# Patient Record
Sex: MALE | Race: WHITE | NOT HISPANIC OR LATINO | URBAN - METROPOLITAN AREA
[De-identification: names, ages, dates, MRNs, and addresses within clinical notes are randomized per-mention and may not be internally consistent; named-entity substitution may affect disease eponyms.]

---

## 2015-09-25 RX ORDER — LISINOPRIL 2.5 MG/1
1 TABLET ORAL
Qty: 0 | Refills: 0 | COMMUNITY
Start: 2015-09-25

## 2017-05-25 ENCOUNTER — INPATIENT (INPATIENT)
Facility: HOSPITAL | Age: 67
LOS: 5 days | Discharge: HOME | DRG: 292 | End: 2017-05-31
Attending: INTERNAL MEDICINE | Admitting: INTERNAL MEDICINE
Payer: COMMERCIAL

## 2017-05-25 VITALS
OXYGEN SATURATION: 97 % | SYSTOLIC BLOOD PRESSURE: 136 MMHG | WEIGHT: 197.98 LBS | RESPIRATION RATE: 20 BRPM | HEART RATE: 64 BPM | DIASTOLIC BLOOD PRESSURE: 82 MMHG | TEMPERATURE: 97 F

## 2017-05-25 DIAGNOSIS — I50.23 ACUTE ON CHRONIC SYSTOLIC (CONGESTIVE) HEART FAILURE: ICD-10-CM

## 2017-05-25 DIAGNOSIS — R05 COUGH: ICD-10-CM

## 2017-05-25 DIAGNOSIS — Z78.9 OTHER SPECIFIED HEALTH STATUS: ICD-10-CM

## 2017-05-25 DIAGNOSIS — E11.9 TYPE 2 DIABETES MELLITUS WITHOUT COMPLICATIONS: ICD-10-CM

## 2017-05-25 DIAGNOSIS — Z95.1 PRESENCE OF AORTOCORONARY BYPASS GRAFT: Chronic | ICD-10-CM

## 2017-05-25 DIAGNOSIS — I10 ESSENTIAL (PRIMARY) HYPERTENSION: ICD-10-CM

## 2017-05-25 DIAGNOSIS — I73.9 PERIPHERAL VASCULAR DISEASE, UNSPECIFIED: ICD-10-CM

## 2017-05-25 DIAGNOSIS — I25.10 ATHEROSCLEROTIC HEART DISEASE OF NATIVE CORONARY ARTERY WITHOUT ANGINA PECTORIS: ICD-10-CM

## 2017-05-25 LAB
ALBUMIN SERPL ELPH-MCNC: 3.9 G/DL — SIGNIFICANT CHANGE UP (ref 3.3–5)
ALP SERPL-CCNC: 158 U/L — HIGH (ref 40–120)
ALT FLD-CCNC: 33 U/L — SIGNIFICANT CHANGE UP (ref 10–45)
ANION GAP SERPL CALC-SCNC: 12 MMOL/L — SIGNIFICANT CHANGE UP (ref 5–17)
APTT BLD: 34 SEC — SIGNIFICANT CHANGE UP (ref 27.5–37.4)
AST SERPL-CCNC: 36 U/L — SIGNIFICANT CHANGE UP (ref 10–40)
BASE EXCESS BLDV CALC-SCNC: 3.6 MMOL/L — SIGNIFICANT CHANGE UP
BASOPHILS NFR BLD AUTO: 0.8 % — SIGNIFICANT CHANGE UP (ref 0–2)
BILIRUB SERPL-MCNC: 0.8 MG/DL — SIGNIFICANT CHANGE UP (ref 0.2–1.2)
BUN SERPL-MCNC: 28 MG/DL — HIGH (ref 7–23)
CALCIUM SERPL-MCNC: 8.8 MG/DL — SIGNIFICANT CHANGE UP (ref 8.4–10.5)
CHLORIDE SERPL-SCNC: 101 MMOL/L — SIGNIFICANT CHANGE UP (ref 96–108)
CK MB CFR SERPL CALC: 4.1 NG/ML — SIGNIFICANT CHANGE UP (ref 0–6.7)
CK MB CFR SERPL CALC: 4.7 NG/ML — SIGNIFICANT CHANGE UP (ref 0–6.7)
CK MB CFR SERPL CALC: 5.2 NG/ML — SIGNIFICANT CHANGE UP (ref 0–6.7)
CK SERPL-CCNC: 112 U/L — SIGNIFICANT CHANGE UP (ref 30–200)
CK SERPL-CCNC: 122 U/L — SIGNIFICANT CHANGE UP (ref 30–200)
CK SERPL-CCNC: 149 U/L — SIGNIFICANT CHANGE UP (ref 30–200)
CO2 SERPL-SCNC: 27 MMOL/L — SIGNIFICANT CHANGE UP (ref 22–31)
CREAT SERPL-MCNC: 1.1 MG/DL — SIGNIFICANT CHANGE UP (ref 0.5–1.3)
EOSINOPHIL NFR BLD AUTO: 6.3 % — HIGH (ref 0–6)
GAS PNL BLDV: SIGNIFICANT CHANGE UP
GLUCOSE SERPL-MCNC: 112 MG/DL — HIGH (ref 70–99)
HCO3 BLDV-SCNC: 28 MMOL/L — HIGH (ref 20–27)
HCT VFR BLD CALC: 41 % — SIGNIFICANT CHANGE UP (ref 39–50)
HGB BLD-MCNC: 13.1 G/DL — SIGNIFICANT CHANGE UP (ref 13–17)
INR BLD: 1.21 — HIGH (ref 0.88–1.16)
LACTATE SERPL-SCNC: 1.1 MMOL/L — SIGNIFICANT CHANGE UP (ref 0.5–2)
LYMPHOCYTES # BLD AUTO: 22.9 % — SIGNIFICANT CHANGE UP (ref 13–44)
MAGNESIUM SERPL-MCNC: 2.4 MG/DL — SIGNIFICANT CHANGE UP (ref 1.6–2.6)
MCHC RBC-ENTMCNC: 27.5 PG — SIGNIFICANT CHANGE UP (ref 27–34)
MCHC RBC-ENTMCNC: 32 G/DL — SIGNIFICANT CHANGE UP (ref 32–36)
MCV RBC AUTO: 86 FL — SIGNIFICANT CHANGE UP (ref 80–100)
MONOCYTES NFR BLD AUTO: 8.2 % — SIGNIFICANT CHANGE UP (ref 2–14)
NEUTROPHILS NFR BLD AUTO: 61.8 % — SIGNIFICANT CHANGE UP (ref 43–77)
NT-PROBNP SERPL-SCNC: 1922 PG/ML — HIGH (ref 0–300)
PCO2 BLDV: 40 MMHG — LOW (ref 41–51)
PH BLDV: 7.46 — HIGH (ref 7.32–7.43)
PLATELET # BLD AUTO: 309 K/UL — SIGNIFICANT CHANGE UP (ref 150–400)
PO2 BLDV: 76 MMHG — SIGNIFICANT CHANGE UP
POTASSIUM SERPL-MCNC: 3.9 MMOL/L — SIGNIFICANT CHANGE UP (ref 3.5–5.3)
POTASSIUM SERPL-SCNC: 3.9 MMOL/L — SIGNIFICANT CHANGE UP (ref 3.5–5.3)
PROT SERPL-MCNC: 7.5 G/DL — SIGNIFICANT CHANGE UP (ref 6–8.3)
PROTHROM AB SERPL-ACNC: 13.5 SEC — HIGH (ref 9.8–12.7)
RBC # BLD: 4.77 M/UL — SIGNIFICANT CHANGE UP (ref 4.2–5.8)
RBC # FLD: 16.2 % — SIGNIFICANT CHANGE UP (ref 10.3–16.9)
SAO2 % BLDV: 95 % — SIGNIFICANT CHANGE UP
SODIUM SERPL-SCNC: 140 MMOL/L — SIGNIFICANT CHANGE UP (ref 135–145)
TROPONIN T SERPL-MCNC: 0.03 NG/ML — HIGH (ref 0–0.01)
WBC # BLD: 8 K/UL — SIGNIFICANT CHANGE UP (ref 3.8–10.5)
WBC # FLD AUTO: 8 K/UL — SIGNIFICANT CHANGE UP (ref 3.8–10.5)

## 2017-05-25 PROCEDURE — 99285 EMERGENCY DEPT VISIT HI MDM: CPT | Mod: 25

## 2017-05-25 PROCEDURE — 93306 TTE W/DOPPLER COMPLETE: CPT | Mod: 26

## 2017-05-25 PROCEDURE — 93010 ELECTROCARDIOGRAM REPORT: CPT

## 2017-05-25 PROCEDURE — 71020: CPT | Mod: 26

## 2017-05-25 RX ORDER — LISINOPRIL 2.5 MG/1
2.5 TABLET ORAL DAILY
Qty: 0 | Refills: 0 | Status: DISCONTINUED | OUTPATIENT
Start: 2017-05-25 | End: 2017-05-27

## 2017-05-25 RX ORDER — GLUCAGON INJECTION, SOLUTION 0.5 MG/.1ML
1 INJECTION, SOLUTION SUBCUTANEOUS ONCE
Qty: 0 | Refills: 0 | Status: DISCONTINUED | OUTPATIENT
Start: 2017-05-25 | End: 2017-05-31

## 2017-05-25 RX ORDER — DEXTROSE 50 % IN WATER 50 %
25 SYRINGE (ML) INTRAVENOUS ONCE
Qty: 0 | Refills: 0 | Status: DISCONTINUED | OUTPATIENT
Start: 2017-05-25 | End: 2017-05-31

## 2017-05-25 RX ORDER — BACITRACIN ZINC 500 UNIT/G
1 OINTMENT IN PACKET (EA) TOPICAL DAILY
Qty: 0 | Refills: 0 | Status: DISCONTINUED | OUTPATIENT
Start: 2017-05-25 | End: 2017-05-31

## 2017-05-25 RX ORDER — CEFTRIAXONE 500 MG/1
1 INJECTION, POWDER, FOR SOLUTION INTRAMUSCULAR; INTRAVENOUS ONCE
Qty: 0 | Refills: 0 | Status: COMPLETED | OUTPATIENT
Start: 2017-05-25 | End: 2017-05-25

## 2017-05-25 RX ORDER — FUROSEMIDE 40 MG
60 TABLET ORAL
Qty: 0 | Refills: 0 | Status: DISCONTINUED | OUTPATIENT
Start: 2017-05-25 | End: 2017-05-27

## 2017-05-25 RX ORDER — DEXTROSE 50 % IN WATER 50 %
1 SYRINGE (ML) INTRAVENOUS ONCE
Qty: 0 | Refills: 0 | Status: DISCONTINUED | OUTPATIENT
Start: 2017-05-25 | End: 2017-05-31

## 2017-05-25 RX ORDER — INSULIN LISPRO 100/ML
VIAL (ML) SUBCUTANEOUS AT BEDTIME
Qty: 0 | Refills: 0 | Status: DISCONTINUED | OUTPATIENT
Start: 2017-05-25 | End: 2017-05-31

## 2017-05-25 RX ORDER — INSULIN LISPRO 100/ML
VIAL (ML) SUBCUTANEOUS
Qty: 0 | Refills: 0 | Status: DISCONTINUED | OUTPATIENT
Start: 2017-05-25 | End: 2017-05-31

## 2017-05-25 RX ORDER — ASPIRIN/CALCIUM CARB/MAGNESIUM 324 MG
81 TABLET ORAL DAILY
Qty: 0 | Refills: 0 | Status: DISCONTINUED | OUTPATIENT
Start: 2017-05-25 | End: 2017-05-31

## 2017-05-25 RX ORDER — MUPIROCIN 20 MG/G
1 OINTMENT TOPICAL
Qty: 0 | Refills: 0 | Status: DISCONTINUED | OUTPATIENT
Start: 2017-05-25 | End: 2017-05-31

## 2017-05-25 RX ORDER — ISOSORBIDE DINITRATE 5 MG/1
20 TABLET ORAL
Qty: 0 | Refills: 0 | Status: DISCONTINUED | OUTPATIENT
Start: 2017-05-25 | End: 2017-05-27

## 2017-05-25 RX ORDER — SODIUM CHLORIDE 9 MG/ML
1000 INJECTION, SOLUTION INTRAVENOUS
Qty: 0 | Refills: 0 | Status: DISCONTINUED | OUTPATIENT
Start: 2017-05-25 | End: 2017-05-31

## 2017-05-25 RX ORDER — DEXTROSE 50 % IN WATER 50 %
12.5 SYRINGE (ML) INTRAVENOUS ONCE
Qty: 0 | Refills: 0 | Status: DISCONTINUED | OUTPATIENT
Start: 2017-05-25 | End: 2017-05-31

## 2017-05-25 RX ORDER — CARVEDILOL PHOSPHATE 80 MG/1
3.12 CAPSULE, EXTENDED RELEASE ORAL EVERY 12 HOURS
Qty: 0 | Refills: 0 | Status: DISCONTINUED | OUTPATIENT
Start: 2017-05-25 | End: 2017-05-31

## 2017-05-25 RX ORDER — HYDRALAZINE HCL 50 MG
25 TABLET ORAL THREE TIMES A DAY
Qty: 0 | Refills: 0 | Status: DISCONTINUED | OUTPATIENT
Start: 2017-05-25 | End: 2017-05-25

## 2017-05-25 RX ORDER — FUROSEMIDE 40 MG
40 TABLET ORAL ONCE
Qty: 0 | Refills: 0 | Status: COMPLETED | OUTPATIENT
Start: 2017-05-25 | End: 2017-05-25

## 2017-05-25 RX ORDER — AZITHROMYCIN 500 MG/1
500 TABLET, FILM COATED ORAL ONCE
Qty: 0 | Refills: 0 | Status: COMPLETED | OUTPATIENT
Start: 2017-05-25 | End: 2017-05-25

## 2017-05-25 RX ORDER — ATORVASTATIN CALCIUM 80 MG/1
10 TABLET, FILM COATED ORAL AT BEDTIME
Qty: 0 | Refills: 0 | Status: DISCONTINUED | OUTPATIENT
Start: 2017-05-25 | End: 2017-05-27

## 2017-05-25 RX ADMIN — Medication: at 06:43

## 2017-05-25 RX ADMIN — Medication 60 MILLIGRAM(S): at 18:34

## 2017-05-25 RX ADMIN — AZITHROMYCIN 255 MILLIGRAM(S): 500 TABLET, FILM COATED ORAL at 05:20

## 2017-05-25 RX ADMIN — Medication 81 MILLIGRAM(S): at 12:04

## 2017-05-25 RX ADMIN — Medication 25 MILLIGRAM(S): at 21:33

## 2017-05-25 RX ADMIN — CARVEDILOL PHOSPHATE 3.12 MILLIGRAM(S): 80 CAPSULE, EXTENDED RELEASE ORAL at 18:34

## 2017-05-25 RX ADMIN — Medication 25 MILLIGRAM(S): at 14:12

## 2017-05-25 RX ADMIN — LISINOPRIL 2.5 MILLIGRAM(S): 2.5 TABLET ORAL at 06:36

## 2017-05-25 RX ADMIN — Medication 40 MILLIGRAM(S): at 04:15

## 2017-05-25 RX ADMIN — Medication 25 MILLIGRAM(S): at 06:36

## 2017-05-25 RX ADMIN — CARVEDILOL PHOSPHATE 3.12 MILLIGRAM(S): 80 CAPSULE, EXTENDED RELEASE ORAL at 06:36

## 2017-05-25 RX ADMIN — ISOSORBIDE DINITRATE 20 MILLIGRAM(S): 5 TABLET ORAL at 06:36

## 2017-05-25 RX ADMIN — CEFTRIAXONE 100 GRAM(S): 500 INJECTION, POWDER, FOR SOLUTION INTRAMUSCULAR; INTRAVENOUS at 04:14

## 2017-05-25 RX ADMIN — ATORVASTATIN CALCIUM 10 MILLIGRAM(S): 80 TABLET, FILM COATED ORAL at 21:32

## 2017-05-25 NOTE — ED ADULT TRIAGE NOTE - CHIEF COMPLAINT QUOTE
shortness of breath for over a week; with cough (thick green phlegm) ; stomach is bloated, legs were swollen

## 2017-05-25 NOTE — ED PROVIDER NOTE - OBJECTIVE STATEMENT
66M hx htn, dm, cabg, chf, c/o SOB. pt states over past 2 weeks worsening SOB.  increased dyspnea on exertion. +cough with green phlegm. +LE swelling. states ran out of lasix a week ago.  occasional chest pain, however none today. no fevers. no vomiting. no sick contacts. no recent travel. 66M hx htn, dm, cabg, chf, c/o SOB. pt states over past 2 weeks worsening SOB.  increased dyspnea on exertion. +cough with green phlegm. +LE swelling. states ran out of lasix a week ago.  denies chest pain. no fevers. no vomiting. no sick contacts. no recent travel. 66M hx htn, dm, cabg, chf (EF 31%), c/o SOB. pt states over past 2 weeks worsening SOB.  increased dyspnea on exertion. +cough with green phlegm. +LE swelling. states ran out of lasix a week ago. occasional chest pressure, however chest pain currently. no fevers. no vomiting. no sick contacts. no recent travel.

## 2017-05-25 NOTE — PROGRESS NOTE ADULT - PROBLEM SELECTOR PLAN 1
Will increase Lasix to 60mg IV BID, Strict I/Os and daily weights; Echo today to assess EF. Cont ACE I. Repeat enzymes at 8AM and 2PM.Will increase Lasix to 60mg IV BID, Strict I/Os and daily weights; Echo ordered to assess EF. Cont ACE I.

## 2017-05-25 NOTE — ED PROVIDER NOTE - PROGRESS NOTE DETAILS
R sided pleural effusion, will give ceft/azithro. slightly elevated troponin - however no chest pain, no EKG changes currently.

## 2017-05-25 NOTE — H&P ADULT - HISTORY OF PRESENT ILLNESS
65 y/o M with Hx of HTN, hyperlipidemia, DM, CAD s/p 3VCABG in 2015, chronic systolic CHF last recorded EF=31%, presents to [facility] with worsening CALLOWAY for past 2 wks, with associated LE edema, and coughing of green sputum. Patient also admits to some chest pressure. He denies diaphoresis, palpitations, N/V, dizziness, LOC, orthopnea/PND, fever/chills. 67 y/o M with Hx of HTN, hyperlipidemia, DM, CAD s/p 3VCABG in 2015, chronic systolic CHF last recorded EF=31%, presents to [facility] with worsening CALLOWAY for past 2 wks with walking 2 blocks or going [place] a flight of subway stairs, with associated LE edema, and coughing of green sputum for past wk. Patient also admits to some chest pain on the L side of his chest, he has had chronically that is intermittent in nature. He describes it as like a "hammer hitting him." He denies diaphoresis, palpitations, N/V, dizziness, LOC, orthopnea/PND, fever/chills, or SOB at rest. 65 y/o male, former smoker, noncompliant with follow up, with PMHx of HTN, hyperlipidemia, NIDDM, CAD s/p 3VCABG in 2015 (at [facility] in [place], [place]), chronic systolic CHF last recorded EF 31%, pulmonary embolism (diagnosed 2-3 months ago, on warfarin last dose 1 week prior to admission), presents to [facility] with worsening CALLOWAY for past 2 wks with walking 2 blocks or going [place] a flight of subway stairs, with associated LE edema, and coughing of green sputum for past wk. Patient also admits to some chest pain on the L side of his chest, he has had chronically that is intermittent in nature. He describes it as like a "hammer hitting him." He denies diaphoresis, palpitations, N/V, dizziness, LOC, orthopnea/PND, fever/chills, or SOB at rest.

## 2017-05-25 NOTE — H&P ADULT - ASSESSMENT
67 y/o M with Hx of HTN, hyperlipidemia, DM, CAD s/p 3VCABG in 2015, chronic systolic CHF last recorded EF=31%, presents to [facility] with worsening CALLOWAY for past 2 wks, with associated LE edema, and coughing of green sputum. In ED CE was esssentially negative x 1 and EKG showed no acute changes. CXR showed pleural effusion and patient was given IV Lasix 40mg x 1. BNP elevated at 1900. Patient's VSS, and appears in no distress. 67 y/o M with Hx of HTN, hyperlipidemia, DM, CAD s/p 3VCABG in 2015, chronic systolic CHF last recorded EF=31%, presents to [facility] with worsening CALLOWAY for past 2 wks with walking 2 blocks or going up a flight of subway stairs, with associated LE edema, and coughing of green sputum for past wk. Patient also admits to some chest pain on the L side of his chest, he has had chronically. In ED CE was essentially negative x 1 and EKG showed no acute changes. CXR showed pleural effusion and patient was given IV Lasix 40mg x 1. BNP elevated at 1900. Patient also received IV Abx to cover for PNA based on patient's symptoms. Patient's VSS, and appears in no distress.

## 2017-05-25 NOTE — H&P ADULT - PROBLEM SELECTOR PLAN 1
Cont Lasix 40mg IV BID, Strict I/Os and daily weights; Echo today to assess EF. Cont ACE I. Will increase Lasix to 60mg IV BID, Strict I/Os and daily weights; Echo today to assess EF. Cont ACE I. Will increase Lasix to 60mg IV BID, Strict I/Os and daily weights; Echo today to assess EF. Cont ACE I. Repeat enzymes at 8AM and 2PM.

## 2017-05-25 NOTE — PROGRESS NOTE ADULT - PROBLEM SELECTOR PLAN 2
Cont ASA/Beta blocker/Statin/ACE. Pt endorses CALLOWAY and Chest heaviness with minimal exertion. Asymptomatic at rest

## 2017-05-25 NOTE — ED ADULT NURSE NOTE - OBJECTIVE STATEMENT
Pt walked into ED with c/o of cough with green sputum, and Left chest pain. Pt states he is SOB when walking up the block. Pt. has tripple bypass 2 years ago and takes coumadin and lasix PO. Pt. states he did not take meds for one week. Pt. denies fever, chills, Ha, LOC, numbness, tingling. PT. denies taking anything for the pain,

## 2017-05-25 NOTE — ED PROVIDER NOTE - PMH
Angina pectoris    CAD (coronary artery disease)    CHF (congestive heart failure)    DM (diabetes mellitus)    Dyslipidemia

## 2017-05-25 NOTE — H&P ADULT - PROBLEM SELECTOR PLAN 5
Hold of Abx for now, patient with no leukocytosis, left shift, or fevers. F/U final CXR results. If warranted may need CT Chest to R/O PNA.

## 2017-05-25 NOTE — PROGRESS NOTE ADULT - SUBJECTIVE AND OBJECTIVE BOX
Interventional Cardiology PA Adult Progress Note    Subjective Assessment:    Endorsing CALLOWAY and chest heaviness with minimal exertion.    MEDICATIONS:  lisinopril 2.5milliGRAM(s) Oral daily  isosorbide   dinitrate Tablet (ISORDIL) 20milliGRAM(s) Oral two times a day  carvedilol 3.125milliGRAM(s) Oral every 12 hours  hydrALAZINE 25milliGRAM(s) Oral three times a day  furosemide   Injectable 60milliGRAM(s) IV Push two times a day  insulin lispro (HumaLOG) corrective regimen sliding scale  SubCutaneous three times a day before meals  insulin lispro (HumaLOG) corrective regimen sliding scale  SubCutaneous at bedtime  dextrose Gel 1Dose(s) Oral once PRN  dextrose 50% Injectable 12.5Gram(s) IV Push once  dextrose 50% Injectable 25Gram(s) IV Push once  dextrose 50% Injectable 25Gram(s) IV Push once  glucagon  Injectable 1milliGRAM(s) IntraMuscular once PRN  atorvastatin 10milliGRAM(s) Oral at bedtime  aspirin enteric coated 81milliGRAM(s) Oral daily  dextrose 5%. 1000milliLiter(s) IV Continuous <Continuous>      	    [PHYSICAL EXAM:  TELEMETRY:  T(C): 36.6, Max: 36.6 (05-25 @ 17:01)  HR: 62 (60 - 64)  BP: 126/69 (98/59 - 155/83)  RR: 16 (16 - 20)  SpO2: 98% (96% - 100%)  Wt(kg): --  I&O's Summary    I & Os for current day (as of 25 May 2017 19:43)  =============================================  IN: 0 ml / OUT: 210 ml / NET: -210 ml      Weight (kg): 89.8 (05-25 @ 02:29)  Li: none  Central/PICC/Mid Line:     none                                    Appearance: Normal	  HEENT:   Normal oral mucosa, PERRL, EOMI	  Neck: Supple, + JVD/ - JVD; Carotid Bruit   Cardiovascular: Normal S1 S2, No JVD, No murmurs,   Respiratory: Lungs clear to auscultation b/l, No Rales, Rhonchi, Wheezing	  Gastrointestinal:  Soft, Non-tender, + BS	  Skin: PVD changes on shins to feet. 0.5cm circular superficial skin breakdown x 2 on right shin. No errythema around site. Minimal tenderness to palpation  Extremities: trace pitting edema b/l  Vascular: Peripheral pulses palpable 2+ bilaterally  Neurologic: Non-focal  Psychiatry: A & O x 3, Mood & affect appropriate    LABS:	 	  CARDIAC MARKERS:                        13.1   8.0   )-----------( 309      ( 25 May 2017 02:58 )             41.0     05-25    140  |  101  |  28<H>  ----------------------------<  112<H>  3.9   |  27  |  1.10    Ca    8.8      25 May 2017 02:58  Mg     2.4     05-25    TPro  7.5  /  Alb  3.9  /  TBili  0.8  /  DBili  x   /  AST  36  /  ALT  33  /  AlkPhos  158<H>  05-25    proBNP: Serum Pro-Brain Natriuretic Peptide: 1922 pg/mL (05-25 @ 02:58)  PT/INR - ( 25 May 2017 02:58 )   PT: 13.5 sec;   INR: 1.21       PTT - ( 25 May 2017 02:58 )  PTT:34.0 sec    ASSESSMENT/PLAN:

## 2017-05-26 DIAGNOSIS — I26.99 OTHER PULMONARY EMBOLISM WITHOUT ACUTE COR PULMONALE: ICD-10-CM

## 2017-05-26 DIAGNOSIS — R05 COUGH: ICD-10-CM

## 2017-05-26 LAB
ANION GAP SERPL CALC-SCNC: 15 MMOL/L — SIGNIFICANT CHANGE UP (ref 5–17)
APTT BLD: 31.9 SEC — SIGNIFICANT CHANGE UP (ref 27.5–37.4)
BUN SERPL-MCNC: 38 MG/DL — HIGH (ref 7–23)
CALCIUM SERPL-MCNC: 9 MG/DL — SIGNIFICANT CHANGE UP (ref 8.4–10.5)
CHLORIDE SERPL-SCNC: 98 MMOL/L — SIGNIFICANT CHANGE UP (ref 96–108)
CO2 SERPL-SCNC: 25 MMOL/L — SIGNIFICANT CHANGE UP (ref 22–31)
CREAT SERPL-MCNC: 1.2 MG/DL — SIGNIFICANT CHANGE UP (ref 0.5–1.3)
D DIMER BLD IA.RAPID-MCNC: 2569 NG/ML DDU — HIGH
GLUCOSE SERPL-MCNC: 134 MG/DL — HIGH (ref 70–99)
HBA1C BLD-MCNC: 6 % — HIGH (ref 4–5.6)
HCT VFR BLD CALC: 44 % — SIGNIFICANT CHANGE UP (ref 39–50)
HGB BLD-MCNC: 14.1 G/DL — SIGNIFICANT CHANGE UP (ref 13–17)
INR BLD: 1.16 — SIGNIFICANT CHANGE UP (ref 0.88–1.16)
MAGNESIUM SERPL-MCNC: 2.4 MG/DL — SIGNIFICANT CHANGE UP (ref 1.6–2.6)
MCHC RBC-ENTMCNC: 27.4 PG — SIGNIFICANT CHANGE UP (ref 27–34)
MCHC RBC-ENTMCNC: 32 G/DL — SIGNIFICANT CHANGE UP (ref 32–36)
MCV RBC AUTO: 85.6 FL — SIGNIFICANT CHANGE UP (ref 80–100)
PLATELET # BLD AUTO: 312 K/UL — SIGNIFICANT CHANGE UP (ref 150–400)
POTASSIUM SERPL-MCNC: 4.4 MMOL/L — SIGNIFICANT CHANGE UP (ref 3.5–5.3)
POTASSIUM SERPL-SCNC: 4.4 MMOL/L — SIGNIFICANT CHANGE UP (ref 3.5–5.3)
PROTHROM AB SERPL-ACNC: 12.9 SEC — HIGH (ref 9.8–12.7)
RBC # BLD: 5.14 M/UL — SIGNIFICANT CHANGE UP (ref 4.2–5.8)
RBC # FLD: 16.4 % — SIGNIFICANT CHANGE UP (ref 10.3–16.9)
SODIUM SERPL-SCNC: 138 MMOL/L — SIGNIFICANT CHANGE UP (ref 135–145)
WBC # BLD: 8.3 K/UL — SIGNIFICANT CHANGE UP (ref 3.8–10.5)
WBC # FLD AUTO: 8.3 K/UL — SIGNIFICANT CHANGE UP (ref 3.8–10.5)

## 2017-05-26 PROCEDURE — 71275 CT ANGIOGRAPHY CHEST: CPT | Mod: 26

## 2017-05-26 PROCEDURE — 93970 EXTREMITY STUDY: CPT | Mod: 26

## 2017-05-26 PROCEDURE — 99222 1ST HOSP IP/OBS MODERATE 55: CPT

## 2017-05-26 PROCEDURE — 93880 EXTRACRANIAL BILAT STUDY: CPT | Mod: 26

## 2017-05-26 PROCEDURE — 99223 1ST HOSP IP/OBS HIGH 75: CPT

## 2017-05-26 RX ORDER — HEPARIN SODIUM 5000 [USP'U]/ML
3500 INJECTION INTRAVENOUS; SUBCUTANEOUS EVERY 6 HOURS
Qty: 0 | Refills: 0 | Status: DISCONTINUED | OUTPATIENT
Start: 2017-05-26 | End: 2017-05-26

## 2017-05-26 RX ORDER — HEPARIN SODIUM 5000 [USP'U]/ML
7000 INJECTION INTRAVENOUS; SUBCUTANEOUS EVERY 6 HOURS
Qty: 0 | Refills: 0 | Status: DISCONTINUED | OUTPATIENT
Start: 2017-05-26 | End: 2017-05-26

## 2017-05-26 RX ORDER — HEPARIN SODIUM 5000 [USP'U]/ML
7000 INJECTION INTRAVENOUS; SUBCUTANEOUS ONCE
Qty: 0 | Refills: 0 | Status: DISCONTINUED | OUTPATIENT
Start: 2017-05-26 | End: 2017-05-26

## 2017-05-26 RX ORDER — HEPARIN SODIUM 5000 [USP'U]/ML
INJECTION INTRAVENOUS; SUBCUTANEOUS
Qty: 25000 | Refills: 0 | Status: DISCONTINUED | OUTPATIENT
Start: 2017-05-26 | End: 2017-05-26

## 2017-05-26 RX ADMIN — ISOSORBIDE DINITRATE 20 MILLIGRAM(S): 5 TABLET ORAL at 18:40

## 2017-05-26 RX ADMIN — Medication 1 APPLICATION(S): at 14:44

## 2017-05-26 RX ADMIN — Medication 60 MILLIGRAM(S): at 18:40

## 2017-05-26 RX ADMIN — Medication 81 MILLIGRAM(S): at 14:44

## 2017-05-26 RX ADMIN — LISINOPRIL 2.5 MILLIGRAM(S): 2.5 TABLET ORAL at 06:48

## 2017-05-26 RX ADMIN — CARVEDILOL PHOSPHATE 3.12 MILLIGRAM(S): 80 CAPSULE, EXTENDED RELEASE ORAL at 10:49

## 2017-05-26 RX ADMIN — ISOSORBIDE DINITRATE 20 MILLIGRAM(S): 5 TABLET ORAL at 06:56

## 2017-05-26 RX ADMIN — CARVEDILOL PHOSPHATE 3.12 MILLIGRAM(S): 80 CAPSULE, EXTENDED RELEASE ORAL at 21:44

## 2017-05-26 RX ADMIN — MUPIROCIN 1 APPLICATION(S): 20 OINTMENT TOPICAL at 21:43

## 2017-05-26 RX ADMIN — Medication 60 MILLIGRAM(S): at 06:54

## 2017-05-26 RX ADMIN — ATORVASTATIN CALCIUM 10 MILLIGRAM(S): 80 TABLET, FILM COATED ORAL at 21:44

## 2017-05-26 NOTE — CONSULT NOTE ADULT - SUBJECTIVE AND OBJECTIVE BOX
Patient is a 66y old  Male who presents with a chief complaint of       Patient is a poor historian.      HPI:  67 y/o male, noncompliant with follow up, with PMHx of HTN, hyperlipidemia, NIDDM, CAD s/p 3VCABG in 2015 (at [facility] in [place], [place]), chronic systolic CHF last recorded EF 31%, pulmonary embolism (diagnosed 2-3 months ago, on warfarin last dose 1 week prior to admission), presents to [facility] with worsening CALLOWAY for past 2 wks with walking 2 blocks or going up a flight of subway stairs, with associated LE edema, and coughing of green sputum for past wk no fever or chills, no known sick contacts. No hx of any lung disease. Reports a history of PE and taking coumadin sporadically for a few months. Recently at [facility] last month for "heart problems" was told he had fluid around the lung on the right had an ultrasound done and no thoracentesis was done.   CTA here shows no PE, but moderate right effusion with overlying atelectasis and asked to evaluate for pneumonia.         Allergies: NKDA      Meds:      PAST MEDICAL & SURGICAL HISTORY:  Angina pectoris  CHF (congestive heart failure)  Dyslipidemia  DM (diabetes mellitus)  CAD (coronary artery disease)  No pertinent past medical history  S/P CABG x 3  ?PE      Family history:  FAMILY HISTORY:      Social history:      REVIEW OF SYSTEMS:  Constitutional: No fever, weight loss or fatigue  Eyes: No eye pain, visual disturbances, or discharge  ENMT:  No difficulty hearing, tinnitus, vertigo; No sinus or throat pain  Neck: No pain, stiffness or neck swelling  Respiratory: No cough, wheezing, chills or hemoptysis  Cardiovascular: No chest pain, palpitations, dizziness or leg swelling  Gastrointestinal: No abdominal or epigastric pain. No nausea, vomiting or hematemesis; No diarrhea or constipation. No melena or hematochezia.  Genitourinary: No dysuria, frequency, hematuria or incontinence  Neurological: No headaches, memory loss, loss of strength, numbness or tremors  Skin: No itching, burning, rashes or lesions   Lymph Nodes: No enlarged glands  Endocrine: No heat or cold intolerance; No hair loss  Musculoskeletal: No joint pain or swelling; No muscle, back or extremity pain  Psychiatric: No depression, anxiety, mood swings or difficulty sleeping  Heme/Lymph: No easy bruising or bleeding gums  Allergy and Immunologic: No hives or eczema          Vital Signs Last 24 Hrs  T(C): 35.9, Max: 36.6 (05-25 @ 17:01)  T(F): 96.7, Max: 97.8 (05-25 @ 17:01)  HR: 60 (60 - 64)  BP: 111/61 (111/61 - 155/83)  BP(mean): --  RR: 18 (16 - 18)  SpO2: 96% (95% - 98%)  I & Os for 24h ending 05-26 @ 07:00  =============================================  IN: 0 ml / OUT: 1710 ml / NET: -1710 ml    I & Os for current day (as of 05-26 @ 14:23)  =============================================  IN: 0 ml / OUT: 1050 ml / NET: -1050 ml        PHYSICAL EXAM:    General: Well developed; well nourished; in no acute distress  Eyes: PERRL, EOM intact; conjunctiva and sclera clear  Head: Normocephalic; atraumatic  ENMT: No nasal discharge; airway clear  Neck: Supple; non tender; no masses  Respiratory: Clear to auscultation bilaterally without wheezing, rhonchi or rales  Cardiovascular: Regular rate and rhythm. S1 and S2 Normal; No murmurs, gallops or rubs  Gastrointestinal: Soft non-tender non-distended; Normal bowel sounds; No hepatosplenomegaly  Genitourinary: No costovertebral angle tenderness  Extremities: Normal range of motion, No clubbing, cyanosis or edema  Vascular: Peripheral pulses palpable 2+ bilaterally  Neurological: Alert and oriented x3  Skin: Warm and dry. No obvious rash  Lymph Nodes: No acute cervical or supraclavicular adenopathy  Musculoskeletal: Normal gait, tone, without deformities  Psychiatric: Cooperative and appropriate mood    LABS:      CBC Full  -  ( 26 May 2017 07:21 )  WBC Count : 8.3 K/uL  Hemoglobin : 14.1 g/dL  Hematocrit : 44.0 %  Platelet Count - Automated : 312 K/uL  Mean Cell Volume : 85.6 fL  Mean Cell Hemoglobin : 27.4 pg  Mean Cell Hemoglobin Concentration : 32.0 g/dL  Auto Neutrophil # : x  Auto Lymphocyte # : x  Auto Monocyte # : x  Auto Eosinophil # : x  Auto Basophil # : x  Auto Neutrophil % : x  Auto Lymphocyte % : x  Auto Monocyte % : x  Auto Eosinophil % : x  Auto Basophil % : x    05-26    138  |  98  |  38<H>  ----------------------------<  134<H>  4.4   |  25  |  1.20    Ca    9.0      26 May 2017 07:13  Mg     2.4     05-26    TPro  7.5  /  Alb  3.9  /  TBili  0.8  /  DBili  x   /  AST  36  /  ALT  33  /  AlkPhos  158<H>  05-25    PT/INR - ( 26 May 2017 10:04 )   PT: 12.9 sec;   INR: 1.16          PTT - ( 26 May 2017 10:04 )  PTT:31.9 sec                  RADIOLOGY & ADDITIONAL STUDIES (The following images were personally reviewed):

## 2017-05-26 NOTE — PROGRESS NOTE ADULT - SUBJECTIVE AND OBJECTIVE BOX
Interventional Cardiology PA Adult Progress Note    Subjective Assessment:    Asymptomatic. CALLOWAY and CP with exertion of a 1-2 flights of stairs. Pt not ambulating that much at present  	  MEDICATIONS:  lisinopril 2.5milliGRAM(s) Oral daily  isosorbide   dinitrate Tablet (ISORDIL) 20milliGRAM(s) Oral two times a day  carvedilol 3.125milliGRAM(s) Oral every 12 hours  furosemide   Injectable 60milliGRAM(s) IV Push two times a day  insulin lispro (HumaLOG) corrective regimen sliding scale  SubCutaneous three times a day before meals  insulin lispro (HumaLOG) corrective regimen sliding scale  SubCutaneous at bedtime  dextrose Gel 1Dose(s) Oral once PRN  dextrose 50% Injectable 12.5Gram(s) IV Push once  dextrose 50% Injectable 25Gram(s) IV Push once  dextrose 50% Injectable 25Gram(s) IV Push once  glucagon  Injectable 1milliGRAM(s) IntraMuscular once PRN  atorvastatin 10milliGRAM(s) Oral at bedtime  aspirin enteric coated 81milliGRAM(s) Oral daily  dextrose 5%. 1000milliLiter(s) IV Continuous <Continuous>  mupirocin 2% Ointment 1Application(s) Topical two times a day  BACItracin   Ointment 1Application(s) Topical daily      	    [PHYSICAL EXAM:  TELEMETRY:  T(C): 36.2, Max: 36.6 (05-26 @ 03:40)  HR: 54 (54 - 64)  BP: 120/71 (111/61 - 148/85)  RR: 16 (16 - 18)  SpO2: 96% (95% - 98%)  Wt(kg): --  I&O's Summary  I & Os for 24h ending 26 May 2017 07:00  =============================================  IN: 0 ml / OUT: 1710 ml / NET: -1710 ml    I & Os for current day (as of 26 May 2017 19:53)  =============================================  IN: 0 ml / OUT: 1050 ml / NET: -1050 ml      Li: none  Central/PICC/Mid Line: none                                         Appearance: Normal	  Cardiovascular: Normal S1 S2, systolic murmur,   Respiratory: decreased breath sounds in RLL  Gastrointestinal:  Soft, Non-tender, + BS	  Skin: No rashes, No ecchymoses, No cyanosis  Extremities: Normal range of motion, No clubbing, cyanosis or edema  Vascular: Peripheral pulses palpable 2+ bilaterally  Neurologic: Non-focal  Psychiatry: A & O x 3, Mood & affect appropriate    LABS:	 	  CARDIAC MARKERS:                          14.1   8.3   )-----------( 312      ( 26 May 2017 07:21 )             44.0     05-26    138  |  98  |  38<H>  ----------------------------<  134<H>  4.4   |  25  |  1.20    Ca    9.0      26 May 2017 07:13  Mg     2.4     05-26    TPro  7.5  /  Alb  3.9  /  TBili  0.8  /  DBili  x   /  AST  36  /  ALT  33  /  AlkPhos  158<H>  05-25    proBNP:   Lipid Profile:   HgA1c: Hemoglobin A1C, Whole Blood: 6.0 % (05-26 @ 07:13)    TSH:   PT/INR - ( 26 May 2017 10:04 )   PT: 12.9 sec;   INR: 1.16          PTT - ( 26 May 2017 10:04 )  PTT:31.9 sec    ASSESSMENT/PLAN: 	        DVT ppx:  Dispo:

## 2017-05-26 NOTE — CONSULT NOTE ADULT - PROBLEM SELECTOR RECOMMENDATION 9
His cough is related to a bronchitis/sinusitis with PND. There has been no fever or elevation in his WBC. The CT findings are more consistent with an effusion causing compressive atelectasis.   No abx needed at this time.    He does have a right sided effusion - have offered a diagnostic thoracentesis to evaluate the fluid, he would rather see if it resolves with diuretics at this time. If it does not resolve he is willing to undergo a diagnostic thoracentesis.

## 2017-05-26 NOTE — CONSULT NOTE ADULT - SUBJECTIVE AND OBJECTIVE BOX
CHIEF COMPLAINT: SOB    HISTORY OF PRESENT ILLNESS: 65 y/o M with history  of HTN, hyperlipidemia, DM, CAD s/p 3VCABG in 2015, chronic systolic CHF last  EF=31%, admitted with worsening CALLOWAY for past 2 wks , SOB and  LE  edema, coughing of green sputum for past wk.  Patient is now treated for CHF exacerbation and  PNA, latest echo showed EF 20-25%  EPS called to evaluate for possible ICD implantation.    PAST MEDICAL & SURGICAL HISTORY:  Angina pectoris  CHF (congestive heart failure)  Dyslipidemia  DM (diabetes mellitus)  CAD (coronary artery disease)  No pertinent past medical history  S/P CABG x 3        PERTINENT DIAGNOSTIC TESTING:    [ ] Echocardiogram: A complete two-dimensional transthoracic echocardiogram was performed (2D,   M-mode, spectral and color flow doppler).  Study Quality: Poor.After   verbal   consent obtained, contrast injection of 2ml of diluted Definity contrast   (1.3ml Definity diluted in 8.7ml Saline) were given to enhance   endocardial   resolution.  Normal left ventricular size and wall thickness.There is   severe   global hypokinesis of the left ventricle.The left ventricular ejection   fraction is estimated to be 20-25%The left atrium is dilated.Right   atrial size   is normal.The right ventricle is normal in size and function.Calcified   aortic   valve.No aortic regurgitation noted.There is Moderate aortic stenosis.The   aortic valve area, calculated by planimetry, is 1.2 cm2.The peak pressure   gradient is 17 mmHg.The mean pressure gradient is 11 mmHg.The calculated   stroke volume index is 22 cc/m2 (normal >35cc/m2).There is mild to   moderate   mitral valve thickening.Mitral chordal calcification.No mitral   regurgitation   noted.Structurally normal tricuspid valve.There is mild tricuspid   regurgitation.There is mild pulmonary hypertension.The tricuspid   regurgitation   vena contracta is 47 cm.No aortic root dilatation.There is no pericardial   effusion.Low gradient may be due to low stroke volume.          Allergies    Aldactone (Unknown)  metoprolol (Unknown)  spironolactone (Unknown)    Intolerances    	    MEDICATIONS:  lisinopril 2.5milliGRAM(s) Oral daily  isosorbide   dinitrate Tablet (ISORDIL) 20milliGRAM(s) Oral two times a day  carvedilol 3.125milliGRAM(s) Oral every 12 hours  furosemide   Injectable 60milliGRAM(s) IV Push two times a day  insulin lispro (HumaLOG) corrective regimen sliding scale  SubCutaneous three times a day before meals  insulin lispro (HumaLOG) corrective regimen sliding scale  SubCutaneous at bedtime  dextrose Gel 1Dose(s) Oral once PRN  dextrose 50% Injectable 12.5Gram(s) IV Push once  dextrose 50% Injectable 25Gram(s) IV Push once  dextrose 50% Injectable 25Gram(s) IV Push once  glucagon  Injectable 1milliGRAM(s) IntraMuscular once PRN  atorvastatin 10milliGRAM(s) Oral at bedtime  aspirin enteric coated 81milliGRAM(s) Oral daily  dextrose 5%. 1000milliLiter(s) IV Continuous <Continuous>  mupirocin 2% Ointment 1Application(s) Topical two times a day  BACItracin   Ointment 1Application(s) Topical daily      FAMILY HISTORY:      SOCIAL HISTORY:    [x] Non-smoker      REVIEW OF SYSTEMS:    CONSTITUTIONAL: No fever, weight loss, + fatigue  EYES: No eye pain, visual disturbances, or discharge  ENMT:  No difficulty hearing, tinnitus, vertigo; No sinus or throat pain  NECK: No pain or stiffness  RESPIRATORY: No cough, wheezing, chills or hemoptysis; + Shortness of Breath  CARDIOVASCULAR: No chest pain, palpitations, dizziness, +  leg swelling  GASTROINTESTINAL: No abdominal or epigastric pain. No nausea, vomiting, or hematemesis; No diarrhea or constipation.   GENITOURINARY: No dysuria, frequency, hematuria, or incontinence  NEUROLOGICAL: No headaches, memory loss, loss of strength, numbness, or tremors  SKIN: No itching, burning, rashes, or lesions   LYMPH Nodes: No enlarged glands  ENDOCRINE: No heat or cold intolerance; No hair loss  CTA: chest:  	  No pulmonary embolism.    Cardiomegaly.    Moderate size right pleural effusion.    Dependent consolidations in the right lower lobe and right middle lobe,   probably atelectatic. Pneumonia/aspiration is not excluded.     PHYSICAL EXAM:  T(C): 35.9, Max: 36.6 (05-25 @ 17:01)  HR: 60 (60 - 64)  BP: 111/61 (111/61 - 155/83)  RR: 18 (16 - 18)  SpO2: 96% (95% - 98%)  Wt(kg): --  I&O's Summary  I & Os for 24h ending 26 May 2017 07:00  =============================================  IN: 0 ml / OUT: 1710 ml / NET: -1710 ml    I & Os for current day (as of 26 May 2017 14:23)  =============================================  IN: 0 ml / OUT: 1050 ml / NET: -1050 ml      TELEMETRY: 	  SR  ECG: Ventricular Rate 70 BPM    Atrial Rate 70 BPM    P-R Interval 284 ms    QRS Duration 110 ms    Q-T Interval 476 ms    QTC Calculation(Bezet) 514 ms    P Axis 21 degrees    R Axis 67 degrees    T Axis 166 degrees    Diagnosis Line Sinus rhythm with 1st degree AV block  Possible Inferior infarct , age undetermined  T wave abnormality, consider lateral ischemia  Prolonged QT    Appearance: Normal	  HEENT:   Normal oral mucosa, PERRL, EOMI	  Cardiovascular: Normal S1 S2, No JVD, + murmurs, + edema  Respiratory: BS decreased R>L	  Gastrointestinal:  Soft, Non-tender, + BS	  Neurologic: A&O x 3  Extremities: + edema      	  LABS:	 	    CARDIAC MARKERS:                                  14.1   8.3   )-----------( 312      ( 26 May 2017 07:21 )             44.0     05-26    138  |  98  |  38<H>  ----------------------------<  134<H>  4.4   |  25  |  1.20    Ca    9.0      26 May 2017 07:13  Mg     2.4     05-26    TPro  7.5  /  Alb  3.9  /  TBili  0.8  /  DBili  x   /  AST  36  /  ALT  33  /  AlkPhos  158<H>  05-25    proBNP:   Lipid Profile:   HgA1c: Hemoglobin A1C, Whole Blood: 6.0 % (05-26 @ 07:13)    TSH:     ASSESSMENT/PLAN: 	  65 y/o M with history  of HTN, hyperlipidemia, DM, CAD s/p 3VCABG in 2015, chronic systolic CHF last  EF=31%, admitted with worsening CALLOWAY for past 2 wks , SOB and  LE  edema, coughing of green sputum for past wk.  Patient is now treated for CHF exacerbation and  PNA, latest echo showed EF 20-25%   Patient with history of depressed ef 31% (9/25/15) to 20-25%, would optimize HF medication, continue diuresing and treat pneumonia and follow up as out patient to schedule ICD implant. CHIEF COMPLAINT: SOB    HISTORY OF PRESENT ILLNESS: 67 y/o M with history  of HTN, hyperlipidemia, DM, CAD s/p 3VCABG in 2015, chronic systolic CHF last  EF=31%, admitted with worsening CALLOWAY for past 2 wks , SOB and  LE  edema, coughing of green sputum for past wk.  Patient is now treated for CHF exacerbation and  PNA, latest echo showed EF 20-25%  EPS called to evaluate for possible ICD implantation.    PAST MEDICAL & SURGICAL HISTORY:  Angina pectoris  CHF (congestive heart failure)  Dyslipidemia  DM (diabetes mellitus)  CAD (coronary artery disease)  No pertinent past medical history  S/P CABG x 3        PERTINENT DIAGNOSTIC TESTING:    [ ] Echocardiogram: A complete two-dimensional transthoracic echocardiogram was performed (2D,   M-mode, spectral and color flow doppler).  Study Quality: Poor.After   verbal   consent obtained, contrast injection of 2ml of diluted Definity contrast   (1.3ml Definity diluted in 8.7ml Saline) were given to enhance   endocardial   resolution.  Normal left ventricular size and wall thickness.There is   severe   global hypokinesis of the left ventricle.The left ventricular ejection   fraction is estimated to be 20-25%The left atrium is dilated.Right   atrial size   is normal.The right ventricle is normal in size and function.Calcified   aortic   valve.No aortic regurgitation noted.There is Moderate aortic stenosis.The   aortic valve area, calculated by planimetry, is 1.2 cm2.The peak pressure   gradient is 17 mmHg.The mean pressure gradient is 11 mmHg.The calculated   stroke volume index is 22 cc/m2 (normal >35cc/m2).There is mild to   moderate   mitral valve thickening.Mitral chordal calcification.No mitral   regurgitation   noted.Structurally normal tricuspid valve.There is mild tricuspid   regurgitation.There is mild pulmonary hypertension.The tricuspid   regurgitation   vena contracta is 47 cm.No aortic root dilatation.There is no pericardial   effusion.Low gradient may be due to low stroke volume.          Allergies    Aldactone (Unknown)  metoprolol (Unknown)  spironolactone (Unknown)    Intolerances    	    MEDICATIONS:  lisinopril 2.5milliGRAM(s) Oral daily  isosorbide   dinitrate Tablet (ISORDIL) 20milliGRAM(s) Oral two times a day  carvedilol 3.125milliGRAM(s) Oral every 12 hours  furosemide   Injectable 60milliGRAM(s) IV Push two times a day  insulin lispro (HumaLOG) corrective regimen sliding scale  SubCutaneous three times a day before meals  insulin lispro (HumaLOG) corrective regimen sliding scale  SubCutaneous at bedtime  dextrose Gel 1Dose(s) Oral once PRN  dextrose 50% Injectable 12.5Gram(s) IV Push once  dextrose 50% Injectable 25Gram(s) IV Push once  dextrose 50% Injectable 25Gram(s) IV Push once  glucagon  Injectable 1milliGRAM(s) IntraMuscular once PRN  atorvastatin 10milliGRAM(s) Oral at bedtime  aspirin enteric coated 81milliGRAM(s) Oral daily  dextrose 5%. 1000milliLiter(s) IV Continuous <Continuous>  mupirocin 2% Ointment 1Application(s) Topical two times a day  BACItracin   Ointment 1Application(s) Topical daily      FAMILY HISTORY:      SOCIAL HISTORY:    [x] Non-smoker      REVIEW OF SYSTEMS:    CONSTITUTIONAL: No fever, weight loss, + fatigue  EYES: No eye pain, visual disturbances, or discharge  ENMT:  No difficulty hearing, tinnitus, vertigo; No sinus or throat pain  NECK: No pain or stiffness  RESPIRATORY: No cough, wheezing, chills or hemoptysis; + Shortness of Breath  CARDIOVASCULAR: No chest pain, palpitations, dizziness, +  leg swelling  GASTROINTESTINAL: No abdominal or epigastric pain. No nausea, vomiting, or hematemesis; No diarrhea or constipation.   GENITOURINARY: No dysuria, frequency, hematuria, or incontinence  NEUROLOGICAL: No headaches, memory loss, loss of strength, numbness, or tremors  SKIN: No itching, burning, rashes, or lesions   LYMPH Nodes: No enlarged glands  ENDOCRINE: No heat or cold intolerance; No hair loss  CTA: chest:  	  No pulmonary embolism.    Cardiomegaly.    Moderate size right pleural effusion.    Dependent consolidations in the right lower lobe and right middle lobe,   probably atelectatic. Pneumonia/aspiration is not excluded.     PHYSICAL EXAM:  T(C): 35.9, Max: 36.6 (05-25 @ 17:01)  HR: 60 (60 - 64)  BP: 111/61 (111/61 - 155/83)  RR: 18 (16 - 18)  SpO2: 96% (95% - 98%)  Wt(kg): --  I&O's Summary  I & Os for 24h ending 26 May 2017 07:00  =============================================  IN: 0 ml / OUT: 1710 ml / NET: -1710 ml    I & Os for current day (as of 26 May 2017 14:23)  =============================================  IN: 0 ml / OUT: 1050 ml / NET: -1050 ml      TELEMETRY: 	  SR  ECG: Ventricular Rate 70 BPM    Atrial Rate 70 BPM    P-R Interval 284 ms    QRS Duration 110 ms    Q-T Interval 476 ms    QTC Calculation(Bezet) 514 ms    P Axis 21 degrees    R Axis 67 degrees    T Axis 166 degrees    Diagnosis Line Sinus rhythm with 1st degree AV block  Possible Inferior infarct , age undetermined  T wave abnormality, consider lateral ischemia  Prolonged QT    Appearance: Normal	  HEENT:   Normal oral mucosa, PERRL, EOMI	  Cardiovascular: Normal S1 S2, No JVD, + murmurs, + edema  Respiratory: BS decreased R>L	  Gastrointestinal:  Soft, Non-tender, + BS	  Neurologic: A&O x 3  Extremities: + edema      	  LABS:	 	    CARDIAC MARKERS:                                  14.1   8.3   )-----------( 312      ( 26 May 2017 07:21 )             44.0     05-26    138  |  98  |  38<H>  ----------------------------<  134<H>  4.4   |  25  |  1.20    Ca    9.0      26 May 2017 07:13  Mg     2.4     05-26    TPro  7.5  /  Alb  3.9  /  TBili  0.8  /  DBili  x   /  AST  36  /  ALT  33  /  AlkPhos  158<H>  05-25    proBNP:   Lipid Profile:   HgA1c: Hemoglobin A1C, Whole Blood: 6.0 % (05-26 @ 07:13)    TSH:     ASSESSMENT/PLAN: 	  67 y/o M with history  of HTN, hyperlipidemia, DM, CAD s/p 3VCABG in 2015, chronic systolic CHF last  EF=31%, admitted with worsening CALLOWAY for past 2 wks , SOB and  LE  edema, coughing of green sputum for past wk.  Patient is now treated for CHF exacerbation and  PNA, latest echo showed EF 20-25%   Patient with history of depressed ef 31% (9/25/15) to 20-25%, would optimize HF medication, continue diuresing and evaluate cause of effusion. follow up as out patient to schedule ICD implant.

## 2017-05-26 NOTE — PROGRESS NOTE ADULT - PROBLEM SELECTOR PLAN 1
Will increase Lasix to 60mg IV BID, Strict I/Os and daily weights; Echo shows EF 20-25% with moderate AR. Cont ACE I. Repeat enzymes at 8AM and 2PM.Will increase Lasix to 60mg IV BID, Strict I/Os and daily weights. Echo shoe. Cont ACE I. Pt. had 5beats of VT asymptomatic 3AM sleeping. Pulm consulted for moderate right pleural effusion and offered to do a thoracentesis but pt wants to see if it decreases with diuresis.

## 2017-05-26 NOTE — PROVIDER CONTACT NOTE (OTHER) - ACTION/TREATMENT ORDERED:
RN contacted LARA Turner and informed her of situation and of pt's request. LARA Turner spoke with pt.

## 2017-05-26 NOTE — CONSULT NOTE ADULT - PROBLEM SELECTOR RECOMMENDATION 2
Hx of ?PE on coumadin. No visible PE on our CTA.  - The records of when and where it was diagnosed should be obtained and he should complete a course of anti-coagulation. Perhaps something other than coumadin given his difficulty with compliance.

## 2017-05-26 NOTE — PROGRESS NOTE ADULT - PROBLEM SELECTOR PLAN 2
Pt had h/o PE. He said he was diagnosed 2-3 months ago but his pharmacy has had Warfarin prescribed for many months. Will follow up exactly how long he's been on it to see if he needs it. CTA chest r/o PE shows moderate right lower lobe pleural effusion. Dependent consolidations in the right lower lobe and right middle lobe, probably atelectatic. No pulmonary embolism seen. LE duplex negative for DVT.

## 2017-05-26 NOTE — ADVANCED PRACTICE NURSE CONSULT - ASSESSMENT
Patient presented with 3 healed ulcers on RLE, 2 on anterior tibial aspect and one on medial aspect.   Re-applied bandaids to healed ulcers, as requested by patient.  Informed house PA of assessment.

## 2017-05-26 NOTE — ADVANCED PRACTICE NURSE CONSULT - REASON FOR CONSULT
WOC nurse consult to assess RLE ulcers. for 67 y/o male, former smoker, noncompliant with follow up, with PMHx of HTN, hyperlipidemia, NIDDM, CAD s/p 3VCABG in 2015 (at [facility] in [place], [place]), chronic systolic CHF last recorded EF 31%, pulmonary embolism (diagnosed 2-3 months ago, on warfarin last dose 1 week prior to admission), presents to [facility] with worsening [place]AY for past 2 wks with walking 2 blocks or going up a flight of subway stairs, with associated LE edema, and coughing of green sputum for past wk. Patient also admits to some chest pain on the L side of his chest, he has had chronically that is intermittent in nature.

## 2017-05-27 DIAGNOSIS — J90 PLEURAL EFFUSION, NOT ELSEWHERE CLASSIFIED: ICD-10-CM

## 2017-05-27 LAB
ALBUMIN SERPL ELPH-MCNC: 3.7 G/DL — SIGNIFICANT CHANGE UP (ref 3.3–5)
ALP SERPL-CCNC: 131 U/L — HIGH (ref 40–120)
ALT FLD-CCNC: 22 U/L — SIGNIFICANT CHANGE UP (ref 10–45)
ANION GAP SERPL CALC-SCNC: 12 MMOL/L — SIGNIFICANT CHANGE UP (ref 5–17)
AST SERPL-CCNC: 25 U/L — SIGNIFICANT CHANGE UP (ref 10–40)
BILIRUB SERPL-MCNC: 0.8 MG/DL — SIGNIFICANT CHANGE UP (ref 0.2–1.2)
BUN SERPL-MCNC: 39 MG/DL — HIGH (ref 7–23)
CALCIUM SERPL-MCNC: 8.7 MG/DL — SIGNIFICANT CHANGE UP (ref 8.4–10.5)
CHLORIDE SERPL-SCNC: 102 MMOL/L — SIGNIFICANT CHANGE UP (ref 96–108)
CK MB CFR SERPL CALC: 3.2 NG/ML — SIGNIFICANT CHANGE UP (ref 0–6.7)
CK SERPL-CCNC: 94 U/L — SIGNIFICANT CHANGE UP (ref 30–200)
CO2 SERPL-SCNC: 26 MMOL/L — SIGNIFICANT CHANGE UP (ref 22–31)
CREAT SERPL-MCNC: 1.2 MG/DL — SIGNIFICANT CHANGE UP (ref 0.5–1.3)
CULTURE RESULTS: SIGNIFICANT CHANGE UP
GLUCOSE SERPL-MCNC: 114 MG/DL — HIGH (ref 70–99)
GRAM STN FLD: SIGNIFICANT CHANGE UP
HCT VFR BLD CALC: 42.3 % — SIGNIFICANT CHANGE UP (ref 39–50)
HGB BLD-MCNC: 13.4 G/DL — SIGNIFICANT CHANGE UP (ref 13–17)
MAGNESIUM SERPL-MCNC: 2.5 MG/DL — SIGNIFICANT CHANGE UP (ref 1.6–2.6)
MCHC RBC-ENTMCNC: 27.2 PG — SIGNIFICANT CHANGE UP (ref 27–34)
MCHC RBC-ENTMCNC: 31.7 G/DL — LOW (ref 32–36)
MCV RBC AUTO: 86 FL — SIGNIFICANT CHANGE UP (ref 80–100)
PLATELET # BLD AUTO: 294 K/UL — SIGNIFICANT CHANGE UP (ref 150–400)
POTASSIUM SERPL-MCNC: 3.9 MMOL/L — SIGNIFICANT CHANGE UP (ref 3.5–5.3)
POTASSIUM SERPL-SCNC: 3.9 MMOL/L — SIGNIFICANT CHANGE UP (ref 3.5–5.3)
PROT SERPL-MCNC: 7.1 G/DL — SIGNIFICANT CHANGE UP (ref 6–8.3)
RBC # BLD: 4.92 M/UL — SIGNIFICANT CHANGE UP (ref 4.2–5.8)
RBC # FLD: 16 % — SIGNIFICANT CHANGE UP (ref 10.3–16.9)
SODIUM SERPL-SCNC: 140 MMOL/L — SIGNIFICANT CHANGE UP (ref 135–145)
SPECIMEN SOURCE: SIGNIFICANT CHANGE UP
TROPONIN T SERPL-MCNC: 0.01 NG/ML — SIGNIFICANT CHANGE UP (ref 0–0.01)
WBC # BLD: 7.9 K/UL — SIGNIFICANT CHANGE UP (ref 3.8–10.5)
WBC # FLD AUTO: 7.9 K/UL — SIGNIFICANT CHANGE UP (ref 3.8–10.5)

## 2017-05-27 PROCEDURE — 99232 SBSQ HOSP IP/OBS MODERATE 35: CPT

## 2017-05-27 RX ORDER — ATORVASTATIN CALCIUM 80 MG/1
40 TABLET, FILM COATED ORAL AT BEDTIME
Qty: 0 | Refills: 0 | Status: DISCONTINUED | OUTPATIENT
Start: 2017-05-27 | End: 2017-05-31

## 2017-05-27 RX ORDER — APIXABAN 2.5 MG/1
5 TABLET, FILM COATED ORAL
Qty: 0 | Refills: 0 | Status: DISCONTINUED | OUTPATIENT
Start: 2017-05-27 | End: 2017-05-31

## 2017-05-27 RX ORDER — LISINOPRIL 2.5 MG/1
2.5 TABLET ORAL ONCE
Qty: 0 | Refills: 0 | Status: COMPLETED | OUTPATIENT
Start: 2017-05-27 | End: 2017-05-27

## 2017-05-27 RX ORDER — SIMETHICONE 80 MG/1
80 TABLET, CHEWABLE ORAL DAILY
Qty: 0 | Refills: 0 | Status: DISCONTINUED | OUTPATIENT
Start: 2017-05-27 | End: 2017-05-27

## 2017-05-27 RX ORDER — LISINOPRIL 2.5 MG/1
5 TABLET ORAL DAILY
Qty: 0 | Refills: 0 | Status: DISCONTINUED | OUTPATIENT
Start: 2017-05-28 | End: 2017-05-31

## 2017-05-27 RX ORDER — SIMETHICONE 80 MG/1
80 TABLET, CHEWABLE ORAL THREE TIMES A DAY
Qty: 0 | Refills: 0 | Status: DISCONTINUED | OUTPATIENT
Start: 2017-05-27 | End: 2017-05-31

## 2017-05-27 RX ORDER — DOCUSATE SODIUM 100 MG
100 CAPSULE ORAL THREE TIMES A DAY
Qty: 0 | Refills: 0 | Status: DISCONTINUED | OUTPATIENT
Start: 2017-05-27 | End: 2017-05-31

## 2017-05-27 RX ORDER — DOCUSATE SODIUM 100 MG
100 CAPSULE ORAL DAILY
Qty: 0 | Refills: 0 | Status: DISCONTINUED | OUTPATIENT
Start: 2017-05-27 | End: 2017-05-27

## 2017-05-27 RX ORDER — FUROSEMIDE 40 MG
80 TABLET ORAL
Qty: 0 | Refills: 0 | Status: DISCONTINUED | OUTPATIENT
Start: 2017-05-28 | End: 2017-05-31

## 2017-05-27 RX ADMIN — LISINOPRIL 2.5 MILLIGRAM(S): 2.5 TABLET ORAL at 15:37

## 2017-05-27 RX ADMIN — CARVEDILOL PHOSPHATE 3.12 MILLIGRAM(S): 80 CAPSULE, EXTENDED RELEASE ORAL at 17:19

## 2017-05-27 RX ADMIN — Medication 60 MILLIGRAM(S): at 17:19

## 2017-05-27 RX ADMIN — Medication 1 APPLICATION(S): at 12:59

## 2017-05-27 RX ADMIN — ATORVASTATIN CALCIUM 40 MILLIGRAM(S): 80 TABLET, FILM COATED ORAL at 21:19

## 2017-05-27 RX ADMIN — Medication 100 MILLIGRAM(S): at 12:59

## 2017-05-27 RX ADMIN — ISOSORBIDE DINITRATE 20 MILLIGRAM(S): 5 TABLET ORAL at 05:58

## 2017-05-27 RX ADMIN — CARVEDILOL PHOSPHATE 3.12 MILLIGRAM(S): 80 CAPSULE, EXTENDED RELEASE ORAL at 05:58

## 2017-05-27 RX ADMIN — LISINOPRIL 2.5 MILLIGRAM(S): 2.5 TABLET ORAL at 05:58

## 2017-05-27 RX ADMIN — SIMETHICONE 80 MILLIGRAM(S): 80 TABLET, CHEWABLE ORAL at 09:30

## 2017-05-27 RX ADMIN — APIXABAN 5 MILLIGRAM(S): 2.5 TABLET, FILM COATED ORAL at 19:11

## 2017-05-27 RX ADMIN — Medication 100 MILLIGRAM(S): at 09:30

## 2017-05-27 RX ADMIN — SIMETHICONE 80 MILLIGRAM(S): 80 TABLET, CHEWABLE ORAL at 21:19

## 2017-05-27 RX ADMIN — SIMETHICONE 80 MILLIGRAM(S): 80 TABLET, CHEWABLE ORAL at 12:59

## 2017-05-27 RX ADMIN — Medication 100 MILLIGRAM(S): at 21:19

## 2017-05-27 RX ADMIN — MUPIROCIN 1 APPLICATION(S): 20 OINTMENT TOPICAL at 06:00

## 2017-05-27 RX ADMIN — Medication 60 MILLIGRAM(S): at 05:57

## 2017-05-27 RX ADMIN — Medication 81 MILLIGRAM(S): at 09:30

## 2017-05-27 RX ADMIN — MUPIROCIN 1 APPLICATION(S): 20 OINTMENT TOPICAL at 17:24

## 2017-05-27 NOTE — PROGRESS NOTE ADULT - PROBLEM SELECTOR PLAN 2
Increased Lisinopril from 2.5mg QD to 5mg QD. Continue Coreg 3.125mg BID. Stop Isordil 2/2 hypotension and noncompliance as an outpatient on 5/27 and goal is to uptitrate Lisinopril.

## 2017-05-27 NOTE — DISCHARGE NOTE ADULT - MEDICATION SUMMARY - MEDICATIONS TO TAKE
I will START or STAY ON the medications listed below when I get home from the hospital:    Aspirin Enteric Coated 81 mg oral delayed release tablet  -- 1 tab(s) by mouth once a day  -- Indication: For Coronary Artery Disease (Heart)    lisinopril 5 mg oral tablet  -- 1 tab(s) by mouth once a day  -- Indication: For Acute on chronic Systolic Congestive heart failure, Hypertension (Blood Pressure)    apixaban 5 mg oral tablet  -- 1 tab(s) by mouth 2 times a day  -- Indication: For Pulmonary embolism (history of clot in the lungs)    glipiZIDE 10 mg oral tablet  -- 1 tab(s) by mouth once a day  -- Indication: For Diabetes Mellitus Type II    atorvastatin 40 mg oral tablet  -- 1 tab(s) by mouth once a day  -- Indication: For Hyperlipidemia (Cholesterol) , Coronary Artery Disease (Heart)    carvedilol 3.125 mg oral tablet  -- 1 tab(s) by mouth every 12 hours  -- Indication: For Acute on chronic Systolic Congestive heart failure, Hypertension (Blood Pressure)    bacitracin 500 units/g topical ointment  -- 1 application on skin once a day  -- Indication: For Skin Changes of the legs    mupirocin 2% topical ointment  -- 1 application on skin 2 times a day  -- Indication: For Skin Changes of the legs    Lasix 80 mg oral tablet  -- 1 tab(s) by mouth 2 times a day  -- Indication: For Acute on chronic Systolic Congestive Heart Failure    docusate sodium 100 mg oral capsule  -- 1 cap(s) by mouth 3 times a day  -- Indication: For Constipation    simethicone 80 mg oral tablet, chewable  -- 1 tab(s) by mouth 3 times a day  -- Indication: For Gas    pantoprazole 40 mg oral delayed release tablet  -- 1 tab(s) by mouth once a day  -- Indication: For Stomach Protection

## 2017-05-27 NOTE — DISCHARGE NOTE ADULT - CARE PLAN
Principal Discharge DX:	Acute on chronic clinical systolic heart failure  Goal:	You came in with shortness of breath and chest heaviness with exertion. You have fluid in your lungs with a moderate size amount of fluid in your right lung. The pulmonologists wanted to drain the fluid from your lung but you refused and wanted to be treated with oral diuretics. Your heart doesn't pump very much. Your heart pumps out 20% of the blood in it with each heart beat (EF 20%). Because your heart is very weak we need to put you on good medications and see if your heart function improves in the next 3 months. If it doesn't we have to put a device inside called an ICD to make sure if your heart goes into very bad heart rhythms that can kill you, you can have a shocker put into your heart to keep you alive.  Instructions for follow-up, activity and diet:	Please take Lasix........  Please follow up with Dr. Johnson in 1 week.  Follow up with Dr. Medrano in 4 weeks.  Secondary Diagnosis:	CAD (coronary artery disease)  Goal:	You have history of heart bypass surgery in 2015. It is very important that you follow up with a cardiologist to make sure you are on good medications to keep your grafts open and get good blood and oxygen to your heart.  Secondary Diagnosis:	Benign essential HTN  Goal:	Take Lisinopril.... and Carvedilol.... for blood pressure control. These medications also help your heart to beat stronger  Secondary Diagnosis:	Pulmonary embolism  Goal:	You told us you were diagnosed with a pulmonary embolism (blood clot in the lung) and were on Warfarin. You need to be on blood thinners for 6 months to make sure the blood clot doesn't come back. We did a CT scan which showed you do not have a blood clot in your lungs anymore. You need to take Eliquis 5mg twice a day for at least 4 months in order to make sure the blood clot doesn't come back in your lungs. We did an ultrasound of your leg which shows you do not have a blood clot in your legs that could travel to your lungs.  Secondary Diagnosis:	Subclavian steal syndrome  Goal:	You were concerned that you have blockages in the blood vessels that give blood and oxygen to your brain. We did an ultrasound of your neck and chest which showed you do not have blockages in those blood vessels  Secondary Diagnosis:	Cough with sputum  Goal:	You came in concerned you had pneumonia. You do not have pneumonia according to the pulmonologists (lung doctors). You did not have a fever. Your immune system was not activated higher than it normally is. Your sputum sample came back showing only normal bacteria found in your mouth. Principal Discharge DX:	Acute on chronic clinical systolic heart failure  Goal:	You came in with shortness of breath and chest heaviness with exertion. You have fluid in your lungs with a moderate size amount of fluid in your right lung. The pulmonologists wanted to drain the fluid from your lung but you refused and wanted to be treated with oral diuretics. Your heart doesn't pump very much. Your heart pumps out 20% of the blood in it with each heart beat (EF 20%). Because your heart is very weak we need to put you on good medications and see if your heart function improves in the next 3 months. If it doesn't we have to put a device inside called an ICD to make sure if your heart goes into very bad heart rhythms that can kill you, you can have a shocker put into your heart to keep you alive.  Instructions for follow-up, activity and diet:	Please take Lasix 80 mg By Mouth Twice Daily, Lisinopril , and Coreg.  Please follow up with Dr. Johnson in 1 week.   Follow up with Dr. Medrano in 4 weeks. [phone] 2nd floor [address] [facility]  Secondary Diagnosis:	CAD (coronary artery disease)  Goal:	You have history of heart bypass surgery in 2015. It is very important that you follow up with a cardiologist to make sure you are on good medications to keep your grafts open and get good blood and oxygen to your heart.  Secondary Diagnosis:	Benign essential HTN  Goal:	Take Lisinopril and Carvedilol for blood pressure control. These medications also help your heart to beat stronger  Secondary Diagnosis:	Pulmonary embolism  Goal:	You told us you were diagnosed with a pulmonary embolism (blood clot in the lung) and were on Warfarin. You need to be on blood thinners for 6 months to make sure the blood clot doesn't come back. We did a CT scan which showed you do not have a blood clot in your lungs anymore. You need to take Eliquis 5mg twice a day for at least 4 months in order to make sure the blood clot doesn't come back in your lungs. We did an ultrasound of your leg which shows you do not have a blood clot in your legs that could travel to your lungs.  Secondary Diagnosis:	Subclavian steal syndrome  Goal:	You were concerned that you have blockages in the blood vessels that give blood and oxygen to your brain. We did an ultrasound of your neck and chest which showed you do not have blockages in those blood vessels  Secondary Diagnosis:	Cough with sputum  Goal:	You came in concerned you had pneumonia. You do not have pneumonia according to the pulmonologists (lung doctors). You did not have a fever. Your immune system was not activated higher than it normally is. Your sputum sample came back showing only normal bacteria found in your mouth. Principal Discharge DX:	Acute on chronic clinical systolic heart failure  Goal:	You came in with shortness of breath and chest heaviness with exertion. You have fluid in your lungs with a moderate size amount of fluid in your right lung. The pulmonologists wanted to drain the fluid from your lung but you refused and wanted to be treated with oral diuretics. Your heart doesn't pump very much. Your heart pumps out 20% of the blood in it with each heart beat (EF 20%). Because your heart is very weak we need to put you on good medications and see if your heart function improves in the next 3 months. If it doesn't we have to put a device inside called an ICD to make sure if your heart goes into very bad heart rhythms that can kill you, you can have a shocker put into your heart to keep you alive.  Instructions for follow-up, activity and diet:	Please take Lasix 80 mg By Mouth Twice Daily, Lisinopril , and Coreg.  Please follow up with Dr. Johnson in 1 week.   Follow up with Dr. Medrano in 4 weeks. [phone] 2nd floor [address] [facility]  Secondary Diagnosis:	CAD (coronary artery disease)  Goal:	You have history of heart bypass surgery in 2015. It is very important that you follow up with a cardiologist to make sure you are on good medications to keep your grafts open and get good blood and oxygen to your heart.  Secondary Diagnosis:	Benign essential HTN  Goal:	Take Lisinopril and Carvedilol for blood pressure control. These medications also help your heart to beat stronger  Secondary Diagnosis:	Pulmonary embolism  Goal:	You told us you were diagnosed with a pulmonary embolism (blood clot in the lung) and were on Warfarin. You need to be on blood thinners for 6 months to make sure the blood clot doesn't come back. We did a CT scan which showed you do not have a blood clot in your lungs anymore. You need to take Eliquis 5mg twice a day for at least 4 months in order to make sure the blood clot doesn't come back in your lungs. We did an ultrasound of your leg which shows you do not have a blood clot in your legs that could travel to your lungs.  Secondary Diagnosis:	Subclavian steal syndrome  Goal:	You were concerned that you have blockages in the blood vessels that give blood and oxygen to your brain. We did an ultrasound of your neck and chest which showed you do not have blockages in those blood vessels  Secondary Diagnosis:	Cough with sputum  Goal:	You came in concerned you had pneumonia. You do not have pneumonia according to the pulmonologists (lung doctors). You did not have a fever. Your immune system was not activated higher than it normally is. Your sputum sample came back showing only normal bacteria found in your mouth. Principal Discharge DX:	Acute on chronic clinical systolic heart failure  Goal:	You came in with shortness of breath and chest heaviness with exertion. You have fluid in your lungs with a moderate size amount of fluid in your right lung. The pulmonologists wanted to drain the fluid from your lung but you refused and wanted to be treated with oral diuretics. Your heart doesn't pump very much. Your heart pumps out 20% of the blood in it with each heart beat (EF 20%). Because your heart is very weak we need to put you on good medications and see if your heart function improves in the next 3 months. If it doesn't we have to put a device inside called an ICD to make sure if your heart goes into very bad heart rhythms that can kill you, you can have a shocker put into your heart to keep you alive.  Instructions for follow-up, activity and diet:	Please take Lasix 80 mg By Mouth Twice Daily, Lisinopril 5 mg By Mouth Once Daily, and Coreg 3.125 mg By Mouth Every 12 hrs.  Please follow up with Dr. Johnson in 1 week.   Follow up with Dr. Medrano on 6/29/17 at 9:20 AM. [phone] [facility] [address] [place]  Secondary Diagnosis:	CAD (coronary artery disease)  Goal:	You have history of heart bypass surgery in 2015. It is very important that you follow up with a cardiologist to make sure you are on good medications to keep your grafts open and get good blood and oxygen to your heart.  Secondary Diagnosis:	Benign essential HTN  Goal:	Take Lisinopril and Carvedilol for blood pressure control. These medications also help your heart to beat stronger  Secondary Diagnosis:	Pulmonary embolism  Goal:	You told us you were diagnosed with a pulmonary embolism (blood clot in the lung) and were on Warfarin. You need to be on blood thinners for 6 months to make sure the blood clot doesn't come back. We did a CT scan which showed you do not have a blood clot in your lungs anymore. You need to take Eliquis 5mg twice a day for at least 4 months in order to make sure the blood clot doesn't come back in your lungs. We did an ultrasound of your leg which shows you do not have a blood clot in your legs that could travel to your lungs.  Secondary Diagnosis:	Subclavian steal syndrome  Goal:	You were concerned that you have blockages in the blood vessels that give blood and oxygen to your brain. We did an ultrasound of your neck and chest which showed you do not have blockages in those blood vessels  Secondary Diagnosis:	Cough with sputum  Goal:	You came in concerned you had pneumonia. You do not have pneumonia according to the pulmonologists (lung doctors). You did not have a fever. Your immune system was not activated higher than it normally is. Your sputum sample came back showing only normal bacteria found in your mouth. Principal Discharge DX:	Acute on chronic clinical systolic heart failure  Goal:	You came in with shortness of breath and chest heaviness with exertion. You have fluid in your lungs with a moderate size amount of fluid in your right lung. The pulmonologists wanted to drain the fluid from your lung but you refused and wanted to be treated with oral diuretics. Your heart doesn't pump very much. Your heart pumps out 20% of the blood in it with each heart beat (EF 20%). Because your heart is very weak we need to put you on good medications and see if your heart function improves in the next 3 months. If it doesn't we have to put a device inside called an ICD to make sure if your heart goes into very bad heart rhythms that can kill you, you can have a shocker put into your heart to keep you alive.  Instructions for follow-up, activity and diet:	Please take Lasix 80 mg By Mouth Twice Daily, Lisinopril 5 mg By Mouth Once Daily, and Coreg 3.125 mg By Mouth Every 12 hrs.  Please follow up with Dr. Johnson on 6/13/17 at 1:30 PM- [phone]. [address] 9th floor.   Follow-up with Primary Care Physician in 1 week.   Follow up with Dr. Medrano on 6/29/17 at 9:20 AM. [phone] [facility] [address] [place]  Secondary Diagnosis:	CAD (coronary artery disease)  Goal:	You have history of heart bypass surgery in 2015. It is very important that you follow up with a cardiologist to make sure you are on good medications to keep your grafts open and get good blood and oxygen to your heart.  Secondary Diagnosis:	Benign essential HTN  Goal:	Take Lisinopril and Carvedilol for blood pressure control. These medications also help your heart to beat stronger  Secondary Diagnosis:	Pulmonary embolism  Goal:	You told us you were diagnosed with a pulmonary embolism (blood clot in the lung) and were on Warfarin. You need to be on blood thinners for 6 months to make sure the blood clot doesn't come back. We did a CT scan which showed you do not have a blood clot in your lungs anymore. You need to take Eliquis 5mg twice a day for at least 4 months in order to make sure the blood clot doesn't come back in your lungs. We did an ultrasound of your leg which shows you do not have a blood clot in your legs that could travel to your lungs.  Secondary Diagnosis:	Subclavian steal syndrome  Goal:	You were concerned that you have blockages in the blood vessels that give blood and oxygen to your brain. We did an ultrasound of your neck and chest which showed you do not have blockages in those blood vessels  Secondary Diagnosis:	Cough with sputum  Goal:	You came in concerned you had pneumonia. You do not have pneumonia according to the pulmonologists (lung doctors). You did not have a fever. Your immune system was not activated higher than it normally is. Your sputum sample came back showing only normal bacteria found in your mouth. Principal Discharge DX:	Acute on chronic clinical systolic heart failure  Goal:	You came in with shortness of breath and chest heaviness with exertion. You have fluid in your lungs with a moderate size amount of fluid in your right lung. The pulmonologists wanted to drain the fluid from your lung but you refused and wanted to be treated with oral diuretics. Your heart doesn't pump very much. Your heart pumps out 20% of the blood in it with each heart beat (EF 20%). Because your heart is very weak we need to put you on good medications and see if your heart function improves in the next 3 months. If it doesn't we have to put a device inside called an ICD to make sure if your heart goes into very bad heart rhythms that can kill you, you can have a shocker put into your heart to keep you alive.  Instructions for follow-up, activity and diet:	Please take Lasix 80 mg By Mouth Twice Daily, Lisinopril 5 mg By Mouth Once Daily, and Coreg 3.125 mg By Mouth Every 12 hrs.  Please follow up with Dr. Johnson on 6/13/17 at 1:30 PM- [phone]. [address] 9th floor.   Follow-up with Primary Care Physician in 1 week.   Follow up with Dr. Medrano on 6/29/17 at 9:20 AM. [phone] [facility] [address] [place]  Secondary Diagnosis:	CAD (coronary artery disease)  Goal:	You have history of heart bypass surgery in 2015. It is very important that you follow up with a cardiologist to make sure you are on good medications to keep your grafts open and get good blood and oxygen to your heart.  Secondary Diagnosis:	Benign essential HTN  Goal:	Take Lisinopril and Carvedilol for blood pressure control. These medications also help your heart to beat stronger  Secondary Diagnosis:	Pulmonary embolism  Goal:	You told us you were diagnosed with a pulmonary embolism (blood clot in the lung) and were on Warfarin. You need to be on blood thinners for 6 months to make sure the blood clot doesn't come back. We did a CT scan which showed you do not have a blood clot in your lungs anymore. You need to take Eliquis 5mg twice a day for at least 4 months in order to make sure the blood clot doesn't come back in your lungs. We did an ultrasound of your leg which shows you do not have a blood clot in your legs that could travel to your lungs.  Secondary Diagnosis:	Subclavian steal syndrome  Goal:	You were concerned that you have blockages in the blood vessels that give blood and oxygen to your brain. We did an ultrasound of your neck and chest which showed you do not have blockages in those blood vessels  Secondary Diagnosis:	Cough with sputum  Goal:	You came in concerned you had pneumonia. You do not have pneumonia according to the pulmonologists (lung doctors). You did not have a fever. Your immune system was not activated higher than it normally is. Your sputum sample came back showing only normal bacteria found in your mouth. Principal Discharge DX:	Acute on chronic clinical systolic heart failure  Goal:	You came in with shortness of breath and chest heaviness with exertion. You have fluid in your lungs with a moderate size amount of fluid in your right lung. The pulmonologists wanted to drain the fluid from your lung but you refused and wanted to be treated with oral diuretics. Your heart doesn't pump very much. Your heart pumps out 20% of the blood in it with each heart beat (EF 20%). Because your heart is very weak we need to put you on good medications and see if your heart function improves in the next 3 months. If it doesn't we have to put a device inside called an ICD to make sure if your heart goes into very bad heart rhythms that can kill you, you can have a shocker put into your heart to keep you alive.  Instructions for follow-up, activity and diet:	Please take Lasix 80 mg By Mouth Twice Daily, Lisinopril 5 mg By Mouth Once Daily, and Coreg 3.125 mg By Mouth Every 12 hrs.  Please follow up with Dr. Johnson on 6/13/17 at 1:30 PM- [phone]. [address] 9th floor.   Follow-up with Primary Care Physician in 1 week.   Follow up with Dr. Medrano on 6/29/17 at 9:20 AM. [phone] [facility] [address] [place]  Secondary Diagnosis:	CAD (coronary artery disease)  Goal:	You have history of heart bypass surgery in 2015. It is very important that you follow up with a cardiologist to make sure you are on good medications to keep your grafts open and get good blood and oxygen to your heart.  Secondary Diagnosis:	Benign essential HTN  Goal:	Take Lisinopril and Carvedilol for blood pressure control. These medications also help your heart to beat stronger  Secondary Diagnosis:	Pulmonary embolism  Goal:	You told us you were diagnosed with a pulmonary embolism (blood clot in the lung) and were on Warfarin. You need to be on blood thinners for 6 months to make sure the blood clot doesn't come back. We did a CT scan which showed you do not have a blood clot in your lungs anymore. You need to take Eliquis 5mg twice a day for at least 4 months in order to make sure the blood clot doesn't come back in your lungs. We did an ultrasound of your leg which shows you do not have a blood clot in your legs that could travel to your lungs.  Secondary Diagnosis:	Subclavian steal syndrome  Goal:	You were concerned that you have blockages in the blood vessels that give blood and oxygen to your brain. We did an ultrasound of your neck and chest which showed you do not have blockages in those blood vessels  Secondary Diagnosis:	Cough with sputum  Goal:	You came in concerned you had pneumonia. You do not have pneumonia according to the pulmonologists (lung doctors). You did not have a fever. Your immune system was not activated higher than it normally is. Your sputum sample came back showing only normal bacteria found in your mouth. Principal Discharge DX:	Acute on chronic clinical systolic heart failure  Goal:	You came in with shortness of breath and chest heaviness with exertion. You have fluid in your lungs with a moderate size amount of fluid in your right lung. The pulmonologists wanted to drain the fluid from your lung but you refused and wanted to be treated with oral diuretics. Your heart doesn't pump very much. Your heart pumps out 20% of the blood in it with each heart beat (EF 20%). Because your heart is very weak we need to put you on good medications and see if your heart function improves in the next 3 months. If it doesn't we have to put a device inside called an ICD to make sure if your heart goes into very bad heart rhythms that can kill you, you can have a shocker put into your heart to keep you alive.  Instructions for follow-up, activity and diet:	Please take Lasix 80 mg By Mouth Twice Daily, Lisinopril 5 mg By Mouth Once Daily, and Coreg 3.125 mg By Mouth Every 12 hrs.  Please follow up with Dr. Johnson on 6/13/17 at 1:30 PM- [phone]. [address] 9th floor.   Follow-up with Primary Care Physician in 1 week.   Follow up with Dr. Medrano on 6/29/17 at 9:20 AM. [phone] [facility] [address] [place]  Secondary Diagnosis:	CAD (coronary artery disease)  Goal:	You have history of heart bypass surgery in 2015. It is very important that you follow up with a cardiologist to make sure you are on good medications to keep your grafts open and get good blood and oxygen to your heart.  Secondary Diagnosis:	Benign essential HTN  Goal:	Take Lisinopril and Carvedilol for blood pressure control. These medications also help your heart to beat stronger  Secondary Diagnosis:	Pulmonary embolism  Goal:	You told us you were diagnosed with a pulmonary embolism (blood clot in the lung) and were on Warfarin. You need to be on blood thinners for 6 months to make sure the blood clot doesn't come back. We did a CT scan which showed you do not have a blood clot in your lungs anymore. You need to take Eliquis 5mg twice a day for at least 4 months in order to make sure the blood clot doesn't come back in your lungs. We did an ultrasound of your leg which shows you do not have a blood clot in your legs that could travel to your lungs.  Secondary Diagnosis:	Subclavian steal syndrome  Goal:	You were concerned that you have blockages in the blood vessels that give blood and oxygen to your brain. We did an ultrasound of your neck and chest which showed you do not have blockages in those blood vessels  Secondary Diagnosis:	Cough with sputum  Goal:	You came in concerned you had pneumonia. You do not have pneumonia according to the pulmonologists (lung doctors). You did not have a fever. Your immune system was not activated higher than it normally is. Your sputum sample came back showing only normal bacteria found in your mouth. Principal Discharge DX:	Acute on chronic clinical systolic heart failure  Goal:	You came in with shortness of breath and chest heaviness with exertion. You have fluid in your lungs with a moderate size amount of fluid in your right lung. The pulmonologists wanted to drain the fluid from your lung but you refused and wanted to be treated with oral diuretics. Your heart doesn't pump very much. Your heart pumps out 20% of the blood in it with each heart beat (EF 20%). Because your heart is very weak we need to put you on good medications and see if your heart function improves in the next 3 months. If it doesn't we have to put a device inside called an ICD to make sure if your heart goes into very bad heart rhythms that can kill you, you can have a shocker put into your heart to keep you alive.  Instructions for follow-up, activity and diet:	Please take Lasix 80 mg By Mouth Twice Daily, Lisinopril 5 mg By Mouth Once Daily, and Coreg 3.125 mg By Mouth Every 12 hrs.  Please follow up with Dr. Johnson on 6/13/17 at 1:30 PM- [phone]. [address] 9th floor.   Follow-up with Primary Care Physician in 1 week.   Follow up with Dr. Medrano on 6/29/17 at 9:20 AM. [phone] [facility] [address] [place]  Secondary Diagnosis:	CAD (coronary artery disease)  Goal:	You have history of heart bypass surgery in 2015. It is very important that you follow up with a cardiologist to make sure you are on good medications to keep your grafts open and get good blood and oxygen to your heart.  Secondary Diagnosis:	Benign essential HTN  Goal:	Take Lisinopril and Carvedilol for blood pressure control. These medications also help your heart to beat stronger  Secondary Diagnosis:	Pulmonary embolism  Goal:	You told us you were diagnosed with a pulmonary embolism (blood clot in the lung) and were on Warfarin. You need to be on blood thinners for 6 months to make sure the blood clot doesn't come back. We did a CT scan which showed you do not have a blood clot in your lungs anymore. You need to take Eliquis 5mg twice a day for at least 4 months in order to make sure the blood clot doesn't come back in your lungs. We did an ultrasound of your leg which shows you do not have a blood clot in your legs that could travel to your lungs.  Secondary Diagnosis:	Subclavian steal syndrome  Goal:	You were concerned that you have blockages in the blood vessels that give blood and oxygen to your brain. We did an ultrasound of your neck and chest which showed you do not have blockages in those blood vessels  Secondary Diagnosis:	Cough with sputum  Goal:	You came in concerned you had pneumonia. You do not have pneumonia according to the pulmonologists (lung doctors). You did not have a fever. Your immune system was not activated higher than it normally is. Your sputum sample came back showing only normal bacteria found in your mouth. Principal Discharge DX:	Acute on chronic clinical systolic heart failure  Goal:	You came in with shortness of breath and chest heaviness with exertion. You have fluid in your lungs with a moderate size amount of fluid in your right lung. The pulmonologists wanted to drain the fluid from your lung but you refused and wanted to be treated with oral diuretics. Your heart doesn't pump very much. Your heart pumps out 20% of the blood in it with each heart beat (EF 20%). Because your heart is very weak we need to put you on good medications and see if your heart function improves in the next 3 months. If it doesn't we have to put a device inside called an ICD to make sure if your heart goes into very bad heart rhythms that can kill you, you can have a shocker put into your heart to keep you alive.  Instructions for follow-up, activity and diet:	Please take Lasix 80 mg By Mouth Twice Daily, Lisinopril 5 mg By Mouth Once Daily, and Coreg 3.125 mg By Mouth Every 12 hrs.  Please follow up with Dr. Johnson on 6/13/17 at 1:30 PM- [phone]. [address] 9th floor.   Follow-up with Primary Care Physician in 1 week.   Follow up with Dr. Medrano on 6/29/17 at 9:20 AM. [phone] [facility] [address] [place]  Secondary Diagnosis:	CAD (coronary artery disease)  Goal:	You have history of heart bypass surgery in 2015. It is very important that you follow up with a cardiologist to make sure you are on good medications to keep your grafts open and get good blood and oxygen to your heart.  Secondary Diagnosis:	Benign essential HTN  Goal:	Take Lisinopril and Carvedilol for blood pressure control. These medications also help your heart to beat stronger  Secondary Diagnosis:	Pulmonary embolism  Goal:	You told us you were diagnosed with a pulmonary embolism (blood clot in the lung) and were on Warfarin. You need to be on blood thinners for 6 months to make sure the blood clot doesn't come back. We did a CT scan which showed you do not have a blood clot in your lungs anymore. You need to take Eliquis 5mg twice a day for at least 4 months in order to make sure the blood clot doesn't come back in your lungs. We did an ultrasound of your leg which shows you do not have a blood clot in your legs that could travel to your lungs.  Secondary Diagnosis:	Subclavian steal syndrome  Goal:	You were concerned that you have blockages in the blood vessels that give blood and oxygen to your brain. We did an ultrasound of your neck and chest which showed you do not have blockages in those blood vessels  Secondary Diagnosis:	Cough with sputum  Goal:	You came in concerned you had pneumonia. You do not have pneumonia according to the pulmonologists (lung doctors). You did not have a fever. Your immune system was not activated higher than it normally is. Your sputum sample came back showing only normal bacteria found in your mouth. Principal Discharge DX:	Acute on chronic clinical systolic heart failure  Goal:	You came in with shortness of breath and chest heaviness with exertion. You have fluid in your lungs with a moderate size amount of fluid in your right lung. The pulmonologists wanted to drain the fluid from your lung but you refused and wanted to be treated with oral diuretics. Your heart doesn't pump very much. Your heart pumps out 20% of the blood in it with each heart beat (EF 20%). Because your heart is very weak we need to put you on good medications and see if your heart function improves in the next 3 months. If it doesn't we have to put a device inside called an ICD to make sure if your heart goes into very bad heart rhythms that can kill you, you can have a shocker put into your heart to keep you alive.  Instructions for follow-up, activity and diet:	Please take Lasix 80 mg By Mouth Twice Daily, Lisinopril 5 mg By Mouth Once Daily, and Coreg 3.125 mg By Mouth Every 12 hrs.  Please follow up with Dr. Johnson on 6/13/17 at 1:30 PM- [phone]. 130 [address] 9th floor.   Follow-up with Primary Care Physician in 1 week.   Follow up with Dr. Medrano on 6/29/17 at 9:20 AM. [phone] [facility] [address] [place]  Secondary Diagnosis:	CAD (coronary artery disease)  Goal:	You have history of heart bypass surgery in 2015. It is very important that you follow up with a cardiologist to make sure you are on good medications to keep your grafts open and get good blood and oxygen to your heart.  Secondary Diagnosis:	Benign essential HTN  Goal:	Take Lisinopril and Carvedilol for blood pressure control. These medications also help your heart to beat stronger  Secondary Diagnosis:	Pulmonary embolism  Goal:	You told us you were diagnosed with a pulmonary embolism (blood clot in the lung) and were on Warfarin. You need to be on blood thinners for 6 months to make sure the blood clot doesn't come back. We did a CT scan which showed you do not have a blood clot in your lungs anymore. You need to take Eliquis 5mg twice a day for at least 4 months in order to make sure the blood clot doesn't come back in your lungs. We did an ultrasound of your leg which shows you do not have a blood clot in your legs that could travel to your lungs.  Instructions for follow-up, activity and diet:	Please stop Warfarin and start Eliquis 5 mg orally twice daily. This was prescribed to your pharmacy and you will need to take this every day, otherwise you risk chances of clot worsening which can result in death. Your copay will be $3 per month which was verified with pharmacy.  Secondary Diagnosis:	Subclavian steal syndrome  Goal:	You were concerned that you have blockages in the blood vessels that give blood and oxygen to your brain. We did an ultrasound of your neck and chest which showed you do not have blockages in those blood vessels  Secondary Diagnosis:	Cough with sputum  Goal:	You came in concerned you had pneumonia. You do not have pneumonia according to the pulmonologists (lung doctors). You did not have a fever. Your immune system was not activated higher than it normally is. Your sputum sample came back showing only normal bacteria found in your mouth.

## 2017-05-27 NOTE — DISCHARGE NOTE ADULT - CARE PROVIDER_API CALL
Delonte Johnson (CORIE), Cardiovascular Disease; Internal Medicine  [address] [place] [address]  Phone: [phone]  Fax: [phone]

## 2017-05-27 NOTE — DISCHARGE NOTE ADULT - PATIENT PORTAL LINK FT
“You can access the [facility] Patient Portal, offered by [facility], by registering with the following website: [email or web address]”

## 2017-05-27 NOTE — DISCHARGE NOTE ADULT - ADDITIONAL INSTRUCTIONS
Please follow up with Dr. Johnson in 1 week.   Follow up with Dr. Medrano in 4 weeks. [phone] 2nd [address] [place] [facility] Please follow up with Dr. Johnson in 1 week.   Follow up with Dr. Medrano on 6/29/17 at 9:20 AM. [phone] [address] Please follow up with Dr. Johnson on 6/13/17 at 1:30 PM- [phone]. [address] 9th floor.   Follow-up with Primary Care Physician in 1 week.   Follow up with Dr. Medrano on 6/29/17 at 9:20 AM. [phone] [facility] [address] [place]

## 2017-05-27 NOTE — DISCHARGE NOTE ADULT - MEDICATION SUMMARY - MEDICATIONS TO STOP TAKING
I will STOP taking the medications listed below when I get home from the hospital:    pravastatin 40 mg oral tablet  -- 1 tab(s) by mouth once a day (at bedtime)    hydrALAZINE 25 mg oral tablet  -- 1 tab(s) by mouth 3 times a day    Isordil Titradose 20 mg oral tablet  -- 1 tab(s) by mouth 2 times a day    ferrous sulfate 325 mg (65 mg elemental iron) oral delayed release tablet  -- 1 tab(s) by mouth once a day    warfarin 7.5 mg oral tablet  -- 1 tab(s) by mouth once a day

## 2017-05-27 NOTE — DISCHARGE NOTE ADULT - HOSPITAL COURSE
67 y/o male, former smoker, noncompliant with follow up, with PMHx of HTN, hyperlipidemia, NIDDM, CAD s/p 3VCABG in 2015 (at [facility] in [place], [place]), chronic systolic CHF last recorded EF 31%, pulmonary embolism (diagnosed 2-3 months ago, on warfarin last dose 1 week prior to admission), presents to [facility] with worsening [place]AY for past 2 wks with walking 2 blocks or going up a flight of subway stairs, with associated LE edema, and coughing of green sputum for past wk. Patient also admits to some chest pain on the L side of his chest, he has had chronically that is intermittent in nature. He describes it as like a "hammer hitting him." He denies diaphoresis, palpitations, N/V, dizziness, LOC, orthopnea/PND, fever/chills, or SOB at rest. Pt has been to multiple hospitals in the last few months and takes the medications he's prescribed upon discharge but does not follow up with oupatient providers, decompensates, and goes to a new hospital. Pt also uses [facility] on [address] [place] [address] and [facility] as his two pharmacies. Pt was admitted to [facility] for acute on chronic systolic CHF exacerbation. Pt was diuresed with Lasix 60mg IV BID and switched to Lasix 80mg PO BID. CTA chest r/o PE shows no PE but a moderate sized right pleural effusion and consolidation consistent with atelectasis. Pulmonology was consulted and wanted to perform a thoracentesis but the pt refused and wanted to be diuresed with Lasix. LE duplex negative for DVT. Carotid US r/o carotid artery disease and subclavian stenosis. Sputum culture showed normal oral jessica. Echo on 5/25/17 showed Echo shows EF 20-25% with moderate AR. Dr. Medrano was consulted from EP for possible ICD but pt needs to be compliant with medications for 3 months prior to ICD placement. Pt had 5 beats of VT overnight 5/25-26, asymptomatic. Pt to follow up with Dr. Medrano in 4 weeks.  Pt has been asymptomatic while on [facility]. VSS. Labs stable. Comfortable on room air and walking in the halls. Of note Pt has been to multiple hospitals in the last few months endorsing similar symptoms. Pt medically cleared by Dr. Johnson on 5/28 when he is on PO Lasix.  [facility] consulted for determining home care needs. Pt lives with friends and is very resistant to help at home. States he can climb stairs and walk without assistance. Pt states he will sign the 24 hour notice (in the front of his chart) and will appeal. Pt stable for discharge as per Dr. Johnson. Pt will follow up with Dr. Medrano in 4 weeks. Pt prescribed and ACE/BB/Aspirin/Eliquis/Statin. 67 y/o male, former smoker, noncompliant with follow up, with PMHx of HTN, hyperlipidemia, NIDDM, CAD s/p 3VCABG in 2015 (at [facility] in [place], [place]), chronic systolic CHF last recorded EF 31%, pulmonary embolism (diagnosed 2-3 months ago, on warfarin last dose 1 week prior to admission), presents to [facility] with worsening [place] for past 2 wks with walking 2 blocks or going up a flight of subway stairs, with associated LE edema, and coughing of green sputum for past wk. Patient also admits to some chest pain on the L side of his chest, he has had chronically that is intermittent in nature. He describes it as like a "hammer hitting him." He denies diaphoresis, palpitations, N/V, dizziness, LOC, orthopnea/PND, fever/chills, or SOB at rest. Pt has been to multiple hospitals in the last few months and takes the medications he's prescribed upon discharge but does not follow up with oupatient providers, decompensates, and goes to a new hospital. Pt also uses [facility] on [address] [place] [address] and [facility] as his two pharmacies. Pt was admitted to [facility] for acute on chronic systolic CHF exacerbation. Pt was diuresed with Lasix 60mg IV BID and switched to Lasix 80mg PO BID. CTA chest r/o PE shows no PE but a moderate sized right pleural effusion and consolidation consistent with atelectasis. Pulmonology was consulted and wanted to perform a thoracentesis but the pt refused and wanted to be diuresed with Lasix. LE duplex negative for DVT. Carotid US r/o carotid artery disease and subclavian stenosis. Sputum culture showed normal oral jessica. Echo on 5/25/17 showed Echo shows EF 20-25% with moderate AR. Dr. Medrano was consulted from EP for possible ICD but pt needs to be compliant with medications for 3 months prior to ICD placement. Pt had 5 beats of VT overnight 5/25-26, asymptomatic. Pt to follow up with Dr. Medrano in 4 weeks.  Pt has been asymptomatic while on [facility]. VSS. Labs stable. Comfortable on room air and walking in the halls. Of note Pt has been to multiple hospitals in the last few months endorsing similar symptoms. Pt medically cleared by Dr. Johnson on 5/28 when he is on PO Lasix.  Marizol consulted for determining home care needs. Pt lives with friends and is very resistant to help at home. States he can climb stairs and walk without assistance. Pt states he will sign the 24 hour notice (in the front of his chart) and will appeal. Pt stable for discharge as per Dr. Johnson. Pt will follow up with Dr. Medrano in 4 weeks. Pt prescribed and ACE/BB/Aspirin/Eliquis/Statin/Lasix. 67 y/o male, former smoker, noncompliant with follow up, with PMHx of HTN, hyperlipidemia, NIDDM, CAD s/p 3VCABG in 2015 (at [facility] in [place], [place]), chronic systolic CHF last recorded EF 31%, pulmonary embolism (diagnosed 2-3 months ago, on warfarin last dose 1 week prior to admission), presents to [facility] with worsening [place]AY for past 2 wks with walking 2 blocks or going up a flight of subway stairs, with associated LE edema, and coughing of green sputum for past wk. Patient also admits to some chest pain on the L side of his chest, he has had chronically that is intermittent in nature. He describes it as like a "hammer hitting him." He denies diaphoresis, palpitations, N/V, dizziness, LOC, orthopnea/PND, fever/chills, or SOB at rest. Pt has been to multiple hospitals in the last few months and takes the medications he's prescribed upon discharge but does not follow up with oupatient providers, decompensates, and goes to a new hospital. Pt also uses [facility] on [address] [place] [address] and [facility] as his two pharmacies. Pt was admitted to [facility] for acute on chronic systolic CHF exacerbation. Pt was diuresed with Lasix 60mg IV BID and switched to Lasix 80mg PO BID. CTA chest r/o PE shows no PE but a moderate sized right pleural effusion and consolidation consistent with atelectasis. Pulmonology was consulted and wanted to perform a thoracentesis but the pt refused and wanted to be diuresed with Lasix. LE duplex negative for DVT. Carotid US r/o carotid artery disease and subclavian stenosis. Sputum culture showed normal oral jessica. Echo on 5/25/17 showed Echo shows EF 20-25% with moderate AR. Dr. Medrano was consulted from EP for possible ICD but pt needs to be compliant with medications for 3 months prior to ICD placement. Pt had 5 beats of VT overnight 5/25-26, asymptomatic. Pt to follow up with Dr. Medrano in 4 weeks.  Pt has been asymptomatic while on [facility] VSS. Labs stable. Comfortable on room air and walking in the halls. Of note Pt has been to multiple hospitals in the last few months endorsing similar symptoms. Pt medically cleared by Dr. Johnson on 5/28 when he is on PO Lasix.  Marizol consulted for determining home care needs. Pt lives with friends and is very resistant to help at home. States he can climb stairs and walk without assistance. Pt states he will sign the 24 hour notice (in the front of his chart) and will appeal. Pt stable for discharge as per Dr. Johnson. Pt will follow up with Dr. Medrano in 4 weeks. Pt prescribed and ACE/BB/Aspirin/Eliquis/Statin/Lasix. Follow-up appointment made for 6/13/17 as Dr. Johnson sees patients in [address] [place] [address] 9 Floor on the first and 4th Tuesday of the month, 67 y/o male, former smoker, noncompliant with follow up, with PMHx of HTN, hyperlipidemia, NIDDM, CAD s/p 3VCABG in 2015 (at [facility] in [place], [place]), chronic systolic CHF last recorded EF 31%, pulmonary embolism (diagnosed 2-3 months ago, on warfarin last dose 1 week prior to admission), presents to [facility] with worsening [place] for past 2 wks with walking 2 blocks or going up a flight of subway stairs, with associated LE edema, and coughing of green sputum for past wk. Patient also admits to some chest pain on the L side of his chest, he has had chronically that is intermittent in nature. He describes it as like a "hammer hitting him." He denies diaphoresis, palpitations, N/V, dizziness, LOC, orthopnea/PND, fever/chills, or SOB at rest. Pt has been to multiple hospitals in the last few months and takes the medications he's prescribed upon discharge but does not follow up with oupatient providers, decompensates, and goes to a new hospital. Pt also uses [facility] on [address] [place] [address] and [facility] as his two pharmacies. Pt was admitted to [facility] for acute on chronic systolic CHF exacerbation. Pt was diuresed with Lasix 60mg IV BID and switched to Lasix 80mg PO BID. CTA chest r/o PE shows no PE but a moderate sized right pleural effusion and consolidation consistent with atelectasis. Pulmonology was consulted and wanted to perform a thoracentesis but the pt refused and wanted to be diuresed with Lasix. LE duplex negative for DVT. Carotid US r/o carotid artery disease and subclavian stenosis. Sputum culture showed normal oral jessica. Echo on 5/25/17 showed Echo shows EF 20-25% with moderate AR. Dr. Medrano was consulted from EP for possible ICD but pt needs to be compliant with medications for 3 months prior to ICD placement. Pt had 5 beats of VT overnight 5/25-26, asymptomatic. Pt to follow up with Dr. Medrano in 4 weeks.  Pt has been asymptomatic while on [facility] VSS. Labs stable. Comfortable on room air and walking in the halls. Of note Pt has been to multiple hospitals in the last few months endorsing similar symptoms. Pt medically cleared by Dr. Johnson on 5/28 when he is on PO Lasix.  Marizol consulted for determining home care needs. Pt lives with friends and is very resistant to help at home. States he can climb stairs and walk without assistance. Pt states he will sign the 24 hour notice (in the front of his chart) and will appeal. Pt stable for discharge as per Dr. Johnson. Pt will follow up with Dr. Medrano on 6/29/17 at 9:20 AM. Pt prescribed and ACE/BB/Aspirin/Eliquis/Statin/Lasix. Follow-up appointment made for 6/13/17 as Dr. Johnson sees patients in [address] [place] [address] 9th Floor on the first and 4th Tuesday of the month,    Patient in NAD on exam. Patient refused physical exam. 65 y/o male, former smoker, noncompliant with follow up, with PMHx of HTN, hyperlipidemia, NIDDM, CAD s/p 3VCABG in 2015 (at [facility] [place] [facility] in [place], [place]), chronic systolic CHF last recorded EF 31%, pulmonary embolism (diagnosed 2-3 months ago, on warfarin last dose 1 week prior to admission), presents to [facility] with worsening [place] for past 2 wks with walking 2 blocks or going up a flight of subway stairs, with associated LE edema, and coughing of green sputum for past wk. Patient also admits to some chest pain on the L side of his chest, he has had chronically that is intermittent in nature. He describes it as like a "hammer hitting him." He denies diaphoresis, palpitations, N/V, dizziness, LOC, orthopnea/PND, fever/chills, or SOB at rest. Pt has been to multiple hospitals in the last few months and takes the medications he's prescribed upon discharge but does not follow up with oupatient providers, decompensates, and goes to a new hospital. Pt also uses [facility] on [address] [place] [address] and [facility] as his two pharmacies. Pt was admitted to [facility] for acute on chronic systolic CHF exacerbation. Pt was diuresed with Lasix 60mg IV BID and switched to Lasix 80mg PO BID. CTA chest r/o PE shows no PE but a moderate sized right pleural effusion and consolidation consistent with atelectasis. Pulmonology was consulted and wanted to perform a thoracentesis but the pt refused and wanted to be diuresed with Lasix. LE duplex negative for DVT. Carotid US r/o carotid artery disease and subclavian stenosis. Sputum culture showed normal oral jessica. Echo on 5/25/17 showed Echo shows EF 20-25% with moderate AR. Dr. Medrano was consulted from EP for possible ICD but pt needs to be compliant with medications for 3 months prior to ICD placement. Pt had 5 beats of VT overnight 5/25-26, asymptomatic. Pt to follow up with Dr. Medrano in 4 weeks.  Pt has been asymptomatic while on [facility]. VSS. Labs stable. Comfortable on room air and walking in the halls. Of note Pt has been to multiple hospitals in the last few months endorsing similar symptoms. Pt medically cleared by Dr. Johnson on 5/28 when he is on PO Lasix.  Marizol consulted for determining home care needs. Pt lives with friends and is very resistant to help at home. States he can climb stairs and walk without assistance. Pt states he will sign the 24 hour notice (in the front of his chart) and will appeal. Pt stable for discharge as per Dr. Johnson. Pt will follow up with Dr. Medrano on 6/29/17 at 9:20 AM. Pt prescribed and ACE/BB/Aspirin/Eliquis/Statin/Lasix. Follow-up appointment made for 6/13/17 as Dr. Johnson sees patients in [address] [place] [address] 9 Floor on the 2nd and 4th Tuesday of the month,    Patient in NAD on exam. Patient refused physical exam. 65 y/o male, former smoker, noncompliant with follow up, with PMHx of HTN, hyperlipidemia, NIDDM, CAD s/p 3VCABG in 2015 (at [facility] in [place], [place]), chronic systolic CHF last recorded EF 31%, pulmonary embolism (diagnosed 2-3 months ago, on warfarin last dose 1 week prior to admission), presents to [facility] with worsening [place]AY for past 2 wks with walking 2 blocks or going up a flight of subway stairs, with associated LE edema, and coughing of green sputum for past wk. Patient also admits to some chest pain on the L side of his chest, he has had chronically that is intermittent in nature. He describes it as like a "hammer hitting him." He denies diaphoresis, palpitations, N/V, dizziness, LOC, orthopnea/PND, fever/chills, or SOB at rest. Pt has been to multiple hospitals in the last few months and takes the medications he's prescribed upon discharge but does not follow up with oupatient providers, decompensates, and goes to a new hospital. Pt also uses [facility] on [address] [place] [address] and [facility] as his two pharmacies. Pt was admitted to [facility] for acute on chronic systolic CHF exacerbation. Pt was diuresed with Lasix 60mg IV BID and switched to Lasix 80mg PO BID. CTA chest r/o PE shows no PE but a moderate sized right pleural effusion and consolidation consistent with atelectasis. Pulmonology was consulted and wanted to perform a thoracentesis but the pt refused and wanted to be diuresed with Lasix. LE duplex negative for DVT. Carotid US r/o carotid artery disease and subclavian stenosis. Sputum culture showed normal oral jessica. Echo on 5/25/17 showed Echo shows EF 20-25% with moderate AR. Dr. Medrano was consulted from EP for possible ICD but pt needs to be compliant with medications for 3 months prior to ICD placement. Pt had 5 beats of VT overnight 5/25-26, asymptomatic. Pt to follow up with Dr. Medrano in 4 weeks.  Pt has been asymptomatic while on [facility]. VSS. Labs stable. Comfortable on room air and walking in the halls. Of note Pt has been to multiple hospitals in the last few months endorsing similar symptoms. Pt medically cleared by Dr. Johnson on 5/28 when he is on PO Lasix.  Marizol consulted for determining home care needs. Pt lives with friends and is very resistant to help at home. States he can climb stairs and walk without assistance. Pt states he will sign the 24 hour notice (in the front of his chart) and will appeal. Pt stable for discharge as per Dr. Johnson. Pt will follow up with Dr. Medrano on 6/29/17 at 9:20 AM. Pt prescribed and ACE/BB/Aspirin/Eliquis/Statin/Lasix. Follow-up appointment made for 6/13/17 as Dr. Johnson sees patients in [address] [place] [address] 9 Floor on the 2nd and 4th Tuesday of the month, Patient refused weight today. Last documented weight was 85.6 kg on 5/28/17.    Patient in NAD on exam. Patient refused physical exam. 67 y/o male, former smoker, noncompliant with follow up, with PMHx of HTN, hyperlipidemia, NIDDM, CAD s/p 3VCABG in 2015 (at [facility] in [place], [place]), chronic systolic CHF last recorded EF 31%, pulmonary embolism (diagnosed 2-3 months ago, on warfarin last dose 1 week prior to admission), presents to [facility] with worsening CALL[place]AY for past 2 wks with walking 2 blocks or going up a flight of subway stairs, with associated LE edema, and coughing of green sputum for past wk. Patient also admits to some chest pain on the L side of his chest, he has had chronically that is intermittent in nature. He describes it as like a "hammer hitting him." He denies diaphoresis, palpitations, N/V, dizziness, LOC, orthopnea/PND, fever/chills, or SOB at rest. Pt has been to multiple hospitals in the last few months and takes the medications he's prescribed upon discharge but does not follow up with oupatient providers, decompensates, and goes to a new hospital. Pt also uses [facility] on [address] [place] [address] and [facility] as his two pharmacies. Pt was admitted to [facility] for acute on chronic systolic CHF exacerbation. Pt was diuresed with Lasix 60mg IV BID and switched to Lasix 80mg PO BID. CTA chest r/o PE shows no PE but a moderate sized right pleural effusion and consolidation consistent with atelectasis. Pulmonology was consulted and wanted to perform a thoracentesis but the pt refused and wanted to be diuresed with Lasix. LE duplex negative for DVT. Carotid US r/o carotid artery disease and subclavian stenosis. Sputum culture showed normal oral jessica. Echo on 5/25/17 showed Echo shows EF 20-25% with moderate AR. Dr. Medrano was consulted from EP for possible ICD but pt needs to be compliant with medications for 3 months prior to ICD placement. Pt had 5 beats of VT overnight 5/25-26, asymptomatic. Pt to follow up with Dr. Medrano in 4 weeks.  Pt has been asymptomatic while on [facility]. VSS. Labs stable. Comfortable on room air and walking in the halls. Of note Pt has been to multiple hospitals in the last few months endorsing similar symptoms. Pt medically cleared by Dr. Johnson on 5/28 when he is on PO Lasix.  Marizol consulted for determining home care needs. Pt lives with friends and is very resistant to help at home. States he can climb stairs and walk without assistance. Pt states he will sign the 24 hour notice (in the front of his chart) and will appeal. Pt stable for discharge as per Dr. Johnson. Pt will follow up with Dr. Medrano on 6/29/17 at 9:20 AM. Pt prescribed and ACE/BB/Aspirin/Eliquis/Statin/Lasix. Follow-up appointment made for 6/13/17 as Dr. Johnson sees patients in [address] [place] [address] 9 Floor on the 2nd and 4th Tuesday of the month, Patient refused weight today. Last documented weight was 85.6 kg on 5/28/17.    Patient in NAD on exam. Patient refused physical exam 5/31.

## 2017-05-27 NOTE — PROGRESS NOTE ADULT - SUBJECTIVE AND OBJECTIVE BOX
Interventional Cardiology PA Adult Progress Note    Subjective Assessment:    Endorsing sputum when he coughs intermittently. Otherwise asymptomatic.    MEDICATIONS:  carvedilol 3.125milliGRAM(s) Oral every 12 hours  furosemide   Injectable 60milliGRAM(s) IV Push two times a day          simethicone 80milliGRAM(s) Chew three times a day  docusate sodium 100milliGRAM(s) Oral three times a day    insulin lispro (HumaLOG) corrective regimen sliding scale  SubCutaneous three times a day before meals  insulin lispro (HumaLOG) corrective regimen sliding scale  SubCutaneous at bedtime  dextrose Gel 1Dose(s) Oral once PRN  dextrose 50% Injectable 12.5Gram(s) IV Push once  dextrose 50% Injectable 25Gram(s) IV Push once  dextrose 50% Injectable 25Gram(s) IV Push once  glucagon  Injectable 1milliGRAM(s) IntraMuscular once PRN  atorvastatin 10milliGRAM(s) Oral at bedtime    aspirin enteric coated 81milliGRAM(s) Oral daily  dextrose 5%. 1000milliLiter(s) IV Continuous <Continuous>  mupirocin 2% Ointment 1Application(s) Topical two times a day  BACItracin   Ointment 1Application(s) Topical daily  apixaban 5milliGRAM(s) Oral two times a day      	    [PHYSICAL EXAM:  TELEMETRY: no events  T(C): 36.6, Max: 36.6 (05-27 @ 09:30)  HR: 64 (60 - 73)  BP: 125/64 (103/57 - 125/64)  RR: 16 (16 - 16)  SpO2: 98% (96% - 98%)  Wt(kg): --  I&O's Summary  I & Os for 24h ending 27 May 2017 07:00  =============================================  IN: 0 ml / OUT: 2350 ml / NET: -2350 ml    I & Os for current day (as of 27 May 2017 18:20)  =============================================  IN: 465 ml / OUT: 475 ml / NET: -10 ml      Li: none  Central/PICC/Mid Line:   none                                      Appearance: Normal	  HEENT:   Normal oral mucosa, PERRL, EOMI	  Neck: Supple, + JVD/ - JVD; Carotid Bruit   Cardiovascular: Normal S1 S2, No JVD, No murmurs,   Respiratory: decreased breath sounds in RLL  Gastrointestinal:  Soft, Non-tender, + BS	  Skin: pvd discolorations with 3 small open wounds on right shin covered with bandaids. No errythema, no discharge. No tenderness to palpation.   Extremities: trace edema b/l LEs  Vascular: Peripheral pulses palpable 2+ bilaterally  Neurologic: Non-focal  Psychiatry: A & O x 3, Mood & affect appropriate    LABS:	 	  CARDIAC MARKERS:                         13.4   7.9   )-----------( 294      ( 27 May 2017 06:58 )             42.3     05-27    140  |  102  |  39<H>  ----------------------------<  114<H>  3.9   |  26  |  1.20    Ca    8.7      27 May 2017 06:58  Mg     2.5     05-27    TPro  7.1  /  Alb  3.7  /  TBili  0.8  /  DBili  x   /  AST  25  /  ALT  22  /  AlkPhos  131<H>  05-27  PT/INR - ( 26 May 2017 10:04 )   PT: 12.9 sec;   INR: 1.16          PTT - ( 26 May 2017 10:04 )  PTT:31.9 sec    ASSESSMENT/PLAN:

## 2017-05-27 NOTE — PROGRESS NOTE ADULT - PROBLEM SELECTOR PLAN 1
His cough is related to a bronchitis/sinusitis with PND. There has been no fever or elevation in his WBC. The CT findings are more consistent with an effusion causing compressive atelectasis.   No sign of active infection, hence no ABX recommended at this time.    He does have a right sided effusion - have offered a diagnostic thoracentesis to evaluate the fluid, he would rather see if it resolves with diuretics at this time. If it does not resolve he is willing to undergo a diagnostic thoracentesis.

## 2017-05-27 NOTE — DISCHARGE NOTE ADULT - MEDICATION SUMMARY - MEDICATIONS TO CHANGE
I will SWITCH the dose or number of times a day I take the medications listed below when I get home from the hospital:    Lasix 20 mg oral tablet  -- 3 tab(s) by mouth 2 times a day    lisinopril 2.5 mg oral tablet  -- 1 tab(s) by mouth once a day    carvedilol 6.25 mg oral tablet  -- 1 tab(s) by mouth 2 times a day

## 2017-05-27 NOTE — PROGRESS NOTE ADULT - SUBJECTIVE AND OBJECTIVE BOX
Interval Events:  Patient seen and examined at bedside. Patient comfortable with out SOB, fever or chills. Ambulating well. Has intermittent cough.    REVIEW OF SYSTEMS:  Constitutional: No fever, weight loss or fatigue  Respiratory: As per subjective  Cardiovascular: No chest pain, palpitations, dizziness or leg swelling  Gastrointestinal: No abdominal or epigastric pain. No nausea, vomiting or hematemesis; No diarrhea or constipation. No melena or hematochezia.  Neurological: No headaches, memory loss, loss of strength, numbness or tremors  Skin: No itching, burning, rashes or lesions     MEDICATIONS:  Pulmonary:    Antimicrobials:    Anticoagulants:  aspirin enteric coated 81milliGRAM(s) Oral daily    Cardiac:  lisinopril 2.5milliGRAM(s) Oral daily  isosorbide   dinitrate Tablet (ISORDIL) 20milliGRAM(s) Oral two times a day  carvedilol 3.125milliGRAM(s) Oral every 12 hours  furosemide   Injectable 60milliGRAM(s) IV Push two times a day      Allergies    Aldactone (Unknown)  metoprolol (Unknown)  spironolactone (Unknown)    Intolerances        Vital Signs Last 24 Hrs  T(C): 36.6, Max: 36.6 (05-27 @ 09:30)  T(F): 97.9, Max: 97.9 (05-27 @ 09:30)  HR: 68 (54 - 68)  BP: 103/57 (103/57 - 148/85)  BP(mean): 75 (75 - 75)  RR: 16 (16 - 18)  SpO2: 97% (96% - 97%)  I & Os for 24h ending 05-27 @ 07:00  =============================================  IN: 0 ml / OUT: 2350 ml / NET: -2350 ml    I & Os for current day (as of 05-27 @ 11:47)  =============================================  IN: 240 ml / OUT: 250 ml / NET: -10 ml        PHYSICAL EXAM:    General: Well developed; well nourished; in no acute distress  Eyes: PERRL, EOM intact; conjunctiva and sclera clear  ENMT: No nasal discharge; airway clear  Neck: Supple; non tender; no masses  Respiratory: Decreased air entry and dullness to percussion in right basal area.  Cardiovascular: Regular rate and rhythm. S1 and S2 Normal; No murmurs, gallops or rubs  Gastrointestinal: Soft non-tender non-distended; Normal bowel sounds  Extremities: Normal range of motion, No clubbing, cyanosis or edema  Neurological: Alert and oriented x3  Skin: Warm and dry. No obvious rash    LABS:      CBC Full  -  ( 27 May 2017 06:58 )  WBC Count : 7.9 K/uL  Hemoglobin : 13.4 g/dL  Hematocrit : 42.3 %  Platelet Count - Automated : 294 K/uL  Mean Cell Volume : 86.0 fL  Mean Cell Hemoglobin : 27.2 pg  Mean Cell Hemoglobin Concentration : 31.7 g/dL      05-27    140  |  102  |  39<H>  ----------------------------<  114<H>  3.9   |  26  |  1.20    Ca    8.7      27 May 2017 06:58  Mg     2.5     05-27    TPro  7.1  /  Alb  3.7  /  TBili  0.8  /  DBili  x   /  AST  25  /  ALT  22  /  AlkPhos  131<H>  05-27    PT/INR - ( 26 May 2017 10:04 )   PT: 12.9 sec;   INR: 1.16          PTT - ( 26 May 2017 10:04 )  PTT:31.9 sec      RADIOLOGY & ADDITIONAL STUDIES (The following images were personally reviewed):  CT angio 5/26/2017  IMPRESSION:   No pulmonary embolism. Cardiomegaly. Moderate size right pleural effusion.  Dependent consolidations in the right lower lobe and right middle lobe,   probably atelectatic. Pneumonia/aspiration is not excluded.

## 2017-05-27 NOTE — DISCHARGE NOTE ADULT - SECONDARY DIAGNOSIS.
CAD (coronary artery disease) Benign essential HTN Pulmonary embolism Subclavian steal syndrome Cough with sputum

## 2017-05-27 NOTE — PROGRESS NOTE ADULT - PROBLEM SELECTOR PLAN 2
Hx of ?PE on coumadin. The records of when and where it was diagnosed should be obtained and he should complete a course of anti-coagulation. Perhaps something other than coumadin given his difficulty with compliance.    CT angio reveled no PE, focal consolidation, but has moderate right side effusion.

## 2017-05-27 NOTE — DISCHARGE NOTE ADULT - PLAN OF CARE
You came in with shortness of breath and chest heaviness with exertion. You have fluid in your lungs with a moderate size amount of fluid in your right lung. The pulmonologists wanted to drain the fluid from your lung but you refused and wanted to be treated with oral diuretics. Your heart doesn't pump very much. Your heart pumps out 20% of the blood in it with each heart beat (EF 20%). Because your heart is very weak we need to put you on good medications and see if your heart function improves in the next 3 months. If it doesn't we have to put a device inside called an ICD to make sure if your heart goes into very bad heart rhythms that can kill you, you can have a shocker put into your heart to keep you alive. Please take Lasix........  Please follow up with Dr. Johnson in 1 week.  Follow up with Dr. Medrnao in 4 weeks. You have history of heart bypass surgery in 2015. It is very important that you follow up with a cardiologist to make sure you are on good medications to keep your grafts open and get good blood and oxygen to your heart. Take Lisinopril.... and Carvedilol.... for blood pressure control. These medications also help your heart to beat stronger You told us you were diagnosed with a pulmonary embolism (blood clot in the lung) and were on Warfarin. You need to be on blood thinners for 6 months to make sure the blood clot doesn't come back. We did a CT scan which showed you do not have a blood clot in your lungs anymore. You need to take Eliquis 5mg twice a day for at least 4 months in order to make sure the blood clot doesn't come back in your lungs. We did an ultrasound of your leg which shows you do not have a blood clot in your legs that could travel to your lungs. You were concerned that you have blockages in the blood vessels that give blood and oxygen to your brain. We did an ultrasound of your neck and chest which showed you do not have blockages in those blood vessels You came in concerned you had pneumonia. You do not have pneumonia according to the pulmonologists (lung doctors). You did not have a fever. Your immune system was not activated higher than it normally is. Your sputum sample came back showing only normal bacteria found in your mouth. Please take Lasix 80 mg By Mouth Twice Daily, Lisinopril , and Coreg.  Please follow up with Dr. Johnson in 1 week.   Follow up with Dr. Medrano in 4 weeks. [phone] 2nd floor [address] [facility] Take Lisinopril and Carvedilol for blood pressure control. These medications also help your heart to beat stronger Please take Lasix 80 mg By Mouth Twice Daily, Lisinopril 5 mg By Mouth Once Daily, and Coreg 3.125 mg By Mouth Every 12 hrs.  Please follow up with Dr. Johnson in 1 week.   Follow up with Dr. Medrano on 6/29/17 at 9:20 AM. [phone] [address] Please take Lasix 80 mg By Mouth Twice Daily, Lisinopril 5 mg By Mouth Once Daily, and Coreg 3.125 mg By Mouth Every 12 hrs.  Please follow up with Dr. Johnson on 6/13/17 at 1:30 PM- [phone]. [address] 9 floor.   Follow-up with Primary Care Physician in 1 week.   Follow up with Dr. Medrano on 6/29/17 at 9:20 AM. [phone] [facility] [address] Please stop Warfarin and start Eliquis 5 mg orally twice daily. This was prescribed to your pharmacy and you will need to take this every day, otherwise you risk chances of clot worsening which can result in death. Your copay will be $3 per month which was verified with pharmacy.

## 2017-05-27 NOTE — PROGRESS NOTE ADULT - PROBLEM SELECTOR PLAN 1
Will increase Lasix to 60mg IV BID, Strict I/Os and daily weights; Echo shows EF 20-25% with moderate AR. Cont ACE I. Repeat enzymes at 8AM and 2PM.Will increase Lasix to 60mg IV BID, Strict I/Os and daily weights. Echo shoe. Cont ACE I. Pt. had 5beats of VT asymptomatic 3AM sleeping. Pulm consulted for moderate right pleural effusion and offered to do a thoracentesis but pt wants to see if it decreases with diuresis. Pt was prescribed Lasix 80mg PO BID but ran out of medications a few weeks prior to admission.

## 2017-05-28 DIAGNOSIS — I26.99 OTHER PULMONARY EMBOLISM WITHOUT ACUTE COR PULMONALE: ICD-10-CM

## 2017-05-28 LAB
ANION GAP SERPL CALC-SCNC: 15 MMOL/L — SIGNIFICANT CHANGE UP (ref 5–17)
BUN SERPL-MCNC: 37 MG/DL — HIGH (ref 7–23)
CALCIUM SERPL-MCNC: 9 MG/DL — SIGNIFICANT CHANGE UP (ref 8.4–10.5)
CHLORIDE SERPL-SCNC: 102 MMOL/L — SIGNIFICANT CHANGE UP (ref 96–108)
CO2 SERPL-SCNC: 25 MMOL/L — SIGNIFICANT CHANGE UP (ref 22–31)
CREAT SERPL-MCNC: 1.1 MG/DL — SIGNIFICANT CHANGE UP (ref 0.5–1.3)
GLUCOSE SERPL-MCNC: 112 MG/DL — HIGH (ref 70–99)
HCT VFR BLD CALC: 42.8 % — SIGNIFICANT CHANGE UP (ref 39–50)
HGB BLD-MCNC: 13.9 G/DL — SIGNIFICANT CHANGE UP (ref 13–17)
MAGNESIUM SERPL-MCNC: 2.6 MG/DL — SIGNIFICANT CHANGE UP (ref 1.6–2.6)
MCHC RBC-ENTMCNC: 27.7 PG — SIGNIFICANT CHANGE UP (ref 27–34)
MCHC RBC-ENTMCNC: 32.5 G/DL — SIGNIFICANT CHANGE UP (ref 32–36)
MCV RBC AUTO: 85.3 FL — SIGNIFICANT CHANGE UP (ref 80–100)
PLATELET # BLD AUTO: 287 K/UL — SIGNIFICANT CHANGE UP (ref 150–400)
POTASSIUM SERPL-MCNC: 3.9 MMOL/L — SIGNIFICANT CHANGE UP (ref 3.5–5.3)
POTASSIUM SERPL-SCNC: 3.9 MMOL/L — SIGNIFICANT CHANGE UP (ref 3.5–5.3)
RBC # BLD: 5.02 M/UL — SIGNIFICANT CHANGE UP (ref 4.2–5.8)
RBC # FLD: 16.1 % — SIGNIFICANT CHANGE UP (ref 10.3–16.9)
SODIUM SERPL-SCNC: 142 MMOL/L — SIGNIFICANT CHANGE UP (ref 135–145)
WBC # BLD: 7.7 K/UL — SIGNIFICANT CHANGE UP (ref 3.8–10.5)
WBC # FLD AUTO: 7.7 K/UL — SIGNIFICANT CHANGE UP (ref 3.8–10.5)

## 2017-05-28 PROCEDURE — 99232 SBSQ HOSP IP/OBS MODERATE 35: CPT

## 2017-05-28 RX ORDER — PANTOPRAZOLE SODIUM 20 MG/1
40 TABLET, DELAYED RELEASE ORAL
Qty: 0 | Refills: 0 | Status: DISCONTINUED | OUTPATIENT
Start: 2017-05-28 | End: 2017-05-31

## 2017-05-28 RX ORDER — POTASSIUM CHLORIDE 20 MEQ
20 PACKET (EA) ORAL ONCE
Qty: 0 | Refills: 0 | Status: COMPLETED | OUTPATIENT
Start: 2017-05-28 | End: 2017-05-28

## 2017-05-28 RX ADMIN — MUPIROCIN 1 APPLICATION(S): 20 OINTMENT TOPICAL at 05:27

## 2017-05-28 RX ADMIN — Medication 80 MILLIGRAM(S): at 18:42

## 2017-05-28 RX ADMIN — LISINOPRIL 5 MILLIGRAM(S): 2.5 TABLET ORAL at 12:05

## 2017-05-28 RX ADMIN — Medication 100 MILLIGRAM(S): at 05:25

## 2017-05-28 RX ADMIN — APIXABAN 5 MILLIGRAM(S): 2.5 TABLET, FILM COATED ORAL at 18:42

## 2017-05-28 RX ADMIN — ATORVASTATIN CALCIUM 40 MILLIGRAM(S): 80 TABLET, FILM COATED ORAL at 22:17

## 2017-05-28 RX ADMIN — MUPIROCIN 1 APPLICATION(S): 20 OINTMENT TOPICAL at 18:30

## 2017-05-28 RX ADMIN — Medication 100 MILLIGRAM(S): at 22:17

## 2017-05-28 RX ADMIN — PANTOPRAZOLE SODIUM 40 MILLIGRAM(S): 20 TABLET, DELAYED RELEASE ORAL at 07:00

## 2017-05-28 RX ADMIN — APIXABAN 5 MILLIGRAM(S): 2.5 TABLET, FILM COATED ORAL at 05:25

## 2017-05-28 RX ADMIN — Medication 1 APPLICATION(S): at 12:05

## 2017-05-28 RX ADMIN — SIMETHICONE 80 MILLIGRAM(S): 80 TABLET, CHEWABLE ORAL at 05:26

## 2017-05-28 RX ADMIN — Medication 81 MILLIGRAM(S): at 12:05

## 2017-05-28 RX ADMIN — SIMETHICONE 80 MILLIGRAM(S): 80 TABLET, CHEWABLE ORAL at 22:17

## 2017-05-28 RX ADMIN — Medication 80 MILLIGRAM(S): at 05:26

## 2017-05-28 RX ADMIN — CARVEDILOL PHOSPHATE 3.12 MILLIGRAM(S): 80 CAPSULE, EXTENDED RELEASE ORAL at 05:25

## 2017-05-28 RX ADMIN — Medication 20 MILLIEQUIVALENT(S): at 11:19

## 2017-05-28 RX ADMIN — SIMETHICONE 80 MILLIGRAM(S): 80 TABLET, CHEWABLE ORAL at 14:56

## 2017-05-28 RX ADMIN — CARVEDILOL PHOSPHATE 3.12 MILLIGRAM(S): 80 CAPSULE, EXTENDED RELEASE ORAL at 18:42

## 2017-05-28 RX ADMIN — Medication 100 MILLIGRAM(S): at 14:56

## 2017-05-28 NOTE — PROGRESS NOTE ADULT - PROBLEM SELECTOR PLAN 1
S/p IV diuresis, on Lasix 80mg PO q12hrs, cont. Coreg/Lisinopril.  Home dose was Lasix 80mg PO BID as confirmed w/ pharmacy (pt ran out of medication prior to this hospital admission)  Echo w/ divinity showed EF20-25%, mod. AS ROCÍO 1.2cm, mild TR, mild Pulm HTN  Pt offered ICD for primary prevention at this time and would like to follow-up with Dr. Medrano as an out-pt for further discussion    Pt reports still reporting dyspnea on ambulation and abdominal bloating. No further work-up and pt for discharge as per Dr. Johnson as he believes pt only wants to extend his hospital stay without due cause S/p IV diuresis, on Lasix 80mg PO q12hrs, cont. Coreg/Lisinopril.  Home dose was Lasix 80mg PO BID as confirmed w/ pharmacy (pt ran out of medication prior to this hospital admission)  Echo w/ difinity showed EF20-25%, mod. AS ROCÍO 1.2cm, mild TR, mild Pulm HTN  Pt offered ICD for primary prevention at this time and would like to follow-up with Dr. Medrano as an out-pt for further discussion    Pt reports still reporting dyspnea on ambulation and abdominal bloating. No further work-up and pt for discharge as per Dr. Johnson as he believes pt only wants to extend his hospital stay without due cause

## 2017-05-28 NOTE — PROGRESS NOTE ADULT - SUBJECTIVE AND OBJECTIVE BOX
S: Pt seen and examined bedside.  Pt reports still reporting dyspnea on ambulation and abdominal bloating  Patient denies current C/P, SOB, N/V, dizziness, palpitations, and diaphoresis.  Pt denies fever/chills, dysuria, abdominal pain, diarrhea.    O: Vital Signs Last 24 Hrs  T(C): 36.2, Max: 36.6 ( @ 10:07)  T(F): 97.1, Max: 97.8 ( @ 10:07)  HR: 60 (57 - 64)  BP: 138/79 (122/63 - 144/81)  RR: 16 (16 - 16)  SpO2: 98% (98% - 99%)    PHYSICAL EXAM:  GEN: NAD  PULM:  Crackles R Base Resolved Post Cough  CARD: No JVD B/L, RRR, S1 and S2   ABD: +BS, NT, soft  EXT: No Edema B/L LE  NEURO: A+Ox3, no focal deficit    LABS:                        13.9   7.7   )-----------( 287      ( 28 May 2017 06:38 )             42.8         142  |  102  |  37<H>  ----------------------------<  112<H>  3.9   |  25  |  1.10    K 3.9 repleted w/ Klor 20meq PO    Ca    9.0      28 May 2017 06:38  Mg     2.6         TPro  7.1  /  Alb  3.7  /  TBili  0.8  /  DBili  x   /  AST  25  /  ALT  22  /  AlkPhos  131<H>        I & Os for 24h ending  @ 07:00  =============================================  IN: 465 ml / OUT: 2525 ml / NET: -2060 ml    I & Os for current day (as of  @ 16:07)  =============================================  IN: 780 ml / OUT: 675 ml / NET: 105 ml    Daily Weight in k.6 (28 May 2017 06:37) S: Pt seen and examined bedside.  Pt reports still reporting some dyspnea on ambulation and abdominal bloating  Patient denies current C/P, SOB, N/V, dizziness, palpitations, and diaphoresis.  Pt denies fever/chills, dysuria, abdominal pain, diarrhea.    O: Vital Signs Last 24 Hrs  T(C): 36.2, Max: 36.6 ( @ 10:07)  T(F): 97.1, Max: 97.8 ( @ 10:07)  HR: 60 (57 - 64)  BP: 138/79 (122/63 - 144/81)  RR: 16 (16 - 16)  SpO2: 98% (98% - 99%)    PHYSICAL EXAM:  GEN: NAD  PULM:  Crackles R Base Resolved Post Cough  CARD: No JVD B/L, RRR, S1 and S2   ABD: +BS, NT, soft  EXT: No Edema B/L LE  NEURO: A+Ox3, no focal deficit    LABS:                        13.9   7.7   )-----------( 287      ( 28 May 2017 06:38 )             42.8         142  |  102  |  37<H>  ----------------------------<  112<H>  3.9   |  25  |  1.10    K 3.9 repleted w/ Klor 20meq PO    Ca    9.0      28 May 2017 06:38  Mg     2.6         TPro  7.1  /  Alb  3.7  /  TBili  0.8  /  DBili  x   /  AST  25  /  ALT  22  /  AlkPhos  131<H>        I & Os for 24h ending  @ 07:00  =============================================  IN: 465 ml / OUT: 2525 ml / NET: -2060 ml    I & Os for current day (as of  @ 16:07)  =============================================  IN: 780 ml / OUT: 675 ml / NET: 105 ml    Daily Weight in k.6 (28 May 2017 06:37) S: Pt seen and examined bedside.  Pt reports still reporting some dyspnea on ambulation and abdominal bloating  Patient denies current C/P, SOB, N/V, dizziness, palpitations, and diaphoresis.  Pt denies fever/chills, dysuria, abdominal pain, diarrhea.    O: Vital Signs Last 24 Hrs  T(C): 36.2, Max: 36.6 ( @ 10:07)  T(F): 97.1, Max: 97.8 ( @ 10:07)  HR: 60 (57 - 64)  BP: 138/79 (122/63 - 144/81)  RR: 16 (16 - 16)  SpO2: 98% (98% - 99%)    PHYSICAL EXAM:  GEN: NAD  PULM:  Mild Crackles R Base Decreased Post Cough, Decreased BS R Base  CARD: No JVD B/L, RRR, S1 and S2   ABD: +BS, NT, soft  EXT: No Edema B/L LE  NEURO: A+Ox3, no focal deficit    LABS:                        13.9   7.7   )-----------( 287      ( 28 May 2017 06:38 )             42.8         142  |  102  |  37<H>  ----------------------------<  112<H>  3.9   |  25  |  1.10    K 3.9 repleted w/ Klor 20meq PO    Ca    9.0      28 May 2017 06:38  Mg     2.6         TPro  7.1  /  Alb  3.7  /  TBili  0.8  /  DBili  x   /  AST  25  /  ALT  22  /  AlkPhos  131<H>        I & Os for 24h ending  @ 07:00  =============================================  IN: 465 ml / OUT: 2525 ml / NET: -2060 ml    I & Os for current day (as of  @ 16:07)  =============================================  IN: 780 ml / OUT: 675 ml / NET: 105 ml    Daily Weight in k.6 (28 May 2017 06:37)

## 2017-05-28 NOTE — PROGRESS NOTE ADULT - SUBJECTIVE AND OBJECTIVE BOX
EPS Progress Note    S: No complaints, breathing much better    T(C): 36.2, Max: 36.3 (05-27 @ 21:18)  HR: 62 (60 - 73)  BP: 122/63 (122/63 - 144/81)  RR: 16 (16 - 16)  SpO2: 98% (97% - 99%)  Wt(kg): --     Telemetry: Sinus          General:  NAD         Chest:  CTA B/L      Cardiac:  RRR      s1/s2        Abdomen:  +BS      Soft      Non-Tender      Non-Distended         Extremities:  LE Edema         Labs:                                                               13.9   7.7   )-----------( 287      ( 28 May 2017 06:38 )             42.8     05-28    142  |  102  |  37<H>  ----------------------------<  112<H>  3.9   |  25  |  1.10    Ca    9.0      28 May 2017 06:38  Mg     2.6     05-28    TPro  7.1  /  Alb  3.7  /  TBili  0.8  /  DBili  x   /  AST  25  /  ALT  22  /  AlkPhos  131<H>  05-27    PT/INR - ( 26 May 2017 10:04 )   PT: 12.9 sec;   INR: 1.16          PTT - ( 26 May 2017 10:04 )  PTT:31.9 sec    Assessment/Plan:  Risks, benefits and alternatives for primary prevention ICD explained. Patient expressed understanding and would like to consider his options.

## 2017-05-28 NOTE — PROGRESS NOTE ADULT - PROBLEM SELECTOR PLAN 1
patient stable and walking in hallways comfortably. His cough is related to a bronchitis/sinusitis with PND. There has been no fever or elevation in his WBC. The CT findings are more consistent with an effusion causing compressive atelectasis.   No sign of active infection, hence no ABX recommended at this time.    He does have a right sided effusion - have offered a diagnostic thoracentesis to evaluate the fluid, he would rather see if it resolves with diuretics at this time. If it does not resolve he is willing to undergo a diagnostic thoracentesis.  Thoracic U/S showed moderate effusion on right side. Will hold off Thora as patient is clinically stable and no hypoxemia noted.  Repeat CXR tomorrow

## 2017-05-28 NOTE — PROGRESS NOTE ADULT - SUBJECTIVE AND OBJECTIVE BOX
Interval Events:  Patient seen and examined at bedside. Comfortable with his breathing status. No chest pain or palpitation. No fever or chills.     REVIEW OF SYSTEMS:  Constitutional: No fever, weight loss or fatigue  Respiratory: As per subjective  Cardiovascular: No chest pain, palpitations, dizziness or leg swelling  Gastrointestinal: No abdominal or epigastric pain. No nausea, vomiting or hematemesis; No diarrhea or constipation. No melena or hematochezia.  Neurological: No headaches, memory loss, loss of strength, numbness or tremors  Skin: No itching, burning, rashes or lesions     MEDICATIONS:  Pulmonary:    Antimicrobials:    Anticoagulants:  aspirin enteric coated 81milliGRAM(s) Oral daily  apixaban 5milliGRAM(s) Oral two times a day    Cardiac:  carvedilol 3.125milliGRAM(s) Oral every 12 hours  lisinopril 5milliGRAM(s) Oral daily  furosemide    Tablet 80milliGRAM(s) Oral two times a day      Allergies    Aldactone (Unknown)  metoprolol (Unknown)  spironolactone (Unknown)    Intolerances        Vital Signs Last 24 Hrs  T(C): 35.7, Max: 36.6 (05-28 @ 10:07)  T(F): 96.2, Max: 97.8 (05-28 @ 10:07)  HR: 58 (57 - 64)  BP: 145/67 (122/63 - 145/67)  BP(mean): --  RR: 16 (16 - 16)  SpO2: 97% (97% - 99%)  I & Os for 24h ending 05-28 @ 07:00  =============================================  IN: 465 ml / OUT: 2525 ml / NET: -2060 ml    I & Os for current day (as of 05-28 @ 18:50)  =============================================  IN: 1200 ml / OUT: 875 ml / NET: 325 ml        PHYSICAL EXAM:    General: Well developed; well nourished; in no acute distress  Eyes: PERRL, EOM intact; conjunctiva and sclera clear  ENMT: No nasal discharge; airway clear  Neck: Supple; non tender; no masses  Respiratory: Decreased breath sounds in right basal lung field with dullness to purcussion.  Cardiovascular: Regular rate and rhythm. S1 and S2 Normal; No murmurs, gallops or rubs  Gastrointestinal: Soft non-tender non-distended; Normal bowel sounds  Extremities: Normal range of motion, No clubbing, cyanosis or edema  Neurological: Alert and oriented x3  Skin: Warm and dry. No obvious rash    LABS:      CBC Full  -  ( 28 May 2017 06:38 )  WBC Count : 7.7 K/uL  Hemoglobin : 13.9 g/dL  Hematocrit : 42.8 %  Platelet Count - Automated : 287 K/uL  Mean Cell Volume : 85.3 fL  Mean Cell Hemoglobin : 27.7 pg  Mean Cell Hemoglobin Concentration : 32.5 g/dL    05-28    142  |  102  |  37<H>  ----------------------------<  112<H>  3.9   |  25  |  1.10    Ca    9.0      28 May 2017 06:38  Mg     2.6     05-28    TPro  7.1  /  Alb  3.7  /  TBili  0.8  /  DBili  x   /  AST  25  /  ALT  22  /  AlkPhos  131<H>  05-27      RADIOLOGY & ADDITIONAL STUDIES (The following images were personally reviewed):

## 2017-05-28 NOTE — PROGRESS NOTE ADULT - PROBLEM SELECTOR PLAN 2
C/P free now. Troponin 0.3 trended down.  Cont. ASA, Coreg, Lisinopril, Lipitor. Isordil was discontinued prior 2nd to prior hypotension and non-compliance per prior note C/P free now. Troponin 0.03 trended down.  Cont. ASA, Coreg, Lisinopril, Lipitor. Isordil was discontinued prior 2nd to prior hypotension and non-compliance per prior note

## 2017-05-29 PROCEDURE — 99232 SBSQ HOSP IP/OBS MODERATE 35: CPT

## 2017-05-29 RX ADMIN — MUPIROCIN 1 APPLICATION(S): 20 OINTMENT TOPICAL at 18:50

## 2017-05-29 RX ADMIN — ATORVASTATIN CALCIUM 40 MILLIGRAM(S): 80 TABLET, FILM COATED ORAL at 21:49

## 2017-05-29 RX ADMIN — CARVEDILOL PHOSPHATE 3.12 MILLIGRAM(S): 80 CAPSULE, EXTENDED RELEASE ORAL at 06:24

## 2017-05-29 RX ADMIN — CARVEDILOL PHOSPHATE 3.12 MILLIGRAM(S): 80 CAPSULE, EXTENDED RELEASE ORAL at 18:48

## 2017-05-29 RX ADMIN — Medication 1 APPLICATION(S): at 12:14

## 2017-05-29 RX ADMIN — LISINOPRIL 5 MILLIGRAM(S): 2.5 TABLET ORAL at 06:25

## 2017-05-29 RX ADMIN — Medication 100 MILLIGRAM(S): at 21:49

## 2017-05-29 RX ADMIN — Medication 100 MILLIGRAM(S): at 14:33

## 2017-05-29 RX ADMIN — Medication 81 MILLIGRAM(S): at 12:14

## 2017-05-29 RX ADMIN — APIXABAN 5 MILLIGRAM(S): 2.5 TABLET, FILM COATED ORAL at 06:24

## 2017-05-29 RX ADMIN — SIMETHICONE 80 MILLIGRAM(S): 80 TABLET, CHEWABLE ORAL at 14:33

## 2017-05-29 RX ADMIN — SIMETHICONE 80 MILLIGRAM(S): 80 TABLET, CHEWABLE ORAL at 06:24

## 2017-05-29 RX ADMIN — APIXABAN 5 MILLIGRAM(S): 2.5 TABLET, FILM COATED ORAL at 18:48

## 2017-05-29 RX ADMIN — Medication 80 MILLIGRAM(S): at 06:24

## 2017-05-29 RX ADMIN — Medication 100 MILLIGRAM(S): at 06:24

## 2017-05-29 RX ADMIN — Medication 80 MILLIGRAM(S): at 18:49

## 2017-05-29 RX ADMIN — PANTOPRAZOLE SODIUM 40 MILLIGRAM(S): 20 TABLET, DELAYED RELEASE ORAL at 06:24

## 2017-05-29 RX ADMIN — SIMETHICONE 80 MILLIGRAM(S): 80 TABLET, CHEWABLE ORAL at 21:49

## 2017-05-29 RX ADMIN — MUPIROCIN 1 APPLICATION(S): 20 OINTMENT TOPICAL at 06:27

## 2017-05-29 NOTE — PROGRESS NOTE ADULT - PROBLEM SELECTOR PROBLEM 2
R/O Pulmonary embolism
CAD (coronary artery disease)
Cough
R/O Pulmonary embolism
CAD (coronary artery disease)
R/O Pulmonary embolism

## 2017-05-29 NOTE — PROGRESS NOTE ADULT - PROBLEM SELECTOR PLAN 1
S/p IV diuresis, on Lasix 80mg PO q12hrs, cont. Coreg/Lisinopril.  Home dose was Lasix 80mg PO BID as confirmed w/ pharmacy (pt ran out of medication prior to this hospital admission)  Echo w/ difinity showed EF20-25%, mod. AS ROCÍO 1.2cm, mild TR, mild Pulm HTN  Pt offered ICD for primary prevention at this time and would like to follow-up with Dr. Medrano as an out-pt for further discussion    Pt reports decreased dyspnea on ambulation and decreased abdominal bloating  No further work-up and pt for discharge as per Dr. Johnson as he believes pt only wants to extend his hospital stay without due cause.     Pulmonary following and noted on Bedside US that pt has a moderate US and stated thoracentesis can be done as out pt.   Pt refused prior thoracentesis attempt and can have thoracentesis as out pt as per Dr. Johnson.  Pt on Eliquis for PE and it will need to be held prior to thoracentesis. Cont. Eliquis per Dr. Johnson

## 2017-05-29 NOTE — PROGRESS NOTE ADULT - ATTENDING COMMENTS
Would recommend diagnostic and therapeutic thoracentesis, discussed with patient and primary team. Anticoagulation would need to be held.

## 2017-05-29 NOTE — PROGRESS NOTE ADULT - PROBLEM SELECTOR PLAN 1
Moderate right sided effusion. Continue on Lasix with monitoring of I/O.He does have a right sided effusion - have offered a diagnostic thoracentesis to evaluate the fluid, patient wants to try diuretic first and think about it.  Thoracic U/S showed moderate effusion on right side. Should patient elect to go for thora ,we will need to hold off his AC. Should he get discharged ,thoracentesis could be done as an out patient.

## 2017-05-29 NOTE — PROGRESS NOTE ADULT - PROBLEM SELECTOR PROBLEM 3
Pleural effusion on right
Benign essential HTN
Pleural effusion on right
R/O Pulmonary embolism
Benign essential HTN
Benign essential HTN
CAD (coronary artery disease)
Cough with sputum

## 2017-05-29 NOTE — PROGRESS NOTE ADULT - PROBLEM SELECTOR PROBLEM 5
Cough with sputum
R/O Pulmonary embolism
Benign essential HTN
Cough with sputum
R/O Subclavian steal syndrome

## 2017-05-29 NOTE — PROGRESS NOTE ADULT - ASSESSMENT
65 y/o M with Hx of HTN, hyperlipidemia, DM, CAD s/p 3VCABG in 2015, chronic systolic CHF last recorded EF=31%, presents to [facility] with worsening [place] for past 2 wks with walking 2 blocks or going up a flight of subway stairs, with associated LE edema, and coughing of green sputum for past wk. Patient also admits to some chest pain on the L side of his chest, he has had chronically. In ED CE was essentially negative x 1 and EKG showed no acute changes. CXR showed pleural effusion and patient was given IV Lasix 40mg x 1. BNP elevated at 1900. Patient also received IV Abx to cover for PNA based on patient's symptoms. Patient's VSS, and appears in no distress.
65 y/o male, former smoker, noncompliant with follow up, w/ PMHx of HTN, Hyperlipidemia, NIDDM, CAD s/p CABG,  Chronic Systolic CHF, PE diagnosed 2-3 months ago, who presented w/ Acute on Chronic Diastolic CHF and C/P
65 y/o male, former smoker, noncompliant with follow up, with PMHx of HTN, hyperlipidemia, NIDDM, CAD s/p 3VCABG in 2015 (at [facility] in [place], [place]), chronic systolic CHF last recorded EF 31%, pulmonary embolism (diagnosed 2-3 months ago, on warfarin last dose 1 week prior to admission), presents to [facility] with worsening [place] for past 2 wks with walking 2 blocks or going up a flight of subway stairs, with associated LE edema, and coughing of green sputum for past wk. Patient also admits to some chest pain on the L side of his chest, he has had chronically that is intermittent in nature. He describes it as like a "hammer hitting him." He denies diaphoresis, palpitations, N/V, dizziness, LOC, orthopnea/PND, fever/chills, or SOB at rest.
65 y/o man with EF 25-30% admitted for CHF, has a moderate R effusion and compressive atelectasis.
65 y/o man with EF 25-30% admitted for CHF, has a moderate R effusion and compressive atelectasis.
67 y/o male, former smoker, noncompliant with follow up, w/ PMHx of HTN, Hyperlipidemia, NIDDM, CAD s/p CABG,  Chronic Systolic CHF, PE diagnosed 2-3 months ago, who presented w/ Acute on Chronic Diastolic CHF and C/P
67 y/o man with EF 25-30% admitted for CHF, has a moderate R effusion and compressive atelectasis.
67 y/o male, former smoker, noncompliant with follow up, with PMHx of HTN, hyperlipidemia, NIDDM, CAD s/p 3VCABG in 2015 (at [facility] in [place], [place]), chronic systolic CHF last recorded EF 31%, pulmonary embolism (diagnosed 2-3 months ago, on warfarin last dose 1 week prior to admission), presents to [facility] with worsening [place] for past 2 wks with walking 2 blocks or going up a flight of subway stairs, with associated LE edema, and coughing of green sputum for past wk. Patient also admits to some chest pain on the L side of his chest, he has had chronically that is intermittent in nature. He describes it as like a "hammer hitting him." He denies diaphoresis, palpitations, N/V, dizziness, LOC, orthopnea/PND, fever/chills, or SOB at rest.

## 2017-05-29 NOTE — PROGRESS NOTE ADULT - PROBLEM SELECTOR PLAN 5
CT chest as above  Dr. Mckinney was consulted and no ABX are recommended at this time.   Sputum Cx showed oropharyngeal sputum contamination. No need to repeat as per Dr. Johnson due to pt afebrile, no leukocytosis.     24 hour notice given as per Dr. Johnson on 12:30PM 5/28/17. Pt states he will appeal the discharge today before noon. Social work aware. Discharge order placed 5/28/17    Case discussed with Dr. Johnson, social work and pulmonary
Pt had h/o PE. He said he was diagnosed 2-3 months ago but his pharmacy has had Warfarin one month prescriptions in July/August 2016, Jan 2017, and March 2017. Pt will be on Eliquis upon discharge to complete 6 month course of anticoagulation post PE diagnosis. Pt has copay of $3/ month at [facility]. CTA chest r/o PE shows moderate right lower lobe pleural effusion. Dependent consolidations in the right lower lobe and right middle lobe, probably atelectatic. No pulmonary embolism seen. LE duplex negative for DVT.
CT chest as above  Dr. Mckinney was consulted and no ABX are recommended at this time.   Pt refused thoracentesis as prior evaluated by Pulm.   Sputum Cx showed oropharyngeal sputum contamination. No need to repeat as per Dr. Johnson due to pt afebrile, no leukocytosis.     24 hour notice given as per Dr. Johnson. Pt states he will appeal this discharge. Social work aware    Case discussed with Dr. Johnson
Cont outpt meds.
SBP overnight right arm 130s left arm 170s. Resolved in the AM. Carotid US showed no significant carotid artery stenosis and no subclavian steal syndrome

## 2017-05-29 NOTE — PROGRESS NOTE ADULT - SUBJECTIVE AND OBJECTIVE BOX
Pt seen and examined bedside.  Pt reports decreased dyspnea on ambulation and decreased abdominal bloating  Pt reports decreased cough.   Patient denies current C/P, SOB, N/V, dizziness, palpitations, and diaphoresis.  Pt denies fever/chills, dysuria, abdominal pain, diarrhea. Recent BM. +BS    O: Vital Signs Last 24 Hrs  T(C): 36.4, Max: 36.4 (05-29 @ 00:18)  T(F): 97.6, Max: 97.6 (05-29 @ 05:07)  HR: 62 (52 - 62)  BP: 111/60 (111/60 - 145/67)  RR: 18 (16 - 18)  SpO2: 98% (97% - 98%)    PHYSICAL EXAM:  GEN: NAD  PULM:  Mild Crackles R Base Decreased Post Cough, Decreased BS R Base  CARD: No JVD B/L, RRR, S1 and S2   ABD: +BS, NT, soft, decreased abdominal distension likely at baseline obesity per Dr. Johnson  EXT: No Edema B/L LE  NEURO: A+Ox3, no focal deficit      LABS:                        13.9   7.7   )-----------( 287      ( 28 May 2017 06:38 )             42.8     05-28    142  |  102  |  37<H>  ----------------------------<  112<H>  3.9   |  25  |  1.10    Ca    9.0      28 May 2017 06:38  Mg     2.6     05-28      I & Os for 24h ending 05-29 @ 07:00  =============================================  IN: 1320 ml / OUT: 2350 ml / NET: -1030 ml    Daily Weight Not Documented  Yet

## 2017-05-29 NOTE — PROGRESS NOTE ADULT - PROBLEM SELECTOR PLAN 3
Cont. current regimen.  Due to discrepancy in BP in B/L arms by SBP of 40 points prior carotid US was done showing no subclavian artery stenosis or carotid artery stenosis
Hx of ?PE on coumadin. The records of when and where it was diagnosed should be obtained and he should complete a course of anti-coagulation. Perhaps something other than coumadin given his difficulty with compliance.    CT angio reveled no PE, focal consolidation, but has moderate right side effusion.
Increased Lisinopril from 2.5mg QD to 5mg QD. Continue Coreg 3.125mg BID. Stop Isordil 2/2 hypotension and noncompliance as an outpatient on 5/27 and goal is to uptitrate Lisinopril.
Moderate right sided effusion. Continue on Lasix with monitoring of I/O.  Will do follow up Chest U/S to evaluate if there is any progression of effusion.
Cont ASA/Beta blocker/Statin/ACE. Pt endorses CALLOWAY and Chest heaviness with minimal exertion. Asymptomatic at rest. Troponin T 0.03 x 3
Cont. current regimen.  Due to discrepancy in BP in B/L arms by SBP of 40 points prior carotid US was done showing no subclavian artery stenosis or carotid artery stenosis
Hold of Abx for now, patient with no leukocytosis, left shift, or fevers. F/U final CXR results. If warranted may need CT Chest to R/O PNA.
Moderate right sided effusion. Continue on Lasix with monitoring of I/O.  Will do follow up Chest U/S to evaluate if there is any progression of effusion.

## 2017-05-29 NOTE — PROGRESS NOTE ADULT - PROBLEM SELECTOR PLAN 2
Stable breathing, but CALLOWAY on walking long distance. No fever and WBC stable. No sign of active infection, hence no ABX recommended at this time. The CT findings are more consistent with an effusion causing compressive atelectasis.

## 2017-05-29 NOTE — PROGRESS NOTE ADULT - PROBLEM SELECTOR PROBLEM 1
Acute on chronic clinical systolic heart failure
Cough
Cough
Pleural effusion on right
Acute on chronic clinical systolic heart failure

## 2017-05-29 NOTE — PROGRESS NOTE ADULT - PROBLEM SELECTOR PLAN 2
C/P free. Troponin 0.03 trended down.  Cont. ASA, Coreg, Lisinopril, Lipitor. Isordil was discontinued prior 2nd to prior hypotension and non-compliance per prior note

## 2017-05-29 NOTE — PROGRESS NOTE ADULT - SUBJECTIVE AND OBJECTIVE BOX
.EPS Progress Note    S: No CP, wishes to stay     T(C): 36.4, Max: 36.4 (05-29 @ 00:18)  HR: 62 (52 - 62)  BP: 111/60 (111/60 - 145/67)  RR: 18 (16 - 18)  SpO2: 98% (97% - 98%)  Wt(kg): --     Telemetry: SInus          General:  NAD         Chest:  CTA B/L      Rales      Rhonchi      Cardiac:  RRR      s1/s2       Abdomen:  +BS      Soft      Non-Tender      Non-Distended      Guarding         Extremities:  LE Edema      Distal Pulses Intact          Labs:                                                               13.9   7.7   )-----------( 287      ( 28 May 2017 06:38 )             42.8     05-28    142  |  102  |  37<H>  ----------------------------<  112<H>  3.9   |  25  |  1.10    Ca    9.0      28 May 2017 06:38  Mg     2.6     05-28      Assessment/Plan:  Discussed with him importance of medical compliance and benefit for primary prevention ICD. He wishes to follow up as outpatient.

## 2017-05-29 NOTE — PROGRESS NOTE ADULT - PROBLEM SELECTOR PLAN 4
CTA Chest to R/O PE on 5/26/17 showed no pulmonary embolism. Cardiomegaly. Moderate size right pleural effusion. Dependent consolidations in the right lower lobe and right middle lobe, probably atelectatic. Pneumonia/aspiration is not excluded.     H/o PE diagnosed 2-3 months ago, non-compliant with Coumadin.  Switched to Eliquis 5mg PO BID $3 co-pay per month.   LE Duplex negative for DVT
Pt insists that he has pneumonia. Pt has been afebrile, has no leukocytosis, and has clear sputum when he coughs intermittently. Sputum culture from 5/26 showed oropharyngeal contamination and was rejected. CT scan reviewed by Pulm and do not want to start antibiotics. This was explained at length to the pt.
CTA Chest to R/O PE on 5/26/17 showed no pulmonary embolism. Cardiomegaly. Moderate size right pleural effusion. Dependent consolidations in the right lower lobe and right middle lobe, probably atelectatic. Pneumonia/aspiration is not excluded.     H/o PE diagnosed 2-3 months ago, non-compliant with Coumadin.  Switched to Eliquis 5mg PO BID $3 co-pay per month.   LE Duplex negative for DVT
Hold of Abx for now, patient with no leukocytosis, left shift, or fevers.
TRIPP coverage with FS QID.

## 2017-05-30 LAB
CULTURE RESULTS: SIGNIFICANT CHANGE UP
CULTURE RESULTS: SIGNIFICANT CHANGE UP
SPECIMEN SOURCE: SIGNIFICANT CHANGE UP
SPECIMEN SOURCE: SIGNIFICANT CHANGE UP

## 2017-05-30 RX ORDER — ASPIRIN/CALCIUM CARB/MAGNESIUM 324 MG
1 TABLET ORAL
Qty: 30 | Refills: 5
Start: 2017-05-30 | End: 2017-11-25

## 2017-05-30 RX ORDER — FERROUS SULFATE 325(65) MG
1 TABLET ORAL
Qty: 0 | Refills: 0 | COMMUNITY

## 2017-05-30 RX ORDER — CARVEDILOL PHOSPHATE 80 MG/1
1 CAPSULE, EXTENDED RELEASE ORAL
Qty: 60 | Refills: 2
Start: 2017-05-30 | End: 2017-08-27

## 2017-05-30 RX ORDER — APIXABAN 2.5 MG/1
1 TABLET, FILM COATED ORAL
Qty: 60 | Refills: 1
Start: 2017-05-30 | End: 2017-07-28

## 2017-05-30 RX ORDER — DOCUSATE SODIUM 100 MG
1 CAPSULE ORAL
Qty: 90 | Refills: 1
Start: 2017-05-30 | End: 2017-07-28

## 2017-05-30 RX ORDER — FUROSEMIDE 40 MG
1 TABLET ORAL
Qty: 0 | Refills: 0 | COMMUNITY

## 2017-05-30 RX ORDER — CARVEDILOL PHOSPHATE 80 MG/1
1 CAPSULE, EXTENDED RELEASE ORAL
Qty: 0 | Refills: 0 | COMMUNITY

## 2017-05-30 RX ORDER — PANTOPRAZOLE SODIUM 20 MG/1
1 TABLET, DELAYED RELEASE ORAL
Qty: 0 | Refills: 0 | COMMUNITY

## 2017-05-30 RX ORDER — BACITRACIN ZINC 500 UNIT/G
1 OINTMENT IN PACKET (EA) TOPICAL
Qty: 0 | Refills: 0 | DISCHARGE
Start: 2017-05-30

## 2017-05-30 RX ORDER — ATORVASTATIN CALCIUM 80 MG/1
1 TABLET, FILM COATED ORAL
Qty: 30 | Refills: 2
Start: 2017-05-30 | End: 2017-08-27

## 2017-05-30 RX ORDER — SIMETHICONE 80 MG/1
1 TABLET, CHEWABLE ORAL
Qty: 0 | Refills: 0 | DISCHARGE
Start: 2017-05-30

## 2017-05-30 RX ORDER — PANTOPRAZOLE SODIUM 20 MG/1
1 TABLET, DELAYED RELEASE ORAL
Qty: 30 | Refills: 2
Start: 2017-05-30 | End: 2017-08-27

## 2017-05-30 RX ORDER — FUROSEMIDE 40 MG
1 TABLET ORAL
Qty: 60 | Refills: 2
Start: 2017-05-30 | End: 2017-08-27

## 2017-05-30 RX ORDER — ATORVASTATIN CALCIUM 80 MG/1
1 TABLET, FILM COATED ORAL
Qty: 0 | Refills: 0 | COMMUNITY

## 2017-05-30 RX ORDER — LISINOPRIL 2.5 MG/1
1 TABLET ORAL
Qty: 30 | Refills: 2
Start: 2017-05-30 | End: 2017-08-27

## 2017-05-30 RX ORDER — APIXABAN 2.5 MG/1
1 TABLET, FILM COATED ORAL
Qty: 0 | Refills: 0 | COMMUNITY
Start: 2017-05-30

## 2017-05-30 RX ORDER — DOCUSATE SODIUM 100 MG
1 CAPSULE ORAL
Qty: 0 | Refills: 0 | COMMUNITY
Start: 2017-05-30

## 2017-05-30 RX ORDER — MUPIROCIN 20 MG/G
1 OINTMENT TOPICAL
Qty: 1 | Refills: 0 | OUTPATIENT
Start: 2017-05-30 | End: 2017-06-29

## 2017-05-30 RX ORDER — MUPIROCIN 20 MG/G
1 OINTMENT TOPICAL
Qty: 0 | Refills: 0 | DISCHARGE
Start: 2017-05-30

## 2017-05-30 RX ORDER — WARFARIN SODIUM 2.5 MG/1
1 TABLET ORAL
Qty: 0 | Refills: 0 | COMMUNITY

## 2017-05-30 RX ORDER — CARVEDILOL PHOSPHATE 80 MG/1
1 CAPSULE, EXTENDED RELEASE ORAL
Qty: 60 | Refills: 2 | OUTPATIENT
Start: 2017-05-30 | End: 2017-08-27

## 2017-05-30 RX ADMIN — LISINOPRIL 5 MILLIGRAM(S): 2.5 TABLET ORAL at 06:56

## 2017-05-30 RX ADMIN — SIMETHICONE 80 MILLIGRAM(S): 80 TABLET, CHEWABLE ORAL at 06:55

## 2017-05-30 RX ADMIN — MUPIROCIN 1 APPLICATION(S): 20 OINTMENT TOPICAL at 07:00

## 2017-05-30 RX ADMIN — SIMETHICONE 80 MILLIGRAM(S): 80 TABLET, CHEWABLE ORAL at 21:23

## 2017-05-30 RX ADMIN — Medication 100 MILLIGRAM(S): at 06:55

## 2017-05-30 RX ADMIN — CARVEDILOL PHOSPHATE 3.12 MILLIGRAM(S): 80 CAPSULE, EXTENDED RELEASE ORAL at 18:44

## 2017-05-30 RX ADMIN — CARVEDILOL PHOSPHATE 3.12 MILLIGRAM(S): 80 CAPSULE, EXTENDED RELEASE ORAL at 06:56

## 2017-05-30 RX ADMIN — Medication 80 MILLIGRAM(S): at 18:44

## 2017-05-30 RX ADMIN — Medication 81 MILLIGRAM(S): at 11:58

## 2017-05-30 RX ADMIN — PANTOPRAZOLE SODIUM 40 MILLIGRAM(S): 20 TABLET, DELAYED RELEASE ORAL at 06:55

## 2017-05-30 RX ADMIN — Medication 100 MILLIGRAM(S): at 21:23

## 2017-05-30 RX ADMIN — Medication 100 MILLIGRAM(S): at 14:18

## 2017-05-30 RX ADMIN — SIMETHICONE 80 MILLIGRAM(S): 80 TABLET, CHEWABLE ORAL at 14:18

## 2017-05-30 RX ADMIN — Medication 80 MILLIGRAM(S): at 06:55

## 2017-05-30 NOTE — PROVIDER CONTACT NOTE (OTHER) - ASSESSMENT
Pt was ambulatory and walked to doorway awaiting gown. CNGINI Corbetta was to get gown but was called away for more urgent need.
Patient did not notify anyone of his findings.
pt became irate and agitated when bed alarm went off after getting up. pt refusing to have it go back on. provider notified.

## 2017-05-30 NOTE — PROVIDER CONTACT NOTE (OTHER) - SITUATION
patient refused to take AM dose of Apixaban. Patient instructed as to importance medication compliance.
pt refusing bed alarm, high fall risk
MANUELA Petit was asked to get gown for pt and approached pt with a joke about his not having a top on. Pt had negative response and both pt and CNA had increasingly loud discussion. RN removed CNA.

## 2017-05-30 NOTE — DIETITIAN INITIAL EVALUATION ADULT. - ENERGY NEEDS
Height  5'7 , weight 187 # , # , bmi 31 , 133% IBW , Fluid rest 1000cc as per MD , IBW used for calculations

## 2017-05-30 NOTE — DIETITIAN INITIAL EVALUATION ADULT. - OTHER INFO
skin - intact , no weight loss , no food allergys , no pain , no n/v/d/c , food allergys - none , skin - intact  ,  pta - avoids sweets , sugar salt

## 2017-05-31 VITALS
DIASTOLIC BLOOD PRESSURE: 65 MMHG | SYSTOLIC BLOOD PRESSURE: 122 MMHG | RESPIRATION RATE: 16 BRPM | OXYGEN SATURATION: 98 % | HEART RATE: 62 BPM

## 2017-05-31 PROCEDURE — 83605 ASSAY OF LACTIC ACID: CPT

## 2017-05-31 PROCEDURE — 84484 ASSAY OF TROPONIN QUANT: CPT

## 2017-05-31 PROCEDURE — 99285 EMERGENCY DEPT VISIT HI MDM: CPT | Mod: 25

## 2017-05-31 PROCEDURE — 85025 COMPLETE CBC W/AUTO DIFF WBC: CPT

## 2017-05-31 PROCEDURE — 36415 COLL VENOUS BLD VENIPUNCTURE: CPT

## 2017-05-31 PROCEDURE — 82553 CREATINE MB FRACTION: CPT

## 2017-05-31 PROCEDURE — 83036 HEMOGLOBIN GLYCOSYLATED A1C: CPT

## 2017-05-31 PROCEDURE — 85379 FIBRIN DEGRADATION QUANT: CPT

## 2017-05-31 PROCEDURE — 83880 ASSAY OF NATRIURETIC PEPTIDE: CPT

## 2017-05-31 PROCEDURE — C8929: CPT

## 2017-05-31 PROCEDURE — 83735 ASSAY OF MAGNESIUM: CPT

## 2017-05-31 PROCEDURE — 93880 EXTRACRANIAL BILAT STUDY: CPT

## 2017-05-31 PROCEDURE — 71046 X-RAY EXAM CHEST 2 VIEWS: CPT

## 2017-05-31 PROCEDURE — 93005 ELECTROCARDIOGRAM TRACING: CPT

## 2017-05-31 PROCEDURE — 71275 CT ANGIOGRAPHY CHEST: CPT

## 2017-05-31 PROCEDURE — 80048 BASIC METABOLIC PNL TOTAL CA: CPT

## 2017-05-31 PROCEDURE — 93970 EXTREMITY STUDY: CPT

## 2017-05-31 PROCEDURE — 85730 THROMBOPLASTIN TIME PARTIAL: CPT

## 2017-05-31 PROCEDURE — 82803 BLOOD GASES ANY COMBINATION: CPT

## 2017-05-31 PROCEDURE — 82550 ASSAY OF CK (CPK): CPT

## 2017-05-31 PROCEDURE — 80053 COMPREHEN METABOLIC PANEL: CPT

## 2017-05-31 PROCEDURE — 87070 CULTURE OTHR SPECIMN AEROBIC: CPT

## 2017-05-31 PROCEDURE — 87040 BLOOD CULTURE FOR BACTERIA: CPT

## 2017-05-31 PROCEDURE — 85610 PROTHROMBIN TIME: CPT

## 2017-05-31 PROCEDURE — 85027 COMPLETE CBC AUTOMATED: CPT

## 2017-05-31 RX ADMIN — Medication 81 MILLIGRAM(S): at 11:00

## 2017-05-31 RX ADMIN — PANTOPRAZOLE SODIUM 40 MILLIGRAM(S): 20 TABLET, DELAYED RELEASE ORAL at 05:52

## 2017-05-31 RX ADMIN — Medication 100 MILLIGRAM(S): at 05:53

## 2017-05-31 RX ADMIN — LISINOPRIL 5 MILLIGRAM(S): 2.5 TABLET ORAL at 05:52

## 2017-05-31 RX ADMIN — CARVEDILOL PHOSPHATE 3.12 MILLIGRAM(S): 80 CAPSULE, EXTENDED RELEASE ORAL at 05:53

## 2017-05-31 RX ADMIN — Medication 80 MILLIGRAM(S): at 05:52

## 2017-05-31 RX ADMIN — MUPIROCIN 1 APPLICATION(S): 20 OINTMENT TOPICAL at 05:58

## 2017-05-31 RX ADMIN — APIXABAN 5 MILLIGRAM(S): 2.5 TABLET, FILM COATED ORAL at 05:52

## 2017-05-31 RX ADMIN — SIMETHICONE 80 MILLIGRAM(S): 80 TABLET, CHEWABLE ORAL at 05:52

## 2017-06-02 DIAGNOSIS — I35.0 NONRHEUMATIC AORTIC (VALVE) STENOSIS: ICD-10-CM

## 2017-06-02 DIAGNOSIS — L81.8 OTHER SPECIFIED DISORDERS OF PIGMENTATION: ICD-10-CM

## 2017-06-02 DIAGNOSIS — Z91.14 PATIENT'S OTHER NONCOMPLIANCE WITH MEDICATION REGIMEN: ICD-10-CM

## 2017-06-02 DIAGNOSIS — E78.5 HYPERLIPIDEMIA, UNSPECIFIED: ICD-10-CM

## 2017-06-02 DIAGNOSIS — Z88.8 ALLERGY STATUS TO OTHER DRUGS, MEDICAMENTS AND BIOLOGICAL SUBSTANCES STATUS: ICD-10-CM

## 2017-06-02 DIAGNOSIS — I25.10 ATHEROSCLEROTIC HEART DISEASE OF NATIVE CORONARY ARTERY WITHOUT ANGINA PECTORIS: ICD-10-CM

## 2017-06-02 DIAGNOSIS — Z79.82 LONG TERM (CURRENT) USE OF ASPIRIN: ICD-10-CM

## 2017-06-02 DIAGNOSIS — J40 BRONCHITIS, NOT SPECIFIED AS ACUTE OR CHRONIC: ICD-10-CM

## 2017-06-02 DIAGNOSIS — R07.9 CHEST PAIN, UNSPECIFIED: ICD-10-CM

## 2017-06-02 DIAGNOSIS — Z95.1 PRESENCE OF AORTOCORONARY BYPASS GRAFT: ICD-10-CM

## 2017-06-02 DIAGNOSIS — I44.0 ATRIOVENTRICULAR BLOCK, FIRST DEGREE: ICD-10-CM

## 2017-06-02 DIAGNOSIS — Z86.711 PERSONAL HISTORY OF PULMONARY EMBOLISM: ICD-10-CM

## 2017-06-02 DIAGNOSIS — I27.2 OTHER SECONDARY PULMONARY HYPERTENSION: ICD-10-CM

## 2017-06-02 DIAGNOSIS — J91.8 PLEURAL EFFUSION IN OTHER CONDITIONS CLASSIFIED ELSEWHERE: ICD-10-CM

## 2017-06-02 DIAGNOSIS — Z87.891 PERSONAL HISTORY OF NICOTINE DEPENDENCE: ICD-10-CM

## 2017-06-02 DIAGNOSIS — I50.23 ACUTE ON CHRONIC SYSTOLIC (CONGESTIVE) HEART FAILURE: ICD-10-CM

## 2017-06-02 DIAGNOSIS — I36.1 NONRHEUMATIC TRICUSPID (VALVE) INSUFFICIENCY: ICD-10-CM

## 2017-06-02 DIAGNOSIS — E11.9 TYPE 2 DIABETES MELLITUS WITHOUT COMPLICATIONS: ICD-10-CM

## 2017-06-02 DIAGNOSIS — J98.11 ATELECTASIS: ICD-10-CM

## 2017-06-02 DIAGNOSIS — I11.0 HYPERTENSIVE HEART DISEASE WITH HEART FAILURE: ICD-10-CM

## 2017-06-03 DIAGNOSIS — Z53.20 PROCEDURE AND TREATMENT NOT CARRIED OUT BECAUSE OF PATIENT'S DECISION FOR UNSPECIFIED REASONS: ICD-10-CM

## 2017-06-13 ENCOUNTER — APPOINTMENT (OUTPATIENT)
Dept: HEART AND VASCULAR | Facility: CLINIC | Age: 67
End: 2017-06-13

## 2017-06-13 RX ORDER — SIMETHICONE 80 MG/1
80 TABLET, CHEWABLE ORAL
Refills: 0 | Status: ACTIVE | COMMUNITY

## 2017-06-13 RX ORDER — ASPIRIN 81 MG
81 TABLET, DELAYED RELEASE (ENTERIC COATED) ORAL
Refills: 0 | Status: ACTIVE | COMMUNITY

## 2017-06-13 RX ORDER — CARVEDILOL 3.12 MG/1
3.12 TABLET, FILM COATED ORAL
Refills: 0 | Status: ACTIVE | COMMUNITY

## 2017-06-13 RX ORDER — ATORVASTATIN CALCIUM 40 MG/1
40 TABLET, FILM COATED ORAL
Refills: 0 | Status: ACTIVE | COMMUNITY

## 2017-06-13 RX ORDER — LISINOPRIL 5 MG/1
5 TABLET ORAL
Refills: 0 | Status: ACTIVE | COMMUNITY

## 2017-06-13 RX ORDER — APIXABAN 5 MG/1
5 TABLET, FILM COATED ORAL
Refills: 0 | Status: ACTIVE | COMMUNITY

## 2017-06-13 RX ORDER — GLIPIZIDE 10 MG/1
10 TABLET ORAL
Refills: 0 | Status: ACTIVE | COMMUNITY

## 2017-06-13 RX ORDER — DOCUSATE SODIUM 100 MG/1
100 CAPSULE, LIQUID FILLED ORAL
Refills: 0 | Status: ACTIVE | COMMUNITY

## 2017-06-13 RX ORDER — MUPIROCIN 20 MG/G
2 OINTMENT TOPICAL
Refills: 0 | Status: ACTIVE | COMMUNITY

## 2017-06-13 RX ORDER — BACITRACIN 500 [IU]/G
500 OINTMENT TOPICAL
Refills: 0 | Status: ACTIVE | COMMUNITY

## 2017-06-13 RX ORDER — FUROSEMIDE 80 MG/1
80 TABLET ORAL
Refills: 0 | Status: ACTIVE | COMMUNITY

## 2017-06-13 RX ORDER — PANTOPRAZOLE 40 MG/1
40 TABLET, DELAYED RELEASE ORAL
Refills: 0 | Status: ACTIVE | COMMUNITY

## 2017-06-28 ENCOUNTER — APPOINTMENT (OUTPATIENT)
Dept: HEART AND VASCULAR | Facility: CLINIC | Age: 67
End: 2017-06-28

## 2017-11-01 ENCOUNTER — OUTPATIENT (OUTPATIENT)
Dept: OUTPATIENT SERVICES | Facility: HOSPITAL | Age: 67
LOS: 1 days | End: 2017-11-01
Payer: MEDICAID

## 2017-11-01 DIAGNOSIS — Z95.1 PRESENCE OF AORTOCORONARY BYPASS GRAFT: Chronic | ICD-10-CM

## 2017-11-01 PROCEDURE — G9001: CPT

## 2017-11-15 VITALS
HEIGHT: 67 IN | SYSTOLIC BLOOD PRESSURE: 148 MMHG | HEART RATE: 84 BPM | DIASTOLIC BLOOD PRESSURE: 89 MMHG | OXYGEN SATURATION: 97 % | WEIGHT: 199.96 LBS | RESPIRATION RATE: 18 BRPM | TEMPERATURE: 99 F

## 2017-11-15 LAB
APPEARANCE UR: (no result)
BACTERIA # UR AUTO: PRESENT /HPF
BILIRUB UR-MCNC: NEGATIVE — SIGNIFICANT CHANGE UP
CK MB CFR SERPL CALC: 6.3 NG/ML — SIGNIFICANT CHANGE UP (ref 0–6.7)
CK SERPL-CCNC: 166 U/L — SIGNIFICANT CHANGE UP (ref 30–200)
COLOR SPEC: YELLOW — SIGNIFICANT CHANGE UP
DIFF PNL FLD: (no result)
EPI CELLS # UR: (no result) /HPF (ref 0–5)
GAS PNL BLDV: SIGNIFICANT CHANGE UP
GLUCOSE UR QL: NEGATIVE — SIGNIFICANT CHANGE UP
GRAN CASTS # UR COMP ASSIST: (no result) /LPF
HCO3 BLDV-SCNC: 23 MMOL/L — SIGNIFICANT CHANGE UP (ref 20–27)
HCT VFR BLD CALC: 44.9 % — SIGNIFICANT CHANGE UP (ref 39–50)
HGB BLD-MCNC: 14.1 G/DL — SIGNIFICANT CHANGE UP (ref 13–17)
KETONES UR-MCNC: NEGATIVE — SIGNIFICANT CHANGE UP
LACTATE SERPL-SCNC: 1.3 MMOL/L — SIGNIFICANT CHANGE UP (ref 0.5–2)
LEUKOCYTE ESTERASE UR-ACNC: NEGATIVE — SIGNIFICANT CHANGE UP
LIDOCAIN IGE QN: 31 U/L — SIGNIFICANT CHANGE UP (ref 7–60)
MCHC RBC-ENTMCNC: 26.8 PG — LOW (ref 27–34)
MCHC RBC-ENTMCNC: 31.4 G/DL — LOW (ref 32–36)
MCV RBC AUTO: 85.4 FL — SIGNIFICANT CHANGE UP (ref 80–100)
NITRITE UR-MCNC: NEGATIVE — SIGNIFICANT CHANGE UP
NT-PROBNP SERPL-SCNC: 6465 PG/ML — HIGH (ref 0–300)
PCO2 BLDV: 31 MMHG — LOW (ref 41–51)
PH BLDV: 7.48 — HIGH (ref 7.32–7.43)
PH UR: 5.5 — SIGNIFICANT CHANGE UP (ref 5–8)
PLATELET # BLD AUTO: 209 K/UL — SIGNIFICANT CHANGE UP (ref 150–400)
PO2 BLDV: 69 MMHG — SIGNIFICANT CHANGE UP
PROT UR-MCNC: 100 MG/DL
RBC # BLD: 5.26 M/UL — SIGNIFICANT CHANGE UP (ref 4.2–5.8)
RBC # FLD: 15.9 % — SIGNIFICANT CHANGE UP (ref 10.3–16.9)
RBC CASTS # UR COMP ASSIST: < 5 /HPF — SIGNIFICANT CHANGE UP
SAO2 % BLDV: 93 % — SIGNIFICANT CHANGE UP
SP GR SPEC: 1.02 — SIGNIFICANT CHANGE UP (ref 1–1.03)
TROPONIN T SERPL-MCNC: 0.02 NG/ML — HIGH (ref 0–0.01)
UROBILINOGEN FLD QL: 1 E.U./DL — SIGNIFICANT CHANGE UP
WBC # BLD: 7.4 K/UL — SIGNIFICANT CHANGE UP (ref 3.8–10.5)
WBC # FLD AUTO: 7.4 K/UL — SIGNIFICANT CHANGE UP (ref 3.8–10.5)
WBC UR QL: < 5 /HPF — SIGNIFICANT CHANGE UP

## 2017-11-15 PROCEDURE — 74177 CT ABD & PELVIS W/CONTRAST: CPT | Mod: 26

## 2017-11-15 PROCEDURE — 99285 EMERGENCY DEPT VISIT HI MDM: CPT | Mod: 25,GC

## 2017-11-15 PROCEDURE — 93010 ELECTROCARDIOGRAM REPORT: CPT

## 2017-11-15 PROCEDURE — 71020: CPT | Mod: 26

## 2017-11-15 RX ORDER — SODIUM CHLORIDE 9 MG/ML
1000 INJECTION INTRAMUSCULAR; INTRAVENOUS; SUBCUTANEOUS
Qty: 0 | Refills: 0 | Status: DISCONTINUED | OUTPATIENT
Start: 2017-11-15 | End: 2017-11-16

## 2017-11-15 RX ORDER — IOHEXOL 300 MG/ML
50 INJECTION, SOLUTION INTRAVENOUS ONCE
Qty: 0 | Refills: 0 | Status: COMPLETED | OUTPATIENT
Start: 2017-11-15 | End: 2017-11-15

## 2017-11-15 RX ORDER — SODIUM CHLORIDE 9 MG/ML
250 INJECTION INTRAMUSCULAR; INTRAVENOUS; SUBCUTANEOUS ONCE
Qty: 0 | Refills: 0 | Status: COMPLETED | OUTPATIENT
Start: 2017-11-15 | End: 2017-11-15

## 2017-11-15 RX ADMIN — SODIUM CHLORIDE 100 MILLILITER(S): 9 INJECTION INTRAMUSCULAR; INTRAVENOUS; SUBCUTANEOUS at 20:11

## 2017-11-15 RX ADMIN — SODIUM CHLORIDE 500 MILLILITER(S): 9 INJECTION INTRAMUSCULAR; INTRAVENOUS; SUBCUTANEOUS at 22:29

## 2017-11-15 RX ADMIN — SODIUM CHLORIDE 100 MILLILITER(S): 9 INJECTION INTRAMUSCULAR; INTRAVENOUS; SUBCUTANEOUS at 22:00

## 2017-11-15 RX ADMIN — IOHEXOL 50 MILLILITER(S): 300 INJECTION, SOLUTION INTRAVENOUS at 20:11

## 2017-11-15 NOTE — ED PROVIDER NOTE - MEDICAL DECISION MAKING DETAILS
Pt w h/o chf, dm, cad noncompliant c/o chf type sx as well as several wks of abd pain, diarrhea/constipation w/o ttp on exam.  Plan labs, cxr, ekg to eval for chf, stool studies and gi labs to eval gi complaint but no indication for imaging at this time to eval - will likely benefit from gi eval in non-ed setting.

## 2017-11-15 NOTE — ED ADULT TRIAGE NOTE - OTHER COMPLAINTS
pt c.o bloating to stomach. reports intermittent constipation and orange stool. pt denies n/v. pt also c.o swollen b/l legs. pt c.o sob with exertion. denies cp. denies cough/fever/chills.

## 2017-11-15 NOTE — ED PROVIDER NOTE - OBJECTIVE STATEMENT
68 yo male mal, former smoker, noncompliant with follow up, with PMHx of HTN, hyperlipidemia, NIDDM, CAD s/p 3VCABG 2015, chronic systolic CHF, pulmonary embolism w admit in May for CHF w echo 5/25/17 w EF 20-25% with moderate AR c/o 66 yo male mal, former smoker, noncompliant with follow up, with PMHx of HTN, hyperlipidemia, NIDDM, CAD s/p 3VCABG 2015, chronic systolic CHF, pulmonary embolism w admit in May for CHF w echo 5/25/17 w EF 20-25% with moderate AR c/o 1. resendez and le edema - worse lately, last admit ~ 2 wk ago.  Pt reports he's taking lasix 40 mg bid.  Pt notes intermittent exertional cp, no current cp.  No fever.  + cough w white or green sputum w/o uri sx.  No palpitations.  Sx similar to prior chf exacerbations.  2. alternating constipation and diarrhea w abd distension and intermittent pain (pressure, bloating sensation) x 3-4 wk.  No travel, + distant prior colonoscopy.  No sick contacts, no known suspicious foods.  No urinary sx. 68 yo male mal, former smoker, noncompliant with follow up, with PMHx of HTN, hyperlipidemia, NIDDM, CAD s/p 3VCABG 2015, chronic systolic CHF, pulmonary embolism w admit in May for CHF w echo 5/25/17 w EF 20-25% with moderate AR c/o 1. resendez and le edema - worse lately, last admit ~ 2 wk ago.  Pt reports he's taking lasix 40 mg bid.  Pt notes intermittent exertional cp, no current cp.  No fever.  + cough w white or green sputum w/o uri sx.  No palpitations.  Sx similar to prior chf exacerbations.  2. alternating constipation and diarrhea w abd distension and intermittent pain (pressure, bloating sensation) x 3-4 wk.  No travel, + distant prior colonoscopy.  No sick contacts, no known suspicious foods.  No urinary sx.  No recent abx.

## 2017-11-15 NOTE — ED PROVIDER NOTE - PROGRESS NOTE DETAILS
Pt c/o abd pain now worse.  Initial exam nontender, repeat exam now w diffuse ttp most in rlq w/o r/g.  Will get ct abd to further eval pain and stool change complaints.  Trop elevated w similar elevation in the past - repeat planned for 4 h after initial.  BNP elevated.  CXR w effusion unchanged from prev. Pt w ag on initial labs.  Additional labs ordered after small ivf bolus including repeat trop.  CT abd being performed now.  Pt signed out to Dr Jonas and SLOAN Brice pending ct, repeat labs.

## 2017-11-15 NOTE — ED PROVIDER NOTE - CARE PLAN
Principal Discharge DX:	CHF (congestive heart failure)  Secondary Diagnosis:	Abdominal pain  Secondary Diagnosis:	Diarrhea

## 2017-11-15 NOTE — ED PROVIDER NOTE - MUSCULOSKELETAL, MLM
Spine appears normal, range of motion is not limited, no muscle or joint tenderness, 3+ edema bilat le, no c/c, + brawny skin changes bilat (chronic)

## 2017-11-15 NOTE — ED ADULT NURSE NOTE - OBJECTIVE STATEMENT
67y M, A&ox3, presents to ED for multiple medical complaints. Pt states "I only want to speak to medical doctor but its just everything hurts" PT uncooperative for full nursing exam. Pt c/o abdominal pain, cp, dyspnea on exertion, nausea. Pt able to speak in full sentences. Pt refuses auscultation nor palpation. EKG performed. Stool sample given. Will continue to monitor. 67y M, A&ox3, presents to ED for multiple medical complaints. Pt states "I only want to speak to medical doctor but its just everything hurts amd im bloated" PT uncooperative for full nursing exam. Pt c/o abdominal pain, cp, dyspnea on exertion, nausea. Pt able to speak in full sentences. States abdominal pain and bloating (noted distended obese abdomen) with a diarrhea and times of constipation, able to perform stool sample in ER. Lungs clear, no jvd, mild +1 swelling lower extremities.  EKG performed. Will continue to monitor.

## 2017-11-16 ENCOUNTER — INPATIENT (INPATIENT)
Facility: HOSPITAL | Age: 67
LOS: 8 days | Discharge: HOME | DRG: 291 | End: 2017-11-25
Attending: INTERNAL MEDICINE | Admitting: INTERNAL MEDICINE
Payer: MEDICARE

## 2017-11-16 DIAGNOSIS — I87.8 OTHER SPECIFIED DISORDERS OF VEINS: ICD-10-CM

## 2017-11-16 DIAGNOSIS — Z02.9 ENCOUNTER FOR ADMINISTRATIVE EXAMINATIONS, UNSPECIFIED: ICD-10-CM

## 2017-11-16 DIAGNOSIS — I50.9 HEART FAILURE, UNSPECIFIED: ICD-10-CM

## 2017-11-16 DIAGNOSIS — I26.99 OTHER PULMONARY EMBOLISM WITHOUT ACUTE COR PULMONALE: ICD-10-CM

## 2017-11-16 DIAGNOSIS — E78.5 HYPERLIPIDEMIA, UNSPECIFIED: ICD-10-CM

## 2017-11-16 DIAGNOSIS — E11.9 TYPE 2 DIABETES MELLITUS WITHOUT COMPLICATIONS: ICD-10-CM

## 2017-11-16 DIAGNOSIS — Z95.1 PRESENCE OF AORTOCORONARY BYPASS GRAFT: Chronic | ICD-10-CM

## 2017-11-16 DIAGNOSIS — I10 ESSENTIAL (PRIMARY) HYPERTENSION: ICD-10-CM

## 2017-11-16 DIAGNOSIS — R18.8 OTHER ASCITES: ICD-10-CM

## 2017-11-16 LAB
ALBUMIN SERPL ELPH-MCNC: 4 G/DL — SIGNIFICANT CHANGE UP (ref 3.3–5)
ALP SERPL-CCNC: 123 U/L — HIGH (ref 40–120)
ALT FLD-CCNC: 35 U/L — SIGNIFICANT CHANGE UP (ref 10–45)
ANION GAP SERPL CALC-SCNC: 15 MMOL/L — SIGNIFICANT CHANGE UP (ref 5–17)
ANION GAP SERPL CALC-SCNC: 16 MMOL/L — SIGNIFICANT CHANGE UP (ref 5–17)
AST SERPL-CCNC: 40 U/L — SIGNIFICANT CHANGE UP (ref 10–40)
BILIRUB DIRECT SERPL-MCNC: 0.4 MG/DL — HIGH (ref 0–0.2)
BILIRUB INDIRECT FLD-MCNC: 1.3 MG/DL — HIGH (ref 0.2–1)
BILIRUB SERPL-MCNC: 1.7 MG/DL — HIGH (ref 0.2–1.2)
BUN SERPL-MCNC: 25 MG/DL — HIGH (ref 7–23)
BUN SERPL-MCNC: 28 MG/DL — HIGH (ref 7–23)
CALCIUM SERPL-MCNC: 8.5 MG/DL — SIGNIFICANT CHANGE UP (ref 8.4–10.5)
CALCIUM SERPL-MCNC: 8.8 MG/DL — SIGNIFICANT CHANGE UP (ref 8.4–10.5)
CHLORIDE SERPL-SCNC: 100 MMOL/L — SIGNIFICANT CHANGE UP (ref 96–108)
CHLORIDE SERPL-SCNC: 99 MMOL/L — SIGNIFICANT CHANGE UP (ref 96–108)
CK MB CFR SERPL CALC: 4.9 NG/ML — SIGNIFICANT CHANGE UP (ref 0–6.7)
CK SERPL-CCNC: 127 U/L — SIGNIFICANT CHANGE UP (ref 30–200)
CO2 SERPL-SCNC: 20 MMOL/L — LOW (ref 22–31)
CO2 SERPL-SCNC: 23 MMOL/L — SIGNIFICANT CHANGE UP (ref 22–31)
CREAT SERPL-MCNC: 1.2 MG/DL — SIGNIFICANT CHANGE UP (ref 0.5–1.3)
CREAT SERPL-MCNC: 1.24 MG/DL — SIGNIFICANT CHANGE UP (ref 0.5–1.3)
GLUCOSE BLDC GLUCOMTR-MCNC: 102 MG/DL — HIGH (ref 70–99)
GLUCOSE BLDC GLUCOMTR-MCNC: 119 MG/DL — HIGH (ref 70–99)
GLUCOSE BLDC GLUCOMTR-MCNC: 179 MG/DL — HIGH (ref 70–99)
GLUCOSE BLDC GLUCOMTR-MCNC: 95 MG/DL — SIGNIFICANT CHANGE UP (ref 70–99)
GLUCOSE SERPL-MCNC: 106 MG/DL — HIGH (ref 70–99)
GLUCOSE SERPL-MCNC: 121 MG/DL — HIGH (ref 70–99)
HBA1C BLD-MCNC: 6.3 % — HIGH (ref 4–5.6)
HCT VFR BLD CALC: 44.8 % — SIGNIFICANT CHANGE UP (ref 39–50)
HGB BLD-MCNC: 14.3 G/DL — SIGNIFICANT CHANGE UP (ref 13–17)
MCHC RBC-ENTMCNC: 27 PG — SIGNIFICANT CHANGE UP (ref 27–34)
MCHC RBC-ENTMCNC: 31.9 G/DL — LOW (ref 32–36)
MCV RBC AUTO: 84.7 FL — SIGNIFICANT CHANGE UP (ref 80–100)
PLATELET # BLD AUTO: 231 K/UL — SIGNIFICANT CHANGE UP (ref 150–400)
POTASSIUM SERPL-MCNC: 3.6 MMOL/L — SIGNIFICANT CHANGE UP (ref 3.5–5.3)
POTASSIUM SERPL-MCNC: 4.2 MMOL/L — SIGNIFICANT CHANGE UP (ref 3.5–5.3)
POTASSIUM SERPL-SCNC: 3.6 MMOL/L — SIGNIFICANT CHANGE UP (ref 3.5–5.3)
POTASSIUM SERPL-SCNC: 4.2 MMOL/L — SIGNIFICANT CHANGE UP (ref 3.5–5.3)
PROT SERPL-MCNC: 7.8 G/DL — SIGNIFICANT CHANGE UP (ref 6–8.3)
RBC # BLD: 5.29 M/UL — SIGNIFICANT CHANGE UP (ref 4.2–5.8)
RBC # FLD: 16 % — SIGNIFICANT CHANGE UP (ref 10.3–16.9)
SODIUM SERPL-SCNC: 135 MMOL/L — SIGNIFICANT CHANGE UP (ref 135–145)
SODIUM SERPL-SCNC: 138 MMOL/L — SIGNIFICANT CHANGE UP (ref 135–145)
TROPONIN T SERPL-MCNC: 0.02 NG/ML — HIGH (ref 0–0.01)
TROPONIN T SERPL-MCNC: 0.03 NG/ML — HIGH (ref 0–0.01)
WBC # BLD: 7.5 K/UL — SIGNIFICANT CHANGE UP (ref 3.8–10.5)
WBC # FLD AUTO: 7.5 K/UL — SIGNIFICANT CHANGE UP (ref 3.8–10.5)

## 2017-11-16 PROCEDURE — 93970 EXTREMITY STUDY: CPT | Mod: 26

## 2017-11-16 PROCEDURE — 99223 1ST HOSP IP/OBS HIGH 75: CPT

## 2017-11-16 RX ORDER — LISINOPRIL 2.5 MG/1
10 TABLET ORAL DAILY
Qty: 0 | Refills: 0 | Status: DISCONTINUED | OUTPATIENT
Start: 2017-11-17 | End: 2017-11-20

## 2017-11-16 RX ORDER — ATORVASTATIN CALCIUM 80 MG/1
40 TABLET, FILM COATED ORAL AT BEDTIME
Qty: 0 | Refills: 0 | Status: DISCONTINUED | OUTPATIENT
Start: 2017-11-16 | End: 2017-11-25

## 2017-11-16 RX ORDER — BACITRACIN ZINC 500 UNIT/G
1 OINTMENT IN PACKET (EA) TOPICAL
Qty: 0 | Refills: 0 | Status: DISCONTINUED | OUTPATIENT
Start: 2017-11-16 | End: 2017-11-25

## 2017-11-16 RX ORDER — INSULIN LISPRO 100/ML
VIAL (ML) SUBCUTANEOUS
Qty: 0 | Refills: 0 | Status: DISCONTINUED | OUTPATIENT
Start: 2017-11-16 | End: 2017-11-25

## 2017-11-16 RX ORDER — FUROSEMIDE 40 MG
80 TABLET ORAL EVERY 12 HOURS
Qty: 0 | Refills: 0 | Status: DISCONTINUED | OUTPATIENT
Start: 2017-11-16 | End: 2017-11-16

## 2017-11-16 RX ORDER — POLYETHYLENE GLYCOL 3350 17 G/17G
17 POWDER, FOR SOLUTION ORAL ONCE
Qty: 0 | Refills: 0 | Status: COMPLETED | OUTPATIENT
Start: 2017-11-16 | End: 2017-11-16

## 2017-11-16 RX ORDER — SODIUM CHLORIDE 9 MG/ML
1000 INJECTION, SOLUTION INTRAVENOUS
Qty: 0 | Refills: 0 | Status: DISCONTINUED | OUTPATIENT
Start: 2017-11-16 | End: 2017-11-25

## 2017-11-16 RX ORDER — DEXTROSE 50 % IN WATER 50 %
25 SYRINGE (ML) INTRAVENOUS ONCE
Qty: 0 | Refills: 0 | Status: DISCONTINUED | OUTPATIENT
Start: 2017-11-16 | End: 2017-11-25

## 2017-11-16 RX ORDER — LISINOPRIL 2.5 MG/1
5 TABLET ORAL ONCE
Qty: 0 | Refills: 0 | Status: COMPLETED | OUTPATIENT
Start: 2017-11-16 | End: 2017-11-16

## 2017-11-16 RX ORDER — MUPIROCIN 20 MG/G
1 OINTMENT TOPICAL THREE TIMES A DAY
Qty: 0 | Refills: 0 | Status: DISCONTINUED | OUTPATIENT
Start: 2017-11-16 | End: 2017-11-24

## 2017-11-16 RX ORDER — GLUCAGON INJECTION, SOLUTION 0.5 MG/.1ML
1 INJECTION, SOLUTION SUBCUTANEOUS ONCE
Qty: 0 | Refills: 0 | Status: DISCONTINUED | OUTPATIENT
Start: 2017-11-16 | End: 2017-11-25

## 2017-11-16 RX ORDER — POTASSIUM CHLORIDE 20 MEQ
40 PACKET (EA) ORAL ONCE
Qty: 0 | Refills: 0 | Status: COMPLETED | OUTPATIENT
Start: 2017-11-16 | End: 2017-11-16

## 2017-11-16 RX ORDER — APIXABAN 2.5 MG/1
5 TABLET, FILM COATED ORAL EVERY 12 HOURS
Qty: 0 | Refills: 0 | Status: DISCONTINUED | OUTPATIENT
Start: 2017-11-16 | End: 2017-11-19

## 2017-11-16 RX ORDER — POTASSIUM CHLORIDE 20 MEQ
40 PACKET (EA) ORAL ONCE
Qty: 0 | Refills: 0 | Status: DISCONTINUED | OUTPATIENT
Start: 2017-11-16 | End: 2017-11-16

## 2017-11-16 RX ORDER — DEXTROSE 50 % IN WATER 50 %
12.5 SYRINGE (ML) INTRAVENOUS ONCE
Qty: 0 | Refills: 0 | Status: DISCONTINUED | OUTPATIENT
Start: 2017-11-16 | End: 2017-11-25

## 2017-11-16 RX ORDER — LISINOPRIL 2.5 MG/1
5 TABLET ORAL DAILY
Qty: 0 | Refills: 0 | Status: DISCONTINUED | OUTPATIENT
Start: 2017-11-16 | End: 2017-11-16

## 2017-11-16 RX ORDER — CARVEDILOL PHOSPHATE 80 MG/1
3.12 CAPSULE, EXTENDED RELEASE ORAL EVERY 12 HOURS
Qty: 0 | Refills: 0 | Status: DISCONTINUED | OUTPATIENT
Start: 2017-11-16 | End: 2017-11-20

## 2017-11-16 RX ORDER — ASPIRIN/CALCIUM CARB/MAGNESIUM 324 MG
81 TABLET ORAL DAILY
Qty: 0 | Refills: 0 | Status: DISCONTINUED | OUTPATIENT
Start: 2017-11-16 | End: 2017-11-25

## 2017-11-16 RX ORDER — VANCOMYCIN HCL 1 G
1000 VIAL (EA) INTRAVENOUS ONCE
Qty: 0 | Refills: 0 | Status: COMPLETED | OUTPATIENT
Start: 2017-11-16 | End: 2017-11-16

## 2017-11-16 RX ORDER — DEXTROSE 50 % IN WATER 50 %
1 SYRINGE (ML) INTRAVENOUS ONCE
Qty: 0 | Refills: 0 | Status: DISCONTINUED | OUTPATIENT
Start: 2017-11-16 | End: 2017-11-25

## 2017-11-16 RX ORDER — SIMETHICONE 80 MG/1
80 TABLET, CHEWABLE ORAL THREE TIMES A DAY
Qty: 0 | Refills: 0 | Status: DISCONTINUED | OUTPATIENT
Start: 2017-11-16 | End: 2017-11-25

## 2017-11-16 RX ORDER — FUROSEMIDE 40 MG
120 TABLET ORAL EVERY 12 HOURS
Qty: 0 | Refills: 0 | Status: DISCONTINUED | OUTPATIENT
Start: 2017-11-16 | End: 2017-11-20

## 2017-11-16 RX ORDER — PANTOPRAZOLE SODIUM 20 MG/1
40 TABLET, DELAYED RELEASE ORAL
Qty: 0 | Refills: 0 | Status: DISCONTINUED | OUTPATIENT
Start: 2017-11-16 | End: 2017-11-25

## 2017-11-16 RX ADMIN — CARVEDILOL PHOSPHATE 3.12 MILLIGRAM(S): 80 CAPSULE, EXTENDED RELEASE ORAL at 17:19

## 2017-11-16 RX ADMIN — APIXABAN 5 MILLIGRAM(S): 2.5 TABLET, FILM COATED ORAL at 07:32

## 2017-11-16 RX ADMIN — CARVEDILOL PHOSPHATE 3.12 MILLIGRAM(S): 80 CAPSULE, EXTENDED RELEASE ORAL at 07:20

## 2017-11-16 RX ADMIN — SIMETHICONE 80 MILLIGRAM(S): 80 TABLET, CHEWABLE ORAL at 07:20

## 2017-11-16 RX ADMIN — SIMETHICONE 80 MILLIGRAM(S): 80 TABLET, CHEWABLE ORAL at 14:23

## 2017-11-16 RX ADMIN — Medication 1: at 22:50

## 2017-11-16 RX ADMIN — Medication 40 MILLIEQUIVALENT(S): at 14:23

## 2017-11-16 RX ADMIN — PANTOPRAZOLE SODIUM 40 MILLIGRAM(S): 20 TABLET, DELAYED RELEASE ORAL at 07:20

## 2017-11-16 RX ADMIN — Medication 80 MILLIGRAM(S): at 02:49

## 2017-11-16 RX ADMIN — SIMETHICONE 80 MILLIGRAM(S): 80 TABLET, CHEWABLE ORAL at 22:50

## 2017-11-16 RX ADMIN — APIXABAN 5 MILLIGRAM(S): 2.5 TABLET, FILM COATED ORAL at 17:19

## 2017-11-16 RX ADMIN — Medication 1 APPLICATION(S): at 06:55

## 2017-11-16 RX ADMIN — Medication 81 MILLIGRAM(S): at 14:24

## 2017-11-16 RX ADMIN — LISINOPRIL 5 MILLIGRAM(S): 2.5 TABLET ORAL at 07:20

## 2017-11-16 RX ADMIN — Medication 124 MILLIGRAM(S): at 19:41

## 2017-11-16 RX ADMIN — POLYETHYLENE GLYCOL 3350 17 GRAM(S): 17 POWDER, FOR SOLUTION ORAL at 21:31

## 2017-11-16 RX ADMIN — Medication 250 MILLIGRAM(S): at 02:50

## 2017-11-16 RX ADMIN — LISINOPRIL 5 MILLIGRAM(S): 2.5 TABLET ORAL at 17:19

## 2017-11-16 NOTE — H&P ADULT - NSHPPHYSICALEXAM_GEN_ALL_CORE
149/84, 68, 18, Afeb  NC/AT  +JVD  B/L Crackles through out  s1s2 no m/r/g  Abd distended + Ascites  LE B/L 4+ edema with venous changes  Neuro non focal

## 2017-11-16 NOTE — PROGRESS NOTE ADULT - PROBLEM SELECTOR PLAN 1
+ anasarca on exam, BNP 6465, CE 0.02 -> 0.03, f/u 6pm, likely elevated in setting of CHF.   - Moderate Right pleural effusion noted on CT Abd/Pelvis 11/16  - Patient currently refusing to have a repeat Echo.  Prior Echo 5/25/17 revealing EF 20-25%, moderate AS (ROCÍO 1.2)  - CHF Team consulted.  Recommended Lasix increased to 120mg IV BID, increased Lisinopril to 10mg PO QD, Continue Coreg 3.125mg PO BID.  - EP team consulted, Followed on prior admit in May for ICD placement and recommended 3mo medical mngt.  Unclear if patient is compliant.   - Continue Strict I/Os, Daily Weights.   - Consider Ischemic Eval when more euvolemic.

## 2017-11-16 NOTE — PROGRESS NOTE ADULT - PROBLEM SELECTOR PLAN 2
Abdomen distended/tender.  + Ascites likely in setting of CHF. AST/ALT wnl.  - CT Abd/Pelvis 11/16: Few mildly distended loops of small bowel without evidence of defined transition point to suggest obstruction, Moderate ascites, Moderate right pleural effusion.  - Continue to Diurese with IV Lasix.

## 2017-11-16 NOTE — H&P ADULT - ASSESSMENT
66 y/o male, former smoker, noncompliant with follow up, with PMHx of HTN, hyperlipidemia, NIDDM, CAD s/p 3VCABG in 2015 (at [facility] in [place], [place]), chronic systolic CHF , Echo on 5/25/17 showed Echo shows EF 20-25% with moderate AR.   st recorded EF 20 pulmonary embolism (on eliquis), presents to [facility] with worsening Dyspnea on exertion as well as rest for past  few weeks and progressively worsening. He also admits to increased abdominal bloating and le edema swelling.  He denies chest pain.  diaphoresis, palpitations, N/V, dizziness, LOC, fever/chills. Pt says he was recently at [facility] and left because he didnt like how he was treated. Patient can not tell me who his primary cardiologist is. Currently his BNP is > 6000, troponin 0.02. EKG SR with long  first degree AVB, Late transition, tiny inferior q's and inverted T waves in V5-V6. Patient being admitted to [facility].  -Admit to tele  -Serial trops   -Lasix 80mg IVP Q12h  -Daily wts  strict I +O  -Cont BB  -Cont ACE  -Add Aldactone 25mg PO Daily  -Consider paracentesis(may need to hold AC)  -Cont eliquis for now  -Consider entresto when euvolemic?  -Repeat 2D echp  -If LVEF remains low consider ICD implant  -Vancomcyin x1 for LE erythemia, discuss continuing with Dr Ibarra 68 y/o male, former smoker, noncompliant with follow up, with PMHx of HTN, hyperlipidemia, NIDDM, CAD s/p 3VCABG in 2015 (at [facility] in [place], [place]), chronic systolic CHF , Echo on 5/25/17 showed Echo shows EF 20-25% with moderate AR.   st recorded EF 20 pulmonary embolism (on eliquis), presents to [facility] with worsening Dyspnea on exertion as well as rest for past  few weeks and progressively worsening. He also admits to increased abdominal bloating and le edema swelling.  He denies chest pain.  diaphoresis, palpitations, N/V, dizziness, LOC, fever/chills. Pt says he was recently at [facility] and left because he didnt like how he was treated. Patient can not tell me who his primary cardiologist is. Currently his BNP is > 6000, troponin 0.02. EKG SR with long  first degree AVB, Late transition, tiny inferior q's and inverted T waves in V5-V6. Patient being admitted to [facility].  -Admit to tele  -Serial trops   -Lasix 80mg IVP Q12h  -Daily wts  strict I +O  -Cont BB  -Cont ACE  -Aldactone allergy  -Consider paracentesis(may need to hold AC)  -Cont eliquis for now  -Consider entresto when euvolemic?  -Repeat 2D echp  -If LVEF remains low consider ICD implant  -Vancomcyin x1 for LE erythemia, discuss continuing with Dr Ibarra

## 2017-11-16 NOTE — H&P ADULT - HISTORY OF PRESENT ILLNESS
66 y/o male, former smoker, noncompliant with follow up, with PMHx of HTN, hyperlipidemia, NIDDM, CAD s/p 3VCABG in 2015 (at [facility] [place] [facility] in [place], [place]), chronic systolic CHF , Echo on 5/25/17 showed Echo shows EF 20-25% with moderate AR.   st recorded EF 20 pulmonary embolism (on eliquis), presents to [facility] with worsening Dyspnea on exertion as well as rest for past  few weeks and progressively worsening. He also admits to increased abdominal bloating and le edema swelling.  He denies chest pain.  diaphoresis, palpitations, N/V, dizziness, LOC, fever/chills. Pt says he was recently at [facility] and left because he didnt like how he was treated. Patient can not tell me who his primary cardiologist is. Currently his BNP is > 6000, troponin 0.02. EKG SR with long  first degree AVB, Late transition, tiny inferior q's and inverted T waves in V5-V6. Patient being admitted to [facility].

## 2017-11-16 NOTE — PROVIDER CONTACT NOTE (OTHER) - ASSESSMENT
Pt is refusing telemetry. Pt states he  "doesn't like stickers in his chest:". Education is provided but pt refuses anyway.

## 2017-11-16 NOTE — PATIENT PROFILE ADULT. - NS PRO CONTRA FLU 1
pt unsure if he got the flu vaccine/unable to assess immunization status Pt states he never received vaccine/no

## 2017-11-16 NOTE — PROGRESS NOTE ADULT - PROBLEM SELECTOR PLAN 7
b/l brawny discoloration likely 2/2 venous stasis and LE swelling  - s/p Vanco x1 in ED.  WBC wnl and afebrile.  Will not continue Abx at this time  - f/u Wound care consult.

## 2017-11-16 NOTE — PROGRESS NOTE ADULT - PROBLEM SELECTOR PLAN 3
Hx of PE diagnosed early in 2017.    - Recent CT PE protocol 5/2017 negative for PE.  - LE Duplex 11/16: negative for DVT  - Continue Eliquis 5mg PO BID.

## 2017-11-16 NOTE — PROGRESS NOTE ADULT - SUBJECTIVE AND OBJECTIVE BOX
Interventional Cardiology PA Adult Progress Note    Subjective Assessment: Patient seen and examined at bedside.  Continues to complaint of SOB and abdominal swelling but laying flat.  Patient continues to refuse repeat Echocardiogram "within a few days".  Patient does not remember where these test's were done.    	  MEDICATIONS:  carvedilol 3.125 milliGRAM(s) Oral every 12 hours  furosemide   IVPB 120 milliGRAM(s) IV Intermittent every 12 hours  lisinopril 5 milliGRAM(s) Oral once  pantoprazole    Tablet 40 milliGRAM(s) Oral before breakfast  simethicone 80 milliGRAM(s) Chew three times a day  atorvastatin 40 milliGRAM(s) Oral at bedtime  dextrose Gel 1 Dose(s) Oral once PRN  glucagon  Injectable 1 milliGRAM(s) IntraMuscular once PRN  insulin lispro (HumaLOG) corrective regimen sliding scale   SubCutaneous Before meals and at bedtime  apixaban 5 milliGRAM(s) Oral every 12 hours  aspirin enteric coated 81 milliGRAM(s) Oral daily  BACItracin   Ointment 1 Application(s) Topical four times a day  dextrose 5%. 1000 milliLiter(s) IV Continuous <Continuous>  mupirocin 2% Ointment 1 Application(s) Topical three times a day	    [PHYSICAL EXAM:  TELEMETRY:  T(C): 36.6 (11-16-17 @ 13:08), Max: 36.8 (11-15-17 @ 19:55)  HR: 69 (11-16-17 @ 13:08) (69 - 84)  BP: 152/98 (11-16-17 @ 13:08) (132/86 - 162/107)  RR: 18 (11-16-17 @ 13:08) (18 - 20)  SpO2: 95% (11-16-17 @ 13:08) (95% - 98%)    I&O's Summary    15 Nov 2017 07:01  -  16 Nov 2017 07:00  --------------------------------------------------------  IN: 0 mL / OUT: 2450 mL / NET: -2450 mL    Li: No  Central/PICC/Mid Line: No                                         Appearance: Normal, NAD	  HEENT:   Normal oral mucosa, PERRL, EOMI	  Neck: Supple, + JVD,  Cardiovascular: Normal S1 S2, No JVD, No murmurs,   Respiratory: Decreased Breath Sounds b/l, b/l rales. /No  Rhonchi, Wheezing	  Gastrointestinal:  +BS, distended abdomen, tender, soft  Skin: brawny discoloration b/l, mild erythema  Extremities: Normal range of motion, 2+ pitting edema  Vascular: Peripheral pulses palpable 1+  Neurologic: Non-focal  Psychiatry: A & O x 3, Mood & affect appropriate      LABS:	 	  CARDIAC MARKERS:                        14.3   7.5   )-----------( 231      ( 16 Nov 2017 06:39 )             44.8     11-16    138  |  99  |  25<H>  ----------------------------<  121<H>  3.6   |  23  |  1.24    Ca    8.8      16 Nov 2017 06:41    TPro  7.8  /  Alb  4.0  /  TBili  1.7<H>  /  DBili  0.4<H>  /  AST  40  /  ALT  35  /  AlkPhos  123<H>  11-16    proBNP: Serum Pro-Brain Natriuretic Peptide: 6465 pg/mL (11-15 @ 18:59)    HgA1c: Hemoglobin A1C, Whole Blood: 6.3 % (11-16 @ 06:39)    ASSESSMENT/PLAN: 	  DVT ppx: Eliquis

## 2017-11-17 LAB
ALBUMIN SERPL ELPH-MCNC: 3.7 G/DL — SIGNIFICANT CHANGE UP (ref 3.3–5)
ALP SERPL-CCNC: 109 U/L — SIGNIFICANT CHANGE UP (ref 40–120)
ALT FLD-CCNC: 27 U/L — SIGNIFICANT CHANGE UP (ref 10–45)
ANION GAP SERPL CALC-SCNC: 16 MMOL/L — SIGNIFICANT CHANGE UP (ref 5–17)
AST SERPL-CCNC: 29 U/L — SIGNIFICANT CHANGE UP (ref 10–40)
BILIRUB SERPL-MCNC: 1 MG/DL — SIGNIFICANT CHANGE UP (ref 0.2–1.2)
BUN SERPL-MCNC: 32 MG/DL — HIGH (ref 7–23)
CALCIUM SERPL-MCNC: 8.3 MG/DL — LOW (ref 8.4–10.5)
CHLORIDE SERPL-SCNC: 101 MMOL/L — SIGNIFICANT CHANGE UP (ref 96–108)
CHOLEST SERPL-MCNC: 141 MG/DL — SIGNIFICANT CHANGE UP (ref 10–199)
CO2 SERPL-SCNC: 25 MMOL/L — SIGNIFICANT CHANGE UP (ref 22–31)
CREAT SERPL-MCNC: 1.4 MG/DL — HIGH (ref 0.5–1.3)
CULTURE RESULTS: SIGNIFICANT CHANGE UP
CULTURE RESULTS: SIGNIFICANT CHANGE UP
GLUCOSE BLDC GLUCOMTR-MCNC: 117 MG/DL — HIGH (ref 70–99)
GLUCOSE BLDC GLUCOMTR-MCNC: 126 MG/DL — HIGH (ref 70–99)
GLUCOSE BLDC GLUCOMTR-MCNC: 131 MG/DL — HIGH (ref 70–99)
GLUCOSE BLDC GLUCOMTR-MCNC: 160 MG/DL — HIGH (ref 70–99)
GLUCOSE SERPL-MCNC: 155 MG/DL — HIGH (ref 70–99)
HBA1C BLD-MCNC: 6.7 % — HIGH (ref 4–5.6)
HCT VFR BLD CALC: 42.4 % — SIGNIFICANT CHANGE UP (ref 39–50)
HDLC SERPL-MCNC: 18 MG/DL — LOW (ref 40–125)
HGB BLD-MCNC: 13.7 G/DL — SIGNIFICANT CHANGE UP (ref 13–17)
LIPID PNL WITH DIRECT LDL SERPL: 107 MG/DL — SIGNIFICANT CHANGE UP
MAGNESIUM SERPL-MCNC: 2.4 MG/DL — SIGNIFICANT CHANGE UP (ref 1.6–2.6)
MCHC RBC-ENTMCNC: 27.8 PG — SIGNIFICANT CHANGE UP (ref 27–34)
MCHC RBC-ENTMCNC: 32.3 G/DL — SIGNIFICANT CHANGE UP (ref 32–36)
MCV RBC AUTO: 86 FL — SIGNIFICANT CHANGE UP (ref 80–100)
PLATELET # BLD AUTO: 205 K/UL — SIGNIFICANT CHANGE UP (ref 150–400)
POTASSIUM SERPL-MCNC: 3.8 MMOL/L — SIGNIFICANT CHANGE UP (ref 3.5–5.3)
POTASSIUM SERPL-SCNC: 3.8 MMOL/L — SIGNIFICANT CHANGE UP (ref 3.5–5.3)
PROT SERPL-MCNC: 7.1 G/DL — SIGNIFICANT CHANGE UP (ref 6–8.3)
RAPID RVP RESULT: SIGNIFICANT CHANGE UP
RBC # BLD: 4.93 M/UL — SIGNIFICANT CHANGE UP (ref 4.2–5.8)
RBC # FLD: 16 % — SIGNIFICANT CHANGE UP (ref 10.3–16.9)
SODIUM SERPL-SCNC: 142 MMOL/L — SIGNIFICANT CHANGE UP (ref 135–145)
SPECIMEN SOURCE: SIGNIFICANT CHANGE UP
SPECIMEN SOURCE: SIGNIFICANT CHANGE UP
TOTAL CHOLESTEROL/HDL RATIO MEASUREMENT: 7.8 RATIO — SIGNIFICANT CHANGE UP (ref 3.4–9.6)
TRIGL SERPL-MCNC: 81 MG/DL — SIGNIFICANT CHANGE UP (ref 10–149)
WBC # BLD: 6.2 K/UL — SIGNIFICANT CHANGE UP (ref 3.8–10.5)
WBC # FLD AUTO: 6.2 K/UL — SIGNIFICANT CHANGE UP (ref 3.8–10.5)

## 2017-11-17 PROCEDURE — 99223 1ST HOSP IP/OBS HIGH 75: CPT

## 2017-11-17 PROCEDURE — 99233 SBSQ HOSP IP/OBS HIGH 50: CPT

## 2017-11-17 PROCEDURE — 93306 TTE W/DOPPLER COMPLETE: CPT | Mod: 26

## 2017-11-17 RX ORDER — POLYETHYLENE GLYCOL 3350 17 G/17G
17 POWDER, FOR SOLUTION ORAL DAILY
Qty: 0 | Refills: 0 | Status: DISCONTINUED | OUTPATIENT
Start: 2017-11-17 | End: 2017-11-25

## 2017-11-17 RX ADMIN — Medication 1 APPLICATION(S): at 07:12

## 2017-11-17 RX ADMIN — Medication 81 MILLIGRAM(S): at 11:22

## 2017-11-17 RX ADMIN — SIMETHICONE 80 MILLIGRAM(S): 80 TABLET, CHEWABLE ORAL at 22:00

## 2017-11-17 RX ADMIN — Medication 124 MILLIGRAM(S): at 19:49

## 2017-11-17 RX ADMIN — Medication 1 APPLICATION(S): at 18:33

## 2017-11-17 RX ADMIN — CARVEDILOL PHOSPHATE 3.12 MILLIGRAM(S): 80 CAPSULE, EXTENDED RELEASE ORAL at 05:55

## 2017-11-17 RX ADMIN — SIMETHICONE 80 MILLIGRAM(S): 80 TABLET, CHEWABLE ORAL at 05:56

## 2017-11-17 RX ADMIN — APIXABAN 5 MILLIGRAM(S): 2.5 TABLET, FILM COATED ORAL at 18:31

## 2017-11-17 RX ADMIN — POLYETHYLENE GLYCOL 3350 17 GRAM(S): 17 POWDER, FOR SOLUTION ORAL at 15:54

## 2017-11-17 RX ADMIN — PANTOPRAZOLE SODIUM 40 MILLIGRAM(S): 20 TABLET, DELAYED RELEASE ORAL at 05:55

## 2017-11-17 RX ADMIN — LISINOPRIL 10 MILLIGRAM(S): 2.5 TABLET ORAL at 05:55

## 2017-11-17 RX ADMIN — APIXABAN 5 MILLIGRAM(S): 2.5 TABLET, FILM COATED ORAL at 07:12

## 2017-11-17 RX ADMIN — MUPIROCIN 1 APPLICATION(S): 20 OINTMENT TOPICAL at 15:01

## 2017-11-17 RX ADMIN — Medication 124 MILLIGRAM(S): at 05:55

## 2017-11-17 RX ADMIN — CARVEDILOL PHOSPHATE 3.12 MILLIGRAM(S): 80 CAPSULE, EXTENDED RELEASE ORAL at 18:31

## 2017-11-17 RX ADMIN — SIMETHICONE 80 MILLIGRAM(S): 80 TABLET, CHEWABLE ORAL at 14:58

## 2017-11-17 RX ADMIN — Medication 1 APPLICATION(S): at 11:22

## 2017-11-17 RX ADMIN — Medication 1: at 07:55

## 2017-11-17 NOTE — PHYSICAL THERAPY INITIAL EVALUATION ADULT - PERTINENT HX OF CURRENT PROBLEM, REHAB EVAL
Patient is a 67 year old male who presented to ED with c/o feeling bloated and progressively worsening dyspnea.

## 2017-11-17 NOTE — PHYSICAL THERAPY INITIAL EVALUATION ADULT - ADDITIONAL COMMENTS
Patient unwilling to provide information regarding his home set up (stairs vs elevator, living alone vs with someone). States that he "[place] subway [place] but now its too much." Denies using a cane or rolling walker prior to admission.

## 2017-11-17 NOTE — CONSULT NOTE ADULT - SUBJECTIVE AND OBJECTIVE BOX
CHIEF COMPLAINT: SOB, CALLOWAY    HISTORY OF PRESENT ILLNESS: 66 yo M with history of  HTN, hyperlipidemia, NIDDM, CAD s/p 3VCABG in 2015 (at [facility] in [place], [place]), chronic systolic CHF (EF 20-25% by Echo 5/25/17), pulmonary embolism (on Eliquis) admitted for acute  exacerbation of CHF. Patent was seen by EPS on his previous admission to [facility] for depressed EF but  patient  is  noncompliant with his follow up and his medications.   EPS called to evaluate for ICD.     PAST MEDICAL & SURGICAL HISTORY:  Angina pectoris  CHF (congestive heart failure)  Dyslipidemia  DM (diabetes mellitus)  CAD (coronary artery disease)  S/P CABG x 3      PERTINENT DIAGNOSTIC TESTING:    [ ] Echocardiogram: < from: TTE Echo w/Cont Complete (05.25.17 @ 16:12) >   Normal left ventricular size and wall thickness.There is   severe   global hypokinesis of the left ventricle.The left ventricular ejection   fraction is estimated to be 20-25%The left atrium is dilated.Right   atrial size   is normal.The right ventricle is normal in size and function.Calcified   aortic   valve.No aortic regurgitation noted.There is Moderate aortic stenosis.The   aortic valve area, calculated by planimetry, is 1.2 cm2.The peak pressure   gradient is 17 mmHg.The mean pressure gradient is 11 mmHg.The calculated   stroke volume index is 22 cc/m2 (normal >35cc/m2).There is mild to   moderate   mitral valve thickening.Mitral chordal calcification.No mitral   regurgitation   noted.Structurally normal tricuspid valve.There is mild tricuspid   regurgitation.There is mild pulmonary hypertension.The tricuspid   regurgitation   vena contracta is 47 cm.No aortic root dilatation.There is no pericardial   effusion.Low gradient may be due to low stroke volume.        Allergies    Aldactone (Unknown)  metoprolol (Unknown)  spironolactone (Unknown)    Intolerances    	    MEDICATIONS:  carvedilol 3.125 milliGRAM(s) Oral every 12 hours  furosemide   IVPB 120 milliGRAM(s) IV Intermittent every 12 hours  lisinopril 10 milliGRAM(s) Oral daily  pantoprazole    Tablet 40 milliGRAM(s) Oral before breakfast  simethicone 80 milliGRAM(s) Chew three times a day  atorvastatin 40 milliGRAM(s) Oral at bedtime  dextrose 50% Injectable 12.5 Gram(s) IV Push once  dextrose 50% Injectable 25 Gram(s) IV Push once  dextrose 50% Injectable 25 Gram(s) IV Push once  dextrose Gel 1 Dose(s) Oral once PRN  glucagon  Injectable 1 milliGRAM(s) IntraMuscular once PRN  insulin lispro (HumaLOG) corrective regimen sliding scale   SubCutaneous Before meals and at bedtime  apixaban 5 milliGRAM(s) Oral every 12 hours  aspirin enteric coated 81 milliGRAM(s) Oral daily  BACItracin   Ointment 1 Application(s) Topical four times a day  dextrose 5%. 1000 milliLiter(s) IV Continuous <Continuous>  mupirocin 2% Ointment 1 Application(s) Topical three times a day      FAMILY HISTORY:  No pertinent family history in first degree relatives      SOCIAL HISTORY:    [x ] Non-smoker          REVIEW OF SYSTEMS:    CONSTITUTIONAL: No fever, weight loss, or fatigue  EYES: No eye pain, visual disturbances, or discharge  ENMT:  No difficulty hearing, tinnitus, vertigo; No sinus or throat pain  NECK: No pain or stiffness  RESPIRATORY: No cough, wheezing, chills or hemoptysis; + Shortness of Breath, +CALLOWAY  CARDIOVASCULAR: No chest pain, palpitations, dizziness, or leg swelling  GASTROINTESTINAL: No abdominal or epigastric pain. No nausea, vomiting, or hematemesis; No diarrhea or constipation.   GENITOURINARY: No dysuria, frequency, hematuria, or incontinence  NEUROLOGICAL: No headaches, memory loss, loss of strength, numbness, or tremors  SKIN: No itching, burning, rashes, or lesions   LYMPH Nodes: No enlarged glands  ENDOCRINE: No heat or cold intolerance; No hair loss  MUSCULOSKELETAL: No joint pain or + LE  swelling; No muscle, back, or extremity pain  PSYCHIATRIC: No depression, anxiety, mood swings, or difficulty sleeping    PHYSICAL EXAM:  T(C): 36.2 (11-17-17 @ 08:50), Max: 36.8 (11-16-17 @ 19:13)  HR: 68 (11-17-17 @ 09:30) (67 - 71)  BP: 147/86 (11-17-17 @ 09:30) (131/86 - 158/97)  RR: 18 (11-17-17 @ 09:30) (18 - 20)  SpO2: 96% (11-17-17 @ 09:30) (95% - 98%)  Wt(kg): --  I&O's Summary    16 Nov 2017 07:01  -  17 Nov 2017 07:00  --------------------------------------------------------  IN: 290 mL / OUT: 2850 mL / NET: -2560 mL    17 Nov 2017 07:01  -  17 Nov 2017 11:58  --------------------------------------------------------  IN: 0 mL / OUT: 2225 mL / NET: -2225 mL        TELEMETRY: 	Patient refusing telemetry  ECG: < from: 12 Lead ECG (11.15.17 @ 18:50) >  Ventricular Rate 72 BPM    Atrial Rate 72 BPM    P-R Interval 272 ms    QRS Duration 96 ms    Q-T Interval 404 ms    QTC Calculation(Bezet) 442 ms    P Axis 39 degrees    R Axis 90 degrees    T Axis 155 degrees    Diagnosis Line Sinus rhythm with sinus arrhythmia with 1st degree AV block  Rightward axis  T wave abnormality, consider lateral ischemia          HEENT:   PERRL, EOMI	  Cardiovascular:  S1 S2, + edema  Respiratory: Lungs clear to auscultation	  Gastrointestinal:  Soft, Non-tender, + BS	  Neurologic: A&O x 3  Extremities+ edema, chronic skin changes of LE       	  LABS:	 	    CARDIAC MARKERS:                                  13.7   6.2   )-----------( 205      ( 17 Nov 2017 07:40 )             42.4     11-17    142  |  101  |  32<H>  ----------------------------<  155<H>  3.8   |  25  |  1.40<H>    Ca    8.3<L>      17 Nov 2017 07:40  Mg     2.4     11-17    TPro  7.1  /  Alb  3.7  /  TBili  1.0  /  DBili  x   /  AST  29  /  ALT  27  /  AlkPhos  109  11-17    proBNP:   Lipid Profile:   HgA1c: Hemoglobin A1C, Whole Blood: 6.7 % (11-17 @ 07:40)    TSH:     ASSESSMENT/PLAN: 	  66 yo M with history of  HTN, hyperlipidemia, NIDDM, CAD s/p 3VCABG in 2015 (at [facility] in [place], [place]), chronic systolic CHF (EF 20-25% by Echo 5/25/17), pulmonary embolism (on Eliquis) admitted for acute  exacerbation of CHF. Patent has not been compliant was his medications and his follow up.   Will follow up echocardiogram (ordered, but not done yet), will need to establish complianes with medications and follow ups before patient  will qualify for ICD implant. CHIEF COMPLAINT: SOB, CALLOWAY    HISTORY OF PRESENT ILLNESS: 66 yo M with history of  HTN, hyperlipidemia, NIDDM, CAD s/p 3VCABG in 2015 (at [facility] in [place], [place]), chronic systolic CHF (EF 20-25% by Echo 5/25/17), pulmonary embolism (on Eliquis) admitted for acute  exacerbation of CHF. Patent was seen by EPS on his previous admission to [facility] for depressed EF but  patient  is  noncompliant with his follow up and his medications.   EPS called to evaluate for ICD.     PAST MEDICAL & SURGICAL HISTORY:  Angina pectoris  CHF (congestive heart failure)  Dyslipidemia  DM (diabetes mellitus)  CAD (coronary artery disease)  S/P CABG x 3      PERTINENT DIAGNOSTIC TESTING:    [ ] Echocardiogram: < from: TTE Echo w/Cont Complete (05.25.17 @ 16:12) >   Normal left ventricular size and wall thickness.There is   severe   global hypokinesis of the left ventricle.The left ventricular ejection   fraction is estimated to be 20-25%The left atrium is dilated.Right   atrial size   is normal.The right ventricle is normal in size and function.Calcified   aortic   valve.No aortic regurgitation noted.There is Moderate aortic stenosis.The   aortic valve area, calculated by planimetry, is 1.2 cm2.The peak pressure   gradient is 17 mmHg.The mean pressure gradient is 11 mmHg.The calculated   stroke volume index is 22 cc/m2 (normal >35cc/m2).There is mild to   moderate   mitral valve thickening.Mitral chordal calcification.No mitral   regurgitation   noted.Structurally normal tricuspid valve.There is mild tricuspid   regurgitation.There is mild pulmonary hypertension.The tricuspid   regurgitation   vena contracta is 47 cm.No aortic root dilatation.There is no pericardial   effusion.Low gradient may be due to low stroke volume.        Allergies    Aldactone (Unknown)  metoprolol (Unknown)  spironolactone (Unknown)    Intolerances    	    MEDICATIONS:  carvedilol 3.125 milliGRAM(s) Oral every 12 hours  furosemide   IVPB 120 milliGRAM(s) IV Intermittent every 12 hours  lisinopril 10 milliGRAM(s) Oral daily  pantoprazole    Tablet 40 milliGRAM(s) Oral before breakfast  simethicone 80 milliGRAM(s) Chew three times a day  atorvastatin 40 milliGRAM(s) Oral at bedtime  dextrose 50% Injectable 12.5 Gram(s) IV Push once  dextrose 50% Injectable 25 Gram(s) IV Push once  dextrose 50% Injectable 25 Gram(s) IV Push once  dextrose Gel 1 Dose(s) Oral once PRN  glucagon  Injectable 1 milliGRAM(s) IntraMuscular once PRN  insulin lispro (HumaLOG) corrective regimen sliding scale   SubCutaneous Before meals and at bedtime  apixaban 5 milliGRAM(s) Oral every 12 hours  aspirin enteric coated 81 milliGRAM(s) Oral daily  BACItracin   Ointment 1 Application(s) Topical four times a day  dextrose 5%. 1000 milliLiter(s) IV Continuous <Continuous>  mupirocin 2% Ointment 1 Application(s) Topical three times a day      FAMILY HISTORY:  No pertinent family history in first degree relatives      SOCIAL HISTORY:    [x ] Non-smoker          REVIEW OF SYSTEMS:    CONSTITUTIONAL: No fever, weight loss, or fatigue  EYES: No eye pain, visual disturbances, or discharge  ENMT:  No difficulty hearing, tinnitus, vertigo; No sinus or throat pain  NECK: No pain or stiffness  RESPIRATORY: No cough, wheezing, chills or hemoptysis; + Shortness of Breath, +CALLOWAY  CARDIOVASCULAR: No chest pain, palpitations, dizziness, or leg swelling  GASTROINTESTINAL: No abdominal or epigastric pain. No nausea, vomiting, or hematemesis; No diarrhea or constipation.   GENITOURINARY: No dysuria, frequency, hematuria, or incontinence  NEUROLOGICAL: No headaches, memory loss, loss of strength, numbness, or tremors  SKIN: No itching, burning, rashes, or lesions   LYMPH Nodes: No enlarged glands  ENDOCRINE: No heat or cold intolerance; No hair loss  MUSCULOSKELETAL: No joint pain or + LE  swelling; No muscle, back, or extremity pain  PSYCHIATRIC: No depression, anxiety, mood swings, or difficulty sleeping    PHYSICAL EXAM:  T(C): 36.2 (11-17-17 @ 08:50), Max: 36.8 (11-16-17 @ 19:13)  HR: 68 (11-17-17 @ 09:30) (67 - 71)  BP: 147/86 (11-17-17 @ 09:30) (131/86 - 158/97)  RR: 18 (11-17-17 @ 09:30) (18 - 20)  SpO2: 96% (11-17-17 @ 09:30) (95% - 98%)  Wt(kg): --  I&O's Summary    16 Nov 2017 07:01  -  17 Nov 2017 07:00  --------------------------------------------------------  IN: 290 mL / OUT: 2850 mL / NET: -2560 mL    17 Nov 2017 07:01  -  17 Nov 2017 11:58  --------------------------------------------------------  IN: 0 mL / OUT: 2225 mL / NET: -2225 mL        TELEMETRY: 	Patient refusing telemetry  ECG: < from: 12 Lead ECG (11.15.17 @ 18:50) >  Ventricular Rate 72 BPM    Atrial Rate 72 BPM    P-R Interval 272 ms    QRS Duration 96 ms    Q-T Interval 404 ms    QTC Calculation(Bezet) 442 ms    P Axis 39 degrees    R Axis 90 degrees    T Axis 155 degrees    Diagnosis Line Sinus rhythm with sinus arrhythmia with 1st degree AV block  Rightward axis  T wave abnormality, consider lateral ischemia          HEENT:   PERRL, EOMI	  Cardiovascular:  S1 S2, + edema  Respiratory: Lungs clear to auscultation	  Gastrointestinal:  Soft, Non-tender, + BS	  Neurologic: A&O x 3  Extremities+ edema, chronic skin changes of LE       	  LABS:	 	    CARDIAC MARKERS:                                  13.7   6.2   )-----------( 205      ( 17 Nov 2017 07:40 )             42.4     11-17    142  |  101  |  32<H>  ----------------------------<  155<H>  3.8   |  25  |  1.40<H>    Ca    8.3<L>      17 Nov 2017 07:40  Mg     2.4     11-17    TPro  7.1  /  Alb  3.7  /  TBili  1.0  /  DBili  x   /  AST  29  /  ALT  27  /  AlkPhos  109  11-17    proBNP:   Lipid Profile:   HgA1c: Hemoglobin A1C, Whole Blood: 6.7 % (11-17 @ 07:40)    TSH:     ASSESSMENT/PLAN: 	  66 yo M with history of  HTN, hyperlipidemia, NIDDM, CAD s/p 3VCABG in 2015 (at [facility] in [place], [place]), chronic systolic CHF (EF 20-25% by Echo 5/25/17), pulmonary embolism (on Eliquis) admitted for acute  exacerbation of CHF. Patent has not been compliant was his medications and his follow up.   Will follow up echocardiogram (ordered, but not done yet), will need to establish compliance with medications and follow ups before patient  will qualify for ICD implant.

## 2017-11-17 NOTE — PROGRESS NOTE ADULT - SUBJECTIVE AND OBJECTIVE BOX
Interventional Cardiology PA Adult Progress Note    Subjective Assessment:    Endorsing discolored bowel movements.     MEDICATIONS:  carvedilol 3.125 milliGRAM(s) Oral every 12 hours  furosemide   IVPB 120 milliGRAM(s) IV Intermittent every 12 hours  lisinopril 10 milliGRAM(s) Oral daily  pantoprazole    Tablet 40 milliGRAM(s) Oral before breakfast  polyethylene glycol 3350 17 Gram(s) Oral daily  simethicone 80 milliGRAM(s) Chew three times a day  atorvastatin 40 milliGRAM(s) Oral at bedtime  dextrose 50% Injectable 12.5 Gram(s) IV Push once  dextrose 50% Injectable 25 Gram(s) IV Push once  dextrose 50% Injectable 25 Gram(s) IV Push once  dextrose Gel 1 Dose(s) Oral once PRN  glucagon  Injectable 1 milliGRAM(s) IntraMuscular once PRN  insulin lispro (HumaLOG) corrective regimen sliding scale   SubCutaneous Before meals and at bedtime  apixaban 5 milliGRAM(s) Oral every 12 hours  aspirin enteric coated 81 milliGRAM(s) Oral daily  BACItracin   Ointment 1 Application(s) Topical four times a day  dextrose 5%. 1000 milliLiter(s) IV Continuous <Continuous>  mupirocin 2% Ointment 1 Application(s) Topical three times a day    PHYSICAL EXAM:  TELEMETRY: refusing telemetry  T(C): 36.3 (11-17-17 @ 18:19), Max: 36.8 (11-16-17 @ 19:13)  HR: 82 (11-17-17 @ 18:34) (67 - 82)  BP: 145/94 (11-17-17 @ 18:34) (136/99 - 161/100)  RR: 18 (11-17-17 @ 18:34) (18 - 19)  SpO2: 96% (11-17-17 @ 18:34) (96% - 98%)  Wt(kg): --  I&O's Summary    16 Nov 2017 07:01  -  17 Nov 2017 07:00  --------------------------------------------------------  IN: 290 mL / OUT: 2850 mL / NET: -2560 mL    17 Nov 2017 07:01  -  17 Nov 2017 18:57  --------------------------------------------------------  IN: 474 mL / OUT: 2425 mL / NET: -1951 mL        Li: none  Central/PICC/Mid Line:   none                                      Appearance: Normal	  HEENT:   Normal oral mucosa, PERRL, EOMI	  Neck: Supple, + JVD/ - JVD; Carotid Bruit   Cardiovascular: Normal S1 S2, No JVD, No murmurs,   Respiratory: decreased breath sounds b/l bases   Gastrointestinal:  Soft, Non-tender, + BS	  Skin: No rashes, No ecchymoses, No cyanosis  Extremities: dark PVD changes to both calves and legs  Vascular: feet warm b/l  Neurologic: Non-focal  Psychiatry: A & O x 3, Mood & affect appropriate    LABS:                        13.7   6.2   )-----------( 205      ( 17 Nov 2017 07:40 )             42.4     11-17    142  |  101  |  32<H>  ----------------------------<  155<H>  3.8   |  25  |  1.40<H>    Ca    8.3<L>      17 Nov 2017 07:40  Mg     2.4     11-17    TPro  7.1  /  Alb  3.7  /  TBili  1.0  /  DBili  x   /  AST  29  /  ALT  27  /  AlkPhos  109  11-17    proBNP:   Lipid Profile:   HgA1c: Hemoglobin A1C, Whole Blood: 6.7 % (11-17 @ 07:40)      ASSESSMENT/PLAN:

## 2017-11-17 NOTE — PHYSICAL THERAPY INITIAL EVALUATION ADULT - GENERAL OBSERVATIONS, REHAB EVAL
Rec'd standing in londono with + heplock IV, no tele (patient is refusing tele as per nursing notes). Jan pain, reports feeling bloated and uncomfortable.

## 2017-11-17 NOTE — PROGRESS NOTE ADULT - PROBLEM SELECTOR PLAN 1
+ anasarca on exam, BNP 6465, CE 0.02 -> 0.03 -> 0.02, likely elevated in setting of CHF.   - Moderate Right pleural effusion noted on CT Abd/Pelvis 11/16  - Echo 11/17/17 showing EF 20-25%. Akinesis of inferior wall, no LV thrombus. RV mild to moderately dilated. LA mildly dilated. Mod AS. ROCÍO 1.1. Prior Echo 5/25/17 revealing EF 20-25%, moderate AS (ROCÍO 1.2).   - CHF Team consulted.  Recommended Lasix increased to 120mg IV BID, increased Lisinopril to 10mg PO QD, Continue Coreg 3.125mg PO BID.  - EP team consulted, Followed on prior admit in May for ICD placement and recommended 3mo medical mngt.  Pt never followed up with EP. Pt not compliant with medications. EP states they can put in an ICD as an outpt if pt will follow up with them.  - Continue Strict I/Os, Daily Weights.   - Consider Ischemic Eval when more euvolemic.

## 2017-11-18 LAB
ANION GAP SERPL CALC-SCNC: 17 MMOL/L — SIGNIFICANT CHANGE UP (ref 5–17)
BUN SERPL-MCNC: 38 MG/DL — HIGH (ref 7–23)
CALCIUM SERPL-MCNC: 8.7 MG/DL — SIGNIFICANT CHANGE UP (ref 8.4–10.5)
CHLORIDE SERPL-SCNC: 100 MMOL/L — SIGNIFICANT CHANGE UP (ref 96–108)
CO2 SERPL-SCNC: 28 MMOL/L — SIGNIFICANT CHANGE UP (ref 22–31)
CREAT SERPL-MCNC: 1.39 MG/DL — HIGH (ref 0.5–1.3)
GLUCOSE BLDC GLUCOMTR-MCNC: 118 MG/DL — HIGH (ref 70–99)
GLUCOSE BLDC GLUCOMTR-MCNC: 120 MG/DL — HIGH (ref 70–99)
GLUCOSE BLDC GLUCOMTR-MCNC: 135 MG/DL — HIGH (ref 70–99)
GLUCOSE BLDC GLUCOMTR-MCNC: 142 MG/DL — HIGH (ref 70–99)
GLUCOSE SERPL-MCNC: 159 MG/DL — HIGH (ref 70–99)
HCT VFR BLD CALC: 43.1 % — SIGNIFICANT CHANGE UP (ref 39–50)
HGB BLD-MCNC: 14 G/DL — SIGNIFICANT CHANGE UP (ref 13–17)
MAGNESIUM SERPL-MCNC: 2.5 MG/DL — SIGNIFICANT CHANGE UP (ref 1.6–2.6)
MCHC RBC-ENTMCNC: 28 PG — SIGNIFICANT CHANGE UP (ref 27–34)
MCHC RBC-ENTMCNC: 32.5 G/DL — SIGNIFICANT CHANGE UP (ref 32–36)
MCV RBC AUTO: 86.2 FL — SIGNIFICANT CHANGE UP (ref 80–100)
PLATELET # BLD AUTO: 229 K/UL — SIGNIFICANT CHANGE UP (ref 150–400)
POTASSIUM SERPL-MCNC: 3.9 MMOL/L — SIGNIFICANT CHANGE UP (ref 3.5–5.3)
POTASSIUM SERPL-SCNC: 3.9 MMOL/L — SIGNIFICANT CHANGE UP (ref 3.5–5.3)
RBC # BLD: 5 M/UL — SIGNIFICANT CHANGE UP (ref 4.2–5.8)
RBC # FLD: 15.9 % — SIGNIFICANT CHANGE UP (ref 10.3–16.9)
SODIUM SERPL-SCNC: 145 MMOL/L — SIGNIFICANT CHANGE UP (ref 135–145)
WBC # BLD: 6.9 K/UL — SIGNIFICANT CHANGE UP (ref 3.8–10.5)
WBC # FLD AUTO: 6.9 K/UL — SIGNIFICANT CHANGE UP (ref 3.8–10.5)

## 2017-11-18 PROCEDURE — 99233 SBSQ HOSP IP/OBS HIGH 50: CPT

## 2017-11-18 RX ORDER — DOCUSATE SODIUM 100 MG
100 CAPSULE ORAL THREE TIMES A DAY
Qty: 0 | Refills: 0 | Status: DISCONTINUED | OUTPATIENT
Start: 2017-11-18 | End: 2017-11-25

## 2017-11-18 RX ORDER — SENNA PLUS 8.6 MG/1
2 TABLET ORAL AT BEDTIME
Qty: 0 | Refills: 0 | Status: DISCONTINUED | OUTPATIENT
Start: 2017-11-18 | End: 2017-11-25

## 2017-11-18 RX ADMIN — Medication 81 MILLIGRAM(S): at 15:42

## 2017-11-18 RX ADMIN — CARVEDILOL PHOSPHATE 3.12 MILLIGRAM(S): 80 CAPSULE, EXTENDED RELEASE ORAL at 06:15

## 2017-11-18 RX ADMIN — ATORVASTATIN CALCIUM 40 MILLIGRAM(S): 80 TABLET, FILM COATED ORAL at 23:08

## 2017-11-18 RX ADMIN — LISINOPRIL 10 MILLIGRAM(S): 2.5 TABLET ORAL at 06:15

## 2017-11-18 RX ADMIN — Medication 124 MILLIGRAM(S): at 17:41

## 2017-11-18 RX ADMIN — SENNA PLUS 2 TABLET(S): 8.6 TABLET ORAL at 23:08

## 2017-11-18 RX ADMIN — Medication 100 MILLIGRAM(S): at 23:08

## 2017-11-18 RX ADMIN — Medication 100 MILLIGRAM(S): at 06:15

## 2017-11-18 RX ADMIN — APIXABAN 5 MILLIGRAM(S): 2.5 TABLET, FILM COATED ORAL at 17:41

## 2017-11-18 RX ADMIN — PANTOPRAZOLE SODIUM 40 MILLIGRAM(S): 20 TABLET, DELAYED RELEASE ORAL at 06:16

## 2017-11-18 RX ADMIN — Medication 1 APPLICATION(S): at 07:58

## 2017-11-18 RX ADMIN — MUPIROCIN 1 APPLICATION(S): 20 OINTMENT TOPICAL at 15:39

## 2017-11-18 RX ADMIN — Medication 1 APPLICATION(S): at 15:37

## 2017-11-18 RX ADMIN — SIMETHICONE 80 MILLIGRAM(S): 80 TABLET, CHEWABLE ORAL at 23:08

## 2017-11-18 RX ADMIN — Medication 124 MILLIGRAM(S): at 06:16

## 2017-11-18 RX ADMIN — Medication 100 MILLIGRAM(S): at 15:37

## 2017-11-18 RX ADMIN — SIMETHICONE 80 MILLIGRAM(S): 80 TABLET, CHEWABLE ORAL at 15:37

## 2017-11-18 RX ADMIN — CARVEDILOL PHOSPHATE 3.12 MILLIGRAM(S): 80 CAPSULE, EXTENDED RELEASE ORAL at 17:41

## 2017-11-18 RX ADMIN — SIMETHICONE 80 MILLIGRAM(S): 80 TABLET, CHEWABLE ORAL at 06:15

## 2017-11-18 RX ADMIN — APIXABAN 5 MILLIGRAM(S): 2.5 TABLET, FILM COATED ORAL at 06:16

## 2017-11-18 RX ADMIN — POLYETHYLENE GLYCOL 3350 17 GRAM(S): 17 POWDER, FOR SOLUTION ORAL at 15:37

## 2017-11-18 NOTE — PROGRESS NOTE ADULT - PROBLEM SELECTOR PLAN 1
+ anasarca on exam, BNP 6465, CE 0.02 -> 0.03 ->0.02, likely elevated in setting of CHF.   - Moderate Right pleural effusion noted on CT Abd/Pelvis 11/16  - Patient currently refusing to have a repeat Echo.  Prior Echo 5/25/17 revealing EF 20-25%, moderate AS (ROCÍO 1.2)  - CHF Team consulted.  Recommended Lasix increased to 120mg IV BID, increased Lisinopril to 10mg PO QD, Continue Coreg 3.125mg PO BID.  - EP team consulted, Followed on prior admit in May for ICD placement and recommended 3mo medical mngt.  Pt did not follow up. If pt can follow up with EP as an outpt he can get an ICD in the future. Currently still noncompliant.   - Continue Strict I/Os, Daily Weights.   - Consider Ischemic Eval when more euvolemic. Pt is very noncompliant

## 2017-11-18 NOTE — PROVIDER CONTACT NOTE (OTHER) - ACTION/TREATMENT ORDERED:
SLOAN Pham made aware, no actions at this time. reeducation provided but pt refuses telemetry anyway.

## 2017-11-18 NOTE — PROGRESS NOTE ADULT - SUBJECTIVE AND OBJECTIVE BOX
Interventional Cardiology PA Adult Progress Note    Subjective Assessment:    Endorsing neck discomfort and constipation and abdominal discomfort.   	  MEDICATIONS:  carvedilol 3.125 milliGRAM(s) Oral every 12 hours  furosemide   IVPB 120 milliGRAM(s) IV Intermittent every 12 hours  lisinopril 10 milliGRAM(s) Oral daily  docusate sodium 100 milliGRAM(s) Oral three times a day  pantoprazole    Tablet 40 milliGRAM(s) Oral before breakfast  polyethylene glycol 3350 17 Gram(s) Oral daily  senna 2 Tablet(s) Oral at bedtime  simethicone 80 milliGRAM(s) Chew three times a day  atorvastatin 40 milliGRAM(s) Oral at bedtime  dextrose 50% Injectable 12.5 Gram(s) IV Push once  dextrose 50% Injectable 25 Gram(s) IV Push once  dextrose 50% Injectable 25 Gram(s) IV Push once  dextrose Gel 1 Dose(s) Oral once PRN  glucagon  Injectable 1 milliGRAM(s) IntraMuscular once PRN  insulin lispro (HumaLOG) corrective regimen sliding scale   SubCutaneous Before meals and at bedtime    apixaban 5 milliGRAM(s) Oral every 12 hours  aspirin enteric coated 81 milliGRAM(s) Oral daily  BACItracin   Ointment 1 Application(s) Topical four times a day  dextrose 5%. 1000 milliLiter(s) IV Continuous <Continuous>  mupirocin 2% Ointment 1 Application(s) Topical three times a day      	    [PHYSICAL EXAM:  TELEMETRY: refusing telemetry  T(C): 36.6 (11-18-17 @ 18:17), Max: 36.6 (11-18-17 @ 18:17)  HR: 64 (11-18-17 @ 17:39) (56 - 68)  BP: 141/87 (11-18-17 @ 17:39) (132/75 - 172/102)  RR: 16 (11-18-17 @ 17:39) (16 - 18)  SpO2: 96% (11-18-17 @ 17:39) (94% - 97%)  Wt(kg): --  I&O's Summary    17 Nov 2017 07:01  -  18 Nov 2017 07:00  --------------------------------------------------------  IN: 524 mL / OUT: 5225 mL / NET: -4701 mL    18 Nov 2017 07:01  -  18 Nov 2017 19:31  --------------------------------------------------------  IN: 412 mL / OUT: 1025 mL / NET: -613 mL        Li: none  Central/PICC/Mid Line:  none                                    Appearance: Normal	  HEENT:   Normal oral mucosa, PERRL, EOMI	  Neck: Supple, + JVD/ - JVD; Carotid Bruit   Cardiovascular: Normal S1 S2, No JVD, No murmurs,   Respiratory: decreased breath sounds right lower lobe  Gastrointestinal:  Soft, Non-tender, + BS	  Skin: No rashes, No ecchymoses, No cyanosis  Extremities: PVD changes on b/l shins.  Vascular: Peripheral pulses palpable 2+ bilaterally  Neurologic: Non-focal  Psychiatry: A & O x 3, Mood & affect appropriate      LABS:	 	  CARDIAC MARKERS:                          14.0   6.9   )-----------( 229      ( 18 Nov 2017 13:28 )             43.1     11-18    145  |  100  |  38<H>  ----------------------------<  159<H>  3.9   |  28  |  1.39<H>    Ca    8.7      18 Nov 2017 13:28  Mg     2.5     11-18    TPro  7.1  /  Alb  3.7  /  TBili  1.0  /  DBili  x   /  AST  29  /  ALT  27  /  AlkPhos  109  11-17

## 2017-11-19 LAB
ALBUMIN SERPL ELPH-MCNC: 4.1 G/DL — SIGNIFICANT CHANGE UP (ref 3.3–5)
ANION GAP SERPL CALC-SCNC: 17 MMOL/L — SIGNIFICANT CHANGE UP (ref 5–17)
BUN SERPL-MCNC: 40 MG/DL — HIGH (ref 7–23)
CALCIUM SERPL-MCNC: 9 MG/DL — SIGNIFICANT CHANGE UP (ref 8.4–10.5)
CHLORIDE SERPL-SCNC: 99 MMOL/L — SIGNIFICANT CHANGE UP (ref 96–108)
CO2 SERPL-SCNC: 29 MMOL/L — SIGNIFICANT CHANGE UP (ref 22–31)
CREAT SERPL-MCNC: 1.46 MG/DL — HIGH (ref 0.5–1.3)
GLUCOSE BLDC GLUCOMTR-MCNC: 117 MG/DL — HIGH (ref 70–99)
GLUCOSE BLDC GLUCOMTR-MCNC: 123 MG/DL — HIGH (ref 70–99)
GLUCOSE BLDC GLUCOMTR-MCNC: 140 MG/DL — HIGH (ref 70–99)
GLUCOSE BLDC GLUCOMTR-MCNC: 160 MG/DL — HIGH (ref 70–99)
GLUCOSE SERPL-MCNC: 127 MG/DL — HIGH (ref 70–99)
HCT VFR BLD CALC: 44.8 % — SIGNIFICANT CHANGE UP (ref 39–50)
HGB BLD-MCNC: 14.4 G/DL — SIGNIFICANT CHANGE UP (ref 13–17)
MAGNESIUM SERPL-MCNC: 2.5 MG/DL — SIGNIFICANT CHANGE UP (ref 1.6–2.6)
MCHC RBC-ENTMCNC: 27.7 PG — SIGNIFICANT CHANGE UP (ref 27–34)
MCHC RBC-ENTMCNC: 32.1 G/DL — SIGNIFICANT CHANGE UP (ref 32–36)
MCV RBC AUTO: 86.3 FL — SIGNIFICANT CHANGE UP (ref 80–100)
PLATELET # BLD AUTO: 255 K/UL — SIGNIFICANT CHANGE UP (ref 150–400)
POTASSIUM SERPL-MCNC: 3.5 MMOL/L — SIGNIFICANT CHANGE UP (ref 3.5–5.3)
POTASSIUM SERPL-SCNC: 3.5 MMOL/L — SIGNIFICANT CHANGE UP (ref 3.5–5.3)
RBC # BLD: 5.19 M/UL — SIGNIFICANT CHANGE UP (ref 4.2–5.8)
RBC # FLD: 15.8 % — SIGNIFICANT CHANGE UP (ref 10.3–16.9)
SODIUM SERPL-SCNC: 145 MMOL/L — SIGNIFICANT CHANGE UP (ref 135–145)
WBC # BLD: 6.7 K/UL — SIGNIFICANT CHANGE UP (ref 3.8–10.5)
WBC # FLD AUTO: 6.7 K/UL — SIGNIFICANT CHANGE UP (ref 3.8–10.5)

## 2017-11-19 PROCEDURE — 99233 SBSQ HOSP IP/OBS HIGH 50: CPT

## 2017-11-19 RX ORDER — POTASSIUM CHLORIDE 20 MEQ
40 PACKET (EA) ORAL ONCE
Qty: 0 | Refills: 0 | Status: COMPLETED | OUTPATIENT
Start: 2017-11-19 | End: 2017-11-19

## 2017-11-19 RX ORDER — APIXABAN 2.5 MG/1
5 TABLET, FILM COATED ORAL EVERY 12 HOURS
Qty: 0 | Refills: 0 | Status: DISCONTINUED | OUTPATIENT
Start: 2017-11-19 | End: 2017-11-25

## 2017-11-19 RX ADMIN — APIXABAN 5 MILLIGRAM(S): 2.5 TABLET, FILM COATED ORAL at 17:36

## 2017-11-19 RX ADMIN — Medication 1 APPLICATION(S): at 17:36

## 2017-11-19 RX ADMIN — SIMETHICONE 80 MILLIGRAM(S): 80 TABLET, CHEWABLE ORAL at 22:29

## 2017-11-19 RX ADMIN — Medication 124 MILLIGRAM(S): at 17:37

## 2017-11-19 RX ADMIN — SIMETHICONE 80 MILLIGRAM(S): 80 TABLET, CHEWABLE ORAL at 13:54

## 2017-11-19 RX ADMIN — POLYETHYLENE GLYCOL 3350 17 GRAM(S): 17 POWDER, FOR SOLUTION ORAL at 11:19

## 2017-11-19 RX ADMIN — PANTOPRAZOLE SODIUM 40 MILLIGRAM(S): 20 TABLET, DELAYED RELEASE ORAL at 06:06

## 2017-11-19 RX ADMIN — SENNA PLUS 2 TABLET(S): 8.6 TABLET ORAL at 22:29

## 2017-11-19 RX ADMIN — MUPIROCIN 1 APPLICATION(S): 20 OINTMENT TOPICAL at 06:09

## 2017-11-19 RX ADMIN — MUPIROCIN 1 APPLICATION(S): 20 OINTMENT TOPICAL at 13:54

## 2017-11-19 RX ADMIN — CARVEDILOL PHOSPHATE 3.12 MILLIGRAM(S): 80 CAPSULE, EXTENDED RELEASE ORAL at 17:36

## 2017-11-19 RX ADMIN — Medication 100 MILLIGRAM(S): at 13:54

## 2017-11-19 RX ADMIN — MUPIROCIN 1 APPLICATION(S): 20 OINTMENT TOPICAL at 22:31

## 2017-11-19 RX ADMIN — LISINOPRIL 10 MILLIGRAM(S): 2.5 TABLET ORAL at 11:19

## 2017-11-19 RX ADMIN — Medication 1 APPLICATION(S): at 06:07

## 2017-11-19 RX ADMIN — Medication 100 MILLIGRAM(S): at 06:06

## 2017-11-19 RX ADMIN — CARVEDILOL PHOSPHATE 3.12 MILLIGRAM(S): 80 CAPSULE, EXTENDED RELEASE ORAL at 06:07

## 2017-11-19 RX ADMIN — ATORVASTATIN CALCIUM 40 MILLIGRAM(S): 80 TABLET, FILM COATED ORAL at 22:29

## 2017-11-19 RX ADMIN — Medication 40 MILLIEQUIVALENT(S): at 15:37

## 2017-11-19 RX ADMIN — Medication 124 MILLIGRAM(S): at 06:06

## 2017-11-19 RX ADMIN — Medication 100 MILLIGRAM(S): at 22:29

## 2017-11-19 RX ADMIN — Medication 81 MILLIGRAM(S): at 11:19

## 2017-11-19 RX ADMIN — APIXABAN 5 MILLIGRAM(S): 2.5 TABLET, FILM COATED ORAL at 06:07

## 2017-11-19 RX ADMIN — Medication 1 APPLICATION(S): at 13:47

## 2017-11-19 RX ADMIN — SIMETHICONE 80 MILLIGRAM(S): 80 TABLET, CHEWABLE ORAL at 06:06

## 2017-11-19 NOTE — PROGRESS NOTE ADULT - PROBLEM SELECTOR PLAN 7
b/l brawny discoloration likely 2/2 venous stasis and LE swelling  - s/p Vanco x1 in ED.  WBC wnl and afebrile.  Will not continue Abx at this time  - wound care following

## 2017-11-19 NOTE — PROGRESS NOTE ADULT - PROBLEM SELECTOR PLAN 8
DVT PPx: eliquis  PT and SW consult for home care needs.    DISPO: NPO after MN for probable paracentesis in AM DVT PPx: eliquis  PT and SW consult for home care needs.    DISPO: Pending clinical progression with IV diuresis

## 2017-11-19 NOTE — PROGRESS NOTE ADULT - PROBLEM SELECTOR PLAN 1
+ anasarca on exam, BNP 6465, CE 0.02 -> 0.03 ->0.02, likely elevated in setting of CHF. Moderate Right pleural effusion noted on CT Abd/Pelvis 11/16  - Patient currently refusing to have a repeat Echo.  Prior Echo 5/25/17 revealing EF 20-25%, moderate AS (ROCÍO 1.2)  - CHF Team consulted. Continue Lasix 120mg IV BID started 11/16, lisinopril 10mg QD, coreg 3.125mg BID; will consider decreasing lasix dosing or switching to PO tomorrow as discussed with Dr. Ibarra pending patient's clinical progression  - EP team re-consulted regarding possible ICD placement, patient is not interested and EP has signed off at this time  - Continue Strict I/Os, Daily Weights.   - Consider Ischemic Eval when more euvolemic. Pt is very noncompliant + anasarca on exam, BNP 6465, CE 0.02 -> 0.03 ->0.02, likely elevated in setting of CHF. Moderate Right pleural effusion noted on CT Abd/Pelvis 11/16  - Echo 11/17/17 revealed LV moderately dilated, severe segmental LV dysfunction with akinesis of inferior wall and hypokinesis of other myocardial segments, no LV thrombus seen, EF severely reduced 20%, RV mild to moderately dilated, RV systolic function mildly reduced, LA mildly dilated, moderate AS (ROCÍO 1.1), trace MR, trace to mild TR, mod pulmHTN with PAP 58mmHg, IVC dilated consistent with elevated RAP  - CHF Team consulted. Continue Lasix 120mg IV BID started 11/16, lisinopril 10mg QD, coreg 3.125mg BID; will consider decreasing lasix dosing or switching to PO tomorrow as discussed with Dr. Ibarra pending patient's clinical progression  - EP team re-consulted regarding possible ICD placement, patient is not interested and EP has signed off at this time  - Continue Strict I/Os, Daily Weights.   - Consider Ischemic Eval when more euvolemic. Pt is very noncompliant

## 2017-11-19 NOTE — PROGRESS NOTE ADULT - PROBLEM SELECTOR PLAN 2
Dr. Amado consulted  Abdomen distended/tender.  + Ascites likely in setting of CHF. AST/ALT, albumin nl.  - CT Abd/Pelvis 11/16: Few mildly distended loops of small bowel without evidence of defined transition point to suggest obstruction, Moderate ascites, Moderate right pleural effusion.  - NPO after MN for probable abd paracentesis in AM, last dose eliquis 11/19 AM Dr. Amado consulted  Abdomen distended/tender.  + Ascites likely in setting of CHF. AST/ALT, albumin nl.  - CT Abd/Pelvis 11/16: Few mildly distended loops of small bowel without evidence of defined transition point to suggest obstruction, Moderate ascites, Moderate right pleural effusion.  - Abd paracentesis would be high risk as abd not tense at this time as discussed with Dr. Martino, will continue to closely monitor

## 2017-11-19 NOTE — PROGRESS NOTE ADULT - PROBLEM SELECTOR PLAN 3
Hx of PE diagnosed early in 2017.    - Recent CT PE protocol 5/2017 negative for PE.  - LE Duplex 11/16: negative for DVT  - Patient received eliquis 5mg this AM, holding for now in setting of possible abd paracentesis tomorrow Hx of PE diagnosed early in 2017.    - Recent CT PE protocol 5/2017 negative for PE.  - LE Duplex 11/16: negative for DVT  - Continue eliquis 5mg BID

## 2017-11-19 NOTE — CONSULT NOTE ADULT - ASSESSMENT
Impression: ascites due to cardiac issue improving with diuresis.  Ascites is not large enogh for blind paracentesis.  Suggest sono guidance but patient prefers to hold off since it has been improving with diuretics.  Continue to diureses.  Remind patient to monitor input and output

## 2017-11-19 NOTE — PROGRESS NOTE ADULT - SUBJECTIVE AND OBJECTIVE BOX
Interventional Cardiology PA Adult Progress Note    Subjective Assessment: Patient was seen and examined this AM. Patient reports his abdomen feels just as swollen and uncomfortable as yesterday. Denies CP or SOB.  	  MEDICATIONS:  carvedilol 3.125 milliGRAM(s) Oral every 12 hours  furosemide   IVPB 120 milliGRAM(s) IV Intermittent every 12 hours  lisinopril 10 milliGRAM(s) Oral daily  docusate sodium 100 milliGRAM(s) Oral three times a day  pantoprazole    Tablet 40 milliGRAM(s) Oral before breakfast  polyethylene glycol 3350 17 Gram(s) Oral daily  senna 2 Tablet(s) Oral at bedtime  simethicone 80 milliGRAM(s) Chew three times a day  atorvastatin 40 milliGRAM(s) Oral at bedtime  insulin lispro (HumaLOG) corrective regimen sliding scale   SubCutaneous Before meals and at bedtime  aspirin enteric coated 81 milliGRAM(s) Oral daily  BACItracin   Ointment 1 Application(s) Topical four times a day  dextrose 5%. 1000 milliLiter(s) IV Continuous <Continuous>  mupirocin 2% Ointment 1 Application(s) Topical three times a day    [PHYSICAL EXAM:  TELEMETRY:  T(C): 36.1 (11-19-17 @ 05:05), Max: 36.6 (11-18-17 @ 18:17)  HR: 63 (11-19-17 @ 09:00) (56 - 64)  BP: 145/84 (11-19-17 @ 09:00) (137/88 - 155/85)  RR: 19 (11-19-17 @ 09:00) (16 - 19)  SpO2: 98% (11-19-17 @ 06:18) (96% - 98%)  Wt(kg): --  I&O's Summary    18 Nov 2017 07:01  -  19 Nov 2017 07:00  --------------------------------------------------------  IN: 1399 mL / OUT: 2875 mL / NET: -1476 mL    19 Nov 2017 07:01  -  19 Nov 2017 13:57  --------------------------------------------------------  IN: 597 mL / OUT: 950 mL / NET: -353 mL                                   Appearance: Normal	  HEENT:   Normal oral mucosa, PERRL, EOMI	  Neck: Supple, + JVD; No Carotid Bruit   Cardiovascular: Normal S1 S2, + JVD, No murmurs  Respiratory: Lungs clear to auscultation, No Rales, Rhonchi, Wheezing	  Gastrointestinal:  Distended, soft, mild diffuse tenderness, negative guarding or rebound tenderness	  Skin: No rashes, No ecchymoses, No cyanosis  Extremities: chronic venous stasis changes b/l, 1+ pitting edema b/l  Vascular: Peripheral pulses   Neurologic: Non-focal  Psychiatry: A & O x 3, Mood & affect appropriate      	    ECG:  	  RADIOLOGY:   DIAGNOSTIC TESTING:  [ ] Echocardiogram:  [ ]  Catheterization:  [ ] Stress Test:    [ ] KAITLIN  OTHER: 	    LABS:	 	  CARDIAC MARKERS:                                  14.4   6.7   )-----------( 255      ( 19 Nov 2017 07:53 )             44.8     11-19    145  |  99  |  40<H>  ----------------------------<  127<H>  3.5   |  29  |  1.46<H>    Ca    9.0      19 Nov 2017 07:53  Mg     2.5     11-19    TPro  x   /  Alb  4.1  /  TBili  x   /  DBili  x   /  AST  x   /  ALT  x   /  AlkPhos  x   11-19    proBNP:   Lipid Profile:   HgA1c:   TSH:       ASSESSMENT/PLAN: 	        DVT ppx:  Dispo: Interventional Cardiology PA Adult Progress Note    Subjective Assessment: Patient was seen and examined this AM. Patient reports his abdomen feels just as swollen and uncomfortable as yesterday. Denies CP or SOB.  	  MEDICATIONS:  carvedilol 3.125 milliGRAM(s) Oral every 12 hours  furosemide   IVPB 120 milliGRAM(s) IV Intermittent every 12 hours  lisinopril 10 milliGRAM(s) Oral daily  docusate sodium 100 milliGRAM(s) Oral three times a day  pantoprazole    Tablet 40 milliGRAM(s) Oral before breakfast  polyethylene glycol 3350 17 Gram(s) Oral daily  senna 2 Tablet(s) Oral at bedtime  simethicone 80 milliGRAM(s) Chew three times a day  atorvastatin 40 milliGRAM(s) Oral at bedtime  insulin lispro (HumaLOG) corrective regimen sliding scale   SubCutaneous Before meals and at bedtime  aspirin enteric coated 81 milliGRAM(s) Oral daily  BACItracin   Ointment 1 Application(s) Topical four times a day  dextrose 5%. 1000 milliLiter(s) IV Continuous <Continuous>  mupirocin 2% Ointment 1 Application(s) Topical three times a day    [PHYSICAL EXAM:  TELEMETRY:  T(C): 36.1 (11-19-17 @ 05:05), Max: 36.6 (11-18-17 @ 18:17)  HR: 63 (11-19-17 @ 09:00) (56 - 64)  BP: 145/84 (11-19-17 @ 09:00) (137/88 - 155/85)  RR: 19 (11-19-17 @ 09:00) (16 - 19)  SpO2: 98% (11-19-17 @ 06:18) (96% - 98%)  Wt(kg): --  I&O's Summary    18 Nov 2017 07:01  -  19 Nov 2017 07:00  --------------------------------------------------------  IN: 1399 mL / OUT: 2875 mL / NET: -1476 mL    19 Nov 2017 07:01  -  19 Nov 2017 13:57  --------------------------------------------------------  IN: 597 mL / OUT: 950 mL / NET: -353 mL                                   Appearance: Normal	  HEENT:   Normal oral mucosa, PERRL, EOMI	  Neck: Supple, + JVD; No Carotid Bruit   Cardiovascular: Normal S1 S2, + JVD, No murmurs  Respiratory: Lungs clear to auscultation, No Rales, Rhonchi, Wheezing	  Gastrointestinal:  Distended, soft, mild diffuse tenderness, negative guarding or rebound tenderness	  Skin: No rashes, No ecchymoses, No cyanosis  Extremities: chronic venous stasis changes b/l, 1+ pitting edema b/l  Vascular: Peripheral pulses non-palp, + doppler  Neurologic: Non-focal  Psychiatry: A & O x 3, Mood & affect appropriate  	    LABS:	 	  CARDIAC MARKERS:                        14.4   6.7   )-----------( 255      ( 19 Nov 2017 07:53 )             44.8     11-19    145  |  99  |  40<H>  ----------------------------<  127<H>  3.5   |  29  |  1.46<H>    Ca    9.0      19 Nov 2017 07:53  Mg     2.5     11-19    TPro  x   /  Alb  4.1  /  TBili  x   /  DBili  x   /  AST  x   /  ALT  x   /  AlkPhos  x   11-19

## 2017-11-19 NOTE — CONSULT NOTE ADULT - SUBJECTIVE AND OBJECTIVE BOX
Patient is a 67y old  Male who presents with a chief complaint of       HPI:  68 y/o male, former smoker, noncompliant with follow up, with PMHx of HTN, hyperlipidemia, NIDDM, CAD s/p 3VCABG in 2015 (at [facility] in [place], [place]), chronic systolic CHF , Echo on 5/25/17 showed Echo shows EF 20-25% with moderate AR.   st recorded EF 20 pulmonary embolism (on eliquis), presents to [facility] with worsening Dyspnea on exertion as well as rest for past  few weeks and progressively worsening. He also admits to increased abdominal bloating and le edema swelling.  He denies chest pain.  diaphoresis, palpitations, N/V, dizziness, LOC, fever/chills. Pt says he was recently at [facility] and left because he didnt like how he was treated. Patient can not tell me who his primary cardiologist is. Currently his BNP is > 6000, troponin 0.02. EKG SR with long  first degree AVB, Late transition, tiny inferior q's and inverted T waves in V5-V6. Patient being admitted to [facility]. (16 Nov 2017 01:01)     Abdominal discomfort and pain in LE- diabetes      Allergies  Aldactone (Unknown)  metoprolol (Unknown)  spironolactone (Unknown)      Health Issues  CHF ABD PAIN DIARRHEA  H/o or current diagnosis of HF- no contraindication to ACEI/ARBs  H/o or current diagnosis of HF- ACEI/ARB contraindication unknown  No pertinent family history in first degree relatives  Handoff  MEWS Score  Angina pectoris  CHF (congestive heart failure)  Dyslipidemia  DM (diabetes mellitus)  CAD (coronary artery disease)  No pertinent past medical history  CHF (congestive heart failure)  Discharge planning issues  Venous stasis  DM (diabetes mellitus)  Dyslipidemia  Hypertension  Pulmonary embolism  Ascites  CHF (congestive heart failure)  S/P CABG x 3  MULTIPLE MEDICAL COMP  90+  Diarrhea  Abdominal pain        FAMILY HISTORY:  No pertinent family history in first degree relatives      MEDICATIONS  (STANDING):  apixaban 5 milliGRAM(s) Oral every 12 hours  aspirin enteric coated 81 milliGRAM(s) Oral daily  atorvastatin 40 milliGRAM(s) Oral at bedtime  BACItracin   Ointment 1 Application(s) Topical four times a day  carvedilol 3.125 milliGRAM(s) Oral every 12 hours  dextrose 5%. 1000 milliLiter(s) (50 mL/Hr) IV Continuous <Continuous>  dextrose 50% Injectable 12.5 Gram(s) IV Push once  dextrose 50% Injectable 25 Gram(s) IV Push once  dextrose 50% Injectable 25 Gram(s) IV Push once  docusate sodium 100 milliGRAM(s) Oral three times a day  furosemide   IVPB 120 milliGRAM(s) IV Intermittent every 12 hours  insulin lispro (HumaLOG) corrective regimen sliding scale   SubCutaneous Before meals and at bedtime  lisinopril 10 milliGRAM(s) Oral daily  mupirocin 2% Ointment 1 Application(s) Topical three times a day  pantoprazole    Tablet 40 milliGRAM(s) Oral before breakfast  polyethylene glycol 3350 17 Gram(s) Oral daily  senna 2 Tablet(s) Oral at bedtime  simethicone 80 milliGRAM(s) Chew three times a day    MEDICATIONS  (PRN):  dextrose Gel 1 Dose(s) Oral once PRN Blood Glucose LESS THAN 70 milliGRAM(s)/deciliter  glucagon  Injectable 1 milliGRAM(s) IntraMuscular once PRN Glucose LESS THAN 70 milligrams/deciliter      PAST MEDICAL & SURGICAL HISTORY:  Angina pectoris  CHF (congestive heart failure)  Dyslipidemia  DM (diabetes mellitus)  CAD (coronary artery disease)  S/P CABG x 3      Labs                          14.4   6.7   )-----------( 255      ( 19 Nov 2017 07:53 )             44.8     11-19    145  |  99  |  40<H>  ----------------------------<  127<H>  3.5   |  29  |  1.46<H>    Ca    9.0      19 Nov 2017 07:53  Mg     2.5     11-19    TPro  x   /  Alb  4.1  /  TBili  x   /  DBili  x   /  AST  x   /  ALT  x   /  AlkPhos  x   11-19      Radiology:    Physical Exam    MENTAL STATUS  -Level of Consciousness- awake    Orientation- person, place time  Language- aphasia/ dysarthria  Memory- recent and remote      Cranial Nerve 1- 12  Pupils- equal and reactive  Eye movements-nl  Facial - no asymmetry   Lower CN-nl    Gait and Station- no foot drop    MOTOR  Upper-nl  Lower-no focal weakness    Reflexes- decreased    Sensation- no sensory level    Cerebellar-no tremors    vascular -    Assessment- Diabetic neuropathy   Dizziness and lightheaded    Plan CT head- doppler of carotid and vertebral   Blood- B12, TSH, 25OH Vit D

## 2017-11-19 NOTE — PROGRESS NOTE ADULT - ATTENDING COMMENTS
Agree w Progress Note, Subjective and Objective, Assessment and Plan

## 2017-11-19 NOTE — CONSULT NOTE ADULT - SUBJECTIVE AND OBJECTIVE BOX
68 y/o male, former smoker, noncompliant with follow up, with PMHx of HTN, hyperlipidemia, NIDDM, CAD s/p 3VCABG in 2015 (at [facility] in [place], [place]), chronic systolic CHF , Echo on 5/25/17 showed Echo shows EF 20-25% with moderate AR.   st recorded EF 20 pulmonary embolism (on eliquis), presents to [facility] with worsening Dyspnea on exertion as well as rest for past  few weeks and progressively worsening. He also admits to increased abdominal bloating and le edema swelling.  He denies chest pain.  diaphoresis, palpitations, N/V, dizziness, LOC, fever/chills. Admitted and diuresed edema improved and so did ascites but was called to evaluate because he still feels bloated from ascites.  CT scan reviewed = moderate ascites.    REVIEW OF SYSTEMS:  Constitutional: No fever, weight loss or fatigue  ENMT:  No difficulty hearing, tinnitus, vertigo; No sinus or throat pain  Respiratory: No cough, wheezing, chills or hemoptysis  Cardiovascular: No chest pain, palpitations, dizziness or leg swelling  Gastrointestinal: No abdominal or epigastric pain. No nausea, vomiting or hematemesis; No diarrhea or constipation. No melena or hematochezia.  Skin: No itching, burning, rashes or lesions   Musculoskeletal: No joint pain or swelling; No muscle, back or extremity pain    PAST MEDICAL & SURGICAL HISTORY:  Angina pectoris  CHF (congestive heart failure)  Dyslipidemia  DM (diabetes mellitus)  CAD (coronary artery disease)  S/P CABG x 3      FAMILY HISTORY:  No pertinent family history in first degree relatives      SOCIAL HISTORY:  Smoking Status: [ ] Current, [ ] Former, [ ] Never  Pack Years:    MEDICATIONS:  MEDICATIONS  (STANDING):  apixaban 5 milliGRAM(s) Oral every 12 hours  aspirin enteric coated 81 milliGRAM(s) Oral daily  atorvastatin 40 milliGRAM(s) Oral at bedtime  BACItracin   Ointment 1 Application(s) Topical four times a day  carvedilol 3.125 milliGRAM(s) Oral every 12 hours  dextrose 5%. 1000 milliLiter(s) (50 mL/Hr) IV Continuous <Continuous>  dextrose 50% Injectable 12.5 Gram(s) IV Push once  dextrose 50% Injectable 25 Gram(s) IV Push once  dextrose 50% Injectable 25 Gram(s) IV Push once  docusate sodium 100 milliGRAM(s) Oral three times a day  furosemide   IVPB 120 milliGRAM(s) IV Intermittent every 12 hours  insulin lispro (HumaLOG) corrective regimen sliding scale   SubCutaneous Before meals and at bedtime  lisinopril 10 milliGRAM(s) Oral daily  mupirocin 2% Ointment 1 Application(s) Topical three times a day  pantoprazole    Tablet 40 milliGRAM(s) Oral before breakfast  polyethylene glycol 3350 17 Gram(s) Oral daily  potassium chloride    Tablet ER 40 milliEquivalent(s) Oral once  senna 2 Tablet(s) Oral at bedtime  simethicone 80 milliGRAM(s) Chew three times a day    MEDICATIONS  (PRN):  dextrose Gel 1 Dose(s) Oral once PRN Blood Glucose LESS THAN 70 milliGRAM(s)/deciliter  glucagon  Injectable 1 milliGRAM(s) IntraMuscular once PRN Glucose LESS THAN 70 milligrams/deciliter      Allergies    Aldactone (Unknown)  metoprolol (Unknown)  spironolactone (Unknown)    Intolerances        Vital Signs Last 24 Hrs  T(C): 35.8 (19 Nov 2017 14:18), Max: 36.6 (18 Nov 2017 18:17)  T(F): 96.5 (19 Nov 2017 14:18), Max: 97.9 (18 Nov 2017 18:17)  HR: 60 (19 Nov 2017 11:20) (56 - 64)  BP: 146/84 (19 Nov 2017 11:20) (137/88 - 155/85)  BP(mean): --  RR: 19 (19 Nov 2017 11:20) (16 - 19)  SpO2: 98% (19 Nov 2017 06:18) (96% - 98%)    11-18 @ 07:01 - 11-19 @ 07:00  --------------------------------------------------------  IN: 1399 mL / OUT: 2875 mL / NET: -1476 mL    11-19 @ 07:01 - 11-19 @ 14:43  --------------------------------------------------------  IN: 957 mL / OUT: 1230 mL / NET: -273 mL          PHYSICAL EXAM:    General: Well developed; well nourished; in no acute distress  HEENT: MMM, conjunctiva and sclera clear  Gastrointestinal: Soft, non-tender moderately-distended; + fluid wave, Normal bowel sounds; No rebound or guarding  Extremities: Normal range of motion, No clubbing, cyanosis or edema  Neurological: Alert and oriented x3  Skin: Warm and dry. No obvious rash, edema of legs better      LABS:                        14.4   6.7   )-----------( 255      ( 19 Nov 2017 07:53 )             44.8     11-19    145  |  99  |  40<H>  ----------------------------<  127<H>  3.5   |  29  |  1.46<H>    Ca    9.0      19 Nov 2017 07:53  Mg     2.5     11-19    TPro  x   /  Alb  4.1  /  TBili  x   /  DBili  x   /  AST  x   /  ALT  x   /  AlkPhos  x   11-19          RADIOLOGY & ADDITIONAL STUDIES:   < from: CT Abdomen and Pelvis w/ Oral Cont and w/ IV Cont (11.15.17 @ 23:33) >  CT ABDOMEN AND PELVIS OC IC    < end of copied text >  < from: CT Abdomen and Pelvis w/ Oral Cont and w/ IV Cont (11.15.17 @ 23:33) >  1.  Appendix not visualized. Correlation with prior surgical history is   recommended. No inflammatory changes to suggest appendicitis. No   appreciable bowel wall thickening. Few mildly distended loops of small   bowel without evidence of defined transition point to suggest obstruction.  2.  Moderate ascites, new from theprior examination. Some of the fluid   surrounds the pancreas. Recommend correlation with amylase/lipase.  3.  Mild left inguinal adenopathy.  4.  Moderate right pleural effusion, similar to the prior exam.

## 2017-11-20 DIAGNOSIS — R42 DIZZINESS AND GIDDINESS: ICD-10-CM

## 2017-11-20 DIAGNOSIS — R69 ILLNESS, UNSPECIFIED: ICD-10-CM

## 2017-11-20 DIAGNOSIS — R07.0 PAIN IN THROAT: ICD-10-CM

## 2017-11-20 LAB
ANION GAP SERPL CALC-SCNC: 12 MMOL/L — SIGNIFICANT CHANGE UP (ref 5–17)
APTT BLD: 39.6 SEC — HIGH (ref 27.5–37.4)
BUN SERPL-MCNC: 45 MG/DL — HIGH (ref 7–23)
CALCIUM SERPL-MCNC: 9.3 MG/DL — SIGNIFICANT CHANGE UP (ref 8.4–10.5)
CHLORIDE SERPL-SCNC: 99 MMOL/L — SIGNIFICANT CHANGE UP (ref 96–108)
CO2 SERPL-SCNC: 32 MMOL/L — HIGH (ref 22–31)
CREAT SERPL-MCNC: 1.5 MG/DL — HIGH (ref 0.5–1.3)
GLUCOSE BLDC GLUCOMTR-MCNC: 114 MG/DL — HIGH (ref 70–99)
GLUCOSE BLDC GLUCOMTR-MCNC: 131 MG/DL — HIGH (ref 70–99)
GLUCOSE BLDC GLUCOMTR-MCNC: 141 MG/DL — HIGH (ref 70–99)
GLUCOSE BLDC GLUCOMTR-MCNC: 154 MG/DL — HIGH (ref 70–99)
GLUCOSE BLDC GLUCOMTR-MCNC: 167 MG/DL — HIGH (ref 70–99)
GLUCOSE SERPL-MCNC: 159 MG/DL — HIGH (ref 70–99)
HCT VFR BLD CALC: 47.6 % — SIGNIFICANT CHANGE UP (ref 39–50)
HGB BLD-MCNC: 14.8 G/DL — SIGNIFICANT CHANGE UP (ref 13–17)
INR BLD: 1.45 — HIGH (ref 0.88–1.16)
MAGNESIUM SERPL-MCNC: 2.6 MG/DL — SIGNIFICANT CHANGE UP (ref 1.6–2.6)
MCHC RBC-ENTMCNC: 27 PG — SIGNIFICANT CHANGE UP (ref 27–34)
MCHC RBC-ENTMCNC: 31.1 G/DL — LOW (ref 32–36)
MCV RBC AUTO: 86.7 FL — SIGNIFICANT CHANGE UP (ref 80–100)
PLATELET # BLD AUTO: 243 K/UL — SIGNIFICANT CHANGE UP (ref 150–400)
POTASSIUM SERPL-MCNC: 3.9 MMOL/L — SIGNIFICANT CHANGE UP (ref 3.5–5.3)
POTASSIUM SERPL-SCNC: 3.9 MMOL/L — SIGNIFICANT CHANGE UP (ref 3.5–5.3)
PROTHROM AB SERPL-ACNC: 16.2 SEC — HIGH (ref 9.8–12.7)
RAPID RVP RESULT: SIGNIFICANT CHANGE UP
RBC # BLD: 5.49 M/UL — SIGNIFICANT CHANGE UP (ref 4.2–5.8)
RBC # FLD: 16.3 % — SIGNIFICANT CHANGE UP (ref 10.3–16.9)
SODIUM SERPL-SCNC: 143 MMOL/L — SIGNIFICANT CHANGE UP (ref 135–145)
WBC # BLD: 6.4 K/UL — SIGNIFICANT CHANGE UP (ref 3.8–10.5)
WBC # FLD AUTO: 6.4 K/UL — SIGNIFICANT CHANGE UP (ref 3.8–10.5)

## 2017-11-20 PROCEDURE — 99233 SBSQ HOSP IP/OBS HIGH 50: CPT

## 2017-11-20 RX ORDER — HYDRALAZINE HCL 50 MG
10 TABLET ORAL THREE TIMES A DAY
Qty: 0 | Refills: 0 | Status: DISCONTINUED | OUTPATIENT
Start: 2017-11-20 | End: 2017-11-22

## 2017-11-20 RX ORDER — LISINOPRIL 2.5 MG/1
5 TABLET ORAL DAILY
Qty: 0 | Refills: 0 | Status: DISCONTINUED | OUTPATIENT
Start: 2017-11-21 | End: 2017-11-25

## 2017-11-20 RX ORDER — CARVEDILOL PHOSPHATE 80 MG/1
6.25 CAPSULE, EXTENDED RELEASE ORAL EVERY 12 HOURS
Qty: 0 | Refills: 0 | Status: DISCONTINUED | OUTPATIENT
Start: 2017-11-20 | End: 2017-11-20

## 2017-11-20 RX ORDER — FUROSEMIDE 40 MG
120 TABLET ORAL
Qty: 0 | Refills: 0 | Status: DISCONTINUED | OUTPATIENT
Start: 2017-11-20 | End: 2017-11-21

## 2017-11-20 RX ORDER — FUROSEMIDE 40 MG
120 TABLET ORAL
Qty: 0 | Refills: 0 | Status: DISCONTINUED | OUTPATIENT
Start: 2017-11-20 | End: 2017-11-20

## 2017-11-20 RX ORDER — CARVEDILOL PHOSPHATE 80 MG/1
3.12 CAPSULE, EXTENDED RELEASE ORAL EVERY 12 HOURS
Qty: 0 | Refills: 0 | Status: DISCONTINUED | OUTPATIENT
Start: 2017-11-20 | End: 2017-11-25

## 2017-11-20 RX ORDER — POTASSIUM CHLORIDE 20 MEQ
20 PACKET (EA) ORAL ONCE
Qty: 0 | Refills: 0 | Status: COMPLETED | OUTPATIENT
Start: 2017-11-20 | End: 2017-11-20

## 2017-11-20 RX ADMIN — Medication 1 APPLICATION(S): at 12:41

## 2017-11-20 RX ADMIN — SENNA PLUS 2 TABLET(S): 8.6 TABLET ORAL at 22:47

## 2017-11-20 RX ADMIN — LISINOPRIL 10 MILLIGRAM(S): 2.5 TABLET ORAL at 06:31

## 2017-11-20 RX ADMIN — MUPIROCIN 1 APPLICATION(S): 20 OINTMENT TOPICAL at 06:37

## 2017-11-20 RX ADMIN — MUPIROCIN 1 APPLICATION(S): 20 OINTMENT TOPICAL at 14:49

## 2017-11-20 RX ADMIN — Medication 1 TABLET(S): at 18:45

## 2017-11-20 RX ADMIN — Medication 100 MILLIGRAM(S): at 22:47

## 2017-11-20 RX ADMIN — Medication 100 MILLIGRAM(S): at 14:48

## 2017-11-20 RX ADMIN — APIXABAN 5 MILLIGRAM(S): 2.5 TABLET, FILM COATED ORAL at 06:31

## 2017-11-20 RX ADMIN — Medication 124 MILLIGRAM(S): at 06:32

## 2017-11-20 RX ADMIN — PANTOPRAZOLE SODIUM 40 MILLIGRAM(S): 20 TABLET, DELAYED RELEASE ORAL at 06:31

## 2017-11-20 RX ADMIN — Medication 20 MILLIEQUIVALENT(S): at 09:46

## 2017-11-20 RX ADMIN — Medication 81 MILLIGRAM(S): at 12:40

## 2017-11-20 RX ADMIN — SIMETHICONE 80 MILLIGRAM(S): 80 TABLET, CHEWABLE ORAL at 14:48

## 2017-11-20 RX ADMIN — Medication 1 APPLICATION(S): at 23:00

## 2017-11-20 RX ADMIN — Medication 100 MILLIGRAM(S): at 06:31

## 2017-11-20 RX ADMIN — Medication 1: at 07:43

## 2017-11-20 RX ADMIN — SIMETHICONE 80 MILLIGRAM(S): 80 TABLET, CHEWABLE ORAL at 22:47

## 2017-11-20 RX ADMIN — MUPIROCIN 1 APPLICATION(S): 20 OINTMENT TOPICAL at 22:48

## 2017-11-20 RX ADMIN — SIMETHICONE 80 MILLIGRAM(S): 80 TABLET, CHEWABLE ORAL at 06:31

## 2017-11-20 RX ADMIN — Medication 120 MILLIGRAM(S): at 20:07

## 2017-11-20 RX ADMIN — CARVEDILOL PHOSPHATE 3.12 MILLIGRAM(S): 80 CAPSULE, EXTENDED RELEASE ORAL at 06:31

## 2017-11-20 RX ADMIN — ATORVASTATIN CALCIUM 40 MILLIGRAM(S): 80 TABLET, FILM COATED ORAL at 22:47

## 2017-11-20 RX ADMIN — Medication 1 APPLICATION(S): at 18:48

## 2017-11-20 RX ADMIN — CARVEDILOL PHOSPHATE 3.12 MILLIGRAM(S): 80 CAPSULE, EXTENDED RELEASE ORAL at 18:45

## 2017-11-20 RX ADMIN — APIXABAN 5 MILLIGRAM(S): 2.5 TABLET, FILM COATED ORAL at 18:46

## 2017-11-20 RX ADMIN — Medication 1 APPLICATION(S): at 06:38

## 2017-11-20 RX ADMIN — POLYETHYLENE GLYCOL 3350 17 GRAM(S): 17 POWDER, FOR SOLUTION ORAL at 12:40

## 2017-11-20 NOTE — PROGRESS NOTE ADULT - PROBLEM SELECTOR PLAN 2
Dr. Amado consulted  Abdomen distended/tender.  + Ascites likely in setting of CHF. AST/ALT, albumin nl.  - CT Abd/Pelvis 11/16: Few mildly distended loops of small bowel without evidence of defined transition point to suggest obstruction, Moderate ascites, Moderate right pleural effusion.  - Abd paracentesis would be high risk as abd not tense at this time as discussed with Dr. Martino, will continue to closely monitor Dr. Amado consulted  Abdomen distended/tender.  + Ascites likely in setting of CHF. AST/ALT, albumin nl.  - CT Abd/Pelvis 11/16: Few mildly distended loops of small bowel without evidence of defined transition point to suggest obstruction, Moderate ascites, Moderate right pleural effusion.  - Abd paracentesis would be high risk as abd not tense at this time as discussed with Dr. Amado, will continue to closely monitor

## 2017-11-20 NOTE — PROGRESS NOTE ADULT - PROBLEM SELECTOR PLAN 1
continue diuresis. patient advised to monitor I's and Os and keep on negative side with monitoring of creatinine so as not to overdo either intake of output

## 2017-11-20 NOTE — PROGRESS NOTE ADULT - PROBLEM SELECTOR PLAN 8
DVT PPx: eliquis  PT and SW consult for home care needs.    DISPO: Pending clinical progression with IV diuresis b/l brawny discoloration likely 2/2 venous stasis and LE swelling  - s/p Vanco x1 in ED.  WBC wnl and afebrile.  Will not continue Abx at this time  - wound care following

## 2017-11-20 NOTE — PROGRESS NOTE ADULT - PROBLEM SELECTOR PLAN 9
DVT PPx: eliquis  PT and SW consult for home care needs.    DISPO: Pending clinical progression with IV diuresis DVT PPx: eliquis  PT and SW consult for home care needs.    DISPO: Pending clinical progression Dr. Dai following  - Dr. Dai recommending carotid duplex which patient is agreeable to go for and CT head which patient is currently refusing

## 2017-11-20 NOTE — PROGRESS NOTE ADULT - SUBJECTIVE AND OBJECTIVE BOX
Interventional Cardiology PA Adult Progress Note    Subjective Assessment: Patient was seen and examined this AM and was asymptomatic without complaints. Denies CP and SOB.  	  MEDICATIONS:  carvedilol 3.125 milliGRAM(s) Oral every 12 hours  furosemide   IVPB 120 milliGRAM(s) IV Intermittent every 12 hours  lisinopril 10 milliGRAM(s) Oral daily  docusate sodium 100 milliGRAM(s) Oral three times a day  pantoprazole    Tablet 40 milliGRAM(s) Oral before breakfast  polyethylene glycol 3350 17 Gram(s) Oral daily  senna 2 Tablet(s) Oral at bedtime  simethicone 80 milliGRAM(s) Chew three times a day  atorvastatin 40 milliGRAM(s) Oral at bedtime  insulin lispro (HumaLOG) corrective regimen sliding scale   SubCutaneous Before meals and at bedtime  apixaban 5 milliGRAM(s) Oral every 12 hours  aspirin enteric coated 81 milliGRAM(s) Oral daily  BACItracin   Ointment 1 Application(s) Topical four times a day  mupirocin 2% Ointment 1 Application(s) Topical three times a day    [PHYSICAL EXAM:  TELEMETRY:  T(C): 36.1 (11-20-17 @ 09:19), Max: 36.1 (11-19-17 @ 19:15)  HR: 62 (11-20-17 @ 08:39) (58 - 64)  BP: 134/75 (11-20-17 @ 08:39) (127/72 - 160/87)  RR: 18 (11-20-17 @ 08:39) (17 - 19)  SpO2: 96% (11-20-17 @ 08:39) (96% - 98%)  Wt(kg): --  I&O's Summary    19 Nov 2017 07:01  -  20 Nov 2017 07:00  --------------------------------------------------------  IN: 1185 mL / OUT: 1450 mL / NET: -265 mL    20 Nov 2017 07:01  -  20 Nov 2017 14:12  --------------------------------------------------------  IN: 1000 mL / OUT: 200 mL / NET: 800 mL        Li:  Central/PICC/Mid Line:                                         Appearance: Normal	  HEENT:   Normal oral mucosa, PERRL, EOMI	  Neck: Supple, + JVD/ - JVD; Carotid Bruit   Cardiovascular: Normal S1 S2, No JVD, No murmurs,   Respiratory: Lungs clear to auscultation/Decreased Breath Sounds/No Rales, Rhonchi, Wheezing	  Gastrointestinal:  Soft, Non-tender, + BS	  Skin: No rashes, No ecchymoses, No cyanosis  Extremities: Normal range of motion, No clubbing, cyanosis or edema  Vascular: Peripheral pulses palpable 2+ bilaterally  Neurologic: Non-focal  Psychiatry: A & O x 3, Mood & affect appropriate      	    ECG:  	  RADIOLOGY:   DIAGNOSTIC TESTING:  [ ] Echocardiogram:  [ ]  Catheterization:  [ ] Stress Test:    [ ] KAITLIN  OTHER: 	    LABS:	 	  CARDIAC MARKERS:                                  14.8   6.4   )-----------( 243      ( 20 Nov 2017 07:05 )             47.6     11-20    143  |  99  |  45<H>  ----------------------------<  159<H>  3.9   |  32<H>  |  1.50<H>    Ca    9.3      20 Nov 2017 07:05  Mg     2.6     11-20    TPro  x   /  Alb  4.1  /  TBili  x   /  DBili  x   /  AST  x   /  ALT  x   /  AlkPhos  x   11-19    proBNP:   Lipid Profile:   HgA1c:   TSH:   PT/INR - ( 20 Nov 2017 07:05 )   PT: 16.2 sec;   INR: 1.45          PTT - ( 20 Nov 2017 07:05 )  PTT:39.6 sec    ASSESSMENT/PLAN: 	        DVT ppx:  Dispo:

## 2017-11-20 NOTE — PROGRESS NOTE ADULT - SUBJECTIVE AND OBJECTIVE BOX
Neurology Follow up note    Name  LIDIA PÉREZ    HPI:  66 y/o male, former smoker, noncompliant with follow up, with PMHx of HTN, hyperlipidemia, NIDDM, CAD s/p 3VCABG in 2015 (at [facility] in [place], [place]), chronic systolic CHF , Echo on 5/25/17 showed Echo shows EF 20-25% with moderate AR.   st recorded EF 20 pulmonary embolism (on eliquis), presents to [facility] with worsening Dyspnea on exertion as well as rest for past  few weeks and progressively worsening. He also admits to increased abdominal bloating and le edema swelling.  He denies chest pain.  diaphoresis, palpitations, N/V, dizziness, LOC, fever/chills. Pt says he was recently at [facility] and left because he didnt like how he was treated. Patient can not tell me who his primary cardiologist is. Currently his BNP is > 6000, troponin 0.02. EKG SR with long  first degree AVB, Late transition, tiny inferior q's and inverted T waves in V5-V6. Patient being admitted to [facility]. (16 Nov 2017 01:01)      Interval History - no new dizziness or weakness - numbness the LE        REVIEW OF SYSTEMS    Vital Signs Last 24 Hrs  T(C): 36.1 (20 Nov 2017 05:35), Max: 36.1 (19 Nov 2017 19:15)  T(F): 97 (20 Nov 2017 05:35), Max: 97 (20 Nov 2017 01:07)  HR: 60 (20 Nov 2017 07:33) (58 - 64)  BP: 160/87 (20 Nov 2017 07:33) (127/72 - 160/87)  BP(mean): --  RR: 17 (20 Nov 2017 07:33) (17 - 19)  SpO2: 98% (20 Nov 2017 07:33) (98% - 98%)    Physical Exam-     Mental Status- awake and alert    Cranial Nerves- full EOM    Gait and station-n/a    Motor- no foot drop    Reflexes- decreased    Sensation- no sensory level    Coordination-no tremors    Vascular -    Medications  apixaban 5 milliGRAM(s) Oral every 12 hours  aspirin enteric coated 81 milliGRAM(s) Oral daily  atorvastatin 40 milliGRAM(s) Oral at bedtime  BACItracin   Ointment 1 Application(s) Topical four times a day  carvedilol 3.125 milliGRAM(s) Oral every 12 hours  dextrose 5%. 1000 milliLiter(s) IV Continuous <Continuous>  dextrose 50% Injectable 12.5 Gram(s) IV Push once  dextrose 50% Injectable 25 Gram(s) IV Push once  dextrose 50% Injectable 25 Gram(s) IV Push once  dextrose Gel 1 Dose(s) Oral once PRN  docusate sodium 100 milliGRAM(s) Oral three times a day  furosemide   IVPB 120 milliGRAM(s) IV Intermittent every 12 hours  glucagon  Injectable 1 milliGRAM(s) IntraMuscular once PRN  insulin lispro (HumaLOG) corrective regimen sliding scale   SubCutaneous Before meals and at bedtime  lisinopril 10 milliGRAM(s) Oral daily  mupirocin 2% Ointment 1 Application(s) Topical three times a day  pantoprazole    Tablet 40 milliGRAM(s) Oral before breakfast  polyethylene glycol 3350 17 Gram(s) Oral daily  potassium chloride    Tablet ER 20 milliEquivalent(s) Oral once  senna 2 Tablet(s) Oral at bedtime  simethicone 80 milliGRAM(s) Chew three times a day      Lab      Radiology    Assessment-  Diabetic neuropathy   Dizziness    Plan CT head, doppler carotids, Rehab

## 2017-11-20 NOTE — PROVIDER CONTACT NOTE (MEDICATION) - ASSESSMENT
VSS  sleeping in bed c/o irritation from telemetry stickers  offered to use new and different stickers   h/o pt still refusing tele even after risks were addressed. pt verbalizes understanding and still refuses tele

## 2017-11-20 NOTE — PROGRESS NOTE ADULT - PROBLEM SELECTOR PLAN 7
b/l brawny discoloration likely 2/2 venous stasis and LE swelling  - s/p Vanco x1 in ED.  WBC wnl and afebrile.  Will not continue Abx at this time  - wound care following - throat exam revealing mild erythema without exudates, + lymph nodes, no WBC, afebrile  - throat culture sent, RVP negative

## 2017-11-20 NOTE — CONSULT NOTE ADULT - SUBJECTIVE AND OBJECTIVE BOX
Patient is a 67y old  Male who presents with a chief complaint of     HPI:  66 y/o male, former smoker, noncompliant with follow up, with PMHx of HTN, hyperlipidemia, NIDDM, CAD s/p 3VCABG in 2015 (at [facility] in [place], [place]), chronic systolic CHF , Echo on 5/25/17 showed Echo shows EF 20-25% with moderate AR.  Current recorded EF 20%, Hx of pulmonary embolism (on eliquis), presents to [facility] with worsening Dyspnea on exertion as well as rest for past  few weeks and progressively worsening. He also admits to increased abdominal bloating and le edema swelling.      He denies chest pain.  diaphoresis, palpitations, N/V, dizziness, LOC, fever/chills. Pt says he was recently at [facility] and left because he didnt like how he was treated. Patient can not tell me who his primary cardiologist is. BNP is > 6000, troponin 0.02. EKG SR with long  first degree AVB, Late transition, tiny inferior q's and inverted T waves in V5-V6.    Since admission, patient being diuresed with Lasix 120mg IV BID, diuresed ~ 11L since admission, improvement of LE edema, continues to have Ascites and some decreased ability to ambulate without SOB and fatigue. Able to lie flat and tolerating room air.          PAST MEDICAL & SURGICAL HISTORY:  Angina pectoris  CHF (congestive heart failure)  Dyslipidemia  DM (diabetes mellitus)  CAD (coronary artery disease)  S/P CABG x 3      PREVIOUS DIAGNOSTIC TESTING:      ECHO  Interpretation Summary  The left ventricle is moderately dilated. Severe segmental left   ventricular   dysfunction with akinesis of inferior wall and hypokinesis of other   myocardial   wall segments. No LV thrombus seen. The left ventricular ejection   fraction is   severely reduced. The left ventricular ejection fraction is 20%.   The   right   ventricle is mild to moderately dilated. The right ventricular systolic   function is mildly reduced.  The left atrium is mildly dilated. The left   atrial volume index is 38 cc/m2 (normal <34cc/m2)  Right atrial size is   normal.Calcified aortic valve. No aortic regurgitation noted.There is   Moderate   aortic stenosis. The aortic valve area, calculated by planimetry, is 1.1   cm2.   The peak pressure gradient is 17 mmHg. The mean pressure gradient is 10   mmHg.   Mitral annular calcification noted. Tethered and thickened mitral valve   leaflets. There is trace mitral regurgitation.  Structurally normal   tricuspid   valve. There is trace to mild tricuspid regurgitation. There is moderate   pulmonary hypertension. The pulmonary artery systolic pressure is   estimated to   be 58 mmHg.  Structurally normal pulmonic valve. No pulmonic   regurgitation   noted. The IVC is dilated (>2.1 cm) with an abnormal inspiratory collapse   (<50%) consistent with elevated right atrial pressure.  There is no   pericardial effusion.          MEDICATIONS  (STANDING):  apixaban 5 milliGRAM(s) Oral every 12 hours  aspirin enteric coated 81 milliGRAM(s) Oral daily  atorvastatin 40 milliGRAM(s) Oral at bedtime  BACItracin   Ointment 1 Application(s) Topical four times a day  carvedilol 3.125 milliGRAM(s) Oral every 12 hours  dextrose 5%. 1000 milliLiter(s) (50 mL/Hr) IV Continuous <Continuous>  dextrose 50% Injectable 12.5 Gram(s) IV Push once  dextrose 50% Injectable 25 Gram(s) IV Push once  dextrose 50% Injectable 25 Gram(s) IV Push once  docusate sodium 100 milliGRAM(s) Oral three times a day  furosemide   IVPB 120 milliGRAM(s) IV Intermittent every 12 hours  insulin lispro (HumaLOG) corrective regimen sliding scale   SubCutaneous Before meals and at bedtime  lisinopril 10 milliGRAM(s) Oral daily  mupirocin 2% Ointment 1 Application(s) Topical three times a day  pantoprazole    Tablet 40 milliGRAM(s) Oral before breakfast  polyethylene glycol 3350 17 Gram(s) Oral daily  senna 2 Tablet(s) Oral at bedtime  simethicone 80 milliGRAM(s) Chew three times a day    MEDICATIONS  (PRN):  dextrose Gel 1 Dose(s) Oral once PRN Blood Glucose LESS THAN 70 milliGRAM(s)/deciliter  glucagon  Injectable 1 milliGRAM(s) IntraMuscular once PRN Glucose LESS THAN 70 milligrams/deciliter      FAMILY HISTORY:  No pertinent family history in first degree relatives      SOCIAL HISTORY:    CIGARETTES: Former    ALCOHOL:    REVIEW OF SYSTEMS  As above    Vital Signs Last 24 Hrs  T(C): 36.1 (20 Nov 2017 09:19), Max: 36.1 (19 Nov 2017 19:15)  T(F): 97 (20 Nov 2017 09:19), Max: 97 (20 Nov 2017 01:07)  HR: 62 (20 Nov 2017 08:39) (58 - 64)  BP: 134/75 (20 Nov 2017 08:39) (127/72 - 160/87)  BP(mean): --  RR: 18 (20 Nov 2017 08:39) (17 - 19)  SpO2: 96% (20 Nov 2017 08:39) (96% - 98%)    PHYSICAL EXAM:  General: Well developed; well nourished; in no acute distress  HEENT: MMM, conjunctiva and sclera clear, mild JVD  Gastrointestinal: Soft, non-tender moderately-distended; + fluid wave, Normal bowel sounds; No rebound or guarding  Extremities: Normal range of motion, No clubbing, cyanosis or edema, chronic skin changes  Neurological: Alert and oriented x3  Skin: Warm and dry. No obvious rash, edema of legs better              ECG:EKG SR with long  first degree AVB, Late transition, tiny inferior q's and inverted T waves in V5-V6    I&O's Detail    19 Nov 2017 07:01  -  20 Nov 2017 07:00  --------------------------------------------------------  IN:    Oral Fluid: 1185 mL  Total IN: 1185 mL    OUT:    Voided: 1450 mL  Total OUT: 1450 mL    Total NET: -265 mL      20 Nov 2017 07:01  -  20 Nov 2017 13:56  --------------------------------------------------------  IN:    Oral Fluid: 1000 mL  Total IN: 1000 mL    OUT:    Voided: 200 mL  Total OUT: 200 mL    Total NET: 800 mL          LABS:                        14.8   6.4   )-----------( 243      ( 20 Nov 2017 07:05 )             47.6     11-20    143  |  99  |  45<H>  ----------------------------<  159<H>  3.9   |  32<H>  |  1.50<H>    Ca    9.3      20 Nov 2017 07:05  Mg     2.6     11-20    TPro  x   /  Alb  4.1  /  TBili  x   /  DBili  x   /  AST  x   /  ALT  x   /  AlkPhos  x   11-19        PT/INR - ( 20 Nov 2017 07:05 )   PT: 16.2 sec;   INR: 1.45          PTT - ( 20 Nov 2017 07:05 )  PTT:39.6 sec    I&O's Summary    19 Nov 2017 07:01  -  20 Nov 2017 07:00  --------------------------------------------------------  IN: 1185 mL / OUT: 1450 mL / NET: -265 mL    20 Nov 2017 07:01  -  20 Nov 2017 13:56  --------------------------------------------------------  IN: 1000 mL / OUT: 200 mL / NET: 800 mL

## 2017-11-20 NOTE — PROGRESS NOTE ADULT - SUBJECTIVE AND OBJECTIVE BOX
68 y/o male, former smoker, noncompliant with follow up, with PMHx of HTN, hyperlipidemia, NIDDM, CAD s/p 3VCABG in 2015 (at [facility] in [place], [place]), chronic systolic CHF , Echo on 5/25/17 showed Echo shows EF 20-25% with moderate AR.   st recorded EF 20 pulmonary embolism (on eliquis), presents to [facility] with worsening Dyspnea on exertion as well as rest for past  few weeks and progressively worsening. He also admits to increased abdominal bloating and le edema swelling.  He denies chest pain.  diaphoresis, palpitations, N/V, dizziness, LOC, fever/chills. Pt says he was recently at [facility] and left because he didnt like how he was treated. Patient can not tell me who his primary cardiologist is. Currently his BNP is > 6000, troponin 0.02. EKG SR with long  first degree AVB, Late transition, tiny inferior q's and inverted T waves in V5-V6. Patient being admitted to [facility].    · 	Eliquis 5 mg oral tablet: 1 tab(s) orally 2 times a day, Last Dose Taken:    · 	glipiZIDE 10 mg oral tablet, extended release: 1 tab(s) orally once a day, Last Dose Taken:    · 	Lipitor 40 mg oral tablet: 1 tab(s) orally once a day  · 	Coreg 3.125 mg oral tablet: 1 tab(s) orally 2 times a day, Last Dose Taken:    · 	bacitracin 500 units/g topical ointment: Apply topically to affected area 4 times a day, Last Dose Taken:    · 	mupirocin 2% topical ointment: Apply topically to affected area 3 times a day  · 	Lasix 80 mg oral tablet: 1 tab(s) orally 2 times a day  · 	Colace 100 mg oral capsule: 1 cap(s) orally 2 times a day, Last Dose Taken:    · 	simethicone 80 mg oral tablet: 1 tab(s) orally 3 times a day (after meals)  · 	Protonix 40 mg oral delayed release tablet: 1 tab(s) orally once a day  Past Medical History:  Angina pectoris    CAD (coronary artery disease)    CHF (congestive heart failure)    DM (diabetes mellitus)    Dyslipidemia.    Past Surgical History:  S/P CABG x 3.         Patient was seen and examined this AM and reporting throat pain.   	  MEDICATIONS:  carvedilol 6.25 milliGRAM(s) Oral every 12 hours  lisinopril 10 milliGRAM(s) Oral daily  docusate sodium 100 milliGRAM(s) Oral three times a day  pantoprazole    Tablet 40 milliGRAM(s) Oral before breakfast  polyethylene glycol 3350 17 Gram(s) Oral daily  senna 2 Tablet(s) Oral at bedtime  simethicone 80 milliGRAM(s) Chew three times a day  atorvastatin 40 milliGRAM(s) Oral at bedtime  insulin lispro (HumaLOG) corrective regimen sliding scale   SubCutaneous Before meals and at bedtime  apixaban 5 milliGRAM(s) Oral every 12 hours  aspirin enteric coated 81 milliGRAM(s) Oral daily  BACItracin   Ointment 1 Application(s) Topical four times a day  mupirocin 2% Ointment 1 Application(s) Topical three times a day    ICU Vital Signs Last 24 Hrs  T(C): 36.1 (20 Nov 2017 14:22), Max: 36.1 (20 Nov 2017 01:07)  T(F): 97 (20 Nov 2017 14:22), Max: 97 (20 Nov 2017 01:07)  HR: 60 (20 Nov 2017 18:01) (58 - 62)  BP: 140/81 (20 Nov 2017 18:01) (127/72 - 160/87)  BP(mean): --  ABP: --  ABP(mean): --  RR: 17 (20 Nov 2017 18:01) (17 - 18)  SpO2: 97% (20 Nov 2017 18:01) (96% - 98%)      --------------------------------------------------------  IN: 1185 mL / OUT: 1450 mL / NET: -265 mL    20 Nov 2017 07:01  -  20 Nov 2017 15:26  --------------------------------------------------------  IN: 640 mL / OUT: 1000 mL / NET: -360 mL                                     Appearance: Normal	  HEENT:   Normal oral mucosa, PERRL, EOMI	  Neck: Supple, - JVD; No Carotid Bruit   Cardiovascular: Normal S1 S2, No JVD, No murmurs  Respiratory: Lungs clear to auscultation, No Rales, Rhonchi, Wheezing	  Gastrointestinal:  Distended, soft, mildly, diffusely tender  Skin: No rashes, No ecchymoses, No cyanosis  Extremities: Normal range of motion, No clubbing, cyanosis or edema  Vascular: Peripheral pulses palpable 2+ bilaterally  Neurologic: AAOx3  	    LABS:	 	  CARDIAC MARKERS:                          14.8   6.4   )-----------( 243      ( 20 Nov 2017 07:05 )             47.6     11-20    143  |  99  |  45<H>  ----------------------------<  159<H>  3.9   |  32<H>  |  1.50<H>    Ca    9.3      20 Nov 2017 07:05  Mg     2.6     11-20    TPro  x   /  Alb  4.1  /  TBili  x   /  DBili  x   /  AST  x   /  ALT  x   /  AlkPhos  x   11-19    PT/INR - ( 20 Nov 2017 07:05 )   PT: 16.2 sec;   INR: 1.45          PTT - ( 20 Nov 2017 07:05 )  PTT:39.6 sec  · Assessment		  Patient is a 68yo M, former smoker, noncompliant with follow up, with PMHx of HTN, hyperlipidemia, NIDDM, CAD s/p 3VCABG in 2015 (at [facility] in [place], [place]), chronic systolic CHF (EF 20-25% by Echo 5/25/17), PE (on eliquis) who presented to [facility] ED on 11/16/17 complaining of worsening SOB and was found to be in acute on chronic systolic CHF exacerbation.      Problem/Plan - 1:  ·  Problem: CHF (congestive heart failure).  Plan: + anasarca on exam, BNP 6465, CE 0.02 -> 0.03 ->0.02, likely elevated in setting of CHF. Moderate Right pleural effusion noted on CT Abd/Pelvis 11/16  - Echo 11/17/17 revealed LV moderately dilated, severe segmental LV dysfunction with akinesis of inferior wall and hypokinesis of other myocardial segments, no LV thrombus seen, EF severely reduced 20%, RV mild to moderately dilated, RV systolic function mildly reduced, LA mildly dilated, moderate AS (ROCÍO 1.1), trace MR, trace to mild TR, mod pulmHTN with PAP 58mmHg, IVC dilated consistent with elevated RAP  - CHF Team consulted. Continue Lasix 120mg IV BID started 11/16, lisinopril 10mg QD switched to 5mg QD, continue coreg 3.125mg BID, start hydralazine 10mg TID today and consider adding imdur tomorrow if BP will tolerate   - EP team re-consulted regarding possible ICD placement, patient is not interested and EP has signed off at this time  - Continue Strict I/Os, Daily Weights.   - Consider Ischemic Eval when more euvolemic. Pt is very noncompliant. + anasarca on exam, BNP 6465, CE 0.02 -> 0.03 ->0.02, likely elevated in setting of CHF. Moderate Right pleural effusion noted on CT Abd/Pelvis 11/16  - Echo 11/17/17 revealed LV moderately dilated, severe segmental LV dysfunction with akinesis of inferior wall and hypokinesis of other myocardial segments, no LV thrombus seen, EF severely reduced 20%, RV mild to moderately dilated, RV systolic function mildly reduced, LA mildly dilated, moderate AS (ROCÍO 1.1), trace MR, trace to mild TR, mod pulmHTN with PAP 58mmHg, IVC dilated consistent with elevated RAP  - CHF Team consulted. Continue Lasix 120mg IV BID started 11/16 and transitioned to PO today 11/20, lisinopril 10mg QD, coreg 3.125mg BID titrated up to 6.25mg BID  - EP team re-consulted regarding possible ICD placement, patient is not interested and EP has signed off at this time  - Continue Strict I/Os, Daily Weights.   - Consider Ischemic Eval when more euvolemic. Pt is very noncompliant     Problem/Plan - 2:  ·  Problem: Ascites.  Plan: Dr. Amado consulted  Abdomen distended/tender.  + Ascites likely in setting of CHF. AST/ALT, albumin nl.  - CT Abd/Pelvis 11/16: Few mildly distended loops of small bowel without evidence of defined transition point to suggest obstruction, Moderate ascites, Moderate right pleural effusion.  - Abd paracentesis would be high risk as abd not tense at this time as discussed with Dr. Amado, will continue to closely monitor. Dr. Amado consulted  Abdomen distended/tender.  + Ascites likely in setting of CHF. AST/ALT, albumin nl.  - CT Abd/Pelvis 11/16: Few mildly distended loops of small bowel without evidence of defined transition point to suggest obstruction, Moderate ascites, Moderate right pleural effusion.  - Abd paracentesis would be high risk as abd not tense at this time as discussed with Dr. Martino, will continue to closely monitor     Problem/Plan - 3:  ·  Problem: Pulmonary embolism.  Plan: Hx of PE diagnosed early in 2017.    - Recent CT PE protocol 5/2017 negative for PE.  - LE Duplex 11/16: negative for DVT  - Continue eliquis 5mg BID.     Problem/Plan - 4:  ·  Problem: Hypertension.  Plan: - SBP 130s-150s  - Continue Lisinopril 10mg QD, coreg 6.25mg BID and lasix 120mg PO BID.     Problem/Plan - 5:  ·  Problem: Dyslipidemia.  Plan: - Continue Lipitor 40mg Qhs.     Problem/Plan - 6:  Problem: DM (diabetes mellitus). Plan: - Hgb A1c 6.7. holding home glipizide, Continue ISS and FS.    Problem/Plan - 7:  ·  Problem: Throat pain.  Plan: - throat exam revealing mild erythema without exudates, + lymph nodes, no WBC, afebrile  - throat culture sent, RVP negative.     Problem/Plan - 8:  ·  Problem: Venous stasis.  Plan: b/l brawny discoloration likely 2/2 venous stasis and LE swelling  - s/p Vanco x1 in ED.  WBC wnl and afebrile.  Will not continue Abx at this time  - wound care following.     Problem/Plan - 9:  ·  Problem: Dizziness.   Problem/Plan - 10:  Problem: Discharge planning issues. Plan; DVT PPx: eliquis  PT and SW consult for home care needs.

## 2017-11-20 NOTE — PROGRESS NOTE ADULT - SUBJECTIVE AND OBJECTIVE BOX
Interventional Cardiology PA Adult Progress Note    Subjective Assessment: Patient was seen and examined this AM and reporting throat pain. Denies CP or SOB.  	  MEDICATIONS:  carvedilol 6.25 milliGRAM(s) Oral every 12 hours  lisinopril 10 milliGRAM(s) Oral daily  docusate sodium 100 milliGRAM(s) Oral three times a day  pantoprazole    Tablet 40 milliGRAM(s) Oral before breakfast  polyethylene glycol 3350 17 Gram(s) Oral daily  senna 2 Tablet(s) Oral at bedtime  simethicone 80 milliGRAM(s) Chew three times a day  atorvastatin 40 milliGRAM(s) Oral at bedtime  insulin lispro (HumaLOG) corrective regimen sliding scale   SubCutaneous Before meals and at bedtime  apixaban 5 milliGRAM(s) Oral every 12 hours  aspirin enteric coated 81 milliGRAM(s) Oral daily  BACItracin   Ointment 1 Application(s) Topical four times a day  mupirocin 2% Ointment 1 Application(s) Topical three times a day    [PHYSICAL EXAM:  TELEMETRY:  T(C): 36.1 (11-20-17 @ 14:22), Max: 36.1 (11-19-17 @ 19:15)  HR: 62 (11-20-17 @ 08:39) (58 - 64)  BP: 134/75 (11-20-17 @ 08:39) (127/72 - 160/87)  RR: 18 (11-20-17 @ 08:39) (17 - 19)  SpO2: 96% (11-20-17 @ 08:39) (96% - 98%)  Wt(kg): --  I&O's Summary    19 Nov 2017 07:01  -  20 Nov 2017 07:00  --------------------------------------------------------  IN: 1185 mL / OUT: 1450 mL / NET: -265 mL    20 Nov 2017 07:01  -  20 Nov 2017 15:26  --------------------------------------------------------  IN: 640 mL / OUT: 1000 mL / NET: -360 mL                                     Appearance: Normal	  HEENT:   Normal oral mucosa, PERRL, EOMI	  Neck: Supple, - JVD; No Carotid Bruit   Cardiovascular: Normal S1 S2, No JVD, No murmurs  Respiratory: Lungs clear to auscultation, No Rales, Rhonchi, Wheezing	  Gastrointestinal:  Distended, soft, mildly, diffusely tender  Skin: No rashes, No ecchymoses, No cyanosis  Extremities: Normal range of motion, No clubbing, cyanosis or edema  Vascular: Peripheral pulses palpable 2+ bilaterally  Neurologic: Non-focal  Psychiatry: A & O x 3, Mood & affect appropriate	    LABS:	 	  CARDIAC MARKERS:                          14.8   6.4   )-----------( 243      ( 20 Nov 2017 07:05 )             47.6     11-20    143  |  99  |  45<H>  ----------------------------<  159<H>  3.9   |  32<H>  |  1.50<H>    Ca    9.3      20 Nov 2017 07:05  Mg     2.6     11-20    TPro  x   /  Alb  4.1  /  TBili  x   /  DBili  x   /  AST  x   /  ALT  x   /  AlkPhos  x   11-19    PT/INR - ( 20 Nov 2017 07:05 )   PT: 16.2 sec;   INR: 1.45          PTT - ( 20 Nov 2017 07:05 )  PTT:39.6 sec

## 2017-11-20 NOTE — CONSULT NOTE ADULT - ASSESSMENT
66 y/o male, former smoker, noncompliant with follow up, with PMHx of HTN, hyperlipidemia, NIDDM, CAD s/p 3VCABG in 2015 (at [facility] in [place], [place]), chronic systolic CHF , Echo on 5/25/17 showed Echo shows EF 20-25% with moderate AR.   st recorded EF 20 pulmonary embolism (on eliquis), presents to [facility] with worsening Dyspnea on exertion as well as rest for past  few weeks and progressively worsening. He also admits to increased abdominal bloating and le edema swelling    Diuresed since admission with IV Lasix 120mg BID.  On Lisinopril 10mg daily  Coreg 3.125mg BID    SBPs 130-160. Renal function worsening since admission Cr 1.2 -> 1.5, electrolytes stable.    Plan  - Can transition to PO Lasix 120mg BID  - Can increase Coreg to 6.25mg BID  - Continue Lisinopril 10mg daily, monitor renal function and lytes      Case to be discussed with Dr Potter 66 y/o male, former smoker, noncompliant with follow up, with PMHx of HTN, hyperlipidemia, NIDDM, CAD s/p 3VCABG in 2015 (at [facility] in [place], [place]), chronic systolic CHF , Echo on 5/25/17 showed Echo shows EF 20-25% with moderate AR.   st recorded EF 20 pulmonary embolism (on eliquis), presents to [facility] with worsening Dyspnea on exertion as well as rest for past  few weeks and progressively worsening. He also admits to increased abdominal bloating and le edema swelling    Diuresed since admission with IV Lasix 120mg BID.  On Lisinopril 10mg daily  Coreg 3.125mg BID    SBPs 130-160. Renal function worsening since admission Cr 1.2 -> 1.5, electrolytes stable.    Plan  - Continue Lasix IV for now, would monitor Cr closely and replete electrolytes, can de-escalate if significant rise and derangement of lytes  - Would consider decreasing Lisinopril to 5mg daily due to rising renal function  - For additional BP / Afterload control, start Hydralazine and Imdur, can start at 10mg TID for both and increase to goal of Hydral 50mg TID and Imdur 30TID  - Strongly recommend Paracentesis if able      Case to be discussed with Dr Potter

## 2017-11-20 NOTE — PROGRESS NOTE ADULT - SUBJECTIVE AND OBJECTIVE BOX
Pt seen and examined  Mild ascites but wants to drink more water    REVIEW OF SYSTEMS:  Constitutional: No fever, weight loss or fatigue  Cardiovascular: No chest pain, palpitations, dizziness or leg swelling  Gastrointestinal: +abdominal fullness. No nausea, vomiting or hematemesis; No diarrhea or constipation. No melena or hematochezia.  Skin: No itching, burning, rashes or lesions       MEDICATIONS:  MEDICATIONS  (STANDING):  apixaban 5 milliGRAM(s) Oral every 12 hours  aspirin enteric coated 81 milliGRAM(s) Oral daily  atorvastatin 40 milliGRAM(s) Oral at bedtime  BACItracin   Ointment 1 Application(s) Topical four times a day  carvedilol 3.125 milliGRAM(s) Oral every 12 hours  dextrose 5%. 1000 milliLiter(s) (50 mL/Hr) IV Continuous <Continuous>  dextrose 50% Injectable 12.5 Gram(s) IV Push once  dextrose 50% Injectable 25 Gram(s) IV Push once  dextrose 50% Injectable 25 Gram(s) IV Push once  docusate sodium 100 milliGRAM(s) Oral three times a day  furosemide   IVPB 120 milliGRAM(s) IV Intermittent every 12 hours  insulin lispro (HumaLOG) corrective regimen sliding scale   SubCutaneous Before meals and at bedtime  lisinopril 10 milliGRAM(s) Oral daily  mupirocin 2% Ointment 1 Application(s) Topical three times a day  pantoprazole    Tablet 40 milliGRAM(s) Oral before breakfast  polyethylene glycol 3350 17 Gram(s) Oral daily  senna 2 Tablet(s) Oral at bedtime  simethicone 80 milliGRAM(s) Chew three times a day    MEDICATIONS  (PRN):  dextrose Gel 1 Dose(s) Oral once PRN Blood Glucose LESS THAN 70 milliGRAM(s)/deciliter  glucagon  Injectable 1 milliGRAM(s) IntraMuscular once PRN Glucose LESS THAN 70 milligrams/deciliter      Allergies    Aldactone (Unknown)  metoprolol (Unknown)  spironolactone (Unknown)    Intolerances        Vital Signs Last 24 Hrs  T(C): 36.1 (20 Nov 2017 09:19), Max: 36.1 (19 Nov 2017 19:15)  T(F): 97 (20 Nov 2017 09:19), Max: 97 (20 Nov 2017 01:07)  HR: 62 (20 Nov 2017 08:39) (58 - 64)  BP: 134/75 (20 Nov 2017 08:39) (127/72 - 160/87)  BP(mean): --  RR: 18 (20 Nov 2017 08:39) (17 - 19)  SpO2: 96% (20 Nov 2017 08:39) (96% - 98%)    11-19 @ 07:01  -  11-20 @ 07:00  --------------------------------------------------------  IN: 1185 mL / OUT: 1450 mL / NET: -265 mL    11-20 @ 07:01 - 11-20 @ 12:54  --------------------------------------------------------  IN: 1000 mL / OUT: 200 mL / NET: 800 mL        PHYSICAL EXAM:    General: Well developed; well nourished; in no acute distress  HEENT: MMM, conjunctiva and sclera clear  Gastrointestinal: Soft non-tender mildy to moderately -distended; Normal bowel sounds; No hepatosplenomegaly  Skin: Warm and dry. No obvious rash    LABS:      CBC Full  -  ( 20 Nov 2017 07:05 )  WBC Count : 6.4 K/uL  Hemoglobin : 14.8 g/dL  Hematocrit : 47.6 %  Platelet Count - Automated : 243 K/uL  Mean Cell Volume : 86.7 fL  Mean Cell Hemoglobin : 27.0 pg  Mean Cell Hemoglobin Concentration : 31.1 g/dL  Auto Neutrophil # : x  Auto Lymphocyte # : x  Auto Monocyte # : x  Auto Eosinophil # : x  Auto Basophil # : x  Auto Neutrophil % : x  Auto Lymphocyte % : x  Auto Monocyte % : x  Auto Eosinophil % : x  Auto Basophil % : x    11-20    143  |  99  |  45<H>  ----------------------------<  159<H>  3.9   |  32<H>  |  1.50<H>    Ca    9.3      20 Nov 2017 07:05  Mg     2.6     11-20    TPro  x   /  Alb  4.1  /  TBili  x   /  DBili  x   /  AST  x   /  ALT  x   /  AlkPhos  x   11-19    PT/INR - ( 20 Nov 2017 07:05 )   PT: 16.2 sec;   INR: 1.45          PTT - ( 20 Nov 2017 07:05 )  PTT:39.6 sec                  RADIOLOGY & ADDITIONAL STUDIES (The following images were personally reviewed):

## 2017-11-20 NOTE — PROGRESS NOTE ADULT - PROBLEM SELECTOR PLAN 1
+ anasarca on exam, BNP 6465, CE 0.02 -> 0.03 ->0.02, likely elevated in setting of CHF. Moderate Right pleural effusion noted on CT Abd/Pelvis 11/16  - Echo 11/17/17 revealed LV moderately dilated, severe segmental LV dysfunction with akinesis of inferior wall and hypokinesis of other myocardial segments, no LV thrombus seen, EF severely reduced 20%, RV mild to moderately dilated, RV systolic function mildly reduced, LA mildly dilated, moderate AS (ROCÍO 1.1), trace MR, trace to mild TR, mod pulmHTN with PAP 58mmHg, IVC dilated consistent with elevated RAP  - CHF Team consulted. Continue Lasix 120mg IV BID started 11/16 and transitioned to PO today 11/20, lisinopril 10mg QD, coreg 3.125mg BID titrated up to 6.25mg BID  - EP team re-consulted regarding possible ICD placement, patient is not interested and EP has signed off at this time  - Continue Strict I/Os, Daily Weights.   - Consider Ischemic Eval when more euvolemic. Pt is very noncompliant + anasarca on exam, BNP 6465, CE 0.02 -> 0.03 ->0.02, likely elevated in setting of CHF. Moderate Right pleural effusion noted on CT Abd/Pelvis 11/16  - Echo 11/17/17 revealed LV moderately dilated, severe segmental LV dysfunction with akinesis of inferior wall and hypokinesis of other myocardial segments, no LV thrombus seen, EF severely reduced 20%, RV mild to moderately dilated, RV systolic function mildly reduced, LA mildly dilated, moderate AS (ROCÍO 1.1), trace MR, trace to mild TR, mod pulmHTN with PAP 58mmHg, IVC dilated consistent with elevated RAP  - CHF Team consulted. Continue Lasix 120mg IV BID started 11/16, lisinopril 10mg QD switched to 5mg QD, continue coreg 3.125mg BID, start hydralazine 10mg TID today and consider adding imdur tomorrow if BP will tolerate   - EP team re-consulted regarding possible ICD placement, patient is not interested and EP has signed off at this time  - Continue Strict I/Os, Daily Weights.   - Consider Ischemic Eval when more euvolemic. Pt is very noncompliant

## 2017-11-20 NOTE — PROGRESS NOTE ADULT - PROBLEM SELECTOR PLAN 3
Hx of PE diagnosed early in 2017.    - Recent CT PE protocol 5/2017 negative for PE.  - LE Duplex 11/16: negative for DVT  - Continue eliquis 5mg BID

## 2017-11-20 NOTE — CONSULT NOTE ADULT - ATTENDING COMMENTS
67yrs CAD. LVEF 22. s/p CABG 3Vbypass 2015. D, HTN, lipids. On eloquist for PE MArch this year.   Multiple hospitalizations for ADHF primarily driven by non compliance.   Most recently at [facility], left AMA.   Outpatient MEds: coreg 3.125 bid, Lasix 80 bid, Lisinopril 5,   Presented 11.16 with LE edema, ascites, SOB, decreased ex tolerance. orthopnea. mild troponin leak on presentation.   During hospitalization: LAsix 120 IV bid, Cumulative balance -11L. 6 kg weight loss by weights. Lisinopril increased to 10 for BP control.  Team has not recommended angiogram because of consistent non compliance and limitations of interventional options related to that.   TTE: IVC dilation. LA 38cc/m2 LVEDD 6, segmental (inf wall akinetic) LVEF 20, trace MR, RV mild reduced. trace TR, RVSP 58mmHg.  134/75 62    EKG: SR 1AVB. poor RWP. Non specific t wave inversion lat leads  CXR (11/15) mod right pleural effusion   14.8   6.4   )-----------( 243      ( 20 Nov 2017 07:05 )             47.6     11-20    143  |  99  |  45<H>  ----------------------------<  159<H>  3.9   |  32<H>  |  1.50<H> 67yrs CAD. LVEF 20. s/p CABG 3Vbypass 2015. D, HTN, lipids. On eloquist for PE MArch this year.   Multiple hospitalizations for ADHF primarily driven by non compliance.   Most recently at [facility], left AMA.   Outpatient MEds: coreg 3.125 bid, Lasix 80 bid, Lisinopril 5,   Presented 11.16 with LE edema, ascites, SOB, decreased ex tolerance. orthopnea. mild troponin leak on presentation.   During hospitalization: LAsix 120 IV bid, Cumulative balance -11L. 6 kg weight loss by weights. Lisinopril increased to 10 for BP control.  Team has not recommended angiogram because of consistent non compliance and limitations of interventional options related to that.   TTE: IVC dilation. LA 38cc/m2 LVEDD 6, segmental (inf wall akinetic) LVEF 20, trace MR, RV mild reduced. trace TR, RVSP 58mmHg.  134/75 62 JVP not well seen  decreased AE right base. quiet HS  distended abdomen, shifting dullness.  bilat + edema with venous stasis skin changes  EKG: SR 1AVB. poor RWP. Non specific t wave inversion lat leads  CXR (11/15) mod right pleural effusion   14.8   6.4   )-----------( 243      ( 20 Nov 2017 07:05 )             47.6     11-20    143  |  99  |  45<H>  ----------------------------<  159<H>  3.9   |  32<H>  |  1.50<H>  67 yrs ischemic cardiomyopathy, recurrent admission for decompensated HF. questionable compliance. Discussed his deteriorating condition at length. Discussed need for ICD.  suggest: continue IV lasix at current dose. favor paracentesis which may fascilitate response to diuretics. decrease lisinopril to 5 QD, initiate HDZN/nitrates with rapid uptitration as tolerated.   Should be seen in HF clinic with 7-10 days of discharge.  Gurdeep Valdes

## 2017-11-20 NOTE — PROVIDER CONTACT NOTE (MEDICATION) - BACKGROUND
Pt being diuresed. PA pulling sheath and RN called cath lab, was told PA would inform me later which type of Lasix would be given.

## 2017-11-21 ENCOUNTER — TRANSCRIPTION ENCOUNTER (OUTPATIENT)
Age: 67
End: 2017-11-21

## 2017-11-21 LAB
ANION GAP SERPL CALC-SCNC: 16 MMOL/L — SIGNIFICANT CHANGE UP (ref 5–17)
BUN SERPL-MCNC: 43 MG/DL — HIGH (ref 7–23)
CALCIUM SERPL-MCNC: 9.3 MG/DL — SIGNIFICANT CHANGE UP (ref 8.4–10.5)
CHLORIDE SERPL-SCNC: 100 MMOL/L — SIGNIFICANT CHANGE UP (ref 96–108)
CO2 SERPL-SCNC: 26 MMOL/L — SIGNIFICANT CHANGE UP (ref 22–31)
CREAT SERPL-MCNC: 1.36 MG/DL — HIGH (ref 0.5–1.3)
GLUCOSE BLDC GLUCOMTR-MCNC: 109 MG/DL — HIGH (ref 70–99)
GLUCOSE BLDC GLUCOMTR-MCNC: 113 MG/DL — HIGH (ref 70–99)
GLUCOSE BLDC GLUCOMTR-MCNC: 124 MG/DL — HIGH (ref 70–99)
GLUCOSE BLDC GLUCOMTR-MCNC: 128 MG/DL — HIGH (ref 70–99)
GLUCOSE SERPL-MCNC: 143 MG/DL — HIGH (ref 70–99)
HCT VFR BLD CALC: 48.6 % — SIGNIFICANT CHANGE UP (ref 39–50)
HGB BLD-MCNC: 15.8 G/DL — SIGNIFICANT CHANGE UP (ref 13–17)
MAGNESIUM SERPL-MCNC: 2.6 MG/DL — SIGNIFICANT CHANGE UP (ref 1.6–2.6)
MCHC RBC-ENTMCNC: 27.7 PG — SIGNIFICANT CHANGE UP (ref 27–34)
MCHC RBC-ENTMCNC: 32.5 G/DL — SIGNIFICANT CHANGE UP (ref 32–36)
MCV RBC AUTO: 85.1 FL — SIGNIFICANT CHANGE UP (ref 80–100)
PLATELET # BLD AUTO: 252 K/UL — SIGNIFICANT CHANGE UP (ref 150–400)
POTASSIUM SERPL-MCNC: 3.8 MMOL/L — SIGNIFICANT CHANGE UP (ref 3.5–5.3)
POTASSIUM SERPL-SCNC: 3.8 MMOL/L — SIGNIFICANT CHANGE UP (ref 3.5–5.3)
RBC # BLD: 5.71 M/UL — SIGNIFICANT CHANGE UP (ref 4.2–5.8)
RBC # FLD: 15.8 % — SIGNIFICANT CHANGE UP (ref 10.3–16.9)
SODIUM SERPL-SCNC: 142 MMOL/L — SIGNIFICANT CHANGE UP (ref 135–145)
WBC # BLD: 7.7 K/UL — SIGNIFICANT CHANGE UP (ref 3.8–10.5)
WBC # FLD AUTO: 7.7 K/UL — SIGNIFICANT CHANGE UP (ref 3.8–10.5)

## 2017-11-21 PROCEDURE — 99233 SBSQ HOSP IP/OBS HIGH 50: CPT

## 2017-11-21 RX ORDER — BACITRACIN ZINC 500 UNIT/G
1 OINTMENT IN PACKET (EA) TOPICAL
Qty: 0 | Refills: 0 | COMMUNITY

## 2017-11-21 RX ORDER — HYDRALAZINE HCL 50 MG
1 TABLET ORAL
Qty: 90 | Refills: 0
Start: 2017-11-21 | End: 2017-12-21

## 2017-11-21 RX ORDER — CARVEDILOL PHOSPHATE 80 MG/1
1 CAPSULE, EXTENDED RELEASE ORAL
Qty: 0 | Refills: 0 | COMMUNITY

## 2017-11-21 RX ORDER — ATORVASTATIN CALCIUM 80 MG/1
1 TABLET, FILM COATED ORAL
Qty: 30 | Refills: 0
Start: 2017-11-21 | End: 2017-12-21

## 2017-11-21 RX ORDER — LISINOPRIL 2.5 MG/1
1 TABLET ORAL
Qty: 0 | Refills: 0 | COMMUNITY

## 2017-11-21 RX ORDER — FUROSEMIDE 40 MG
1 TABLET ORAL
Qty: 60 | Refills: 2
Start: 2017-11-21 | End: 2018-02-18

## 2017-11-21 RX ORDER — MUPIROCIN 20 MG/G
1 OINTMENT TOPICAL
Qty: 0 | Refills: 0 | COMMUNITY

## 2017-11-21 RX ORDER — ATORVASTATIN CALCIUM 80 MG/1
1 TABLET, FILM COATED ORAL
Qty: 0 | Refills: 0 | COMMUNITY

## 2017-11-21 RX ORDER — SIMETHICONE 80 MG/1
0 TABLET, CHEWABLE ORAL
Qty: 0 | Refills: 0 | COMMUNITY

## 2017-11-21 RX ORDER — FUROSEMIDE 40 MG
1 TABLET ORAL
Qty: 0 | Refills: 0 | COMMUNITY

## 2017-11-21 RX ORDER — CARVEDILOL PHOSPHATE 80 MG/1
1 CAPSULE, EXTENDED RELEASE ORAL
Qty: 60 | Refills: 0
Start: 2017-11-21 | End: 2017-12-21

## 2017-11-21 RX ORDER — LISINOPRIL 2.5 MG/1
1 TABLET ORAL
Qty: 30 | Refills: 2
Start: 2017-11-21 | End: 2018-02-18

## 2017-11-21 RX ORDER — FUROSEMIDE 40 MG
80 TABLET ORAL
Qty: 0 | Refills: 0 | Status: DISCONTINUED | OUTPATIENT
Start: 2017-11-21 | End: 2017-11-25

## 2017-11-21 RX ADMIN — Medication 1 APPLICATION(S): at 17:19

## 2017-11-21 RX ADMIN — Medication 10 MILLIGRAM(S): at 14:11

## 2017-11-21 RX ADMIN — LISINOPRIL 5 MILLIGRAM(S): 2.5 TABLET ORAL at 05:12

## 2017-11-21 RX ADMIN — Medication 120 MILLIGRAM(S): at 05:12

## 2017-11-21 RX ADMIN — SENNA PLUS 2 TABLET(S): 8.6 TABLET ORAL at 22:06

## 2017-11-21 RX ADMIN — MUPIROCIN 1 APPLICATION(S): 20 OINTMENT TOPICAL at 22:07

## 2017-11-21 RX ADMIN — Medication 100 MILLIGRAM(S): at 05:12

## 2017-11-21 RX ADMIN — ATORVASTATIN CALCIUM 40 MILLIGRAM(S): 80 TABLET, FILM COATED ORAL at 22:06

## 2017-11-21 RX ADMIN — Medication 80 MILLIGRAM(S): at 17:19

## 2017-11-21 RX ADMIN — MUPIROCIN 1 APPLICATION(S): 20 OINTMENT TOPICAL at 05:15

## 2017-11-21 RX ADMIN — APIXABAN 5 MILLIGRAM(S): 2.5 TABLET, FILM COATED ORAL at 17:19

## 2017-11-21 RX ADMIN — SIMETHICONE 80 MILLIGRAM(S): 80 TABLET, CHEWABLE ORAL at 05:12

## 2017-11-21 RX ADMIN — SIMETHICONE 80 MILLIGRAM(S): 80 TABLET, CHEWABLE ORAL at 22:06

## 2017-11-21 RX ADMIN — MUPIROCIN 1 APPLICATION(S): 20 OINTMENT TOPICAL at 16:14

## 2017-11-21 RX ADMIN — Medication 1 APPLICATION(S): at 11:56

## 2017-11-21 RX ADMIN — POLYETHYLENE GLYCOL 3350 17 GRAM(S): 17 POWDER, FOR SOLUTION ORAL at 11:56

## 2017-11-21 RX ADMIN — Medication 100 MILLIGRAM(S): at 14:12

## 2017-11-21 RX ADMIN — CARVEDILOL PHOSPHATE 3.12 MILLIGRAM(S): 80 CAPSULE, EXTENDED RELEASE ORAL at 17:19

## 2017-11-21 RX ADMIN — Medication 100 MILLIGRAM(S): at 22:06

## 2017-11-21 RX ADMIN — PANTOPRAZOLE SODIUM 40 MILLIGRAM(S): 20 TABLET, DELAYED RELEASE ORAL at 07:22

## 2017-11-21 RX ADMIN — CARVEDILOL PHOSPHATE 3.12 MILLIGRAM(S): 80 CAPSULE, EXTENDED RELEASE ORAL at 05:12

## 2017-11-21 RX ADMIN — SIMETHICONE 80 MILLIGRAM(S): 80 TABLET, CHEWABLE ORAL at 14:11

## 2017-11-21 RX ADMIN — Medication 81 MILLIGRAM(S): at 11:56

## 2017-11-21 RX ADMIN — APIXABAN 5 MILLIGRAM(S): 2.5 TABLET, FILM COATED ORAL at 05:12

## 2017-11-21 NOTE — PROGRESS NOTE ADULT - SUBJECTIVE AND OBJECTIVE BOX
68 y/o male, former smoker, noncompliant with follow up, with PMHx of HTN, hyperlipidemia, NIDDM, CAD s/p 3VCABG in 2015 (at [facility] in [place], [place]), chronic systolic CHF , Echo on 5/25/17 showed Echo shows EF 20-25% with moderate AR.   st recorded EF 20 pulmonary embolism (on eliquis), presents to [facility] with worsening Dyspnea on exertion as well as rest for past  few weeks and progressively worsening. He also admits to increased abdominal bloating and le edema swelling.  He denies chest pain.  diaphoresis, palpitations, N/V, dizziness, LOC, fever/chills. Pt says he was recently at [facility] and left because he didnt like how he was treated. Patient can not tell me who his primary cardiologist is. Currently his BNP is > 6000, troponin 0.02. EKG SR with long  first degree AVB, Late transition, tiny inferior q's and inverted T waves in V5-V6.       	  MEDICATIONS:  carvedilol 3.125 milliGRAM(s) Oral every 12 hours  furosemide    Tablet 80 milliGRAM(s) Oral two times a day  hydrALAZINE 10 milliGRAM(s) Oral three times a day  lisinopril 5 milliGRAM(s) Oral daily          docusate sodium 100 milliGRAM(s) Oral three times a day  pantoprazole    Tablet 40 milliGRAM(s) Oral before breakfast  polyethylene glycol 3350 17 Gram(s) Oral daily  senna 2 Tablet(s) Oral at bedtime  simethicone 80 milliGRAM(s) Chew three times a day    atorvastatin 40 milliGRAM(s) Oral at bedtime  dextrose 50% Injectable 12.5 Gram(s) IV Push once  dextrose 50% Injectable 25 Gram(s) IV Push once  dextrose 50% Injectable 25 Gram(s) IV Push once  dextrose Gel 1 Dose(s) Oral once PRN  glucagon  Injectable 1 milliGRAM(s) IntraMuscular once PRN  insulin lispro (HumaLOG) corrective regimen sliding scale   SubCutaneous Before meals and at bedtime    apixaban 5 milliGRAM(s) Oral every 12 hours  aspirin enteric coated 81 milliGRAM(s) Oral daily  BACItracin   Ointment 1 Application(s) Topical four times a day  dextrose 5%. 1000 milliLiter(s) IV Continuous <Continuous>  mupirocin 2% Ointment 1 Application(s) Topical three times a day      Complaint:     ECG:      CHEST X RAY    CT    MRI    MRA    CT ANGIO    CAROTID DUPLEX    DUPLEX     Echocardiogram    Catheterization:    Stress Test:     LABS:	 	  CARDIAC MARKERS:                              15.8   7.7   )-----------( 252      ( 21 Nov 2017 11:58 )             48.6     11-21    142  |  100  |  43<H>  ----------------------------<  143<H>  3.8   |  26  |  1.36<H>    Ca    9.3      21 Nov 2017 11:58  Mg     2.6     11-21            ASSESSMENT/PLAN: 	  68 y/o M, former smoker, noncompliant with follow up, frequent visits to multiple hospitals, with PMHx of HTN, hyperlipidemia, NIDDM, CAD s/p 3VCABG in 2015 (at [facility] in [place], [place]), chronic systolic CHF (Echo on 5/25/17 EF 20-25%), Hx of pulmonary embolism (on eliquis), presents to [facility] with worsening Dyspnea on exertion as well as at rest for past few weeks and progressively worsening. He also admits to increased abdominal bloating and LE edema/swelling.  He denies chest pain, diaphoresis, palpitations, N/V, dizziness, LOC, fever/chills. Pt says he was recently at [facility] and left because he didn’t like how he was treated. Patient cannot tell me who his primary cardiologist is. Currently his BNP is > 6000, troponin 0.02. EKG SR with long first degree AVB, Late transition, tiny inferior q's and inverted T waves in V5-V6. Patient admitted to [facility]. Patient was found to have +anasarca, BNP: 6465, CE 0.02 à 0.03 à0.02, likely elevated in the setting of CHF. A moderate right pleural effusion was noted on CT of the abdomen and pelvis on 11/16. Echo on 11/17 revealed LV moderately dilated, severe segmental LV dysfunction with akinesis of inferior wall and hypokinesis of other myocardial segments, no LV thrombus seen, EF severely reduced to 20%, RV mild to moderately dilated, RV systolic function mildly reduced, LA mildly dilated, moderate AS (ROCÍO 1.1), trace MR, trace to mild TR, moderate pulmHTN with PAP 58mmHg, IVC dilated consistent with RAP. CHF team was consulted. Patient was continued on Lasix 120mg IV BID started 11/16, Lisinopril 10mg QD switched to 5mg QD, coreg 3.125mg BID. Hydralazine 10mg TID was started and Imdur was considered depending on if blood pressure could tolerate it. EP team was re-consulted regarding possible ICD placement. Patient was not interested in an ICD and EP signed off. Strict I/Os and daily weights were continued. Ischemic evaluation would be considered when the patient becomes euvolemic, however, the patient is very noncompliant and intervention may not be appropriate. Dr. Amado from GI was consulted regarding the patient’s ascites. On exam, the patient’s abdomen was distended and tender to palpation. Ascites is likely 2/2 CHF exacerbation. AST/ALT and albumin levels wnl. CT of the abdomen and pelvis on 11/16 revealed few mildly distended loops of bowel without evidence of defined transition point to suggest obstruction, moderate ascites, and moderate right pleural effusion. Abdominal paracentesis would be high risk because the abdomen is not tense at this time as per Dr. Amado. Continue to closely monitor. Patient was diagnosed with a pulmonary embolism in early 2017 and recent CT PE 5/2017 was negative. LE duplex was performed on 11/16 and was negative for DVT. Patient was continued on Eliquis 5mg BID for AC. SBP was 130s-150s so the patient was continued on Lisinopril 10mg QD, coreg 6.25mg BID and Lasix 120mg PO BID. Lipitor 40mg QHS was continued for dyslipidemia. Hgb A1c was 6.7. Home glipizide was held, ISS and FS continued. Patient complained of throat pain. On exam, the throat was mild erythematous without exudates, + lymph nodes, no WBC and afebrile. A throat culture was sent and RVP was negative. Patient has venous stasis. On exam LE brawny and discolored b/l likely 2/2 venous stasis and leg swelling. WBC wnl and afebrile.  Patient received Vanco x 1 in ED. Wound care following patient and abx were not continued. Patient c/o dizziness so Dr. Dai was following. Dr. Dai recommended carotid duplex which the patient was agreeable to go for and CT head which the patient refused and he cleared pt for d/c home. SW and PT were consulted for home care needs. Patient will be sent home on Eliquis for DVT prophylaxis. Patient was seen this morning 11/21 and refused labs and telemetry. Patient was seen by Dr. Ibarra and cleared for discharge home. Currently still refusing treatment and was given and signed 24 hour notice .     68 y/o M, former smoker, noncompliant with follow up, frequent visits to multiple hospitals, with PMHx of HTN, hyperlipidemia, NIDDM, CAD s/p 3VCABG in 2015 (at [facility] in [place], [place]), chronic systolic CHF (Echo on 5/25/17 EF 20-25%), Hx of pulmonary embolism (on eliquis), presents to [facility] with worsening Dyspnea on exertion as well as at rest for past few weeks and progressively worsening. He also admits to increased abdominal bloating and LE edema/swelling.  He denies chest pain, diaphoresis, palpitations, N/V, dizziness, LOC, fever/chills. Pt says he was recently at [facility] and left because he didn’t like how he was treated. Patient cannot tell me who his primary cardiologist is. Currently his BNP is > 6000, troponin 0.02. EKG SR with long first degree AVB, Late transition, tiny inferior q's and inverted T waves in V5-V6. Patient admitted to [facility]. Patient was found to have +anasarca, BNP: 6465, CE 0.02 à 0.03 à0.02, likely elevated in the setting of CHF. A moderate right pleural effusion was noted on CT of the abdomen and pelvis on 11/16. Echo on 11/17 revealed LV moderately dilated, severe segmental LV dysfunction with akinesis of inferior wall and hypokinesis of other myocardial segments, no LV thrombus seen, EF severely reduced to 20%, RV mild to moderately dilated, RV systolic function mildly reduced, LA mildly dilated, moderate AS (ROCÍO 1.1), trace MR, trace to mild TR, moderate pulmHTN with PAP 58mmHg, IVC dilated consistent with RAP. CHF team was consulted. Patient was continued on Lasix 120mg IV BID started 11/16, Lisinopril 10mg QD switched to 5mg QD, coreg 3.125mg BID. Hydralazine 10mg TID was started and Imdur was considered depending on if blood pressure could tolerate it. EP team was re-consulted regarding possible ICD placement. Patient was not interested in an ICD and EP signed off. Strict I/Os and daily weights were continued. Ischemic evaluation would be considered when the patient becomes euvolemic, however, the patient is very noncompliant and intervention may not be appropriate. Dr. Amado from GI was consulted regarding the patient’s ascites. On exam, the patient’s abdomen was distended and tender to palpation. Ascites is likely 2/2 CHF exacerbation. AST/ALT and albumin levels wnl. CT of the abdomen and pelvis on 11/16 revealed few mildly distended loops of bowel without evidence of defined transition point to suggest obstruction, moderate ascites, and moderate right pleural effusion. Abdominal paracentesis would be high risk because the abdomen is not tense at this time as per Dr. Amado. Continue to closely monitor. Patient was diagnosed with a pulmonary embolism in early 2017 and recent CT PE 5/2017 was negative. LE duplex was performed on 11/16 and was negative for DVT. Patient was continued on Eliquis 5mg BID for AC. SBP was 130s-150s so the patient was continued on Lisinopril 10mg QD, coreg 6.25mg BID and Lasix 120mg PO BID. Lipitor 40mg QHS was continued for dyslipidemia. Hgb A1c was 6.7. Home glipizide was held, ISS and FS continued. Patient complained of throat pain. On exam, the throat was mild erythematous without exudates, + lymph nodes, no WBC and afebrile. A throat culture was sent and RVP was negative. Patient has venous stasis. On exam LE brawny and discolored b/l likely 2/2 venous stasis and leg swelling. WBC wnl and afebrile.  Patient received Vanco x 1 in ED. Wound care following patient and abx were not continued. Patient c/o dizziness so Dr. Dai was following. Dr. Dai recommended carotid duplex which the patient was agreeable to go for and CT head which the patient refused and he cleared pt for d/c home. SW and PT were consulted for home care needs. Patient will be sent home on Eliquis for DVT prophylaxis. Patient was seen this morning 11/21 and refused labs and telemetry. Patient was seen by Dr. Ibarra and cleared for discharge home. Currently still refusing treatment and was given 24 hour notice at _______.    Temp: 96.6; HR 65bpm; BP: 154/99; RR: 16; O2 Sat 96% RA.

## 2017-11-21 NOTE — PROVIDER CONTACT NOTE (OTHER) - RECOMMENDATIONS
None
PA was notified
Pt is refusing telemetry. Pt states he  "doesn't like stickers in his chest:". Education is provided but pt refuses anyway.
will notify provider, will reeducate pt
SLOAN Baxter requested at bedside to assess patient.

## 2017-11-21 NOTE — PROVIDER CONTACT NOTE (OTHER) - ACTION/TREATMENT ORDERED:
PA was notified PAs were notified of pt. experiencing symptoms, no intervention at this time. Continue to monitor pt closely.

## 2017-11-21 NOTE — PROVIDER CONTACT NOTE (OTHER) - SITUATION
pt. admitted for CHF, abdominal pain, ascites pt. complaining of headache, light headedness and "not feeling good"

## 2017-11-21 NOTE — PROVIDER CONTACT NOTE (OTHER) - SITUATION
pt. admitted for CHF, abdominal pain, ascites. EF of 20%, pt. insisting on showering against medical advice

## 2017-11-21 NOTE — DISCHARGE NOTE ADULT - PATIENT PORTAL LINK FT
“You can access the Follow[facility] Patient Portal, offered by [facility], by registering with the following website: [email or web address]”

## 2017-11-21 NOTE — DISCHARGE NOTE ADULT - CARE PROVIDER_API CALL
Dong Ibarra), Internal Medicine  [address] [id number]  Phone: [phone]  Fax: [phone]    Gail Ames (NP; RN), NP in Family Health  [address] [address]  Phone: [phone]  Fax: [phone]    Gordon Amado), Medicine  [address] [address]  Phone: [phone]  Fax: [phone]    Ronnie Dai), Electrodiagnostic Medicine; Neurology  [address] [address]  Phone: [phone]  Fax: [phone]

## 2017-11-21 NOTE — PROGRESS NOTE ADULT - SUBJECTIVE AND OBJECTIVE BOX
Neurology Follow up note    Name  LIDIA PÉREZ    HPI:  68 y/o male, former smoker, noncompliant with follow up, with PMHx of HTN, hyperlipidemia, NIDDM, CAD s/p 3VCABG in 2015 (at [facility] in [place], [place]), chronic systolic CHF , Echo on 5/25/17 showed Echo shows EF 20-25% with moderate AR.   st recorded EF 20 pulmonary embolism (on eliquis), presents to [facility] with worsening Dyspnea on exertion as well as rest for past  few weeks and progressively worsening. He also admits to increased abdominal bloating and le edema swelling.  He denies chest pain.  diaphoresis, palpitations, N/V, dizziness, LOC, fever/chills. Pt says he was recently at [facility] and left because he didnt like how he was treated. Patient can not tell me who his primary cardiologist is. Currently his BNP is > 6000, troponin 0.02. EKG SR with long  first degree AVB, Late transition, tiny inferior q's and inverted T waves in V5-V6. Patient being admitted to [facility]. (16 Nov 2017 01:01)      Interval History - no new neuro symptoms- no headache OR DIZZINESS         REVIEW OF SYSTEMS    Vital Signs Last 24 Hrs  T(C): 36.1 (21 Nov 2017 06:38), Max: 36.1 (20 Nov 2017 09:19)  T(F): 96.9 (21 Nov 2017 06:38), Max: 97 (20 Nov 2017 09:19)  HR: 70 (21 Nov 2017 05:09) (60 - 70)  BP: 124/77 (21 Nov 2017 05:09) (123/71 - 160/87)  BP(mean): --  RR: 16 (21 Nov 2017 05:09) (16 - 18)  SpO2: 96% (21 Nov 2017 05:09) (95% - 98%)    Physical Exam-     Mental Status-awake and alert    Cranial Nerves-full EOM    Gait and station-n/a    Motor-moves all 4 extremities    Reflexes-decreased    Sensation-nl    Coordination-no tremors    Vascular -    Medications  apixaban 5 milliGRAM(s) Oral every 12 hours  aspirin enteric coated 81 milliGRAM(s) Oral daily  atorvastatin 40 milliGRAM(s) Oral at bedtime  BACItracin   Ointment 1 Application(s) Topical four times a day  carvedilol 3.125 milliGRAM(s) Oral every 12 hours  dextrose 5%. 1000 milliLiter(s) IV Continuous <Continuous>  dextrose 50% Injectable 12.5 Gram(s) IV Push once  dextrose 50% Injectable 25 Gram(s) IV Push once  dextrose 50% Injectable 25 Gram(s) IV Push once  dextrose Gel 1 Dose(s) Oral once PRN  docusate sodium 100 milliGRAM(s) Oral three times a day  furosemide   Injectable 120 milliGRAM(s) IV Push two times a day  glucagon  Injectable 1 milliGRAM(s) IntraMuscular once PRN  hydrALAZINE 10 milliGRAM(s) Oral three times a day  insulin lispro (HumaLOG) corrective regimen sliding scale   SubCutaneous Before meals and at bedtime  lisinopril 5 milliGRAM(s) Oral daily  mupirocin 2% Ointment 1 Application(s) Topical three times a day  pantoprazole    Tablet 40 milliGRAM(s) Oral before breakfast  polyethylene glycol 3350 17 Gram(s) Oral daily  senna 2 Tablet(s) Oral at bedtime  simethicone 80 milliGRAM(s) Chew three times a day      Lab      Radiology    Assessment- Dizziness - no focal neuro dedicit    Plan- as per prior consult   CT head, doppler

## 2017-11-21 NOTE — DISCHARGE NOTE ADULT - CARE PLAN
Principal Discharge DX:	Acute on chronic systolic CHF (congestive heart failure)  Goal:	You have heart failure and need to continue taking Lasix 80mg twice a day.  Instructions for follow-up, activity and diet:	You have a history of heart failure and should continue Lasix 80mg every 12 hours.  You should weigh yourself daily and if you notice a weight gain of more than 2-3 pounds in 2 days, shortness of breath, or lower extremity swelling you should contact your doctor immediately. Please restrict your fluid intake to 1-2L daily. Follow up with Dr. Ibarra in 1-2 weeks as well in the heart failure clinic with Gail Ames on Monday 11/27/17 at 11am (located at [address] [place] [address] on ground floor).  Secondary Diagnosis:	Ascites  Goal:	Your abdomen was distended but is improving with the water pill. You had a CT scan of your abdomen and pelvis with no acute disease.  Instructions for follow-up, activity and diet:	Follow up with Dr. Amado in 1-2 weeks.  Secondary Diagnosis:	Pulmonary embolism  Goal:	Continue your Eliquis 5mg two times a day for your history of pulmonary embolism  Instructions for follow-up, activity and diet:	Follow up with Dr. Ibarra in 1-2 weeks.  Secondary Diagnosis:	Hypertension  Goal:	Continue taking Lisinopril 5mg daily, Coreg 3.125mg every 12 hours, Hydralazine 10mg three times a day for your blood pressure.  Instructions for follow-up, activity and diet:	Follow up with Dr. Ibarra in 1-2 weeks.  Secondary Diagnosis:	Dyslipidemia  Goal:	Continue taking Lipitor 40mg daily at bedtime for your cholesterol  Instructions for follow-up, activity and diet:	Follow up with Dr. Ibarra in 1-2 weeks.  Secondary Diagnosis:	DM (diabetes mellitus)  Goal:	Continue your home Glipizide as prescribed and monitor your blood sugar.  Secondary Diagnosis:	Dizziness  Goal:	You had dizziness and Dr. Dai saw you and you should follow up with him as an outpatient

## 2017-11-21 NOTE — DISCHARGE NOTE ADULT - PLAN OF CARE
You have heart failure and need to continue taking Lasix 80mg twice a day. You have a history of heart failure and should continue Lasix 80mg every 12 hours.  You should weigh yourself daily and if you notice a weight gain of more than 2-3 pounds in 2 days, shortness of breath, or lower extremity swelling you should contact your doctor immediately. Please restrict your fluid intake to 1-2L daily. Follow up with Dr. Ibarra in 1-2 weeks as well in the heart failure clinic with Gail Ames on Monday 11/27/17 at 11am (located at [address] [place] [address] on ground floor). Your abdomen was distended but is improving with the water pill. You had a CT scan of your abdomen and pelvis with no acute disease. Follow up with Dr. Amado in 1-2 weeks. Continue your Eliquis 5mg two times a day for your history of pulmonary embolism Follow up with Dr. Ibarra in 1-2 weeks. Continue taking Lisinopril 5mg daily, Coreg 3.125mg every 12 hours, Hydralazine 10mg three times a day for your blood pressure. Continue taking Lipitor 40mg daily at bedtime for your cholesterol Continue your home Glipizide as prescribed and monitor your blood sugar. You had dizziness and Dr. Dai saw you and you should follow up with him as an outpatient

## 2017-11-21 NOTE — DISCHARGE NOTE ADULT - HOSPITAL COURSE
66 y/o M, former smoker, noncompliant with follow up, frequent visits to multiple hospitals, with PMHx of HTN, hyperlipidemia, NIDDM, CAD s/p 3VCABG in 2015 (at [facility] in [place], [place]), chronic systolic CHF (Echo on 5/25/17 EF 20-25%), Hx of pulmonary embolism (on eliquis), presents to [facility] with worsening Dyspnea on exertion as well as at rest for past few weeks and progressively worsening. He also admits to increased abdominal bloating and LE edema/swelling.  He denies chest pain, diaphoresis, palpitations, N/V, dizziness, LOC, fever/chills. Pt says he was recently at [facility] and left because he didn’t like how he was treated. Patient cannot tell me who his primary cardiologist is. Currently his BNP is > 6000, troponin 0.02. EKG SR with long first degree AVB, Late transition, tiny inferior q's and inverted T waves in V5-V6. Patient admitted to [facility]. Patient was found to have +anasarca, BNP: 6465, CE 0.02 à 0.03 à0.02, likely elevated in the setting of CHF. A moderate right pleural effusion was noted on CT of the abdomen and pelvis on 11/16. Echo on 11/17 revealed LV moderately dilated, severe segmental LV dysfunction with akinesis of inferior wall and hypokinesis of other myocardial segments, no LV thrombus seen, EF severely reduced to 20%, RV mild to moderately dilated, RV systolic function mildly reduced, LA mildly dilated, moderate AS (ROCÍO 1.1), trace MR, trace to mild TR, moderate pulmHTN with PAP 58mmHg, IVC dilated consistent with RAP. CHF team was consulted. Patient was continued on Lasix 120mg IV BID started 11/16, Lisinopril 10mg QD switched to 5mg QD, coreg 3.125mg BID. Hydralazine 10mg TID was started and Imdur was considered depending on if blood pressure could tolerate it. EP team was re-consulted regarding possible ICD placement. Patient was not interested in an ICD and EP signed off. Strict I/Os and daily weights were continued. Ischemic evaluation would be considered when the patient becomes euvolemic, however, the patient is very noncompliant and intervention may not be appropriate. Dr. Amado from GI was consulted regarding the patient’s ascites. On exam, the patient’s abdomen was distended and tender to palpation. Ascites is likely 2/2 CHF exacerbation. AST/ALT and albumin levels wnl. CT of the abdomen and pelvis on 11/16 revealed few mildly distended loops of bowel without evidence of defined transition point to suggest obstruction, moderate ascites, and moderate right pleural effusion. Abdominal paracentesis would be high risk because the abdomen is not tense at this time as per Dr. Amado. Continue to closely monitor. Patient was diagnosed with a pulmonary embolism in early 2017 and recent CT PE 5/2017 was negative. LE duplex was performed on 11/16 and was negative for DVT. Patient was continued on Eliquis 5mg BID for AC. SBP was 130s-150s so the patient was continued on Lisinopril 10mg QD, coreg 6.25mg BID and Lasix 120mg PO BID. Lipitor 40mg QHS was continued for dyslipidemia. Hgb A1c was 6.7. Home glipizide was held, ISS and FS continued. Patient complained of throat pain. On exam, the throat was mild erythematous without exudates, + lymph nodes, no WBC and afebrile. A throat culture was sent and RVP was negative. Patient has venous stasis. On exam LE brawny and discolored b/l likely 2/2 venous stasis and leg swelling. WBC wnl and afebrile.  Patient received Vanco x 1 in ED. Wound care following patient and abx were not continued. Patient c/o dizziness so Dr. Dai was following. Dr. Dai recommended carotid duplex which the patient was agreeable to go for and CT head which the patient refused. SW and PT were consulted for home care needs. Patient will be sent home on Eliquis for DVT prophylaxis. Patient was seen this morning 11/21 and refused labs and telemetry. Patient was seen by Dr. Ibarra and cleared for discharge home. Currently still refusing treatment and was given 24 hour notice at _______.    Temp: 96.6; HR 65bpm; BP: 154/99; RR: 16; O2 Sat 96% RA. 66 y/o M, former smoker, noncompliant with follow up, frequent visits to multiple hospitals, with PMHx of HTN, hyperlipidemia, NIDDM, CAD s/p 3VCABG in 2015 (at [facility] in [place], [place]), chronic systolic CHF (Echo on 5/25/17 EF 20-25%), Hx of pulmonary embolism (on eliquis), presents to [facility] with worsening Dyspnea on exertion as well as at rest for past few weeks and progressively worsening. He also admits to increased abdominal bloating and LE edema/swelling.  He denies chest pain, diaphoresis, palpitations, N/V, dizziness, LOC, fever/chills. Pt says he was recently at [facility] and left because he didn’t like how he was treated. Patient cannot tell me who his primary cardiologist is. Currently his BNP is > 6000, troponin 0.02. EKG SR with long first degree AVB, Late transition, tiny inferior q's and inverted T waves in V5-V6. Patient admitted to [facility]. Patient was found to have +anasarca, BNP: 6465, CE 0.02 à 0.03 à0.02, likely elevated in the setting of CHF. A moderate right pleural effusion was noted on CT of the abdomen and pelvis on 11/16. Echo on 11/17 revealed LV moderately dilated, severe segmental LV dysfunction with akinesis of inferior wall and hypokinesis of other myocardial segments, no LV thrombus seen, EF severely reduced to 20%, RV mild to moderately dilated, RV systolic function mildly reduced, LA mildly dilated, moderate AS (ROCÍO 1.1), trace MR, trace to mild TR, moderate pulmHTN with PAP 58mmHg, IVC dilated consistent with RAP. CHF team was consulted. Patient was continued on Lasix 120mg IV BID started 11/16, Lisinopril 10mg QD switched to 5mg QD, coreg 3.125mg BID. Hydralazine 10mg TID was started and Imdur was considered depending on if blood pressure could tolerate it. EP team was re-consulted regarding possible ICD placement. Patient was not interested in an ICD and EP signed off. Strict I/Os and daily weights were continued. Ischemic evaluation would be considered when the patient becomes euvolemic, however, the patient is very noncompliant and intervention may not be appropriate. Dr. Amado from GI was consulted regarding the patient’s ascites. On exam, the patient’s abdomen was distended and tender to palpation. Ascites is likely 2/2 CHF exacerbation. AST/ALT and albumin levels wnl. CT of the abdomen and pelvis on 11/16 revealed few mildly distended loops of bowel without evidence of defined transition point to suggest obstruction, moderate ascites, and moderate right pleural effusion. Abdominal paracentesis would be high risk because the abdomen is not tense at this time as per Dr. Amado. Continue to closely monitor. Patient was diagnosed with a pulmonary embolism in early 2017 and recent CT PE 5/2017 was negative. LE duplex was performed on 11/16 and was negative for DVT. Patient was continued on Eliquis 5mg BID for AC. SBP was 130s-150s so the patient was continued on Lisinopril 10mg QD, coreg 6.25mg BID and Lasix 120mg PO BID. Lipitor 40mg QHS was continued for dyslipidemia. Hgb A1c was 6.7. Home glipizide was held, ISS and FS continued. Patient complained of throat pain. On exam, the throat was mild erythematous without exudates, + lymph nodes, no WBC and afebrile. A throat culture was sent and RVP was negative. Patient has venous stasis. On exam LE brawny and discolored b/l likely 2/2 venous stasis and leg swelling. WBC wnl and afebrile.  Patient received Vanco x 1 in ED. Wound care following patient and abx were not continued. Patient c/o dizziness so Dr. Dai was following. Dr. Dai recommended carotid duplex which the patient was agreeable to go for and CT head which the patient refused and he cleared pt for d/c home. SW and PT were consulted for home care needs. Patient will be sent home on Eliquis for DVT prophylaxis. Patient was seen this morning 11/21 and refused labs and telemetry. Patient was seen by Dr. Ibarra and cleared for discharge home. Currently still refusing treatment and was given 24 hour notice at _______.    Temp: 96.6; HR 65bpm; BP: 154/99; RR: 16; O2 Sat 96% RA. 68 y/o M, former smoker, noncompliant with follow up, frequent visits to multiple hospitals, with PMHx of HTN, hyperlipidemia, NIDDM, CAD s/p 3VCABG in 2015 (at [facility] in [place], [place]), chronic systolic CHF (Echo on 5/25/17 EF 20-25%), Hx of pulmonary embolism (on eliquis), presents to [facility] with worsening Dyspnea on exertion as well as at rest for past few weeks and progressively worsening. He also admits to increased abdominal bloating and LE edema/swelling.  He denies chest pain, diaphoresis, palpitations, N/V, dizziness, LOC, fever/chills. Pt says he was recently at [facility] and left because he didn’t like how he was treated. Patient cannot tell me who his primary cardiologist is. Currently his BNP is > 6000, troponin 0.02. EKG SR with long first degree AVB, Late transition, tiny inferior q's and inverted T waves in V5-V6. Patient admitted to [facility]. Patient was found to have +anasarca, BNP: 6465, CE 0.02 à 0.03 à0.02, likely elevated in the setting of CHF. A moderate right pleural effusion was noted on CT of the abdomen and pelvis on 11/16. Echo on 11/17 revealed LV moderately dilated, severe segmental LV dysfunction with akinesis of inferior wall and hypokinesis of other myocardial segments, no LV thrombus seen, EF severely reduced to 20%, RV mild to moderately dilated, RV systolic function mildly reduced, LA mildly dilated, moderate AS (ROCÍO 1.1), trace MR, trace to mild TR, moderate pulmHTN with PAP 58mmHg, IVC dilated consistent with RAP. CHF team was consulted. Patient was continued on Lasix 120mg IV BID started 11/16, Lisinopril 10mg QD switched to 5mg QD, coreg 3.125mg BID. Hydralazine 10mg TID was started and Imdur was considered depending on if blood pressure could tolerate it. EP team was re-consulted regarding possible ICD placement. Patient was not interested in an ICD and EP signed off. Strict I/Os and daily weights were continued. Ischemic evaluation would be considered when the patient becomes euvolemic, however, the patient is very noncompliant and intervention may not be appropriate. Dr. Amado from GI was consulted regarding the patient’s ascites. On exam, the patient’s abdomen was distended and tender to palpation. Ascites is likely 2/2 CHF exacerbation. AST/ALT and albumin levels wnl. CT of the abdomen and pelvis on 11/16 revealed few mildly distended loops of bowel without evidence of defined transition point to suggest obstruction, moderate ascites, and moderate right pleural effusion. Abdominal paracentesis would be high risk because the abdomen is not tense at this time as per Dr. Amado. Continue to closely monitor. Patient was diagnosed with a pulmonary embolism in early 2017 and recent CT PE 5/2017 was negative. LE duplex was performed on 11/16 and was negative for DVT. Patient was continued on Eliquis 5mg BID for AC. SBP was 130s-150s so the patient was continued on Lisinopril 10mg QD, coreg 6.25mg BID and Lasix 120mg PO BID. Lipitor 40mg QHS was continued for dyslipidemia. Hgb A1c was 6.7. Home glipizide was held, ISS and FS continued. Patient complained of throat pain. On exam, the throat was mild erythematous without exudates, + lymph nodes, no WBC and afebrile. A throat culture was sent and RVP was negative. Patient has venous stasis. On exam LE brawny and discolored b/l likely 2/2 venous stasis and leg swelling. WBC wnl and afebrile.  Patient received Vanco x 1 in ED. Wound care following patient and abx were not continued. Patient c/o dizziness so Dr. Dai was following. Dr. Dai recommended carotid duplex which the patient was agreeable to go for and CT head which the patient refused and he cleared pt for d/c home. SW and PT were consulted for home care needs. Patient will be sent home on Eliquis for DVT prophylaxis. Patient was seen this morning 11/21 and refused labs and telemetry. Patient was seen by Dr. Ibarra and cleared for discharge home. Currently still refusing treatment and was given and signed 24 hour notice on 11/21/17 at 1350. All prescriptions sent to pts o/p pharmacy.    Temp: 96.6; HR 65bpm; BP: 154/99; RR: 16; O2 Sat 96% RA. 66 y/o M, former smoker, noncompliant with follow up, frequent visits to multiple hospitals, with PMHx of HTN, hyperlipidemia, NIDDM, CAD s/p 3VCABG in 2015 (at [facility] in [place], [place]), chronic systolic CHF (Echo on 5/25/17 EF 20-25%), Hx of pulmonary embolism (on eliquis), presents to [facility] with worsening Dyspnea on exertion as well as at rest for past few weeks and progressively worsening. He also admits to increased abdominal bloating and LE edema/swelling.  He denies chest pain, diaphoresis, palpitations, N/V, dizziness, LOC, fever/chills. Pt says he was recently at [facility] and left because he didn’t like how he was treated. Patient cannot tell me who his primary cardiologist is. Currently his BNP is > 6000, troponin 0.02. EKG SR with long first degree AVB, Late transition, tiny inferior q's and inverted T waves in V5-V6. Patient admitted to [facility]. Patient was found to have +anasarca, BNP: 6465, CE 0.02 à 0.03 à0.02, likely elevated in the setting of CHF. A moderate right pleural effusion was noted on CT of the abdomen and pelvis on 11/16. Echo on 11/17 revealed LV moderately dilated, severe segmental LV dysfunction with akinesis of inferior wall and hypokinesis of other myocardial segments, no LV thrombus seen, EF severely reduced to 20%, RV mild to moderately dilated, RV systolic function mildly reduced, LA mildly dilated, moderate AS (ROCÍO 1.1), trace MR, trace to mild TR, moderate pulmHTN with PAP 58mmHg, IVC dilated consistent with RAP. CHF team was consulted. Patient was continued on Lasix 120mg IV BID started 11/16, Lisinopril 10mg QD switched to 5mg QD, coreg 3.125mg BID. Hydralazine 10mg TID was started and Imdur was considered depending on if blood pressure could tolerate it, but ultimately was not started. EP team was re-consulted regarding possible ICD placement. Patient was not interested in an ICD and EP signed off. Strict I/Os and daily weights were continued. Ischemic evaluation would be considered when the patient becomes euvolemic, however, the patient is very noncompliant and intervention may not be appropriate. Dr. Amado from GI was consulted regarding the patient’s ascites. On exam, the patient’s abdomen was distended and tender to palpation. Ascites is likely 2/2 CHF exacerbation. AST/ALT and albumin levels wnl. CT of the abdomen and pelvis on 11/16 revealed few mildly distended loops of bowel without evidence of defined transition point to suggest obstruction, moderate ascites, and moderate right pleural effusion. Abdominal paracentesis would be high risk because the abdomen is not tense at this time as per Dr. Amado. Patient was diagnosed with a pulmonary embolism in early 2017 and recent CT PE 5/2017 was negative. LE duplex was performed on 11/16 and was negative for DVT. Patient was continued on Eliquis 5mg BID for AC. SBP was 130s-150s so the patient was continued on Lisinopril 5mg QD, coreg 6.25mg BID and Lasix 120mg PO BID. Lipitor 40mg QHS was continued for dyslipidemia. Hgb A1c was 6.7. Patient may be discharged on home glipizide. Patient complained of throat pain. On exam, the throat was mild erythematous without exudates, + lymph nodes, no WBC and afebrile. A throat culture and RVP were negative. Patient has venous stasis. On exam LE brawny and discolored b/l likely 2/2 venous stasis and leg swelling. WBC wnl and afebrile.  Patient received Vanco x 1 in ED. Wound care following patient and abx were not continued. Patient c/o dizziness so Dr. Dai was following. Dr. Dai recommended carotid duplex which patient can have done as an outpatient and CT head which the patient refused and he cleared pt for d/c home. SW and PT were consulted for home care needs. Patient will be sent home on Eliquis for DVT prophylaxis. Patient was seen this morning 11/21 and refused labs and telemetry. Patient was seen by Dr. Ibarra and cleared for discharge home. Currently still refusing treatment and was given and signed 24 hour notice on 11/21/17 at 1350. All prescriptions sent to p[facility] o/p pharmacy.     Temp: 96.6; HR 65bpm; BP: 154/99; RR: 16; O2 Sat 96% RA. Patient is a 66yo M, former smoker, noncompliant with follow up, frequent visits to multiple hospitals, with PMHx of HTN, hyperlipidemia, NIDDM, CAD s/p 3VCABG in 2015 (at [facility] in [place], [place]), chronic systolic CHF (Echo on 5/25/17 EF 20-25%), Hx of pulmonary embolism (on eliquis), presents to [facility] with worsening Dyspnea on exertion as well as at rest for past few weeks and progressively worsening. He also admits to increased abdominal bloating and LE edema/swelling.  He denies chest pain, diaphoresis, palpitations, N/V, dizziness, LOC, fever/chills. Pt says he was recently at [facility] and left because he didn’t like how he was treated. Patient does not know who is cardiologist is. EKG SR with long first degree AVB, Late transition, tiny inferior q's and inverted T waves in V5-V6. Patient admitted to [facility]. Patient was found to have +anasarca, BNP: 6465, CE 0.02 à 0.03 à0.02, likely elevated in the setting of CHF. A moderate right pleural effusion was noted on CT of the abdomen and pelvis on 11/16. Echo on 11/17 revealed LV moderately dilated, severe segmental LV dysfunction with akinesis of inferior wall and hypokinesis of other myocardial segments, no LV thrombus seen, EF severely reduced to 20%, RV mild to moderately dilated, RV systolic function mildly reduced, LA mildly dilated, moderate AS (ROCÍO 1.1), trace MR, trace to mild TR, moderate pulmHTN with PAP 58mmHg, IVC dilated consistent with RAP. CHF team was consulted. Patient was continued on Lasix 120mg IV BID started 11/16, Lisinopril 10mg QD switched to 5mg QD, coreg 3.125mg BID. Hydralazine 10mg TID was started as was isordil 10mg TID per HF team recs. However, patient refusing these medications as he does not want to take any new medications. Patient has been extensively educated regarding their benefit in heart failure. EP team was re-consulted regarding possible ICD placement. Patient was not interested in an ICD and EP signed off. Strict I/Os and daily weights were continued. Patient may undergo ischemic eval as an outpatient if he is compliant with follow up. If patient is non-compliant, intervention would be contraindicated as it is unlikely patient would be compliant with DAPT. Dr. Amado from GI was consulted regarding the patient’s ascites. On exam, the patient’s abdomen was distended and tender to palpation, soft. Ascites is likely 2/2 CHF exacerbation. AST/ALT and albumin levels wnl. CT of the abdomen and pelvis on 11/16 revealed few mildly distended loops of bowel without evidence of defined transition point to suggest obstruction, moderate ascites, and moderate right pleural effusion. Abdominal paracentesis would be high risk because the abdomen is not tense at this time as per Dr. Amado. Patient was diagnosed with a pulmonary embolism in early 2017 and recent CT PE 5/2017 was negative. LE duplex was performed on 11/16 and was negative for DVT. Patient was continued on Eliquis 5mg BID for AC. SBP was 130s-150s so the patient was continued on Lisinopril 5mg QD, coreg 6.25mg BID, hydralazine 10mg TID, isordil 10mg TID and Lasix 120mg PO BID. Lipitor 40mg QHS was continued for dyslipidemia. Hgb A1c was 6.7. Patient may be discharged on home glipizide. Patient complained of throat pain. On exam, the throat was mild erythematous without exudates, + lymph nodes, no WBC and afebrile. A throat culture and RVP were negative. Patient has venous stasis. On exam LE brawny and discolored b/l likely 2/2 venous stasis and leg swelling. WBC wnl and afebrile.  Patient received Vanco x 1 in ED. Wound care following patient and abx were not continued. Patient c/o dizziness so Dr. Dai was following. Dr. Dai recommended carotid duplex which patient can have done as an outpatient and CT head which the patient refused and he cleared pt for d/c home. SW and PT were consulted for home care needs. Patient will be sent home on Eliquis for DVT prophylaxis. Patient was seen this AM and refused labs and telemetry. Of note, patient intermittently refused telemetry throughout admission. Patient was seen by Dr. Ibarra and cleared for discharge home. Patient is still refusing treatment options offered to optimize his heart failure. Discharge was given and signed 24 hour notice on 11/22/17 at 6PM. All prescriptions sent to pts o/p pharmacy.

## 2017-11-21 NOTE — DIETITIAN INITIAL EVALUATION ADULT. - ADHERENCE
poor/Pt seemingly non-compliant with meds and dietary advice even though pt states he monitors sodium and fluid intake at home

## 2017-11-21 NOTE — DISCHARGE NOTE ADULT - MEDICATION SUMMARY - MEDICATIONS TO TAKE
I will START or STAY ON the medications listed below when I get home from the hospital:    Ecotrin Adult Low Strength 81 mg oral delayed release tablet  -- 1 tab(s) by mouth once a day  -- Indication: For Acute on chronic systolic CHF (congestive heart failure)    lisinopril 5 mg oral tablet  -- 1 tab(s) by mouth once a day   -- Indication: For Hypertension    Eliquis 5 mg oral tablet  -- 1 tab(s) by mouth 2 times a day  -- Indication: For Pulmonary embolism    glipiZIDE 10 mg oral tablet, extended release  -- 1 tab(s) by mouth once a day  -- Indication: For DM (diabetes mellitus)    Lipitor 40 mg oral tablet  -- 1 tab(s) by mouth once a day  -- Indication: For Dyslipidemia    Coreg 3.125 mg oral tablet  -- 1 tab(s) by mouth 2 times a day  -- Indication: For Hypertension    Lasix 80 mg oral tablet  -- 1 tab(s) by mouth 2 times a day   -- Avoid prolonged or excessive exposure to direct and/or artificial sunlight while taking this medication.  It is very important that you take or use this exactly as directed.  Do not skip doses or discontinue unless directed by your doctor.  It may be advisable to drink a full glass orange juice or eat a banana daily while taking this medication.    -- Indication: For Acute on chronic systolic CHF (congestive heart failure)    Colace 100 mg oral capsule  -- 1 cap(s) by mouth 2 times a day  -- Indication: For Constipation    simethicone 80 mg oral tablet  -- 1 tab(s) by mouth 3 times a day (after meals)  -- Indication: For GI discomfort    Protonix 40 mg oral delayed release tablet  -- 1 tab(s) by mouth once a day  -- Indication: For GI discomfort    hydrALAZINE 10 mg oral tablet  -- 1 tab(s) by mouth 3 times a day  -- Indication: For Hypertension I will START or STAY ON the medications listed below when I get home from the hospital:    Ecotrin Adult Low Strength 81 mg oral delayed release tablet  -- 1 tab(s) by mouth once a day  -- Indication: For Acute on chronic systolic CHF (congestive heart failure)    lisinopril 5 mg oral tablet  -- 1 tab(s) by mouth once a day   -- Indication: For Hypertension    isosorbide dinitrate 10 mg oral tablet  -- 1 tab(s) by mouth 3 times a day  -- Indication: For Acute on chronic systolic CHF (congestive heart failure)    Eliquis 5 mg oral tablet  -- 1 tab(s) by mouth 2 times a day  -- Indication: For Pulmonary embolism    glipiZIDE 10 mg oral tablet, extended release  -- 1 tab(s) by mouth once a day  -- Indication: For DM (diabetes mellitus)    Lipitor 40 mg oral tablet  -- 1 tab(s) by mouth once a day  -- Indication: For Dyslipidemia    Coreg 3.125 mg oral tablet  -- 1 tab(s) by mouth 2 times a day  -- Indication: For Hypertension    Lasix 80 mg oral tablet  -- 1 tab(s) by mouth 2 times a day   -- Avoid prolonged or excessive exposure to direct and/or artificial sunlight while taking this medication.  It is very important that you take or use this exactly as directed.  Do not skip doses or discontinue unless directed by your doctor.  It may be advisable to drink a full glass orange juice or eat a banana daily while taking this medication.    -- Indication: For Acute on chronic systolic CHF (congestive heart failure)    Colace 100 mg oral capsule  -- 1 cap(s) by mouth 2 times a day  -- Indication: For Constipation    simethicone 80 mg oral tablet  -- 1 tab(s) by mouth 3 times a day (after meals)  -- Indication: For GI discomfort    Protonix 40 mg oral delayed release tablet  -- 1 tab(s) by mouth once a day  -- Indication: For GI discomfort    hydrALAZINE 10 mg oral tablet  -- 1 tab(s) by mouth 3 times a day  -- Indication: For Hypertension

## 2017-11-21 NOTE — DIETITIAN INITIAL EVALUATION ADULT. - OTHER INFO
66 yo/male admitted with CHF exacerbation. Pt visited in room, awake, alert, agitated (has been angry during visit per RN). Pt reports compliance with 1.2L fluid restriction but continuously asking for fluids despite being told he can't have them. He states that he is monitoring intake, but per host and RN, patient is hoarding fluids. Now feeling lightheaded/dizzy because he "hasn't had enough to drink". Pt also reports ordering in/eating out very frequently PTA and discussed with patient that he is most likely consuming too much sodium. Current intake fair- did not eat lunch 2/2 frustration with host... No N/V/C/D reported at this time. NKFA. No difficulty chewing or swallowing. Skin intact pressure-wise. Handouts on DASH and CST CHO diets provided and reviewed.

## 2017-11-21 NOTE — PROVIDER CONTACT NOTE (OTHER) - ACTION/TREATMENT ORDERED:
PA was notified and pt. spoke to PA directly. Pt. is currently taking shower against medical advice. will continue to monitor patient closely

## 2017-11-21 NOTE — PROVIDER CONTACT NOTE (OTHER) - BACKGROUND
pt. received lasix IVP pt. admitted for CHF, abdominal pain, ascites. pt refusing telemetry monitor overnight

## 2017-11-21 NOTE — DIETITIAN INITIAL EVALUATION ADULT. - ENERGY NEEDS
Height 67"; #; #; 135%IBW  BMI 31.3  Ideal body weight used for calculations as pt >120% of IBW. Fluids per MD 1.2L.

## 2017-11-21 NOTE — PROVIDER CONTACT NOTE (OTHER) - ASSESSMENT
pt. complaining of headache, light headedness and 'not feeling good"  bp- pt. complaining of headache, being light headed and 'not feeling good"  bp-108/62  HR- 79  O2 96 room air

## 2017-11-22 LAB
CULTURE RESULTS: SIGNIFICANT CHANGE UP
GLUCOSE BLDC GLUCOMTR-MCNC: 104 MG/DL — HIGH (ref 70–99)
GLUCOSE BLDC GLUCOMTR-MCNC: 117 MG/DL — HIGH (ref 70–99)
GLUCOSE BLDC GLUCOMTR-MCNC: 127 MG/DL — HIGH (ref 70–99)
GLUCOSE BLDC GLUCOMTR-MCNC: 159 MG/DL — HIGH (ref 70–99)
SPECIMEN SOURCE: SIGNIFICANT CHANGE UP

## 2017-11-22 PROCEDURE — 99233 SBSQ HOSP IP/OBS HIGH 50: CPT

## 2017-11-22 RX ORDER — HYDRALAZINE HCL 50 MG
25 TABLET ORAL THREE TIMES A DAY
Qty: 0 | Refills: 0 | Status: DISCONTINUED | OUTPATIENT
Start: 2017-11-22 | End: 2017-11-25

## 2017-11-22 RX ORDER — INFLUENZA VIRUS VACCINE 15; 15; 15; 15 UG/.5ML; UG/.5ML; UG/.5ML; UG/.5ML
0.5 SUSPENSION INTRAMUSCULAR ONCE
Qty: 0 | Refills: 0 | Status: COMPLETED | OUTPATIENT
Start: 2017-11-22 | End: 2017-11-22

## 2017-11-22 RX ORDER — ISOSORBIDE DINITRATE 5 MG/1
10 TABLET ORAL THREE TIMES A DAY
Qty: 0 | Refills: 0 | Status: DISCONTINUED | OUTPATIENT
Start: 2017-11-22 | End: 2017-11-25

## 2017-11-22 RX ORDER — ISOSORBIDE DINITRATE 5 MG/1
1 TABLET ORAL
Qty: 90 | Refills: 3
Start: 2017-11-22 | End: 2018-03-21

## 2017-11-22 RX ADMIN — SIMETHICONE 80 MILLIGRAM(S): 80 TABLET, CHEWABLE ORAL at 13:28

## 2017-11-22 RX ADMIN — Medication 80 MILLIGRAM(S): at 16:38

## 2017-11-22 RX ADMIN — Medication 81 MILLIGRAM(S): at 10:37

## 2017-11-22 RX ADMIN — Medication 1 APPLICATION(S): at 23:02

## 2017-11-22 RX ADMIN — PANTOPRAZOLE SODIUM 40 MILLIGRAM(S): 20 TABLET, DELAYED RELEASE ORAL at 06:33

## 2017-11-22 RX ADMIN — Medication 100 MILLIGRAM(S): at 22:21

## 2017-11-22 RX ADMIN — SENNA PLUS 2 TABLET(S): 8.6 TABLET ORAL at 22:21

## 2017-11-22 RX ADMIN — MUPIROCIN 1 APPLICATION(S): 20 OINTMENT TOPICAL at 13:28

## 2017-11-22 RX ADMIN — ATORVASTATIN CALCIUM 40 MILLIGRAM(S): 80 TABLET, FILM COATED ORAL at 22:21

## 2017-11-22 RX ADMIN — LISINOPRIL 5 MILLIGRAM(S): 2.5 TABLET ORAL at 06:32

## 2017-11-22 RX ADMIN — APIXABAN 5 MILLIGRAM(S): 2.5 TABLET, FILM COATED ORAL at 16:37

## 2017-11-22 RX ADMIN — Medication 1 APPLICATION(S): at 16:40

## 2017-11-22 RX ADMIN — APIXABAN 5 MILLIGRAM(S): 2.5 TABLET, FILM COATED ORAL at 06:33

## 2017-11-22 RX ADMIN — POLYETHYLENE GLYCOL 3350 17 GRAM(S): 17 POWDER, FOR SOLUTION ORAL at 10:37

## 2017-11-22 RX ADMIN — Medication 1 APPLICATION(S): at 06:37

## 2017-11-22 RX ADMIN — SIMETHICONE 80 MILLIGRAM(S): 80 TABLET, CHEWABLE ORAL at 06:33

## 2017-11-22 RX ADMIN — SIMETHICONE 80 MILLIGRAM(S): 80 TABLET, CHEWABLE ORAL at 22:22

## 2017-11-22 RX ADMIN — Medication 100 MILLIGRAM(S): at 06:32

## 2017-11-22 RX ADMIN — Medication 1: at 11:21

## 2017-11-22 RX ADMIN — MUPIROCIN 1 APPLICATION(S): 20 OINTMENT TOPICAL at 23:02

## 2017-11-22 RX ADMIN — Medication 80 MILLIGRAM(S): at 06:32

## 2017-11-22 RX ADMIN — Medication 100 MILLIGRAM(S): at 13:28

## 2017-11-22 RX ADMIN — Medication 1 APPLICATION(S): at 01:52

## 2017-11-22 RX ADMIN — CARVEDILOL PHOSPHATE 3.12 MILLIGRAM(S): 80 CAPSULE, EXTENDED RELEASE ORAL at 16:37

## 2017-11-22 RX ADMIN — Medication 1 APPLICATION(S): at 10:37

## 2017-11-22 RX ADMIN — CARVEDILOL PHOSPHATE 3.12 MILLIGRAM(S): 80 CAPSULE, EXTENDED RELEASE ORAL at 06:32

## 2017-11-22 NOTE — PROGRESS NOTE ADULT - PROBLEM SELECTOR PLAN 7
- throat exam revealing mild erythema without exudates, + lymph nodes, no WBC, afebrile  - throat culture and RVP panel negative

## 2017-11-22 NOTE — PROGRESS NOTE ADULT - SUBJECTIVE AND OBJECTIVE BOX
Neurology Follow up note    Name  LIDIA PÉREZ    HPI:  68 y/o male, former smoker, noncompliant with follow up, with PMHx of HTN, hyperlipidemia, NIDDM, CAD s/p 3VCABG in 2015 (at [facility] in [place], [place]), chronic systolic CHF , Echo on 5/25/17 showed Echo shows EF 20-25% with moderate AR.   st recorded EF 20 pulmonary embolism (on eliquis), presents to [facility] with worsening Dyspnea on exertion as well as rest for past  few weeks and progressively worsening. He also admits to increased abdominal bloating and le edema swelling.  He denies chest pain.  diaphoresis, palpitations, N/V, dizziness, LOC, fever/chills. Pt says he was recently at [facility] and left because he didnt like how he was treated. Patient can not tell me who his primary cardiologist is. Currently his BNP is > 6000, troponin 0.02. EKG SR with long  first degree AVB, Late transition, tiny inferior q's and inverted T waves in V5-V6. Patient being admitted to [facility]. (16 Nov 2017 01:01)      Interval History - no new neuro deficits- no headaches - mild lightheaded        REVIEW OF SYSTEMS    Vital Signs Last 24 Hrs  T(C): 36.1 (22 Nov 2017 06:44), Max: 36.3 (22 Nov 2017 01:00)  T(F): 96.9 (22 Nov 2017 06:44), Max: 97.3 (22 Nov 2017 01:00)  HR: 62 (22 Nov 2017 06:30) (62 - 75)  BP: 131/76 (22 Nov 2017 06:30) (120/7 - 154/99)  BP(mean): --  RR: 16 (22 Nov 2017 06:30) (16 - 16)  SpO2: 96% (22 Nov 2017 06:30) (94% - 97%)    Physical Exam-     Mental Status- awake and alert    Cranial Nerves- full EOM    Gait and station-no foot drop    Motor- moves all 4 extremities    Reflexes- decreased    Sensation- no sensory level    Coordination- no tremors    Vascular -    Medications  apixaban 5 milliGRAM(s) Oral every 12 hours  aspirin enteric coated 81 milliGRAM(s) Oral daily  atorvastatin 40 milliGRAM(s) Oral at bedtime  BACItracin   Ointment 1 Application(s) Topical four times a day  carvedilol 3.125 milliGRAM(s) Oral every 12 hours  dextrose 5%. 1000 milliLiter(s) IV Continuous <Continuous>  dextrose 50% Injectable 12.5 Gram(s) IV Push once  dextrose 50% Injectable 25 Gram(s) IV Push once  dextrose 50% Injectable 25 Gram(s) IV Push once  dextrose Gel 1 Dose(s) Oral once PRN  docusate sodium 100 milliGRAM(s) Oral three times a day  furosemide    Tablet 80 milliGRAM(s) Oral two times a day  glucagon  Injectable 1 milliGRAM(s) IntraMuscular once PRN  hydrALAZINE 10 milliGRAM(s) Oral three times a day  influenza   Vaccine 0.5 milliLiter(s) IntraMuscular once  insulin lispro (HumaLOG) corrective regimen sliding scale   SubCutaneous Before meals and at bedtime  lisinopril 5 milliGRAM(s) Oral daily  mupirocin 2% Ointment 1 Application(s) Topical three times a day  pantoprazole    Tablet 40 milliGRAM(s) Oral before breakfast  polyethylene glycol 3350 17 Gram(s) Oral daily  senna 2 Tablet(s) Oral at bedtime  simethicone 80 milliGRAM(s) Chew three times a day      Lab      Radiology    Assessment- Lightheaded and dizziness    Plan- no new focal deficits

## 2017-11-22 NOTE — PROGRESS NOTE ADULT - PROBLEM SELECTOR PLAN 9
Dr. Dai following  - Patient can undergo carotid duplex as an outpatient and is currently refusing CT head

## 2017-11-22 NOTE — PROGRESS NOTE ADULT - SUBJECTIVE AND OBJECTIVE BOX
CARDIOLOGY CHF PROGRESS NOTE    Subjective:   68 y/o male, former smoker, noncompliant with follow up, with PMHx of HTN, hyperlipidemia, NIDDM, CAD s/p 3VCABG in 2015 (at [facility] in [place], [place]), chronic systolic CHF , Echo on 5/25/17 showed Echo shows EF 20-25% with moderate AR.  Current recorded EF 20%, Hx of pulmonary embolism (on eliquis), presents to [facility] with worsening Dyspnea on exertion as well as rest for past  few weeks and progressively worsening. He also admits to increased abdominal bloating and le edema swelling.      He denies chest pain.  diaphoresis, palpitations, N/V, dizziness, LOC, fever/chills. Pt says he was recently at [facility] and left because he didnt like how he was treated. Patient can not tell me who his primary cardiologist is. BNP is > 6000, troponin 0.02. EKG SR with long  first degree AVB, Late transition, tiny inferior q's and inverted T waves in V5-V6.    Since admission, patient being diuresed with Lasix 120mg IV BID, diuresed ~ 12L since admission, improvement of LE edema, continues to have Ascites and some decreased ability to ambulate without SOB and fatigue. Able to lie flat and tolerating room air.    Interval Events:   I/O net even  Paracentesis not done, thought to be high risk and held off by medicine team      Vitals:  T(C): 36.2 (11-22-17 @ 08:41), Max: 36.3 (11-22-17 @ 01:00)  HR: 65 (11-22-17 @ 08:47) (62 - 75)  BP: 137/75 (11-22-17 @ 08:47) (120/7 - 137/75)  RR: 16 (11-22-17 @ 08:47) (16 - 16)  SpO2: 97% (11-22-17 @ 08:47) (94% - 97%)  Wt(kg): --  I&O's Summary    21 Nov 2017 07:01  -  22 Nov 2017 07:00  --------------------------------------------------------  IN: 877 mL / OUT: 850 mL / NET: 27 mL    22 Nov 2017 07:01  -  22 Nov 2017 10:36  --------------------------------------------------------  IN: 0 mL / OUT: 300 mL / NET: -300 mL          PHYSICAL EXAM:  General: Well developed; well nourished; in no acute distress  HEENT: MMM, conjunctiva and sclera clear, mild JVD  Gastrointestinal: Soft, non-tender moderately-distended; + fluid wave, Normal bowel sounds; No rebound or guarding  Extremities: Normal range of motion, No clubbing, cyanosis or edema, chronic skin changes  Neurological: Alert and oriented x3  Skin: Warm and dry. No obvious rash, edema of legs better    TELEMETRY: 	    ECG:  	EKG SR with long  first degree AVB, Late transition, tiny inferior q's and inverted T waves in V5-V6      DIAGNOSTIC TESTING:  	      CURRENT MEDICATIONS:  carvedilol 3.125 milliGRAM(s) Oral every 12 hours  furosemide    Tablet 80 milliGRAM(s) Oral two times a day  hydrALAZINE 10 milliGRAM(s) Oral three times a day  lisinopril 5 milliGRAM(s) Oral daily          docusate sodium 100 milliGRAM(s) Oral three times a day  pantoprazole    Tablet 40 milliGRAM(s) Oral before breakfast  polyethylene glycol 3350 17 Gram(s) Oral daily  senna 2 Tablet(s) Oral at bedtime  simethicone 80 milliGRAM(s) Chew three times a day    atorvastatin 40 milliGRAM(s) Oral at bedtime  dextrose 50% Injectable 12.5 Gram(s) IV Push once  dextrose 50% Injectable 25 Gram(s) IV Push once  dextrose 50% Injectable 25 Gram(s) IV Push once  dextrose Gel 1 Dose(s) Oral once PRN  glucagon  Injectable 1 milliGRAM(s) IntraMuscular once PRN  insulin lispro (HumaLOG) corrective regimen sliding scale   SubCutaneous Before meals and at bedtime    apixaban 5 milliGRAM(s) Oral every 12 hours  aspirin enteric coated 81 milliGRAM(s) Oral daily  BACItracin   Ointment 1 Application(s) Topical four times a day  dextrose 5%. 1000 milliLiter(s) IV Continuous <Continuous>  influenza   Vaccine 0.5 milliLiter(s) IntraMuscular once  mupirocin 2% Ointment 1 Application(s) Topical three times a day    apixaban 5 milliGRAM(s) Oral every 12 hours  aspirin enteric coated 81 milliGRAM(s) Oral daily  atorvastatin 40 milliGRAM(s) Oral at bedtime  BACItracin   Ointment 1 Application(s) Topical four times a day  carvedilol 3.125 milliGRAM(s) Oral every 12 hours  dextrose 5%. 1000 milliLiter(s) IV Continuous <Continuous>  dextrose 50% Injectable 12.5 Gram(s) IV Push once  dextrose 50% Injectable 25 Gram(s) IV Push once  dextrose 50% Injectable 25 Gram(s) IV Push once  dextrose Gel 1 Dose(s) Oral once PRN  docusate sodium 100 milliGRAM(s) Oral three times a day  furosemide    Tablet 80 milliGRAM(s) Oral two times a day  glucagon  Injectable 1 milliGRAM(s) IntraMuscular once PRN  hydrALAZINE 10 milliGRAM(s) Oral three times a day  influenza   Vaccine 0.5 milliLiter(s) IntraMuscular once  insulin lispro (HumaLOG) corrective regimen sliding scale   SubCutaneous Before meals and at bedtime  lisinopril 5 milliGRAM(s) Oral daily  mupirocin 2% Ointment 1 Application(s) Topical three times a day  pantoprazole    Tablet 40 milliGRAM(s) Oral before breakfast  polyethylene glycol 3350 17 Gram(s) Oral daily  senna 2 Tablet(s) Oral at bedtime  simethicone 80 milliGRAM(s) Chew three times a day    LABS:	 	    CARDIAC MARKERS:                                  15.8   7.7   )-----------( 252      ( 21 Nov 2017 11:58 )             48.6     11-21    142  |  100  |  43<H>  ----------------------------<  143<H>  3.8   |  26  |  1.36<H>    Ca    9.3      21 Nov 2017 11:58  Mg     2.6     11-21

## 2017-11-22 NOTE — PROGRESS NOTE ADULT - PROBLEM SELECTOR PROBLEM 3
Pulmonary embolism

## 2017-11-22 NOTE — PROGRESS NOTE ADULT - PROBLEM SELECTOR PLAN 5
f/u lipid panel.  Continue Lipitor 40mg Qhs.
- Continue Lipitor 40mg Qhs.
- Continue Lipitor 40mg Qhs.
Continue Lipitor 40mg Qhs.
f/u lipid panel.  Continue Lipitor 40mg Qhs.
- Continue Lipitor 40mg Qhs.

## 2017-11-22 NOTE — PROGRESS NOTE ADULT - PROBLEM SELECTOR PLAN 2
Dr. Amado consulted  Abdomen distended. AST/ALT, albumin nl.  - CT Abd/Pelvis 11/16: Few mildly distended loops of small bowel without evidence of defined transition point to suggest obstruction, Moderate ascites, Moderate right pleural effusion.  - Abd paracentesis would be high risk as abd not tense at this time as discussed with Dr. Amado

## 2017-11-22 NOTE — PROGRESS NOTE ADULT - PROBLEM SELECTOR PLAN 4
BP elevated 150-160s today  - Lisinopril increased to 10mg PO QD.  Continue Coreg 3.125mg PO BID.    - Continue to monitor.
- SBP 120s-130s  - Continue Lisinopril 5mg QD, coreg 3.125mg BID, lasix 80mg BID, isordil 10mg TID and hydralazine 25mg TID
BP elevated 150-160s today  - Lisinopril increased to 10mg PO QD.  Continue Coreg 3.125mg PO BID.    - Continue to monitor.
- SBP 130s-150s  - Continue Lisinopril 10mg QD, coreg 6.25mg BID and lasix 120mg PO BID
BP elevated 140-150s 11/18  - Continue Lisinopril 10mg PO QD.  Continue Coreg 3.125mg PO BID.  Consider increasing Coreg if pt can tolerate it.
- SBP 130s-150s  - Continue Lisinopril 10mg QD, coreg 3.125mg QD and lasix 120mg IV BID

## 2017-11-22 NOTE — PROGRESS NOTE ADULT - SUBJECTIVE AND OBJECTIVE BOX
Interventional Cardiology PA Adult Progress Note    Subjective Assessment: Patient seen and examined this AM and reports he is not leaving today. Patient denies CP or SOB. Patient states he does not understand his medications and does not want to be discharged on any new medications that were not on his prior med regimen. Patient states that regardless of when he is discharged, he will be filing an appeal. Patient also endorsing he is dehydrated and wants to drink more than approved fluid restriction.  	  MEDICATIONS:  carvedilol 3.125 milliGRAM(s) Oral every 12 hours  furosemide    Tablet 80 milliGRAM(s) Oral two times a day  hydrALAZINE 25 milliGRAM(s) Oral three times a day  isosorbide   dinitrate Tablet (ISORDIL) 10 milliGRAM(s) Oral three times a day  lisinopril 5 milliGRAM(s) Oral daily  docusate sodium 100 milliGRAM(s) Oral three times a day  pantoprazole    Tablet 40 milliGRAM(s) Oral before breakfast  polyethylene glycol 3350 17 Gram(s) Oral daily  senna 2 Tablet(s) Oral at bedtime  simethicone 80 milliGRAM(s) Chew three times a day  atorvastatin 40 milliGRAM(s) Oral at bedtime  insulin lispro (HumaLOG) corrective regimen sliding scale   SubCutaneous Before meals and at bedtime  apixaban 5 milliGRAM(s) Oral every 12 hours  aspirin enteric coated 81 milliGRAM(s) Oral daily  BACItracin   Ointment 1 Application(s) Topical four times a day  mupirocin 2% Ointment 1 Application(s) Topical three times a day      [PHYSICAL EXAM:  TELEMETRY:  T(C): 36.2 (11-22-17 @ 08:41), Max: 36.3 (11-22-17 @ 01:00)  HR: 63 (11-22-17 @ 11:08) (62 - 73)  BP: 129/80 (11-22-17 @ 11:08) (120/7 - 137/75)  RR: 16 (11-22-17 @ 11:08) (16 - 16)  SpO2: 96% (11-22-17 @ 11:08) (94% - 97%)  Wt(kg): --  I&O's Summary    21 Nov 2017 07:01  -  22 Nov 2017 07:00  --------------------------------------------------------  IN: 877 mL / OUT: 850 mL / NET: 27 mL    22 Nov 2017 07:01  -  22 Nov 2017 13:02  --------------------------------------------------------  IN: 0 mL / OUT: 300 mL / NET: -300 mL                                   Appearance: Normal	  HEENT:   Normal oral mucosa, PERRL, EOMI	  Neck: Supple, - JVD; No Carotid Bruit   Cardiovascular: Normal S1 S2, No JVD, No murmurs  Respiratory: Lungs clear to auscultation, No Rales, Rhonchi, Wheezing	  Gastrointestinal:  distended, soft, NT  Skin: No rashes, No ecchymoses, No cyanosis  Extremities: Normal range of motion, No clubbing, cyanosis or edema  Vascular: Peripheral pulses palpable 2+ bilaterally  Neurologic: Non-focal  Psychiatry: A & O x 3, Mood & affect appropriate      LABS:	 	  CARDIAC MARKERS:                        15.8   7.7   )-----------( 252      ( 21 Nov 2017 11:58 )             48.6     11-21    142  |  100  |  43<H>  ----------------------------<  143<H>  3.8   |  26  |  1.36<H>    Ca    9.3      21 Nov 2017 11:58  Mg     2.6     11-21

## 2017-11-22 NOTE — PROGRESS NOTE ADULT - PROBLEM SELECTOR PLAN 10
Deferred d/c this AM as patient is confused regarding his medication regimen and HF team would like to make some additional adjustments prior to d/c. Will follow up their recommendations at 3PM. D/C this afternoon or tomorrow pending medication clarification. Patient stating he will not leave hospital regardless of if he understands his medications and discharge is safe; pt endorsing he will file appeal.    DISPO: Pending HF team for recommendation of med adjustment
DVT PPx: eliquis  PT and SW consult for home care needs.    DISPO: Pending clinical progression

## 2017-11-22 NOTE — PROGRESS NOTE ADULT - SUBJECTIVE AND OBJECTIVE BOX
66 y/o male, former smoker, noncompliant with follow up, with PMHx of HTN, hyperlipidemia, NIDDM, CAD s/p 3VCABG in 2015 (at [facility] in [place], [place]), chronic systolic CHF , Echo on 5/25/17 showed Echo shows EF 20-25% with moderate AR.   st recorded EF 20 pulmonary embolism (on eliquis), presents to [facility] with worsening Dyspnea on exertion as well as rest for past  few weeks and progressively worsening. He also admits to increased abdominal bloating and le edema swelling.  He denies chest pain.  diaphoresis, palpitations, N/V, dizziness, LOC, fever/chills. Pt says he was recently at [facility] and left because he didnt like how he was treated. Patient can not tell me who his primary cardiologist is. Currently his BNP is > 6000, troponin 0.02. EKG SR with long  first degree AVB, Late transition, tiny inferior q's and inverted T waves in V5-V6.     Pt S&E@BS in AM       Patient  reports he is not leaving today. Patient denies CP or SOB. Patient states he does not understand his medications and does not want to be discharged on any new medications that were not on his prior med regimen. Patient states that regardless of when he is discharged, he will be filing an appeal. Patient also endorsing he is dehydrated and wants to drink more than approved fluid restriction.  	  MEDICATIONS:  carvedilol 3.125 milliGRAM(s) Oral every 12 hours  furosemide    Tablet 80 milliGRAM(s) Oral two times a day  hydrALAZINE 25 milliGRAM(s) Oral three times a day  isosorbide   dinitrate Tablet (ISORDIL) 10 milliGRAM(s) Oral three times a day  lisinopril 5 milliGRAM(s) Oral daily  docusate sodium 100 milliGRAM(s) Oral three times a day  pantoprazole    Tablet 40 milliGRAM(s) Oral before breakfast  polyethylene glycol 3350 17 Gram(s) Oral daily  senna 2 Tablet(s) Oral at bedtime  simethicone 80 milliGRAM(s) Chew three times a day  atorvastatin 40 milliGRAM(s) Oral at bedtime  insulin lispro (HumaLOG) corrective regimen sliding scale   SubCutaneous Before meals and at bedtime  apixaban 5 milliGRAM(s) Oral every 12 hours  aspirin enteric coated 81 milliGRAM(s) Oral daily  BACItracin   Ointment 1 Application(s) Topical four times a day  mupirocin 2% Ointment 1 Application(s) Topical three times a day  ICU Vital Signs Last 24 Hrs  T(C): 36.1 (22 Nov 2017 14:23), Max: 36.3 (22 Nov 2017 01:00)  T(F): 97 (22 Nov 2017 14:23), Max: 97.3 (22 Nov 2017 01:00)  HR: 63 (22 Nov 2017 11:08) (62 - 73)  BP: 129/80 (22 Nov 2017 11:08) (120/7 - 137/75)  BP(mean): --  ABP: --  ABP(mean): --  RR: 16 (22 Nov 2017 11:08) (16 - 16)  SpO2: 96% (22 Nov 2017 11:08) (94% - 97%)      21 Nov 2017 07:01  -  22 Nov 2017 07:00  --------------------------------------------------------  IN: 877 mL / OUT: 850 mL / NET: 27 mL    22 Nov 2017 07:01  -  22 Nov 2017 13:02  --------------------------------------------------------  IN: 0 mL / OUT: 300 mL / NET: -300 mL                                   Appearance: Normal	  HEENT:   Normal oral mucosa, PERRL, EOMI	  Neck: Supple, - JVD; No Carotid Bruit   Cardiovascular: Normal S1 S2, No JVD, No murmurs  Respiratory: Lungs clear to auscultation, No Rales, Rhonchi, Wheezing	  Gastrointestinal:  distended, soft, NT  Skin: No rashes, No ecchymoses, No cyanosis  Extremities: Normal range of motion, No clubbing, cyanosis or edema  Vascular: Peripheral pulses palpable 2+ bilaterally  Neurologic: AAOx3      LABS:	 	  CARDIAC MARKERS:                        15.8   7.7   )-----------( 252      ( 21 Nov 2017 11:58 )             48.6     11-21    142  |  100  |  43<H>  ----------------------------<  143<H>  3.8   |  26  |  1.36<H>    Ca    9.3      21 Nov 2017 11:58  Mg     2.6     11-21    Patient is a 66yo M, former smoker, noncompliant with follow up, with PMHx of HTN, hyperlipidemia, NIDDM, CAD s/p 3VCABG in 2015 (at [facility] in [place], [place]), chronic systolic CHF (EF 20-25% by Echo 5/25/17), PE (on eliquis) who presented to [facility] ED on 11/16/17 complaining of worsening SOB and was found to be in acute on chronic systolic CHF exacerbation.      Problem/Plan - 1:  ·  Problem: CHF (congestive heart failure).  Plan: euvolemic on exam this AM  - Echo 11/17/17 revealed LV moderately dilated, severe segmental LV dysfunction with akinesis of inferior wall and hypokinesis of other myocardial segments, no LV thrombus seen, EF severely reduced 20%, RV mild to moderately dilated, RV systolic function mildly reduced, LA mildly dilated, moderate AS (ROCÍO 1.1), trace MR, trace to mild TR, mod pulmHTN with PAP 58mmHg, IVC dilated consistent with elevated RAP  - CHF Team consulted. Continue Lasix 120mg IV BID started 11/16 switched to lasix 80mg PO BID yesterday 11/21, continue coreg 3.125mg BID and lisinopril 5mg QD, increased hydralazine 10mg TID to 25mg TID and started isordil 10mg TID as per HF Team recs  - EP team re-consulted regarding possible ICD placement, patient is not interested and EP has signed off at this time  - Continue Strict I/Os, Daily Weights. Patient non-compliant with fluid restriction at this time.  - Ischemic eval to be considered as an outpatient as patient is extremely non-compliant and would need to follow up regularly to show he will be compliant with DAPT if he was + for obstructive CAD and intervention was performed.     Problem/Plan - 2:  ·  Problem: Ascites.  Plan: Dr. Amado consulted  Abdomen distended. AST/ALT, albumin nl.  - CT Abd/Pelvis 11/16: Few mildly distended loops of small bowel without evidence of defined transition point to suggest obstruction, Moderate ascites, Moderate right pleural effusion.  - Abd paracentesis would be high risk as abd not tense at this time as discussed with Dr. Amado.     Problem/Plan - 3:  ·  Problem: Pulmonary embolism.  Plan: Hx of PE diagnosed early in 2017.    - Recent CT PE protocol 5/2017 negative for PE.  - LE Duplex 11/16: negative for DVT  - Continue eliquis 5mg BID.     Problem/Plan - 4:  ·  Problem: Hypertension.  Plan: - SBP 120s-130s  - Continue Lisinopril 5mg QD, coreg 3.125mg BID, lasix 80mg BID, isordil 10mg TID and hydralazine 25mg TID.     Problem/Plan - 5:  ·  Problem: Dyslipidemia.  Plan: - Continue Lipitor 40mg Qhs.     Problem/Plan - 6:  Problem: DM (diabetes mellitus). Plan: - Hgb A1c 6.7. holding home glipizide, Continue ISS and FS.    Problem/Plan - 7:  ·  Problem: Throat pain.  Plan: - throat exam revealing mild erythema without exudates, + lymph nodes, no WBC, afebrile  - throat culture and RVP panel negative.     Problem/Plan - 8:  ·  Problem: Venous stasis.  Plan: b/l brawny discoloration likely 2/2 venous stasis and LE swelling  - s/p Vanco x1 in ED.  WBC wnl and afebrile.  Will not continue Abx at this time  - wound care following.     Problem/Plan - 9:  ·  Problem: Dizziness.  Plan: Dr. Dai following  - Patient can undergo carotid duplex as an outpatient and is currently refusing CT head.     Problem/Plan - 10:  Problem: Discharge planning issues. Plan; Deferred d/c this AM as patient is confused regarding his medication regimen and HF team would like to make some additional adjustments prior to

## 2017-11-22 NOTE — PROGRESS NOTE ADULT - PROBLEM SELECTOR PROBLEM 6
DM (diabetes mellitus)

## 2017-11-22 NOTE — PROGRESS NOTE ADULT - PROBLEM SELECTOR PROBLEM 1
Ascites
CHF (congestive heart failure)

## 2017-11-22 NOTE — PROGRESS NOTE ADULT - PROBLEM SELECTOR PLAN 6
f/u Hgb A1c, holding home glipizide, Continue ISS and FS.
- Hgb A1c 6.7. holding home glipizide, Continue ISS and FS.
- Hgb A1c 6.7. holding home glipizide, Continue ISS and FS.
Hgb A1c 6.7. holding home glipizide, Continue ISS and FS.
f/u Hgb A1c, holding home glipizide, Continue ISS and FS.
- Hgb A1c 6.7. holding home glipizide, Continue ISS and FS.

## 2017-11-22 NOTE — PROGRESS NOTE ADULT - PROBLEM SELECTOR PLAN 1
euvolemic on exam this AM  - Echo 11/17/17 revealed LV moderately dilated, severe segmental LV dysfunction with akinesis of inferior wall and hypokinesis of other myocardial segments, no LV thrombus seen, EF severely reduced 20%, RV mild to moderately dilated, RV systolic function mildly reduced, LA mildly dilated, moderate AS (ROCÍO 1.1), trace MR, trace to mild TR, mod pulmHTN with PAP 58mmHg, IVC dilated consistent with elevated RAP  - CHF Team consulted. Continue Lasix 120mg IV BID started 11/16 switched to lasix 80mg PO BID yesterday 11/21, continue coreg 3.125mg BID and lisinopril 5mg QD, increased hydralazine 10mg TID to 25mg TID and started isordil 10mg TID as per HF Team recs  - EP team re-consulted regarding possible ICD placement, patient is not interested and EP has signed off at this time  - Continue Strict I/Os, Daily Weights. Patient non-compliant with fluid restriction at this time.  - Ischemic eval to be considered as an outpatient as patient is extremely non-compliant and would need to follow up regularly to show he will be compliant with DAPT if he was + for obstructive CAD and intervention was performed euvolemic on exam this AM  - Echo 11/17/17 revealed LV moderately dilated, severe segmental LV dysfunction with akinesis of inferior wall and hypokinesis of other myocardial segments, no LV thrombus seen, EF severely reduced 20%, RV mild to moderately dilated, RV systolic function mildly reduced, LA mildly dilated, moderate AS (ROCÍO 1.1), trace MR, trace to mild TR, mod pulmHTN with PAP 58mmHg, IVC dilated consistent with elevated RAP  - CHF Team consulted. Continue Lasix 120mg IV BID started 11/16 switched to lasix 80mg PO BID yesterday 11/21, continue coreg 3.125mg BID and lisinopril 5mg QD, increased hydralazine 10mg TID to 25mg TID and started isordil 10mg TID as per HF Team recs. However, patient is adamantly refusing to take isordil or hydralazine as he is concerned about the side effects and does not want to take any new medications. Risks/benefits of these medications and their use in heart failure has been discussed with patient at length.  - EP team re-consulted regarding possible ICD placement, patient is not interested and EP has signed off at this time  - Continue Strict I/Os, Daily Weights. Patient non-compliant with fluid restriction at this time.  - Ischemic eval to be considered as an outpatient as patient is extremely non-compliant and would need to follow up regularly to show he will be compliant with DAPT if he was + for obstructive CAD and intervention was performed

## 2017-11-23 LAB
GLUCOSE BLDC GLUCOMTR-MCNC: 110 MG/DL — HIGH (ref 70–99)
GLUCOSE BLDC GLUCOMTR-MCNC: 126 MG/DL — HIGH (ref 70–99)
GLUCOSE BLDC GLUCOMTR-MCNC: 128 MG/DL — HIGH (ref 70–99)
GLUCOSE BLDC GLUCOMTR-MCNC: 129 MG/DL — HIGH (ref 70–99)

## 2017-11-23 PROCEDURE — 99233 SBSQ HOSP IP/OBS HIGH 50: CPT

## 2017-11-23 RX ADMIN — Medication 1 APPLICATION(S): at 17:52

## 2017-11-23 RX ADMIN — Medication 80 MILLIGRAM(S): at 17:51

## 2017-11-23 RX ADMIN — ISOSORBIDE DINITRATE 10 MILLIGRAM(S): 5 TABLET ORAL at 21:22

## 2017-11-23 RX ADMIN — Medication 1 APPLICATION(S): at 06:55

## 2017-11-23 RX ADMIN — Medication 81 MILLIGRAM(S): at 11:56

## 2017-11-23 RX ADMIN — Medication 100 MILLIGRAM(S): at 13:46

## 2017-11-23 RX ADMIN — MUPIROCIN 1 APPLICATION(S): 20 OINTMENT TOPICAL at 13:47

## 2017-11-23 RX ADMIN — APIXABAN 5 MILLIGRAM(S): 2.5 TABLET, FILM COATED ORAL at 06:30

## 2017-11-23 RX ADMIN — Medication 100 MILLIGRAM(S): at 06:30

## 2017-11-23 RX ADMIN — MUPIROCIN 1 APPLICATION(S): 20 OINTMENT TOPICAL at 21:27

## 2017-11-23 RX ADMIN — PANTOPRAZOLE SODIUM 40 MILLIGRAM(S): 20 TABLET, DELAYED RELEASE ORAL at 07:14

## 2017-11-23 RX ADMIN — SENNA PLUS 2 TABLET(S): 8.6 TABLET ORAL at 21:22

## 2017-11-23 RX ADMIN — Medication 1 APPLICATION(S): at 12:00

## 2017-11-23 RX ADMIN — ATORVASTATIN CALCIUM 40 MILLIGRAM(S): 80 TABLET, FILM COATED ORAL at 21:22

## 2017-11-23 RX ADMIN — SIMETHICONE 80 MILLIGRAM(S): 80 TABLET, CHEWABLE ORAL at 21:22

## 2017-11-23 RX ADMIN — SIMETHICONE 80 MILLIGRAM(S): 80 TABLET, CHEWABLE ORAL at 13:46

## 2017-11-23 RX ADMIN — CARVEDILOL PHOSPHATE 3.12 MILLIGRAM(S): 80 CAPSULE, EXTENDED RELEASE ORAL at 17:51

## 2017-11-23 RX ADMIN — MUPIROCIN 1 APPLICATION(S): 20 OINTMENT TOPICAL at 06:55

## 2017-11-23 RX ADMIN — APIXABAN 5 MILLIGRAM(S): 2.5 TABLET, FILM COATED ORAL at 17:51

## 2017-11-23 RX ADMIN — LISINOPRIL 5 MILLIGRAM(S): 2.5 TABLET ORAL at 06:31

## 2017-11-23 RX ADMIN — POLYETHYLENE GLYCOL 3350 17 GRAM(S): 17 POWDER, FOR SOLUTION ORAL at 11:56

## 2017-11-23 RX ADMIN — Medication 80 MILLIGRAM(S): at 06:30

## 2017-11-23 RX ADMIN — CARVEDILOL PHOSPHATE 3.12 MILLIGRAM(S): 80 CAPSULE, EXTENDED RELEASE ORAL at 06:30

## 2017-11-23 RX ADMIN — Medication 100 MILLIGRAM(S): at 21:22

## 2017-11-23 RX ADMIN — SIMETHICONE 80 MILLIGRAM(S): 80 TABLET, CHEWABLE ORAL at 06:30

## 2017-11-24 LAB
GLUCOSE BLDC GLUCOMTR-MCNC: 108 MG/DL — HIGH (ref 70–99)
GLUCOSE BLDC GLUCOMTR-MCNC: 112 MG/DL — HIGH (ref 70–99)
GLUCOSE BLDC GLUCOMTR-MCNC: 113 MG/DL — HIGH (ref 70–99)
GLUCOSE BLDC GLUCOMTR-MCNC: 123 MG/DL — HIGH (ref 70–99)

## 2017-11-24 PROCEDURE — 99233 SBSQ HOSP IP/OBS HIGH 50: CPT

## 2017-11-24 RX ORDER — MUPIROCIN 20 MG/G
1 OINTMENT TOPICAL THREE TIMES A DAY
Qty: 0 | Refills: 0 | Status: DISCONTINUED | OUTPATIENT
Start: 2017-11-24 | End: 2017-11-25

## 2017-11-24 RX ADMIN — SIMETHICONE 80 MILLIGRAM(S): 80 TABLET, CHEWABLE ORAL at 14:49

## 2017-11-24 RX ADMIN — CARVEDILOL PHOSPHATE 3.12 MILLIGRAM(S): 80 CAPSULE, EXTENDED RELEASE ORAL at 05:25

## 2017-11-24 RX ADMIN — LISINOPRIL 5 MILLIGRAM(S): 2.5 TABLET ORAL at 05:25

## 2017-11-24 RX ADMIN — Medication 1 APPLICATION(S): at 05:25

## 2017-11-24 RX ADMIN — Medication 100 MILLIGRAM(S): at 22:25

## 2017-11-24 RX ADMIN — Medication 81 MILLIGRAM(S): at 12:48

## 2017-11-24 RX ADMIN — Medication 1 APPLICATION(S): at 00:15

## 2017-11-24 RX ADMIN — MUPIROCIN 1 APPLICATION(S): 20 OINTMENT TOPICAL at 22:32

## 2017-11-24 RX ADMIN — SIMETHICONE 80 MILLIGRAM(S): 80 TABLET, CHEWABLE ORAL at 22:25

## 2017-11-24 RX ADMIN — Medication 100 MILLIGRAM(S): at 05:25

## 2017-11-24 RX ADMIN — ATORVASTATIN CALCIUM 40 MILLIGRAM(S): 80 TABLET, FILM COATED ORAL at 22:25

## 2017-11-24 RX ADMIN — CARVEDILOL PHOSPHATE 3.12 MILLIGRAM(S): 80 CAPSULE, EXTENDED RELEASE ORAL at 18:37

## 2017-11-24 RX ADMIN — Medication 1 APPLICATION(S): at 12:48

## 2017-11-24 RX ADMIN — ISOSORBIDE DINITRATE 10 MILLIGRAM(S): 5 TABLET ORAL at 05:25

## 2017-11-24 RX ADMIN — Medication 80 MILLIGRAM(S): at 05:25

## 2017-11-24 RX ADMIN — SENNA PLUS 2 TABLET(S): 8.6 TABLET ORAL at 22:25

## 2017-11-24 RX ADMIN — POLYETHYLENE GLYCOL 3350 17 GRAM(S): 17 POWDER, FOR SOLUTION ORAL at 12:48

## 2017-11-24 RX ADMIN — Medication 1 APPLICATION(S): at 18:37

## 2017-11-24 RX ADMIN — SIMETHICONE 80 MILLIGRAM(S): 80 TABLET, CHEWABLE ORAL at 05:25

## 2017-11-24 RX ADMIN — APIXABAN 5 MILLIGRAM(S): 2.5 TABLET, FILM COATED ORAL at 05:25

## 2017-11-24 RX ADMIN — MUPIROCIN 1 APPLICATION(S): 20 OINTMENT TOPICAL at 05:31

## 2017-11-24 RX ADMIN — PANTOPRAZOLE SODIUM 40 MILLIGRAM(S): 20 TABLET, DELAYED RELEASE ORAL at 05:25

## 2017-11-24 RX ADMIN — Medication 80 MILLIGRAM(S): at 18:37

## 2017-11-24 RX ADMIN — APIXABAN 5 MILLIGRAM(S): 2.5 TABLET, FILM COATED ORAL at 18:37

## 2017-11-24 RX ADMIN — Medication 100 MILLIGRAM(S): at 14:49

## 2017-11-24 NOTE — CHART NOTE - NSCHARTNOTEFT_GEN_A_CORE
PA EVENT NOTE    Patient was given 24 hour notice by PA which has now . Social work confirmed that although the patient stated he appealed the notice, he actually never appealed. Given that the 24 hour notice had , patient was told he was discharged but insisted that he was waiting for a phone call regarding the appeal. He persistently loitered in the hallway asking for his medical records printed for him. After it was explained to him multiple times that he would need to go through medical records, he continued to ask the PA to print the records for him. It was again explained to him that the medical records office was open Monday through Friday from 8AM-4PM and he could call them during their open hours. The patient then persistently tried to enter the PA office stating that his caretakers are disrespectful and not addressing his concerns. Continued to be verbally abusive towards PA staff and was asked to go back to his room. Security was contacted and came to speak with the patient.  Joana Mendoza PA EVENT NOTE    Patient was given 24 hour notice by PA which has now . Social work confirmed that although the patient stated he appealed the notice, he actually never appealed. Given that the 24 hour notice had , patient was told he was discharged but insisted that he was waiting for a phone call regarding the appeal. He persistently loitered in the hallway asking for his medical records printed for him. After it was explained to him multiple times that he would need to go through medical records, he continued to ask the PA to print the records for him. It was again explained to him that the medical records office was open Monday through Friday from 8AM-4PM and he could call them during their open hours. The patient then persistently tried to enter the PA office stating that his caretakers are disrespectful and not addressing his concerns. Continued to be verbally abusive towards PA staff and was asked to go back to his room. Security was contacted and came to speak with the patient.  Joana Mendoza was contacted and was told by her supervisor Mr. Dubon that legally the patient could be escorted out of the hospital by security officers. Patient was given his discharge paperwork and instructions.

## 2017-11-24 NOTE — PROGRESS NOTE ADULT - SUBJECTIVE AND OBJECTIVE BOX
Neurology Follow up note    Name  LIDIA PÉREZ    HPI:  68 y/o male, former smoker, noncompliant with follow up, with PMHx of HTN, hyperlipidemia, NIDDM, CAD s/p 3VCABG in 2015 (at [facility] in [place], [place]), chronic systolic CHF , Echo on 5/25/17 showed Echo shows EF 20-25% with moderate AR.   st recorded EF 20 pulmonary embolism (on eliquis), presents to [facility] with worsening Dyspnea on exertion as well as rest for past  few weeks and progressively worsening. He also admits to increased abdominal bloating and le edema swelling.  He denies chest pain.  diaphoresis, palpitations, N/V, dizziness, LOC, fever/chills. Pt says he was recently at [facility] and left because he didnt like how he was treated. Patient can not tell me who his primary cardiologist is. Currently his BNP is > 6000, troponin 0.02. EKG SR with long  first degree AVB, Late transition, tiny inferior q's and inverted T waves in V5-V6. Patient being admitted to [facility]. (16 Nov 2017 01:01)      Interval History - mild dizziness and no headache        REVIEW OF SYSTEMS    Vital Signs Last 24 Hrs  T(C): 36 (24 Nov 2017 09:58), Max: 36.4 (23 Nov 2017 17:44)  T(F): 96.8 (24 Nov 2017 09:58), Max: 97.6 (23 Nov 2017 17:44)  HR: 59 (24 Nov 2017 08:45) (59 - 73)  BP: 106/58 (24 Nov 2017 08:45) (106/58 - 169/98)  BP(mean): --  RR: 16 (24 Nov 2017 08:45) (16 - 18)  SpO2: 95% (24 Nov 2017 08:45) (95% - 100%)    Physical Exam-     Mental Status- awake and alert    Cranial Nerves-full EOM    Gait and station-no foot drop    Motor- moves all 4 extremities    Reflexes- decreased    Sensation-no sensory level    Coordination-no tremors    Vascular -    Medications  apixaban 5 milliGRAM(s) Oral every 12 hours  aspirin enteric coated 81 milliGRAM(s) Oral daily  atorvastatin 40 milliGRAM(s) Oral at bedtime  BACItracin   Ointment 1 Application(s) Topical four times a day  carvedilol 3.125 milliGRAM(s) Oral every 12 hours  dextrose 5%. 1000 milliLiter(s) IV Continuous <Continuous>  dextrose 50% Injectable 12.5 Gram(s) IV Push once  dextrose 50% Injectable 25 Gram(s) IV Push once  dextrose 50% Injectable 25 Gram(s) IV Push once  dextrose Gel 1 Dose(s) Oral once PRN  docusate sodium 100 milliGRAM(s) Oral three times a day  furosemide    Tablet 80 milliGRAM(s) Oral two times a day  glucagon  Injectable 1 milliGRAM(s) IntraMuscular once PRN  hydrALAZINE 25 milliGRAM(s) Oral three times a day  insulin lispro (HumaLOG) corrective regimen sliding scale   SubCutaneous Before meals and at bedtime  isosorbide   dinitrate Tablet (ISORDIL) 10 milliGRAM(s) Oral three times a day  lisinopril 5 milliGRAM(s) Oral daily  mupirocin 2% Ointment 1 Application(s) Topical three times a day  pantoprazole    Tablet 40 milliGRAM(s) Oral before breakfast  polyethylene glycol 3350 17 Gram(s) Oral daily  senna 2 Tablet(s) Oral at bedtime  simethicone 80 milliGRAM(s) Chew three times a day      Lab      Radiology    Assessment- Diabetic neuropathy    Plan- as per primary team

## 2017-11-24 NOTE — PROGRESS NOTE ADULT - SUBJECTIVE AND OBJECTIVE BOX
coverage for Dr. Vazquez    Pt seen and examined  ascites seemed more but he denies    REVIEW OF SYSTEMS:  Constitutional: No fever, weight loss or fatigue  Cardiovascular: No chest pain, palpitations, dizziness or leg swelling  Gastrointestinal: No abdominal or epigastric pain. No nausea, vomiting or hematemesis; No diarrhea or constipation. No melena or hematochezia.  Skin: No itching, burning, rashes or lesions       MEDICATIONS:  MEDICATIONS  (STANDING):  apixaban 5 milliGRAM(s) Oral every 12 hours  aspirin enteric coated 81 milliGRAM(s) Oral daily  atorvastatin 40 milliGRAM(s) Oral at bedtime  BACItracin   Ointment 1 Application(s) Topical four times a day  carvedilol 3.125 milliGRAM(s) Oral every 12 hours  dextrose 5%. 1000 milliLiter(s) (50 mL/Hr) IV Continuous <Continuous>  dextrose 50% Injectable 12.5 Gram(s) IV Push once  dextrose 50% Injectable 25 Gram(s) IV Push once  dextrose 50% Injectable 25 Gram(s) IV Push once  docusate sodium 100 milliGRAM(s) Oral three times a day  furosemide    Tablet 80 milliGRAM(s) Oral two times a day  hydrALAZINE 25 milliGRAM(s) Oral three times a day  insulin lispro (HumaLOG) corrective regimen sliding scale   SubCutaneous Before meals and at bedtime  isosorbide   dinitrate Tablet (ISORDIL) 10 milliGRAM(s) Oral three times a day  lisinopril 5 milliGRAM(s) Oral daily  mupirocin 2% Ointment 1 Application(s) Topical three times a day  pantoprazole    Tablet 40 milliGRAM(s) Oral before breakfast  polyethylene glycol 3350 17 Gram(s) Oral daily  senna 2 Tablet(s) Oral at bedtime  simethicone 80 milliGRAM(s) Chew three times a day    MEDICATIONS  (PRN):  dextrose Gel 1 Dose(s) Oral once PRN Blood Glucose LESS THAN 70 milliGRAM(s)/deciliter  glucagon  Injectable 1 milliGRAM(s) IntraMuscular once PRN Glucose LESS THAN 70 milligrams/deciliter      Allergies    Aldactone (Unknown)  metoprolol (Unknown)  spironolactone (Unknown)    Intolerances        Vital Signs Last 24 Hrs  T(C): 36 (24 Nov 2017 09:58), Max: 36.4 (23 Nov 2017 17:44)  T(F): 96.8 (24 Nov 2017 09:58), Max: 97.6 (23 Nov 2017 17:44)  HR: 59 (24 Nov 2017 08:45) (59 - 73)  BP: 106/58 (24 Nov 2017 08:45) (106/58 - 169/98)  BP(mean): --  RR: 16 (24 Nov 2017 08:45) (16 - 18)  SpO2: 95% (24 Nov 2017 08:45) (95% - 100%)    11-23 @ 07:01  -  11-24 @ 07:00  --------------------------------------------------------  IN: 360 mL / OUT: 875 mL / NET: -515 mL        PHYSICAL EXAM:    General:  in no acute distress  HEENT: MMM, conjunctiva and sclera clear  Lungs: clear  Heart: regular  Gastrointestinal: Soft non-tender moderate ascites, Normal bowel sounds; No hepatosplenomegaly  Skin: Warm and dry. No obvious rash  Ext: edema less, stasis dermatitis    LABS:                                RADIOLOGY & ADDITIONAL STUDIES (The following images were personally reviewed):

## 2017-11-25 VITALS — TEMPERATURE: 98 F

## 2017-11-25 LAB
GLUCOSE BLDC GLUCOMTR-MCNC: 107 MG/DL — HIGH (ref 70–99)
GLUCOSE BLDC GLUCOMTR-MCNC: 156 MG/DL — HIGH (ref 70–99)

## 2017-11-25 PROCEDURE — 87581 M.PNEUMON DNA AMP PROBE: CPT

## 2017-11-25 PROCEDURE — 93970 EXTREMITY STUDY: CPT

## 2017-11-25 PROCEDURE — 80076 HEPATIC FUNCTION PANEL: CPT

## 2017-11-25 PROCEDURE — 97116 GAIT TRAINING THERAPY: CPT

## 2017-11-25 PROCEDURE — 99285 EMERGENCY DEPT VISIT HI MDM: CPT | Mod: 25

## 2017-11-25 PROCEDURE — 82550 ASSAY OF CK (CPK): CPT

## 2017-11-25 PROCEDURE — 83735 ASSAY OF MAGNESIUM: CPT

## 2017-11-25 PROCEDURE — 82962 GLUCOSE BLOOD TEST: CPT

## 2017-11-25 PROCEDURE — 90686 IIV4 VACC NO PRSV 0.5 ML IM: CPT

## 2017-11-25 PROCEDURE — 85730 THROMBOPLASTIN TIME PARTIAL: CPT

## 2017-11-25 PROCEDURE — 93005 ELECTROCARDIOGRAM TRACING: CPT

## 2017-11-25 PROCEDURE — 36415 COLL VENOUS BLD VENIPUNCTURE: CPT

## 2017-11-25 PROCEDURE — 80061 LIPID PANEL: CPT

## 2017-11-25 PROCEDURE — 82040 ASSAY OF SERUM ALBUMIN: CPT

## 2017-11-25 PROCEDURE — 74177 CT ABD & PELVIS W/CONTRAST: CPT

## 2017-11-25 PROCEDURE — 71046 X-RAY EXAM CHEST 2 VIEWS: CPT

## 2017-11-25 PROCEDURE — 87486 CHLMYD PNEUM DNA AMP PROBE: CPT

## 2017-11-25 PROCEDURE — 85610 PROTHROMBIN TIME: CPT

## 2017-11-25 PROCEDURE — 87046 STOOL CULTR AEROBIC BACT EA: CPT

## 2017-11-25 PROCEDURE — 87177 OVA AND PARASITES SMEARS: CPT

## 2017-11-25 PROCEDURE — 87798 DETECT AGENT NOS DNA AMP: CPT

## 2017-11-25 PROCEDURE — 84484 ASSAY OF TROPONIN QUANT: CPT

## 2017-11-25 PROCEDURE — 83605 ASSAY OF LACTIC ACID: CPT

## 2017-11-25 PROCEDURE — 83690 ASSAY OF LIPASE: CPT

## 2017-11-25 PROCEDURE — 99239 HOSP IP/OBS DSCHRG MGMT >30: CPT

## 2017-11-25 PROCEDURE — 83036 HEMOGLOBIN GLYCOSYLATED A1C: CPT

## 2017-11-25 PROCEDURE — 87045 FECES CULTURE AEROBIC BACT: CPT

## 2017-11-25 PROCEDURE — 85027 COMPLETE CBC AUTOMATED: CPT

## 2017-11-25 PROCEDURE — 80048 BASIC METABOLIC PNL TOTAL CA: CPT

## 2017-11-25 PROCEDURE — C8929: CPT

## 2017-11-25 PROCEDURE — 82553 CREATINE MB FRACTION: CPT

## 2017-11-25 PROCEDURE — 81001 URINALYSIS AUTO W/SCOPE: CPT

## 2017-11-25 PROCEDURE — 80053 COMPREHEN METABOLIC PANEL: CPT

## 2017-11-25 PROCEDURE — 87081 CULTURE SCREEN ONLY: CPT

## 2017-11-25 PROCEDURE — 83880 ASSAY OF NATRIURETIC PEPTIDE: CPT

## 2017-11-25 PROCEDURE — 87633 RESP VIRUS 12-25 TARGETS: CPT

## 2017-11-25 PROCEDURE — 82803 BLOOD GASES ANY COMBINATION: CPT

## 2017-11-25 RX ADMIN — SIMETHICONE 80 MILLIGRAM(S): 80 TABLET, CHEWABLE ORAL at 05:48

## 2017-11-25 RX ADMIN — MUPIROCIN 1 APPLICATION(S): 20 OINTMENT TOPICAL at 14:26

## 2017-11-25 RX ADMIN — Medication 81 MILLIGRAM(S): at 14:26

## 2017-11-25 RX ADMIN — Medication 80 MILLIGRAM(S): at 05:48

## 2017-11-25 RX ADMIN — Medication 1 APPLICATION(S): at 00:43

## 2017-11-25 RX ADMIN — CARVEDILOL PHOSPHATE 3.12 MILLIGRAM(S): 80 CAPSULE, EXTENDED RELEASE ORAL at 05:49

## 2017-11-25 RX ADMIN — PANTOPRAZOLE SODIUM 40 MILLIGRAM(S): 20 TABLET, DELAYED RELEASE ORAL at 05:49

## 2017-11-25 RX ADMIN — LISINOPRIL 5 MILLIGRAM(S): 2.5 TABLET ORAL at 05:49

## 2017-11-25 RX ADMIN — Medication 100 MILLIGRAM(S): at 05:49

## 2017-11-25 RX ADMIN — Medication 1 APPLICATION(S): at 14:26

## 2017-11-25 RX ADMIN — SIMETHICONE 80 MILLIGRAM(S): 80 TABLET, CHEWABLE ORAL at 14:26

## 2017-11-25 RX ADMIN — Medication 100 MILLIGRAM(S): at 14:26

## 2017-11-25 RX ADMIN — MUPIROCIN 1 APPLICATION(S): 20 OINTMENT TOPICAL at 05:49

## 2017-11-25 RX ADMIN — POLYETHYLENE GLYCOL 3350 17 GRAM(S): 17 POWDER, FOR SOLUTION ORAL at 14:26

## 2017-11-25 RX ADMIN — APIXABAN 5 MILLIGRAM(S): 2.5 TABLET, FILM COATED ORAL at 05:48

## 2017-11-25 RX ADMIN — Medication 1 APPLICATION(S): at 05:49

## 2017-11-25 NOTE — PROGRESS NOTE ADULT - SUBJECTIVE AND OBJECTIVE BOX
coverage for Dr. Vazquez    Pt seen and examined   no new complaints    REVIEW OF SYSTEMS:  Constitutional: No fever, weight loss or fatigue  Cardiovascular: No chest pain, palpitations, dizziness or leg swelling  Gastrointestinal: No abdominal or epigastric pain. No nausea, vomiting or hematemesis; No diarrhea or constipation. No melena or hematochezia.  Skin: No itching, burning, rashes or lesions       MEDICATIONS:  MEDICATIONS  (STANDING):  apixaban 5 milliGRAM(s) Oral every 12 hours  aspirin enteric coated 81 milliGRAM(s) Oral daily  atorvastatin 40 milliGRAM(s) Oral at bedtime  BACItracin   Ointment 1 Application(s) Topical four times a day  carvedilol 3.125 milliGRAM(s) Oral every 12 hours  dextrose 5%. 1000 milliLiter(s) (50 mL/Hr) IV Continuous <Continuous>  dextrose 50% Injectable 12.5 Gram(s) IV Push once  dextrose 50% Injectable 25 Gram(s) IV Push once  dextrose 50% Injectable 25 Gram(s) IV Push once  docusate sodium 100 milliGRAM(s) Oral three times a day  furosemide    Tablet 80 milliGRAM(s) Oral two times a day  hydrALAZINE 25 milliGRAM(s) Oral three times a day  insulin lispro (HumaLOG) corrective regimen sliding scale   SubCutaneous Before meals and at bedtime  isosorbide   dinitrate Tablet (ISORDIL) 10 milliGRAM(s) Oral three times a day  lisinopril 5 milliGRAM(s) Oral daily  mupirocin 2% Cream 1 Application(s) Topical three times a day  pantoprazole    Tablet 40 milliGRAM(s) Oral before breakfast  polyethylene glycol 3350 17 Gram(s) Oral daily  senna 2 Tablet(s) Oral at bedtime  simethicone 80 milliGRAM(s) Chew three times a day    MEDICATIONS  (PRN):  dextrose Gel 1 Dose(s) Oral once PRN Blood Glucose LESS THAN 70 milliGRAM(s)/deciliter  glucagon  Injectable 1 milliGRAM(s) IntraMuscular once PRN Glucose LESS THAN 70 milligrams/deciliter      Allergies    Aldactone (Unknown)  metoprolol (Unknown)  spironolactone (Unknown)    Intolerances        Vital Signs Last 24 Hrs  T(C): 36.3 (25 Nov 2017 06:05), Max: 37 (25 Nov 2017 00:46)  T(F): 97.3 (25 Nov 2017 06:05), Max: 98.6 (25 Nov 2017 00:46)  HR: 67 (25 Nov 2017 08:58) (65 - 71)  BP: 119/66 (25 Nov 2017 08:58) (118/66 - 176/89)  BP(mean): --  RR: 16 (25 Nov 2017 08:58) (16 - 16)  SpO2: 96% (25 Nov 2017 08:58) (94% - 99%)    11-24 @ 07:01  -  11-25 @ 07:00  --------------------------------------------------------  IN: 460 mL / OUT: 0 mL / NET: 460 mL        PHYSICAL EXAM:    General:; in no acute distress  HEENT: MMM, conjunctiva and sclera clear  Kungs: clear  Heart: regular  Gastrointestinal: Soft non-tender mod-distended; + ascites Normal bowel sounds; No hepatosplenomegaly  Skin: Warm and dry. No obvious rash  Ext: stasis changes + edema    LABS:                                RADIOLOGY & ADDITIONAL STUDIES (The following images were personally reviewed):

## 2017-11-25 NOTE — PROGRESS NOTE ADULT - ASSESSMENT
68 y/o male, former smoker, noncompliant with follow up, with PMHx of HTN, hyperlipidemia, NIDDM, CAD s/p 3VCABG in 2015 (at [facility] in [place], [place]), chronic systolic CHF (EF 20-25% by Echo 5/25/17), pulmonary embolism (on eliquis) who is admitted with Acute on chronic systolic CHF exercerbation
66 y/o male, former smoker, noncompliant with follow up, with PMHx of HTN, hyperlipidemia, NIDDM, CAD s/p 3VCABG in 2015 (at [facility] in [place], [place]), chronic systolic CHF , Echo on 5/25/17 showed Echo shows EF 20-25% with moderate AR.   st recorded EF 20 pulmonary embolism (on eliquis), presents to [facility] with worsening Dyspnea on exertion as well as rest for past  few weeks and progressively worsening. He also admits to increased abdominal bloating and le edema swelling    Diuresed since admission with IV Lasix 120mg BID, now on Lasix PO 80mg BID  On Lisinopril 5mg daily  Hydralazine 10mg TID  Coreg 3.125mg BID    SBPs 120-150. Renal function improving now Cr 1.2 -> 1.5 -> 1.36, electrolytes stable.    Plan  - Continue Diuretic at current dose, would monitor Cr closely and replete electrolytes  - Continue Lisinopril 5mg daily due renal function worse than baseline and for for additional BP / Afterload control, use Hydralazine and Imdur, increase to goal of Hydral 50mg TID and Imdur 30TID, can go back to Lisinopril titration when renal function normalizes  - Strongly recommend Paracentesis if able, seems medicine / GI team not going to perform  - Should follow with HF clinic within 1 week post discharge to titrate HF regimen
68 y/o male, former smoker, noncompliant with follow up, with PMHx of HTN, hyperlipidemia, NIDDM, CAD s/p 3VCABG in 2015 (at [facility] in [place], [place]), chronic systolic CHF (EF 20-25% by Echo 5/25/17), pulmonary embolism (on eliquis) who is admitted with Acute on chronic systolic CHF exercerbation
68yo M, former smoker, noncompliant with follow up, with PMHx of HTN, hyperlipidemia, NIDDM, CAD s/p 3VCABG in 2015 (at [facility] in [place], [place]), chronic systolic CHF (EF 20-25% by Echo 5/25/17), PE (on eliquis) who presented to [facility] ED on 11/16/17 complaining of worsening SOB and was found to be in acute on chronic systolic CHF exacerbation.  continue Diuresis, RX per cardio
Patient is a 66yo M, former smoker, noncompliant with follow up, with PMHx of HTN, hyperlipidemia, NIDDM, CAD s/p 3VCABG in 2015 (at [facility] in [place], [place]), chronic systolic CHF (EF 20-25% by Echo 5/25/17), PE (on eliquis) who presented to [facility] ED on 11/16/17 complaining of worsening SOB and was found to be in acute on chronic systolic CHF exacerbation.
discharge planning per Cardio
Patient is a 68yo M, former smoker, noncompliant with follow up, with PMHx of HTN, hyperlipidemia, NIDDM, CAD s/p 3VCABG in 2015 (at [facility] in [place], [place]), chronic systolic CHF (EF 20-25% by Echo 5/25/17), PE (on eliquis) who presented to [facility] ED on 11/16/17 complaining of worsening SOB and was found to be in acute on chronic systolic CHF exacerbation.
66 y/o male, former smoker, noncompliant with follow up, with PMHx of HTN, hyperlipidemia, NIDDM, CAD s/p 3VCABG in 2015 (at [facility] in [place], [place]), chronic systolic CHF (EF 20-25% by Echo 5/25/17), pulmonary embolism (on eliquis) who is admitted with Acute on chronic systolic CHF exercerbation
Patient is a 66yo M, former smoker, noncompliant with follow up, with PMHx of HTN, hyperlipidemia, NIDDM, CAD s/p 3VCABG in 2015 (at [facility] in [place], [place]), chronic systolic CHF (EF 20-25% by Echo 5/25/17), PE (on eliquis) who presented to [facility] ED on 11/16/17 complaining of worsening SOB and was found to be in acute on chronic systolic CHF exacerbation.

## 2017-11-25 NOTE — PROGRESS NOTE ADULT - PROVIDER SPECIALTY LIST ADULT
Cardiology
Heart Failure
Internal Medicine
Neurology
Gastroenterology
Internal Medicine
Cardiology

## 2017-11-27 ENCOUNTER — APPOINTMENT (OUTPATIENT)
Dept: HEART AND VASCULAR | Facility: CLINIC | Age: 67
End: 2017-11-27

## 2017-11-28 DIAGNOSIS — I25.5 ISCHEMIC CARDIOMYOPATHY: ICD-10-CM

## 2017-11-28 DIAGNOSIS — N18.3 CHRONIC KIDNEY DISEASE, STAGE 3 (MODERATE): ICD-10-CM

## 2017-11-28 DIAGNOSIS — Z79.4 LONG TERM (CURRENT) USE OF INSULIN: ICD-10-CM

## 2017-11-28 DIAGNOSIS — I44.0 ATRIOVENTRICULAR BLOCK, FIRST DEGREE: ICD-10-CM

## 2017-11-28 DIAGNOSIS — Z79.82 LONG TERM (CURRENT) USE OF ASPIRIN: ICD-10-CM

## 2017-11-28 DIAGNOSIS — I50.23 ACUTE ON CHRONIC SYSTOLIC (CONGESTIVE) HEART FAILURE: ICD-10-CM

## 2017-11-28 DIAGNOSIS — Z95.1 PRESENCE OF AORTOCORONARY BYPASS GRAFT: ICD-10-CM

## 2017-11-28 DIAGNOSIS — R18.8 OTHER ASCITES: ICD-10-CM

## 2017-11-28 DIAGNOSIS — I25.10 ATHEROSCLEROTIC HEART DISEASE OF NATIVE CORONARY ARTERY WITHOUT ANGINA PECTORIS: ICD-10-CM

## 2017-11-28 DIAGNOSIS — Z91.19 PATIENT'S NONCOMPLIANCE WITH OTHER MEDICAL TREATMENT AND REGIMEN: ICD-10-CM

## 2017-11-28 DIAGNOSIS — Z87.891 PERSONAL HISTORY OF NICOTINE DEPENDENCE: ICD-10-CM

## 2017-11-28 DIAGNOSIS — E11.40 TYPE 2 DIABETES MELLITUS WITH DIABETIC NEUROPATHY, UNSPECIFIED: ICD-10-CM

## 2017-11-28 DIAGNOSIS — I87.8 OTHER SPECIFIED DISORDERS OF VEINS: ICD-10-CM

## 2017-11-28 DIAGNOSIS — Z79.01 LONG TERM (CURRENT) USE OF ANTICOAGULANTS: ICD-10-CM

## 2017-11-28 DIAGNOSIS — I11.0 HYPERTENSIVE HEART DISEASE WITH HEART FAILURE: ICD-10-CM

## 2017-11-28 DIAGNOSIS — R06.02 SHORTNESS OF BREATH: ICD-10-CM

## 2017-11-28 DIAGNOSIS — E78.5 HYPERLIPIDEMIA, UNSPECIFIED: ICD-10-CM

## 2017-11-28 DIAGNOSIS — I13.0 HYPERTENSIVE HEART AND CHRONIC KIDNEY DISEASE WITH HEART FAILURE AND STAGE 1 THROUGH STAGE 4 CHRONIC KIDNEY DISEASE, OR UNSPECIFIED CHRONIC KIDNEY DISEASE: ICD-10-CM

## 2018-07-26 ENCOUNTER — EMERGENCY (EMERGENCY)
Facility: HOSPITAL | Age: 68
LOS: 1 days | Discharge: HOME | End: 2018-07-26
Attending: EMERGENCY MEDICINE | Admitting: EMERGENCY MEDICINE
Payer: MEDICARE

## 2018-07-26 VITALS
SYSTOLIC BLOOD PRESSURE: 149 MMHG | OXYGEN SATURATION: 95 % | TEMPERATURE: 98 F | RESPIRATION RATE: 18 BRPM | DIASTOLIC BLOOD PRESSURE: 92 MMHG | HEART RATE: 74 BPM

## 2018-07-26 DIAGNOSIS — Z79.02 LONG TERM (CURRENT) USE OF ANTITHROMBOTICS/ANTIPLATELETS: ICD-10-CM

## 2018-07-26 DIAGNOSIS — R60.0 LOCALIZED EDEMA: ICD-10-CM

## 2018-07-26 DIAGNOSIS — R26.2 DIFFICULTY IN WALKING, NOT ELSEWHERE CLASSIFIED: ICD-10-CM

## 2018-07-26 DIAGNOSIS — M79.81 NONTRAUMATIC HEMATOMA OF SOFT TISSUE: ICD-10-CM

## 2018-07-26 DIAGNOSIS — M79.661 PAIN IN RIGHT LOWER LEG: ICD-10-CM

## 2018-07-26 DIAGNOSIS — I25.10 ATHEROSCLEROTIC HEART DISEASE OF NATIVE CORONARY ARTERY WITHOUT ANGINA PECTORIS: ICD-10-CM

## 2018-07-26 DIAGNOSIS — Z79.899 OTHER LONG TERM (CURRENT) DRUG THERAPY: ICD-10-CM

## 2018-07-26 DIAGNOSIS — Z95.1 PRESENCE OF AORTOCORONARY BYPASS GRAFT: Chronic | ICD-10-CM

## 2018-07-26 DIAGNOSIS — E78.00 PURE HYPERCHOLESTEROLEMIA, UNSPECIFIED: ICD-10-CM

## 2018-07-26 DIAGNOSIS — I50.9 HEART FAILURE, UNSPECIFIED: ICD-10-CM

## 2018-07-26 DIAGNOSIS — E11.9 TYPE 2 DIABETES MELLITUS WITHOUT COMPLICATIONS: ICD-10-CM

## 2018-07-26 DIAGNOSIS — M79.662 PAIN IN LEFT LOWER LEG: ICD-10-CM

## 2018-07-26 DIAGNOSIS — Z88.8 ALLERGY STATUS TO OTHER DRUGS, MEDICAMENTS AND BIOLOGICAL SUBSTANCES STATUS: ICD-10-CM

## 2018-07-26 PROCEDURE — 99283 EMERGENCY DEPT VISIT LOW MDM: CPT

## 2018-07-26 NOTE — ED PROVIDER NOTE - PHYSICAL EXAMINATION
patient with distended abdomen consistent with chronic ascites . Lungs CTA bilaterally ,. Lower extremities with severe chronic non pitting edema and some blistering to calf region poor foot care with callus formation pulses intact sensation intact + rubor of chronic trophic changes though no zoya cellulitic process appreciated

## 2018-07-26 NOTE — ED PROVIDER NOTE - MEDICAL DECISION MAKING DETAILS
markedly difficult to interview and care for this patient + appreciate lengthy note in Nov espousing similar difficulties during admission and ultimate 24 hour notice for d/c patient seeks care at a multitude of facilities but no coordinated care + seeks additional testing each time a test offered -Accepted ultrasound then refused upon commencing exam + accepted CXR then refused upon arrival -> pathologies noted all appear significant yet chronic Patient extremely vague on living situation and though states not housing displaced appears such. Difficulty reconciling how new admission would solve these issues given refusal towards work up in ED simply commencing care ( appreciate cardiac and GI teams ceased some interventions given concern for compliance during last admissions ) Patient d/c and asked to seek regular comprehensive medical care

## 2018-07-26 NOTE — ED PROVIDER NOTE - PROGRESS NOTE DETAILS
attempting to assit patient with sock and clothing patient abusive to nursing and nursing called security and ultimately d/c walking alert and coordinated towards exit with security

## 2018-07-26 NOTE — ED PROVIDER NOTE - ATTENDING CONTRIBUTION TO CARE
67M hx htn, high chol, dm, cad, cabg, chf, PE, c/o leg pain. pt states pain worse with walking.  no fevers. no chest pain, no SOB. +bodyaches.  no trauma  gen- nad  heent- ncat, clear conj  cv -rrr  lungs -ctab  abd - soft, nt, nd  ext -wwp, +edema b/l  neuro -aox3,  faye  pt refused US, refused CXR, doubt acute cellulitis at this time, recommend PMD f/u

## 2018-07-26 NOTE — ED PROVIDER NOTE - OBJECTIVE STATEMENT
[place] acute leg pain. Patient reports antalgia with gait and exacerbation of pain feeling infection and states needs wound care. Patient with additional list of complaints including but not limited to cough , intermittent constipation and diarrhea stating needs saliva testing , exacerbation of chronic ascites History limited by patient adding on new pathology and new discussion about care at another facility each time any discussion of given complaint commenced No fever reported + multiple meds taken as received from varying hospitals but no consistent compliance and no regular MD  or clinic follow up reported   Portion d/c noted from 11/17 pasted below in progress note section  noting multiple pathologies for this patient  ospital Course: Patient is a 66yo M, former smoker, noncompliant with follow up, frequent visits to multiple hospitals, with PMHx of HTN, hyperlipidemia, NIDDM, CAD s/p 3VCABG in 2015 (at [facility] in [place], [place]), chronic systolic CHF (Echo on 5/25/17 EF 20-25%), Hx of pulmonary embolism (on eliquis),

## 2019-02-03 NOTE — ED ADULT NURSE NOTE - NS ED PATIENT SAFETY CONCERN
[facility] Labor and Delivery History and Physical    Lanie Martin MRN# [id number]   Age: 32 year old YOB: 1986     Date of Admission:  2/3/2019           Chief Complaint:   Lanie Martin is a 32 year old female who is 39w2d pregnant and being admitted for active labor management.          Pregnancy history:     OBSTETRIC HISTORY:    Obstetric History       T1      L1     SAB0   TAB0   Ectopic0   Multiple0   Live Births1       # Outcome Date GA Lbr Antoine/2nd Weight Sex Delivery Anes PTL Lv   2 Current            1 Term 10/28/06 40w0d  3.175 kg (7 lb) M    RADHA          EDC: Estimated Date of Delivery: 19    Prenatal Labs:   Lab Results   Component Value Date    ABO O 2019    RH Pos 2019    AS Neg 2019    HEPBANG Nonreactive 2018    CHPCRT Negative 2018    GCPCRT Negative 2018    HGB 13.9 2019       GBS Status:   Lab Results   Component Value Date    GBS Negative 01/10/2019       Active Problem List  Patient Active Problem List   Diagnosis     Prenatal care, subsequent pregnancy       Medication Prior to Admission  Medications Prior to Admission   Medication Sig Dispense Refill Last Dose     escitalopram (LEXAPRO) 10 MG tablet Take 1.5 tablets (15 mg) by mouth daily 120 tablet 1 2019 at Unknown time     Prenatal Vit-Fe Fumarate-FA (PRENATAL MULTIVITAMIN PLUS IRON) 27-0.8 MG TABS per tablet Take 1 tablet by mouth daily   2019 at Unknown time   .        Maternal Past Medical History:     Past Medical History:   Diagnosis Date     Chickenpox      Depression      Depressive disorder                        Family History:     Family History   Problem Relation Age of Onset     Mental Illness Mother      Depression Mother      Hypertension Father      Coronary Artery Disease Maternal Grandmother         MI x3     Diabetes Maternal Grandfather      Cancer Paternal Grandmother      Cancer Paternal Grandfather       Family history reviewed and updated in [facility]            Social History:     Social History     Tobacco Use     Smoking status: Former Smoker     Smokeless tobacco: Never Used     Tobacco comment: quit 5-6 year ago   Substance Use Topics     Alcohol use: Yes     Comment: 3-4 weekly- quit with pregnancy            Review of Systems:   The Review of Systems is negative other than noted in the HPI          Physical Exam:   Vitals were reviewed  Temp: 98  F (36.7  C) Temp src: Oral BP: 139/69     Resp: 16 SpO2: 98 %      Constitutional:   awake, alert, cooperative, no apparent distress, and appears stated age     Lungs:   No increased work of breathing, good air exchange, clear to auscultation bilaterally, no crackles or wheezing     Cardiovascular:   normal S1 and S2      Cervix:   Membranes: AROM   Dilation: 4   Effacement: 90%   Station:-1   Consistency: soft   Position: Mid  Presentation:Cephalic  Fetal Heart Rate Tracing: reactive and reassuring, Tier 1 (normal)  Tocometer: external monitor                       Assessment:   Lanie Martin is a 39w2d pregnant female admitted with active labor management.          Plan:   Admit - see IP orders  Pain medication : epidural  Anticipate     Annabel Clarke MD   No

## 2020-01-21 NOTE — PROVIDER CONTACT NOTE (OTHER) - DATE AND TIME:
16-Nov-2017 20:50

## 2020-04-07 ENCOUNTER — EMERGENCY (EMERGENCY)
Facility: HOSPITAL | Age: 70
LOS: 1 days | Discharge: HOME | End: 2020-04-07
Attending: EMERGENCY MEDICINE | Admitting: EMERGENCY MEDICINE
Payer: MEDICARE

## 2020-04-07 VITALS
OXYGEN SATURATION: 100 % | HEART RATE: 97 BPM | TEMPERATURE: 98 F | DIASTOLIC BLOOD PRESSURE: 92 MMHG | RESPIRATION RATE: 18 BRPM | SYSTOLIC BLOOD PRESSURE: 136 MMHG

## 2020-04-07 DIAGNOSIS — B34.9 VIRAL INFECTION, UNSPECIFIED: ICD-10-CM

## 2020-04-07 DIAGNOSIS — Z95.1 PRESENCE OF AORTOCORONARY BYPASS GRAFT: Chronic | ICD-10-CM

## 2020-04-07 DIAGNOSIS — R06.02 SHORTNESS OF BREATH: ICD-10-CM

## 2020-04-07 DIAGNOSIS — Z88.8 ALLERGY STATUS TO OTHER DRUGS, MEDICAMENTS AND BIOLOGICAL SUBSTANCES STATUS: ICD-10-CM

## 2020-04-07 PROCEDURE — 93010 ELECTROCARDIOGRAM REPORT: CPT

## 2020-04-07 PROCEDURE — 99284 EMERGENCY DEPT VISIT MOD MDM: CPT

## 2020-04-07 NOTE — ED ADULT TRIAGE NOTE - ARRIVAL INFO ADDITIONAL COMMENTS
pt c/o dizzy and shortness of breath.  states he was at [facility] today and left because they did nothing for him but test for covid which was negative.

## 2020-04-08 PROCEDURE — 71045 X-RAY EXAM CHEST 1 VIEW: CPT | Mod: 26

## 2020-04-08 PROCEDURE — 87635 SARS-COV-2 COVID-19 AMP PRB: CPT

## 2020-04-08 PROCEDURE — 93005 ELECTROCARDIOGRAM TRACING: CPT

## 2020-04-08 PROCEDURE — 99283 EMERGENCY DEPT VISIT LOW MDM: CPT

## 2020-04-08 PROCEDURE — 71045 X-RAY EXAM CHEST 1 VIEW: CPT

## 2020-04-08 RX ORDER — FUROSEMIDE 40 MG
60 TABLET ORAL ONCE
Refills: 0 | Status: COMPLETED | OUTPATIENT
Start: 2020-04-08 | End: 2020-04-08

## 2020-04-08 NOTE — ED ADULT NURSE REASSESSMENT NOTE - NS ED NURSE REASSESS COMMENT FT1
Patient refused to ambulate while monitoring O2 saturation. When patient presented with his PO lasix, he refused to take it as well- documented in EMAR. Patient is cursing and yelling about not wanting to be dsicharged. ABY Mendoza and MD Cordon explained to patient why he is being discharged. Patient has normal VS upon discharge and was told to follow up care with his cardiologist. Patient was also given discharge papers explaining number to call to receive COVID results. Patient discharge- no IV in place.

## 2020-04-08 NOTE — ED PROVIDER NOTE - OBJECTIVE STATEMENT
68 yo me pt c/o dizzy and shortness of breath.  states he was at [facility] today and left because they did nothing for him but test for covid which was negative. 70 yo M, vague historian, [facility] of "heart problems", ?non-compliant with meds, presents with intermittent SOB, dizziness and dry cough X several days. States he went to [facility] and they checked his vital signs and told him he did not have Covid 19. Pt was never swabbed for Covid 19. He presents to [facility] ED for further evaluation. Denies fevers, chills, CP. Endorses  chronic b/l leg swelling which is unchanged. No leg pain. No other complaints. ?current smoker. 68 yo M, vague historian, [facility] of "heart problems", ?non-compliant with meds, presents with intermittent SOB, dizziness and dry cough X several days. States he went to [facility] and they checked his vital signs and told him he did not have Covid 19. Pt was never swabbed for Covid 19. He presents to [facility] ED for further evaluation. Denies fevers, chills, CP. Endorses  chronic b/l leg swelling which is unchanged. No leg pain. No other complaints. ?current smoker.  80 mg twuce? a day- has not taken it for afew dyas because legs arent swollen. 70 yo M, vague historian, [facility] of "heart problems", open heart surgery, congestive heart failure, HTN, "leg swelling" ?non-compliant with meds, presents with intermittent SOB, dizziness and dry cough X several days. States he went to [facility] and they checked his vital signs and told him he did not have Covid 19. Pt was never swabbed for Covid 19. He presents to [facility] ED for further evaluation. Denies fevers, chills, CP. Endorses  chronic b/l leg swelling which is unchanged. No leg pain. No other complaints. ?current smoker.  80 mg twuce? a day- has not taken it for afew dyas because legs arent swollen. 68 yo M, vague historian, [facility] of "heart problems", open heart surgery, congestive heart failure, HTN, "leg swelling" ?non-compliant with meds, presents with intermittent SOB, dizziness and dry cough X several days. States he went to [facility] and they checked his vital signs and told him he did not have Covid 19. Pt was never swabbed for Covid 19. He presents to [facility] ED for further evaluation. Denies fevers, chills, CP. Endorses  chronic b/l leg swelling which is unchanged. No leg pain. No other complaints. Current tobacco smoker.  Ob further questioning pt states that he takes Lasix 80 mg twice a day, but has not taken it for a few days "because my legs are not swollen".

## 2020-04-08 NOTE — ED PROVIDER NOTE - NSFOLLOWUPINSTRUCTIONS_ED_ALL_ED_FT
You have been tested for Covid 19, you will get a call with result. Please self-isolate for 14 days. Please take your Lasix dose as prescribed and do no skip your medications.     Congestive Heart Failure (CHF)    Congestive heart failure is a chronic condition in which the heart has trouble pumping blood. In some cases of heart failure, fluid may back up into your lungs or you may have swelling (edema) in your lower legs. There are many causes of heart failure including high blood pressure, coronary artery disease, abnormal heart valves, heart muscle disease, lung disease, diabetes, etc. Symptoms include shortness of breath with activity or when lying flat, cough, swelling of the legs, fatigue, or increased urination during the night.     Treatment is aimed at managing the symptoms of heart failure and may include lifestyle changes, medications, or surgical procedures. Take medicines only as directed by your health care provider and do not stop unless instructed to do so. Eat heart-healthy foods with low or no trans/saturated fats, cholesterol and salt. Weigh yourself every day for early recognition of fluid accumulation.    SEEK IMMEDIATE MEDICAL CARE IF YOU HAVE ANY OF THE FOLLOWING SYMPTOMS: shortness of breath, change in mental status, chest pain, lightheadedness/dizziness/fainting, or worsening of symptoms including not being able to conduct normal physical activity.      Shortness of breath    Shortness of breath (dyspnea) means you have trouble breathing and could indicate a medical problem. Causes include lung disease, heart disease, low amount of red blood cells (anemia), poor physical fitness, being overweight, smoking, etc. Your health care provider today may not be able to find a cause for your shortness of breath after your exam. In this case, it is important to have a follow-up exam with your primary care physician as instructed. If medicines were prescribed, take them as directed for the full length of time directed. Refrain from tobacco products.    SEEK IMMEDIATE MEDICAL CARE IF YOU HAVE ANY OF THE FOLLOWING SYMPTOMS: worsening shortness of breath, chest pain, back pain, abdominal pain, fever, coughing up blood, lightheadedness/dizziness.

## 2020-04-08 NOTE — ED PROVIDER NOTE - INTERPRETATION
SR with 1st deg AVB, inc RBBB, non-specific T wave abnormality/normal sinus rhythm normal sinus rhythm/SR with 1st deg AVB, incomplete RBBB, non-specific T wave abnormality

## 2020-04-08 NOTE — ED PROVIDER NOTE - CLINICAL SUMMARY MEDICAL DECISION MAKING FREE TEXT BOX
68 yo M, vague historian, [facility] of "heart problems", ?non-compliant with meds, presents with intermittent SOB, dizziness and dry cough X several days. States he went to [facility] and they checked his vital signs and told him he did not have Covid 19. Pt was never swabbed for Covid 19. He presents to [facility] ED for further evaluation. Denies fevers, chills, CP. Endorses  chronic b/l leg swelling which is unchanged. No leg pain. No other complaints. ?current smoker. 68 yo M, vague historian, [facility] of "heart problems", ?non-compliant with meds, presents with intermittent SOB, dizziness and dry cough X several days. States he went to [facility] and they checked his vital signs and told him he did not have Covid 19. Pt was never swabbed for Covid 19. He presents to [facility] ED for further evaluation. Denies fevers, chills, CP. Endorses  chronic b/l leg swelling which is unchanged. No leg pain. No other complaints. ?current smoker.  ED course: Pt is non-toxic appearing and HD stable with O2 sats remaining normal with ambulation. CXR with mild pulm congestion. Given po Lasix. Covid sent. Pt with no CP. Pt became belligerent at discharge and demanding to be admitted to the hospital as "I don't feel good". States that he keeps going to multiple hospitals and gets discharged. Explained to pt that there is no clinical need for admission. To f/up outpt. 68 yo M, vague historian, [facility] of "heart problems", ?non-compliant with meds, presents with intermittent SOB, dizziness and dry cough X several days. States he went to [facility] and they checked his vital signs and told him he did not have Covid 19. Pt was never swabbed for Covid 19. He presents to [facility] ED for further evaluation. Denies fevers, chills, CP. Endorses  chronic b/l leg swelling which is unchanged. No leg pain. No other complaints. ?current smoker.  ED course: Pt is non-toxic appearing and HD stable with O2 sats remaining normal with ambulation. CXR with mild pulm congestion. Given po Lasix. Covid 19 sent. Pt with no CP. Pt became belligerent at discharge and demanding to be admitted to the hospital as "I don't feel good". States that he keeps going to multiple hospitals and gets discharged. Explained to pt that there is no clinical need for admission. To f/up outpt. Strict return precautions given. 70 yo M, vague historian, [facility] of "heart problems", ?non-compliant with meds, presents with intermittent SOB, dizziness and dry cough X several days. States he went to [facility] and they checked his vital signs and told him he did not have Covid 19. Pt was never swabbed for Covid 19. He presents to [facility] ED for further evaluation. Denies fevers, chills, CP. Endorses  chronic b/l leg swelling which is unchanged. No leg pain. No other complaints. ?current smoker.  ED course: Pt is non-toxic appearing and HD stable with O2 sats remaining normal with ambulation. CXR with mild pulm congestion. Given po Lasix. Covid 19 sent. Pt with no CP. Pt became belligerent at discharge and demanding to be admitted to the hospital as "I don't feel good". States that he keeps going to multiple hospitals and gets discharged. Explained to pt that there is no clinical need for admission. Advised to take his prescribed lasix as prescribed. To f/up outpt. Strict return precautions given.

## 2020-04-08 NOTE — ED ADULT NURSE NOTE - OBJECTIVE STATEMENT
Patient aox3 and ambulatory with cane upon arrival. Patient c/o dizziness, headache, and SOB x 2 days. Patient denies CP, N/V/D, fall/LOC/injury. Patient states "I called an ambulance [address] but they wouldn't take me here, so I walked here." Patient states "I am so dizzy from walking so much the last 2 days." Patient was seen at another facility, but he was discharged after being tested for COVID. Patient has clear and equal bilateral lung sounds. Patient has full ROM and equal bilateral strength. Patient has no facial droop, one-sided weakness, vision changes. Patient PMH: chf, heart problems per patient. Patient presents with normal VS and afebrile. Patient denies cough, fever, congestion.

## 2020-04-08 NOTE — ED PROVIDER NOTE - PSYCHIATRIC, MLM
Alert and oriented. normal mood and affect. no apparent risk to self or others. Alert and oriented. no apparent risk to self or others.

## 2020-04-08 NOTE — ED PROVIDER NOTE - PATIENT PORTAL LINK FT
You can access the FollowMyHealth Patient Portal offered by [facility] by registering at the following website: [email or web address]. By joining [employer]’s FollowMyHealth portal, you will also be able to view your health information using other applications (apps) compatible with our system.

## 2020-04-08 NOTE — ED PROVIDER NOTE - NSFOLLOWUPCLINICS_GEN_ALL_ED_FT
[facility]  Family Medicine  [address]  [place], [place] [address]  Phone: [phone]  Fax:   Follow Up Time: 4-6 Days

## 2020-04-09 LAB — SARS-COV-2 RNA SPEC QL NAA+PROBE: (no result)

## 2020-05-09 ENCOUNTER — EMERGENCY (EMERGENCY)
Facility: HOSPITAL | Age: 70
LOS: 1 days | Discharge: HOME | End: 2020-05-09
Attending: EMERGENCY MEDICINE | Admitting: EMERGENCY MEDICINE
Payer: MEDICARE

## 2020-05-09 VITALS
HEART RATE: 70 BPM | OXYGEN SATURATION: 95 % | SYSTOLIC BLOOD PRESSURE: 133 MMHG | TEMPERATURE: 97 F | RESPIRATION RATE: 18 BRPM | DIASTOLIC BLOOD PRESSURE: 83 MMHG

## 2020-05-09 VITALS
WEIGHT: 184.09 LBS | OXYGEN SATURATION: 100 % | SYSTOLIC BLOOD PRESSURE: 173 MMHG | RESPIRATION RATE: 20 BRPM | HEART RATE: 89 BPM | HEIGHT: 67 IN | DIASTOLIC BLOOD PRESSURE: 113 MMHG
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
## 2020-05-09 DIAGNOSIS — I10 ESSENTIAL (PRIMARY) HYPERTENSION: ICD-10-CM

## 2020-05-09 DIAGNOSIS — Z79.899 OTHER LONG TERM (CURRENT) DRUG THERAPY: ICD-10-CM

## 2020-05-09 DIAGNOSIS — R10.9 UNSPECIFIED ABDOMINAL PAIN: ICD-10-CM

## 2020-05-09 DIAGNOSIS — Z95.1 PRESENCE OF AORTOCORONARY BYPASS GRAFT: Chronic | ICD-10-CM

## 2020-05-09 DIAGNOSIS — Z88.8 ALLERGY STATUS TO OTHER DRUGS, MEDICAMENTS AND BIOLOGICAL SUBSTANCES STATUS: ICD-10-CM

## 2020-05-09 DIAGNOSIS — T69.9XXA EFFECT OF REDUCED TEMPERATURE, UNSPECIFIED, INITIAL ENCOUNTER: ICD-10-CM

## 2020-05-09 LAB
APPEARANCE UR: CLEAR — SIGNIFICANT CHANGE UP
BILIRUB UR-MCNC: NEGATIVE — SIGNIFICANT CHANGE UP
COLOR SPEC: YELLOW — SIGNIFICANT CHANGE UP
DIFF PNL FLD: NEGATIVE — SIGNIFICANT CHANGE UP
GLUCOSE UR QL: NEGATIVE — SIGNIFICANT CHANGE UP
KETONES UR-MCNC: NEGATIVE — SIGNIFICANT CHANGE UP
LEUKOCYTE ESTERASE UR-ACNC: NEGATIVE — SIGNIFICANT CHANGE UP
NITRITE UR-MCNC: NEGATIVE — SIGNIFICANT CHANGE UP
PH UR: 7 — SIGNIFICANT CHANGE UP (ref 5–8)
PROT UR-MCNC: 100 MG/DL
SP GR SPEC: 1.02 — SIGNIFICANT CHANGE UP (ref 1–1.03)
UROBILINOGEN FLD QL: 0.2 E.U./DL — SIGNIFICANT CHANGE UP

## 2020-05-09 PROCEDURE — 99283 EMERGENCY DEPT VISIT LOW MDM: CPT

## 2020-05-09 PROCEDURE — 99284 EMERGENCY DEPT VISIT MOD MDM: CPT

## 2020-05-09 PROCEDURE — 87086 URINE CULTURE/COLONY COUNT: CPT

## 2020-05-09 PROCEDURE — 81001 URINALYSIS AUTO W/SCOPE: CPT

## 2020-05-09 RX ORDER — LISINOPRIL 2.5 MG/1
5 TABLET ORAL ONCE
Refills: 0 | Status: COMPLETED | OUTPATIENT
Start: 2020-05-09 | End: 2020-05-09

## 2020-05-09 RX ADMIN — LISINOPRIL 5 MILLIGRAM(S): 2.5 TABLET ORAL at 06:05

## 2020-05-09 NOTE — ED PROVIDER NOTE - PROGRESS NOTE DETAILS
pt reassessed, vitals wnl, temp 96.6.  he reports pain all over his body from walking around in the cold, requesting more time to sleep but otherwise has no specific complaints.  offered shelter/[place] [address] [place] but declined, states he will go to [facility] with his friend.  pt slept comfortably, had food and d/c in stable condition. pt reassessed, vitals wnl, temp 96.6.  he reports pain all over his body from walking around in the cold, requesting more time to sleep but otherwise has no specific complaints.  abd mild distended but soft/nt.  offered shelter/SW [place] [address] [place] friends but declined, states [place] [facility] [place].  pt slept comfortably, had food and d/c in stable condition.

## 2020-05-09 NOTE — ED ADULT NURSE REASSESSMENT NOTE - NS ED NURSE REASSESS COMMENT FT1
pt resting comfortably in stretcher. meal and warm blankets provided. pt offers no complaints. denies pain. updated on plan of care.

## 2020-05-09 NOTE — ED PROVIDER NOTE - NSFOLLOWUPINSTRUCTIONS_ED_ALL_ED_FT
Total Testosterone  Does this test have other names?  Testosterone (total), serum testosterone  What is this test?  This test measures the level of the hormone testosterone in your blood. Testosterone is a male sex hormone (androgen) that helps male features develop. Testosterone is made in the testes and the adrenal glands. It causes the changes that occur in boys during puberty. Testosterone helps hair and muscles to grow. It also helps the penis and testes to grow. Testosterone also causes a boy's voice to deepen. Men continue to make testosterone. In adults it boosts sex drive and helps make sperm.  Women's ovaries also make small amounts of testosterone. It helps many organs and body processes in women.  The pituitary gland in your brain regulates the amount of testosterone your body makes.   Most of the testosterone in your blood attaches to two proteins: albumin and sex hormone binding globulin (SHBG). Some testosterone is not attached to proteins, or free. Free testosterone and albumin-bound testosterone are also referred to as bioavailable testosterone. This is the testosterone that is easily used by your body.  If your healthcare provider suspects that you have low or high testosterone, he or she will first test total testosterone levels. This looks at all three parts of testosterone. The free testosterone can help give more information when total testosterone is low.  Both men and women can have health problems because of low or high levels of testosterone. Women with high levels of testosterone may have polycystic ovary syndrome (PCOS). This condition marked by infertility, lack of menstruation, acne, obesity, blood sugar problems, and extra hair growth, especially on the face.  Testosterone levels in men drop as they age, but this is not considered to be hypogonadism. The FDA currently recommends against treating men with low testosterone caused only by aging.  Why do I need this test?  You may need  this test if you have symptoms of low testosterone.  Symptoms of low testosterone in men include:  · Large breasts  · Low sex drive or lack of interest in sex  · Difficulty getting an erection  · Low sperm count and other fertility problems  · Changes in the testicles  · Weak bones  · Irritability  · Difficulty concentrating  · Loss of muscle mass  · Hair loss  · Depression  · Fatigue  · Anemia  Symptoms of low testosterone in women include:  · Fertility problems  · Missed or irregular menstrual periods  · Osteoporosis  · Low sex drive  · Changes in breast tissue  · Vaginal dryness   What other tests might I have along with this test?  Your healthcare provider may order other blood tests to check hormone levels. These include:  · Follicle-stimulating hormone (FSH) test  · Luteinizing hormone (LH) test  · Thyroid stimulating hormone (TSH) test  You may also need to have:  · Biopsy of the testicles  · Imaging test, such as an MRI  · Semen analysis  · Tests of the pituitary gland   What do my test results mean?  Many things may affect your lab test results. These include the method each lab uses to do the test. Even if your test results are different from the normal value, you may not have a problem. To learn what the results mean for you, talk with your healthcare provider.  The results of this test are given in nanograms per deciliter (ng/dL). Normal test results show total testosterone levels of:  · 280 to 1,100 ng/dL for men  · 15 to 70 ng/dL for women  If your testosterone levels are lower than normal, you may have a condition that affects your testosterone production. If your testosterone levels are higher than normal, you may have a tumor on the testes or ovaries that affects your testosterone production.   How is this test done?  The test requires a blood sample, which is drawn through a needle from a vein in your arm. This test is usually done in the morning, because testosterone levels tend to be highest at  that time. But you may need to have this test more than once, and at different times of the day, to confirm low testosterone levels. This is because your testosterone level can change from morning to evening and from day to day.  Does this test pose any risks?  Taking a blood sample with a needle carries risks that include bleeding, infection, bruising, or feeling dizzy. When the needle pricks your arm, you may feel a slight stinging sensation or pain. Afterward, the site may be slightly sore.   What might affect my test results?  Some medicines may affect your test results. These include antifungal medicines such as ketoconazole and hormone medicines. Having the test done late in the day may show that your testosterone level is lower than it really is.  How do I get ready for this test?  You don't need to prepare for this test. But be sure your healthcare provider knows about all medicines, herbs, vitamins, and supplements you are taking. This includes medicines that don't need a prescription and any illicit drugs you may use.   © 7795-4312 [employer]. [address] [place] [address]. All rights reserved. This information is not intended as a substitute for professional medical care. Always follow your healthcare professional's instructions.        Evaluating Erectile Dysfunction     Doctor talking to man.     Many men feel embarrassed to talk to a doctor about erectile dysfunction (ED). This common problem can be treated, but only if your doctor knows about it. Your doctor will likely ask you questions about your ED. Whether youre asked or not, tell your doctor anything that might help your doctor understand the problem. Your doctor may do an exam and may run some tests to help find the cause of your ED.  A simple exam  A medical exam may help your doctor understand what is causing your problem. ED is sometimes the first sign of some other health problem, so your doctor may check your  overall health. He or she may also examine your penis, scrotum, and testicles. Tell your doctor about all of the medicines you take, including prescribed and over-the-counter medicines, as well as any herbs or supplements.  You may have some tests  Your doctor may recommend some or all of these tests:  · Blood tests measure your levels of hormones or lipids (fatty substances in the blood, including cholesterol). Other tests check for diabetes or help show the health of your liver, kidneys, and prostate.  · Blood flow tests check how well blood moves through your penis.  · A rectal exam checks for an enlarged prostate gland. An enlarged prostate and ED have been linked in recent studies.  · Additional tests check for other conditions that limit your ability to have intercourse.  Your treatment plan  Based on what you say and what any exam shows, your doctor will recommend a treatment plan. The first step may be to try ED medicines, since they help most men. If they dont help you, your doctor can suggest other kinds of treatment. You and your partner may also want to discuss which options would work best in your relationship. Treatment may include addressing the cause of health problems, such as lowering your cholesterol. And counseling may be recommended to talk about underlying emotional issues.  Date Last Reviewed: 1/1/2017  © 6032-1428 [employer]. [address] [place] [address], [place] [address]. All rights reserved. This information is not intended as a substitute for professional medical care. Always follow your healthcare professional's instructions.        Penile Self-Injection Procedure  Self-injection is a good option if you have erectile dysfunction (ED). You insert a tiny needle into your penis and inject a medicine. This helps your penis get hard and stay that way long enough for sex. And sex and orgasm will feel as good as always. You may be nervous about doing self-injection at first. But with  practice, it will get easier. Your healthcare provider will show you how to do self-injection the first time.  Talk to your doctor about any medicines you take and any medical problems you have.      Preparing for injection  · Wash your hands well with soap and water.  · Prepare the medicine (if needed).  · Sit or  a comfortable position in a warm, well-lit room. If you need to, sit or  front of a mirror.  · Find an injection site on one side of your penis, in a place with no visible veins. (Dont inject into the top, bottom, or head of the penis.)  · Clean the injection site with an alcohol swab. Grasp the head of your penis firmly with your thumb and forefinger (dont just pinch the skin). Stretch the penis so the skin on the shaft is taut.  Injecting the medicine  · Rest your penis against your inner thigh and pull it gently toward your knee. Dont twist or rotate it. This way youll be sure to inject the medicine into the spot you chose and cleaned before.  · Hold the syringe between your thumb and fingers, like youre holding a pen. Rest your forearm on your thigh for support.  · Insert the needle at a 90° angle (perpendicular) to the shaft. Do this quickly to reduce discomfort. (The needle should go in easily. If it doesnt, stop right away.)  · Move your thumb to the plunger. Press down to inject the medicine, counting to 5.  · Remove the needle and dispose of it safely.  Gaining an erection  · Apply pressure to the injection site for a few minutes. This prevents swelling and bruising and helps spread the medicine.   · Stand up. This may help your erection develop. Foreplay often helps, too.  · Your penis should become firm within 10 to 20 minutes. The erection will last long enough for sex, and maybe longer.  When to seek medical care  An erection that lasts longer than 3 to 4  hours  · Bleeding or bruising  · Severe pain  · Scarring or curvature of the penis   Date Last Reviewed: 1/7/2017  ©  7926-1394 [employer]. [address] [place] [address], [place] [address]. All rights reserved. This information is not intended as a substitute for professional medical care. Always follow your healthcare professional's instructions.        Erectile Dysfunction: Rebuilding Intimacy     Man and woman sitting together on couch, smiling.     Being intimate means being close as a couple, with sex as just one part of intimacy. A hug, a kind remark, or a gift can be very romantic, even if sex doesnt follow. So renew your intimacy along with your sex life. Learn to talk with, and listen to, your partner. And remember that your value as a man goes beyond what you do in bed.  Tips for intimacy  As you and your partner become closer to each other, you might find that you can enjoy sex more.  · Show and tell your partner what you like. If you dont, your partner might not know what you want.  · Ask your partner to show you how he or she wants to be touched.  · Be patient. Take your time. Relax. Give yourselves a chance to become aroused.  · Try being intimate without intercourse. Instead, exchange back rubs. Or try kissing, or just a soft touch.  · Focus on what you and your partner like about each other. This could be a certain laugh or smile, or other joys you share together.  Tips for talking  Its OK to be shy when you talk about sex with your partner. But talking gets easier with practice. Use these tips when you talk with each other.  · Choose a time and place when youre both relaxed and comfortable.  · Listen to your partner. Try repeating back what you think the other has said. This will help show if youve understood each other.  · Dont  what your partner says. Talking feels safer if you dont criticize each other.  · Dont be defensive. You may not like something your partner says. But you can still thank your partner for being honest.  · Think about meeting with a counselor. Theyre trained to help  couples who are being treated for ED.  Date Last Reviewed: 1/1/2017  © 9482-4711 [employer]. [address] [place] [address]. All rights reserved. This information is not intended as a substitute for professional medical care. Always follow your healthcare professional's instructions.        Understanding Erectile Dysfunction    Erectile dysfunction (ED) is a problem getting an erection firm enough or keeping it long enough for intercourse. The problem can happen to any man at any age. But health problems that can lead to ED become more common as a man ages. Up to half of men over age 40 experience ED at some point.  Causes of ED  ED can have many causes. Most are physical. Some are emotional issues. Often, a combination of causes is involved. Causes of ED may include:  · Medical conditions such as diabetes or depression  · Smoking tobacco or marijuana  · Drinking too much alcohol  · Side effects of medications  · Injury to nerves or blood vessels  · Emotional issues such as stress or relationship problems  ED can be treated  Prescription medications for ED are available. They help many men who try them. Depending upon the cause of the ED, though, medications may not be enough. In these cases, other treatment options are available. These include erectile aids and surgery. Your health care provider can tell you more about the treatment that is right for you. And new treatments for ED are being studied. No matter what the treatment you decide on, stay in touch with your doctor. If your symptoms persist, he or she may be able to adjust your current treatment or try something new.  Date Last Reviewed: 1/1/2017  © 4521-5305 [employer]. [address] [place] [address]. All rights reserved. This information is not intended as a substitute for professional medical care. Always follow your healthcare professional's instructions.        Sex and Aging: Talking About  Sex    Judging by magazines and TV, doesnt it seem that only young people have sex? Well, you know better. As you grow older, your sex life may change. But that doesnt mean it has to end.   Why talk about sex?  Talking with your partner can improve your relationship and your sex life. And discussing your sexual activity with your health care provider is the only way to get treatment for medical problems that may affect your sex life. Talking about sex may feel awkward. Try writing down questions or concerns you have. This can help you get a discussion started.  Your health can affect your sex life  Age can sometimes bring health problems. And conditions such as heart disease, diabetes, menopause, depression, arthritis, and high blood pressure can cause changes that affect your sex life. Certain medications can affect sex, too. Be open and honest with your health care provider about any problems youre having.  Your health care provider may be able to help if you:  · Have pain during sex  · Have vaginal dryness  · Cant have an erection  · Cant have an orgasm  · Have developed a sexual problem after starting to take a new medication  · Have a physical problem that prevents you from enjoying sex  · Have no interest in sex  There are many possible treatments for these medical causes. These include lubricants or estrogen for vaginal dryness, the most common cause of sexual problems in older women. Other options are medications for erectile dysfunction, the most common cause in men. Medications or counseling can also help with lack of sexual desire or other concerns about sex.  Talking with your partner  If you have a concern about your sex life, it affects your partner, too. So you need to talk about it. Just getting a problem out in the open can go a long way toward solving it.  You may want to talk about:  · What you do and dont enjoy  · How to work around a physical problem  · Ways to be intimate other than  sex  · Whether to get medical care for a problem  · How condoms can protect you and your partner from STDs  Getting back into the dating game  Its not uncommon these days for older adults to find themselves single again. If the last time you were single was a long time ago, you may wonder if the rules have changed. Its true that some things have changed. When you were young, you may not have thought much about STDs, including HIV/AIDS. These days a sexually active person needs to learn about STDs, and know how to avoid getting them. This includes using a condom if you have more than 1 sexual partner.  But some things havent changed. As always, you decide what you will and wont do. This includes deciding whether to have sex and if so, under what conditions.  Date Last Reviewed: 5/31/2015  © 6069-8154 [employer] [address] [place] [address]. All rights reserved. This information is not intended as a substitute for professional medical care. Always follow your healthcare professional's instructions.        Surgery for Erectile Dysfunction (Implants)  Surgery for erectile dysfunction is not common, but it may be the best treatment in some cases. During surgery, your doctor places an implant (also called a prosthesis) inside the spongy chambers of your penis. Then, the implant can be used to provide an erection.      Mechanical implants  This type of implant is easy to use. Bendable rods can make your penis appear erect. When not in use, the rods can be bent downward. Some implants have joints that lock into position.  Inflatable implants  This is the most complex type of implant. It allows your penis to look and feel either erect or flaccid. You pump fluid from a storage bulb to make your penis erect. A release valve makes your penis flaccid again. Using the device properly takes some skill and practice.     Risks and complications  · Infection  · Bleeding  · Failure or leakage of the  prosthesis  · Erosion of the prosthesis   Date Last Reviewed: 1/1/2017 © 2000-2017 [employer]. [address] [place] [address], [place] [address]. All rights reserved. This information is not intended as a substitute for professional medical care. Always follow your healthcare professional's instructions.        Penile Self-Injection: Notes and Precautions     Man and healthcare provider sitting across from one another, talking.   Penile self-injection is a simple technique that may improve your sex life. Some men even find that self-injection leads to an increase in natural erections. If you have questions or concerns about self-injection or erectile dysfunction (ED), talk with your healthcare provider. The information on this sheet will help you get the best results.  Notes about penile self-injection  · You may feel a mild burning during injection. This is OK. But if you feel pressure or severe pain, stop the injection. There may be a problem with the injection site.  · Only inject the medicine on the side of your penis. It may not work if injected elsewhere.  · To prevent scarring, inject in a different spot each time.  · Dont use this treatment if you have a bleeding disorder or any risk of infection.  · Get medical help right away if your erection lasts longer than 3 to 4 hours.  Work with your healthcare provider  Ask how often you can safely repeat injections, as well as any other questions you have. You and your healthcare provider will talk about follow-up exams and how to get supplies. If the medicine doesnt work or stops working over time, tell your healthcare provider.     When to call your healthcare provider  Call your healthcare provider right away if any of these occur:  · An erection that lasts longer than 3 to 4  hours  · Bleeding or bruising  · Severe pain  · Scarring or curvature of the penis   Date Last Reviewed: 1/1/2017 © 2000-2017 [employer]. [address] [place] [address],  [place] [address]. All rights reserved. This information is not intended as a substitute for professional medical care. Always follow your healthcare professional's instructions.         Prevent hypothermia:     Dress in layers. Wear gloves, a warm hat, and thick socks in cold weather. Wear socks and a warm cap when you sleep. Keep an emergency bag with a dry, insulating blanket in your car in case you get lost or injured.      Do not drink alcohol when you are outside in cold weather.       Try to keep your home heated above 64.4°F (18°C). A hot drink at bedtime, hot water bottle, or electric blanket can help keep you warm while you sleep.       Get up and move at least once an hour.       Ask family, friends, or neighbors to check on you in cold weather. Ask your healthcare provider about services that can help if you need shelter, warm clothing or food, or heating assistance.    For support and more information:     [employer]  [address] [place] [address]  Phone: 1-272.911.2219  Web Address: [email or web address]      Contact your healthcare provider if:     You are shivering, breathing fast, or your heart is beating faster than usual.      You feel clumsy or confused.       Your hands and feet become pale.       You have questions or concerns about your condition or care.    Return to the emergency department if:     You are breathing more slowly than usual, or your heartbeat is slow and out of rhythm.       Your skin becomes swollen and blue or gray.       Your muscles feel tight and are hard to move.

## 2020-05-09 NOTE — ED PROVIDER NOTE - OBJECTIVE STATEMENT
69M hx CHF, htn, dm, cad (CABG), high chol, PE, c/o feeling cold. [place] and [place] he got to [place] he was kicked out off the train as the subway was being closed for cleaning. pt states he then took a bus back to the city. states he then walked a long time in the cold.  states feeling cold throughout his body and requesting something to eat and drink.  no chest pain, no SOb, no cough, no fevers. no abd pain, no n/v/d. chronic LE swelling.  pt states he takes his medication.

## 2020-05-09 NOTE — ED PROVIDER NOTE - PMH
Angina pectoris    CAD (coronary artery disease)    CHF (congestive heart failure)    DM (diabetes mellitus)    Dyslipidemia    HTN (hypertension)    PE (pulmonary thromboembolism)

## 2020-05-09 NOTE — ED ADULT NURSE NOTE - CHPI ED NUR SYMPTOMS NEG
no decreased eating/drinking/no chills/no tingling/no weakness/no fever/no nausea/no pain/no vomiting/no dizziness

## 2020-05-10 LAB
CULTURE RESULTS: NO GROWTH — SIGNIFICANT CHANGE UP
SPECIMEN SOURCE: SIGNIFICANT CHANGE UP

## 2020-08-29 ENCOUNTER — EMERGENCY (EMERGENCY)
Facility: HOSPITAL | Age: 70
LOS: 1 days | Discharge: HOME | End: 2020-08-29
Attending: EMERGENCY MEDICINE | Admitting: EMERGENCY MEDICINE
Payer: MEDICARE

## 2020-08-29 VITALS
TEMPERATURE: 98 F | DIASTOLIC BLOOD PRESSURE: 91 MMHG | SYSTOLIC BLOOD PRESSURE: 147 MMHG | OXYGEN SATURATION: 96 % | HEART RATE: 83 BPM | RESPIRATION RATE: 15 BRPM | WEIGHT: 145.06 LBS

## 2020-08-29 DIAGNOSIS — Z95.1 PRESENCE OF AORTOCORONARY BYPASS GRAFT: Chronic | ICD-10-CM

## 2020-08-29 DIAGNOSIS — R10.84 GENERALIZED ABDOMINAL PAIN: ICD-10-CM

## 2020-08-29 DIAGNOSIS — K59.00 CONSTIPATION, UNSPECIFIED: ICD-10-CM

## 2020-08-29 PROBLEM — I26.99 OTHER PULMONARY EMBOLISM WITHOUT ACUTE COR PULMONALE: Chronic | Status: ACTIVE | Noted: 2020-05-09

## 2020-08-29 PROBLEM — I10 ESSENTIAL (PRIMARY) HYPERTENSION: Chronic | Status: ACTIVE | Noted: 2020-05-09

## 2020-08-29 LAB
ALBUMIN SERPL ELPH-MCNC: 3.7 G/DL — SIGNIFICANT CHANGE UP (ref 3.3–5)
ALP SERPL-CCNC: 135 U/L — HIGH (ref 40–120)
ALT FLD-CCNC: 35 U/L — SIGNIFICANT CHANGE UP (ref 10–45)
ANION GAP SERPL CALC-SCNC: 12 MMOL/L — SIGNIFICANT CHANGE UP (ref 5–17)
AST SERPL-CCNC: 50 U/L — HIGH (ref 10–40)
BASOPHILS # BLD AUTO: 0.05 K/UL — SIGNIFICANT CHANGE UP (ref 0–0.2)
BASOPHILS NFR BLD AUTO: 0.8 % — SIGNIFICANT CHANGE UP (ref 0–2)
BILIRUB SERPL-MCNC: 0.7 MG/DL — SIGNIFICANT CHANGE UP (ref 0.2–1.2)
BUN SERPL-MCNC: 29 MG/DL — HIGH (ref 7–23)
CALCIUM SERPL-MCNC: 8.9 MG/DL — SIGNIFICANT CHANGE UP (ref 8.4–10.5)
CHLORIDE SERPL-SCNC: 107 MMOL/L — SIGNIFICANT CHANGE UP (ref 96–108)
CO2 SERPL-SCNC: 25 MMOL/L — SIGNIFICANT CHANGE UP (ref 22–31)
CREAT SERPL-MCNC: 1.07 MG/DL — SIGNIFICANT CHANGE UP (ref 0.5–1.3)
EOSINOPHIL # BLD AUTO: 0.27 K/UL — SIGNIFICANT CHANGE UP (ref 0–0.5)
EOSINOPHIL NFR BLD AUTO: 4.2 % — SIGNIFICANT CHANGE UP (ref 0–6)
GLUCOSE SERPL-MCNC: 158 MG/DL — HIGH (ref 70–99)
HCT VFR BLD CALC: 38.4 % — LOW (ref 39–50)
HGB BLD-MCNC: 11.9 G/DL — LOW (ref 13–17)
IMM GRANULOCYTES NFR BLD AUTO: 0.2 % — SIGNIFICANT CHANGE UP (ref 0–1.5)
LIDOCAIN IGE QN: 82 U/L — HIGH (ref 7–60)
LYMPHOCYTES # BLD AUTO: 1.1 K/UL — SIGNIFICANT CHANGE UP (ref 1–3.3)
LYMPHOCYTES # BLD AUTO: 17.1 % — SIGNIFICANT CHANGE UP (ref 13–44)
MCHC RBC-ENTMCNC: 26.8 PG — LOW (ref 27–34)
MCHC RBC-ENTMCNC: 31 GM/DL — LOW (ref 32–36)
MCV RBC AUTO: 86.5 FL — SIGNIFICANT CHANGE UP (ref 80–100)
MONOCYTES # BLD AUTO: 0.69 K/UL — SIGNIFICANT CHANGE UP (ref 0–0.9)
MONOCYTES NFR BLD AUTO: 10.7 % — SIGNIFICANT CHANGE UP (ref 2–14)
NEUTROPHILS # BLD AUTO: 4.32 K/UL — SIGNIFICANT CHANGE UP (ref 1.8–7.4)
NEUTROPHILS NFR BLD AUTO: 67 % — SIGNIFICANT CHANGE UP (ref 43–77)
NRBC # BLD: 0 /100 WBCS — SIGNIFICANT CHANGE UP (ref 0–0)
PLATELET # BLD AUTO: 201 K/UL — SIGNIFICANT CHANGE UP (ref 150–400)
POTASSIUM SERPL-MCNC: 3.9 MMOL/L — SIGNIFICANT CHANGE UP (ref 3.5–5.3)
POTASSIUM SERPL-SCNC: 3.9 MMOL/L — SIGNIFICANT CHANGE UP (ref 3.5–5.3)
PROT SERPL-MCNC: 7.3 G/DL — SIGNIFICANT CHANGE UP (ref 6–8.3)
RBC # BLD: 4.44 M/UL — SIGNIFICANT CHANGE UP (ref 4.2–5.8)
RBC # FLD: 16.8 % — HIGH (ref 10.3–14.5)
SODIUM SERPL-SCNC: 144 MMOL/L — SIGNIFICANT CHANGE UP (ref 135–145)
WBC # BLD: 6.44 K/UL — SIGNIFICANT CHANGE UP (ref 3.8–10.5)
WBC # FLD AUTO: 6.44 K/UL — SIGNIFICANT CHANGE UP (ref 3.8–10.5)

## 2020-08-29 PROCEDURE — 36415 COLL VENOUS BLD VENIPUNCTURE: CPT

## 2020-08-29 PROCEDURE — 85025 COMPLETE CBC W/AUTO DIFF WBC: CPT

## 2020-08-29 PROCEDURE — 74019 RADEX ABDOMEN 2 VIEWS: CPT

## 2020-08-29 PROCEDURE — 99283 EMERGENCY DEPT VISIT LOW MDM: CPT

## 2020-08-29 PROCEDURE — 83690 ASSAY OF LIPASE: CPT

## 2020-08-29 PROCEDURE — 74019 RADEX ABDOMEN 2 VIEWS: CPT | Mod: 26

## 2020-08-29 PROCEDURE — 99284 EMERGENCY DEPT VISIT MOD MDM: CPT

## 2020-08-29 PROCEDURE — 80053 COMPREHEN METABOLIC PANEL: CPT

## 2020-08-29 NOTE — ED PROVIDER NOTE - CLINICAL SUMMARY MEDICAL DECISION MAKING FREE TEXT BOX
68 yo male in the ER c/o abdominal distention, frequent constipation. Reports his symptoms are worse for the past 3 weeks.   Denies f/c, n/v, had diarrhea after taking laxatives last week. Pt also  mentioned that he was seen at the few different ERs for the same reasons, " but nobody wants to help him". Pt is very uncooperative and refusing imaging, requesting to be admitted for " professional care". pt is threatening to complain to newspaper that ": old men was kicked out from the hospital" and at the same time requesting his discharge papers. basic labs and abd. xray done. no signs of SBO, no elevated WBS's. Pt non-toxic appearing, ambulatory, AS&O x 3, d/c home stable. Importance to f/u with GI explained.

## 2020-08-29 NOTE — ED PROVIDER NOTE - PATIENT PORTAL LINK FT
You can access the FollowMyHealth Patient Portal offered by [facility] by registering at the following website: [email or web address]. By joining [employer]’s FollowMyHealth portal, you will also be able to view your health information using other applications (apps) compatible with our system.

## 2020-08-29 NOTE — ED ADULT NURSE NOTE - CHPI ED NUR SYMPTOMS NEG
no chills/no diarrhea/no nausea/no blood in stool/no burning urination/no dysuria/no vomiting/no fever/no hematuria

## 2020-08-29 NOTE — ED PROVIDER NOTE - OBJECTIVE STATEMENT
68 yo male with h/o HTN, CAD, CABG, CHF, HLD, in the ER c/o abdominal distention, pain, c/o frequent constipation. Pt states his symptoms are worse for the past 3 weeks. He also mentioned that he was seen at the different ER's, but " nobody is helping him". Pt mentioned that his last BM was yesterday after he drank Mag. citrate. Denies fever, chills, blood in the stool, nausea or vomiting.

## 2020-08-29 NOTE — ED ADULT NURSE REASSESSMENT NOTE - NS ED NURSE REASSESS COMMENT FT1
Interaction with pt by this RN for purposes of discharge only. Pt observed argumentative and verbally abusive towards physician, PA, and staff nurses. IV dcd with catheter intact, site WNL, no bleeding. Bandage applied. Pt given written and verbal instructions for "constipation". Refused to leave department. Escorted from ER by [facility]. No s/s distress noted at time of departure.

## 2020-08-29 NOTE — ED PROVIDER NOTE - ATTENDING CONTRIBUTION TO CARE
69M hx htn, chf, dm, cad, pe, c/o abdominal bloating and distention.  states has frequent constipation, worse over past 3 weeks.  pt states has been to multiple EDs and no one helps him.  states he needs to be admitted to be seen by specialists.  states had bowel movement yesterday after drinking mag citrate. no fever. no vomiting.    gen- nad  heent- ncat, clear conj  cv -rrr  lungs -ctab  abd - soft, mild diffuse ttp  ext -wwp, 2+edema  neuro -aox3, steady gait, faye  no guarding, no rebound, +stool on KUB.  labs checked, pt well-appearing, nontoxic, afebrile. pt refused CT scan. demanding to be admitted.  when explained to pt that he needs to make an appointment and f/u with a gastroenterologist as an outpt he became extremely belligerent.  pt threatening to go to the newspapers that he is being kicked out of the hospital.  per SHERLY pt with multiple recent ED visits.  pt "stating if you can't help me just give me my discharge papers and I will go."  steady gait, fluent speech.

## 2020-08-29 NOTE — ED ADULT NURSE NOTE - OBJECTIVE STATEMENT
Pt complains of abdominal bloating for the last 2-3 weeks.  Pt states "it is very, very bad. It is affecting my breathing." Pt denies any fever, diarrhea, N/V, or pain. Last BM "couple days ago." Brown soft stool. Pt states " I have bacteria in my belly. It is very bad." Pt states he had a CT scan about 2 weeks ago at [facility] or [facility]??? unknown of location. Pt was given milk of magnesia. Pt is poor storian, unable to fully collaborate with symptoms and recent hospital visits. Alert and oriented X3.

## 2020-08-29 NOTE — ED ADULT TRIAGE NOTE - ARRIVAL INFO ADDITIONAL COMMENTS
Patient reports abdominal pain onset three weeks prior. Patient denies nausea, vomiting, diarrhea and fever.

## 2020-08-29 NOTE — ED ADULT NURSE REASSESSMENT NOTE - NS ED NURSE REASSESS COMMENT FT1
IV access to left arm obtained. However, no blood return. Pt refusing to have blood work done. Pt states "I am dehydrated. I need juice." Pt educated NPO status due to chief complain; abdominal Bloating. Pt very upset. Pt states "all other hospitals let me eat and drink." Pt  took patient's wrist band off. Pt refuses to wear it. Pt educated on the importance of wrist band for patient identification. Pt still refusing. SLOAN Shrestha and MD Pollack made aware.

## 2020-11-13 NOTE — ED PROVIDER NOTE - NSFOLLOWUPINSTRUCTIONS_ED_ALL_ED_FT
[facility]  [id number]  [facility] [place] [address]  [phone]      Alexa Guzman :2007 MRN:[id number]    2020 Time Session Began: 1400  Time Session Ended: 1445    Due to COVID-19 precautions, this visit was performed via live interactive two-way Video visit with patient's verbal consent.   Clinician Location:Home.  Patient Location: Home.  Verified patient identity:  [x] Yes    Session Type:45 Minute Therapy (38286)    Others Present: Mom    Intervention: Behavioral, Cognitive    Suicide/Homicide/Violence Ideation: No    If Yes, explain:     Current Outpatient Medications   Medication Sig   • DIAZepam (Valium) 10 MG tablet Give 60 minutes prior to procedure - ok to repeat once if needed   • methylpheniDATE ER (CONCERTA) 36 MG CR tablet Take 1 tablet by mouth every morning. F90.2   • hydrOXYzine (ATARAX) 25 MG tablet Take 1 tablet by mouth at bedtime.   • albuterol 108 (90 Base) MCG/ACT inhaler Inhale 2 puffs into the lungs every 4 hours as needed for Shortness of Breath or Wheezing. As per Asthma Action Plan.   • montelukast (SINGULAIR) 5 MG chewable tablet Chew 1 tablet by mouth nightly.   • ondansetron (ZOFRAN ODT) 4 MG disintegrating tablet Place 1 tablet onto the tongue every 8 hours as needed for Nausea.   • cetirizine (CETIRIZINE HCL ALLERGY CHILD) 5 MG/5ML solution Take 2.5 mLs by mouth daily.   • EPINEPHrine (EPIPEN 2-RYLEE) 0.3 MG/0.3ML auto-injector Inject 0.3 mLs into the muscle 1 time as needed for Anaphylaxis.   • fluticasone (FLONASE) 50 MCG/ACT nasal spray Spray 1 spray in each nostril daily.   • ibuprofen (MOTRIN,ADVIL) 100 MG/5ML suspension Take by mouth every 8 hours as needed for Fever.   • acetaminophen (TYLENOL) 160 MG/5ML suspension Take 15 mg/kg by mouth every 4 hours as needed for Fever.   • triamcinolone (ARISTOCORT) 0.1 % cream Apply sparingly bid prn   • hydroCORTisone (CORTIZONE) 1 % cream Apply topically as  needed.   • diphenhydrAMINE (BENADRYL) 12.5 MG/5ML liquid Take  by mouth as needed.     No current facility-administered medications for this visit.        Change in Medication(s) Reported: No  If Yes, explain:     Patient/Family Education Provided: Yes  Patient/Family Displays Understanding: Yes    If No, explain:     Chief complaint in patient's own words: \"I have a hard time getting my homework done.\"    Progress Note containing chief complaint and symptoms/problems related to the complaint:    (Data/Action/Response/Plan)     D: Spoke with patient for therapy to discuss anxiety symptoms at home and coping skills to utilize at home and school.   A: Discussed coping strategies for anxiety symptoms. Discussed family relationships. Discussed social relationships.  R:  Mom stated patient was having difficulty with organization and focus in the morning.  Patient often forgets things at home and gets upset when she doesn't have it all organized.  She feels that she needs to have everything just right for school.  When she forgets things at home, she has anxiety attacks at school until mom brings it to her at school.  Patient became distraught when discussing packing less fidgets in her backpack, stating she \"needed\" each one or she would lose focus because of the anxiety associated with not having all of them with her.  Patient became tearful and stated she was unable to continue talking due to high anxiety.  Mom stated patient will have these kinds of anxiety attacks frequently and patient described high anxiety if everything is not just right.   P: Schedule follow up for 2-3 weeks. Continue to identify positive coping strategies for anxiety symptoms.  Consider OCD diagnosis as a differential diagnosis if obsessive thought symptoms persist.      Need for Community Resources Assessed: Yes    Resources Needed: No    If Yes, what resources:     Primary Diagnosis: Anxiety D/O NOS and Attention Deficit with Hyperactivity   : 2 Moderate    Treatment Plan: See Treatment Plan    Discharge Plan: Strategies Discussed to Maintain Gains    Next Appointment: 2-3 weeks  Natalie Mosley, PHD   Constipation, Adult  Constipation is when a person has fewer bowel movements in a week than normal, has difficulty having a bowel movement, or has stools that are dry, hard, or larger than normal. Constipation may be caused by an underlying condition. It may become worse with age if a person takes certain medicines and does not take in enough fluids.  Follow these instructions at home:  Eating and drinking        Eat foods that have a lot of fiber, such as fresh fruits and vegetables, whole grains, and beans.Limit foods that are high in fat, low in fiber, or overly processed, such as french fries, hamburgers, cookies, candies, and soda.Drink enough fluid to keep your urine clear or pale yellow.General instructions     Exercise regularly or as told by your health care provider.Go to the restroom when you have the urge to go. Do not hold it in.Take over-the-counter and prescription medicines only as told by your health care provider. These include any fiber supplements.Practice pelvic floor retraining exercises, such as deep breathing while relaxing the lower abdomen and pelvic floor relaxation during bowel movements.Watch your condition for any changes.Keep all follow-up visits as told by your health care provider. This is important.Contact a health care provider if:  You have pain that gets worse.You have a fever.You do not have a bowel movement after 4 days.You vomit.You are not hungry.You lose weight.You are bleeding from the anus.You have thin, pencil-like stools.Get help right away if:  You have a fever and your symptoms suddenly get worse.You leak stool or have blood in your stool.Your abdomen is bloated.You have severe pain in your abdomen.You feel dizzy or you faint.This information is not intended to replace advice given to you by your health care provider. Make sure you discuss any questions you have with your health care provider.

## 2021-03-25 ENCOUNTER — INPATIENT (INPATIENT)
Facility: HOSPITAL | Age: 71
LOS: 10 days | Discharge: HOME | DRG: 291 | End: 2021-04-05
Attending: INTERNAL MEDICINE | Admitting: INTERNAL MEDICINE
Payer: MEDICARE

## 2021-03-25 VITALS
WEIGHT: 253.53 LBS | SYSTOLIC BLOOD PRESSURE: 158 MMHG | HEART RATE: 84 BPM | DIASTOLIC BLOOD PRESSURE: 90 MMHG | RESPIRATION RATE: 19 BRPM | TEMPERATURE: 97 F | HEIGHT: 67 IN | OXYGEN SATURATION: 98 %

## 2021-03-25 DIAGNOSIS — E11.9 TYPE 2 DIABETES MELLITUS WITHOUT COMPLICATIONS: ICD-10-CM

## 2021-03-25 DIAGNOSIS — I26.99 OTHER PULMONARY EMBOLISM WITHOUT ACUTE COR PULMONALE: ICD-10-CM

## 2021-03-25 DIAGNOSIS — I10 ESSENTIAL (PRIMARY) HYPERTENSION: ICD-10-CM

## 2021-03-25 DIAGNOSIS — E78.5 HYPERLIPIDEMIA, UNSPECIFIED: ICD-10-CM

## 2021-03-25 DIAGNOSIS — Z95.1 PRESENCE OF AORTOCORONARY BYPASS GRAFT: Chronic | ICD-10-CM

## 2021-03-25 DIAGNOSIS — Z29.9 ENCOUNTER FOR PROPHYLACTIC MEASURES, UNSPECIFIED: ICD-10-CM

## 2021-03-25 DIAGNOSIS — I50.9 HEART FAILURE, UNSPECIFIED: ICD-10-CM

## 2021-03-25 DIAGNOSIS — I25.10 ATHEROSCLEROTIC HEART DISEASE OF NATIVE CORONARY ARTERY WITHOUT ANGINA PECTORIS: ICD-10-CM

## 2021-03-25 LAB
ALBUMIN SERPL ELPH-MCNC: 2.9 G/DL — LOW (ref 3.5–5)
ALP SERPL-CCNC: 148 U/L — HIGH (ref 40–120)
ALT FLD-CCNC: 29 U/L DA — SIGNIFICANT CHANGE UP (ref 10–60)
ANION GAP SERPL CALC-SCNC: 10 MMOL/L — SIGNIFICANT CHANGE UP (ref 5–17)
APTT BLD: 23.4 SEC — LOW (ref 27.5–35.5)
AST SERPL-CCNC: 33 U/L — SIGNIFICANT CHANGE UP (ref 10–40)
BASOPHILS # BLD AUTO: 0.05 K/UL — SIGNIFICANT CHANGE UP (ref 0–0.2)
BASOPHILS NFR BLD AUTO: 0.8 % — SIGNIFICANT CHANGE UP (ref 0–2)
BILIRUB SERPL-MCNC: 1 MG/DL — SIGNIFICANT CHANGE UP (ref 0.2–1.2)
BUN SERPL-MCNC: 23 MG/DL — HIGH (ref 7–18)
CALCIUM SERPL-MCNC: 8.3 MG/DL — LOW (ref 8.4–10.5)
CHLORIDE SERPL-SCNC: 110 MMOL/L — HIGH (ref 96–108)
CO2 SERPL-SCNC: 22 MMOL/L — SIGNIFICANT CHANGE UP (ref 22–31)
CREAT SERPL-MCNC: 1.2 MG/DL — SIGNIFICANT CHANGE UP (ref 0.5–1.3)
D DIMER BLD IA.RAPID-MCNC: 554 NG/ML DDU — HIGH
EOSINOPHIL # BLD AUTO: 0.15 K/UL — SIGNIFICANT CHANGE UP (ref 0–0.5)
EOSINOPHIL NFR BLD AUTO: 2.5 % — SIGNIFICANT CHANGE UP (ref 0–6)
GLUCOSE SERPL-MCNC: 120 MG/DL — HIGH (ref 70–99)
HCT VFR BLD CALC: 38.7 % — LOW (ref 39–50)
HGB BLD-MCNC: 12 G/DL — LOW (ref 13–17)
IMM GRANULOCYTES NFR BLD AUTO: 0.5 % — SIGNIFICANT CHANGE UP (ref 0–1.5)
INR BLD: 1.39 RATIO — HIGH (ref 0.88–1.16)
LYMPHOCYTES # BLD AUTO: 0.93 K/UL — LOW (ref 1–3.3)
LYMPHOCYTES # BLD AUTO: 15.2 % — SIGNIFICANT CHANGE UP (ref 13–44)
MAGNESIUM SERPL-MCNC: 2.1 MG/DL — SIGNIFICANT CHANGE UP (ref 1.6–2.6)
MCHC RBC-ENTMCNC: 25.9 PG — LOW (ref 27–34)
MCHC RBC-ENTMCNC: 31 GM/DL — LOW (ref 32–36)
MCV RBC AUTO: 83.4 FL — SIGNIFICANT CHANGE UP (ref 80–100)
MONOCYTES # BLD AUTO: 0.64 K/UL — SIGNIFICANT CHANGE UP (ref 0–0.9)
MONOCYTES NFR BLD AUTO: 10.5 % — SIGNIFICANT CHANGE UP (ref 2–14)
NEUTROPHILS # BLD AUTO: 4.32 K/UL — SIGNIFICANT CHANGE UP (ref 1.8–7.4)
NEUTROPHILS NFR BLD AUTO: 70.5 % — SIGNIFICANT CHANGE UP (ref 43–77)
NRBC # BLD: 0 /100 WBCS — SIGNIFICANT CHANGE UP (ref 0–0)
NT-PROBNP SERPL-SCNC: 6970 PG/ML — HIGH (ref 0–125)
PHOSPHATE SERPL-MCNC: 3.3 MG/DL — SIGNIFICANT CHANGE UP (ref 2.5–4.5)
PLATELET # BLD AUTO: 245 K/UL — SIGNIFICANT CHANGE UP (ref 150–400)
POTASSIUM SERPL-MCNC: 4 MMOL/L — SIGNIFICANT CHANGE UP (ref 3.5–5.3)
POTASSIUM SERPL-SCNC: 4 MMOL/L — SIGNIFICANT CHANGE UP (ref 3.5–5.3)
PROT SERPL-MCNC: 7.1 G/DL — SIGNIFICANT CHANGE UP (ref 6–8.3)
PROTHROM AB SERPL-ACNC: 16.3 SEC — HIGH (ref 10.6–13.6)
RBC # BLD: 4.64 M/UL — SIGNIFICANT CHANGE UP (ref 4.2–5.8)
RBC # FLD: 17.6 % — HIGH (ref 10.3–14.5)
SARS-COV-2 RNA SPEC QL NAA+PROBE: SIGNIFICANT CHANGE UP
SODIUM SERPL-SCNC: 142 MMOL/L — SIGNIFICANT CHANGE UP (ref 135–145)
TROPONIN I SERPL-MCNC: 0.02 NG/ML — SIGNIFICANT CHANGE UP (ref 0–0.04)
TROPONIN I SERPL-MCNC: <0.015 NG/ML — SIGNIFICANT CHANGE UP (ref 0–0.04)
TROPONIN I SERPL-MCNC: <0.015 NG/ML — SIGNIFICANT CHANGE UP (ref 0–0.04)
WBC # BLD: 6.12 K/UL — SIGNIFICANT CHANGE UP (ref 3.8–10.5)
WBC # FLD AUTO: 6.12 K/UL — SIGNIFICANT CHANGE UP (ref 3.8–10.5)

## 2021-03-25 PROCEDURE — 93306 TTE W/DOPPLER COMPLETE: CPT | Mod: 26

## 2021-03-25 PROCEDURE — 99285 EMERGENCY DEPT VISIT HI MDM: CPT | Mod: CS

## 2021-03-25 PROCEDURE — 93010 ELECTROCARDIOGRAM REPORT: CPT

## 2021-03-25 PROCEDURE — 71045 X-RAY EXAM CHEST 1 VIEW: CPT | Mod: 26

## 2021-03-25 PROCEDURE — 93970 EXTREMITY STUDY: CPT | Mod: 26

## 2021-03-25 RX ORDER — SODIUM CHLORIDE 9 MG/ML
1000 INJECTION, SOLUTION INTRAVENOUS
Refills: 0 | Status: DISCONTINUED | OUTPATIENT
Start: 2021-03-25 | End: 2021-04-05

## 2021-03-25 RX ORDER — SODIUM CHLORIDE 9 MG/ML
3 INJECTION INTRAMUSCULAR; INTRAVENOUS; SUBCUTANEOUS EVERY 8 HOURS
Refills: 0 | Status: DISCONTINUED | OUTPATIENT
Start: 2021-03-25 | End: 2021-04-05

## 2021-03-25 RX ORDER — APIXABAN 2.5 MG/1
5 TABLET, FILM COATED ORAL EVERY 12 HOURS
Refills: 0 | Status: DISCONTINUED | OUTPATIENT
Start: 2021-03-25 | End: 2021-04-05

## 2021-03-25 RX ORDER — DEXTROSE 50 % IN WATER 50 %
25 SYRINGE (ML) INTRAVENOUS ONCE
Refills: 0 | Status: DISCONTINUED | OUTPATIENT
Start: 2021-03-25 | End: 2021-04-05

## 2021-03-25 RX ORDER — SIMETHICONE 80 MG/1
1 TABLET, CHEWABLE ORAL
Qty: 0 | Refills: 0 | DISCHARGE

## 2021-03-25 RX ORDER — FUROSEMIDE 40 MG
80 TABLET ORAL EVERY 12 HOURS
Refills: 0 | Status: DISCONTINUED | OUTPATIENT
Start: 2021-03-25 | End: 2021-03-26

## 2021-03-25 RX ORDER — ALBUTEROL 90 UG/1
2 AEROSOL, METERED ORAL EVERY 6 HOURS
Refills: 0 | Status: COMPLETED | OUTPATIENT
Start: 2021-03-25 | End: 2021-03-27

## 2021-03-25 RX ORDER — LISINOPRIL 2.5 MG/1
5 TABLET ORAL DAILY
Refills: 0 | Status: DISCONTINUED | OUTPATIENT
Start: 2021-03-25 | End: 2021-04-05

## 2021-03-25 RX ORDER — NITROGLYCERIN 6.5 MG
0.4 CAPSULE, EXTENDED RELEASE ORAL ONCE
Refills: 0 | Status: COMPLETED | OUTPATIENT
Start: 2021-03-25 | End: 2021-03-25

## 2021-03-25 RX ORDER — GLUCAGON INJECTION, SOLUTION 0.5 MG/.1ML
1 INJECTION, SOLUTION SUBCUTANEOUS ONCE
Refills: 0 | Status: DISCONTINUED | OUTPATIENT
Start: 2021-03-25 | End: 2021-04-05

## 2021-03-25 RX ORDER — INSULIN LISPRO 100/ML
VIAL (ML) SUBCUTANEOUS
Refills: 0 | Status: DISCONTINUED | OUTPATIENT
Start: 2021-03-25 | End: 2021-04-05

## 2021-03-25 RX ORDER — CARVEDILOL PHOSPHATE 80 MG/1
3.12 CAPSULE, EXTENDED RELEASE ORAL EVERY 12 HOURS
Refills: 0 | Status: DISCONTINUED | OUTPATIENT
Start: 2021-03-25 | End: 2021-03-25

## 2021-03-25 RX ORDER — DEXTROSE 50 % IN WATER 50 %
15 SYRINGE (ML) INTRAVENOUS ONCE
Refills: 0 | Status: DISCONTINUED | OUTPATIENT
Start: 2021-03-25 | End: 2021-04-05

## 2021-03-25 RX ORDER — INSULIN LISPRO 100/ML
VIAL (ML) SUBCUTANEOUS AT BEDTIME
Refills: 0 | Status: DISCONTINUED | OUTPATIENT
Start: 2021-03-25 | End: 2021-04-05

## 2021-03-25 RX ORDER — DEXTROSE 50 % IN WATER 50 %
12.5 SYRINGE (ML) INTRAVENOUS ONCE
Refills: 0 | Status: DISCONTINUED | OUTPATIENT
Start: 2021-03-25 | End: 2021-04-05

## 2021-03-25 RX ORDER — ATORVASTATIN CALCIUM 80 MG/1
40 TABLET, FILM COATED ORAL AT BEDTIME
Refills: 0 | Status: DISCONTINUED | OUTPATIENT
Start: 2021-03-25 | End: 2021-04-05

## 2021-03-25 RX ORDER — DOCUSATE SODIUM 100 MG
1 CAPSULE ORAL
Qty: 0 | Refills: 0 | DISCHARGE

## 2021-03-25 RX ORDER — ENOXAPARIN SODIUM 100 MG/ML
40 INJECTION SUBCUTANEOUS DAILY
Refills: 0 | Status: DISCONTINUED | OUTPATIENT
Start: 2021-03-25 | End: 2021-03-25

## 2021-03-25 RX ORDER — FUROSEMIDE 40 MG
80 TABLET ORAL ONCE
Refills: 0 | Status: COMPLETED | OUTPATIENT
Start: 2021-03-25 | End: 2021-03-25

## 2021-03-25 RX ADMIN — APIXABAN 5 MILLIGRAM(S): 2.5 TABLET, FILM COATED ORAL at 18:38

## 2021-03-25 RX ADMIN — Medication 0.4 MILLIGRAM(S): at 07:57

## 2021-03-25 RX ADMIN — SODIUM CHLORIDE 3 MILLILITER(S): 9 INJECTION INTRAMUSCULAR; INTRAVENOUS; SUBCUTANEOUS at 22:01

## 2021-03-25 RX ADMIN — CARVEDILOL PHOSPHATE 3.12 MILLIGRAM(S): 80 CAPSULE, EXTENDED RELEASE ORAL at 18:38

## 2021-03-25 RX ADMIN — Medication 80 MILLIGRAM(S): at 07:56

## 2021-03-25 RX ADMIN — SODIUM CHLORIDE 3 MILLILITER(S): 9 INJECTION INTRAMUSCULAR; INTRAVENOUS; SUBCUTANEOUS at 08:01

## 2021-03-25 RX ADMIN — Medication 80 MILLIGRAM(S): at 18:38

## 2021-03-25 RX ADMIN — SODIUM CHLORIDE 3 MILLILITER(S): 9 INJECTION INTRAMUSCULAR; INTRAVENOUS; SUBCUTANEOUS at 15:12

## 2021-03-25 NOTE — ED PROVIDER NOTE - CADM POA CENTRAL LINE
PREP:  1.  Clears to be started after lunch on Sunday, 2 days before procedure  2.  Dulcolax 4 tablets at noon on Sunday, 2 days before procedure  3.  Golytely: 2 liters to start at 4 pm Sunday 2 days before procedure  4.  Magnesium Citrate: 1 bottle at 8 pm Sunday 2 days before procedure  5.  Clear liquids all day Monday  6.  Golytely: 2 liters, start at 6 am Tuesday (The morning of the procedure)   No

## 2021-03-25 NOTE — H&P ADULT - HISTORY OF PRESENT ILLNESS
70y M PMH HTN, HLD, DM, CAD s/p CABGx3, presenting with leg swelling and difficulty breathing for months. He endorses chest tightness, shortness of breath on exertion, requiring him to take frequent breaks when walking or climbing stairs. He also has pain in his legs from the swelling. He states his leg swelling is intermittent, but lately has been constant. He denies any palpitations, abdominal pain, nausea/vomiting. 70y M from home with PMH HTN, HLD, DM, PE, CAD s/p CABG (3.5 yrs ago), presenting with leg swelling, scrotal swelling  and difficulty breathing for months. History limited to patient's cognitive function (does not remember meds, pharmacy number, timelines, wect) He endorses chest tightness, shortness of breath on exertion, requiring him to take frequent breaks when walking or climbing stairs. can walk less than 2 blocks.  He also has pain in his legs from the swelling. He states his leg swelling is intermittent, but lately has been constant. He denies any palpitations, abdominal pain, nausea/vomiting. pt reports had echo 2 month ago with acceptable results. pt non smoker , does not drink ETOH. drinks less than 2 L in a day.        Ed course : pt received Lasix 80mg X1 in ED    ECG : first degree heart block, Qtc is prolonged 491    Of note : I Called pharmacy at [facility] (pharmacy on chart) reported pt did not  refill over last 4 months, also called [facility] , per pharmacist, patient has been going from hospital to hospital and been non compliant to medication. patient showed up for medication  few times , however screamed at pharmacist and refused to pick medication up, as he believed meds were not appropriate

## 2021-03-25 NOTE — H&P ADULT - ASSESSMENT
70y M from home with PMH HTN, HLD, DM, PE, CAD s/p CABG (3.5 yrs ago), presenting with leg swelling, scrotal swelling  and difficulty breathing for months, admitted for CHF exacerbation.      Called pharmacy at [facility] and  [place] , per pharmacists, patient had been going from hospital to hospital and had  been non compliant with medication. Patient showed up for medication  few times , however screamed at pharmacist and refused to  medication, as he believed meds were not appropriate.

## 2021-03-25 NOTE — H&P ADULT - PROBLEM SELECTOR PLAN 2
S/P CABG 3.5 years ago  will start  on  Lasix 80 bId , Coreg 3.125 , Lisinopril 5 qd , Aspirin 81, statin 40qd  - f/u echo   - tele   - cardio Dr Rhodes

## 2021-03-25 NOTE — ED ADULT NURSE NOTE - ED STAT RN HANDOFF DETAILS 2
Patient admitted to [facility], pending transport. IV intact, no redness, no swelling noted.

## 2021-03-25 NOTE — H&P ADULT - PROBLEM/PLAN-1
May 18, 2020     Mark Sanford  [address]  [place] [address]       Dear Mark,    Welcome to [facility]! Our goal is to make care effective, proactive and convenient by using data you send us from home to better treat your chronic conditions.              My name is Adriana Chiu, and I am your dedicated Digital Medicine clinician. As an expert in medication management, I will help ensure that the medications you are taking continue to provide the intended benefits and help you reach your goals. You can reach me directly at [phone] or by sending me a message directly through your [facility] account.      I am Brittany Yao and I will be your health . My job is to help you identify lifestyle changes to improve your disease control. We will talk about nutrition, exercise, and other ways you may be able to adjust your current habits to better your health. Additionally, we will help ensure you are completing the tests and screenings that are necessary to help manage your conditions. You can reach me directly at [phone] or by sending me a message directly through your [facility] account.    Most importantly, YOU are at the center of this team. Together, we will work to improve your overall health and encourage you to meet your goals for a healthier lifestyle.     What we expect from YOU:  · Please take frequent home blood pressure measurements. We ask that you take at least 1 blood pressure reading per week, but more information will better help us get you know you. Be sure you rest for a few minutes before taking the reading in a quiet, comfortable place.     Be available to receive phone calls or Next audiencehart messages, when appropriate, from your care team. Please let us know if there are any specific days or times that work best for us to reach you via phone.     Complete routine tests and screenings. Dont worry, we will help keep you on track!           What you should  expect from your Digital Medicine Care Team:   We will work with you to create a personalized plan of care and provide you with encouragement and education, including regarding lifestyle changes, that could help you manage your disease states.     We will adjust your current medications, if needed, and continue to monitor your long-term progress.     We will provide you and your physician with monthly progress reports after you have been in the program for more than 30 days.     We will send you reminders through [employer] and text messages to help ensure you do not miss any testing deadlines to help manage your disease states.    You will be able to reach us by phone or through your [facility] account by clicking our names under Care Team on the right side of the home screen.    I look forward to working with you to achieve your blood pressure goals!    We look forward to working with you to help manage your health,    Sincerely,    Your Digital Medicine Team    Please visit our websites to learn more:   · Hypertension: [email or web address]      Remember, we are not available for emergencies. If you have an emergency, please contact your doctors office directly or call [facility] ([phone] or [phone]) or [phone].          DISPLAY PLAN FREE TEXT

## 2021-03-25 NOTE — H&P ADULT - NSICDXPASTMEDICALHX_GEN_ALL_CORE_FT
PAST MEDICAL HISTORY:  Angina pectoris     CAD (coronary artery disease)     CHF (congestive heart failure)     DM (diabetes mellitus)     Dyslipidemia     HTN (hypertension)     PE (pulmonary thromboembolism)

## 2021-03-25 NOTE — ED PROVIDER NOTE - PHYSICAL EXAMINATION
PHYSICAL EXAM:  GENERAL: NAD  HEAD:  Atraumatic, Normocephalic  EYES: EOMI, PERRLA, conjunctiva and sclera clear  NECK: Supple  CHEST/LUNG: Clear to auscultation bilaterally; No wheeze  HEART: Regular rate and rhythm; No murmurs, rubs, or gallops  ABDOMEN: Soft, Nontender, Nondistended; Bowel sounds present  EXTREMITIES: Decreased peripheral pulses, fifth digit amputated on RLE, bilateral LE erythema and swelling with chronic skin changes (L>R swelling), painful to palpation  PSYCH: AAOx3  NEUROLOGY: non-focal  SKIN: Chronic skin changes on lower extremities

## 2021-03-25 NOTE — ED ADULT NURSE NOTE - OBJECTIVE STATEMENT
Pt presents to ED with complaints of swollen lower legs. Pt states " I don't feel well, there's a lot of water in my body". Denies chest pain, N/V/D and fever.

## 2021-03-25 NOTE — H&P ADULT - NSHPPHYSICALEXAM_GEN_ALL_CORE
CONSTITUTIONAL: Obese , anasarca edema   ENMT: Airway patent, Nasal mucosa clear. Mouth with normal mucosa. Throat has no vesicles, no oropharyngeal exudates and uvula is midline.  EYES: Clear bilaterally, pupils equal, round and reactive to light. EOMI.  CARDIAC: bradycardic regular rhythm.  Heart sounds S1, S2.   RESPIRATORY: bilateral basis with  rhales   MUSCULOSKELETAL: Spine appears normal, range of motion is not limited, no muscle or joint tenderness  EXTREMITIES: Bilateral LE edema L>R   NEUROLOGICAL: Alert and oriented, no focal deficits, no motor or sensory deficits.  ABDOMEN : distended

## 2021-03-25 NOTE — ED PROVIDER NOTE - CLINICAL SUMMARY MEDICAL DECISION MAKING FREE TEXT BOX
Pt p/w worsening symptoms of CHF- SOB, [place], increased bilat leg swelling. Pt denies chest pain currently. CXR showing pulm edema. EKG showing TWIs. Will s/o to incoming doc pending bilat DVT U/S, meds, and admission for CHF exacerbation. Pt stable.

## 2021-03-25 NOTE — H&P ADULT - ATTENDING COMMENTS
70y M PMH HTN, HLD, DM, CAD s/p CABGx3, Angina pectoris, CHF (congestive heart failure), PE (pulmonary thromboembolism).presenting with leg swelling and difficulty breathing for months. He endorses chest tightness, shortness of breath on exertion, requiring him to take frequent breaks when walking or climbing stairs. He also has pain in his legs from the swelling. He states his leg swelling is intermittent, but lately has been constant. He denies any palpitations, abdominal pain, nausea/vomiting.     assessment   --- acute on chronic systolic chf, pulm edema r/o acs, h/o HTN, HLD, DM, CAD s/p CABGx3,    plan  --  adm to tele, acs protocol, coreg, lasix, aspirin, statin, supplemental O2 as needed, bronchodilator, cont preadmit home meds, gi and dvt profilaxis  cbc, bmp, mg, phos, lipid, tsh, ce q8 x3    echo    cardio cons  pulm cons

## 2021-03-25 NOTE — ED PROVIDER NOTE - OBJECTIVE STATEMENT
70y M PMH HTN, HLD, DM, CAD s/p CABGx3, presenting with leg swelling and difficulty breathing for months. He endorses chest tightness, shortness of breath on exertion, requiring him to take frequent breaks when walking or climbing stairs. He also has pain in his legs from the swelling. He states his leg swelling is intermittent, but lately has been constant. He denies any palpitations, abdominal pain, nausea/vomiting.

## 2021-03-25 NOTE — ED ADULT TRIAGE NOTE - CHIEF COMPLAINT QUOTE
c/o swollen legs/water retension in his body as per pt stated c/o swollen legs/water retention in his body as per pt stated

## 2021-03-25 NOTE — ED PROVIDER NOTE - NS ED ROS FT
REVIEW OF SYSTEMS:    CONSTITUTIONAL: No weakness, fevers or chills  EYES/ENT: No visual changes;  No vertigo or throat pain   NECK: No pain or stiffness  RESPIRATORY: +Shortness of breath on exertion, No cough, wheezing, hemoptysis  CARDIOVASCULAR: +Chest tightness, no palpitations  GASTROINTESTINAL: No abdominal or epigastric pain. No nausea, vomiting, or hematemesis; No diarrhea or constipation. No melena or hematochezia.  GENITOURINARY: No dysuria, frequency or hematuria  NEUROLOGICAL: No numbness or weakness  SKIN: No itching, rashes

## 2021-03-25 NOTE — H&P ADULT - PROBLEM SELECTOR PLAN 1
clinically fluid overloaded ,p/w anasarca edema , lungs are congested in CXr, Pro BNp elevate :6970, tn wnl , ECG :  TWI lateral leads, grade 1 heart block , Qtc on prolong side   CHF. Exacerbation 2nd to non compliance   Supposed to be on Lasix 80 bId , Coreg 3.125 , Lisinopril 5 qd , Aspirin 81, statin 40qd, hydralazine however non compliant    last echo 2 month ago , results unknown    LE doppler : negative for DVt  Management:  - IV Lasix 80 BID   - will resume home meds  - Check Echocardiogram  - Stricts I/O and daily Weights  - Cardiology Dr Rhodes

## 2021-03-26 LAB
GLUCOSE BLDC GLUCOMTR-MCNC: 101 MG/DL — HIGH (ref 70–99)
GLUCOSE BLDC GLUCOMTR-MCNC: 120 MG/DL — HIGH (ref 70–99)
GLUCOSE BLDC GLUCOMTR-MCNC: 131 MG/DL — HIGH (ref 70–99)

## 2021-03-26 RX ORDER — FUROSEMIDE 40 MG
40 TABLET ORAL
Refills: 0 | Status: DISCONTINUED | OUTPATIENT
Start: 2021-03-26 | End: 2021-03-26

## 2021-03-26 RX ORDER — ASPIRIN/CALCIUM CARB/MAGNESIUM 324 MG
81 TABLET ORAL DAILY
Refills: 0 | Status: DISCONTINUED | OUTPATIENT
Start: 2021-03-26 | End: 2021-04-05

## 2021-03-26 RX ORDER — FUROSEMIDE 40 MG
40 TABLET ORAL
Refills: 0 | Status: DISCONTINUED | OUTPATIENT
Start: 2021-03-26 | End: 2021-04-05

## 2021-03-26 RX ORDER — CARVEDILOL PHOSPHATE 80 MG/1
3.12 CAPSULE, EXTENDED RELEASE ORAL EVERY 12 HOURS
Refills: 0 | Status: DISCONTINUED | OUTPATIENT
Start: 2021-03-26 | End: 2021-04-05

## 2021-03-26 RX ADMIN — SODIUM CHLORIDE 3 MILLILITER(S): 9 INJECTION INTRAMUSCULAR; INTRAVENOUS; SUBCUTANEOUS at 05:47

## 2021-03-26 RX ADMIN — LISINOPRIL 5 MILLIGRAM(S): 2.5 TABLET ORAL at 05:46

## 2021-03-26 RX ADMIN — CARVEDILOL PHOSPHATE 3.12 MILLIGRAM(S): 80 CAPSULE, EXTENDED RELEASE ORAL at 18:27

## 2021-03-26 RX ADMIN — APIXABAN 5 MILLIGRAM(S): 2.5 TABLET, FILM COATED ORAL at 18:27

## 2021-03-26 RX ADMIN — Medication 40 MILLIGRAM(S): at 20:11

## 2021-03-26 RX ADMIN — APIXABAN 5 MILLIGRAM(S): 2.5 TABLET, FILM COATED ORAL at 05:45

## 2021-03-26 NOTE — PROGRESS NOTE ADULT - ASSESSMENT
70y M from home with PMH HTN, HLD, DM, PE, CAD s/p CABG (3.5 yrs ago), presented  with leg swelling, scrotal swelling  and difficulty breathing for months.   In ED CXR No acute infiltrate. Cardiomegaly  ECG : first degree heart block, Qtc is prolonged 491  BNP 6970, CXR   pt received Lasix 80mg X1   Admitted for acute on chronic systolic heart failure   Seen at bedside, states is feeling better, refused to be minitored on cardiac monitor.   Co leg  heaviness   venous doppler negative for DVT   TTE with EF 15-20%  Troponin negative

## 2021-03-26 NOTE — DIETITIAN INITIAL EVALUATION ADULT. - PERTINENT LABORATORY DATA
03-25 Na142 mmol/L Glu 120 mg/dL<H> K+ 4.0 mmol/L Cr  1.20 mg/dL BUN 23 mg/dL<H> 03-25 Phos 3.3 mg/dL 03-25 Alb 2.9 g/dL<L>

## 2021-03-26 NOTE — CONSULT NOTE ADULT - SUBJECTIVE AND OBJECTIVE BOX
PULMONARY CONSULT NOTE      LIDIA PÉREZ  MRN-[id number]    Patient is a 70y old  Male who presents with a chief complaint of CHF exacerbation (26 Mar 2021 11:45)      HISTORY OF PRESENT ILLNESS:  Reason for Admission: CHF exacerbation  History of Present Illness:   70y M from home with PMH HTN, HLD, DM, PE, CAD s/p CABG (3.5 yrs ago), presenting with leg swelling, scrotal swelling  and difficulty breathing for months. History limited to patient's cognitive function (does not remember meds, pharmacy number, timelines, wect) He endorses chest tightness, shortness of breath on exertion, requiring him to take frequent breaks when walking or climbing stairs. can walk less than 2 blocks.  He also has pain in his legs from the swelling. He states his leg swelling is intermittent, but lately has been constant. He denies any palpitations, abdominal pain, nausea/vomiting. pt reports had echo 2 month ago with acceptable results. pt non smoker , does not drink ETOH. drinks less than 2 L in a day.        Ed course : pt received Lasix 80mg X1 in ED    ECG : first degree heart block, Qtc is prolonged 491    Of note : I Called pharmacy at [facility] (pharmacy on chart) reported pt did not  refill over last 4 months, also called [facility] , per pharmacist, patient has been going from hospital to hospital and been non compliant to medication. patient showed up for medication  few times , however screamed at pharmacist and refused to pick medication up, as he believed meds were not appropriate    Pt is awake, alert, lying in bed in NAD. Lower ext. edema. Denies cough/SOB at present. Not on O2.     MEDICATIONS  (STANDING):  ALBUTerol    90 MICROgram(s) HFA Inhaler 2 Puff(s) Inhalation every 6 hours  apixaban 5 milliGRAM(s) Oral every 12 hours  aspirin  chewable 81 milliGRAM(s) Oral daily  atorvastatin 40 milliGRAM(s) Oral at bedtime  carvedilol 3.125 milliGRAM(s) Oral every 12 hours  dextrose 40% Gel 15 Gram(s) Oral once  dextrose 5%. 1000 milliLiter(s) (50 mL/Hr) IV Continuous <Continuous>  dextrose 5%. 1000 milliLiter(s) (100 mL/Hr) IV Continuous <Continuous>  dextrose 50% Injectable 25 Gram(s) IV Push once  dextrose 50% Injectable 12.5 Gram(s) IV Push once  dextrose 50% Injectable 25 Gram(s) IV Push once  furosemide   Injectable 40 milliGRAM(s) IV Push two times a day  glucagon  Injectable 1 milliGRAM(s) IntraMuscular once  insulin lispro (ADMELOG) corrective regimen sliding scale   SubCutaneous three times a day before meals  insulin lispro (ADMELOG) corrective regimen sliding scale   SubCutaneous at bedtime  lisinopril 5 milliGRAM(s) Oral daily  sodium chloride 0.9% lock flush 3 milliLiter(s) IV Push every 8 hours      MEDICATIONS  (PRN):      Allergies    Aldactone (Unknown)  Bumex (Blisters; Rash)  metoprolol (Unknown)  spironolactone (Unknown)    Intolerances        PAST MEDICAL & SURGICAL HISTORY:  PE (pulmonary thromboembolism)    HTN (hypertension)    Angina pectoris    CHF (congestive heart failure)    Dyslipidemia    DM (diabetes mellitus)    CAD (coronary artery disease)    S/P CABG x 3        FAMILY HISTORY:      SOCIAL HISTORY  Smoking History:     REVIEW OF SYSTEMS:    CONSTITUTIONAL:  No fevers, chills, sweats    HEENT:  Eyes:  No diplopia or blurred vision. ENT:  No earache, sore throat or runny nose.    CARDIOVASCULAR:  No pressure, squeezing, tightness, or heaviness about the chest; no palpitations.    RESPIRATORY:  Per HPI    GASTROINTESTINAL:  No abdominal pain, nausea, vomiting or diarrhea.    GENITOURINARY:  No dysuria, frequency or urgency.    NEUROLOGIC:  No paresthesias, fasciculations, seizures or weakness.    PSYCHIATRIC:  No disorder of thought or mood.    Vital Signs Last 24 Hrs  T(C): 36.4 (26 Mar 2021 11:13), Max: 36.4 (26 Mar 2021 11:13)  T(F): 97.6 (26 Mar 2021 11:13), Max: 97.6 (26 Mar 2021 11:13)  HR: 66 (26 Mar 2021 11:13) (65 - 79)  BP: 124/72 (26 Mar 2021 11:13) (119/53 - 133/78)  BP(mean): --  RR: 18 (26 Mar 2021 11:13) (17 - 18)  SpO2: 97% (26 Mar 2021 11:13) (94% - 97%)  I&O's Detail    25 Mar 2021 07:01  -  26 Mar 2021 07:00  --------------------------------------------------------  IN:    Oral Fluid: 100 mL  Total IN: 100 mL    OUT:    Voided (mL): 1650 mL  Total OUT: 1650 mL    Total NET: -1550 mL          PHYSICAL EXAMINATION:    GENERAL: The patient is a well-developed, well-nourished n no apparent distress.     HEENT: Head is normocephalic and atraumatic. Extraocular muscles are intact. Mucous membranes are moist.     NECK: Supple.     LUNGS: Clear to auscultation without wheezing, rales, or rhonchi. Respirations unlabored    HEART: Regular rate and rhythm without murmur.    ABDOMEN: Soft, nontender, and nondistended.  No hepatosplenomegaly is noted.    EXTREMITIES: lower extremity edema     NEUROLOGIC: Grossly intact.      LABS:                        12.0   6.12  )-----------( 245      ( 25 Mar 2021 06:56 )             38.7     03-25    142  |  110<H>  |  23<H>  ----------------------------<  120<H>  4.0   |  22  |  1.20    Ca    8.3<L>      25 Mar 2021 06:19  Phos  3.3     03-25  Mg     2.1     03-25    TPro  7.1  /  Alb  2.9<L>  /  TBili  1.0  /  DBili  x   /  AST  33  /  ALT  29  /  AlkPhos  148<H>  03-25    PT/INR - ( 25 Mar 2021 06:19 )   PT: 16.3 sec;   INR: 1.39 ratio         PTT - ( 25 Mar 2021 06:19 )  PTT:23.4 sec    COVID-19 PCR . (03.25.21 @ 12:30)   COVID-19 PCR: NotDetec: EUA/IVD   CARDIAC MARKERS ( 25 Mar 2021 21:19 )  0.017 ng/mL / x     / x     / x     / x      CARDIAC MARKERS ( 25 Mar 2021 13:55 )  <0.015 ng/mL / x     / x     / x     / x      CARDIAC MARKERS ( 25 Mar 2021 06:19 )  <0.015 ng/mL / x     / x     / x     / x          D-Dimer Assay, Quantitative: 554 ng/mL DDU (03-25-21 @ 13:55)    Serum Pro-Brain Natriuretic Peptide: 6970 pg/mL (03-25-21 @ 06:19)          MICROBIOLOGY:    RADIOLOGY & ADDITIONAL STUDIES:  < from: US Duplex Venous Lower Ext Complete, Bilateral (03.25.21 @ 10:38) >  IMPRESSION:  No evidence of deep venous thrombosis in either lower extremity.    < end of copied text >    CXR:  < from: Xray Chest 1 View-PORTABLE IMMEDIATE (Xray Chest 1 View-PORTABLE IMMEDIATE .) (03.25.21 @ 05:52) >  IMPRESSION:    No acute infiltrate. Cardiomegaly. Sternotomy.    Limited by projection. Recommend repeat.    < end of copied text >    Ct scan chest:  < from: Transthoracic Echocardiogram (03.25.21 @ 12:35) >  CONCLUSIONS:  1. Tethered mitral valve leaflets. Mild to moderate mitral  regurgitation.  2. Calcified trileaflet aortic valve with decreased  opening. Mild aortic stenosis by gradients. The aortic  valve apears more stenotic than indicated by gradients. In  setting of low EF, cannot rule out paradoxical low-flow  low-gradient AS. Consider dobutamine stress echocardiogram  for further assessment if clinically indicated.   Trace  aortic regurgitation.  3. Normal aortic root.  4. Normal left atrium.  5. Mild left ventricular enlargement.  6. Severe global left ventricular systolic dysfunction (EF  15-20% by visual estimation).  7. Grade II diastolic dysfunction.  8. Normal right atrium.  9. Off axis images preclude accurate assessment of right  ventricular size. Reduced RV systolic function (TAPSE 1.0  cm).  10. RV systolic pressure is severely increased at  67 mm  Hg.  11. Normal tricuspid valve. Moderate to severe tricuspid  regurgitation.  12. Pulmonic valve not well seen. Trace pulmonic  insufficiency is noted.  13. No pericardial effusion.  14. Right pleural effusion.    *** No previous Echo exam.    < end of copied text >    ekg;    echo:

## 2021-03-26 NOTE — DIETITIAN INITIAL EVALUATION ADULT. - CONTINUE CURRENT NUTRITION CARE PLAN
. provide carbohydrate consistent , DASH/TLC , low sodium diet provide carbohydrate consistent , DASH/TLC , low sodium diet

## 2021-03-26 NOTE — CONSULT NOTE ADULT - SUBJECTIVE AND OBJECTIVE BOX
CHIEF COMPLAINT:Patient is a 70y old  Male who presents with a chief complaint of CHF exacerbation .      HPI:  70y M from home with PMH HTN, HLD, DM, PE, CAD s/p CABG (3.5 yrs ago), presenting with leg swelling, scrotal swelling  and difficulty breathing for months. History limited to patient's cognitive function (does not remember meds, pharmacy number, timelines, wect) He endorses chest tightness, shortness of breath on exertion, requiring him to take frequent breaks when walking or climbing stairs. can walk less than 2 blocks.  He also has pain in his legs from the swelling. He states his leg swelling is intermittent, but lately has been constant. He denies any palpitations, abdominal pain, nausea/vomiting. pt reports had echo 2 month ago with acceptable results. pt non smoker , does not drink ETOH. drinks less than 2 L in a day.        Ed course : pt received Lasix 80mg X1 in ED    ECG : first degree heart block, Qtc is prolonged 491    Of note : I Called pharmacy at [facility] (pharmacy on chart) reported pt did not  refill over last 4 months, also called [facility] , per pharmacist, patient has been going from hospital to hospital and been non compliant to medication. patient showed up for medication  few times , however screamed at pharmacist and refused to pick medication up, as he believed meds were not appropriate     (25 Mar 2021 12:07)      PAST MEDICAL & SURGICAL HISTORY:  PE (pulmonary thromboembolism)    HTN (hypertension)    Angina pectoris    CHF (congestive heart failure)    Dyslipidemia    DM (diabetes mellitus)    CAD (coronary artery disease)    S/P CABG x 3        MEDICATIONS  (STANDING):  ALBUTerol    90 MICROgram(s) HFA Inhaler 2 Puff(s) Inhalation every 6 hours  apixaban 5 milliGRAM(s) Oral every 12 hours  atorvastatin 40 milliGRAM(s) Oral at bedtime  carvedilol 3.125 milliGRAM(s) Oral every 12 hours  dextrose 40% Gel 15 Gram(s) Oral once  dextrose 5%. 1000 milliLiter(s) (50 mL/Hr) IV Continuous <Continuous>  dextrose 5%. 1000 milliLiter(s) (100 mL/Hr) IV Continuous <Continuous>  dextrose 50% Injectable 25 Gram(s) IV Push once  dextrose 50% Injectable 12.5 Gram(s) IV Push once  dextrose 50% Injectable 25 Gram(s) IV Push once  furosemide   Injectable 40 milliGRAM(s) IV Push two times a day  glucagon  Injectable 1 milliGRAM(s) IntraMuscular once  insulin lispro (ADMELOG) corrective regimen sliding scale   SubCutaneous three times a day before meals  insulin lispro (ADMELOG) corrective regimen sliding scale   SubCutaneous at bedtime  lisinopril 5 milliGRAM(s) Oral daily  sodium chloride 0.9% lock flush 3 milliLiter(s) IV Push every 8 hours    FAMILY HISTORY:No hx of CAD      SOCIAL HISTORY:    [x ] Non-smoker    [x ] Alcohol-denies    Allergies    Aldactone (Unknown)  Bumex (Blisters; Rash)  metoprolol (Unknown)  spironolactone (Unknown)    Intolerances    	    REVIEW OF SYSTEMS:  CONSTITUTIONAL: No fever, weight loss, or fatigue  EYES: No eye pain, visual disturbances, or discharge  ENT:  No difficulty hearing, tinnitus, vertigo; No sinus or throat pain  NECK: No pain or stiffness  RESPIRATORY: No cough, wheezing, chills or hemoptysis; + Shortness of Breath  CARDIOVASCULAR: No chest pain, palpitations, passing out, dizziness, + leg swelling  GASTROINTESTINAL: No abdominal or epigastric pain. No nausea, vomiting, or hematemesis; No diarrhea or constipation. No melena or hematochezia.  GENITOURINARY: No dysuria, frequency, hematuria, or incontinence  NEUROLOGICAL: No headaches, memory loss, loss of strength, numbness, or tremors  SKIN: No itching, burning, rashes, or lesions   LYMPH Nodes: No enlarged glands  ENDOCRINE: No heat or cold intolerance; No hair loss  MUSCULOSKELETAL: No joint pain or swelling; No muscle, back, or extremity pain  PSYCHIATRIC: No depression, anxiety, mood swings, or difficulty sleeping  HEME/LYMPH: No easy bruising, or bleeding gums  ALLERGY AND IMMUNOLOGIC: No hives or eczema	        PHYSICAL EXAM:  T(C): 36.3 (03-26-21 @ 05:42), Max: 36.3 (03-25-21 @ 16:40)  HR: 76 (03-26-21 @ 09:43) (65 - 79)  BP: 129/79 (03-26-21 @ 09:43) (119/53 - 133/78)  RR: 17 (03-26-21 @ 05:42) (17 - 18)  SpO2: 94% (03-26-21 @ 05:42) (94% - 97%)  Wt(kg): --  I&O's Summary    25 Mar 2021 07:01  -  26 Mar 2021 07:00  --------------------------------------------------------  IN: 100 mL / OUT: 1650 mL / NET: -1550 mL        Appearance: Normal	  HEENT:   Normal oral mucosa, PERRL, EOMI	  Lymphatic: No lymphadenopathy  Cardiovascular: Normal S1 S2, No JVD, No murmurs, No edema  Respiratory: Lungs clear to auscultation	  Psychiatry: A & O x 3, Mood & affect appropriate  Gastrointestinal:  Soft, Non-tender, + BS	  Skin: No rashes, No ecchymoses, No cyanosis	  Neurologic: Non-focal  Extremities: Normal range of motion, No clubbing, cyanosis or edema  Vascular: Peripheral pulses palpable 2+ bilaterally        ECG:    Sinus rhythm with 1st degree AV block   	  	  LABS:	 	    CARDIAC MARKERS ( 25 Mar 2021 21:19 )  0.017 ng/mL / x     / x     / x     / x      CARDIAC MARKERS ( 25 Mar 2021 13:55 )  <0.015 ng/mL / x     / x     / x     / x      CARDIAC MARKERS ( 25 Mar 2021 06:19 )  <0.015 ng/mL / x     / x     / x     / x                              12.0   6.12  )-----------( 245      ( 25 Mar 2021 06:56 )             38.7     03-25    142  |  110<H>  |  23<H>  ----------------------------<  120<H>  4.0   |  22  |  1.20    Ca    8.3<L>      25 Mar 2021 06:19  Phos  3.3     03-25  Mg     2.1     03-25    TPro  7.1  /  Alb  2.9<L>  /  TBili  1.0  /  DBili  x   /  AST  33  /  ALT  29  /  AlkPhos  148<H>  03-25    < from: US Duplex Venous Lower Ext Complete, Bilateral (03.25.21 @ 10:38) >  EXAM:  US DPLX LWR EXT VEINS COMPL BI                            PROCEDURE DATE:  03/25/2021          INTERPRETATION:  CLINICAL INFORMATION: Leg swelling    COMPARISON: 11/16/2017    TECHNIQUE: Duplex sonography of the BILATERAL LOWER extremity veins with color and spectral Doppler, with and without compression.    FINDINGS:    RIGHT:  Normal compressibility of the RIGHT common femoral, femoral and popliteal veins.  Doppler examination shows normal spontaneous and phasic flow.  No RIGHT calf vein thrombosis is detected.    LEFT:  Normal compressibility of the LEFT common femoral, femoral and popliteal veins.  Doppler examination shows normal spontaneous and phasic flow.  No LEFT calf vein thrombosis is detected.    Enlarged bilateral groin lymph nodes are noted.    IMPRESSION:  No evidence of deep venous thrombosis in either lower extremity.      EXAM:  XR CHEST PORTABLE IMMED 1V                            PROCEDURE DATE:  03/25/2021          INTERPRETATION:  CLINICAL INDICATION: 70 years  Male with Shortness of Breath.    COMPARISON: 4/8/2020    Apical lordotic view of the chest demonstrates the lungs to be clear. There is no pleural effusion. There is no pneumothorax.    The heart is enlarged. The and sternotomy wires are present.. There is no mediastinal or hilar mass.    The pulmonary vasculature is normal.    Mild thoracic degenerative changes are present.    IMPRESSION:    No acute infiltrate. Cardiomegaly. Sternotomy.    Limited by projection. Recommend repeat.            CRISTIANO ALBA MD; Attending Radiologist  This document has been electronically signed. Mar 25 2021  8:44AM        < from: Transthoracic Echocardiogram (03.25.21 @ 12:35) >  ------------------------------------------------------------------------  OBSERVATIONS:  Mitral Valve: Tethered mitral valve leaflets. Mild to  moderate mitral regurgitation.  Aortic Root: Normal aortic root.  Aortic Valve: Calcified trileaflet aortic valve with  decreased opening. Peak transaortic valve gradient equals  16.8 mm Hg, mean transaortic valve gradient equals 10 mm  Hg, consistent with mild aortic stenosis. The aortic valve  apears more stenotic than indicated by gradients. In  setting oflow EF, cannot rule out paradoxical low-flow  low-gradient AS. Consider dobutamine stress echocardiogram  for further assessment if clinically indicated.   Trace  aortic regurgitation.  Left Atrium: Normal left atrium.  LA volume index = 28  cc/m2.  Left Ventricle: Severe global left ventricular systolic  dysfunction (EF 15-20% by visual estimation). Not all LV  wall segments were seen. Mild left ventricular enlargement.  Grade II diastolic dysfunction.  Right Heart: Normal right atrium. Off axisimages preclude  accurate assessment of right ventricular size. Reduced RV  systolic function (TAPSE 1.0 cm). Normal tricuspid valve.  Moderate to severe tricuspid regurgitation. Pulmonic valve  not well seen. Trace pulmonic insufficiency is noted.  Pericardium/PleuraNo pericardial effusion. Right pleural  effusion.  Hemodynamic: RA Pressure is 8 mm Hg. RV systolic pressure  is severely increased at  67 mm Hg.    < end of copied text >   CHIEF COMPLAINT:Patient is a 70y old  Male who presents with a chief complaint of CHF exacerbation .      HPI:  70y M from home with PMH HTN, HLD, DM, PE, CAD s/p CABG (3.5 yrs ago), presenting with leg swelling, scrotal swelling  and difficulty breathing for months. History limited to patient's cognitive function (does not remember meds, pharmacy number, timelines, wect) He endorses chest tightness, shortness of breath on exertion, requiring him to take frequent breaks when walking or climbing stairs. can walk less than 2 blocks.  He also has pain in his legs from the swelling. He states his leg swelling is intermittent, but lately has been constant. He denies any palpitations, abdominal pain, nausea/vomiting. pt reports had echo 2 month ago with acceptable results. pt non smoker , does not drink ETOH. drinks less than 2 L in a day.        Ed course : pt received Lasix 80mg X1 in ED    ECG : first degree heart block, Qtc is prolonged 491    Of note : I Called pharmacy at [facility] (pharmacy on chart) reported pt did not  refill over last 4 months, also called [facility] , per pharmacist, patient has been going from hospital to hospital and been non compliant to medication. patient showed up for medication  few times , however screamed at pharmacist and refused to pick medication up, as he believed meds were not appropriate     (25 Mar 2021 12:07)      PAST MEDICAL & SURGICAL HISTORY:  PE (pulmonary thromboembolism)    HTN (hypertension)    Angina pectoris    CHF (congestive heart failure)    Dyslipidemia    DM (diabetes mellitus)    CAD (coronary artery disease)    S/P CABG x 3        MEDICATIONS  (STANDING):  ALBUTerol    90 MICROgram(s) HFA Inhaler 2 Puff(s) Inhalation every 6 hours  apixaban 5 milliGRAM(s) Oral every 12 hours  atorvastatin 40 milliGRAM(s) Oral at bedtime  carvedilol 3.125 milliGRAM(s) Oral every 12 hours  dextrose 40% Gel 15 Gram(s) Oral once  dextrose 5%. 1000 milliLiter(s) (50 mL/Hr) IV Continuous <Continuous>  dextrose 5%. 1000 milliLiter(s) (100 mL/Hr) IV Continuous <Continuous>  dextrose 50% Injectable 25 Gram(s) IV Push once  dextrose 50% Injectable 12.5 Gram(s) IV Push once  dextrose 50% Injectable 25 Gram(s) IV Push once  furosemide   Injectable 40 milliGRAM(s) IV Push two times a day  glucagon  Injectable 1 milliGRAM(s) IntraMuscular once  insulin lispro (ADMELOG) corrective regimen sliding scale   SubCutaneous three times a day before meals  insulin lispro (ADMELOG) corrective regimen sliding scale   SubCutaneous at bedtime  lisinopril 5 milliGRAM(s) Oral daily  sodium chloride 0.9% lock flush 3 milliLiter(s) IV Push every 8 hours    FAMILY HISTORY:No hx of CAD      SOCIAL HISTORY:    [x ] Non-smoker    [x ] Alcohol-denies    Allergies    Aldactone (Unknown)  Bumex (Blisters; Rash)  metoprolol (Unknown)  spironolactone (Unknown)    Intolerances    	    REVIEW OF SYSTEMS:  CONSTITUTIONAL: No fever, weight loss, or fatigue  EYES: No eye pain, visual disturbances, or discharge  ENT:  No difficulty hearing, tinnitus, vertigo; No sinus or throat pain  NECK: No pain or stiffness  RESPIRATORY: No cough, wheezing, chills or hemoptysis; + Shortness of Breath  CARDIOVASCULAR: No chest pain, palpitations, passing out, dizziness, + leg swelling  GASTROINTESTINAL: No abdominal or epigastric pain. No nausea, vomiting, or hematemesis; No diarrhea or constipation. No melena or hematochezia.  GENITOURINARY: No dysuria, frequency, hematuria, or incontinence  NEUROLOGICAL: No headaches, memory loss, loss of strength, numbness, or tremors  SKIN: No itching, burning, rashes, or lesions   LYMPH Nodes: No enlarged glands  ENDOCRINE: No heat or cold intolerance; No hair loss  MUSCULOSKELETAL: No joint pain or swelling; No muscle, back, or extremity pain  PSYCHIATRIC: No depression, anxiety, mood swings, or difficulty sleeping  HEME/LYMPH: No easy bruising, or bleeding gums  ALLERGY AND IMMUNOLOGIC: No hives or eczema	        PHYSICAL EXAM:  T(C): 36.3 (03-26-21 @ 05:42), Max: 36.3 (03-25-21 @ 16:40)  HR: 76 (03-26-21 @ 09:43) (65 - 79)  BP: 129/79 (03-26-21 @ 09:43) (119/53 - 133/78)  RR: 17 (03-26-21 @ 05:42) (17 - 18)  SpO2: 94% (03-26-21 @ 05:42) (94% - 97%)  Wt(kg): --  I&O's Summary    25 Mar 2021 07:01  -  26 Mar 2021 07:00  --------------------------------------------------------  IN: 100 mL / OUT: 1650 mL / NET: -1550 mL        Appearance: Normal	  HEENT:   Normal oral mucosa, PERRL, EOMI	  Lymphatic: No lymphadenopathy  Cardiovascular: Normal S1 S2, + JVD, No murmurs, +2 edema  Respiratory: Dec bS at bases	  Psychiatry: A & O x 3, Mood & affect appropriate  Gastrointestinal:  Soft, Non-tender, + BS	  Skin: No rashes, No ecchymoses, No cyanosis	  Neurologic: Non-focal  Extremities: Normal range of motion, No clubbing, cyanosis +2 edema          ECG:    Sinus rhythm with 1st degree AV block   	  	  LABS:	 	    CARDIAC MARKERS ( 25 Mar 2021 21:19 )  0.017 ng/mL / x     / x     / x     / x      CARDIAC MARKERS ( 25 Mar 2021 13:55 )  <0.015 ng/mL / x     / x     / x     / x      CARDIAC MARKERS ( 25 Mar 2021 06:19 )  <0.015 ng/mL / x     / x     / x     / x                              12.0   6.12  )-----------( 245      ( 25 Mar 2021 06:56 )             38.7     03-25    142  |  110<H>  |  23<H>  ----------------------------<  120<H>  4.0   |  22  |  1.20    Ca    8.3<L>      25 Mar 2021 06:19  Phos  3.3     03-25  Mg     2.1     03-25    TPro  7.1  /  Alb  2.9<L>  /  TBili  1.0  /  DBili  x   /  AST  33  /  ALT  29  /  AlkPhos  148<H>  03-25    < from: US Duplex Venous Lower Ext Complete, Bilateral (03.25.21 @ 10:38) >  EXAM:  US DPLX LWR EXT VEINS COMPL BI                            PROCEDURE DATE:  03/25/2021          INTERPRETATION:  CLINICAL INFORMATION: Leg swelling    COMPARISON: 11/16/2017    TECHNIQUE: Duplex sonography of the BILATERAL LOWER extremity veins with color and spectral Doppler, with and without compression.    FINDINGS:    RIGHT:  Normal compressibility of the RIGHT common femoral, femoral and popliteal veins.  Doppler examination shows normal spontaneous and phasic flow.  No RIGHT calf vein thrombosis is detected.    LEFT:  Normal compressibility of the LEFT common femoral, femoral and popliteal veins.  Doppler examination shows normal spontaneous and phasic flow.  No LEFT calf vein thrombosis is detected.    Enlarged bilateral groin lymph nodes are noted.    IMPRESSION:  No evidence of deep venous thrombosis in either lower extremity.      EXAM:  XR CHEST PORTABLE IMMED 1V                            PROCEDURE DATE:  03/25/2021          INTERPRETATION:  CLINICAL INDICATION: 70 years  Male with Shortness of Breath.    COMPARISON: 4/8/2020    Apical lordotic view of the chest demonstrates the lungs to be clear. There is no pleural effusion. There is no pneumothorax.    The heart is enlarged. The and sternotomy wires are present.. There is no mediastinal or hilar mass.    The pulmonary vasculature is normal.    Mild thoracic degenerative changes are present.    IMPRESSION:    No acute infiltrate. Cardiomegaly. Sternotomy.    Limited by projection. Recommend repeat.            CRISTIANO ALBA MD; Attending Radiologist  This document has been electronically signed. Mar 25 2021  8:44AM        < from: Transthoracic Echocardiogram (03.25.21 @ 12:35) >  ------------------------------------------------------------------------  OBSERVATIONS:  Mitral Valve: Tethered mitral valve leaflets. Mild to  moderate mitral regurgitation.  Aortic Root: Normal aortic root.  Aortic Valve: Calcified trileaflet aortic valve with  decreased opening. Peak transaortic valve gradient equals  16.8 mm Hg, mean transaortic valve gradient equals 10 mm  Hg, consistent with mild aortic stenosis. The aortic valve  apears more stenotic than indicated by gradients. In  setting oflow EF, cannot rule out paradoxical low-flow  low-gradient AS. Consider dobutamine stress echocardiogram  for further assessment if clinically indicated.   Trace  aortic regurgitation.  Left Atrium: Normal left atrium.  LA volume index = 28  cc/m2.  Left Ventricle: Severe global left ventricular systolic  dysfunction (EF 15-20% by visual estimation). Not all LV  wall segments were seen. Mild left ventricular enlargement.  Grade II diastolic dysfunction.  Right Heart: Normal right atrium. Off axisimages preclude  accurate assessment of right ventricular size. Reduced RV  systolic function (TAPSE 1.0 cm). Normal tricuspid valve.  Moderate to severe tricuspid regurgitation. Pulmonic valve  not well seen. Trace pulmonic insufficiency is noted.  Pericardium/PleuraNo pericardial effusion. Right pleural  effusion.  Hemodynamic: RA Pressure is 8 mm Hg. RV systolic pressure  is severely increased at  67 mm Hg.    < end of copied text >

## 2021-03-26 NOTE — DIETITIAN INITIAL EVALUATION ADULT. - REASON INDICATOR FOR ASSESSMENT
verbal consult by diet tech to see patient about diet  consult- registered dietitian verbal consult by diet tech to see patient about diet non-compliance   consult- registered dietitian

## 2021-03-26 NOTE — PROGRESS NOTE ADULT - PROBLEM SELECTOR PLAN 2
S/P CABG 3.5 years ago  c/w Lasix 80 mg bid, Coreg 3.125 , Lisinopril 5 qd , Aspirin 81, statin 40qd  tele   cardio Dr Rhodes

## 2021-03-26 NOTE — PROGRESS NOTE ADULT - SUBJECTIVE AND OBJECTIVE BOX
Patient is a 70y old  Male who presents with a chief complaint of CHF exacerbation (25 Mar 2021 12:07)    pt seen in icu [  ], reg med floor [   ], bed [  ], chair at bedside [   ], a+o x3 [  ], lethargic [  ],  nad [  ]    cruz [  ], ngt [  ], peg [  ], et tube [  ], cent line [  ], picc line [  ]        Allergies    Aldactone (Unknown)  Bumex (Blisters; Rash)  metoprolol (Unknown)  spironolactone (Unknown)        Vitals    T(F): 97.4 (03-26-21 @ 05:42), Max: 98.5 (03-25-21 @ 07:21)  HR: 68 (03-26-21 @ 05:42) (65 - 79)  BP: 133/78 (03-26-21 @ 05:42) (119/53 - 139/87)  RR: 17 (03-26-21 @ 05:42) (17 - 18)  SpO2: 94% (03-26-21 @ 05:42) (94% - 97%)  Wt(kg): --  CAPILLARY BLOOD GLUCOSE          Labs                          12.0   6.12  )-----------( 245      ( 25 Mar 2021 06:56 )             38.7       03-25    142  |  110<H>  |  23<H>  ----------------------------<  120<H>  4.0   |  22  |  1.20    Ca    8.3<L>      25 Mar 2021 06:19  Phos  3.3     03-25  Mg     2.1     03-25    TPro  7.1  /  Alb  2.9<L>  /  TBili  1.0  /  DBili  x   /  AST  33  /  ALT  29  /  AlkPhos  148<H>  03-25      CARDIAC MARKERS ( 25 Mar 2021 21:19 )  0.017 ng/mL / x     / x     / x     / x      CARDIAC MARKERS ( 25 Mar 2021 13:55 )  <0.015 ng/mL / x     / x     / x     / x      CARDIAC MARKERS ( 25 Mar 2021 06:19 )  <0.015 ng/mL / x     / x     / x     / x                Radiology Results      Meds    MEDICATIONS  (STANDING):  ALBUTerol    90 MICROgram(s) HFA Inhaler 2 Puff(s) Inhalation every 6 hours  apixaban 5 milliGRAM(s) Oral every 12 hours  atorvastatin 40 milliGRAM(s) Oral at bedtime  dextrose 40% Gel 15 Gram(s) Oral once  dextrose 5%. 1000 milliLiter(s) (50 mL/Hr) IV Continuous <Continuous>  dextrose 5%. 1000 milliLiter(s) (100 mL/Hr) IV Continuous <Continuous>  dextrose 50% Injectable 25 Gram(s) IV Push once  dextrose 50% Injectable 12.5 Gram(s) IV Push once  dextrose 50% Injectable 25 Gram(s) IV Push once  furosemide   Injectable 80 milliGRAM(s) IV Push every 12 hours  glucagon  Injectable 1 milliGRAM(s) IntraMuscular once  insulin lispro (ADMELOG) corrective regimen sliding scale   SubCutaneous three times a day before meals  insulin lispro (ADMELOG) corrective regimen sliding scale   SubCutaneous at bedtime  lisinopril 5 milliGRAM(s) Oral daily  sodium chloride 0.9% lock flush 3 milliLiter(s) IV Push every 8 hours      MEDICATIONS  (PRN):      Physical Exam    Neuro :  no focal deficits  Respiratory: CTA B/L  CV: RRR, S1S2, no murmurs,   Abdominal: Soft, NT, ND +BS,  Extremities: No edema, + peripheral pulses    ASSESSMENT    Heart failure    PE (pulmonary thromboembolism)    HTN (hypertension)    Angina pectoris    CHF (congestive heart failure)    Dyslipidemia    DM (diabetes mellitus)    CAD (coronary artery disease)    No pertinent past medical history    S/P CABG x 3        PLAN     Patient is a 70y old  Male who presents with a chief complaint of CHF exacerbation (25 Mar 2021 12:07)    pt seen in tele [ x ], reg med floor [   ], bed [ x ], chair at bedside [   ], a+o x3 [ x ], lethargic [  ],  nad [ x ]    pt noncompliant with wearing monitor and taking meds    Allergies    Aldactone (Unknown)  Bumex (Blisters; Rash)  metoprolol (Unknown)  spironolactone (Unknown)        Vitals    T(F): 97.4 (03-26-21 @ 05:42), Max: 98.5 (03-25-21 @ 07:21)  HR: 68 (03-26-21 @ 05:42) (65 - 79)  BP: 133/78 (03-26-21 @ 05:42) (119/53 - 139/87)  RR: 17 (03-26-21 @ 05:42) (17 - 18)  SpO2: 94% (03-26-21 @ 05:42) (94% - 97%)  Wt(kg): --  CAPILLARY BLOOD GLUCOSE          Labs                          12.0   6.12  )-----------( 245      ( 25 Mar 2021 06:56 )             38.7       03-25    142  |  110<H>  |  23<H>  ----------------------------<  120<H>  4.0   |  22  |  1.20    Ca    8.3<L>      25 Mar 2021 06:19  Phos  3.3     03-25  Mg     2.1     03-25    TPro  7.1  /  Alb  2.9<L>  /  TBili  1.0  /  DBili  x   /  AST  33  /  ALT  29  /  AlkPhos  148<H>  03-25      CARDIAC MARKERS ( 25 Mar 2021 21:19 )  0.017 ng/mL / x     / x     / x     / x      CARDIAC MARKERS ( 25 Mar 2021 13:55 )  <0.015 ng/mL / x     / x     / x     / x      CARDIAC MARKERS ( 25 Mar 2021 06:19 )  <0.015 ng/mL / x     / x     / x     / x                Radiology Results      Meds    MEDICATIONS  (STANDING):  ALBUTerol    90 MICROgram(s) HFA Inhaler 2 Puff(s) Inhalation every 6 hours  apixaban 5 milliGRAM(s) Oral every 12 hours  atorvastatin 40 milliGRAM(s) Oral at bedtime  dextrose 40% Gel 15 Gram(s) Oral once  dextrose 5%. 1000 milliLiter(s) (50 mL/Hr) IV Continuous <Continuous>  dextrose 5%. 1000 milliLiter(s) (100 mL/Hr) IV Continuous <Continuous>  dextrose 50% Injectable 25 Gram(s) IV Push once  dextrose 50% Injectable 12.5 Gram(s) IV Push once  dextrose 50% Injectable 25 Gram(s) IV Push once  furosemide   Injectable 80 milliGRAM(s) IV Push every 12 hours  glucagon  Injectable 1 milliGRAM(s) IntraMuscular once  insulin lispro (ADMELOG) corrective regimen sliding scale   SubCutaneous three times a day before meals  insulin lispro (ADMELOG) corrective regimen sliding scale   SubCutaneous at bedtime  lisinopril 5 milliGRAM(s) Oral daily  sodium chloride 0.9% lock flush 3 milliLiter(s) IV Push every 8 hours      MEDICATIONS  (PRN):      Physical Exam    Neuro :  no focal deficits  Respiratory: CTA B/L  CV: RRR, S1S2, no murmurs,   Abdominal: Soft, NT, ND +BS,  Extremities: b/l le edema, + peripheral pulses        ASSESSMENT    acute on chronic systolic chf,   pulm edema   r/o acs,   h/o HTN,   HLD,   DM,   CAD s/p CABGx3,  PE (pulmonary thromboembolism)    PLAN      cont tele,   acs protocol,   cont coreg, lasix,   aspirin, statin,   ce x3 noted above  cardio cons   echo  supplemental O2 as needed,  cont  bronchodilator,  f/u [id number]  cont current meds

## 2021-03-26 NOTE — PROGRESS NOTE ADULT - SUBJECTIVE AND OBJECTIVE BOX
NP Note discussed with  primary attending    Patient is a 70y old  Male who presents with a chief complaint of CHF exacerbation (26 Mar 2021 13:36)      INTERVAL HPI/OVERNIGHT EVENTS: no new complaints    MEDICATIONS  (STANDING):  ALBUTerol    90 MICROgram(s) HFA Inhaler 2 Puff(s) Inhalation every 6 hours  apixaban 5 milliGRAM(s) Oral every 12 hours  aspirin  chewable 81 milliGRAM(s) Oral daily  atorvastatin 40 milliGRAM(s) Oral at bedtime  carvedilol 3.125 milliGRAM(s) Oral every 12 hours  dextrose 40% Gel 15 Gram(s) Oral once  dextrose 5%. 1000 milliLiter(s) (50 mL/Hr) IV Continuous <Continuous>  dextrose 5%. 1000 milliLiter(s) (100 mL/Hr) IV Continuous <Continuous>  dextrose 50% Injectable 25 Gram(s) IV Push once  dextrose 50% Injectable 12.5 Gram(s) IV Push once  dextrose 50% Injectable 25 Gram(s) IV Push once  furosemide   Injectable 40 milliGRAM(s) IV Push two times a day  glucagon  Injectable 1 milliGRAM(s) IntraMuscular once  insulin lispro (ADMELOG) corrective regimen sliding scale   SubCutaneous three times a day before meals  insulin lispro (ADMELOG) corrective regimen sliding scale   SubCutaneous at bedtime  lisinopril 5 milliGRAM(s) Oral daily  sodium chloride 0.9% lock flush 3 milliLiter(s) IV Push every 8 hours    MEDICATIONS  (PRN):      __________________________________________________  REVIEW OF SYSTEMS:    CONSTITUTIONAL: No fever,   EYES: no acute visual disturbances  NECK: No pain or stiffness  RESPIRATORY: No cough; + intermittent shortness of breath  CARDIOVASCULAR: No chest pain, no palpitations  GASTROINTESTINAL: No pain. No nausea or vomiting; No diarrhea   NEUROLOGICAL: No headache or numbness, no tremors  MUSCULOSKELETAL: No joint pain, no muscle pain  GENITOURINARY: no dysuria, no frequency, no hesitancy        Vital Signs Last 24 Hrs  T(C): 36.4 (26 Mar 2021 11:13), Max: 36.4 (26 Mar 2021 11:13)  T(F): 97.6 (26 Mar 2021 11:13), Max: 97.6 (26 Mar 2021 11:13)  HR: 66 (26 Mar 2021 11:13) (66 - 76)  BP: 124/72 (26 Mar 2021 11:13) (124/72 - 133/78)  BP(mean): --  RR: 18 (26 Mar 2021 11:13) (17 - 18)  SpO2: 97% (26 Mar 2021 11:13) (94% - 97%)    ________________________________________________  PHYSICAL EXAM:  GENERAL: NAD  HEENT: Normocephalic;  conjunctivae and sclerae clear; moist mucous membranes;   NECK : supple  CHEST/LUNG: decreased breath sounds aman   HEART: S1 S2  regular; no murmurs, gallops or rubs  ABDOMEN: Soft, Nontender, Nondistended; Bowel sounds present  EXTREMITIES: no cyanosis; no edema; no calf tenderness  SKIN: warm and dry; no rash  NERVOUS SYSTEM:  Awake and alert; Oriented  to place, person and time ;    _________________________________________________  LABS:                        12.0   6.12  )-----------( 245      ( 25 Mar 2021 06:56 )             38.7     03-25    142  |  110<H>  |  23<H>  ----------------------------<  120<H>  4.0   |  22  |  1.20    Ca    8.3<L>      25 Mar 2021 06:19  Phos  3.3     03-25  Mg     2.1     03-25    TPro  7.1  /  Alb  2.9<L>  /  TBili  1.0  /  DBili  x   /  AST  33  /  ALT  29  /  AlkPhos  148<H>  03-25    PT/INR - ( 25 Mar 2021 06:19 )   PT: 16.3 sec;   INR: 1.39 ratio         PTT - ( 25 Mar 2021 06:19 )  PTT:23.4 sec    CAPILLARY BLOOD GLUCOSE      POCT Blood Glucose.: 131 mg/dL (26 Mar 2021 16:49)  POCT Blood Glucose.: 101 mg/dL (26 Mar 2021 08:04)        RADIOLOGY & ADDITIONAL TESTS:    Imaging Personally Reviewed:  YES/NO    Consultant(s) Notes Reviewed:   YES/ No    Care Discussed with Consultants :     Plan of care was discussed with patient and /or primary care giver; all questions and concerns were addressed and care was aligned with patient's wishes.

## 2021-03-26 NOTE — PROGRESS NOTE ADULT - PROBLEM SELECTOR PLAN 1
acute on chronic systolic heart failure due to noncompliance  with medications and diet   clinically fluid overloaded ,p/w anasarca edema , lungs are congested in CXr, Pro BNp elevate 6970  lisinopril 5mg daily  carvedilol 3.125 mg bid  lasix 80 mg BID   Stricts I/O and daily Weights  Cardiology Dr Rhodes

## 2021-03-27 LAB
ANION GAP SERPL CALC-SCNC: 6 MMOL/L — SIGNIFICANT CHANGE UP (ref 5–17)
BUN SERPL-MCNC: 22 MG/DL — HIGH (ref 7–18)
CALCIUM SERPL-MCNC: 8.2 MG/DL — LOW (ref 8.4–10.5)
CHLORIDE SERPL-SCNC: 109 MMOL/L — HIGH (ref 96–108)
CO2 SERPL-SCNC: 27 MMOL/L — SIGNIFICANT CHANGE UP (ref 22–31)
CREAT SERPL-MCNC: 1.12 MG/DL — SIGNIFICANT CHANGE UP (ref 0.5–1.3)
GLUCOSE BLDC GLUCOMTR-MCNC: 112 MG/DL — HIGH (ref 70–99)
GLUCOSE BLDC GLUCOMTR-MCNC: 118 MG/DL — HIGH (ref 70–99)
GLUCOSE BLDC GLUCOMTR-MCNC: 97 MG/DL — SIGNIFICANT CHANGE UP (ref 70–99)
GLUCOSE SERPL-MCNC: 87 MG/DL — SIGNIFICANT CHANGE UP (ref 70–99)
HCT VFR BLD CALC: 38.3 % — LOW (ref 39–50)
HGB BLD-MCNC: 11.9 G/DL — LOW (ref 13–17)
MCHC RBC-ENTMCNC: 26.2 PG — LOW (ref 27–34)
MCHC RBC-ENTMCNC: 31.1 GM/DL — LOW (ref 32–36)
MCV RBC AUTO: 84.4 FL — SIGNIFICANT CHANGE UP (ref 80–100)
NRBC # BLD: 0 /100 WBCS — SIGNIFICANT CHANGE UP (ref 0–0)
PLATELET # BLD AUTO: 220 K/UL — SIGNIFICANT CHANGE UP (ref 150–400)
POTASSIUM SERPL-MCNC: 3.7 MMOL/L — SIGNIFICANT CHANGE UP (ref 3.5–5.3)
POTASSIUM SERPL-SCNC: 3.7 MMOL/L — SIGNIFICANT CHANGE UP (ref 3.5–5.3)
RBC # BLD: 4.54 M/UL — SIGNIFICANT CHANGE UP (ref 4.2–5.8)
RBC # FLD: 17.4 % — HIGH (ref 10.3–14.5)
SODIUM SERPL-SCNC: 142 MMOL/L — SIGNIFICANT CHANGE UP (ref 135–145)
WBC # BLD: 5.78 K/UL — SIGNIFICANT CHANGE UP (ref 3.8–10.5)
WBC # FLD AUTO: 5.78 K/UL — SIGNIFICANT CHANGE UP (ref 3.8–10.5)

## 2021-03-27 RX ADMIN — CARVEDILOL PHOSPHATE 3.12 MILLIGRAM(S): 80 CAPSULE, EXTENDED RELEASE ORAL at 06:18

## 2021-03-27 RX ADMIN — CARVEDILOL PHOSPHATE 3.12 MILLIGRAM(S): 80 CAPSULE, EXTENDED RELEASE ORAL at 18:35

## 2021-03-27 RX ADMIN — Medication 40 MILLIGRAM(S): at 06:18

## 2021-03-27 RX ADMIN — APIXABAN 5 MILLIGRAM(S): 2.5 TABLET, FILM COATED ORAL at 06:18

## 2021-03-27 RX ADMIN — Medication 40 MILLIGRAM(S): at 18:35

## 2021-03-27 RX ADMIN — APIXABAN 5 MILLIGRAM(S): 2.5 TABLET, FILM COATED ORAL at 18:35

## 2021-03-27 NOTE — PROGRESS NOTE ADULT - SUBJECTIVE AND OBJECTIVE BOX
CHIEF COMPLAINT:Patient is a 70y old  Male who presents with a chief complaint of CHF exacerbation.Pt refusing V lasix.    	  REVIEW OF SYSTEMS:  CONSTITUTIONAL: No fever, weight loss, or fatigue  EYES: No eye pain, visual disturbances, or discharge  ENT:  No difficulty hearing, tinnitus, vertigo; No sinus or throat pain  NECK: No pain or stiffness  RESPIRATORY: No cough, wheezing, chills or hemoptysis; No Shortness of Breath  CARDIOVASCULAR: No chest pain, palpitations, passing out, dizziness, or leg swelling  GASTROINTESTINAL: No abdominal or epigastric pain. No nausea, vomiting, or hematemesis; No diarrhea or constipation. No melena or hematochezia.  GENITOURINARY: No dysuria, frequency, hematuria, or incontinence  NEUROLOGICAL: No headaches, memory loss, loss of strength, numbness, or tremors  SKIN: No itching, burning, rashes, or lesions   LYMPH Nodes: No enlarged glands  ENDOCRINE: No heat or cold intolerance; No hair loss  MUSCULOSKELETAL: No joint pain or swelling; No muscle, back, or extremity pain  PSYCHIATRIC: No depression, anxiety, mood swings, or difficulty sleeping  HEME/LYMPH: No easy bruising, or bleeding gums  ALLERGY AND IMMUNOLOGIC: No hives or eczema	      PHYSICAL EXAM:  T(C): 36.4 (03-27-21 @ 04:54), Max: 36.8 (03-26-21 @ 18:35)  HR: 66 (03-27-21 @ 04:54) (61 - 69)  BP: 125/81 (03-27-21 @ 04:54) (120/66 - 127/77)  RR: 18 (03-27-21 @ 04:54) (18 - 18)  SpO2: 97% (03-27-21 @ 04:54) (95% - 98%)      Appearance: Normal	  HEENT:   Normal oral mucosa, PERRL, EOMI	  Lymphatic: No lymphadenopathy  Cardiovascular: Normal S1 S2, No JVD, No murmurs, +2 edema  Respiratory: Lungs clear to auscultation	  Psychiatry: A & O x 3, Mood & affect appropriate  Gastrointestinal:  Soft, Non-tender, + BS	  Skin: No rashes, No ecchymoses, No cyanosis	  Neurologic: Non-focal  Extremities: Normal range of motion, No clubbing, cyanosis +2edema  Vascular: Peripheral pulses palpable 2+ bilaterally    MEDICATIONS  (STANDING):  ALBUTerol    90 MICROgram(s) HFA Inhaler 2 Puff(s) Inhalation every 6 hours  apixaban 5 milliGRAM(s) Oral every 12 hours  aspirin  chewable 81 milliGRAM(s) Oral daily  atorvastatin 40 milliGRAM(s) Oral at bedtime  carvedilol 3.125 milliGRAM(s) Oral every 12 hours  dextrose 40% Gel 15 Gram(s) Oral once  dextrose 5%. 1000 milliLiter(s) (50 mL/Hr) IV Continuous <Continuous>  dextrose 5%. 1000 milliLiter(s) (100 mL/Hr) IV Continuous <Continuous>  dextrose 50% Injectable 25 Gram(s) IV Push once  dextrose 50% Injectable 12.5 Gram(s) IV Push once  dextrose 50% Injectable 25 Gram(s) IV Push once  furosemide    Tablet 40 milliGRAM(s) Oral two times a day  glucagon  Injectable 1 milliGRAM(s) IntraMuscular once  insulin lispro (ADMELOG) corrective regimen sliding scale   SubCutaneous three times a day before meals  insulin lispro (ADMELOG) corrective regimen sliding scale   SubCutaneous at bedtime  lisinopril 5 milliGRAM(s) Oral daily  sodium chloride 0.9% lock flush 3 milliLiter(s) IV Push every 8 hours    	  	  LABS:	 	      CARDIAC MARKERS ( 25 Mar 2021 21:19 )  0.017 ng/mL / x     / x     / x     / x      CARDIAC MARKERS ( 25 Mar 2021 13:55 )  <0.015 ng/mL / x     / x     / x     / x                                    11.9   5.78  )-----------( 220      ( 27 Mar 2021 07:02 )             38.3     03-27    142  |  109<H>  |  22<H>  ----------------------------<  87  3.7   |  27  |  1.12    Ca    8.2<L>      27 Mar 2021 07:02  Phos  3.3     03-25  Mg     2.1     03-25      proBNP: Serum Pro-Brain Natriuretic Peptide: 6970 pg/mL (03-25 @ 06:19)

## 2021-03-27 NOTE — PROGRESS NOTE ADULT - ASSESSMENT
1. CHF   Diuretics  Monitor I&O  Echo noted.   Cardio F/U  Elevate L/E   Encourage compliance with medication and treatment plan as OP.   DVT and GI PPX     2. Pleural Effusion  Likely 2nd to CHF  CXR noted   Cont diuretics.   F/U CXR - may need CT chest.   O2 supp PRN      3. Pulmonary HTN   Severe   Noted on Echo   CCB  Bronchodilators PRN   O2 supp PRN   Consider Revatio 20mg PO TID if cardio agrees     4. CAD  Check Carotid duplex  Cont meds  Cardio f/u

## 2021-03-27 NOTE — PROGRESS NOTE ADULT - SUBJECTIVE AND OBJECTIVE BOX
Patient is a 70y old  Male who presents with a chief complaint of CHF exacerbation (26 Mar 2021 17:28)    pt seen in tele [ x ], reg med floor [   ], bed [ x ], chair at bedside [   ], a+o x3 [ x ], lethargic [  ],  nad [ x ]    pt noncompliant with wearing monitor and taking meds      Allergies    Aldactone (Unknown)  Bumex (Blisters; Rash)  metoprolol (Unknown)  spironolactone (Unknown)        Vitals    T(F): 97.5 (03-27-21 @ 04:54), Max: 98.2 (03-26-21 @ 18:35)  HR: 66 (03-27-21 @ 04:54) (61 - 76)  BP: 125/81 (03-27-21 @ 04:54) (120/66 - 129/79)  RR: 18 (03-27-21 @ 04:54) (18 - 18)  SpO2: 97% (03-27-21 @ 04:54) (95% - 98%)  Wt(kg): --  CAPILLARY BLOOD GLUCOSE      POCT Blood Glucose.: 120 mg/dL (26 Mar 2021 21:23)      Labs                          12.0   6.12  )-----------( 245      ( 25 Mar 2021 06:56 )             38.7         Phos  3.3     03-25  Mg     2.1     03-25        CARDIAC MARKERS ( 25 Mar 2021 21:19 )  0.017 ng/mL / x     / x     / x     / x      CARDIAC MARKERS ( 25 Mar 2021 13:55 )  <0.015 ng/mL / x     / x     / x     / x                Radiology Results      Meds    MEDICATIONS  (STANDING):  ALBUTerol    90 MICROgram(s) HFA Inhaler 2 Puff(s) Inhalation every 6 hours  apixaban 5 milliGRAM(s) Oral every 12 hours  aspirin  chewable 81 milliGRAM(s) Oral daily  atorvastatin 40 milliGRAM(s) Oral at bedtime  carvedilol 3.125 milliGRAM(s) Oral every 12 hours  dextrose 40% Gel 15 Gram(s) Oral once  dextrose 5%. 1000 milliLiter(s) (50 mL/Hr) IV Continuous <Continuous>  dextrose 5%. 1000 milliLiter(s) (100 mL/Hr) IV Continuous <Continuous>  dextrose 50% Injectable 25 Gram(s) IV Push once  dextrose 50% Injectable 12.5 Gram(s) IV Push once  dextrose 50% Injectable 25 Gram(s) IV Push once  furosemide    Tablet 40 milliGRAM(s) Oral two times a day  glucagon  Injectable 1 milliGRAM(s) IntraMuscular once  insulin lispro (ADMELOG) corrective regimen sliding scale   SubCutaneous three times a day before meals  insulin lispro (ADMELOG) corrective regimen sliding scale   SubCutaneous at bedtime  lisinopril 5 milliGRAM(s) Oral daily  sodium chloride 0.9% lock flush 3 milliLiter(s) IV Push every 8 hours      MEDICATIONS  (PRN):      Physical Exam    Neuro :  no focal deficits  Respiratory: CTA B/L  CV: RRR, S1S2, no murmurs,   Abdominal: Soft, NT, ND +BS,  Extremities: b/l le edema, + peripheral pulses        ASSESSMENT    acute on chronic systolic chf,   pulm edema   r/o acs,   h/o HTN,   HLD,   DM,   CAD s/p CABGx3,  PE (pulmonary thromboembolism)    PLAN      cont tele,   acs protocol,   cont coreg, lasix,   aspirin, statin,   ce x3 noted above  cardio cons   echo  supplemental O2 as needed,  cont  bronchodilator,  f/u hgba1c  cont current meds       Patient is a 70y old  Male who presents with a chief complaint of CHF exacerbation (26 Mar 2021 17:28)    pt seen in tele [ x ], reg med floor [   ], bed [ x ], chair at bedside [   ], a+o x3 [ x ], lethargic [  ],  nad [ x ]    pt noncompliant with wearing monitor and taking meds      Allergies    Aldactone (Unknown)  Bumex (Blisters; Rash)  metoprolol (Unknown)  spironolactone (Unknown)        Vitals    T(F): 97.5 (03-27-21 @ 04:54), Max: 98.2 (03-26-21 @ 18:35)  HR: 66 (03-27-21 @ 04:54) (61 - 76)  BP: 125/81 (03-27-21 @ 04:54) (120/66 - 129/79)  RR: 18 (03-27-21 @ 04:54) (18 - 18)  SpO2: 97% (03-27-21 @ 04:54) (95% - 98%)  Wt(kg): --  CAPILLARY BLOOD GLUCOSE      POCT Blood Glucose.: 120 mg/dL (26 Mar 2021 21:23)      Labs                          12.0   6.12  )-----------( 245      ( 25 Mar 2021 06:56 )             38.7         Phos  3.3     03-25  Mg     2.1     03-25        CARDIAC MARKERS ( 25 Mar 2021 21:19 )  0.017 ng/mL / x     / x     / x     / x      CARDIAC MARKERS ( 25 Mar 2021 13:55 )  <0.015 ng/mL / x     / x     / x     / x        < from: Transthoracic Echocardiogram (03.25.21 @ 12:35) >  CONCLUSIONS:  1. Tethered mitral valve leaflets. Mild to moderate mitral  regurgitation.  2. Calcified trileaflet aortic valve with decreased  opening. Mild aortic stenosis by gradients. The aortic  valve apears more stenotic than indicated by gradients. In  setting of low EF, cannot rule out paradoxical low-flow  low-gradient AS. Consider dobutamine stress echocardiogram  for further assessment if clinically indicated.   Trace  aortic regurgitation.  3. Normal aortic root.  4. Normal left atrium.  5. Mild left ventricular enlargement.  6. Severe global left ventricular systolic dysfunction (EF  15-20% by visual estimation).  7. Grade II diastolic dysfunction.  8. Normal right atrium.  9. Off axis images preclude accurate assessment of right  ventricular size. Reduced RV systolic function (TAPSE 1.0  cm).  10. RV systolic pressure is severely increased at  67 mm  Hg.  11. Normal tricuspid valve. Moderate to severe tricuspid  regurgitation.  12. Pulmonic valve not well seen. Trace pulmonic  insufficiency is noted.  13. No pericardial effusion.  14. Right pleural effusion.    < end of copied text >          Radiology Results      Meds    MEDICATIONS  (STANDING):  ALBUTerol    90 MICROgram(s) HFA Inhaler 2 Puff(s) Inhalation every 6 hours  apixaban 5 milliGRAM(s) Oral every 12 hours  aspirin  chewable 81 milliGRAM(s) Oral daily  atorvastatin 40 milliGRAM(s) Oral at bedtime  carvedilol 3.125 milliGRAM(s) Oral every 12 hours  dextrose 40% Gel 15 Gram(s) Oral once  dextrose 5%. 1000 milliLiter(s) (50 mL/Hr) IV Continuous <Continuous>  dextrose 5%. 1000 milliLiter(s) (100 mL/Hr) IV Continuous <Continuous>  dextrose 50% Injectable 25 Gram(s) IV Push once  dextrose 50% Injectable 12.5 Gram(s) IV Push once  dextrose 50% Injectable 25 Gram(s) IV Push once  furosemide    Tablet 40 milliGRAM(s) Oral two times a day  glucagon  Injectable 1 milliGRAM(s) IntraMuscular once  insulin lispro (ADMELOG) corrective regimen sliding scale   SubCutaneous three times a day before meals  insulin lispro (ADMELOG) corrective regimen sliding scale   SubCutaneous at bedtime  lisinopril 5 milliGRAM(s) Oral daily  sodium chloride 0.9% lock flush 3 milliLiter(s) IV Push every 8 hours      MEDICATIONS  (PRN):      Physical Exam    Neuro :  no focal deficits  Respiratory: CTA B/L  CV: RRR, S1S2, no murmurs,   Abdominal: Soft, NT, ND +BS,  Extremities: b/l le edema, + peripheral pulses        ASSESSMENT    acute on chronic systolic chf,   pulm edema   r/o acs,   h/o HTN,   HLD,   DM,   CAD s/p CABGx3,  PE (pulmonary thromboembolism)    PLAN      cont tele,   acs protocol,   cont coreg, lasix,   aspirin, statin,   ce x3 noted above  cardio f/u  echo with EF 15-20%  Grade II diastolic dysfunction.10. RV systolic pressure is severely noted above  cont lisinopril  cont Eliquis.  supplemental O2 as needed,  cont  bronchodilator,  f/u hgba1c  cont current meds

## 2021-03-27 NOTE — PROGRESS NOTE ADULT - SUBJECTIVE AND OBJECTIVE BOX
Pt is awake, alert, lying in bed in NAD.     INTERVAL HPI/OVERNIGHT EVENTS:    VITAL SIGNS:  T(F): 97.5 (03-27-21 @ 04:54)  HR: 66 (03-27-21 @ 04:54)  BP: 125/81 (03-27-21 @ 04:54)  RR: 18 (03-27-21 @ 04:54)  SpO2: 97% (03-27-21 @ 04:54)  Wt(kg): --  I&O's Detail    REVIEW OF SYSTEMS:    CONSTITUTIONAL:  No fevers, chills, sweats    HEENT:  Eyes:  No diplopia or blurred vision. ENT:  No earache, sore throat or runny nose.    CARDIOVASCULAR:  No pressure, squeezing, tightness, or heaviness about the chest; no palpitations.    RESPIRATORY:  Per HPI    GASTROINTESTINAL:  No abdominal pain, nausea, vomiting or diarrhea.    GENITOURINARY:  No dysuria, frequency or urgency.    NEUROLOGIC:  No paresthesias, fasciculations, seizures or weakness.    PSYCHIATRIC:  No disorder of thought or mood.    PHYSICAL EXAM:    Constitutional: Well developed and nourished  Eyes: Perrla  ENMT: normal  Neck: supple  Respiratory: good air entry  Cardiovascular: S1 S2 regular  Gastrointestinal: Soft, Non tender  Extremities: No edema  Vascular: normal  Neurological: Awake, alert,Ox3  Musculoskeletal: Normal    MEDICATIONS  (STANDING):  ALBUTerol    90 MICROgram(s) HFA Inhaler 2 Puff(s) Inhalation every 6 hours  apixaban 5 milliGRAM(s) Oral every 12 hours  aspirin  chewable 81 milliGRAM(s) Oral daily  atorvastatin 40 milliGRAM(s) Oral at bedtime  carvedilol 3.125 milliGRAM(s) Oral every 12 hours  dextrose 40% Gel 15 Gram(s) Oral once  dextrose 5%. 1000 milliLiter(s) (50 mL/Hr) IV Continuous <Continuous>  dextrose 5%. 1000 milliLiter(s) (100 mL/Hr) IV Continuous <Continuous>  dextrose 50% Injectable 25 Gram(s) IV Push once  dextrose 50% Injectable 12.5 Gram(s) IV Push once  dextrose 50% Injectable 25 Gram(s) IV Push once  furosemide    Tablet 40 milliGRAM(s) Oral two times a day  glucagon  Injectable 1 milliGRAM(s) IntraMuscular once  insulin lispro (ADMELOG) corrective regimen sliding scale   SubCutaneous three times a day before meals  insulin lispro (ADMELOG) corrective regimen sliding scale   SubCutaneous at bedtime  lisinopril 5 milliGRAM(s) Oral daily  sodium chloride 0.9% lock flush 3 milliLiter(s) IV Push every 8 hours    MEDICATIONS  (PRN):      Allergies    Aldactone (Unknown)  Bumex (Blisters; Rash)  metoprolol (Unknown)  spironolactone (Unknown)    Intolerances        LABS:                        11.9   5.78  )-----------( 220      ( 27 Mar 2021 07:02 )             38.3     03-27    142  |  109<H>  |  22<H>  ----------------------------<  87  3.7   |  27  |  1.12    Ca    8.2<L>      27 Mar 2021 07:02  Phos  3.3     03-25  Mg     2.1     03-25    CARDIAC MARKERS ( 25 Mar 2021 21:19 )  0.017 ng/mL / x     / x     / x     / x      CARDIAC MARKERS ( 25 Mar 2021 13:55 )  <0.015 ng/mL / x     / x     / x     / x          CAPILLARY BLOOD GLUCOSE      POCT Blood Glucose.: 118 mg/dL (27 Mar 2021 11:36)  POCT Blood Glucose.: 97 mg/dL (27 Mar 2021 08:07)  POCT Blood Glucose.: 120 mg/dL (26 Mar 2021 21:23)  POCT Blood Glucose.: 131 mg/dL (26 Mar 2021 16:49)    pro-bnp -- 03-25 @ 13:55     d-dimer 554  03-25 @ 13:55  pro-bnp 6970 03-25 @ 06:19     d-dimer --  03-25 @ 06:19    RADIOLOGY & ADDITIONAL TESTS:    CXR:    Ct scan chest:    ekg;    echo:

## 2021-03-27 NOTE — PROGRESS NOTE ADULT - ASSESSMENT
70y M from home with PMH HTN, HLD, DM, PE, CAD s/p CABG (3.5 yrs ago), presenting with leg swelling, scrotal swelling  and difficulty breathing for months,acute on chronic systolic HF.  1.Tele monitoring.  2.Acute on chronic systolic HF-coreg,ace,lasix po since pt refusing IV.  3.Pt reports allergy to aldactone.  4.CAD-asa,b blocker,statin.  5.PE-Eliquis.  6.DM-Insulin.  7.HTN-coreg,ace.  8.Carotid doppler.  9.PPI.  10.Pt needs to be compliant with  cardiac medication.

## 2021-03-28 LAB
GLUCOSE BLDC GLUCOMTR-MCNC: 102 MG/DL — HIGH (ref 70–99)
GLUCOSE BLDC GLUCOMTR-MCNC: 112 MG/DL — HIGH (ref 70–99)
GLUCOSE BLDC GLUCOMTR-MCNC: 152 MG/DL — HIGH (ref 70–99)
GLUCOSE BLDC GLUCOMTR-MCNC: 197 MG/DL — HIGH (ref 70–99)
GLUCOSE BLDC GLUCOMTR-MCNC: 210 MG/DL — HIGH (ref 70–99)

## 2021-03-28 RX ADMIN — Medication 81 MILLIGRAM(S): at 12:03

## 2021-03-28 RX ADMIN — Medication 40 MILLIGRAM(S): at 05:27

## 2021-03-28 RX ADMIN — APIXABAN 5 MILLIGRAM(S): 2.5 TABLET, FILM COATED ORAL at 05:27

## 2021-03-28 RX ADMIN — Medication 40 MILLIGRAM(S): at 17:21

## 2021-03-28 RX ADMIN — CARVEDILOL PHOSPHATE 3.12 MILLIGRAM(S): 80 CAPSULE, EXTENDED RELEASE ORAL at 05:27

## 2021-03-28 RX ADMIN — CARVEDILOL PHOSPHATE 3.12 MILLIGRAM(S): 80 CAPSULE, EXTENDED RELEASE ORAL at 17:21

## 2021-03-28 RX ADMIN — APIXABAN 5 MILLIGRAM(S): 2.5 TABLET, FILM COATED ORAL at 17:21

## 2021-03-28 NOTE — PROGRESS NOTE ADULT - SUBJECTIVE AND OBJECTIVE BOX
CHIEF COMPLAINT:Patient is a 70y old  Male who presents with a chief complaint of CHF exacerbation.Pt appears comfortable.    	  REVIEW OF SYSTEMS:  CONSTITUTIONAL: No fever, weight loss, or fatigue  EYES: No eye pain, visual disturbances, or discharge  ENT:  No difficulty hearing, tinnitus, vertigo; No sinus or throat pain  NECK: No pain or stiffness  RESPIRATORY: No cough, wheezing, chills or hemoptysis; No Shortness of Breath  CARDIOVASCULAR: No chest pain, palpitations, passing out, dizziness, or leg swelling  GASTROINTESTINAL: No abdominal or epigastric pain. No nausea, vomiting, or hematemesis; No diarrhea or constipation. No melena or hematochezia.  GENITOURINARY: No dysuria, frequency, hematuria, or incontinence  NEUROLOGICAL: No headaches, memory loss, loss of strength, numbness, or tremors  SKIN: No itching, burning, rashes, or lesions   LYMPH Nodes: No enlarged glands  ENDOCRINE: No heat or cold intolerance; No hair loss  MUSCULOSKELETAL: No joint pain or swelling; No muscle, back, or extremity pain  PSYCHIATRIC: No depression, anxiety, mood swings, or difficulty sleeping  HEME/LYMPH: No easy bruising, or bleeding gums  ALLERGY AND IMMUNOLOGIC: No hives or eczema	    PHYSICAL EXAM:  T(C): 36.2 (03-28-21 @ 11:48), Max: 36.9 (03-27-21 @ 18:28)  HR: 95 (03-28-21 @ 11:48) (56 - 95)  BP: 142/81 (03-28-21 @ 11:48) (118/68 - 142/81)  RR: 18 (03-28-21 @ 11:48) (17 - 18)  SpO2: 97% (03-28-21 @ 11:48) (95% - 97%)  Wt(kg): --  I&O's Summary    27 Mar 2021 07:01  -  28 Mar 2021 07:00  --------------------------------------------------------  IN: 200 mL / OUT: 950 mL / NET: -750 mL        Appearance: Normal	  HEENT:   Normal oral mucosa, PERRL, EOMI	  Lymphatic: No lymphadenopathy  Cardiovascular: Normal S1 S2, No JVD, No murmurs, +2 edema  Respiratory: Lungs clear to auscultation	  Psychiatry: A & O x 3, Mood & affect appropriate  Gastrointestinal:  Soft, Non-tender, + BS	  Skin: No rashes, No ecchymoses, No cyanosis	  Neurologic: Non-focal  Extremities: Normal range of motion, No clubbing, cyanosis +2 edema  Vascular: Peripheral pulses palpable 2+ bilaterally    MEDICATIONS  (STANDING):  apixaban 5 milliGRAM(s) Oral every 12 hours  aspirin  chewable 81 milliGRAM(s) Oral daily  atorvastatin 40 milliGRAM(s) Oral at bedtime  carvedilol 3.125 milliGRAM(s) Oral every 12 hours  dextrose 40% Gel 15 Gram(s) Oral once  dextrose 5%. 1000 milliLiter(s) (50 mL/Hr) IV Continuous <Continuous>  dextrose 5%. 1000 milliLiter(s) (100 mL/Hr) IV Continuous <Continuous>  dextrose 50% Injectable 25 Gram(s) IV Push once  dextrose 50% Injectable 12.5 Gram(s) IV Push once  dextrose 50% Injectable 25 Gram(s) IV Push once  furosemide    Tablet 40 milliGRAM(s) Oral two times a day  glucagon  Injectable 1 milliGRAM(s) IntraMuscular once  insulin lispro (ADMELOG) corrective regimen sliding scale   SubCutaneous three times a day before meals  insulin lispro (ADMELOG) corrective regimen sliding scale   SubCutaneous at bedtime  lisinopril 5 milliGRAM(s) Oral daily  sodium chloride 0.9% lock flush 3 milliLiter(s) IV Push every 8 hours      	  	  LABS:	 	                       11.9   5.78  )-----------( 220      ( 27 Mar 2021 07:02 )             38.3     03-27    142  |  109<H>  |  22<H>  ----------------------------<  87  3.7   |  27  |  1.12    Ca    8.2<L>      27 Mar 2021 07:02      proBNP: Serum Pro-Brain Natriuretic Peptide: 6970 pg/mL (03-25 @ 06:19)

## 2021-03-28 NOTE — PROGRESS NOTE ADULT - SUBJECTIVE AND OBJECTIVE BOX
Patient is a 70y old  Male who presents with a chief complaint of CHF exacerbation (27 Mar 2021 12:21)    pt seen in tele [ x ], reg med floor [   ], bed [ x ], chair at bedside [   ], a+o x3 [ x ], lethargic [  ],  nad [ x ]    pt noncompliant with wearing monitor and taking meds        Allergies    Aldactone (Unknown)  Bumex (Blisters; Rash)  metoprolol (Unknown)  spironolactone (Unknown)        Vitals    T(F): 97.7 (03-28-21 @ 04:58), Max: 98.4 (03-27-21 @ 18:28)  HR: 56 (03-28-21 @ 04:58) (56 - 68)  BP: 128/87 (03-28-21 @ 04:58) (118/68 - 128/87)  RR: 17 (03-28-21 @ 04:58) (17 - 18)  SpO2: 96% (03-28-21 @ 04:58) (95% - 97%)  Wt(kg): --  CAPILLARY BLOOD GLUCOSE      POCT Blood Glucose.: 112 mg/dL (27 Mar 2021 16:47)      Labs                          11.9   5.78  )-----------( 220      ( 27 Mar 2021 07:02 )             38.3       03-27    142  |  109<H>  |  22<H>  ----------------------------<  87  3.7   |  27  |  1.12    Ca    8.2<L>      27 Mar 2021 07:02                  Radiology Results      Meds    MEDICATIONS  (STANDING):  apixaban 5 milliGRAM(s) Oral every 12 hours  aspirin  chewable 81 milliGRAM(s) Oral daily  atorvastatin 40 milliGRAM(s) Oral at bedtime  carvedilol 3.125 milliGRAM(s) Oral every 12 hours  dextrose 40% Gel 15 Gram(s) Oral once  dextrose 5%. 1000 milliLiter(s) (50 mL/Hr) IV Continuous <Continuous>  dextrose 5%. 1000 milliLiter(s) (100 mL/Hr) IV Continuous <Continuous>  dextrose 50% Injectable 25 Gram(s) IV Push once  dextrose 50% Injectable 12.5 Gram(s) IV Push once  dextrose 50% Injectable 25 Gram(s) IV Push once  furosemide    Tablet 40 milliGRAM(s) Oral two times a day  glucagon  Injectable 1 milliGRAM(s) IntraMuscular once  insulin lispro (ADMELOG) corrective regimen sliding scale   SubCutaneous three times a day before meals  insulin lispro (ADMELOG) corrective regimen sliding scale   SubCutaneous at bedtime  lisinopril 5 milliGRAM(s) Oral daily  sodium chloride 0.9% lock flush 3 milliLiter(s) IV Push every 8 hours      MEDICATIONS  (PRN):      Physical Exam    Neuro :  no focal deficits  Respiratory: CTA B/L  CV: RRR, S1S2, no murmurs,   Abdominal: Soft, NT, ND +BS,  Extremities: b/l le edema, + peripheral pulses        ASSESSMENT    acute on chronic systolic chf,   pulm edema   r/o acs,   h/o HTN,   HLD,   DM,   CAD s/p CABGx3,  PE (pulmonary thromboembolism)    PLAN      cont tele,   acs protocol,   cont coreg, lasix,   aspirin, statin,   ce x3 noted above  cardio f/u  echo with EF 15-20%  Grade II diastolic dysfunction.10. RV systolic pressure is severely noted above  cont lisinopril  cont Eliquis.  supplemental O2 as needed,  cont  bronchodilator,  f/u hgba1c  cont current meds           Patient is a 70y old  Male who presents with a chief complaint of CHF exacerbation (27 Mar 2021 12:21)    pt seen in tele [ x ], reg med floor [   ], bed [ x ], chair at bedside [   ], a+o x3 [ x ], lethargic [  ],  nad [ x ]    pt noncompliant with wearing monitor and taking meds        Allergies    Aldactone (Unknown)  Bumex (Blisters; Rash)  metoprolol (Unknown)  spironolactone (Unknown)        Vitals    T(F): 97.7 (03-28-21 @ 04:58), Max: 98.4 (03-27-21 @ 18:28)  HR: 56 (03-28-21 @ 04:58) (56 - 68)  BP: 128/87 (03-28-21 @ 04:58) (118/68 - 128/87)  RR: 17 (03-28-21 @ 04:58) (17 - 18)  SpO2: 96% (03-28-21 @ 04:58) (95% - 97%)  Wt(kg): --  CAPILLARY BLOOD GLUCOSE      POCT Blood Glucose.: 112 mg/dL (27 Mar 2021 16:47)      Labs                          11.9   5.78  )-----------( 220      ( 27 Mar 2021 07:02 )             38.3       03-27    142  |  109<H>  |  22<H>  ----------------------------<  87  3.7   |  27  |  1.12    Ca    8.2<L>      27 Mar 2021 07:02        Radiology Results      Meds    MEDICATIONS  (STANDING):  apixaban 5 milliGRAM(s) Oral every 12 hours  aspirin  chewable 81 milliGRAM(s) Oral daily  atorvastatin 40 milliGRAM(s) Oral at bedtime  carvedilol 3.125 milliGRAM(s) Oral every 12 hours  dextrose 40% Gel 15 Gram(s) Oral once  dextrose 5%. 1000 milliLiter(s) (50 mL/Hr) IV Continuous <Continuous>  dextrose 5%. 1000 milliLiter(s) (100 mL/Hr) IV Continuous <Continuous>  dextrose 50% Injectable 25 Gram(s) IV Push once  dextrose 50% Injectable 12.5 Gram(s) IV Push once  dextrose 50% Injectable 25 Gram(s) IV Push once  furosemide    Tablet 40 milliGRAM(s) Oral two times a day  glucagon  Injectable 1 milliGRAM(s) IntraMuscular once  insulin lispro (ADMELOG) corrective regimen sliding scale   SubCutaneous three times a day before meals  insulin lispro (ADMELOG) corrective regimen sliding scale   SubCutaneous at bedtime  lisinopril 5 milliGRAM(s) Oral daily  sodium chloride 0.9% lock flush 3 milliLiter(s) IV Push every 8 hours      MEDICATIONS  (PRN):      Physical Exam    Neuro :  no focal deficits  Respiratory: CTA B/L  CV: RRR, S1S2, no murmurs,   Abdominal: Soft, NT, ND +BS,  Extremities: b/l le edema, + peripheral pulses        ASSESSMENT    acute on chronic systolic chf,   pulm edema   r/o acs,   h/o HTN,   HLD,   DM,   CAD s/p CABGx3,  PE (pulmonary thromboembolism)    PLAN      cont tele,   acs protocol,   cont coreg, lasix,   aspirin, statin,   ce x3 noted above  cardio f/u  echo with EF 15-20%  Grade II diastolic dysfunction. RV systolic pressure is severely noted    cont lisinopril  cont Eliquis.  supplemental O2 as needed,  cont  bronchodilator,  f/u hgba1c  cont current meds

## 2021-03-28 NOTE — PROGRESS NOTE ADULT - ASSESSMENT
70y M from home with PMH HTN, HLD, DM, PE, CAD s/p CABG (3.5 yrs ago), presenting with leg swelling, scrotal swelling  and difficulty breathing for months,acute on chronic systolic HF.  1.Pt needs to be compliant with  cardiac medication.  2.Acute on chronic systolic HF-coreg,ace,lasix po since pt refusing IV.  3.Pt reports allergy to aldactone.  4.CAD-asa,b blocker,statin.  5.PE-Eliquis.  6.DM-Insulin.  7.HTN-coreg,ace.  8.Carotid doppler.  9.PPI.

## 2021-03-28 NOTE — PROGRESS NOTE ADULT - SUBJECTIVE AND OBJECTIVE BOX
Patient is a 70y old  Male who presents with a chief complaint of CHF exacerbation (28 Mar 2021 06:39)  Awake, alert, comfortable in bed in NAD    INTERVAL HPI/OVERNIGHT EVENTS:      VITAL SIGNS:  T(F): 97.2 (03-28-21 @ 11:48)  HR: 95 (03-28-21 @ 11:48)  BP: 142/81 (03-28-21 @ 11:48)  RR: 18 (03-28-21 @ 11:48)  SpO2: 97% (03-28-21 @ 11:48)  Wt(kg): --  I&O's Detail    27 Mar 2021 07:01  -  28 Mar 2021 07:00  --------------------------------------------------------  IN:    Oral Fluid: 200 mL  Total IN: 200 mL    OUT:    Voided (mL): 950 mL  Total OUT: 950 mL    Total NET: -750 mL              REVIEW OF SYSTEMS:    CONSTITUTIONAL:  No fevers, chills, sweats    HEENT:  Eyes:  No diplopia or blurred vision. ENT:  No earache, sore throat or runny nose.    CARDIOVASCULAR:  No pressure, squeezing, tightness, or heaviness about the chest; no palpitations.    RESPIRATORY:  Per HPI    GASTROINTESTINAL:  No abdominal pain, nausea, vomiting or diarrhea.    GENITOURINARY:  No dysuria, frequency or urgency.    NEUROLOGIC:  No paresthesias, fasciculations, seizures or weakness.    PSYCHIATRIC:  No disorder of thought or mood.      PHYSICAL EXAM:    Constitutional: Well developed and nourished  Eyes:Perrla  ENMT: normal  Neck:supple  Respiratory: good air entry  Cardiovascular: S1 S2 regular  Gastrointestinal: Soft, Non tender  Extremities: + edema  Vascular:normal  Neurological:Awake, alert,Ox3  Musculoskeletal:Normal      MEDICATIONS  (STANDING):  apixaban 5 milliGRAM(s) Oral every 12 hours  aspirin  chewable 81 milliGRAM(s) Oral daily  atorvastatin 40 milliGRAM(s) Oral at bedtime  carvedilol 3.125 milliGRAM(s) Oral every 12 hours  dextrose 40% Gel 15 Gram(s) Oral once  dextrose 5%. 1000 milliLiter(s) (50 mL/Hr) IV Continuous <Continuous>  dextrose 5%. 1000 milliLiter(s) (100 mL/Hr) IV Continuous <Continuous>  dextrose 50% Injectable 25 Gram(s) IV Push once  dextrose 50% Injectable 12.5 Gram(s) IV Push once  dextrose 50% Injectable 25 Gram(s) IV Push once  furosemide    Tablet 40 milliGRAM(s) Oral two times a day  glucagon  Injectable 1 milliGRAM(s) IntraMuscular once  insulin lispro (ADMELOG) corrective regimen sliding scale   SubCutaneous three times a day before meals  insulin lispro (ADMELOG) corrective regimen sliding scale   SubCutaneous at bedtime  lisinopril 5 milliGRAM(s) Oral daily  sodium chloride 0.9% lock flush 3 milliLiter(s) IV Push every 8 hours    MEDICATIONS  (PRN):      Allergies    Aldactone (Unknown)  Bumex (Blisters; Rash)  metoprolol (Unknown)  spironolactone (Unknown)    Intolerances        LABS:                        11.9   5.78  )-----------( 220      ( 27 Mar 2021 07:02 )             38.3     03-27    142  |  109<H>  |  22<H>  ----------------------------<  87  3.7   |  27  |  1.12    Ca    8.2<L>      27 Mar 2021 07:02                CAPILLARY BLOOD GLUCOSE      POCT Blood Glucose.: 102 mg/dL (28 Mar 2021 07:33)  POCT Blood Glucose.: 112 mg/dL (27 Mar 2021 16:47)    pro-bnp -- 03-25 @ 13:55     d-dimer 554  03-25 @ 13:55  pro-bnp 6970 03-25 @ 06:19     d-dimer --  03-25 @ 06:19      RADIOLOGY & ADDITIONAL TESTS:    CXR:  < from: Xray Chest 1 View-PORTABLE IMMEDIATE (Xray Chest 1 View-PORTABLE IMMEDIATE .) (03.25.21 @ 05:52) >  IMPRESSION:    No acute infiltrate. Cardiomegaly. Sternotomy.    Limited by projection. Recommend repeat.      < end of copied text >    Ct scan chest:    ekg;    echo:

## 2021-03-29 ENCOUNTER — TRANSCRIPTION ENCOUNTER (OUTPATIENT)
Age: 71
End: 2021-03-29

## 2021-03-29 DIAGNOSIS — I50.43 ACUTE ON CHRONIC COMBINED SYSTOLIC (CONGESTIVE) AND DIASTOLIC (CONGESTIVE) HEART FAILURE: ICD-10-CM

## 2021-03-29 DIAGNOSIS — Z51.5 ENCOUNTER FOR PALLIATIVE CARE: ICD-10-CM

## 2021-03-29 DIAGNOSIS — I50.9 HEART FAILURE, UNSPECIFIED: ICD-10-CM

## 2021-03-29 DIAGNOSIS — S81.809A UNSPECIFIED OPEN WOUND, UNSPECIFIED LOWER LEG, INITIAL ENCOUNTER: ICD-10-CM

## 2021-03-29 DIAGNOSIS — E44.0 MODERATE PROTEIN-CALORIE MALNUTRITION: ICD-10-CM

## 2021-03-29 LAB
ALBUMIN SERPL ELPH-MCNC: 3.1 G/DL — LOW (ref 3.5–5)
ALP SERPL-CCNC: 163 U/L — HIGH (ref 40–120)
ALT FLD-CCNC: 30 U/L DA — SIGNIFICANT CHANGE UP (ref 10–60)
ANION GAP SERPL CALC-SCNC: 7 MMOL/L — SIGNIFICANT CHANGE UP (ref 5–17)
AST SERPL-CCNC: 27 U/L — SIGNIFICANT CHANGE UP (ref 10–40)
BILIRUB SERPL-MCNC: 0.8 MG/DL — SIGNIFICANT CHANGE UP (ref 0.2–1.2)
BUN SERPL-MCNC: 23 MG/DL — HIGH (ref 7–18)
CALCIUM SERPL-MCNC: 8.6 MG/DL — SIGNIFICANT CHANGE UP (ref 8.4–10.5)
CHLORIDE SERPL-SCNC: 105 MMOL/L — SIGNIFICANT CHANGE UP (ref 96–108)
CO2 SERPL-SCNC: 27 MMOL/L — SIGNIFICANT CHANGE UP (ref 22–31)
CREAT SERPL-MCNC: 1.19 MG/DL — SIGNIFICANT CHANGE UP (ref 0.5–1.3)
GLUCOSE BLDC GLUCOMTR-MCNC: 116 MG/DL — HIGH (ref 70–99)
GLUCOSE BLDC GLUCOMTR-MCNC: 124 MG/DL — HIGH (ref 70–99)
GLUCOSE BLDC GLUCOMTR-MCNC: 124 MG/DL — HIGH (ref 70–99)
GLUCOSE BLDC GLUCOMTR-MCNC: 96 MG/DL — SIGNIFICANT CHANGE UP (ref 70–99)
GLUCOSE SERPL-MCNC: 96 MG/DL — SIGNIFICANT CHANGE UP (ref 70–99)
HCT VFR BLD CALC: 40.7 % — SIGNIFICANT CHANGE UP (ref 39–50)
HGB BLD-MCNC: 12.7 G/DL — LOW (ref 13–17)
MAGNESIUM SERPL-MCNC: 2.4 MG/DL — SIGNIFICANT CHANGE UP (ref 1.6–2.6)
MCHC RBC-ENTMCNC: 25.7 PG — LOW (ref 27–34)
MCHC RBC-ENTMCNC: 31.2 GM/DL — LOW (ref 32–36)
MCV RBC AUTO: 82.2 FL — SIGNIFICANT CHANGE UP (ref 80–100)
NRBC # BLD: 0 /100 WBCS — SIGNIFICANT CHANGE UP (ref 0–0)
PLATELET # BLD AUTO: 233 K/UL — SIGNIFICANT CHANGE UP (ref 150–400)
POTASSIUM SERPL-MCNC: 3.9 MMOL/L — SIGNIFICANT CHANGE UP (ref 3.5–5.3)
POTASSIUM SERPL-SCNC: 3.9 MMOL/L — SIGNIFICANT CHANGE UP (ref 3.5–5.3)
PROT SERPL-MCNC: 7.4 G/DL — SIGNIFICANT CHANGE UP (ref 6–8.3)
RBC # BLD: 4.95 M/UL — SIGNIFICANT CHANGE UP (ref 4.2–5.8)
RBC # FLD: 17.2 % — HIGH (ref 10.3–14.5)
SODIUM SERPL-SCNC: 139 MMOL/L — SIGNIFICANT CHANGE UP (ref 135–145)
WBC # BLD: 6.3 K/UL — SIGNIFICANT CHANGE UP (ref 3.8–10.5)
WBC # FLD AUTO: 6.3 K/UL — SIGNIFICANT CHANGE UP (ref 3.8–10.5)

## 2021-03-29 PROCEDURE — 99497 ADVNCD CARE PLAN 30 MIN: CPT | Mod: 25

## 2021-03-29 PROCEDURE — 99221 1ST HOSP IP/OBS SF/LOW 40: CPT | Mod: 25

## 2021-03-29 RX ORDER — VANCOMYCIN HCL 1 G
1000 VIAL (EA) INTRAVENOUS EVERY 12 HOURS
Refills: 0 | Status: DISCONTINUED | OUTPATIENT
Start: 2021-03-29 | End: 2021-04-03

## 2021-03-29 RX ORDER — PIPERACILLIN AND TAZOBACTAM 4; .5 G/20ML; G/20ML
3.38 INJECTION, POWDER, LYOPHILIZED, FOR SOLUTION INTRAVENOUS EVERY 6 HOURS
Refills: 0 | Status: DISCONTINUED | OUTPATIENT
Start: 2021-03-29 | End: 2021-03-31

## 2021-03-29 RX ORDER — PIPERACILLIN AND TAZOBACTAM 4; .5 G/20ML; G/20ML
3.38 INJECTION, POWDER, LYOPHILIZED, FOR SOLUTION INTRAVENOUS ONCE
Refills: 0 | Status: COMPLETED | OUTPATIENT
Start: 2021-03-29 | End: 2021-03-29

## 2021-03-29 RX ADMIN — PIPERACILLIN AND TAZOBACTAM 25 GRAM(S): 4; .5 INJECTION, POWDER, LYOPHILIZED, FOR SOLUTION INTRAVENOUS at 21:04

## 2021-03-29 RX ADMIN — Medication 40 MILLIGRAM(S): at 05:41

## 2021-03-29 RX ADMIN — Medication 250 MILLIGRAM(S): at 18:00

## 2021-03-29 RX ADMIN — Medication 81 MILLIGRAM(S): at 12:43

## 2021-03-29 RX ADMIN — Medication 40 MILLIGRAM(S): at 18:00

## 2021-03-29 RX ADMIN — SODIUM CHLORIDE 3 MILLILITER(S): 9 INJECTION INTRAMUSCULAR; INTRAVENOUS; SUBCUTANEOUS at 21:06

## 2021-03-29 RX ADMIN — PIPERACILLIN AND TAZOBACTAM 200 GRAM(S): 4; .5 INJECTION, POWDER, LYOPHILIZED, FOR SOLUTION INTRAVENOUS at 15:02

## 2021-03-29 RX ADMIN — APIXABAN 5 MILLIGRAM(S): 2.5 TABLET, FILM COATED ORAL at 18:00

## 2021-03-29 RX ADMIN — APIXABAN 5 MILLIGRAM(S): 2.5 TABLET, FILM COATED ORAL at 05:41

## 2021-03-29 RX ADMIN — CARVEDILOL PHOSPHATE 3.12 MILLIGRAM(S): 80 CAPSULE, EXTENDED RELEASE ORAL at 05:43

## 2021-03-29 RX ADMIN — SODIUM CHLORIDE 3 MILLILITER(S): 9 INJECTION INTRAMUSCULAR; INTRAVENOUS; SUBCUTANEOUS at 17:59

## 2021-03-29 NOTE — PROGRESS NOTE ADULT - SUBJECTIVE AND OBJECTIVE BOX
Patient is a 70y old  Male who presents with a chief complaint of CHF exacerbation (29 Mar 2021 11:11)  Patient is awake, alert, comfortable out of bed in NAD    INTERVAL HPI/OVERNIGHT EVENTS:      VITAL SIGNS:  T(F): 97.8 (03-29-21 @ 11:32)  HR: 76 (03-29-21 @ 11:32)  BP: 131/78 (03-29-21 @ 11:32)  RR: 18 (03-29-21 @ 11:32)  SpO2: 97% (03-29-21 @ 11:32)  Wt(kg): --  I&O's Detail    28 Mar 2021 07:01  -  29 Mar 2021 07:00  --------------------------------------------------------  IN:  Total IN: 0 mL    OUT:    Voided (mL): 200 mL  Total OUT: 200 mL    Total NET: -200 mL              REVIEW OF SYSTEMS:    CONSTITUTIONAL:  No fevers, chills, sweats    HEENT:  Eyes:  No diplopia or blurred vision. ENT:  No earache, sore throat or runny nose.    CARDIOVASCULAR:  No pressure, squeezing, tightness, or heaviness about the chest; no palpitations.    RESPIRATORY:  Per HPI    GASTROINTESTINAL:  No abdominal pain, nausea, vomiting or diarrhea.    GENITOURINARY:  No dysuria, frequency or urgency.    NEUROLOGIC:  No paresthesias, fasciculations, seizures or weakness.    PSYCHIATRIC:  No disorder of thought or mood.      PHYSICAL EXAM:    Constitutional: Well developed and nourished  Eyes:Perrla  ENMT: normal  Neck:supple  Respiratory: good air entry  Cardiovascular: S1 S2 regular  Gastrointestinal: Soft, Non tender  Extremities: + edema. Right leg wound  Vascular:normal  Neurological:Awake, alert,Ox3  Musculoskeletal:Normal      MEDICATIONS  (STANDING):  apixaban 5 milliGRAM(s) Oral every 12 hours  aspirin  chewable 81 milliGRAM(s) Oral daily  atorvastatin 40 milliGRAM(s) Oral at bedtime  carvedilol 3.125 milliGRAM(s) Oral every 12 hours  dextrose 40% Gel 15 Gram(s) Oral once  dextrose 5%. 1000 milliLiter(s) (50 mL/Hr) IV Continuous <Continuous>  dextrose 5%. 1000 milliLiter(s) (100 mL/Hr) IV Continuous <Continuous>  dextrose 50% Injectable 25 Gram(s) IV Push once  dextrose 50% Injectable 12.5 Gram(s) IV Push once  dextrose 50% Injectable 25 Gram(s) IV Push once  furosemide    Tablet 40 milliGRAM(s) Oral two times a day  glucagon  Injectable 1 milliGRAM(s) IntraMuscular once  insulin lispro (ADMELOG) corrective regimen sliding scale   SubCutaneous three times a day before meals  insulin lispro (ADMELOG) corrective regimen sliding scale   SubCutaneous at bedtime  lisinopril 5 milliGRAM(s) Oral daily  sodium chloride 0.9% lock flush 3 milliLiter(s) IV Push every 8 hours    MEDICATIONS  (PRN):      Allergies    Aldactone (Unknown)  Bumex (Blisters; Rash)  metoprolol (Unknown)  spironolactone (Unknown)    Intolerances        LABS:                    CAPILLARY BLOOD GLUCOSE      POCT Blood Glucose.: 96 mg/dL (29 Mar 2021 07:49)  POCT Blood Glucose.: 112 mg/dL (28 Mar 2021 22:17)  POCT Blood Glucose.: 210 mg/dL (28 Mar 2021 17:21)    pro-bnp -- 03-25 @ 13:55     d-dimer 554  03-25 @ 13:55  pro-bnp 6970 03-25 @ 06:19     d-dimer --  03-25 @ 06:19      RADIOLOGY & ADDITIONAL TESTS:    CXR:    Ct scan chest:    ekg;    echo:

## 2021-03-29 NOTE — PROGRESS NOTE ADULT - SUBJECTIVE AND OBJECTIVE BOX
CHIEF COMPLAINT:Patient is a 70y old  Male who presents with a chief complaint of CHF exacerbation.Pt appears comfortable.    	  REVIEW OF SYSTEMS:  CONSTITUTIONAL: No fever, weight loss, or fatigue  EYES: No eye pain, visual disturbances, or discharge  ENT:  No difficulty hearing, tinnitus, vertigo; No sinus or throat pain  NECK: No pain or stiffness  RESPIRATORY: No cough, wheezing, chills or hemoptysis; No Shortness of Breath  CARDIOVASCULAR: No chest pain, palpitations, passing out, dizziness, or leg swelling  GASTROINTESTINAL: No abdominal or epigastric pain. No nausea, vomiting, or hematemesis; No diarrhea or constipation. No melena or hematochezia.  GENITOURINARY: No dysuria, frequency, hematuria, or incontinence  NEUROLOGICAL: No headaches, memory loss, loss of strength, numbness, or tremors  SKIN: No itching, burning, rashes, or lesions   LYMPH Nodes: No enlarged glands  ENDOCRINE: No heat or cold intolerance; No hair loss  MUSCULOSKELETAL: No joint pain or swelling; No muscle, back, or extremity pain  PSYCHIATRIC: No depression, anxiety, mood swings, or difficulty sleeping  HEME/LYMPH: No easy bruising, or bleeding gums  ALLERGY AND IMMUNOLOGIC: No hives or eczema	      PHYSICAL EXAM:  T(C): 36.4 (03-29-21 @ 05:02), Max: 36.6 (03-28-21 @ 17:28)  HR: 70 (03-29-21 @ 05:02) (68 - 95)  BP: 128/74 (03-29-21 @ 05:02) (128/74 - 151/93)  RR: 17 (03-29-21 @ 05:02) (17 - 18)  SpO2: 99% (03-29-21 @ 05:02) (97% - 99%)  Wt(kg): --  I&O's Summary    28 Mar 2021 07:01  -  29 Mar 2021 07:00  --------------------------------------------------------  IN: 0 mL / OUT: 200 mL / NET: -200 mL        Appearance: Normal	  HEENT:   Normal oral mucosa, PERRL, EOMI	  Lymphatic: No lymphadenopathy  Cardiovascular: Normal S1 S2, No JVD, No murmurs, +2 edema  Respiratory: Lungs clear to auscultation	  Psychiatry: A & O x 3, Mood & affect appropriate  Gastrointestinal:  Soft, Non-tender, + BS	  Skin: No rashes, No ecchymoses, No cyanosis	  Neurologic: Non-focal  Extremities: Normal range of motion, No clubbing, cyanosis +2 edema,Lt leg laceration        MEDICATIONS  (STANDING):  apixaban 5 milliGRAM(s) Oral every 12 hours  aspirin  chewable 81 milliGRAM(s) Oral daily  atorvastatin 40 milliGRAM(s) Oral at bedtime  carvedilol 3.125 milliGRAM(s) Oral every 12 hours  dextrose 40% Gel 15 Gram(s) Oral once  dextrose 5%. 1000 milliLiter(s) (50 mL/Hr) IV Continuous <Continuous>  dextrose 5%. 1000 milliLiter(s) (100 mL/Hr) IV Continuous <Continuous>  dextrose 50% Injectable 25 Gram(s) IV Push once  dextrose 50% Injectable 12.5 Gram(s) IV Push once  dextrose 50% Injectable 25 Gram(s) IV Push once  furosemide    Tablet 40 milliGRAM(s) Oral two times a day  glucagon  Injectable 1 milliGRAM(s) IntraMuscular once  insulin lispro (ADMELOG) corrective regimen sliding scale   SubCutaneous three times a day before meals  insulin lispro (ADMELOG) corrective regimen sliding scale   SubCutaneous at bedtime  lisinopril 5 milliGRAM(s) Oral daily  sodium chloride 0.9% lock flush 3 milliLiter(s) IV Push every 8 hours        	  LABS:	 	      proBNP: Serum Pro-Brain Natriuretic Peptide: 6970 pg/mL (03-25 @ 06:19)

## 2021-03-29 NOTE — PROGRESS NOTE ADULT - PROBLEM SELECTOR PLAN 1
Due to non compliance with meds, diet and f/u care  -ECHO shows severe systolic HF with EF 15-20% and GIIDD  -Card Dr. Rhodes  -Cont ASA, BB, statin, ACE  -Cont Lasix 40mg BID  -Strict I&O and daily weights

## 2021-03-29 NOTE — CONSULT NOTE ADULT - PROBLEM SELECTOR RECOMMENDATION 9
P/w CHF exacerbation.  ECHO 15-20% Grade II DD.   Pt appears euvolemic on exam. Diurese as tolerated.  Cardiology following   Encouraged to be compliant with medication and outpt MD follow ups.   Pt remains a FULL CODE

## 2021-03-29 NOTE — DISCHARGE NOTE PROVIDER - CARE PROVIDER_API CALL
Cullen Pitts)  Internal Medicine  [address] [place] [address]  Phone: [phone]  Fax: [phone]  Follow Up Time: 1 week    Anabell Rhodes  INTERNAL MEDICINE  [address] [place] [address]  Phone: [phone]  Fax: [phone]  Follow Up Time: 1 week   Cullen Pitts)  Internal Medicine  [address] [place] [address]  Phone: [phone]  Fax: [phone]  Follow Up Time: 1 week    Anabell Rhodes  INTERNAL MEDICINE  [address] [place] [address]  Phone: [phone]  Fax: [phone]  Follow Up Time: 1 week    ESTEFANIA VALDEZ  Internal Medicine  [address] [place] [address]  Phone: [phone]  Fax: [phone])-  Follow Up Time: 1-3 days

## 2021-03-29 NOTE — CONSULT NOTE ADULT - CONVERSATION DETAILS
Palliative care NP met with the pt at the bedside, discussed his clinical condition.  Pt lacks insight as to his clinical status.  He stated his heart is strong.  He has had several ECHO's and they show his EF 60%, he has a difficult time trusting doctors.    He stated he does not believe doctors are giving him the proper medications.  He survived CABG in [place] so he knows he is strong.    Regarding goals of care, explained the risks vs benefits of cardiopulmonary resuscitation and intubation in context of advanced illness. Pt stated he is strong only issue is the swelling and weeping to LLE.  He wants CPR and trial of intubation.  DIGNA drafted to reflect pt's wishes.   Discussed the role of HCP.  He identified his uncle Robbin Shen, number on file is also noted to be the pt's number.  Second number is listed to be Kameron Mccormack ([phone]), who stated he has not  seen the pt in over 5 years.  He did say he will try to get the correct number for the pt's uncle.  All questions answered.  Support provided.

## 2021-03-29 NOTE — DISCHARGE NOTE PROVIDER - PROVIDER TOKENS
PROVIDER:[TOKEN:[[id number]],FOLLOWUP:[1 week]],PROVIDER:[TOKEN:[[id number]],FOLLOWUP:[1 week]] PROVIDER:[TOKEN:[[id number]],FOLLOWUP:[1 week]],PROVIDER:[TOKEN:[[id number]],FOLLOWUP:[1 week]],PROVIDER:[TOKEN:[[id number]],FOLLOWUP:[1-3 days]]

## 2021-03-29 NOTE — CONSULT NOTE ADULT - SUBJECTIVE AND OBJECTIVE BOX
Patient is a 70y old  Male who presents with a chief complaint of CHF exacerbation (29 Mar 2021 18:02)        REVIEW OF SYSTEMS: Total of twelve systems have been reviewed with patient and found to be negative unless mentioned in HPI        PAST MEDICAL & SURGICAL HISORY:  PE (pulmonary thromboembolism)  HTN (hypertension)  Angina pectori  CHF (congestive heart failure)  Dyslipidemia  DM (diabetes mellitus)  CAD (coronary artery disease)  S/P CABG x 3        SOCIAL HISTORY  Alcohol: Does not drink  Tobacco: Does not smoke  Illicit substance use: None      FAMILY HISTORY: Non contributory to the present illness        Aldactone (Unknown)  Bumex (Blisters; Rash)  metoprolol (Unknown)  spironolactone (Unknown)      Vital Signs Last 24 Hrs  T(C): 36.6 (29 Mar 2021 14:25), Max: 36.6 (29 Mar 2021 11:32)  T(F): 97.9 (29 Mar 2021 14:25), Max: 97.9 (29 Mar 2021 14:25)  HR: 73 (29 Mar 2021 14:25) (68 - 76)  BP: 142/77 (29 Mar 2021 14:25) (128/74 - 142/77)  BP(mean): --  RR: 18 (29 Mar 2021 14:25) (17 - 18)  SpO2: 98% (29 Mar 2021 14:25) (97% - 99%)        PHYSICAL EXAM:  GENERAL: Not in distress   CHEST/LUNG:  Aire ntry bilaterally  HEART: s1 and s2 present  ABDOMEN:  Nontender and  Nondistended  EXTREMITIES:   CNS: Awake and Alert      LABS:                        12.7   6.30  )-----------( 233      ( 29 Mar 2021 12:53 )             40.7         03-29    139  |  105  |  23<H>  ----------------------------<  96  3.9   |  27  |  1.19    Ca    8.6      29 Mar 2021 12:53  Mg     2.4     03-29    TPro  7.4  /  Alb  3.1<L>  /  TBili  0.8  /  DBili  x   /  AST  27  /  ALT  30  /  AlkPhos  163<H>  03-29        CAPILLARY BLOOD GLUCOSE  POCT Blood Glucose.: 124 mg/dL (29 Mar 2021 17:08)  POCT Blood Glucose.: 124 mg/dL (29 Mar 2021 12:24)  POCT Blood Glucose.: 96 mg/dL (29 Mar 2021 07:49)  POCT Blood Glucose.: 112 mg/dL (28 Mar 2021 22:17)        MEDICATIONS  (STANDING):  apixaban 5 milliGRAM(s) Oral every 12 hours  aspirin  chewable 81 milliGRAM(s) Oral daily  atorvastatin 40 milliGRAM(s) Oral at bedtime  carvedilol 3.125 milliGRAM(s) Oral every 12 hours  furosemide    Tablet 40 milliGRAM(s) Oral two times a day  glucagon  Injectable 1 milliGRAM(s) IntraMuscular once  insulin lispro (ADMELOG) corrective regimen sliding scale   SubCutaneous three times a day before meals  insulin lispro (ADMELOG) corrective regimen sliding scale   SubCutaneous at bedtime  lisinopril 5 milliGRAM(s) Oral daily  piperacillin/tazobactam IVPB.. 3.375 Gram(s) IV Intermittent every 6 hours  sodium chloride 0.9% lock flush 3 milliLiter(s) IV Push every 8 hours  vancomycin  IVPB 1000 milliGRAM(s) IV Intermittent every 12 hours    MEDICATIONS  (PRN):          RADIOLOGY & ADDITIONAL TESTS:             Patient is a 70y old  Male from home with PMH HTN, HLD, DM, PE, CAD s/p CABG (3.5 yrs ago), presents to the ER on 3/25/21  for evaluation of leg swelling, scrotal swelling  and difficulty breathing for months.  He endorses chest tightness, shortness of breath on exertion, requiring him to take frequent breaks when walking or climbing stairs. can walk less than 2 blocks. Today, 3/29/21, He has started on Zosyn and Vancomycin and The ID consult requested to assist with further evaluation of LLE cellulitis and antibiotic management.       REVIEW OF SYSTEMS: Total of twelve systems have been reviewed with patient and found to be negative unless mentioned in HPI        PAST MEDICAL & SURGICAL HISORY:  PE (pulmonary thromboembolism)  HTN (hypertension)  Angina pectori  CHF (congestive heart failure)  Dyslipidemia  DM (diabetes mellitus)  CAD (coronary artery disease)  S/P CABG x 3        SOCIAL HISTORY  Alcohol: Does not drink  Tobacco: Does not smoke  Illicit substance use: None      FAMILY HISTORY: Non contributory to the present illness        Aldactone (Unknown)  Bumex (Blisters; Rash)  metoprolol (Unknown)  spironolactone (Unknown)      Vital Signs Last 24 Hrs  T(C): 36.6 (29 Mar 2021 14:25), Max: 36.6 (29 Mar 2021 11:32)  T(F): 97.9 (29 Mar 2021 14:25), Max: 97.9 (29 Mar 2021 14:25)  HR: 73 (29 Mar 2021 14:25) (68 - 76)  BP: 142/77 (29 Mar 2021 14:25) (128/74 - 142/77)  BP(mean): --  RR: 18 (29 Mar 2021 14:25) (17 - 18)  SpO2: 98% (29 Mar 2021 14:25) (97% - 99%)        PHYSICAL EXAM:  GENERAL: Not in distress   CHEST/LUNG: Not using accessory muscles   HEART: s1 and s2 present  ABDOMEN:  Nontender and  Nondistended  EXTREMITIES: LLE swollen, mild erythematous, tender with a fluctuance area at lower leg  CNS: Awake and Alert      LABS:                        12.7   6.30  )-----------( 233      ( 29 Mar 2021 12:53 )             40.7         03-29    139  |  105  |  23<H>  ----------------------------<  96  3.9   |  27  |  1.19    Ca    8.6      29 Mar 2021 12:53  Mg     2.4     03-29    TPro  7.4  /  Alb  3.1<L>  /  TBili  0.8  /  DBili  x   /  AST  27  /  ALT  30  /  AlkPhos  163<H>  03-29        CAPILLARY BLOOD GLUCOSE  POCT Blood Glucose.: 124 mg/dL (29 Mar 2021 17:08)  POCT Blood Glucose.: 124 mg/dL (29 Mar 2021 12:24)  POCT Blood Glucose.: 96 mg/dL (29 Mar 2021 07:49)  POCT Blood Glucose.: 112 mg/dL (28 Mar 2021 22:17)        MEDICATIONS  (STANDING):  apixaban 5 milliGRAM(s) Oral every 12 hours  aspirin  chewable 81 milliGRAM(s) Oral daily  atorvastatin 40 milliGRAM(s) Oral at bedtime  carvedilol 3.125 milliGRAM(s) Oral every 12 hours  furosemide    Tablet 40 milliGRAM(s) Oral two times a day  glucagon  Injectable 1 milliGRAM(s) IntraMuscular once  insulin lispro (ADMELOG) corrective regimen sliding scale   SubCutaneous three times a day before meals  insulin lispro (ADMELOG) corrective regimen sliding scale   SubCutaneous at bedtime  lisinopril 5 milliGRAM(s) Oral daily  piperacillin/tazobactam IVPB.. 3.375 Gram(s) IV Intermittent every 6 hours  sodium chloride 0.9% lock flush 3 milliLiter(s) IV Push every 8 hours  vancomycin  IVPB 1000 milliGRAM(s) IV Intermittent every 12 hours    MEDICATIONS  (PRN):      RADIOLOGY & ADDITIONAL TESTS:    3/25/21 : Xray Chest 1 View-PORTABLE IMMEDIATE (Xray Chest 1 View-PORTABLE IMMEDIATE .) (03.25.21 @ 05:52) >  No acute infiltrate. Cardiomegaly. Sternotomy.    3/25/21 : US Duplex Venous Lower Ext Complete, Bilateral (03.25.21 @ 10:38) No evidence of deep venous thrombosis in either lower extremity.      MICROBIOLOGY DATA:    COVID-19 PCR . (03.25.21 @ 12:30)   COVID-19 PCR: NotDetec:

## 2021-03-29 NOTE — PROGRESS NOTE ADULT - SUBJECTIVE AND OBJECTIVE BOX
Patient is a 70y old  Male who presents with a chief complaint of CHF exacerbation (28 Mar 2021 12:05)    pt seen in tele [ x ], reg med floor [   ], bed [ x ], chair at bedside [   ], a+o x3 [ x ], lethargic [  ],  nad [ x ]    pt noncompliant with wearing monitor and taking meds      Allergies    Aldactone (Unknown)  Bumex (Blisters; Rash)  metoprolol (Unknown)  spironolactone (Unknown)        Vitals    T(F): 97.6 (03-29-21 @ 05:02), Max: 97.9 (03-28-21 @ 17:28)  HR: 70 (03-29-21 @ 05:02) (68 - 95)  BP: 128/74 (03-29-21 @ 05:02) (128/74 - 151/93)  RR: 17 (03-29-21 @ 05:02) (17 - 18)  SpO2: 99% (03-29-21 @ 05:02) (97% - 99%)  Wt(kg): --  CAPILLARY BLOOD GLUCOSE      POCT Blood Glucose.: 112 mg/dL (28 Mar 2021 22:17)      Labs                          11.9   5.78  )-----------( 220      ( 27 Mar 2021 07:02 )             38.3       03-27    142  |  109<H>  |  22<H>  ----------------------------<  87  3.7   |  27  |  1.12    Ca    8.2<L>      27 Mar 2021 07:02                  Radiology Results      Meds    MEDICATIONS  (STANDING):  apixaban 5 milliGRAM(s) Oral every 12 hours  aspirin  chewable 81 milliGRAM(s) Oral daily  atorvastatin 40 milliGRAM(s) Oral at bedtime  carvedilol 3.125 milliGRAM(s) Oral every 12 hours  dextrose 40% Gel 15 Gram(s) Oral once  dextrose 5%. 1000 milliLiter(s) (50 mL/Hr) IV Continuous <Continuous>  dextrose 5%. 1000 milliLiter(s) (100 mL/Hr) IV Continuous <Continuous>  dextrose 50% Injectable 25 Gram(s) IV Push once  dextrose 50% Injectable 12.5 Gram(s) IV Push once  dextrose 50% Injectable 25 Gram(s) IV Push once  furosemide    Tablet 40 milliGRAM(s) Oral two times a day  glucagon  Injectable 1 milliGRAM(s) IntraMuscular once  insulin lispro (ADMELOG) corrective regimen sliding scale   SubCutaneous three times a day before meals  insulin lispro (ADMELOG) corrective regimen sliding scale   SubCutaneous at bedtime  lisinopril 5 milliGRAM(s) Oral daily  sodium chloride 0.9% lock flush 3 milliLiter(s) IV Push every 8 hours      MEDICATIONS  (PRN):      Physical Exam    Neuro :  no focal deficits  Respiratory: CTA B/L  CV: RRR, S1S2, no murmurs,   Abdominal: Soft, NT, ND +BS,  Extremities: b/l le edema, + peripheral pulses        ASSESSMENT    acute on chronic systolic chf,   pulm edema   r/o acs,   h/o HTN,   HLD,   DM,   CAD s/p CABGx3,  PE (pulmonary thromboembolism)    PLAN      cont tele,   acs protocol,   cont coreg, lasix,   aspirin, statin,   ce x3 noted above  cardio f/u  echo with EF 15-20%  Grade II diastolic dysfunction. RV systolic pressure is severely noted    cont lisinopril  cont Eliquis.  supplemental O2 as needed,  cont  bronchodilator,  f/u hgba1c  cont current meds       Patient is a 70y old  Male who presents with a chief complaint of CHF exacerbation (28 Mar 2021 12:05)    pt seen in tele [ x ], reg med floor [   ], bed [ x ], chair at bedside [   ], a+o x3 [ x ], lethargic [  ],  nad [ x ]    pt noncompliant with wearing monitor and taking meds      Allergies    Aldactone (Unknown)  Bumex (Blisters; Rash)  metoprolol (Unknown)  spironolactone (Unknown)        Vitals    T(F): 97.6 (03-29-21 @ 05:02), Max: 97.9 (03-28-21 @ 17:28)  HR: 70 (03-29-21 @ 05:02) (68 - 95)  BP: 128/74 (03-29-21 @ 05:02) (128/74 - 151/93)  RR: 17 (03-29-21 @ 05:02) (17 - 18)  SpO2: 99% (03-29-21 @ 05:02) (97% - 99%)  Wt(kg): --  CAPILLARY BLOOD GLUCOSE      POCT Blood Glucose.: 112 mg/dL (28 Mar 2021 22:17)      Labs                          11.9   5.78  )-----------( 220      ( 27 Mar 2021 07:02 )             38.3       03-27    142  |  109<H>  |  22<H>  ----------------------------<  87  3.7   |  27  |  1.12    Ca    8.2<L>      27 Mar 2021 07:02                  Radiology Results      Meds    MEDICATIONS  (STANDING):  apixaban 5 milliGRAM(s) Oral every 12 hours  aspirin  chewable 81 milliGRAM(s) Oral daily  atorvastatin 40 milliGRAM(s) Oral at bedtime  carvedilol 3.125 milliGRAM(s) Oral every 12 hours  dextrose 40% Gel 15 Gram(s) Oral once  dextrose 5%. 1000 milliLiter(s) (50 mL/Hr) IV Continuous <Continuous>  dextrose 5%. 1000 milliLiter(s) (100 mL/Hr) IV Continuous <Continuous>  dextrose 50% Injectable 25 Gram(s) IV Push once  dextrose 50% Injectable 12.5 Gram(s) IV Push once  dextrose 50% Injectable 25 Gram(s) IV Push once  furosemide    Tablet 40 milliGRAM(s) Oral two times a day  glucagon  Injectable 1 milliGRAM(s) IntraMuscular once  insulin lispro (ADMELOG) corrective regimen sliding scale   SubCutaneous three times a day before meals  insulin lispro (ADMELOG) corrective regimen sliding scale   SubCutaneous at bedtime  lisinopril 5 milliGRAM(s) Oral daily  sodium chloride 0.9% lock flush 3 milliLiter(s) IV Push every 8 hours      MEDICATIONS  (PRN):      Physical Exam    Neuro :  no focal deficits  Respiratory: CTA B/L  CV: RRR, S1S2, no murmurs,   Abdominal: Soft, NT, ND +BS,  Extremities: b/l le edema, left leg erythema and weeping ulcerations        ASSESSMENT    acute on chronic systolic chf,   pulm edema   r/o acs,   left le cellulitis  h/o HTN,   HLD,   DM,   CAD s/p CABGx3,  PE (pulmonary thromboembolism)    PLAN      cont tele,   acs protocol,   cont coreg, lasix,   aspirin, statin,   ce x3 noted above  cardio f/u  echo with EF 15-20%  Grade II diastolic dysfunction. RV systolic pressure is severely noted    cont lisinopril  cont Eliquis.  supplemental O2 as needed,  cont  bronchodilator,  f/u hgba1c   start vanco and zosyn  f/u blood cx  id cons  f/u wound cx  podiatry cons  cont current meds

## 2021-03-29 NOTE — PROGRESS NOTE ADULT - ASSESSMENT
70y M from home with PMH HTN, HLD, DM, PE, CAD s/p CABG (3.5 yrs ago), presented  with leg swelling, scrotal swelling  and difficulty breathing for months.   In ED CXR No acute infiltrate. Cardiomegaly  ECG : first degree heart block, Qtc is prolonged 491  BNP 6970, CXR   pt received Lasix 80mg X1   Admitted for acute on chronic systolic heart failure   Seen at bedside, states is feeling better, refused to be minitored on cardiac monitor.   Co leg  heaviness   venous doppler negative for DVT   TTE with EF 15-20%  Troponin negative     3/29-c/o LLE open wound on anterior mid portion of shin, wound oozing and painful.  Surgery consulted and saw pt., no interventions recommended.  Vanco and Zosyn started, ID Dr. Lauren consulted.  Pt. with persisting LEs edema and cellulitis, ambulates freely.

## 2021-03-29 NOTE — PROGRESS NOTE ADULT - PROBLEM SELECTOR PLAN 2
S/P CABG 3.5 years ago  -Cont Lasix 40 mg bid, Coreg 3.125 , Lisinopril 5 qd , Aspirin 81, statin 40qd  tele   -Cardio Dr Rhodes

## 2021-03-29 NOTE — PROGRESS NOTE ADULT - PROBLEM SELECTOR PLAN 4
B/L LEs with edema 4+, chronic cellulitis and open wound on LLE oozing and painful  -Surgery consulted, no interventions indicated  -Started on Vanco and Zosyn per attending  -ID Dr. Lauren consulted

## 2021-03-29 NOTE — PROGRESS NOTE ADULT - ASSESSMENT
70y M from home with PMH HTN, HLD, DM, PE, CAD s/p CABG (3.5 yrs ago), presenting with leg swelling, scrotal swelling  and difficulty breathing for months,acute on chronic systolic HF.  1.Pt needs to be compliant with  cardiac medication.  2.Acute on chronic systolic HF-coreg,ace,lasix po since pt refusing IV.  3.Pt reports allergy to aldactone.  4.CAD-asa,b blocker,statin.  5.PE-Eliquis.  6.DM-Insulin.  7.HTN-coreg,ace.  8.Lt leg wound-Podiatry eval.  9.PPI.

## 2021-03-29 NOTE — CONSULT NOTE ADULT - SUBJECTIVE AND OBJECTIVE BOX
HPI:  70y M from home with PMH HTN, HLD, DM, PE, CAD s/p CABG (3.5 yrs ago), presenting with leg swelling, scrotal swelling  and difficulty breathing for months. History limited to patient's cognitive function (does not remember meds, pharmacy number, timelines, wect) He endorses chest tightness, shortness of breath on exertion, requiring him to take frequent breaks when walking or climbing stairs. can walk less than 2 blocks.  He also has pain in his legs from the swelling. He states his leg swelling is intermittent, but lately has been constant. He denies any palpitations, abdominal pain, nausea/vomiting. pt reports had echo 2 month ago with acceptable results. pt non smoker , does not drink ETOH. drinks less than 2 L in a day.        Ed course : pt received Lasix 80mg X1 in ED    ECG : first degree heart block, Qtc is prolonged 491    Of note : I Called pharmacy at [facility] (pharmacy on chart) reported pt did not  refill over last 4 months, also called [place] , per pharmacist, patient has been going from hospital to hospital and been non compliant to medication. patient showed up for medication  few times , however screamed at pharmacist and refused to pick medication up, as he believed meds were not appropriate     (25 Mar 2021 12:07)      PAST MEDICAL & SURGICAL HISTORY:  PE (pulmonary thromboembolism)    HTN (hypertension)    Angina pectoris    CHF (congestive heart failure)    Dyslipidemia    DM (diabetes mellitus)    CAD (coronary artery disease)    S/P CABG x 3        SOCIAL HISTORY:    Admitted from:  home assisted living [facility]   Substance abuse history:              Tobacco hx:                  Alcohol hx:              Home Opioid hx:  Holiness:                                    Preferred Language:    Surrogate/HCP/Guardian:            Phone#:    FAMILY HISTORY:    Baseline ADLs (prior to admission):    Allergies    Aldactone (Unknown)  Bumex (Blisters; Rash)  metoprolol (Unknown)  spironolactone (Unknown)    Intolerances      Present Symptoms:   Dyspnea:   Nausea/Vomiting:   Anxiety:  Depressed   Fatigue:  Loss of appetite:   Pain:                                location:          Review of Systems: [All others negative or Unable to obtain due to poor mentation]    MEDICATIONS  (STANDING):  apixaban 5 milliGRAM(s) Oral every 12 hours  aspirin  chewable 81 milliGRAM(s) Oral daily  atorvastatin 40 milliGRAM(s) Oral at bedtime  carvedilol 3.125 milliGRAM(s) Oral every 12 hours  dextrose 40% Gel 15 Gram(s) Oral once  dextrose 5%. 1000 milliLiter(s) (100 mL/Hr) IV Continuous <Continuous>  dextrose 5%. 1000 milliLiter(s) (50 mL/Hr) IV Continuous <Continuous>  dextrose 50% Injectable 25 Gram(s) IV Push once  dextrose 50% Injectable 12.5 Gram(s) IV Push once  dextrose 50% Injectable 25 Gram(s) IV Push once  furosemide    Tablet 40 milliGRAM(s) Oral two times a day  glucagon  Injectable 1 milliGRAM(s) IntraMuscular once  insulin lispro (ADMELOG) corrective regimen sliding scale   SubCutaneous three times a day before meals  insulin lispro (ADMELOG) corrective regimen sliding scale   SubCutaneous at bedtime  lisinopril 5 milliGRAM(s) Oral daily  sodium chloride 0.9% lock flush 3 milliLiter(s) IV Push every 8 hours    MEDICATIONS  (PRN):      PHYSICAL EXAM:    Vital Signs Last 24 Hrs  T(C): 36.4 (29 Mar 2021 05:02), Max: 36.6 (28 Mar 2021 17:28)  T(F): 97.6 (29 Mar 2021 05:02), Max: 97.9 (28 Mar 2021 17:28)  HR: 70 (29 Mar 2021 05:02) (68 - 95)  BP: 128/74 (29 Mar 2021 05:02) (128/74 - 151/93)  BP(mean): --  RR: 17 (29 Mar 2021 05:02) (17 - 18)  SpO2: 99% (29 Mar 2021 05:02) (97% - 99%)    General: alert  oriented x ____    [lethargic distressed cachexia  nonverbal  unarousable verbal]  Karnofsky Performance Score/Palliative Performance Status Version2:     %    HEENT: normal  dry mouth  ET tube/trach oral lesions:  Lungs: comfortable tachypnea/labored breathing  excessive secretions  CV: normal  tachycardia  GI: normal  distended  tender  incontinent               PEG/NG/OG tube  constipation  last BM:   : normal  incontinent  oliguria/anuria  cruz  Musculoskeletal: normal  weakness  edema             ambulatory  bedbound/wheelchair bound  Skin: normal  pressure ulcers: stage: edema: other:  Neuro: no deficits cognitive impairment dsyphagia/dysarthria paresis: other:  Oral intake ability: unable/only mouth care [minimal moderate full capability]  Diet: [NPO]    LABS:              RADIOLOGY & ADDITIONAL STUDIES:    ADVANCE DIRECTIVES:    HPI:  70y M from home with PMH HTN, HLD, DM, PE, CAD s/p CABG (3.5 yrs ago), presenting with leg swelling, scrotal swelling  and difficulty breathing for months. History limited to patient's cognitive function (does not remember meds, pharmacy number, timelines, wect) He endorses chest tightness, shortness of breath on exertion, requiring him to take frequent breaks when walking or climbing stairs. can walk less than 2 blocks.  He also has pain in his legs from the swelling. He states his leg swelling is intermittent, but lately has been constant. He denies any palpitations, abdominal pain, nausea/vomiting. pt reports had echo 2 month ago with acceptable results. pt non smoker , does not drink ETOH. drinks less than 2 L in a day.     Interval hx: Pt seen and examined at the bedside.  AOX3. ECHO 15-20%   Grade II DD.  No insight as to his clinical condition.       PAST MEDICAL & SURGICAL HISTORY:  PE (pulmonary thromboembolism)    HTN (hypertension)    Angina pectoris    CHF (congestive heart failure)    Dyslipidemia    DM (diabetes mellitus)    CAD (coronary artery disease)    S/P CABG x 3        SOCIAL HISTORY:    Admitted from:  home  Pt identifies his uncle Robbin Shen as his HCP.      Jehovah's witness:     [employer]                                  Surrogate/HCP/Guardian: Robbin Shen              Phone#:  [phone]    FAMILY HISTORY:  No pertinent family history  Baseline ADLs (prior to admission): independent    Allergies    Aldactone (Unknown)  Bumex (Blisters; Rash)  metoprolol (Unknown)  spironolactone (Unknown)    Intolerances      Present Symptoms:   Dyspnea: sometimes  Nausea/Vomiting: denies  Anxiety: denies  Loss of appetite: denies  Pain:      denies                           Review of Systems: [All others negative     MEDICATIONS  (STANDING):  apixaban 5 milliGRAM(s) Oral every 12 hours  aspirin  chewable 81 milliGRAM(s) Oral daily  atorvastatin 40 milliGRAM(s) Oral at bedtime  carvedilol 3.125 milliGRAM(s) Oral every 12 hours  dextrose 40% Gel 15 Gram(s) Oral once  dextrose 5%. 1000 milliLiter(s) (100 mL/Hr) IV Continuous <Continuous>  dextrose 5%. 1000 milliLiter(s) (50 mL/Hr) IV Continuous <Continuous>  dextrose 50% Injectable 25 Gram(s) IV Push once  dextrose 50% Injectable 12.5 Gram(s) IV Push once  dextrose 50% Injectable 25 Gram(s) IV Push once  furosemide    Tablet 40 milliGRAM(s) Oral two times a day  glucagon  Injectable 1 milliGRAM(s) IntraMuscular once  insulin lispro (ADMELOG) corrective regimen sliding scale   SubCutaneous three times a day before meals  insulin lispro (ADMELOG) corrective regimen sliding scale   SubCutaneous at bedtime  lisinopril 5 milliGRAM(s) Oral daily  sodium chloride 0.9% lock flush 3 milliLiter(s) IV Push every 8 hours    MEDICATIONS  (PRN):      PHYSICAL EXAM:    Vital Signs Last 24 Hrs  T(C): 36.4 (29 Mar 2021 05:02), Max: 36.6 (28 Mar 2021 17:28)  T(F): 97.6 (29 Mar 2021 05:02), Max: 97.9 (28 Mar 2021 17:28)  HR: 70 (29 Mar 2021 05:02) (68 - 95)  BP: 128/74 (29 Mar 2021 05:02) (128/74 - 151/93)  BP(mean): --  RR: 17 (29 Mar 2021 05:02) (17 - 18)  SpO2: 99% (29 Mar 2021 05:02) (97% - 99%)    General: Elderly man AOX3, b/l LE swelling. NAD  Karnofsky Performance Score/Palliative Performance Status Version2:  40   %    HEENT: atraumatic, moist mucous membrane, neck supple  Lungs: unlabored on RA  CV: RRR  GI: soft, non tender on palpation  : urinating   Musculoskeletal: b/l discoloration likely 2/2 venous stasis and LE swelling  Skin: b/l discoloration, LE swelling  Neuro: no deficits cognitive impairment   Oral intake ability: good po intake      LABS:      < from: Transthoracic Echocardiogram (03.25.21 @ 12:35) >  Patient name: LIDIA PÉERZ  YOB: 1950   Age: 70 (M)   MR#: [id number]  Study Date: 3/25/2021  Location: [address] [place], [place] [address]Sonographer: Johana Akers [facility]  Study quality: Fair  Referring Physician:  CHEKO BOONE MD  Blood Pressure: 139/87 mmHg  Height: 170 cm  Weight: 96 kg  BSA: 2.1 m2  ------------------------------------------------------------------------    PROCEDURE: Transthoracic echocardiogram with 2-D, M-Mode  and complete spectral and color flow Doppler.  INDICATION: Chronic ischemic heart disease, unspecified  (I25.9)  HISTORY:  ------------------------------------------------------------------------  DIMENSIONS:  Dimensions:     Normal Values:  LA:     4.8 cm    2.0 - 4.0 cm  Ao:     3.5 cm    2.0 - 3.8 cm  SEPTUM:0.9 cm    0.6 - 1.2 cm  PWT:    1.0 cm    0.6 - 1.1 cm  LVIDd:  6.0 cm    3.0 - 5.6 cm  LVIDs:  5.6 cm    1.8 - 4.0 cm      Derived Variables:  LVMI: 112 g/m2  RWT: 0.33  Ejection Fraction Visual Estimate: 15-20 %  Peak Velocity (m/sec): AoV=2.0  ------------------------------------------------------------------------  OBSERVATIONS:  Mitral Valve: Tethered mitral valve leaflets. Mild to  moderate mitral regurgitation.  Aortic Root: Normal aortic root.  Aortic Valve: Calcified trileaflet aortic valve with  decreased opening. Peak transaortic valve gradient equals  16.8 mm Hg, mean transaortic valve gradient equals 10 mm  Hg, consistent with mild aortic stenosis. The aortic valve  apears more stenotic than indicated by gradients. In  setting oflow EF, cannot rule out paradoxical low-flow  low-gradient AS. Consider dobutamine stress echocardiogram  for further assessment if clinically indicated.   Trace  aortic regurgitation.  Left Atrium: Normal left atrium.  LA volume index = 28  cc/m2.  Left Ventricle: Severe global left ventricular systolic  dysfunction (EF 15-20% by visual estimation). Not all LV  wall segments were seen. Mild left ventricular enlargement.  Grade II diastolic dysfunction.  Right Heart: Normal right atrium. Off axisimages preclude  accurate assessment of right ventricular size. Reduced RV  systolic function (TAPSE 1.0 cm). Normal tricuspid valve.  Moderate to severe tricuspid regurgitation. Pulmonic valve  not well seen. Trace pulmonic insufficiency is noted.  Pericardium/PleuraNo pericardial effusion. Right pleural  effusion.  Hemodynamic: RA Pressure is 8 mm Hg. RV systolic pressure  is severely increased at  67 mm Hg.  ------------------------------------------------------------------------  CONCLUSIONS:  1. Tethered mitral valve leaflets. Mild to moderate mitral  regurgitation.  2. Calcified trileaflet aortic valve with decreased  opening. Mild aortic stenosis by gradients. The aortic  valve apears more stenotic than indicated by gradients. In  setting of low EF, cannot rule out paradoxical low-flow  low-gradient AS. Consider dobutamine stress echocardiogram  for further assessment if clinically indicated.   Trace  aortic regurgitation.  3. Normal aortic root.  4. Normal left atrium.  5. Mild left ventricular enlargement.  6. Severe global left ventricular systolic dysfunction (EF  15-20% by visual estimation).  7. Grade II diastolic dysfunction.  8. Normal right atrium.  9. Off axis images preclude accurate assessment of right  ventricular size. Reduced RV systolic function (TAPSE 1.0  cm).  10. RV systolic pressure is severely increased at  67 mm  Hg.  11. Normal tricuspid valve. Moderate to severe tricuspid  regurgitation.  12. Pulmonic valve not well seen. Trace pulmonic  insufficiency is noted.  13. No pericardial effusion.  14. Right pleural effusion.    < end of copied text >          RADIOLOGY & ADDITIONAL STUDIES: Reviewed    ADVANCE DIRECTIVES: FULL CODE

## 2021-03-29 NOTE — PROGRESS NOTE ADULT - SUBJECTIVE AND OBJECTIVE BOX
NP Note discussed with  Primary Attending    Patient is a 70y old  Male who presents with a chief complaint of CHF exacerbation (29 Mar 2021 12:10)      INTERVAL HPI/OVERNIGHT EVENTS: no new complaints    MEDICATIONS  (STANDING):  apixaban 5 milliGRAM(s) Oral every 12 hours  aspirin  chewable 81 milliGRAM(s) Oral daily  atorvastatin 40 milliGRAM(s) Oral at bedtime  carvedilol 3.125 milliGRAM(s) Oral every 12 hours  dextrose 40% Gel 15 Gram(s) Oral once  dextrose 5%. 1000 milliLiter(s) (50 mL/Hr) IV Continuous <Continuous>  dextrose 5%. 1000 milliLiter(s) (100 mL/Hr) IV Continuous <Continuous>  dextrose 50% Injectable 25 Gram(s) IV Push once  dextrose 50% Injectable 12.5 Gram(s) IV Push once  dextrose 50% Injectable 25 Gram(s) IV Push once  furosemide    Tablet 40 milliGRAM(s) Oral two times a day  glucagon  Injectable 1 milliGRAM(s) IntraMuscular once  insulin lispro (ADMELOG) corrective regimen sliding scale   SubCutaneous three times a day before meals  insulin lispro (ADMELOG) corrective regimen sliding scale   SubCutaneous at bedtime  lisinopril 5 milliGRAM(s) Oral daily  piperacillin/tazobactam IVPB.. 3.375 Gram(s) IV Intermittent every 6 hours  sodium chloride 0.9% lock flush 3 milliLiter(s) IV Push every 8 hours  vancomycin  IVPB 1000 milliGRAM(s) IV Intermittent every 12 hours    MEDICATIONS  (PRN):      __________________________________________________  REVIEW OF SYSTEMS:    CONSTITUTIONAL: No fever,   EYES: no acute visual disturbances  NECK: No pain or stiffness  RESPIRATORY: No cough; No shortness of breath  CARDIOVASCULAR: No chest pain, no palpitations  GASTROINTESTINAL: No pain. No nausea or vomiting; No diarrhea   NEUROLOGICAL: No headache or numbness, no tremors  MUSCULOSKELETAL: No joint pain, no muscle pain  GENITOURINARY: no dysuria, no frequency, no hesitancy  PSYCHIATRY: no depression , no anxiety  ALL OTHER  ROS negative        Vital Signs Last 24 Hrs  T(C): 36.6 (29 Mar 2021 14:25), Max: 36.6 (28 Mar 2021 17:28)  T(F): 97.9 (29 Mar 2021 14:25), Max: 97.9 (28 Mar 2021 17:28)  HR: 73 (29 Mar 2021 14:25) (68 - 76)  BP: 142/77 (29 Mar 2021 14:25) (128/74 - 151/93)  BP(mean): --  RR: 18 (29 Mar 2021 14:25) (17 - 18)  SpO2: 98% (29 Mar 2021 14:25) (97% - 99%)    ________________________________________________  PHYSICAL EXAM: ambulates  with no difficulty  GENERAL: NAD  HEENT: Normocephalic;  conjunctivae and sclerae clear; moist mucous membranes;   NECK : supple  CHEST/LUNG: Clear to auscultation bilaterally with good air entry   HEART: S1 S2  regular; no murmurs, gallops or rubs  ABDOMEN: Soft, Nontender, Nondistended; Bowel sounds present  EXTREMITIES: LEs edema 4+, cellulitis  SKIN: warm and dry; no rash  NERVOUS SYSTEM:  Awake and alert; Oriented  to place, person and time ; no new deficits    _________________________________________________  LABS:                        12.7   6.30  )-----------( 233      ( 29 Mar 2021 12:53 )             40.7     03-29    139  |  105  |  23<H>  ----------------------------<  96  3.9   |  27  |  1.19    Ca    8.6      29 Mar 2021 12:53  Mg     2.4     03-29    TPro  7.4  /  Alb  3.1<L>  /  TBili  0.8  /  DBili  x   /  AST  27  /  ALT  30  /  AlkPhos  163<H>  03-29        CAPILLARY BLOOD GLUCOSE      POCT Blood Glucose.: 124 mg/dL (29 Mar 2021 12:24)  POCT Blood Glucose.: 96 mg/dL (29 Mar 2021 07:49)  POCT Blood Glucose.: 112 mg/dL (28 Mar 2021 22:17)  POCT Blood Glucose.: 210 mg/dL (28 Mar 2021 17:21)    RADIOLOGY & ADDITIONAL TESTS:    US Duplex Venous Lower Ext Complete, Bilateral (03.25.21 @ 10:38) >  EXAM:  US DPLX LWR EXT VEINS COMPL BI                            PROCEDURE DATE:  03/25/2021          INTERPRETATION:  CLINICAL INFORMATION: Leg swelling    COMPARISON: 11/16/2017    TECHNIQUE: Duplex sonography of the BILATERAL LOWER extremity veins with color and spectral Doppler, with and without compression.    FINDINGS:    RIGHT:  Normal compressibility of the RIGHT common femoral, femoral and popliteal veins.  Doppler examination shows normal spontaneous and phasic flow.  No RIGHT calf vein thrombosis is detected.    LEFT:  Normal compressibility of the LEFT common femoral, femoral and popliteal veins.  Doppler examination shows normal spontaneous and phasic flow.  No LEFT calf vein thrombosis is detected.    Enlarged bilateral groin lymph nodes are noted.    IMPRESSION:  No evidence of deep venous thrombosis in either lower extremity.    < end of copied text >    Xray Chest 1 View-PORTABLE IMMEDIATE (Xray Chest 1 View-PORTABLE IMMEDIATE .) (03.25.21 @ 05:52) >  EXAM:  XR CHEST PORTABLE IMMED 1V                            PROCEDURE DATE:  03/25/2021          INTERPRETATION:  CLINICAL INDICATION: 70 years  Male with Shortness of Breath.    COMPARISON: 4/8/2020    Apical lordotic view of the chest demonstrates the lungs to be clear. There is no pleural effusion. There is no pneumothorax.    The heart is enlarged. The and sternotomy wires are present.. There is no mediastinal or hilar mass.    The pulmonary vasculature is normal.    Mild thoracic degenerative changes are present.    IMPRESSION:    No acute infiltrate. Cardiomegaly. Sternotomy.    Limited by projection. Recommend repeat.    < end of copied text >    < from: Transthoracic Echocardiogram (03.25.21 @ 12:35) >        PROCEDURE: Transthoracic echocardiogram with 2-D, M-Mode  and complete spectral and color flow Doppler.  INDICATION: Chronic ischemic heart disease, unspecified  (I25.9)  HISTORY:  ------------------------------------------------------------------------  DIMENSIONS:  Dimensions:     Normal Values:  LA:     4.8 cm    2.0 - 4.0 cm  Ao:     3.5 cm    2.0 - 3.8 cm  SEPTUM:0.9 cm    0.6 - 1.2 cm  PWT:    1.0 cm    0.6 - 1.1 cm  LVIDd:  6.0 cm    3.0 - 5.6 cm  LVIDs:  5.6 cm    1.8 - 4.0 cm      Derived Variables:  LVMI: 112 g/m2  RWT: 0.33  Ejection Fraction Visual Estimate: 15-20 %  Peak Velocity (m/sec): AoV=2.0  ------------------------------------------------------------------------  OBSERVATIONS:  Mitral Valve: Tethered mitral valve leaflets. Mild to  moderate mitral regurgitation.  Aortic Root: Normal aortic root.  Aortic Valve: Calcified trileaflet aortic valve with  decreased opening. Peak transaortic valve gradient equals  16.8 mm Hg, mean transaortic valve gradient equals 10 mm  Hg, consistent with mild aortic stenosis. The aortic valve  apears more stenotic than indicated by gradients. In  setting oflow EF, cannot rule out paradoxical low-flow  low-gradient AS. Consider dobutamine stress echocardiogram  for further assessment if clinically indicated.   Trace  aortic regurgitation.  Left Atrium: Normal left atrium.  LA volume index = 28  cc/m2.  Left Ventricle: Severe global left ventricular systolic  dysfunction (EF 15-20% by visual estimation). Not all LV  wall segments were seen. Mild left ventricular enlargement.  Grade II diastolic dysfunction.  Right Heart: Normal right atrium. Off axisimages preclude  accurate assessment of right ventricular size. Reduced RV  systolic function (TAPSE 1.0 cm). Normal tricuspid valve.  Moderate to severe tricuspid regurgitation. Pulmonic valve  not well seen. Trace pulmonic insufficiency is noted.  Pericardium/PleuraNo pericardial effusion. Right pleural  effusion.  Hemodynamic: RA Pressure is 8 mm Hg. RV systolic pressure  is severely increased at  67 mm Hg.  ------------------------------------------------------------------------  CONCLUSIONS:  1. Tethered mitral valve leaflets. Mild to moderate mitral  regurgitation.  2. Calcified trileaflet aortic valve with decreased  opening. Mild aortic stenosis by gradients. The aortic  valve apears more stenotic than indicated by gradients. In  setting of low EF, cannot rule out paradoxical low-flow  low-gradient AS. Consider dobutamine stress echocardiogram  for further assessment if clinically indicated.   Trace  aortic regurgitation.  3. Normal aortic root.  4. Normal left atrium.  5. Mild left ventricular enlargement.  6. Severe global left ventricular systolic dysfunction (EF  15-20% by visual estimation).  7. Grade II diastolic dysfunction.  8. Normal right atrium.  9. Off axis images preclude accurate assessment of right  ventricular size. Reduced RV systolic function (TAPSE 1.0  cm).  10. RV systolic pressure is severely increased at  67 mm  Hg.  11. Normal tricuspid valve. Moderate to severe tricuspid  regurgitation.  12. Pulmonic valve not well seen. Trace pulmonic  insufficiency is noted.  13. No pericardial effusion.  14. Right pleural effusion.    < end of copied text >    Imaging Personally Reviewed:  YES  Consultant(s) Notes Reviewed:   YES  Care Discussed with Consultants :     Plan of care was discussed with patient and /or primary care giver; all questions and concerns were addressed and care was aligned with patient's wishes.

## 2021-03-29 NOTE — DISCHARGE NOTE PROVIDER - NSDCHHNEEDSERVICE_GEN_ALL_CORE
Medication teaching and assessment/Rehabilitation services Medication teaching and assessment/Observation and assessment/Rehabilitation services/Other, specify...

## 2021-03-29 NOTE — DISCHARGE NOTE PROVIDER - NSDCMRMEDTOKEN_GEN_ALL_CORE_FT
Coreg 3.125 mg oral tablet: 1 tab(s) orally 2 times a day  Ecotrin Adult Low Strength 81 mg oral delayed release tablet: 1 tab(s) orally once a day  Eliquis 5 mg oral tablet: 1 tab(s) orally 2 times a day  hydrALAZINE 10 mg oral tablet: 1 tab(s) orally 3 times a day  Lasix 80 mg oral tablet: 1 tab(s) orally 2 times a day   Lipitor 40 mg oral tablet: 1 tab(s) orally once a day  lisinopril 5 mg oral tablet: 1 tab(s) orally once a day   Protonix 40 mg oral delayed release tablet: 1 tab(s) orally once a day   amoxicillin-clavulanate 875 mg-125 mg oral tablet: 1 tab(s) orally every 12 hours  collagenase 250 units/g topical ointment: 1 application topically once a day apply santyl to leg leg wound and cover with 4x4 gauze, allegra and ace to left LE  Coreg 3.125 mg oral tablet: 1 tab(s) orally 2 times a day  doxycycline monohydrate 100 mg oral capsule: 1 cap(s) orally every 12 hours  Ecotrin Adult Low Strength 81 mg oral delayed release tablet: 1 tab(s) orally once a day  Eliquis 5 mg oral tablet: 1 tab(s) orally 2 times a day  furosemide 40 mg oral tablet: 1 tab(s) orally 2 times a day  hydrALAZINE 10 mg oral tablet: 1 tab(s) orally 3 times a day  Lipitor 40 mg oral tablet: 1 tab(s) orally once a day  lisinopril 5 mg oral tablet: 1 tab(s) orally once a day   Protonix 40 mg oral delayed release tablet: 1 tab(s) orally once a day

## 2021-03-29 NOTE — DISCHARGE NOTE PROVIDER - HOSPITAL COURSE
70y M from home with PMH HTN, HLD, DM, PE, CAD s/p CABG (3.5 yrs ago), presented  with leg swelling, scrotal swelling  and difficulty breathing for months. In ED CXR No acute infiltrate and showed Cardiomegaly. ECG : first degree heart block, Qtc is prolonged 491. BNP 6970. Pt Admitted for acute on chronic systolic heart failure likely due to non compliance with meds, diet and f/u care. ECHO shows severe systolic HF with EF 15-20% and GIIDD, followed by Cardio Dr. Rhodes  Pt with open wounds to lower leg with edema 4+, chronic cellulitis. venous doppler negative for DVT. Surgery consulted, no interventions indicated. Started on Vanco and Zosyn per attending. ID Dr. Lauren following     NOT COMPLETE 3/29  Optimized for discharge with outpt follow up   Please note that this a brief summary of hospital course please refer to daily progress notes and consult notes for full course and events   70y M from home with PMH HTN, HLD, DM, PE, CAD s/p CABG (3.5 yrs ago), presented  with leg swelling, scrotal swelling  and difficulty breathing for months. In ED CXR No acute infiltrate and showed Cardiomegaly. ECG : first degree heart block, Qtc is prolonged 491. BNP 6970. Pt Admitted for acute on chronic systolic heart failure likely due to non compliance with meds, diet and f/u care. ECHO shows severe systolic HF with EF 15-20% and GIIDD, followed by Cardio Dr. Rhodes  Pt with open wounds to lower leg with edema 4+, chronic cellulitis. venous doppler negative for DVT. Surgery consulted, no interventions indicated. Started on Vanco and Zosyn per attending. ID Dr. Lauren following       Optimized for discharge with outpt follow up   Please note that this a brief summary of hospital course please refer to daily progress notes and consult notes for full course and events      <<<<INCOMPLETE>>>> 4/2   70y M from home with PMH HTN, HLD, DM, PE, CAD s/p CABG (3.5 yrs ago), presented  with leg swelling, scrotal swelling  and difficulty breathing for months. In ED CXR No acute infiltrate and showed Cardiomegaly. ECG : first degree heart block, Qtc is prolonged 491. BNP 6970. Pt Admitted for acute on chronic systolic heart failure likely due to non compliance with meds, diet and f/u care. ECHO shows severe systolic HF with EF 15-20% and GIIDD, followed by Cardio Dr. Rhodes  Pt with open wounds to lower leg with edema 4+, chronic cellulitis. venous doppler negative for DVT. Surgery consulted, no interventions indicated. Started on Vanco and Zosyn per attending. He had CT lower of extremity done on 3/30/2021 revealed Diffuse soft tissue swelling and skin thickening. Nonspecific finding that can be seen in the setting of cellulitis. No drainable fluid collection.  No CT evidence for osteomyelitis. He was followed by ID optimized on IV abx then transitioned to oral Augmentin 875 mg q 12hours and Doxycycline 100 mg [id number] in 48 hours to continue until 4/12/21. He was optimized and stable medically for discharge. Pt educated on the post discharge meds, follow up and procedure care. He is to make an alfonso't to f/u with his PCP within 1 week. Pt verbalized an understanding of discharge plans but objected to being discharge as he believes he "is not ready yet". He reports that he has "been to many hospitals" and added "I know how it goes". He declined homecare/ ride home, and wound dsg supplies when approached by case management.    70y M from home with PMH HTN, HLD, DM, PE, CAD s/p CABG (3.5 yrs ago), presented  with leg swelling, scrotal swelling  and difficulty breathing for months. In ED CXR No acute infiltrate and showed Cardiomegaly. ECG : first degree heart block, Qtc is prolonged 491. BNP 6970. Pt Admitted for acute on chronic systolic heart failure likely due to non compliance with meds, diet and f/u care. ECHO shows severe systolic HF with EF 15-20% and GIIDD, followed by Cardio Dr. Rhodes  Pt with open wounds to lower leg with edema 4+, chronic cellulitis. venous doppler negative for DVT. Surgery consulted, no interventions indicated. Started on Vanco and Zosyn per attending. He had CT lower of extremity done on 3/30/2021 revealed Diffuse soft tissue swelling and skin thickening. Nonspecific finding that can be seen in the setting of cellulitis. No drainable fluid collection.  No CT evidence for osteomyelitis. He was followed by ID optimized on IV abx then transitioned to oral Augmentin 875 mg q 12hours and Doxycycline 100 mg [id number] in 48 hours to continue until 4/12/21. He was optimized and stable medically for discharge. Pt educated on the post discharge meds, follow up and procedure care. He is to make an alfonso't to f/u with his PCP within 1 week. Pt verbalized an understanding of discharge plans but objected to being discharge as he believes he "is not ready yet". He reports that he has "been to many hospitals" and added "I know how it goes". He declined homecare/ ride home, and wound dsg supplies when approached by case management. Patient medically determined to be stable for discharge today(4/5/2021) after receiving the COVID vaccine.    70y M from home with PMH HTN, HLD, DM, PE, CAD s/p CABG (3.5 yrs ago), presented  with leg swelling, scrotal swelling  and difficulty breathing for months. In ED CXR No acute infiltrate and showed Cardiomegaly. ECG : first degree heart block, Qtc is prolonged 491. BNP 6970. Pt Admitted for acute on chronic systolic heart failure likely due to non compliance with meds, diet and f/u care. ECHO shows severe systolic HF with EF 15-20% and GIIDD, followed by Cardio Dr. Rhodes  Pt with open wounds to lower leg with edema 4+, chronic cellulitis. venous doppler negative for DVT. Surgery consulted, no interventions indicated. Started on Vanco and Zosyn per attending. He had CT lower of extremity done on 3/30/2021 revealed Diffuse soft tissue swelling and skin thickening. Nonspecific finding that can be seen in the setting of cellulitis. No drainable fluid collection.  No CT evidence for osteomyelitis. He was followed by ID optimized on IV abx then transitioned to oral Augmentin 875 mg q 12hours and Doxycycline 100 mg [id number] in 48 hours to continue until 4/12/21. He was optimized and stable medically for discharge. Pt educated on the post discharge meds, follow up and procedure care. He is to make an alfonso't to f/u with his PCP within 1 week. Pt verbalized an understanding of discharge plans but objected to being discharge as he believes he "is not ready yet". He reports that he has "been to many hospitals" and added "I know how it goes". He declined homecare/ ride home, and wound dsg supplies when approached by case management. Patient medically determined to be stable for discharge today(4/5/2021). He received [place] vaccine on 4/5/2021 prior to discharge

## 2021-03-29 NOTE — DISCHARGE NOTE PROVIDER - NSDCCPCAREPLAN_GEN_ALL_CORE_FT
PRINCIPAL DISCHARGE DIAGNOSIS  Diagnosis: Acute on chronic combined systolic (congestive) and diastolic (congestive) heart failure  Assessment and Plan of Treatment: you presented to hospital for worsening leg edema which was likely due to CHF exacerbation. you were started on Lasix and Ultrasound of heart was done and it showed that your heart is very weak in its function  It is stongly recommended that you remain compliant with medications, low salt and healthy fat diet and out patient follow up with PCP and Cardiologist to avoid further damage to your heart  Weigh yourself daily.  If you gain 3lbs in 3 days, or 5lbs in a week call your Health Care Provider.  Do not eat or drink foods containing more than 2000mg of salt (sodium) in your diet every day.  Call your Health Care Provider if you have any swelling or increased swelling in your feet, ankles, and/or stomach.  Take all of your medication as directed.  If you become dizzy call your Health Care Provider.        SECONDARY DISCHARGE DIAGNOSES  Diagnosis: HTN (hypertension)  Assessment and Plan of Treatment: Low salt diet  Activity as tolerated.  Take all medication as prescribed.  Follow up with your medical doctor for routine blood pressure monitoring at your next visit.  Notify your doctor if you have any of the following symptoms:   Dizziness, Lightheadedness, Blurry vision, Headache, Chest pain, Shortness of breath      Diagnosis: DM (diabetes mellitus)  Assessment and Plan of Treatment: DM (diabetes mellitus)    Diagnosis: PE (pulmonary thromboembolism)  Assessment and Plan of Treatment: Take your Eliquis as directed.  Follow up with your health care provider within one week. Call for appointment.  If you develop shortness of breath or if your shortness of breath worsens call your Health Care Provider or go to the Emergency Department.       PRINCIPAL DISCHARGE DIAGNOSIS  Diagnosis: Acute on chronic combined systolic (congestive) and diastolic (congestive) heart failure  Assessment and Plan of Treatment: you presented to hospital for worsening leg edema which was likely due to CHF exacerbation. you were started on Lasix and Ultrasound of heart was done and it showed that your heart is very weak in its function  It is stongly recommended that you remain compliant with medications, low salt and healthy fat diet and out patient follow up with PCP and Cardiologist to avoid further damage to your heart  Weigh yourself daily.  If you gain 3lbs in 3 days, or 5lbs in a week call your Health Care Provider.  Do not eat or drink foods containing more than 2000mg of salt (sodium) in your diet every day.  Call your Health Care Provider if you have any swelling or increased swelling in your feet, ankles, and/or stomach.  Take all of your medication as directed.  If you become dizzy call your Health Care Provider.        SECONDARY DISCHARGE DIAGNOSES  Diagnosis: HTN (hypertension)  Assessment and Plan of Treatment: Low salt diet  Activity as tolerated.  Take all medication as prescribed.  Follow up with your medical doctor for routine blood pressure monitoring at your next visit.  Notify your doctor if you have any of the following symptoms:   Dizziness, Lightheadedness, Blurry vision, Headache, Chest pain, Shortness of breath      Diagnosis: PE (pulmonary thromboembolism)  Assessment and Plan of Treatment: Take your Eliquis as directed.  Follow up with your health care provider within one week. Call for appointment.  If you develop shortness of breath or if your shortness of breath worsens call your Health Care Provider or go to the Emergency Department.      Diagnosis: Cellulitis and abscess of leg  Assessment and Plan of Treatment: Cellulitis is a skin infection caused by bacteria. Cellulitis is common and can become severe. Cellulitis usually appears on the lower legs. It can also appear on the arms, face, and other areas. Cellulitis develops when bacteria enter a crack or break in your skin, such as a scratch, bite, or cut.  You were noted to have a wounds on your legs.   You were seen by an infectious disease specialist and started on antibiotics.   You will need to complete nine more days of antibiotics. Please complete the entire course of antibiotics in order to resolve the infection.   Elevate the area above the level of your heart as often as you can. This will help decrease swelling and pain. Prop the area on pillows or blankets to keep it elevated comfortably.  You were instructed by nursing on how to care for your wounds. The care is as follows:   apply santyl to leg leg wound and cover with 4x4 gauze, allegra and ace wrap to left lower extremity    Apply plain ACE wrap to right lower extremity   Change dressing MWF.

## 2021-03-29 NOTE — PROGRESS NOTE ADULT - ASSESSMENT
1. CHF   Diuretics  Monitor I&O  Echo noted.   Cardio F/U  Elevate L/E   Encourage compliance with medication and treatment plan as OP.   DVT and GI PPX     2. Pleural Effusion  Likely 2nd to CHF  CXR noted   Cont diuretics.   F/U CXR - may need CT chest.   O2 supp PRN      3. Pulmonary HTN   Severe   Noted on Echo   [facility]  Bronchodilators PRN   O2 supp PRN   Consider Revatio 20mg PO TID if cardio agrees     4. CAD  Check Carotid duplex  Cont meds  Cardio f/u     5. Leg Wound  Surgical eval  Wound care

## 2021-03-30 LAB
ANION GAP SERPL CALC-SCNC: 9 MMOL/L — SIGNIFICANT CHANGE UP (ref 5–17)
BUN SERPL-MCNC: 21 MG/DL — HIGH (ref 7–18)
CALCIUM SERPL-MCNC: 8.3 MG/DL — LOW (ref 8.4–10.5)
CHLORIDE SERPL-SCNC: 103 MMOL/L — SIGNIFICANT CHANGE UP (ref 96–108)
CO2 SERPL-SCNC: 28 MMOL/L — SIGNIFICANT CHANGE UP (ref 22–31)
CREAT SERPL-MCNC: 1.16 MG/DL — SIGNIFICANT CHANGE UP (ref 0.5–1.3)
GLUCOSE BLDC GLUCOMTR-MCNC: 118 MG/DL — HIGH (ref 70–99)
GLUCOSE BLDC GLUCOMTR-MCNC: 137 MG/DL — HIGH (ref 70–99)
GLUCOSE BLDC GLUCOMTR-MCNC: 144 MG/DL — HIGH (ref 70–99)
GLUCOSE BLDC GLUCOMTR-MCNC: 96 MG/DL — SIGNIFICANT CHANGE UP (ref 70–99)
GLUCOSE SERPL-MCNC: 141 MG/DL — HIGH (ref 70–99)
HCT VFR BLD CALC: 39.9 % — SIGNIFICANT CHANGE UP (ref 39–50)
HGB BLD-MCNC: 12.3 G/DL — LOW (ref 13–17)
MCHC RBC-ENTMCNC: 25.3 PG — LOW (ref 27–34)
MCHC RBC-ENTMCNC: 30.8 GM/DL — LOW (ref 32–36)
MCV RBC AUTO: 81.9 FL — SIGNIFICANT CHANGE UP (ref 80–100)
NRBC # BLD: 0 /100 WBCS — SIGNIFICANT CHANGE UP (ref 0–0)
PLATELET # BLD AUTO: 235 K/UL — SIGNIFICANT CHANGE UP (ref 150–400)
POTASSIUM SERPL-MCNC: 3.5 MMOL/L — SIGNIFICANT CHANGE UP (ref 3.5–5.3)
POTASSIUM SERPL-SCNC: 3.5 MMOL/L — SIGNIFICANT CHANGE UP (ref 3.5–5.3)
RBC # BLD: 4.87 M/UL — SIGNIFICANT CHANGE UP (ref 4.2–5.8)
RBC # FLD: 17.1 % — HIGH (ref 10.3–14.5)
SODIUM SERPL-SCNC: 140 MMOL/L — SIGNIFICANT CHANGE UP (ref 135–145)
WBC # BLD: 6.78 K/UL — SIGNIFICANT CHANGE UP (ref 3.8–10.5)
WBC # FLD AUTO: 6.78 K/UL — SIGNIFICANT CHANGE UP (ref 3.8–10.5)

## 2021-03-30 PROCEDURE — 73701 CT LOWER EXTREMITY W/DYE: CPT | Mod: 26,LT

## 2021-03-30 RX ADMIN — Medication 250 MILLIGRAM(S): at 17:45

## 2021-03-30 RX ADMIN — PIPERACILLIN AND TAZOBACTAM 25 GRAM(S): 4; .5 INJECTION, POWDER, LYOPHILIZED, FOR SOLUTION INTRAVENOUS at 20:50

## 2021-03-30 RX ADMIN — Medication 81 MILLIGRAM(S): at 13:16

## 2021-03-30 RX ADMIN — CARVEDILOL PHOSPHATE 3.12 MILLIGRAM(S): 80 CAPSULE, EXTENDED RELEASE ORAL at 06:23

## 2021-03-30 RX ADMIN — SODIUM CHLORIDE 3 MILLILITER(S): 9 INJECTION INTRAMUSCULAR; INTRAVENOUS; SUBCUTANEOUS at 21:33

## 2021-03-30 RX ADMIN — APIXABAN 5 MILLIGRAM(S): 2.5 TABLET, FILM COATED ORAL at 06:23

## 2021-03-30 RX ADMIN — Medication 40 MILLIGRAM(S): at 06:23

## 2021-03-30 RX ADMIN — PIPERACILLIN AND TAZOBACTAM 25 GRAM(S): 4; .5 INJECTION, POWDER, LYOPHILIZED, FOR SOLUTION INTRAVENOUS at 16:20

## 2021-03-30 RX ADMIN — PIPERACILLIN AND TAZOBACTAM 25 GRAM(S): 4; .5 INJECTION, POWDER, LYOPHILIZED, FOR SOLUTION INTRAVENOUS at 03:11

## 2021-03-30 RX ADMIN — SODIUM CHLORIDE 3 MILLILITER(S): 9 INJECTION INTRAMUSCULAR; INTRAVENOUS; SUBCUTANEOUS at 13:20

## 2021-03-30 RX ADMIN — Medication 40 MILLIGRAM(S): at 17:45

## 2021-03-30 RX ADMIN — APIXABAN 5 MILLIGRAM(S): 2.5 TABLET, FILM COATED ORAL at 17:44

## 2021-03-30 RX ADMIN — Medication 250 MILLIGRAM(S): at 07:02

## 2021-03-30 RX ADMIN — SODIUM CHLORIDE 3 MILLILITER(S): 9 INJECTION INTRAMUSCULAR; INTRAVENOUS; SUBCUTANEOUS at 06:22

## 2021-03-30 RX ADMIN — PIPERACILLIN AND TAZOBACTAM 25 GRAM(S): 4; .5 INJECTION, POWDER, LYOPHILIZED, FOR SOLUTION INTRAVENOUS at 09:50

## 2021-03-30 NOTE — PROGRESS NOTE ADULT - SUBJECTIVE AND OBJECTIVE BOX
CHIEF COMPLAINT:Patient is a 70y old  Male who presents with a chief complaint of CHF exacerbation .Pt appears comfortable.    	  REVIEW OF SYSTEMS:  CONSTITUTIONAL: No fever, weight loss, or fatigue  EYES: No eye pain, visual disturbances, or discharge  ENT:  No difficulty hearing, tinnitus, vertigo; No sinus or throat pain  NECK: No pain or stiffness  RESPIRATORY: No cough, wheezing, chills or hemoptysis; No Shortness of Breath  CARDIOVASCULAR: No chest pain, palpitations, passing out, dizziness, or leg swelling  GASTROINTESTINAL: No abdominal or epigastric pain. No nausea, vomiting, or hematemesis; No diarrhea or constipation. No melena or hematochezia.  GENITOURINARY: No dysuria, frequency, hematuria, or incontinence  NEUROLOGICAL: No headaches, memory loss, loss of strength, numbness, or tremors  SKIN: No itching, burning, rashes, or lesions   LYMPH Nodes: No enlarged glands  ENDOCRINE: No heat or cold intolerance; No hair loss  MUSCULOSKELETAL: No joint pain or swelling; No muscle, back, or extremity pain  PSYCHIATRIC: No depression, anxiety, mood swings, or difficulty sleeping  HEME/LYMPH: No easy bruising, or bleeding gums  ALLERGY AND IMMUNOLOGIC: No hives or eczema	        PHYSICAL EXAM:  T(C): 36.3 (03-30-21 @ 05:42), Max: 36.6 (03-29-21 @ 11:32)  HR: 64 (03-30-21 @ 05:42) (59 - 76)  BP: 105/64 (03-30-21 @ 05:42) (105/64 - 143/82)  RR: 18 (03-30-21 @ 05:42) (17 - 18)  SpO2: 96% (03-30-21 @ 05:42) (96% - 98%)  Wt(kg): --  I&O's Summary    29 Mar 2021 07:01  -  30 Mar 2021 07:00  --------------------------------------------------------  IN: 0 mL / OUT: 500 mL / NET: -500 mL        Appearance: Normal	  HEENT:   Normal oral mucosa, PERRL, EOMI	  Lymphatic: No lymphadenopathy  Cardiovascular: Normal S1 S2, No JVD, No murmurs, +2 edema  Respiratory: Lungs clear to auscultation	  Psychiatry: A & O x 3, Mood & affect appropriate  Gastrointestinal:  Soft, Non-tender, + BS	  Skin: No rashes, No ecchymoses, No cyanosis	  Neurologic: Non-focal  Extremities: Normal range of motion, No clubbing, cyanosis +2 edema  Vascular: Peripheral pulses palpable 2+ bilaterally    MEDICATIONS  (STANDING):  apixaban 5 milliGRAM(s) Oral every 12 hours  aspirin  chewable 81 milliGRAM(s) Oral daily  atorvastatin 40 milliGRAM(s) Oral at bedtime  carvedilol 3.125 milliGRAM(s) Oral every 12 hours  dextrose 40% Gel 15 Gram(s) Oral once  dextrose 5%. 1000 milliLiter(s) (50 mL/Hr) IV Continuous <Continuous>  dextrose 5%. 1000 milliLiter(s) (100 mL/Hr) IV Continuous <Continuous>  dextrose 50% Injectable 25 Gram(s) IV Push once  dextrose 50% Injectable 12.5 Gram(s) IV Push once  dextrose 50% Injectable 25 Gram(s) IV Push once  furosemide    Tablet 40 milliGRAM(s) Oral two times a day  glucagon  Injectable 1 milliGRAM(s) IntraMuscular once  insulin lispro (ADMELOG) corrective regimen sliding scale   SubCutaneous three times a day before meals  insulin lispro (ADMELOG) corrective regimen sliding scale   SubCutaneous at bedtime  lisinopril 5 milliGRAM(s) Oral daily  piperacillin/tazobactam IVPB.. 3.375 Gram(s) IV Intermittent every 6 hours  sodium chloride 0.9% lock flush 3 milliLiter(s) IV Push every 8 hours  vancomycin  IVPB 1000 milliGRAM(s) IV Intermittent every 12 hours      	  	  LABS:	 	                       12.3   6.78  )-----------( 235      ( 30 Mar 2021 09:16 )             39.9     03-30    140  |  103  |  21<H>  ----------------------------<  141<H>  3.5   |  28  |  1.16    Ca    8.3<L>      30 Mar 2021 09:16  Mg     2.4     03-29    TPro  7.4  /  Alb  3.1<L>  /  TBili  0.8  /  DBili  x   /  AST  27  /  ALT  30  /  AlkPhos  163<H>  03-29    proBNP: Serum Pro-Brain Natriuretic Peptide: 6970 pg/mL (03-25 @ 06:19)    Lipid Profile:   HgA1c:   TSH:

## 2021-03-30 NOTE — PROGRESS NOTE ADULT - ASSESSMENT
Patient is a 70y old  Male from home with PMH HTN, HLD, DM, PE, CAD s/p CABG (3.5 yrs ago), presents to the ER on 3/25/21  for evaluation of leg swelling, scrotal swelling  and difficulty breathing for months.  He endorses chest tightness, shortness of breath on exertion, requiring him to take frequent breaks when walking or climbing stairs. can walk less than 2 blocks. Today, 3/29/21, He has started on Zosyn and Vancomycin and The ID consult requested to assist with further evaluation of LLE cellulitis and antibiotic management.     # LLE cellulitis with suspected Abscess    Would recommend:    1. . Monitor and Keep Vancomycin level between 15 to 20  2. Continue Vancomycin and Zosyn  until work up is done   4. Keep LLE elevated  5. Pain management as needed    Attending Attestation:     Spent more than 45 minutes on total encounter, more than 50 % of the visit was spent counseling and/or coordinating care by the Attending physician.   Patient is a 70y old  Male from home with PMH HTN, HLD, DM, PE, CAD s/p CABG (3.5 yrs ago), presents to the ER on 3/25/21  for evaluation of leg swelling, scrotal swelling  and difficulty breathing for months.  He endorses chest tightness, shortness of breath on exertion, requiring him to take frequent breaks when walking or climbing stairs. can walk less than 2 blocks. Today, 3/29/21, He has started on Zosyn and Vancomycin and The ID consult requested to assist with further evaluation of LLE cellulitis and antibiotic management.     # LLE cellulitis with suspected Abscess - CT scan of LLE shows Diffuse soft tissue swelling and skin thickening.  No drainable fluid collection. And No CT evidence for osteomyelitis.      Would recommend:    1. Monitor kidney function and Adjust Abx doses accordingly   2. .Monitor and Keep Vancomycin level between 15 to 20  3.. Continue Vancomycin and Zosyn  until work up is done   4. Keep LLE elevated  5. Pain management as needed    d/w Dr. Pitts and Patient     Attending Attestation:     Spent more than 45 minutes on total encounter, more than 50 % of the visit was spent counseling and/or coordinating care by the Attending physician.

## 2021-03-30 NOTE — PROGRESS NOTE ADULT - ASSESSMENT
1. CHF   Diuretics  Monitor I&O  Echo noted.   Cardio F/U  Elevate L/E   Encourage compliance with medication and treatment plan as OP.   DVT and GI PPX     2. Pleural Effusion  Likely 2nd to CHF  CXR noted   Cont diuretics.   F/U CXR - may need CT chest.   O2 supp PRN      3. Pulmonary HTN   Severe   Noted on Echo   [facility]  Bronchodilators PRN   O2 supp PRN   Consider Revatio 20mg PO TID if cardio agrees     4. CAD  Check Carotid duplex  Cont meds  Cardio f/u     5. Leg Wound  Surgical eval  ID follow up  antibiotics  Wound care  Ct left leg

## 2021-03-30 NOTE — PROGRESS NOTE ADULT - SUBJECTIVE AND OBJECTIVE BOX
Patient is a 70y old  Male who presents with a chief complaint of CHF exacerbation (29 Mar 2021 21:00)      pt seen in tele [ x ], reg med floor [   ], bed [ x ], chair at bedside [   ], a+o x3 [ x ], lethargic [  ],  nad [ x ]    pt noncompliant with wearing monitor and taking meds        Allergies    Aldactone (Unknown)  Bumex (Blisters; Rash)  metoprolol (Unknown)  spironolactone (Unknown)        Vitals    T(F): 97.3 (03-30-21 @ 05:42), Max: 97.9 (03-29-21 @ 14:25)  HR: 64 (03-30-21 @ 05:42) (59 - 76)  BP: 105/64 (03-30-21 @ 05:42) (105/64 - 143/82)  RR: 18 (03-30-21 @ 05:42) (17 - 18)  SpO2: 96% (03-30-21 @ 05:42) (96% - 98%)  Wt(kg): --  CAPILLARY BLOOD GLUCOSE      POCT Blood Glucose.: 116 mg/dL (29 Mar 2021 22:48)      Labs                          12.7   6.30  )-----------( 233      ( 29 Mar 2021 12:53 )             40.7       03-29    139  |  105  |  23<H>  ----------------------------<  96  3.9   |  27  |  1.19    Ca    8.6      29 Mar 2021 12:53  Mg     2.4     03-29    TPro  7.4  /  Alb  3.1<L>  /  TBili  0.8  /  DBili  x   /  AST  27  /  ALT  30  /  AlkPhos  163<H>  03-29                Radiology Results      Meds    MEDICATIONS  (STANDING):  apixaban 5 milliGRAM(s) Oral every 12 hours  aspirin  chewable 81 milliGRAM(s) Oral daily  atorvastatin 40 milliGRAM(s) Oral at bedtime  carvedilol 3.125 milliGRAM(s) Oral every 12 hours  dextrose 40% Gel 15 Gram(s) Oral once  dextrose 5%. 1000 milliLiter(s) (50 mL/Hr) IV Continuous <Continuous>  dextrose 5%. 1000 milliLiter(s) (100 mL/Hr) IV Continuous <Continuous>  dextrose 50% Injectable 25 Gram(s) IV Push once  dextrose 50% Injectable 12.5 Gram(s) IV Push once  dextrose 50% Injectable 25 Gram(s) IV Push once  furosemide    Tablet 40 milliGRAM(s) Oral two times a day  glucagon  Injectable 1 milliGRAM(s) IntraMuscular once  insulin lispro (ADMELOG) corrective regimen sliding scale   SubCutaneous three times a day before meals  insulin lispro (ADMELOG) corrective regimen sliding scale   SubCutaneous at bedtime  lisinopril 5 milliGRAM(s) Oral daily  piperacillin/tazobactam IVPB.. 3.375 Gram(s) IV Intermittent every 6 hours  sodium chloride 0.9% lock flush 3 milliLiter(s) IV Push every 8 hours  vancomycin  IVPB 1000 milliGRAM(s) IV Intermittent every 12 hours      MEDICATIONS  (PRN):      Physical Exam    Neuro :  no focal deficits  Respiratory: CTA B/L  CV: RRR, S1S2, no murmurs,   Abdominal: Soft, NT, ND +BS,  Extremities: b/l le edema, left leg erythema and weeping ulcerations        ASSESSMENT    acute on chronic systolic chf,   pulm edema   r/o acs,   left le cellulitis  h/o HTN,   HLD,   DM,   CAD s/p CABGx3,  PE (pulmonary thromboembolism)    PLAN      cont tele,   acs protocol,   cont coreg, lasix,   aspirin, statin,   ce x3 noted above  cardio f/u  echo with EF 15-20%  Grade II diastolic dysfunction. RV systolic pressure is severely noted    cont lisinopril  cont Eliquis.  supplemental O2 as needed,  cont  bronchodilator,  f/u hgba1c   start vanco and zosyn  f/u blood cx  id cons  f/u wound cx  podiatry cons  cont current meds     Patient is a 70y old  Male who presents with a chief complaint of CHF exacerbation (29 Mar 2021 21:00)      pt seen in tele [ x ], reg med floor [   ], bed [ x ], chair at bedside [   ], a+o x3 [ x ], lethargic [  ],  nad [ x ]        Allergies    Aldactone (Unknown)  Bumex (Blisters; Rash)  metoprolol (Unknown)  spironolactone (Unknown)        Vitals    T(F): 97.3 (03-30-21 @ 05:42), Max: 97.9 (03-29-21 @ 14:25)  HR: 64 (03-30-21 @ 05:42) (59 - 76)  BP: 105/64 (03-30-21 @ 05:42) (105/64 - 143/82)  RR: 18 (03-30-21 @ 05:42) (17 - 18)  SpO2: 96% (03-30-21 @ 05:42) (96% - 98%)  Wt(kg): --  CAPILLARY BLOOD GLUCOSE      POCT Blood Glucose.: 116 mg/dL (29 Mar 2021 22:48)      Labs                          12.7   6.30  )-----------( 233      ( 29 Mar 2021 12:53 )             40.7       03-29    139  |  105  |  23<H>  ----------------------------<  96  3.9   |  27  |  1.19    Ca    8.6      29 Mar 2021 12:53  Mg     2.4     03-29    TPro  7.4  /  Alb  3.1<L>  /  TBili  0.8  /  DBili  x   /  AST  27  /  ALT  30  /  AlkPhos  163<H>  03-29                Radiology Results      Meds    MEDICATIONS  (STANDING):  apixaban 5 milliGRAM(s) Oral every 12 hours  aspirin  chewable 81 milliGRAM(s) Oral daily  atorvastatin 40 milliGRAM(s) Oral at bedtime  carvedilol 3.125 milliGRAM(s) Oral every 12 hours  dextrose 40% Gel 15 Gram(s) Oral once  dextrose 5%. 1000 milliLiter(s) (50 mL/Hr) IV Continuous <Continuous>  dextrose 5%. 1000 milliLiter(s) (100 mL/Hr) IV Continuous <Continuous>  dextrose 50% Injectable 25 Gram(s) IV Push once  dextrose 50% Injectable 12.5 Gram(s) IV Push once  dextrose 50% Injectable 25 Gram(s) IV Push once  furosemide    Tablet 40 milliGRAM(s) Oral two times a day  glucagon  Injectable 1 milliGRAM(s) IntraMuscular once  insulin lispro (ADMELOG) corrective regimen sliding scale   SubCutaneous three times a day before meals  insulin lispro (ADMELOG) corrective regimen sliding scale   SubCutaneous at bedtime  lisinopril 5 milliGRAM(s) Oral daily  piperacillin/tazobactam IVPB.. 3.375 Gram(s) IV Intermittent every 6 hours  sodium chloride 0.9% lock flush 3 milliLiter(s) IV Push every 8 hours  vancomycin  IVPB 1000 milliGRAM(s) IV Intermittent every 12 hours      MEDICATIONS  (PRN):      Physical Exam    Neuro :  no focal deficits  Respiratory: CTA B/L  CV: RRR, S1S2, no murmurs,   Abdominal: Soft, NT, ND +BS,  Extremities: b/l le edema, left leg erythema and weeping ulcerations        ASSESSMENT    acute on chronic systolic chf,   pulm edema   r/o acs,   left le cellulitis  h/o HTN,   HLD,   DM,   CAD s/p CABGx3,  PE (pulmonary thromboembolism)    PLAN      cont tele,   acs protocol,   cont coreg, lasix,   aspirin, statin,   ce x3 noted above  cardio f/u  echo with EF 15-20%  Grade II diastolic dysfunction. RV systolic pressure is severely noted    cont lisinopril  cont Eliquis.  supplemental O2 as needed,  cont  bronchodilator,  f/u hgba1c   cont vanco and zosyn  f/u blood cx  id f/u   f/u wound cx   f/u ct left leg  podiatry cons  cont current meds

## 2021-03-30 NOTE — PROGRESS NOTE ADULT - PROBLEM SELECTOR PLAN 1
Due to non compliance with meds, diet and f/u care-stable  -ECHO shows severe systolic HF with EF 15-20% and GIIDD  -Card Dr. Rhodes  -Cont ASA, BB, statin, ACE  -Cont Lasix 40mg BID  -Strict I&O and daily weights

## 2021-03-30 NOTE — PROGRESS NOTE ADULT - ASSESSMENT
70y M from home with PMH HTN, HLD, DM, PE, CAD s/p CABG (3.5 yrs ago), presenting with leg swelling, scrotal swelling  and difficulty breathing for months,acute on chronic systolic HF.  1.Pt needs to be compliant with  cardiac medication.  2.Acute on chronic systolic HF-coreg,ace,lasix po since pt refusing IV.  3.Pt reports allergy to aldactone.  4.CAD-asa,b blocker,statin.  5.PE-Eliquis.  6.DM-Insulin.  7.HTN-coreg,ace.  8.Lt leg wound,cellulitis-ID  eval called, CT scan-p,ABX.  9.PPI.

## 2021-03-30 NOTE — PROGRESS NOTE ADULT - SUBJECTIVE AND OBJECTIVE BOX
NP Note discussed with  Primary Attending    Patient is a 70y old  Male who presents with a chief complaint of CHF exacerbation (30 Mar 2021 12:42)      INTERVAL HPI/OVERNIGHT EVENTS: no new complaints    MEDICATIONS  (STANDING):  apixaban 5 milliGRAM(s) Oral every 12 hours  aspirin  chewable 81 milliGRAM(s) Oral daily  atorvastatin 40 milliGRAM(s) Oral at bedtime  carvedilol 3.125 milliGRAM(s) Oral every 12 hours  dextrose 40% Gel 15 Gram(s) Oral once  dextrose 5%. 1000 milliLiter(s) (50 mL/Hr) IV Continuous <Continuous>  dextrose 5%. 1000 milliLiter(s) (100 mL/Hr) IV Continuous <Continuous>  dextrose 50% Injectable 25 Gram(s) IV Push once  dextrose 50% Injectable 12.5 Gram(s) IV Push once  dextrose 50% Injectable 25 Gram(s) IV Push once  furosemide    Tablet 40 milliGRAM(s) Oral two times a day  glucagon  Injectable 1 milliGRAM(s) IntraMuscular once  insulin lispro (ADMELOG) corrective regimen sliding scale   SubCutaneous three times a day before meals  insulin lispro (ADMELOG) corrective regimen sliding scale   SubCutaneous at bedtime  lisinopril 5 milliGRAM(s) Oral daily  piperacillin/tazobactam IVPB.. 3.375 Gram(s) IV Intermittent every 6 hours  sodium chloride 0.9% lock flush 3 milliLiter(s) IV Push every 8 hours  vancomycin  IVPB 1000 milliGRAM(s) IV Intermittent every 12 hours    MEDICATIONS  (PRN):      __________________________________________________  REVIEW OF SYSTEMS:    CONSTITUTIONAL: No fever,   EYES: no acute visual disturbances  NECK: No pain or stiffness  RESPIRATORY: No cough; No shortness of breath  CARDIOVASCULAR: No chest pain, no palpitations  GASTROINTESTINAL: No pain. No nausea or vomiting; No diarrhea   NEUROLOGICAL: No headache or numbness, no tremors  MUSCULOSKELETAL: No joint pain, no muscle pain  GENITOURINARY: no dysuria, no frequency, no hesitancy  PSYCHIATRY: no depression , no anxiety  ALL OTHER  ROS negative        Vital Signs Last 24 Hrs  T(C): 36.3 (30 Mar 2021 15:14), Max: 36.3 (30 Mar 2021 05:42)  T(F): 97.4 (30 Mar 2021 15:14), Max: 97.4 (30 Mar 2021 15:14)  HR: 62 (30 Mar 2021 15:14) (59 - 64)  BP: 144/87 (30 Mar 2021 15:14) (105/64 - 144/87)  BP(mean): --  RR: 18 (30 Mar 2021 15:14) (17 - 18)  SpO2: 96% (30 Mar 2021 15:14) (96% - 97%)    ________________________________________________  PHYSICAL EXAM:  ambulates freely  GENERAL: NAD  HEENT: Normocephalic;  conjunctivae and sclerae clear; moist mucous membranes;   NECK : supple  CHEST/LUNG: Clear to auscultation bilaterally with good air entry   HEART: S1 S2  regular; no murmurs, gallops or rubs  ABDOMEN: Soft, Nontender, Nondistended; Bowel sounds present  EXTREMITIES: LEs swelling, cellulitis, LLE with open wound   SKIN: warm and dry; no rash  NERVOUS SYSTEM:  Awake and alert; Oriented  to place, person and time ; no new deficits    _________________________________________________  LABS:                        12.3   6.78  )-----------( 235      ( 30 Mar 2021 09:16 )             39.9     03-30    140  |  103  |  21<H>  ----------------------------<  141<H>  3.5   |  28  |  1.16    Ca    8.3<L>      30 Mar 2021 09:16  Mg     2.4     03-29    TPro  7.4  /  Alb  3.1<L>  /  TBili  0.8  /  DBili  x   /  AST  27  /  ALT  30  /  AlkPhos  163<H>  03-29        CAPILLARY BLOOD GLUCOSE      POCT Blood Glucose.: 96 mg/dL (30 Mar 2021 08:12)  POCT Blood Glucose.: 116 mg/dL (29 Mar 2021 22:48)  POCT Blood Glucose.: 124 mg/dL (29 Mar 2021 17:08)    RADIOLOGY & ADDITIONAL TESTS:    CT Lower Extremity w/ IV Cont, Left (03.30.21 @ 13:06) >  EXAM:  CT LWR EXT IC LT                            PROCEDURE DATE:  03/30/2021          INTERPRETATION:  CT LOWER EXTREMITY WITH IV CONTRAST LEFT dated 3/30/2021 1:06 PM    INDICATION: Pain and swelling    COMPARISON: None available.    TECHNIQUE: CT imaging of the left lower extremity was performed with contrast. The data was reformatted in the axial, coronal, and sagittal planes. Contrast: Omnipaque 350. Administered: 90 cc. Discarded: 10 cc.    FINDINGS:    OSSEOUS STRUCTURES: There is no fracture. No osteonecrosis is noted. Mild medial knee joint space narrowing identified. The tibiotalar, posterior subtalar, middle subtalar, calcaneocuboid joint are preserved. Spurring along the superior aspect of the patella. No cortical erosion or destruction noted. No significant periosteal reaction.  SYNOVIUM/ JOINT FLUID: No knee joint effusion. No popliteal cyst.  TENDONS: The tendons are intact. No full-thickness tendon tear or retraction is identified.  MUSCLES: There is no intramuscular hematoma.  NEUROVASCULAR STRUCTURES: Mild scattered vascular calcifications.  SUBCUTANEOUS SOFT TISSUES: Diffuse severe soft tissue swelling and skin thickening about the calf. No rim-enhancing fluid collection within the soft tissues. No soft tissue gas identified. This edema extends into the dorsum of the hindfoot      IMPRESSION:    Diffuse soft tissue swelling and skin thickening. Nonspecific finding that can be seen in the setting of cellulitis. No drainable fluid collection.  No CT evidence for osteomyelitis.    < end of copied text >    US Duplex Venous Lower Ext Complete, Bilateral (03.25.21 @ 10:38) >  EXAM:  US DPLX LWR EXT VEINS COMPL BI                            PROCEDURE DATE:  03/25/2021          INTERPRETATION:  CLINICAL INFORMATION: Leg swelling    COMPARISON: 11/16/2017    TECHNIQUE: Duplex sonography of the BILATERAL LOWER extremity veins with color and spectral Doppler, with and without compression.    FINDINGS:    RIGHT:  Normal compressibility of the RIGHT common femoral, femoral and popliteal veins.  Doppler examination shows normal spontaneous and phasic flow.  No RIGHT calf vein thrombosis is detected.    LEFT:  Normal compressibility of the LEFT common femoral, femoral and popliteal veins.  Doppler examination shows normal spontaneous and phasic flow.  No LEFT calf vein thrombosis is detected.    Enlarged bilateral groin lymph nodes are noted.    IMPRESSION:  No evidence of deep venous thrombosis in either lower extremity.    < end of copied text >    Xray Chest 1 View-PORTABLE IMMEDIATE (Xray Chest 1 View-PORTABLE IMMEDIATE .) (03.25.21 @ 05:52) >  EXAM:  XR CHEST PORTABLE IMMED 1V                            PROCEDURE DATE:  03/25/2021          INTERPRETATION:  CLINICAL INDICATION: 70 years  Male with Shortness of Breath.    COMPARISON: 4/8/2020    Apical lordotic view of the chest demonstrates the lungs to be clear. There is no pleural effusion. There is no pneumothorax.    The heart is enlarged. The and sternotomy wires are present.. There is no mediastinal or hilar mass.    The pulmonary vasculature is normal.    Mild thoracic degenerative changes are present.    IMPRESSION:    No acute infiltrate. Cardiomegaly. Sternotomy.    Limited by projection. Recommend repeat.    < end of copied text >    Imaging Personally Reviewed:  YES/NO    Consultant(s) Notes Reviewed:   YES/ No    Care Discussed with Consultants :     Plan of care was discussed with patient and /or primary care giver; all questions and concerns were addressed and care was aligned with patient's wishes.

## 2021-03-30 NOTE — PROGRESS NOTE ADULT - ASSESSMENT
70y M from home with PMH HTN, HLD, DM, PE, CAD s/p CABG (3.5 yrs ago), presented  with leg swelling, scrotal swelling  and difficulty breathing for months.   In ED CXR No acute infiltrate. Cardiomegaly  ECG : first degree heart block, Qtc is prolonged 491  BNP 6970, CXR   pt received Lasix 80mg X1   Admitted for acute on chronic systolic heart failure   Seen at bedside, states is feeling better, refused to be minitored on cardiac monitor.   Co leg  heaviness   venous doppler negative for DVT   TTE with EF 15-20%  Troponin negative     3/29-c/o LLE open wound on anterior mid portion of shin, wound oozing and painful.  Surgery consulted and saw pt., no interventions recommended.  Vanco and Zosyn started, ID Dr. Lauren consulted.  Pt. with persisting LEs edema and cellulitis, ambulates freely.    3/30-CT LLE with contrast showed Diffuse soft tissue swelling and skin thickening. Nonspecific finding that can be seen in the setting of cellulitis. No drainable fluid collection.  No CT evidence for osteomyelitis.    Compliance with medications, diet and f/u care reinforced with pt.  Will discharge to home when cleared by ID.

## 2021-03-30 NOTE — PROGRESS NOTE ADULT - PROBLEM SELECTOR PLAN 4
B/L LEs with edema 4+, chronic cellulitis and open wound on LLE oozing and painful  -Surgery consulted, no interventions indicated  -Started on Vanco and Zosyn per attending  -ID Dr. Lauren consulted  -CT LLE shows Diffuse soft tissue swelling and skin thickening. Nonspecific finding that can be seen in the setting of cellulitis. No drainable fluid collection.  No CT evidence for osteomyelitis.    -Cont Abx for now  -f/u Vanco trough

## 2021-03-30 NOTE — PROGRESS NOTE ADULT - SUBJECTIVE AND OBJECTIVE BOX
Patient is a 70y old  Male who presents with a chief complaint of CHF exacerbation (30 Mar 2021 10:44)  Awake, alert, comfortable in bed in NAD    INTERVAL HPI/OVERNIGHT EVENTS:      VITAL SIGNS:  T(F): 97.3 (03-30-21 @ 05:42)  HR: 64 (03-30-21 @ 05:42)  BP: 105/64 (03-30-21 @ 05:42)  RR: 18 (03-30-21 @ 05:42)  SpO2: 96% (03-30-21 @ 05:42)  Wt(kg): --  I&O's Detail    29 Mar 2021 07:01  -  30 Mar 2021 07:00  --------------------------------------------------------  IN:  Total IN: 0 mL    OUT:    Voided (mL): 500 mL  Total OUT: 500 mL    Total NET: -500 mL              REVIEW OF SYSTEMS:    CONSTITUTIONAL:  No fevers, chills, sweats    HEENT:  Eyes:  No diplopia or blurred vision. ENT:  No earache, sore throat or runny nose.    CARDIOVASCULAR:  No pressure, squeezing, tightness, or heaviness about the chest; no palpitations.    RESPIRATORY:  Per HPI    GASTROINTESTINAL:  No abdominal pain, nausea, vomiting or diarrhea.    GENITOURINARY:  No dysuria, frequency or urgency.    NEUROLOGIC:  No paresthesias, fasciculations, seizures or weakness.    PSYCHIATRIC:  No disorder of thought or mood.      PHYSICAL EXAM:    Constitutional: Well developed and nourished  Eyes:Perrla  ENMT: normal  Neck:supple  Respiratory: good air entry  Cardiovascular: S1 S2 regular  Gastrointestinal: Soft, Non tender  Extremities: + edema/erythema  Vascular:normal  Neurological:Awake, alert,Ox3  Musculoskeletal:Normal      MEDICATIONS  (STANDING):  apixaban 5 milliGRAM(s) Oral every 12 hours  aspirin  chewable 81 milliGRAM(s) Oral daily  atorvastatin 40 milliGRAM(s) Oral at bedtime  carvedilol 3.125 milliGRAM(s) Oral every 12 hours  dextrose 40% Gel 15 Gram(s) Oral once  dextrose 5%. 1000 milliLiter(s) (50 mL/Hr) IV Continuous <Continuous>  dextrose 5%. 1000 milliLiter(s) (100 mL/Hr) IV Continuous <Continuous>  dextrose 50% Injectable 25 Gram(s) IV Push once  dextrose 50% Injectable 12.5 Gram(s) IV Push once  dextrose 50% Injectable 25 Gram(s) IV Push once  furosemide    Tablet 40 milliGRAM(s) Oral two times a day  glucagon  Injectable 1 milliGRAM(s) IntraMuscular once  insulin lispro (ADMELOG) corrective regimen sliding scale   SubCutaneous three times a day before meals  insulin lispro (ADMELOG) corrective regimen sliding scale   SubCutaneous at bedtime  lisinopril 5 milliGRAM(s) Oral daily  piperacillin/tazobactam IVPB.. 3.375 Gram(s) IV Intermittent every 6 hours  sodium chloride 0.9% lock flush 3 milliLiter(s) IV Push every 8 hours  vancomycin  IVPB 1000 milliGRAM(s) IV Intermittent every 12 hours    MEDICATIONS  (PRN):      Allergies    Aldactone (Unknown)  Bumex (Blisters; Rash)  metoprolol (Unknown)  spironolactone (Unknown)    Intolerances        LABS:                        12.3   6.78  )-----------( 235      ( 30 Mar 2021 09:16 )             39.9     03-30    140  |  103  |  21<H>  ----------------------------<  141<H>  3.5   |  28  |  1.16    Ca    8.3<L>      30 Mar 2021 09:16  Mg     2.4     03-29    TPro  7.4  /  Alb  3.1<L>  /  TBili  0.8  /  DBili  x   /  AST  27  /  ALT  30  /  AlkPhos  163<H>  03-29              CAPILLARY BLOOD GLUCOSE      POCT Blood Glucose.: 96 mg/dL (30 Mar 2021 08:12)  POCT Blood Glucose.: 116 mg/dL (29 Mar 2021 22:48)  POCT Blood Glucose.: 124 mg/dL (29 Mar 2021 17:08)    pro-bnp -- 03-25 @ 13:55     d-dimer 554  03-25 @ 13:55  pro-bnp 6970 03-25 @ 06:19     d-dimer --  03-25 @ 06:19      RADIOLOGY & ADDITIONAL TESTS:    CXR:    Ct scan chest:    ekg;    echo:

## 2021-03-31 DIAGNOSIS — Z02.9 ENCOUNTER FOR ADMINISTRATIVE EXAMINATIONS, UNSPECIFIED: ICD-10-CM

## 2021-03-31 DIAGNOSIS — Z71.89 OTHER SPECIFIED COUNSELING: ICD-10-CM

## 2021-03-31 LAB
ANION GAP SERPL CALC-SCNC: 8 MMOL/L — SIGNIFICANT CHANGE UP (ref 5–17)
BUN SERPL-MCNC: 20 MG/DL — HIGH (ref 7–18)
CALCIUM SERPL-MCNC: 8 MG/DL — LOW (ref 8.4–10.5)
CHLORIDE SERPL-SCNC: 105 MMOL/L — SIGNIFICANT CHANGE UP (ref 96–108)
CO2 SERPL-SCNC: 27 MMOL/L — SIGNIFICANT CHANGE UP (ref 22–31)
CREAT SERPL-MCNC: 1.12 MG/DL — SIGNIFICANT CHANGE UP (ref 0.5–1.3)
GLUCOSE BLDC GLUCOMTR-MCNC: 131 MG/DL — HIGH (ref 70–99)
GLUCOSE BLDC GLUCOMTR-MCNC: 88 MG/DL — SIGNIFICANT CHANGE UP (ref 70–99)
GLUCOSE SERPL-MCNC: 87 MG/DL — SIGNIFICANT CHANGE UP (ref 70–99)
HCT VFR BLD CALC: 40 % — SIGNIFICANT CHANGE UP (ref 39–50)
HGB BLD-MCNC: 12.4 G/DL — LOW (ref 13–17)
MCHC RBC-ENTMCNC: 25.8 PG — LOW (ref 27–34)
MCHC RBC-ENTMCNC: 31 GM/DL — LOW (ref 32–36)
MCV RBC AUTO: 83.2 FL — SIGNIFICANT CHANGE UP (ref 80–100)
NRBC # BLD: 0 /100 WBCS — SIGNIFICANT CHANGE UP (ref 0–0)
PLATELET # BLD AUTO: 222 K/UL — SIGNIFICANT CHANGE UP (ref 150–400)
POTASSIUM SERPL-MCNC: 3.9 MMOL/L — SIGNIFICANT CHANGE UP (ref 3.5–5.3)
POTASSIUM SERPL-SCNC: 3.9 MMOL/L — SIGNIFICANT CHANGE UP (ref 3.5–5.3)
RBC # BLD: 4.81 M/UL — SIGNIFICANT CHANGE UP (ref 4.2–5.8)
RBC # FLD: 17.2 % — HIGH (ref 10.3–14.5)
SODIUM SERPL-SCNC: 140 MMOL/L — SIGNIFICANT CHANGE UP (ref 135–145)
VANCOMYCIN TROUGH SERPL-MCNC: 18.4 UG/ML — SIGNIFICANT CHANGE UP (ref 10–20)
WBC # BLD: 5.5 K/UL — SIGNIFICANT CHANGE UP (ref 3.8–10.5)
WBC # FLD AUTO: 5.5 K/UL — SIGNIFICANT CHANGE UP (ref 3.8–10.5)

## 2021-03-31 RX ORDER — CARVEDILOL PHOSPHATE 80 MG/1
3.12 CAPSULE, EXTENDED RELEASE ORAL EVERY 12 HOURS
Refills: 0 | Status: DISCONTINUED | OUTPATIENT
Start: 2021-03-31 | End: 2021-03-31

## 2021-03-31 RX ORDER — CEFEPIME 1 G/1
INJECTION, POWDER, FOR SOLUTION INTRAMUSCULAR; INTRAVENOUS
Refills: 0 | Status: DISCONTINUED | OUTPATIENT
Start: 2021-03-31 | End: 2021-04-03

## 2021-03-31 RX ORDER — CEFEPIME 1 G/1
1000 INJECTION, POWDER, FOR SOLUTION INTRAMUSCULAR; INTRAVENOUS EVERY 8 HOURS
Refills: 0 | Status: DISCONTINUED | OUTPATIENT
Start: 2021-04-01 | End: 2021-04-03

## 2021-03-31 RX ORDER — COLLAGENASE CLOSTRIDIUM HIST. 250 UNIT/G
1 OINTMENT (GRAM) TOPICAL DAILY
Refills: 0 | Status: DISCONTINUED | OUTPATIENT
Start: 2021-03-31 | End: 2021-04-05

## 2021-03-31 RX ORDER — APIXABAN 2.5 MG/1
5 TABLET, FILM COATED ORAL EVERY 12 HOURS
Refills: 0 | Status: COMPLETED | OUTPATIENT
Start: 2021-03-31 | End: 2021-03-31

## 2021-03-31 RX ORDER — CARVEDILOL PHOSPHATE 80 MG/1
3.12 CAPSULE, EXTENDED RELEASE ORAL ONCE
Refills: 0 | Status: COMPLETED | OUTPATIENT
Start: 2021-03-31 | End: 2021-03-31

## 2021-03-31 RX ORDER — CEFEPIME 1 G/1
1000 INJECTION, POWDER, FOR SOLUTION INTRAMUSCULAR; INTRAVENOUS ONCE
Refills: 0 | Status: COMPLETED | OUTPATIENT
Start: 2021-03-31 | End: 2021-03-31

## 2021-03-31 RX ADMIN — APIXABAN 5 MILLIGRAM(S): 2.5 TABLET, FILM COATED ORAL at 23:27

## 2021-03-31 RX ADMIN — CEFEPIME 100 MILLIGRAM(S): 1 INJECTION, POWDER, FOR SOLUTION INTRAMUSCULAR; INTRAVENOUS at 21:07

## 2021-03-31 RX ADMIN — SODIUM CHLORIDE 3 MILLILITER(S): 9 INJECTION INTRAMUSCULAR; INTRAVENOUS; SUBCUTANEOUS at 06:01

## 2021-03-31 RX ADMIN — PIPERACILLIN AND TAZOBACTAM 25 GRAM(S): 4; .5 INJECTION, POWDER, LYOPHILIZED, FOR SOLUTION INTRAVENOUS at 08:30

## 2021-03-31 RX ADMIN — SODIUM CHLORIDE 3 MILLILITER(S): 9 INJECTION INTRAMUSCULAR; INTRAVENOUS; SUBCUTANEOUS at 14:44

## 2021-03-31 RX ADMIN — Medication 250 MILLIGRAM(S): at 18:33

## 2021-03-31 RX ADMIN — PIPERACILLIN AND TAZOBACTAM 25 GRAM(S): 4; .5 INJECTION, POWDER, LYOPHILIZED, FOR SOLUTION INTRAVENOUS at 14:44

## 2021-03-31 RX ADMIN — Medication 81 MILLIGRAM(S): at 14:44

## 2021-03-31 RX ADMIN — PIPERACILLIN AND TAZOBACTAM 25 GRAM(S): 4; .5 INJECTION, POWDER, LYOPHILIZED, FOR SOLUTION INTRAVENOUS at 03:03

## 2021-03-31 RX ADMIN — CARVEDILOL PHOSPHATE 3.12 MILLIGRAM(S): 80 CAPSULE, EXTENDED RELEASE ORAL at 06:00

## 2021-03-31 RX ADMIN — ATORVASTATIN CALCIUM 40 MILLIGRAM(S): 80 TABLET, FILM COATED ORAL at 21:59

## 2021-03-31 RX ADMIN — APIXABAN 5 MILLIGRAM(S): 2.5 TABLET, FILM COATED ORAL at 06:00

## 2021-03-31 RX ADMIN — Medication 250 MILLIGRAM(S): at 08:30

## 2021-03-31 RX ADMIN — Medication 40 MILLIGRAM(S): at 06:00

## 2021-03-31 RX ADMIN — SODIUM CHLORIDE 3 MILLILITER(S): 9 INJECTION INTRAMUSCULAR; INTRAVENOUS; SUBCUTANEOUS at 22:00

## 2021-03-31 RX ADMIN — CARVEDILOL PHOSPHATE 3.12 MILLIGRAM(S): 80 CAPSULE, EXTENDED RELEASE ORAL at 23:27

## 2021-03-31 NOTE — PROGRESS NOTE ADULT - PROBLEM SELECTOR PLAN 3
Pt reports swelling of face and throat this evening. Pt states "It feels like my throat is closing". Eliquis 5mg BID fort history of PE

## 2021-03-31 NOTE — CHART NOTE - NSCHARTNOTEFT_GEN_A_CORE
EVENT: Paged by RN, pt is upset and agitated because he did not receive his Eliquis and Coreg.     HPI:  70y M from home with PMH HTN, HLD, DM, PE, CAD s/p CABG (3.5 yrs ago), presented  with leg swelling, scrotal swelling  and difficulty breathing for months. In ED CXR No acute infiltrate. Cardiomegaly. ECG : first degree heart block, Qtc is prolonged 491. BNP 6970, CXR pt received Lasix 80mg X1 Admitted for acute on chronic systolic heart failure.   Co leg  heaviness. Venous doppler negative for DVT. TTE with EF 15-20%. Troponin negative     SUBJECTIVE: Pt reports he did not receive his medications. Per RN, pt refused medications earlier in the day. Pt does not recall but is agreeable to take meds now.     OBJECTIVE:  Vital Signs Last 24 Hrs  T(C): 36.6 (31 Mar 2021 13:51), Max: 36.7 (31 Mar 2021 05:55)  T(F): 97.9 (31 Mar 2021 13:51), Max: 98 (31 Mar 2021 05:55)  HR: 67 (31 Mar 2021 22:01) (62 - 82)  BP: 141/83 (31 Mar 2021 22:01) (107/66 - 141/83)  BP(mean): --  RR: 18 (31 Mar 2021 22:01) (17 - 18)  SpO2: 98% (31 Mar 2021 22:01) (97% - 100%)    PHYSICAL EXAM:  Neuro: Awake and alert, oriented to person, place, and time  Cardiovascular: + S1, S2, no murmurs, rubs, or bruits  Respiratory: clear to auscultation bilaterally with good air entry   GI: Abdomen soft, non-tender, bowel sounds present   : Non distended;   Skin: warm and dry; no rash      LABS:                        12.4   5.50  )-----------( 222      ( 31 Mar 2021 07:01 )             40.0     03-31    140  |  105  |  20<H>  ----------------------------<  87  3.9   |  27  |  1.12    Ca    8.0<L>      31 Mar 2021 07:01      EKG:   IMAGING:    PLAN:     -Meds re-ordered stat  -Coreg 3.125 mg x 1 dose, ordered  -Eliquis 5 mg x 1 dose, ordered  -Continue present/supportive care    FOLLOW UP / RESULT:     -Monitor effectiveness of above intervention EVENT: Paged by RN, pt is upset and agitated because he did not receive his Eliquis and Coreg.     HPI:  70y M from home with PMH HTN, HLD, DM, PE, CAD s/p CABG (3.5 yrs ago), presented  with leg swelling, scrotal swelling  and difficulty breathing for months. In ED CXR No acute infiltrate. Cardiomegaly. ECG : first degree heart block, Qtc is prolonged 491. BNP 6970, CXR pt received Lasix 80mg X1 Admitted for acute on chronic systolic heart failure.   Co leg  heaviness. Venous doppler negative for DVT. TTE with EF 15-20%. Troponin negative     SUBJECTIVE: Pt reports he did not receive his medications. Per RN, pt refused medications earlier in the day. Pt does not recall but is agreeable to take meds now.     OBJECTIVE:  Vital Signs Last 24 Hrs  T(C): 36.6 (31 Mar 2021 13:51), Max: 36.7 (31 Mar 2021 05:55)  T(F): 97.9 (31 Mar 2021 13:51), Max: 98 (31 Mar 2021 05:55)  HR: 67 (31 Mar 2021 22:01) (62 - 82)  BP: 141/83 (31 Mar 2021 22:01) (107/66 - 141/83)  BP(mean): --  RR: 18 (31 Mar 2021 22:01) (17 - 18)  SpO2: 98% (31 Mar 2021 22:01) (97% - 100%)    PHYSICAL EXAM:  Neuro: Awake and alert, oriented to person, place, and time  Cardiovascular: + S1, S2, no murmurs, rubs, or bruits  Respiratory: clear to auscultation bilaterally with good air entry   GI: Abdomen soft, non-tender, bowel sounds present   : Non distended;   Skin: warm and dry; no rash      LABS:                        12.4   5.50  )-----------( 222      ( 31 Mar 2021 07:01 )             40.0     03-31    140  |  105  |  20<H>  ----------------------------<  87  3.9   |  27  |  1.12    Ca    8.0<L>      31 Mar 2021 07:01      EKG:   IMAGING:    PLAN:     -Meds re-ordered  -Coreg 3.125 mg x 1 dose, ordered  -Eliquis 5 mg x 1 dose, ordered  -Continue present/supportive care    FOLLOW UP / RESULT:     -Monitor effectiveness of above intervention

## 2021-03-31 NOTE — PROGRESS NOTE ADULT - PROBLEM SELECTOR PLAN 8
c/w Eliquis 5mg BID c/w Eliquis 5mg BID  PPI for GI ppx  COVID - interested in getting COVID vaccine, explained it will be Jhonatan and Jhonatan and only need 1 time dose   will get consent tomorrow

## 2021-03-31 NOTE — PROGRESS NOTE ADULT - SUBJECTIVE AND OBJECTIVE BOX
Patient is a 70y old  Male who presents with a chief complaint of CHF exacerbation (31 Mar 2021 11:07)    pt seen in icu [  ], reg med floor [   ], bed [  ], chair at bedside [   ], a+o x3 [  ], lethargic [  ],  nad [  ]    cruz [  ], ngt [  ], peg [  ], et tube [  ], cent line [  ], picc line [  ]        Allergies    Aldactone (Unknown)  Bumex (Blisters; Rash)  metoprolol (Unknown)  spironolactone (Unknown)        Vitals    T(F): 98 (03-31-21 @ 05:55), Max: 98 (03-30-21 @ 22:30)  HR: 82 (03-31-21 @ 05:55) (62 - 82)  BP: 125/70 (03-31-21 @ 05:55) (125/70 - 144/87)  RR: 18 (03-31-21 @ 05:55) (17 - 18)  SpO2: 100% (03-31-21 @ 05:55) (96% - 100%)  Wt(kg): --  CAPILLARY BLOOD GLUCOSE      POCT Blood Glucose.: 88 mg/dL (31 Mar 2021 08:14)      Labs                          12.4   5.50  )-----------( 222      ( 31 Mar 2021 07:01 )             40.0       03-31    140  |  105  |  20<H>  ----------------------------<  87  3.9   |  27  |  1.12    Ca    8.0<L>      31 Mar 2021 07:01                  Radiology Results      Meds    MEDICATIONS  (STANDING):  apixaban 5 milliGRAM(s) Oral every 12 hours  aspirin  chewable 81 milliGRAM(s) Oral daily  atorvastatin 40 milliGRAM(s) Oral at bedtime  carvedilol 3.125 milliGRAM(s) Oral every 12 hours  dextrose 40% Gel 15 Gram(s) Oral once  dextrose 5%. 1000 milliLiter(s) (50 mL/Hr) IV Continuous <Continuous>  dextrose 5%. 1000 milliLiter(s) (100 mL/Hr) IV Continuous <Continuous>  dextrose 50% Injectable 25 Gram(s) IV Push once  dextrose 50% Injectable 12.5 Gram(s) IV Push once  dextrose 50% Injectable 25 Gram(s) IV Push once  furosemide    Tablet 40 milliGRAM(s) Oral two times a day  glucagon  Injectable 1 milliGRAM(s) IntraMuscular once  insulin lispro (ADMELOG) corrective regimen sliding scale   SubCutaneous three times a day before meals  insulin lispro (ADMELOG) corrective regimen sliding scale   SubCutaneous at bedtime  lisinopril 5 milliGRAM(s) Oral daily  piperacillin/tazobactam IVPB.. 3.375 Gram(s) IV Intermittent every 6 hours  sodium chloride 0.9% lock flush 3 milliLiter(s) IV Push every 8 hours  vancomycin  IVPB 1000 milliGRAM(s) IV Intermittent every 12 hours      MEDICATIONS  (PRN):      Physical Exam    Neuro :  no focal deficits  Respiratory: CTA B/L  CV: RRR, S1S2, no murmurs,   Abdominal: Soft, NT, ND +BS,  Extremities: No edema, + peripheral pulses    ASSESSMENT    Heart failure    No pertinent past medical history    CAD (coronary artery disease)    DM (diabetes mellitus)    Dyslipidemia    CHF (congestive heart failure)    Angina pectoris    HTN (hypertension)    PE (pulmonary thromboembolism)    S/P CABG x 3        PLAN     Patient is a 70y old  Male who presents with a chief complaint of CHF exacerbation (31 Mar 2021 11:07)    pt seen in tele [ x ], reg med floor [   ], bed [ x ], chair at bedside [   ], a+o x3 [ x ], lethargic [  ],    nad [ x ]      Allergies    Aldactone (Unknown)  Bumex (Blisters; Rash)  metoprolol (Unknown)  spironolactone (Unknown)        Vitals    T(F): 98 (03-31-21 @ 05:55), Max: 98 (03-30-21 @ 22:30)  HR: 82 (03-31-21 @ 05:55) (62 - 82)  BP: 125/70 (03-31-21 @ 05:55) (125/70 - 144/87)  RR: 18 (03-31-21 @ 05:55) (17 - 18)  SpO2: 100% (03-31-21 @ 05:55) (96% - 100%)  Wt(kg): --  CAPILLARY BLOOD GLUCOSE      POCT Blood Glucose.: 88 mg/dL (31 Mar 2021 08:14)      Labs                          12.4   5.50  )-----------( 222      ( 31 Mar 2021 07:01 )             40.0       03-31    140  |  105  |  20<H>  ----------------------------<  87  3.9   |  27  |  1.12    Ca    8.0<L>      31 Mar 2021 07:01                  Radiology Results      Meds    MEDICATIONS  (STANDING):  apixaban 5 milliGRAM(s) Oral every 12 hours  aspirin  chewable 81 milliGRAM(s) Oral daily  atorvastatin 40 milliGRAM(s) Oral at bedtime  carvedilol 3.125 milliGRAM(s) Oral every 12 hours  dextrose 40% Gel 15 Gram(s) Oral once  dextrose 5%. 1000 milliLiter(s) (50 mL/Hr) IV Continuous <Continuous>  dextrose 5%. 1000 milliLiter(s) (100 mL/Hr) IV Continuous <Continuous>  dextrose 50% Injectable 25 Gram(s) IV Push once  dextrose 50% Injectable 12.5 Gram(s) IV Push once  dextrose 50% Injectable 25 Gram(s) IV Push once  furosemide    Tablet 40 milliGRAM(s) Oral two times a day  glucagon  Injectable 1 milliGRAM(s) IntraMuscular once  insulin lispro (ADMELOG) corrective regimen sliding scale   SubCutaneous three times a day before meals  insulin lispro (ADMELOG) corrective regimen sliding scale   SubCutaneous at bedtime  lisinopril 5 milliGRAM(s) Oral daily  piperacillin/tazobactam IVPB.. 3.375 Gram(s) IV Intermittent every 6 hours  sodium chloride 0.9% lock flush 3 milliLiter(s) IV Push every 8 hours  vancomycin  IVPB 1000 milliGRAM(s) IV Intermittent every 12 hours      MEDICATIONS  (PRN):      Physical Exam    Neuro :  no focal deficits  Respiratory: CTA B/L  CV: RRR, S1S2, no murmurs,   Abdominal: Soft, NT, ND +BS,  Extremities: b/l le edema, left leg erythema and weeping ulcerations        ASSESSMENT    acute on chronic systolic chf,   pulm edema   r/o acs,   left le cellulitis  h/o HTN,   HLD,   DM,   CAD s/p CABGx3,  PE (pulmonary thromboembolism)    PLAN      cont tele,   acs protocol,   cont coreg, lasix,   aspirin, statin,   ce x3 noted above  cardio f/u  echo with EF 15-20%  Grade II diastolic dysfunction. RV systolic pressure is severely noted    cont lisinopril  cont Eliquis.  supplemental O2 as needed,  cont  bronchodilator,  f/u hgba1c   cont vanco and zosyn  f/u blood cx  id f/u   f/u wound cx   f/u ct left leg  podiatry cons  cont current meds           Patient is a 70y old  Male who presents with a chief complaint of CHF exacerbation (31 Mar 2021 11:07)    pt seen in tele [ x ], reg med floor [   ], bed [ x ], chair at bedside [   ], a+o x3 [ x ], lethargic [  ],    nad [ x ]      Allergies    Aldactone (Unknown)  Bumex (Blisters; Rash)  metoprolol (Unknown)  spironolactone (Unknown)        Vitals    T(F): 98 (03-31-21 @ 05:55), Max: 98 (03-30-21 @ 22:30)  HR: 82 (03-31-21 @ 05:55) (62 - 82)  BP: 125/70 (03-31-21 @ 05:55) (125/70 - 144/87)  RR: 18 (03-31-21 @ 05:55) (17 - 18)  SpO2: 100% (03-31-21 @ 05:55) (96% - 100%)  Wt(kg): --  CAPILLARY BLOOD GLUCOSE      POCT Blood Glucose.: 88 mg/dL (31 Mar 2021 08:14)      Labs                          12.4   5.50  )-----------( 222      ( 31 Mar 2021 07:01 )             40.0       03-31    140  |  105  |  20<H>  ----------------------------<  87  3.9   |  27  |  1.12    Ca    8.0<L>      31 Mar 2021 07:01                  Radiology Results      < from: CT Lower Extremity w/ IV Cont, Left (03.30.21 @ 13:06) >  IMPRESSION:    Diffuse soft tissue swelling and skin thickening. Nonspecific finding that can be seen in the setting of cellulitis. No drainable fluid collection.  No CT evidence for osteomyelitis.    < end of copied text >      Meds    MEDICATIONS  (STANDING):  apixaban 5 milliGRAM(s) Oral every 12 hours  aspirin  chewable 81 milliGRAM(s) Oral daily  atorvastatin 40 milliGRAM(s) Oral at bedtime  carvedilol 3.125 milliGRAM(s) Oral every 12 hours  dextrose 40% Gel 15 Gram(s) Oral once  dextrose 5%. 1000 milliLiter(s) (50 mL/Hr) IV Continuous <Continuous>  dextrose 5%. 1000 milliLiter(s) (100 mL/Hr) IV Continuous <Continuous>  dextrose 50% Injectable 25 Gram(s) IV Push once  dextrose 50% Injectable 12.5 Gram(s) IV Push once  dextrose 50% Injectable 25 Gram(s) IV Push once  furosemide    Tablet 40 milliGRAM(s) Oral two times a day  glucagon  Injectable 1 milliGRAM(s) IntraMuscular once  insulin lispro (ADMELOG) corrective regimen sliding scale   SubCutaneous three times a day before meals  insulin lispro (ADMELOG) corrective regimen sliding scale   SubCutaneous at bedtime  lisinopril 5 milliGRAM(s) Oral daily  piperacillin/tazobactam IVPB.. 3.375 Gram(s) IV Intermittent every 6 hours  sodium chloride 0.9% lock flush 3 milliLiter(s) IV Push every 8 hours  vancomycin  IVPB 1000 milliGRAM(s) IV Intermittent every 12 hours      MEDICATIONS  (PRN):      Physical Exam    Neuro :  no focal deficits  Respiratory: CTA B/L  CV: RRR, S1S2, no murmurs,   Abdominal: Soft, NT, ND +BS,  Extremities: b/l le edema, left leg erythema and weeping ulcerations        ASSESSMENT    acute on chronic systolic chf,   pulm edema   r/o acs,   left le cellulitis  h/o HTN,   HLD,   DM,   CAD s/p CABGx3,  PE (pulmonary thromboembolism)    PLAN      cont tele,   acs protocol,   cont coreg, lasix,   aspirin, statin,   ce x3 noted above  cardio f/u  echo with EF 15-20%  Grade II diastolic dysfunction. RV systolic pressure is severely noted    cont lisinopril  cont Eliquis.  supplemental O2 as needed,  cont  bronchodilator,  f/u hgba1c   cont vanco and zosyn  f/u blood cx  id f/u   f/u wound cx   ct left leg with Diffuse soft tissue swelling and skin thickening. Nonspecific finding that can be seen in the setting of cellulitis. No drainable fluid collection.  No CT evidence for osteomyelitis noted above.  podiatry cons  cont current meds

## 2021-03-31 NOTE — PROGRESS NOTE ADULT - PROBLEM SELECTOR PLAN 4
B/L LEs with edema 4+, chronic cellulitis and open wound on LLE oozing and painful  -Surgery consulted, no interventions indicated  -Started on Vanco and Zosyn per attending  -ID Dr. Lauren consulted  -CT LLE shows Diffuse soft tissue swelling and skin thickening. Nonspecific finding that can be seen in the setting of cellulitis. No drainable fluid collection.  No CT evidence for osteomyelitis.    Cont Abx for now- podiatry consulted for possible wound cultures and tx recc   -f/u Vanco trough 18.4 WNL

## 2021-03-31 NOTE — PROGRESS NOTE ADULT - SUBJECTIVE AND OBJECTIVE BOX
NP Note discussed with  Primary Attending    Patient is a 70y old  Male who presents with a chief complaint of CHF exacerbation (30 Mar 2021 19:42)      INTERVAL HPI/OVERNIGHT EVENTS: no new complaints    MEDICATIONS  (STANDING):  apixaban 5 milliGRAM(s) Oral every 12 hours  aspirin  chewable 81 milliGRAM(s) Oral daily  atorvastatin 40 milliGRAM(s) Oral at bedtime  carvedilol 3.125 milliGRAM(s) Oral every 12 hours  dextrose 40% Gel 15 Gram(s) Oral once  dextrose 5%. 1000 milliLiter(s) (50 mL/Hr) IV Continuous <Continuous>  dextrose 5%. 1000 milliLiter(s) (100 mL/Hr) IV Continuous <Continuous>  dextrose 50% Injectable 25 Gram(s) IV Push once  dextrose 50% Injectable 12.5 Gram(s) IV Push once  dextrose 50% Injectable 25 Gram(s) IV Push once  furosemide    Tablet 40 milliGRAM(s) Oral two times a day  glucagon  Injectable 1 milliGRAM(s) IntraMuscular once  insulin lispro (ADMELOG) corrective regimen sliding scale   SubCutaneous three times a day before meals  insulin lispro (ADMELOG) corrective regimen sliding scale   SubCutaneous at bedtime  lisinopril 5 milliGRAM(s) Oral daily  piperacillin/tazobactam IVPB.. 3.375 Gram(s) IV Intermittent every 6 hours  sodium chloride 0.9% lock flush 3 milliLiter(s) IV Push every 8 hours  vancomycin  IVPB 1000 milliGRAM(s) IV Intermittent every 12 hours    MEDICATIONS  (PRN):      __________________________________________________  REVIEW OF SYSTEMS:    CONSTITUTIONAL: No fever,   EYES: no acute visual disturbances  NECK: No pain or stiffness  RESPIRATORY: No cough; No shortness of breath  CARDIOVASCULAR: No chest pain, no palpitations  GASTROINTESTINAL: No pain. No nausea or vomiting; No diarrhea   NEUROLOGICAL: No headache or numbness, no tremors  MUSCULOSKELETAL: No joint pain, no muscle pain  GENITOURINARY: no dysuria, no frequency, no hesitancy, hard to touch around scrotum   PSYCHIATRY: no depression , no anxiety  ALL OTHER  ROS negative        Vital Signs Last 24 Hrs  T(C): 36.7 (31 Mar 2021 05:55), Max: 36.7 (30 Mar 2021 22:30)  T(F): 98 (31 Mar 2021 05:55), Max: 98 (30 Mar 2021 22:30)  HR: 82 (31 Mar 2021 05:55) (62 - 82)  BP: 125/70 (31 Mar 2021 05:55) (125/70 - 144/87)  BP(mean): --  RR: 18 (31 Mar 2021 05:55) (17 - 18)  SpO2: 100% (31 Mar 2021 05:55) (96% - 100%)    ________________________________________________  PHYSICAL EXAM:  GENERAL: NAD  HEENT: Normocephalic;  conjunctivae and sclerae clear; moist mucous membranes;   NECK : supple  CHEST/LUNG: Clear to auscultation bilaterally with good air entry   HEART: S1 S2  regular; no murmurs, gallops or rubs  ABDOMEN: Soft, Nontender, Nondistended; Bowel sounds present  EXTREMITIES: LLE - open wound with swelling. RLe - no open wound same swelling as RLE   SKIN: warm and dry; no rash  NERVOUS SYSTEM:  Awake and alert; Oriented  to place, person and time ; no new deficits    _________________________________________________  LABS:                        12.4   5.50  )-----------( 222      ( 31 Mar 2021 07:01 )             40.0     03-31    140  |  105  |  20<H>  ----------------------------<  87  3.9   |  27  |  1.12    Ca    8.0<L>      31 Mar 2021 07:01  Mg     2.4     03-29    TPro  7.4  /  Alb  3.1<L>  /  TBili  0.8  /  DBili  x   /  AST  27  /  ALT  30  /  AlkPhos  163<H>  03-29        CAPILLARY BLOOD GLUCOSE      POCT Blood Glucose.: 88 mg/dL (31 Mar 2021 08:14)  POCT Blood Glucose.: 118 mg/dL (30 Mar 2021 22:18)  POCT Blood Glucose.: 144 mg/dL (30 Mar 2021 11:33)        RADIOLOGY & ADDITIONAL TESTS:  < from: CT Lower Extremity w/ IV Cont, Left (03.30.21 @ 13:06) >    EXAM:  CT LWR EXT IC LT                            PROCEDURE DATE:  03/30/2021          INTERPRETATION:  CT LOWER EXTREMITY WITH IV CONTRAST LEFT dated 3/30/2021 1:06 PM    INDICATION: Pain and swelling    COMPARISON: None available.    TECHNIQUE: CT imaging of the left lower extremity was performed with contrast. The data was reformatted in the axial, coronal, and sagittal planes. Contrast: Omnipaque 350. Administered: 90 cc. Discarded: 10 cc.    FINDINGS:    OSSEOUS STRUCTURES: There is no fracture. No osteonecrosis is noted. Mild medial knee joint space narrowing identified. The tibiotalar, posterior subtalar, middle subtalar, calcaneocuboid joint are preserved. Spurring along the superior aspect of the patella. No cortical erosion or destruction noted. No significant periosteal reaction.  SYNOVIUM/ JOINT FLUID: No knee joint effusion. No popliteal cyst.  TENDONS: The tendons are intact. No full-thickness tendon tear or retraction is identified.  MUSCLES: There is no intramuscular hematoma.  NEUROVASCULAR STRUCTURES: Mild scattered vascular calcifications.  SUBCUTANEOUS SOFT TISSUES: Diffuse severe soft tissue swelling and skin thickening about the calf. No rim-enhancing fluid collection within the soft tissues. No soft tissue gas identified. This edema extends into the dorsum of the hindfoot      IMPRESSION:    Diffuse soft tissue swelling and skin thickening. Nonspecific finding that can be seen in the setting of cellulitis. No drainable fluid collection.  No CT evidence for osteomyelitis.            KIP COREY MD; Attending Radiologist  This document has been electronically signed. Mar 30 2021  1:30PM    < end of copied text >  < from: US Duplex Venous Lower Ext Complete, Bilateral (03.25.21 @ 10:38) >    EXAM:  US DPLX LWR EXT VEINS COMPL BI                            PROCEDURE DATE:  03/25/2021          INTERPRETATION:  CLINICAL INFORMATION: Leg swelling    COMPARISON: 11/16/2017    TECHNIQUE: Duplex sonography of the BILATERAL LOWER extremity veins with color and spectral Doppler, with and without compression.    FINDINGS:    RIGHT:  Normal compressibility of the RIGHT common femoral, femoral and popliteal veins.  Doppler examination shows normal spontaneous and phasic flow.  No RIGHT calf vein thrombosis is detected.    LEFT:  Normal compressibility of the LEFT common femoral, femoral and popliteal veins.  Doppler examination shows normal spontaneous and phasic flow.  No LEFT calf vein thrombosis is detected.    Enlarged bilateral groin lymph nodes are noted.    IMPRESSION:  No evidence of deep venous thrombosis in either lower extremity.                LYNETTE STEVE MD; Attending Radiologist  This document has been electronically signed. Mar 25 2021 11:22AM    < end of copied text >  < from: Xray Chest 1 View-PORTABLE IMMEDIATE (Xray Chest 1 View-PORTABLE IMMEDIATE .) (03.25.21 @ 05:52) >    EXAM:  XR CHEST PORTABLE IMMED 1V                            PROCEDURE DATE:  03/25/2021          INTERPRETATION:  CLINICAL INDICATION: 70 years  Male with Shortness of Breath.    COMPARISON: 4/8/2020    Apical lordotic view of the chest demonstrates the lungs to be clear. There is no pleural effusion. There is no pneumothorax.    The heart is enlarged. The and sternotomy wires are present.. There is no mediastinal or hilar mass.    The pulmonary vasculature is normal.    Mild thoracic degenerative changes are present.    IMPRESSION:    No acute infiltrate. Cardiomegaly. Sternotomy.    Limited by projection. Recommend repeat.            CRISTIANO ALBA MD; Attending Radiologist  This document has been electronically signed. Mar 25 2021  8:44AM    < end of copied text >    Imaging Personally Reviewed:  YES    Consultant(s) Notes Reviewed:   YES    Care Discussed with Consultants : ID/ surgery/ cardiology/ podiatry     Plan of care was discussed with patient and /or primary care giver; all questions and concerns were addressed and care was aligned with patient's wishes.

## 2021-03-31 NOTE — CONSULT NOTE ADULT - ASSESSMENT
1. CHF   Diuretics  Monitor I&O  Echo noted.   Cardio F/U  Elevate L/E   Encourage compliance with medication and treatment plan as OP.   DVT and GI PPX     2. Pleural Effusion  Likely 2nd to CHF  CXR noted   Cont diuretics.   F/U CXR - may need CT chest.   O2 supp PRN      3. Pulmonary HTN   Severe   Noted on Echo   CCB  Bronchodilators PRN   O2 supp PRN   Consider Revatio 20mg PO TID if cardio agrees     4. CAD  Check Carotid duplex  Cont meds  Cardio f/u   
A;  b/l chronic edema secondary to CHF    Patient seen and evaluated   Chart created   Patient advised to elevate and use compression. Explained to patient that wound is from excessive swelling and there is no signs of infection.   Dressed with santyl, 4x4 gauze, allegra and ace to left LE, and plain ACE wrap on RLE.   ordered santyl   wound care/ nursing dressing instructions: apply santyl to leg leg wound and cover with 4x4 gauze, allegra and ace to left LE. Apply plain ACE wrap on RLE. Change dressing MWF.   Patient stable per podiatry standpoint  Reconsult podiatry as needed   Seen and evaluated bedside with attending Dr. Pineda
70y M from home with PMH HTN, HLD, DM, PE, CAD s/p CABG (3.5 yrs ago), presenting with leg swelling, scrotal swelling  and difficulty breathing for months,acute on chronic systolic HF.  1.Tele monitoring.  2.Acute on chronic systolic HF-coreg,ace,lasix.  3.Pt reports allergy to aldactone.  4.CAD-asa,b blocker,statin.  5.PE-Eliquis.  6.DM-Insulin.  7.HTN-coreg,ace.  8.Carotid doppler.  9.PPI.  10.Pt needs to be compliant with  cardiac medication.
Patient is a 70y old  Male from home with PMH HTN, HLD, DM, PE, CAD s/p CABG (3.5 yrs ago), presents to the ER on 3/25/21  for evaluation of leg swelling, scrotal swelling  and difficulty breathing for months.  He endorses chest tightness, shortness of breath on exertion, requiring him to take frequent breaks when walking or climbing stairs. can walk less than 2 blocks. Today, 3/29/21, He has started on Zosyn and Vancomycin and The ID consult requested to assist with further evaluation of LLE cellulitis and antibiotic management.     # LLE cellulitis with suspected Abscess    Would recommend:    1. Please obtain CT scan of LLE to rule  out deep infection  2. Continue Vancomycin and Zosyn  until work up is done   3. Monitor and Keep Vancomycin level between 15 to 20  4. Keep LLE elevated  5. Pain management as needed    will follow the patient with you and make further recommendation based on the clinical course and Lab results  Thank you for the opportunity to participate in Mr. PÉREZ's care      Attending Attestation:     Spent more than 65 minutes on total encounter, more than 50 % of the visit was spent counseling and/or coordinating care by the Attending physician.

## 2021-03-31 NOTE — PROGRESS NOTE ADULT - ASSESSMENT
70y M from home with PMH HTN, HLD, DM, PE, CAD s/p CABG (3.5 yrs ago), presented  with leg swelling, scrotal swelling  and difficulty breathing for months.   In ED CXR No acute infiltrate. Cardiomegaly  ECG : first degree heart block, Qtc is prolonged 491  BNP 6970, CXR   pt received Lasix 80mg X1   Admitted for acute on chronic systolic heart failure   Seen at bedside, states is feeling better, refused to be minitored on cardiac monitor.   Co leg  heaviness   venous doppler negative for DVT   TTE with EF 15-20%  Troponin negative     3/29-c/o LLE open wound on anterior mid portion of shin, wound oozing and painful.  Surgery consulted and saw pt., no interventions recommended.  Vanco and Zosyn started, ID Dr. Lauren consulted.  Pt. with persisting LEs edema and cellulitis, ambulates freely.    3/30-CT LLE with contrast showed Diffuse soft tissue swelling and skin thickening. Nonspecific finding that can be seen in the setting of cellulitis. No drainable fluid collection.  No CT evidence for osteomyelitis.    Compliance with medications, diet and f/u care reinforced with pt.  Will discharge to home when cleared by ID.  3/31 - seen and examined pt at bedside, AO x 3. less pain reported.  Podiatry called for possible wound cultures from LLE wound, continue IV ABT

## 2021-03-31 NOTE — CONSULT NOTE ADULT - SUBJECTIVE AND OBJECTIVE BOX
70y M from home with PMH HTN, HLD, DM, PE, CAD s/p CABG (3.5 yrs ago), presenting with leg swelling, scrotal swelling  and difficulty breathing for months, admitted for CHF exacerbation.    Podiatry HPI: Patient is a 70YM with HTN, DM, PE, CAD, CHF with b/l leg swelling and superficial wound. Patient states his swelling has been bad for months and worse now. Patient has not been using any sort of compression or elevation. He states he has sob but improved. No pain in legs. No other pedal complaints       Patient admits to  (-) Fevers, (-) Chills, (-) Nausea, (-) Vomiting, (-) Shortness of Breath (-) calf pain (-) chest pain     Medications apixaban 5 milliGRAM(s) Oral every 12 hours  aspirin  chewable 81 milliGRAM(s) Oral daily  atorvastatin 40 milliGRAM(s) Oral at bedtime  carvedilol 3.125 milliGRAM(s) Oral every 12 hours  dextrose 40% Gel 15 Gram(s) Oral once  dextrose 5%. 1000 milliLiter(s) IV Continuous <Continuous>  dextrose 5%. 1000 milliLiter(s) IV Continuous <Continuous>  dextrose 50% Injectable 25 Gram(s) IV Push once  dextrose 50% Injectable 12.5 Gram(s) IV Push once  dextrose 50% Injectable 25 Gram(s) IV Push once  furosemide    Tablet 40 milliGRAM(s) Oral two times a day  glucagon  Injectable 1 milliGRAM(s) IntraMuscular once  insulin lispro (ADMELOG) corrective regimen sliding scale   SubCutaneous three times a day before meals  insulin lispro (ADMELOG) corrective regimen sliding scale   SubCutaneous at bedtime  lisinopril 5 milliGRAM(s) Oral daily  piperacillin/tazobactam IVPB.. 3.375 Gram(s) IV Intermittent every 6 hours  sodium chloride 0.9% lock flush 3 milliLiter(s) IV Push every 8 hours  vancomycin  IVPB 1000 milliGRAM(s) IV Intermittent every 12 hours    FHNo pertinent family history in first degree relatives    No pertinent family history in first degree relatives    ,   PMHNo pertinent past medical history    CAD (coronary artery disease)    DM (diabetes mellitus)    Dyslipidemia    CHF (congestive heart failure)    Angina pectoris    HTN (hypertension)    PE (pulmonary thromboembolism)       PSHS/P CABG x 3        Labs                          12.4   5.50  )-----------( 222      ( 31 Mar 2021 07:01 )             40.0      03-31    140  |  105  |  20<H>  ----------------------------<  87  3.9   |  27  |  1.12    Ca    8.0<L>      31 Mar 2021 07:01       Vital Signs Last 24 Hrs  T(C): 36.6 (31 Mar 2021 13:51), Max: 36.7 (30 Mar 2021 22:30)  T(F): 97.9 (31 Mar 2021 13:51), Max: 98 (30 Mar 2021 22:30)  HR: 62 (31 Mar 2021 13:51) (62 - 82)  BP: 107/66 (31 Mar 2021 13:51) (107/66 - 144/87)  BP(mean): --  RR: 17 (31 Mar 2021 13:51) (17 - 18)  SpO2: 97% (31 Mar 2021 13:51) (96% - 100%)          WBC Count: 5.50 K/uL (03-31-21 @ 07:01)        Physical exam   Patient resting comfortably in bed. Patient is AAOx3, ambulation status: able to walk as tolerated     Derm: left anterior leg wound 1. superficial 2. 3x4x0.2cm fibrotic wound bed, no drainage, no purulence.  No clinical signs of infection.   Vasc: DP and PT pulses not assessable due to edema pitting. Skin temperature noted to be warm to warm from proximal to distal b/l.   Neuro: Protective and epicritic sensation grossly intact   Musc: No pain on palpation. muscle strength not assessed as patient is large and unable to  his legs. No gross deformities.

## 2021-03-31 NOTE — PROGRESS NOTE ADULT - PROBLEM SELECTOR PLAN 2
S/P CABG 3.5 years ago  Cont Lasix 40 mg bid, Coreg 3.125 , Lisinopril 5 qd , Aspirin 81, statin 40qd  d.stan tele   Cardio Dr Rhodes

## 2021-03-31 NOTE — PROGRESS NOTE ADULT - ASSESSMENT
Patient is a 70y old  Male from home with PMH HTN, HLD, DM, PE, CAD s/p CABG (3.5 yrs ago), presents to the ER on 3/25/21  for evaluation of leg swelling, scrotal swelling  and difficulty breathing for months.  He endorses chest tightness, shortness of breath on exertion, requiring him to take frequent breaks when walking or climbing stairs. can walk less than 2 blocks. Today, 3/29/21, He has started on Zosyn and Vancomycin and The ID consult requested to assist with further evaluation of LLE cellulitis and antibiotic management.     # LLE cellulitis with suspected Abscess - CT scan of LLE shows Diffuse soft tissue swelling and skin thickening.  No drainable fluid collection. And No CT evidence for osteomyelitis.      Would recommend:    1. Monitor kidney function and Adjust Abx doses accordingly   2. Continue current doses Vancomycin since level is therapeutic  3. Please change  Zosyn  to Cefepime due to high salt content   4. Keep LLE elevated  5. Pain management as needed    d/w Covering NP    Attending Attestation:     Spent more than 45 minutes on total encounter, more than 50 % of the visit was spent counseling and/or coordinating care by the Attending physician.

## 2021-03-31 NOTE — PROGRESS NOTE ADULT - ASSESSMENT
1. CHF   Diuretics  Monitor I&O  Echo noted.   Cardio F/U  Elevate L/E   Encourage compliance with medication and treatment plan as OP.   DVT and GI PPX     2. Pleural Effusion  Likely 2nd to CHF  CXR noted   Cont diuretics.   F/U CXR - may need CT chest.   O2 supp PRN      3. Pulmonary HTN   Severe   Noted on Echo   CCB  Bronchodilators PRN   O2 supp PRN   Consider Revatio 20mg PO TID if cardio agrees     4. CAD  Check Carotid duplex  Cont meds  Cardio f/u     5. Leg Wound with cellulitis  Surgical eval done  No intervention at this time  ID follow up  antibiotics  Wound care  Ct left leg noted (no abscess)

## 2021-03-31 NOTE — PROGRESS NOTE ADULT - SUBJECTIVE AND OBJECTIVE BOX
Patient is a 70y old  Male who presents with a chief complaint of CHF exacerbation (31 Mar 2021 09:24)  Awake, alert, comfortable in bed in NAD  INTERVAL HPI/OVERNIGHT EVENTS:      VITAL SIGNS:  T(F): 98 (03-31-21 @ 05:55)  HR: 82 (03-31-21 @ 05:55)  BP: 125/70 (03-31-21 @ 05:55)  RR: 18 (03-31-21 @ 05:55)  SpO2: 100% (03-31-21 @ 05:55)  Wt(kg): --  I&O's Detail    30 Mar 2021 07:01  -  31 Mar 2021 07:00  --------------------------------------------------------  IN:  Total IN: 0 mL    OUT:    Voided (mL): 750 mL  Total OUT: 750 mL    Total NET: -750 mL      31 Mar 2021 07:01  -  31 Mar 2021 10:56  --------------------------------------------------------  IN:  Total IN: 0 mL    OUT:    Voided (mL): 200 mL  Total OUT: 200 mL    Total NET: -200 mL              REVIEW OF SYSTEMS:    CONSTITUTIONAL:  No fevers, chills, sweats    HEENT:  Eyes:  No diplopia or blurred vision. ENT:  No earache, sore throat or runny nose.    CARDIOVASCULAR:  No pressure, squeezing, tightness, or heaviness about the chest; no palpitations.    RESPIRATORY:  Per HPI    GASTROINTESTINAL:  No abdominal pain, nausea, vomiting or diarrhea.    GENITOURINARY:  No dysuria, frequency or urgency.    NEUROLOGIC:  No paresthesias, fasciculations, seizures or weakness.    PSYCHIATRIC:  No disorder of thought or mood.      PHYSICAL EXAM:    Constitutional: Well developed and nourished  Eyes:Perrla  ENMT: normal  Neck:supple  Respiratory: good air entry  Cardiovascular: S1 S2 regular  Gastrointestinal: Soft, Non tender  Extremities: + edema  Vascular:normal  Neurological:Awake, alert,Ox3  Musculoskeletal:Normal      MEDICATIONS  (STANDING):  apixaban 5 milliGRAM(s) Oral every 12 hours  aspirin  chewable 81 milliGRAM(s) Oral daily  atorvastatin 40 milliGRAM(s) Oral at bedtime  carvedilol 3.125 milliGRAM(s) Oral every 12 hours  dextrose 40% Gel 15 Gram(s) Oral once  dextrose 5%. 1000 milliLiter(s) (50 mL/Hr) IV Continuous <Continuous>  dextrose 5%. 1000 milliLiter(s) (100 mL/Hr) IV Continuous <Continuous>  dextrose 50% Injectable 25 Gram(s) IV Push once  dextrose 50% Injectable 12.5 Gram(s) IV Push once  dextrose 50% Injectable 25 Gram(s) IV Push once  furosemide    Tablet 40 milliGRAM(s) Oral two times a day  glucagon  Injectable 1 milliGRAM(s) IntraMuscular once  insulin lispro (ADMELOG) corrective regimen sliding scale   SubCutaneous three times a day before meals  insulin lispro (ADMELOG) corrective regimen sliding scale   SubCutaneous at bedtime  lisinopril 5 milliGRAM(s) Oral daily  piperacillin/tazobactam IVPB.. 3.375 Gram(s) IV Intermittent every 6 hours  sodium chloride 0.9% lock flush 3 milliLiter(s) IV Push every 8 hours  vancomycin  IVPB 1000 milliGRAM(s) IV Intermittent every 12 hours    MEDICATIONS  (PRN):      Allergies    Aldactone (Unknown)  Bumex (Blisters; Rash)  metoprolol (Unknown)  spironolactone (Unknown)    Intolerances        LABS:                        12.4   5.50  )-----------( 222      ( 31 Mar 2021 07:01 )             40.0     03-31    140  |  105  |  20<H>  ----------------------------<  87  3.9   |  27  |  1.12    Ca    8.0<L>      31 Mar 2021 07:01  Mg     2.4     03-29    TPro  7.4  /  Alb  3.1<L>  /  TBili  0.8  /  DBili  x   /  AST  27  /  ALT  30  /  AlkPhos  163<H>  03-29              CAPILLARY BLOOD GLUCOSE      POCT Blood Glucose.: 88 mg/dL (31 Mar 2021 08:14)  POCT Blood Glucose.: 118 mg/dL (30 Mar 2021 22:18)  POCT Blood Glucose.: 144 mg/dL (30 Mar 2021 11:33)    pro-bnp -- 03-25 @ 13:55     d-dimer 554  03-25 @ 13:55  pro-bnp 6970 03-25 @ 06:19     d-dimer --  03-25 @ 06:19      RADIOLOGY & ADDITIONAL TESTS:  c< from: CT Lower Extremity w/ IV Cont, Left (03.30.21 @ 13:06) >  IMPRESSION:    Diffuse soft tissue swelling and skin thickening. Nonspecific finding that can be seen in the setting of cellulitis. No drainable fluid collection.  No CT evidence for osteomyelitis.    < end of copied text >    CXR:    Ct scan chest:    ekg;    echo:

## 2021-03-31 NOTE — PROGRESS NOTE ADULT - SUBJECTIVE AND OBJECTIVE BOX
CHIEF COMPLAINT:Patient is a 70y old  Male who presents with a chief complaint of CHF exacerbation.Pt appears comfortable.    	  REVIEW OF SYSTEMS:  CONSTITUTIONAL: No fever, weight loss, or fatigue  EYES: No eye pain, visual disturbances, or discharge  ENT:  No difficulty hearing, tinnitus, vertigo; No sinus or throat pain  NECK: No pain or stiffness  RESPIRATORY: No cough, wheezing, chills or hemoptysis; No Shortness of Breath  CARDIOVASCULAR: No chest pain, palpitations, passing out, dizziness, or leg swelling  GASTROINTESTINAL: No abdominal or epigastric pain. No nausea, vomiting, or hematemesis; No diarrhea or constipation. No melena or hematochezia.  GENITOURINARY: No dysuria, frequency, hematuria, or incontinence  NEUROLOGICAL: No headaches, memory loss, loss of strength, numbness, or tremors  SKIN: No itching, burning, rashes, or lesions   LYMPH Nodes: No enlarged glands  ENDOCRINE: No heat or cold intolerance; No hair loss  MUSCULOSKELETAL: No joint pain or swelling; No muscle, back, or extremity pain  PSYCHIATRIC: No depression, anxiety, mood swings, or difficulty sleeping  HEME/LYMPH: No easy bruising, or bleeding gums  ALLERGY AND IMMUNOLOGIC: No hives or eczema	        PHYSICAL EXAM:  T(C): 36.7 (03-31-21 @ 05:55), Max: 36.7 (03-30-21 @ 22:30)  HR: 82 (03-31-21 @ 05:55) (62 - 82)  BP: 125/70 (03-31-21 @ 05:55) (125/70 - 144/87)  RR: 18 (03-31-21 @ 05:55) (17 - 18)  SpO2: 100% (03-31-21 @ 05:55) (96% - 100%)  Wt(kg): --  I&O's Summary    30 Mar 2021 07:01  -  31 Mar 2021 07:00  --------------------------------------------------------  IN: 0 mL / OUT: 750 mL / NET: -750 mL    31 Mar 2021 07:01  -  31 Mar 2021 11:07  --------------------------------------------------------  IN: 0 mL / OUT: 200 mL / NET: -200 mL        Appearance: Normal	  HEENT:   Normal oral mucosa, PERRL, EOMI	  Lymphatic: No lymphadenopathy  Cardiovascular: Normal S1 S2, No JVD, No murmurs, +2 edema  Respiratory: Lungs clear to auscultation	  Psychiatry: A & O x 3, Mood & affect appropriate  Gastrointestinal:  Soft, Non-tender, + BS	  Skin: No rashes, No ecchymoses, No cyanosis	  Neurologic: Non-focal  Extremities: Normal range of motion, No clubbing, cyanosis +2 edema      MEDICATIONS  (STANDING):  apixaban 5 milliGRAM(s) Oral every 12 hours  aspirin  chewable 81 milliGRAM(s) Oral daily  atorvastatin 40 milliGRAM(s) Oral at bedtime  carvedilol 3.125 milliGRAM(s) Oral every 12 hours  dextrose 40% Gel 15 Gram(s) Oral once  dextrose 5%. 1000 milliLiter(s) (50 mL/Hr) IV Continuous <Continuous>  dextrose 5%. 1000 milliLiter(s) (100 mL/Hr) IV Continuous <Continuous>  dextrose 50% Injectable 25 Gram(s) IV Push once  dextrose 50% Injectable 12.5 Gram(s) IV Push once  dextrose 50% Injectable 25 Gram(s) IV Push once  furosemide    Tablet 40 milliGRAM(s) Oral two times a day  glucagon  Injectable 1 milliGRAM(s) IntraMuscular once  insulin lispro (ADMELOG) corrective regimen sliding scale   SubCutaneous three times a day before meals  insulin lispro (ADMELOG) corrective regimen sliding scale   SubCutaneous at bedtime  lisinopril 5 milliGRAM(s) Oral daily  piperacillin/tazobactam IVPB.. 3.375 Gram(s) IV Intermittent every 6 hours  sodium chloride 0.9% lock flush 3 milliLiter(s) IV Push every 8 hours  vancomycin  IVPB 1000 milliGRAM(s) IV Intermittent every 12 hours    	  	  LABS:	 	                         12.4   5.50  )-----------( 222      ( 31 Mar 2021 07:01 )             40.0     03-31    140  |  105  |  20<H>  ----------------------------<  87  3.9   |  27  |  1.12    Ca    8.0<L>      31 Mar 2021 07:01  Mg     2.4     03-29    TPro  7.4  /  Alb  3.1<L>  /  TBili  0.8  /  DBili  x   /  AST  27  /  ALT  30  /  AlkPhos  163<H>  03-29    proBNP: Serum Pro-Brain Natriuretic Peptide: 6970 pg/mL (03-25 @ 06:19)    c< from: CT Lower Extremity w/ IV Cont, Left (03.30.21 @ 13:06) >  EXAM:  CT LWR EXT IC LT                            PROCEDURE DATE:  03/30/2021          INTERPRETATION:  CT LOWER EXTREMITY WITH IV CONTRAST LEFT dated 3/30/2021 1:06 PM    INDICATION: Pain and swelling    COMPARISON: None available.    TECHNIQUE: CT imaging of the left lower extremity was performed with contrast. The data was reformatted in the axial, coronal, and sagittal planes. Contrast: Omnipaque 350. Administered: 90 cc. Discarded: 10 cc.    FINDINGS:    OSSEOUS STRUCTURES: There is no fracture. No osteonecrosis is noted. Mild medial knee joint space narrowing identified. The tibiotalar, posterior subtalar, middle subtalar, calcaneocuboid joint are preserved. Spurring along the superior aspect of the patella. No cortical erosion or destruction noted. No significant periosteal reaction.  SYNOVIUM/ JOINT FLUID: No knee joint effusion. No popliteal cyst.  TENDONS: The tendons are intact. No full-thickness tendon tear or retraction is identified.  MUSCLES: There is no intramuscular hematoma.  NEUROVASCULAR STRUCTURES: Mild scattered vascular calcifications.  SUBCUTANEOUS SOFT TISSUES: Diffuse severe soft tissue swelling and skin thickening about the calf. No rim-enhancing fluid collection within the soft tissues. No soft tissue gas identified. This edema extends into the dorsum of the hindfoot      IMPRESSION:    Diffuse soft tissue swelling and skin thickening. Nonspecific finding that can be seen in the setting of cellulitis. No drainable fluid collection.  No CT evidence for osteomyelitis.            KIP COREY MD; Attending Radiologist

## 2021-03-31 NOTE — PROGRESS NOTE ADULT - PROBLEM SELECTOR PLAN 10
continue IV ABT for cellulitis as ID recommended  pending podiatry evaluation  PT - home COVID vaccine status -

## 2021-03-31 NOTE — PROGRESS NOTE ADULT - PROBLEM SELECTOR PLAN 1
presented with BLEs/ scrotum swelling   Due to non compliance with meds, diet and f/u care-stable  ECHO shows severe systolic HF with EF 15-20% and GIIDD  Card Dr. Rhodes  Cont ASA, BB, statin, ACE  Cont Lasix 40mg BID  Strict I&O and daily weights  RLE - swelling improving, scrotum swelling improving

## 2021-03-31 NOTE — PROGRESS NOTE ADULT - ASSESSMENT
70y M from home with PMH HTN, HLD, DM, PE, CAD s/p CABG (3.5 yrs ago), presenting with leg swelling, scrotal swelling  and difficulty breathing for months,acute on chronic systolic HF.  1.Pt needs to be compliant with  cardiac medication.  2.Acute on chronic systolic HF-coreg,ace,lasix po.  3.Pt reports allergy to aldactone.  4.CAD-asa,b blocker,statin.  5.PE-Eliquis.  6.DM-Insulin.  7.HTN-coreg,ace.  8.Lt leg wound,cellulitis-ABX.  9.PPI.

## 2021-03-31 NOTE — PROGRESS NOTE ADULT - SUBJECTIVE AND OBJECTIVE BOX
Patient is seen and examined at the bed side, is afebrile. The kidney function  is improving.      REVIEW OF SYSTEMS: All other review systems are negative        ALLERGIES: Aldactone (Unknown), Bumex (Blisters; Rash)  metoprolol (Unknown), spironolactone (Unknown)      Vital Signs Last 24 Hrs  T(C): 36.6 (31 Mar 2021 13:51), Max: 36.7 (30 Mar 2021 22:30)  T(F): 97.9 (31 Mar 2021 13:51), Max: 98 (30 Mar 2021 22:30)  HR: 62 (31 Mar 2021 13:51) (62 - 82)  BP: 107/66 (31 Mar 2021 13:51) (107/66 - 130/78)  BP(mean): --  RR: 17 (31 Mar 2021 13:51) (17 - 18)  SpO2: 97% (31 Mar 2021 13:51) (97% - 100%)      PHYSICAL EXAM:  GENERAL: Not in distress   CHEST/LUNG: Not using accessory muscles   HEART: s1 and s2 present  ABDOMEN:  Nontender and  Nondistended  EXTREMITIES: LLE swollen, mild erythematous, tender with a fluctuance area at lower leg  CNS: Awake and Alert      LABS:                        12.4   5.50  )-----------( 222      ( 31 Mar 2021 07:01 )             40.0                           12.3   6.78  )-----------( 235      ( 30 Mar 2021 09:16 )             39.9                03-31    140  |  105  |  20<H>  ----------------------------<  87  3.9   |  27  |  1.12    Ca    8.0<L>      31 Mar 2021 07:01        03-30    140  |  103  |  21<H>  ----------------------------<  141<H>  3.5   |  28  |  1.16    Ca    8.3<L>      30 Mar 2021 09:16  Mg     2.4     03-29    TPro  7.4  /  Alb  3.1<L>  /  TBili  0.8  /  DBili  x   /  AST  27  /  ALT  30  /  AlkPhos  163<H>  03-29        CAPILLARY BLOOD GLUCOSE  POCT Blood Glucose.: 124 mg/dL (29 Mar 2021 17:08)  POCT Blood Glucose.: 124 mg/dL (29 Mar 2021 12:24)  POCT Blood Glucose.: 96 mg/dL (29 Mar 2021 07:49)  POCT Blood Glucose.: 112 mg/dL (28 Mar 2021 22:17)      Vancomycin Level, Trough -Pre 4th Dose, order if dosed q6/8/12h (03.31.21 @ 07:01)   Vancomycin Level, Trough: 18.4:    MEDICATIONS  (STANDING):    apixaban 5 milliGRAM(s) Oral every 12 hours  aspirin  chewable 81 milliGRAM(s) Oral daily  atorvastatin 40 milliGRAM(s) Oral at bedtime  carvedilol 3.125 milliGRAM(s) Oral every 12 hours  collagenase Ointment 1 Application(s) Topical daily  furosemide    Tablet 40 milliGRAM(s) Oral two times a day  glucagon  Injectable 1 milliGRAM(s) IntraMuscular once  insulin lispro (ADMELOG) corrective regimen sliding scale   SubCutaneous three times a day before meals  insulin lispro (ADMELOG) corrective regimen sliding scale   SubCutaneous at bedtime  lisinopril 5 milliGRAM(s) Oral daily  piperacillin/tazobactam IVPB.. 3.375 Gram(s) IV Intermittent every 6 hours  sodium chloride 0.9% lock flush 3 milliLiter(s) IV Push every 8 hours  vancomycin  IVPB 1000 milliGRAM(s) IV Intermittent every 12 hours        RADIOLOGY & ADDITIONAL TESTS:    3/30/21: CT Lower Extremity w/ IV Cont, Left (03.30.21 @ 13:06) Diffuse soft tissue swelling and skin thickening. Nonspecific finding that can be seen in the setting of cellulitis. No drainable fluid collection.  No CT evidence for osteomyelitis.      3/25/21 : Xray Chest 1 View-PORTABLE IMMEDIATE (Xray Chest 1 View-PORTABLE IMMEDIATE .) (03.25.21 @ 05:52) >  No acute infiltrate. Cardiomegaly. Sternotomy.    3/25/21 : US Duplex Venous Lower Ext Complete, Bilateral (03.25.21 @ 10:38) No evidence of deep venous thrombosis in either lower extremity.      MICROBIOLOGY DATA:        COVID-19 PCR . (03.25.21 @ 12:30)   COVID-19 PCR: NotDetec:

## 2021-04-01 LAB
GLUCOSE BLDC GLUCOMTR-MCNC: 115 MG/DL — HIGH (ref 70–99)
GLUCOSE BLDC GLUCOMTR-MCNC: 120 MG/DL — HIGH (ref 70–99)
GLUCOSE BLDC GLUCOMTR-MCNC: 127 MG/DL — HIGH (ref 70–99)
VANCOMYCIN TROUGH SERPL-MCNC: 24.5 UG/ML — HIGH (ref 10–20)

## 2021-04-01 PROCEDURE — 93880 EXTRACRANIAL BILAT STUDY: CPT | Mod: 26

## 2021-04-01 RX ORDER — POTASSIUM CHLORIDE 20 MEQ
40 PACKET (EA) ORAL EVERY 4 HOURS
Refills: 0 | Status: COMPLETED | OUTPATIENT
Start: 2021-04-01 | End: 2021-04-01

## 2021-04-01 RX ADMIN — Medication 40 MILLIEQUIVALENT(S): at 15:01

## 2021-04-01 RX ADMIN — CEFEPIME 100 MILLIGRAM(S): 1 INJECTION, POWDER, FOR SOLUTION INTRAMUSCULAR; INTRAVENOUS at 15:21

## 2021-04-01 RX ADMIN — Medication 40 MILLIGRAM(S): at 17:50

## 2021-04-01 RX ADMIN — APIXABAN 5 MILLIGRAM(S): 2.5 TABLET, FILM COATED ORAL at 06:12

## 2021-04-01 RX ADMIN — CARVEDILOL PHOSPHATE 3.12 MILLIGRAM(S): 80 CAPSULE, EXTENDED RELEASE ORAL at 17:50

## 2021-04-01 RX ADMIN — CARVEDILOL PHOSPHATE 3.12 MILLIGRAM(S): 80 CAPSULE, EXTENDED RELEASE ORAL at 06:12

## 2021-04-01 RX ADMIN — Medication 81 MILLIGRAM(S): at 11:52

## 2021-04-01 RX ADMIN — CEFEPIME 100 MILLIGRAM(S): 1 INJECTION, POWDER, FOR SOLUTION INTRAMUSCULAR; INTRAVENOUS at 21:29

## 2021-04-01 RX ADMIN — SODIUM CHLORIDE 3 MILLILITER(S): 9 INJECTION INTRAMUSCULAR; INTRAVENOUS; SUBCUTANEOUS at 06:14

## 2021-04-01 RX ADMIN — Medication 250 MILLIGRAM(S): at 06:12

## 2021-04-01 RX ADMIN — CEFEPIME 100 MILLIGRAM(S): 1 INJECTION, POWDER, FOR SOLUTION INTRAMUSCULAR; INTRAVENOUS at 06:13

## 2021-04-01 RX ADMIN — Medication 40 MILLIEQUIVALENT(S): at 11:40

## 2021-04-01 RX ADMIN — Medication 40 MILLIGRAM(S): at 06:12

## 2021-04-01 RX ADMIN — SODIUM CHLORIDE 3 MILLILITER(S): 9 INJECTION INTRAMUSCULAR; INTRAVENOUS; SUBCUTANEOUS at 15:01

## 2021-04-01 RX ADMIN — SODIUM CHLORIDE 3 MILLILITER(S): 9 INJECTION INTRAMUSCULAR; INTRAVENOUS; SUBCUTANEOUS at 21:30

## 2021-04-01 RX ADMIN — APIXABAN 5 MILLIGRAM(S): 2.5 TABLET, FILM COATED ORAL at 17:50

## 2021-04-01 NOTE — PROGRESS NOTE ADULT - SUBJECTIVE AND OBJECTIVE BOX
Patient is a 70y old  Male who presents with a chief complaint of CHF exacerbation (01 Apr 2021 06:42)  Patient is awake, alert, comfortable in bed in NAD    INTERVAL HPI/OVERNIGHT EVENTS:      VITAL SIGNS:  T(F): 98.1 (04-01-21 @ 04:42)  HR: 59 (04-01-21 @ 04:42)  BP: 113/71 (04-01-21 @ 04:42)  RR: 18 (04-01-21 @ 04:42)  SpO2: 96% (04-01-21 @ 04:42)  Wt(kg): --  I&O's Detail    31 Mar 2021 07:01  -  01 Apr 2021 07:00  --------------------------------------------------------  IN:  Total IN: 0 mL    OUT:    Voided (mL): 200 mL  Total OUT: 200 mL    Total NET: -200 mL              REVIEW OF SYSTEMS:    CONSTITUTIONAL:  No fevers, chills, sweats    HEENT:  Eyes:  No diplopia or blurred vision. ENT:  No earache, sore throat or runny nose.    CARDIOVASCULAR:  No pressure, squeezing, tightness, or heaviness about the chest; no palpitations.    RESPIRATORY:  Per HPI    GASTROINTESTINAL:  No abdominal pain, nausea, vomiting or diarrhea.    GENITOURINARY:  No dysuria, frequency or urgency.    NEUROLOGIC:  No paresthesias, fasciculations, seizures or weakness.    PSYCHIATRIC:  No disorder of thought or mood.      PHYSICAL EXAM:    Constitutional: Well developed and nourished  Eyes:Perrla  ENMT: normal  Neck:supple  Respiratory: good air entry  Cardiovascular: S1 S2 regular  Gastrointestinal: Soft, Non tender  Extremities: + edema  Vascular:normal  Neurological:Awake, alert,Ox3  Musculoskeletal:Normal      MEDICATIONS  (STANDING):  apixaban 5 milliGRAM(s) Oral every 12 hours  aspirin  chewable 81 milliGRAM(s) Oral daily  atorvastatin 40 milliGRAM(s) Oral at bedtime  carvedilol 3.125 milliGRAM(s) Oral every 12 hours  cefepime   IVPB 1000 milliGRAM(s) IV Intermittent every 8 hours  cefepime   IVPB      collagenase Ointment 1 Application(s) Topical daily  dextrose 40% Gel 15 Gram(s) Oral once  dextrose 5%. 1000 milliLiter(s) (50 mL/Hr) IV Continuous <Continuous>  dextrose 5%. 1000 milliLiter(s) (100 mL/Hr) IV Continuous <Continuous>  dextrose 50% Injectable 25 Gram(s) IV Push once  dextrose 50% Injectable 12.5 Gram(s) IV Push once  dextrose 50% Injectable 25 Gram(s) IV Push once  furosemide    Tablet 40 milliGRAM(s) Oral two times a day  glucagon  Injectable 1 milliGRAM(s) IntraMuscular once  insulin lispro (ADMELOG) corrective regimen sliding scale   SubCutaneous three times a day before meals  insulin lispro (ADMELOG) corrective regimen sliding scale   SubCutaneous at bedtime  lisinopril 5 milliGRAM(s) Oral daily  potassium chloride    Tablet ER 40 milliEquivalent(s) Oral every 4 hours  sodium chloride 0.9% lock flush 3 milliLiter(s) IV Push every 8 hours  vancomycin  IVPB 1000 milliGRAM(s) IV Intermittent every 12 hours    MEDICATIONS  (PRN):      Allergies    Aldactone (Unknown)  Bumex (Blisters; Rash)  metoprolol (Unknown)  spironolactone (Unknown)    Intolerances        LABS:                        12.3   5.53  )-----------( 207      ( 01 Apr 2021 06:50 )             39.9     04-01    138  |  102  |  20<H>  ----------------------------<  84  3.4<L>   |  24  |  1.22    Ca    8.1<L>      01 Apr 2021 06:50                CAPILLARY BLOOD GLUCOSE      POCT Blood Glucose.: 117 mg/dL (01 Apr 2021 07:45)  POCT Blood Glucose.: 131 mg/dL (31 Mar 2021 22:03)  POCT Blood Glucose.: 146 mg/dL (31 Mar 2021 17:02)  POCT Blood Glucose.: 122 mg/dL (31 Mar 2021 11:54)    pro-bnp -- 03-25 @ 13:55     d-dimer 554  03-25 @ 13:55      RADIOLOGY & ADDITIONAL TESTS:    CXR:    Ct scan chest:    ekg;    echo:

## 2021-04-01 NOTE — PROGRESS NOTE ADULT - SUBJECTIVE AND OBJECTIVE BOX
Patient is seen and examined at the bed side, is afebrile. The creatinine is trending back up.      REVIEW OF SYSTEMS: All other review systems are negative        ALLERGIES: Aldactone (Unknown), Bumex (Blisters; Rash)  metoprolol (Unknown), spironolactone (Unknown)      Vital Signs Last 24 Hrs  T(C): 36.2 (01 Apr 2021 15:07), Max: 36.7 (01 Apr 2021 04:42)  T(F): 97.2 (01 Apr 2021 15:07), Max: 98.1 (01 Apr 2021 04:42)  HR: 64 (01 Apr 2021 17:49) (57 - 67)  BP: 104/66 (01 Apr 2021 17:49) (104/66 - 141/83)  BP(mean): --  RR: 18 (01 Apr 2021 17:49) (18 - 18)  SpO2: 95% (01 Apr 2021 17:49) (95% - 98%)      PHYSICAL EXAM:  GENERAL: Not in distress   CHEST/LUNG: Not using accessory muscles   HEART: s1 and s2 present  ABDOMEN:  Nontender and  Nondistended  EXTREMITIES: LLE swollen, mild erythematous, tender with a fluctuance area at lower leg  CNS: Awake and Alert      LABS:                        12.3   5.53  )-----------( 207      ( 01 Apr 2021 06:50 )             39.9                           12.4   5.50  )-----------( 222      ( 31 Mar 2021 07:01 )             40.0       04-01    138  |  102  |  20<H>  ----------------------------<  84  3.4<L>   |  24  |  1.22    Ca    8.1<L>      01 Apr 2021 06:50                 03-31    140  |  105  |  20<H>  ----------------------------<  87  3.9   |  27  |  1.12    Ca    8.0<L>      31 Mar 2021 07:01      TPro  7.4  /  Alb  3.1<L>  /  TBili  0.8  /  DBili  x   /  AST  27  /  ALT  30  /  AlkPhos  163<H>  03-29        CAPILLARY BLOOD GLUCOSE  POCT Blood Glucose.: 124 mg/dL (29 Mar 2021 17:08)  POCT Blood Glucose.: 124 mg/dL (29 Mar 2021 12:24)  POCT Blood Glucose.: 96 mg/dL (29 Mar 2021 07:49)  POCT Blood Glucose.: 112 mg/dL (28 Mar 2021 22:17)      Vancomycin Level, Trough -Pre 4th Dose, order if dosed q6/8/12h (03.31.21 @ 07:01)   Vancomycin Level, Trough: 18.4:      MEDICATIONS  (STANDING):    apixaban 5 milliGRAM(s) Oral every 12 hours  aspirin  chewable 81 milliGRAM(s) Oral daily  atorvastatin 40 milliGRAM(s) Oral at bedtime  carvedilol 3.125 milliGRAM(s) Oral every 12 hours  cefepime   IVPB 1000 milliGRAM(s) IV Intermittent every 8 hours  cefepime   IVPB      collagenase Ointment 1 Application(s) Topical daily  dextrose 40% Gel 15 Gram(s) Oral once  dextrose 5%. 1000 milliLiter(s) (50 mL/Hr) IV Continuous <Continuous>  dextrose 5%. 1000 milliLiter(s) (100 mL/Hr) IV Continuous <Continuous>  dextrose 50% Injectable 25 Gram(s) IV Push once  dextrose 50% Injectable 12.5 Gram(s) IV Push once  dextrose 50% Injectable 25 Gram(s) IV Push once  furosemide    Tablet 40 milliGRAM(s) Oral two times a day  glucagon  Injectable 1 milliGRAM(s) IntraMuscular once  insulin lispro (ADMELOG) corrective regimen sliding scale   SubCutaneous three times a day before meals  insulin lispro (ADMELOG) corrective regimen sliding scale   SubCutaneous at bedtime  lisinopril 5 milliGRAM(s) Oral daily  sodium chloride 0.9% lock flush 3 milliLiter(s) IV Push every 8 hours  vancomycin  IVPB 1000 milliGRAM(s) IV Intermittent every 12 hours        RADIOLOGY & ADDITIONAL TESTS:    3/30/21: CT Lower Extremity w/ IV Cont, Left (03.30.21 @ 13:06) Diffuse soft tissue swelling and skin thickening. Nonspecific finding that can be seen in the setting of cellulitis. No drainable fluid collection.  No CT evidence for osteomyelitis.      3/25/21 : Xray Chest 1 View-PORTABLE IMMEDIATE (Xray Chest 1 View-PORTABLE IMMEDIATE .) (03.25.21 @ 05:52) >  No acute infiltrate. Cardiomegaly. Sternotomy.    3/25/21 : US Duplex Venous Lower Ext Complete, Bilateral (03.25.21 @ 10:38) No evidence of deep venous thrombosis in either lower extremity.      MICROBIOLOGY DATA:    COVID-19 PCR . (03.25.21 @ 12:30)   COVID-19 PCR: NotDetec:

## 2021-04-01 NOTE — PROGRESS NOTE ADULT - SUBJECTIVE AND OBJECTIVE BOX
Patient is a 70y old  Male who presents with a chief complaint of CHF exacerbation (31 Mar 2021 19:54)    pt seen in tele [ x ], reg med floor [   ], bed [ x ], chair at bedside [   ], a+o x3 [ x ], lethargic [  ],    nad [ x ]      Allergies    Aldactone (Unknown)  Bumex (Blisters; Rash)  metoprolol (Unknown)  spironolactone (Unknown)        Vitals    T(F): 98.1 (04-01-21 @ 04:42), Max: 98.1 (04-01-21 @ 04:42)  HR: 59 (04-01-21 @ 04:42) (59 - 67)  BP: 113/71 (04-01-21 @ 04:42) (107/66 - 141/83)  RR: 18 (04-01-21 @ 04:42) (17 - 18)  SpO2: 96% (04-01-21 @ 04:42) (96% - 98%)  Wt(kg): --  CAPILLARY BLOOD GLUCOSE      POCT Blood Glucose.: 131 mg/dL (31 Mar 2021 22:03)      Labs                          12.4   5.50  )-----------( 222      ( 31 Mar 2021 07:01 )             40.0       03-31    140  |  105  |  20<H>  ----------------------------<  87  3.9   |  27  |  1.12    Ca    8.0<L>      31 Mar 2021 07:01                  Radiology Results      Meds    MEDICATIONS  (STANDING):  apixaban 5 milliGRAM(s) Oral every 12 hours  aspirin  chewable 81 milliGRAM(s) Oral daily  atorvastatin 40 milliGRAM(s) Oral at bedtime  carvedilol 3.125 milliGRAM(s) Oral every 12 hours  cefepime   IVPB 1000 milliGRAM(s) IV Intermittent every 8 hours  cefepime   IVPB      collagenase Ointment 1 Application(s) Topical daily  dextrose 40% Gel 15 Gram(s) Oral once  dextrose 5%. 1000 milliLiter(s) (50 mL/Hr) IV Continuous <Continuous>  dextrose 5%. 1000 milliLiter(s) (100 mL/Hr) IV Continuous <Continuous>  dextrose 50% Injectable 25 Gram(s) IV Push once  dextrose 50% Injectable 12.5 Gram(s) IV Push once  dextrose 50% Injectable 25 Gram(s) IV Push once  furosemide    Tablet 40 milliGRAM(s) Oral two times a day  glucagon  Injectable 1 milliGRAM(s) IntraMuscular once  insulin lispro (ADMELOG) corrective regimen sliding scale   SubCutaneous three times a day before meals  insulin lispro (ADMELOG) corrective regimen sliding scale   SubCutaneous at bedtime  lisinopril 5 milliGRAM(s) Oral daily  sodium chloride 0.9% lock flush 3 milliLiter(s) IV Push every 8 hours  vancomycin  IVPB 1000 milliGRAM(s) IV Intermittent every 12 hours      MEDICATIONS  (PRN):      Physical Exam    Neuro :  no focal deficits  Respiratory: CTA B/L  CV: RRR, S1S2, no murmurs,   Abdominal: Soft, NT, ND +BS,  Extremities: b/l le edema, left leg erythema and weeping ulcerations        ASSESSMENT    acute on chronic systolic chf,   pulm edema   r/o acs,   left le cellulitis  h/o HTN,   HLD,   DM,   CAD s/p CABGx3,  PE (pulmonary thromboembolism)    PLAN      cont tele,   acs protocol,   cont coreg, lasix,   aspirin, statin,   ce x3 noted above  cardio f/u  echo with EF 15-20%  Grade II diastolic dysfunction. RV systolic pressure is severely noted    cont lisinopril  cont Eliquis.  supplemental O2 as needed,  cont  bronchodilator,  f/u hgba1c   cont vanco and zosyn  f/u blood cx  id f/u   f/u wound cx   ct left leg with Diffuse soft tissue swelling and skin thickening. Nonspecific finding that can be seen in the setting of cellulitis. No drainable fluid collection.  No CT evidence for osteomyelitis noted above.  podiatry cons  cont current meds         Patient is a 70y old  Male who presents with a chief complaint of CHF exacerbation (31 Mar 2021 19:54)    pt seen in tele [ x ], reg med floor [   ], bed [ x ], chair at bedside [   ], a+o x3 [ x ], lethargic [  ],    nad [ x ]      Allergies    Aldactone (Unknown)  Bumex (Blisters; Rash)  metoprolol (Unknown)  spironolactone (Unknown)        Vitals    T(F): 98.1 (04-01-21 @ 04:42), Max: 98.1 (04-01-21 @ 04:42)  HR: 59 (04-01-21 @ 04:42) (59 - 67)  BP: 113/71 (04-01-21 @ 04:42) (107/66 - 141/83)  RR: 18 (04-01-21 @ 04:42) (17 - 18)  SpO2: 96% (04-01-21 @ 04:42) (96% - 98%)  Wt(kg): --  CAPILLARY BLOOD GLUCOSE      POCT Blood Glucose.: 131 mg/dL (31 Mar 2021 22:03)      Labs                          12.4   5.50  )-----------( 222      ( 31 Mar 2021 07:01 )             40.0       03-31    140  |  105  |  20<H>  ----------------------------<  87  3.9   |  27  |  1.12    Ca    8.0<L>      31 Mar 2021 07:01                  Radiology Results      Meds    MEDICATIONS  (STANDING):  apixaban 5 milliGRAM(s) Oral every 12 hours  aspirin  chewable 81 milliGRAM(s) Oral daily  atorvastatin 40 milliGRAM(s) Oral at bedtime  carvedilol 3.125 milliGRAM(s) Oral every 12 hours  cefepime   IVPB 1000 milliGRAM(s) IV Intermittent every 8 hours  cefepime   IVPB      collagenase Ointment 1 Application(s) Topical daily  dextrose 40% Gel 15 Gram(s) Oral once  dextrose 5%. 1000 milliLiter(s) (50 mL/Hr) IV Continuous <Continuous>  dextrose 5%. 1000 milliLiter(s) (100 mL/Hr) IV Continuous <Continuous>  dextrose 50% Injectable 25 Gram(s) IV Push once  dextrose 50% Injectable 12.5 Gram(s) IV Push once  dextrose 50% Injectable 25 Gram(s) IV Push once  furosemide    Tablet 40 milliGRAM(s) Oral two times a day  glucagon  Injectable 1 milliGRAM(s) IntraMuscular once  insulin lispro (ADMELOG) corrective regimen sliding scale   SubCutaneous three times a day before meals  insulin lispro (ADMELOG) corrective regimen sliding scale   SubCutaneous at bedtime  lisinopril 5 milliGRAM(s) Oral daily  sodium chloride 0.9% lock flush 3 milliLiter(s) IV Push every 8 hours  vancomycin  IVPB 1000 milliGRAM(s) IV Intermittent every 12 hours      MEDICATIONS  (PRN):      Physical Exam    Neuro :  no focal deficits  Respiratory: CTA B/L  CV: RRR, S1S2, no murmurs,   Abdominal: Soft, NT, ND +BS,  Extremities: b/l le edema, b/l le with ace wrap        ASSESSMENT    acute on chronic systolic chf,   pulm edema   r/o acs,   left le cellulitis  h/o HTN,   HLD,   DM,   CAD s/p CABGx3,  PE (pulmonary thromboembolism)    PLAN      cont tele,   acs protocol,   cont coreg, lasix,   aspirin, statin,   ce x3 noted above  cardio f/u  echo with EF 15-20%  Grade II diastolic dysfunction. RV systolic pressure is severely noted    cont lisinopril  cont Eliquis.  supplemental O2 as needed,  cont  bronchodilator,  f/u hgba1c   cont vanco   change  Zosyn  to Cefepime due to high salt content  f/u blood cx  id f/u   ct left leg with Diffuse soft tissue swelling and skin thickening. Nonspecific finding that can be seen in the setting of cellulitis. No drainable fluid collection.  No CT evidence for osteomyelitis noted above.  podiatry cons   Patient advised to elevate and use compression.   Explained to patient that wound is from excessive swelling and there is no signs of infection.   Dressed with santyl, 4x4 gauze, allegra and ace to left LE, and plain ACE wrap on RLE.   ordered santyl   wound care/ nursing dressing instructions:   apply santyl to leg leg wound and cover with 4x4 gauze, allegra and ace to left LE.   Apply plain ACE wrap on RLE.   Change dressing MWF.   Patient stable per podiatry standpoint  cont current meds

## 2021-04-01 NOTE — PROGRESS NOTE ADULT - PROBLEM SELECTOR PLAN 4
B/L LEs with edema 4+, chronic cellulitis and open wound on LLE oozing and painful  -Surgery consulted, no interventions indicated  -continue  Vanco and Zosyn changed to cefepime per ID Dr. Lauren   -ID Dr. Lauren  is following   -CT LLE shows Diffuse soft tissue swelling and skin thickening. Nonspecific finding that can be seen in the setting of cellulitis. No drainable fluid collection.  No CT evidence for osteomyelitis.    Cont Abx for now-  podiatry consulted  - no need wound culture, continue santyl with compression dressing    f/u Vanco trough 18.4 WNL, f/u vanco trough

## 2021-04-01 NOTE — PROGRESS NOTE ADULT - ASSESSMENT
Patient is a 70y old  Male from home with PMH HTN, HLD, DM, PE, CAD s/p CABG (3.5 yrs ago), presents to the ER on 3/25/21  for evaluation of leg swelling, scrotal swelling  and difficulty breathing for months.  He endorses chest tightness, shortness of breath on exertion, requiring him to take frequent breaks when walking or climbing stairs. can walk less than 2 blocks. Today, 3/29/21, He has started on Zosyn and Vancomycin and The ID consult requested to assist with further evaluation of LLE cellulitis and antibiotic management.     # LLE cellulitis with suspected Abscess - CT scan of LLE shows Diffuse soft tissue swelling and skin thickening.  No drainable fluid collection. And No CT evidence for osteomyelitis.      Would recommend:      1. Continue Cefepime and Vancomycin for now  2. May change to oral Augmentin 875 mg q 12hours and Doxycycline 100 mg [id number] in 48 hours to complete 14 days course  3. Monitor kidney function and Adjust Abx doses accordingly   4. Keep LLE elevated  5. Pain management as needed    d/w Covering NP    Attending Attestation:     Spent more than 45 minutes on total encounter, more than 50 % of the visit was spent counseling and/or coordinating care by the Attending physician.

## 2021-04-01 NOTE — PROGRESS NOTE ADULT - PROBLEM SELECTOR PLAN 1
presented with BLEs/ scrotum swelling   Due to non compliance with meds, diet and f/u care-stable  ECHO shows severe systolic HF with EF 15-20% and GIIDD  Card Dr. Rhodes  Cont ASA, BB, statin, ACE  Cont Lasix 40mg BID  Strict I&O and daily weights  improving swelling  to b/l lower ext, resolved pedal edema  scrotum swelling improving presented with BLEs/ scrotum swelling   Due to non compliance with meds, diet and f/u care-stable  ECHO shows severe systolic HF with EF 15-20% and GIIDD  Card Dr. Rhodes  Cont ASA, BB, statin, ACE  Cont Lasix 40mg BID  Strict I&O and daily weights  improving swelling  to b/l lower ext, resolved pedal edema  scrotum swelling improving  pending carotid doppler

## 2021-04-01 NOTE — PROGRESS NOTE ADULT - SUBJECTIVE AND OBJECTIVE BOX
NP Note discussed with  Primary Attending    Patient is a 70y old  Male who presents with a chief complaint of CHF exacerbation (01 Apr 2021 11:38)      INTERVAL HPI/OVERNIGHT EVENTS: no new complaints    MEDICATIONS  (STANDING):  apixaban 5 milliGRAM(s) Oral every 12 hours  aspirin  chewable 81 milliGRAM(s) Oral daily  atorvastatin 40 milliGRAM(s) Oral at bedtime  carvedilol 3.125 milliGRAM(s) Oral every 12 hours  cefepime   IVPB 1000 milliGRAM(s) IV Intermittent every 8 hours  cefepime   IVPB      collagenase Ointment 1 Application(s) Topical daily  dextrose 40% Gel 15 Gram(s) Oral once  dextrose 5%. 1000 milliLiter(s) (50 mL/Hr) IV Continuous <Continuous>  dextrose 5%. 1000 milliLiter(s) (100 mL/Hr) IV Continuous <Continuous>  dextrose 50% Injectable 25 Gram(s) IV Push once  dextrose 50% Injectable 12.5 Gram(s) IV Push once  dextrose 50% Injectable 25 Gram(s) IV Push once  furosemide    Tablet 40 milliGRAM(s) Oral two times a day  glucagon  Injectable 1 milliGRAM(s) IntraMuscular once  insulin lispro (ADMELOG) corrective regimen sliding scale   SubCutaneous three times a day before meals  insulin lispro (ADMELOG) corrective regimen sliding scale   SubCutaneous at bedtime  lisinopril 5 milliGRAM(s) Oral daily  potassium chloride    Tablet ER 40 milliEquivalent(s) Oral every 4 hours  sodium chloride 0.9% lock flush 3 milliLiter(s) IV Push every 8 hours  vancomycin  IVPB 1000 milliGRAM(s) IV Intermittent every 12 hours    MEDICATIONS  (PRN):      __________________________________________________  REVIEW OF SYSTEMS:    CONSTITUTIONAL: No fever,   EYES: no acute visual disturbances  NECK: No pain or stiffness  RESPIRATORY: No cough; No shortness of breath  CARDIOVASCULAR: No chest pain, no palpitations  GASTROINTESTINAL: No pain. No nausea or vomiting; No diarrhea   NEUROLOGICAL: No headache or numbness, no tremors  MUSCULOSKELETAL: No joint pain, no muscle pain  GENITOURINARY: no dysuria, no frequency, no hesitancy, hard to touch scrotum area   PSYCHIATRY: no depression , no anxiety  ALL OTHER  ROS negative        Vital Signs Last 24 Hrs  T(C): 36.7 (01 Apr 2021 04:42), Max: 36.7 (01 Apr 2021 04:42)  T(F): 98.1 (01 Apr 2021 04:42), Max: 98.1 (01 Apr 2021 04:42)  HR: 59 (01 Apr 2021 04:42) (59 - 67)  BP: 113/71 (01 Apr 2021 04:42) (107/66 - 141/83)  BP(mean): --  RR: 18 (01 Apr 2021 04:42) (17 - 18)  SpO2: 96% (01 Apr 2021 04:42) (96% - 98%)    ________________________________________________  PHYSICAL EXAM:  GENERAL: NAD  HEENT: Normocephalic;  conjunctivae and sclerae clear; moist mucous membranes;   NECK : supple  CHEST/LUNG: Clear to auscultation bilaterally with good air entry   HEART: S1 S2  regular; no murmurs, gallops or rubs  ABDOMEN: Soft, Nontender, Nondistended; Bowel sounds present  EXTREMITIES: no pedal edema, +3 edema to LLE, +2 edema to RLE. Ace wrap applied in b/l lower extremities   SKIN: warm and dry; no rash  NERVOUS SYSTEM:  Awake and alert; Oriented  to place, person and time ; no new deficits    _________________________________________________  LABS:                        12.3   5.53  )-----------( 207      ( 01 Apr 2021 06:50 )             39.9     04-01    138  |  102  |  20<H>  ----------------------------<  84  3.4<L>   |  24  |  1.22    Ca    8.1<L>      01 Apr 2021 06:50          CAPILLARY BLOOD GLUCOSE      POCT Blood Glucose.: 120 mg/dL (01 Apr 2021 11:48)  POCT Blood Glucose.: 117 mg/dL (01 Apr 2021 07:45)  POCT Blood Glucose.: 131 mg/dL (31 Mar 2021 22:03)  POCT Blood Glucose.: 146 mg/dL (31 Mar 2021 17:02)        RADIOLOGY & ADDITIONAL TESTS:  < from: CT Lower Extremity w/ IV Cont, Left (03.30.21 @ 13:06) >  EXAM:  CT LWR EXT IC LT                            PROCEDURE DATE:  03/30/2021          INTERPRETATION:  CT LOWER EXTREMITY WITH IV CONTRAST LEFT dated 3/30/2021 1:06 PM    INDICATION: Pain and swelling    COMPARISON: None available.    TECHNIQUE: CT imaging of the left lower extremity was performed with contrast. The data was reformatted in the axial, coronal, and sagittal planes. Contrast: Omnipaque 350. Administered: 90 cc. Discarded: 10 cc.    FINDINGS:    OSSEOUS STRUCTURES: There is no fracture. No osteonecrosis is noted. Mild medial knee joint space narrowing identified. The tibiotalar, posterior subtalar, middle subtalar, calcaneocuboid joint are preserved. Spurring along the superior aspect of the patella. No cortical erosion or destruction noted. No significant periosteal reaction.  SYNOVIUM/ JOINT FLUID: No knee joint effusion. No popliteal cyst.  TENDONS: The tendons are intact. No full-thickness tendon tear or retraction is identified.  MUSCLES: There is no intramuscular hematoma.  NEUROVASCULAR STRUCTURES: Mild scattered vascular calcifications.  SUBCUTANEOUS SOFT TISSUES: Diffuse severe soft tissue swelling and skin thickening about the calf. No rim-enhancing fluid collection within the soft tissues. No soft tissue gas identified. This edema extends into the dorsum of the hindfoot      IMPRESSION:    Diffuse soft tissue swelling and skin thickening. Nonspecific finding that can be seen in the setting of cellulitis. No drainable fluid collection.  No CT evidence for osteomyelitis.            KIP COREY MD; Attending Radiologist  This document has been electronically signed. Mar 30 2021  1:30PM    < end of copied text >  < from: US Duplex Venous Lower Ext Complete, Bilateral (03.25.21 @ 10:38) >    EXAM:  US DPLX LWR EXT VEINS COMPL BI                            PROCEDURE DATE:  03/25/2021          INTERPRETATION:  CLINICAL INFORMATION: Leg swelling    COMPARISON: 11/16/2017    TECHNIQUE: Duplex sonography of the BILATERAL LOWER extremity veins with color and spectral Doppler, with and without compression.    FINDINGS:    RIGHT:  Normal compressibility of the RIGHT common femoral, femoral and popliteal veins.  Doppler examination shows normal spontaneous and phasic flow.  No RIGHT calf vein thrombosis is detected.    LEFT:  Normal compressibility of the LEFT common femoral, femoral and popliteal veins.  Doppler examination shows normal spontaneous and phasic flow.  No LEFT calf vein thrombosis is detected.    Enlarged bilateral groin lymph nodes are noted.    IMPRESSION:  No evidence of deep venous thrombosis in either lower extremity.                LYNETTE STEVE MD; Attending Radiologist  This document has been electronically signed. Mar 25 2021 11:22AM    < end of copied text >  < from: Xray Chest 1 View-PORTABLE IMMEDIATE (Xray Chest 1 View-PORTABLE IMMEDIATE .) (03.25.21 @ 05:52) >  EXAM:  XR CHEST PORTABLE IMMED 1V                            PROCEDURE DATE:  03/25/2021          INTERPRETATION:  CLINICAL INDICATION: 70 years  Male with Shortness of Breath.    COMPARISON: 4/8/2020    Apical lordotic view of the chest demonstrates the lungs to be clear. There is no pleural effusion. There is no pneumothorax.    The heart is enlarged. The and sternotomy wires are present.. There is no mediastinal or hilar mass.    The pulmonary vasculature is normal.    Mild thoracic degenerative changes are present.    IMPRESSION:    No acute infiltrate. Cardiomegaly. Sternotomy.    Limited by projection. Recommend repeat.            CRISTIANO ALBA MD; Attending Radiologist  This document has been electronically signed. Mar 25 2021  8:44AM    < end of copied text >    Imaging Personally Reviewed:  YES    Consultant(s) Notes Reviewed:   YES    Care Discussed with Consultants : cardiology/ ID / podiatry/ pulmonology     Plan of care was discussed with patient and /or primary care giver; all questions and concerns were addressed and care was aligned with patient's wishes.

## 2021-04-01 NOTE — PROGRESS NOTE ADULT - CONVERSATION DETAILS
patient does not have any family and never , no child. Patient can make decision himself but then does not want to make decisions today. He thinks the treatment is working well at this time, wanted to have a time to think about what he wants in terms of code status. He has some friends who he will consider to discuss this also. Will have another discussion later during this admission

## 2021-04-01 NOTE — PROGRESS NOTE ADULT - SUBJECTIVE AND OBJECTIVE BOX
CHIEF COMPLAINT:Patient is a 70y old  Male who presents with a chief complaint of CHF exacerbation.Pt appears comfortable.    	  REVIEW OF SYSTEMS:  CONSTITUTIONAL: No fever, weight loss, or fatigue  EYES: No eye pain, visual disturbances, or discharge  ENT:  No difficulty hearing, tinnitus, vertigo; No sinus or throat pain  NECK: No pain or stiffness  RESPIRATORY: No cough, wheezing, chills or hemoptysis; No Shortness of Breath  CARDIOVASCULAR: No chest pain, palpitations, passing out, dizziness, or leg swelling  GASTROINTESTINAL: No abdominal or epigastric pain. No nausea, vomiting, or hematemesis; No diarrhea or constipation. No melena or hematochezia.  GENITOURINARY: No dysuria, frequency, hematuria, or incontinence  NEUROLOGICAL: No headaches, memory loss, loss of strength, numbness, or tremors  SKIN: No itching, burning, rashes, or lesions   LYMPH Nodes: No enlarged glands  ENDOCRINE: No heat or cold intolerance; No hair loss  MUSCULOSKELETAL: No joint pain or swelling; No muscle, back, or extremity pain  PSYCHIATRIC: No depression, anxiety, mood swings, or difficulty sleeping  HEME/LYMPH: No easy bruising, or bleeding gums  ALLERGY AND IMMUNOLOGIC: No hives or eczema	      PHYSICAL EXAM:  T(C): 36.7 (04-01-21 @ 04:42), Max: 36.7 (04-01-21 @ 04:42)  HR: 59 (04-01-21 @ 04:42) (59 - 67)  BP: 113/71 (04-01-21 @ 04:42) (107/66 - 141/83)  RR: 18 (04-01-21 @ 04:42) (17 - 18)  SpO2: 96% (04-01-21 @ 04:42) (96% - 98%)  Wt(kg): --  I&O's Summary    31 Mar 2021 07:01  -  01 Apr 2021 07:00  --------------------------------------------------------  IN: 0 mL / OUT: 200 mL / NET: -200 mL        Appearance: Normal	  HEENT:   Normal oral mucosa, PERRL, EOMI	  Lymphatic: No lymphadenopathy  Cardiovascular: Normal S1 S2, No JVD, No murmurs, B/L dressing  Respiratory: Lungs clear to auscultation	  Psychiatry: A & O x 3, Mood & affect appropriate  Gastrointestinal:  Soft, Non-tender, + BS	  Skin: No rashes, No ecchymoses, No cyanosis	  Neurologic: Non-focal  Extremities: Normal range of motion, No clubbing, cyanosis,B/L dressing  Vascular: Peripheral pulses palpable 2+ bilaterally    MEDICATIONS  (STANDING):  apixaban 5 milliGRAM(s) Oral every 12 hours  aspirin  chewable 81 milliGRAM(s) Oral daily  atorvastatin 40 milliGRAM(s) Oral at bedtime  carvedilol 3.125 milliGRAM(s) Oral every 12 hours  cefepime   IVPB 1000 milliGRAM(s) IV Intermittent every 8 hours  cefepime   IVPB      collagenase Ointment 1 Application(s) Topical daily  dextrose 40% Gel 15 Gram(s) Oral once  dextrose 5%. 1000 milliLiter(s) (100 mL/Hr) IV Continuous <Continuous>  dextrose 5%. 1000 milliLiter(s) (50 mL/Hr) IV Continuous <Continuous>  dextrose 50% Injectable 25 Gram(s) IV Push once  dextrose 50% Injectable 12.5 Gram(s) IV Push once  dextrose 50% Injectable 25 Gram(s) IV Push once  furosemide    Tablet 40 milliGRAM(s) Oral two times a day  glucagon  Injectable 1 milliGRAM(s) IntraMuscular once  insulin lispro (ADMELOG) corrective regimen sliding scale   SubCutaneous three times a day before meals  insulin lispro (ADMELOG) corrective regimen sliding scale   SubCutaneous at bedtime  lisinopril 5 milliGRAM(s) Oral daily  potassium chloride    Tablet ER 40 milliEquivalent(s) Oral every 4 hours  sodium chloride 0.9% lock flush 3 milliLiter(s) IV Push every 8 hours  vancomycin  IVPB 1000 milliGRAM(s) IV Intermittent every 12 hours      	  	  LABS:	 	                         12.3   5.53  )-----------( 207      ( 01 Apr 2021 06:50 )             39.9     04-01    138  |  102  |  20<H>  ----------------------------<  84  3.4<L>   |  24  |  1.22    Ca    8.1<L>      01 Apr 2021 06:50      proBNP: Serum Pro-Brain Natriuretic Peptide: 6970 pg/mL (03-25 @ 06:19)    Lipid Profile: Cholesterol 119  HDL 24  TG 63

## 2021-04-01 NOTE — PROGRESS NOTE ADULT - ASSESSMENT
70y M from home with PMH HTN, HLD, DM, PE, CAD s/p CABG (3.5 yrs ago), presenting with leg swelling, scrotal swelling  and difficulty breathing for months,acute on chronic systolic HF.  1.Pt needs to be compliant with  cardiac medication.  2.Acute on chronic systolic HF-coreg,ace,lasix po.  3.Pt reports allergy to aldactone.  4.CAD-asa,b blocker,statin.  5.PE-Eliquis.  6.DM-Insulin.  7.HTN-coreg,ace.  8.Lt leg wound,cellulitis-ABX.  9.PPI.  10.D/W pt regarding stress test-R/O ischemia,will consider it.

## 2021-04-01 NOTE — PROGRESS NOTE ADULT - ASSESSMENT
70y M from home with PMH HTN, HLD, DM, PE, CAD s/p CABG (3.5 yrs ago), presented  with leg swelling, scrotal swelling  and difficulty breathing for months.   In ED CXR No acute infiltrate. Cardiomegaly  ECG : first degree heart block, Qtc is prolonged 491  BNP 6970, CXR   pt received Lasix 80mg X1   Admitted for acute on chronic systolic heart failure   Seen at bedside, states is feeling better, refused to be minitored on cardiac monitor.   Co leg  heaviness   venous doppler negative for DVT   TTE with EF 15-20%  Troponin negative     3/29-c/o LLE open wound on anterior mid portion of shin, wound oozing and painful.  Surgery consulted and saw pt., no interventions recommended.  Vanco and Zosyn started, ID Dr. Lauren consulted.  Pt. with persisting LEs edema and cellulitis, ambulates freely.    3/30-CT LLE with contrast showed Diffuse soft tissue swelling and skin thickening. Nonspecific finding that can be seen in the setting of cellulitis. No drainable fluid collection.  No CT evidence for osteomyelitis.    Compliance with medications, diet and f/u care reinforced with pt.  Will discharge to home when cleared by ID.  3/31 - seen and examined pt at bedside, AO x 3. less pain reported.  Podiatry called for possible wound cultures from LLE wound, continue IV ABT     4/1 - not in distress, edema improves,  voiding freely, no c/o voiced

## 2021-04-02 DIAGNOSIS — L03.119 CELLULITIS OF UNSPECIFIED PART OF LIMB: ICD-10-CM

## 2021-04-02 LAB
A1C WITH ESTIMATED AVERAGE GLUCOSE RESULT: 6.6 % — HIGH (ref 4–5.6)
ANION GAP SERPL CALC-SCNC: 7 MMOL/L — SIGNIFICANT CHANGE UP (ref 5–17)
BUN SERPL-MCNC: 21 MG/DL — HIGH (ref 7–18)
CALCIUM SERPL-MCNC: 8.3 MG/DL — LOW (ref 8.4–10.5)
CHLORIDE SERPL-SCNC: 106 MMOL/L — SIGNIFICANT CHANGE UP (ref 96–108)
CO2 SERPL-SCNC: 26 MMOL/L — SIGNIFICANT CHANGE UP (ref 22–31)
COVID-19 SPIKE DOMAIN AB INTERP: POSITIVE
COVID-19 SPIKE DOMAIN ANTIBODY RESULT: 149 U/ML — HIGH
CREAT SERPL-MCNC: 1.09 MG/DL — SIGNIFICANT CHANGE UP (ref 0.5–1.3)
ESTIMATED AVERAGE GLUCOSE: 143 MG/DL — HIGH (ref 68–114)
GLUCOSE BLDC GLUCOMTR-MCNC: 111 MG/DL — HIGH (ref 70–99)
GLUCOSE BLDC GLUCOMTR-MCNC: 113 MG/DL — HIGH (ref 70–99)
GLUCOSE BLDC GLUCOMTR-MCNC: 144 MG/DL — HIGH (ref 70–99)
GLUCOSE BLDC GLUCOMTR-MCNC: 174 MG/DL — HIGH (ref 70–99)
GLUCOSE SERPL-MCNC: 95 MG/DL — SIGNIFICANT CHANGE UP (ref 70–99)
HCT VFR BLD CALC: 39.9 % — SIGNIFICANT CHANGE UP (ref 39–50)
HGB BLD-MCNC: 12.3 G/DL — LOW (ref 13–17)
MCHC RBC-ENTMCNC: 25.7 PG — LOW (ref 27–34)
MCHC RBC-ENTMCNC: 30.8 GM/DL — LOW (ref 32–36)
MCV RBC AUTO: 83.5 FL — SIGNIFICANT CHANGE UP (ref 80–100)
NRBC # BLD: 0 /100 WBCS — SIGNIFICANT CHANGE UP (ref 0–0)
PLATELET # BLD AUTO: 229 K/UL — SIGNIFICANT CHANGE UP (ref 150–400)
POTASSIUM SERPL-MCNC: 4.3 MMOL/L — SIGNIFICANT CHANGE UP (ref 3.5–5.3)
POTASSIUM SERPL-SCNC: 4.3 MMOL/L — SIGNIFICANT CHANGE UP (ref 3.5–5.3)
RBC # BLD: 4.78 M/UL — SIGNIFICANT CHANGE UP (ref 4.2–5.8)
RBC # FLD: 17.6 % — HIGH (ref 10.3–14.5)
SARS-COV-2 IGG+IGM SERPL QL IA: 149 U/ML — HIGH
SARS-COV-2 IGG+IGM SERPL QL IA: POSITIVE
SODIUM SERPL-SCNC: 139 MMOL/L — SIGNIFICANT CHANGE UP (ref 135–145)
VANCOMYCIN TROUGH SERPL-MCNC: 17.5 UG/ML — SIGNIFICANT CHANGE UP (ref 10–20)
WBC # BLD: 5.46 K/UL — SIGNIFICANT CHANGE UP (ref 3.8–10.5)
WBC # FLD AUTO: 5.46 K/UL — SIGNIFICANT CHANGE UP (ref 3.8–10.5)

## 2021-04-02 RX ADMIN — Medication 81 MILLIGRAM(S): at 12:08

## 2021-04-02 RX ADMIN — APIXABAN 5 MILLIGRAM(S): 2.5 TABLET, FILM COATED ORAL at 06:04

## 2021-04-02 RX ADMIN — Medication 1 APPLICATION(S): at 12:08

## 2021-04-02 RX ADMIN — APIXABAN 5 MILLIGRAM(S): 2.5 TABLET, FILM COATED ORAL at 17:21

## 2021-04-02 RX ADMIN — Medication 40 MILLIGRAM(S): at 06:04

## 2021-04-02 RX ADMIN — ATORVASTATIN CALCIUM 40 MILLIGRAM(S): 80 TABLET, FILM COATED ORAL at 22:17

## 2021-04-02 RX ADMIN — SODIUM CHLORIDE 3 MILLILITER(S): 9 INJECTION INTRAMUSCULAR; INTRAVENOUS; SUBCUTANEOUS at 15:02

## 2021-04-02 RX ADMIN — Medication 40 MILLIGRAM(S): at 17:21

## 2021-04-02 RX ADMIN — CEFEPIME 100 MILLIGRAM(S): 1 INJECTION, POWDER, FOR SOLUTION INTRAMUSCULAR; INTRAVENOUS at 06:04

## 2021-04-02 RX ADMIN — SODIUM CHLORIDE 3 MILLILITER(S): 9 INJECTION INTRAMUSCULAR; INTRAVENOUS; SUBCUTANEOUS at 06:01

## 2021-04-02 RX ADMIN — Medication 250 MILLIGRAM(S): at 07:44

## 2021-04-02 RX ADMIN — Medication 250 MILLIGRAM(S): at 18:18

## 2021-04-02 RX ADMIN — CARVEDILOL PHOSPHATE 3.12 MILLIGRAM(S): 80 CAPSULE, EXTENDED RELEASE ORAL at 17:21

## 2021-04-02 RX ADMIN — CARVEDILOL PHOSPHATE 3.12 MILLIGRAM(S): 80 CAPSULE, EXTENDED RELEASE ORAL at 06:04

## 2021-04-02 RX ADMIN — CEFEPIME 100 MILLIGRAM(S): 1 INJECTION, POWDER, FOR SOLUTION INTRAMUSCULAR; INTRAVENOUS at 15:02

## 2021-04-02 NOTE — PROGRESS NOTE ADULT - PROBLEM SELECTOR PLAN 2
S/P CABG 3.5 years ago  Cont Lasix 40 mg bid, Coreg 3.125 , Lisinopril 5 qd , Aspirin 81, statin 40qd  d.stan tele   Cardio Dr Rhodes - Declining stress test  - Continue current medication regimen; ASA, BB, statin, ACE

## 2021-04-02 NOTE — PROGRESS NOTE ADULT - SUBJECTIVE AND OBJECTIVE BOX
NP Note discussed with  Primary Attending    Patient is a 70y old  Male who presents with a chief complaint of CHF exacerbation (02 Apr 2021 13:15)      INTERVAL HPI/OVERNIGHT EVENTS: no new complaints    MEDICATIONS  (STANDING):  apixaban 5 milliGRAM(s) Oral every 12 hours  aspirin  chewable 81 milliGRAM(s) Oral daily  atorvastatin 40 milliGRAM(s) Oral at bedtime  carvedilol 3.125 milliGRAM(s) Oral every 12 hours  cefepime   IVPB 1000 milliGRAM(s) IV Intermittent every 8 hours  cefepime   IVPB      collagenase Ointment 1 Application(s) Topical daily  dextrose 40% Gel 15 Gram(s) Oral once  dextrose 5%. 1000 milliLiter(s) (50 mL/Hr) IV Continuous <Continuous>  dextrose 5%. 1000 milliLiter(s) (100 mL/Hr) IV Continuous <Continuous>  dextrose 50% Injectable 25 Gram(s) IV Push once  dextrose 50% Injectable 12.5 Gram(s) IV Push once  dextrose 50% Injectable 25 Gram(s) IV Push once  furosemide    Tablet 40 milliGRAM(s) Oral two times a day  glucagon  Injectable 1 milliGRAM(s) IntraMuscular once  insulin lispro (ADMELOG) corrective regimen sliding scale   SubCutaneous three times a day before meals  insulin lispro (ADMELOG) corrective regimen sliding scale   SubCutaneous at bedtime  lisinopril 5 milliGRAM(s) Oral daily  sodium chloride 0.9% lock flush 3 milliLiter(s) IV Push every 8 hours  vancomycin  IVPB 1000 milliGRAM(s) IV Intermittent every 12 hours    MEDICATIONS  (PRN):      __________________________________________________  REVIEW OF SYSTEMS:    CONSTITUTIONAL: No fever,   EYES: no acute visual disturbances  NECK: No pain or stiffness  RESPIRATORY: No cough; No shortness of breath  CARDIOVASCULAR: No chest pain, no palpitations  GASTROINTESTINAL: No pain. No nausea or vomiting; No diarrhea   NEUROLOGICAL: No headache or numbness, no tremors  MUSCULOSKELETAL: No joint pain, no muscle pain  GENITOURINARY: no dysuria, no frequency, no hesitancy  PSYCHIATRY: no depression , no anxiety  ALL OTHER  ROS negative        Vital Signs Last 24 Hrs  T(C): 36 (02 Apr 2021 14:19), Max: 36.6 (01 Apr 2021 21:29)  T(F): 96.8 (02 Apr 2021 14:19), Max: 97.9 (01 Apr 2021 21:29)  HR: 64 (02 Apr 2021 14:19) (54 - 64)  BP: 148/80 (02 Apr 2021 14:19) (104/66 - 148/80)  BP(mean): --  RR: 18 (02 Apr 2021 14:19) (18 - 18)  SpO2: 94% (02 Apr 2021 14:19) (94% - 100%)    ________________________________________________  PHYSICAL EXAM:  GENERAL: NAD  HEENT: Normocephalic;  conjunctivae and sclerae clear; moist mucous membranes;   NECK : supple  CHEST/LUNG: Clear to auscultation bilaterally with good air entry   HEART: S1 S2  regular; no murmurs, gallops or rubs  ABDOMEN: Soft, Nontender, Nondistended; Bowel sounds present  EXTREMITIES: no cyanosis; no edema; no calf tenderness  SKIN: warm and dry; no rash  NERVOUS SYSTEM:  Awake and alert; Oriented  to place, person and time ; no new deficits    _________________________________________________  LABS:                        12.3   5.46  )-----------( 229      ( 02 Apr 2021 06:15 )             39.9     04-02    139  |  106  |  21<H>  ----------------------------<  95  4.3   |  26  |  1.09    Ca    8.3<L>      02 Apr 2021 06:15          CAPILLARY BLOOD GLUCOSE      POCT Blood Glucose.: 174 mg/dL (02 Apr 2021 11:42)  POCT Blood Glucose.: 111 mg/dL (02 Apr 2021 08:04)  POCT Blood Glucose.: 127 mg/dL (01 Apr 2021 21:21)        RADIOLOGY & ADDITIONAL TESTS:    Imaging Personally Reviewed:  YES/NO    Consultant(s) Notes Reviewed:   YES/ No    Care Discussed with Consultants :     Plan of care was discussed with patient and /or primary care giver; all questions and concerns were addressed and care was aligned with patient's wishes.     NP Note discussed with  Primary Attending      INTERVAL HPI/OVERNIGHT EVENTS: Patient with  no new complaints.    MEDICATIONS  (STANDING):  apixaban 5 milliGRAM(s) Oral every 12 hours  aspirin  chewable 81 milliGRAM(s) Oral daily  atorvastatin 40 milliGRAM(s) Oral at bedtime  carvedilol 3.125 milliGRAM(s) Oral every 12 hours  cefepime   IVPB 1000 milliGRAM(s) IV Intermittent every 8 hours  cefepime   IVPB      collagenase Ointment 1 Application(s) Topical daily  dextrose 40% Gel 15 Gram(s) Oral once  dextrose 5%. 1000 milliLiter(s) (50 mL/Hr) IV Continuous <Continuous>  dextrose 5%. 1000 milliLiter(s) (100 mL/Hr) IV Continuous <Continuous>  dextrose 50% Injectable 25 Gram(s) IV Push once  dextrose 50% Injectable 12.5 Gram(s) IV Push once  dextrose 50% Injectable 25 Gram(s) IV Push once  furosemide    Tablet 40 milliGRAM(s) Oral two times a day  glucagon  Injectable 1 milliGRAM(s) IntraMuscular once  insulin lispro (ADMELOG) corrective regimen sliding scale   SubCutaneous three times a day before meals  insulin lispro (ADMELOG) corrective regimen sliding scale   SubCutaneous at bedtime  lisinopril 5 milliGRAM(s) Oral daily  sodium chloride 0.9% lock flush 3 milliLiter(s) IV Push every 8 hours  vancomycin  IVPB 1000 milliGRAM(s) IV Intermittent every 12 hours    MEDICATIONS  (PRN):      __________________________________________________  REVIEW OF SYSTEMS:    CONSTITUTIONAL: No fever,   RESPIRATORY: No cough; No shortness of breath  CARDIOVASCULAR: No chest pain, no palpitations  GASTROINTESTINAL: No pain. No nausea or vomiting; No diarrhea   NEUROLOGICAL: No headache or numbness, no tremors  MUSCULOSKELETAL: No joint pain, no muscle pain  GENITOURINARY: no dysuria, no frequency, no hesitancy  PSYCHIATRY: no depression , no anxiety  ALL OTHER  ROS negative        Vital Signs Last 24 Hrs  T(C): 36 (02 Apr 2021 14:19), Max: 36.6 (01 Apr 2021 21:29)  T(F): 96.8 (02 Apr 2021 14:19), Max: 97.9 (01 Apr 2021 21:29)  HR: 64 (02 Apr 2021 14:19) (54 - 64)  BP: 148/80 (02 Apr 2021 14:19) (104/66 - 148/80)  BP(mean): --  RR: 18 (02 Apr 2021 14:19) (18 - 18)  SpO2: 94% (02 Apr 2021 14:19) (94% - 100%)    ________________________________________________  PHYSICAL EXAM:  GENERAL: NAD  HEENT: Normocephalic; Atraumatic   NECK : supple  CHEST/LUNG: Clear to auscultation bilaterally with good air entry   HEART: S1 S2  regular; no murmurs, gallops or rubs  ABDOMEN: Softly distended, Nontender, +Bowel sounds present  EXTREMITIES:+ bilateral LE dressings with ace wrap; C/D/I   NERVOUS SYSTEM:  Awake and alert; Oriented  to place, person and time ; no new deficits    _________________________________________________  LABS:                        12.3   5.46  )-----------( 229      ( 02 Apr 2021 06:15 )             39.9     04-02    139  |  106  |  21<H>  ----------------------------<  95  4.3   |  26  |  1.09    Ca    8.3<L>      02 Apr 2021 06:15          CAPILLARY BLOOD GLUCOSE      POCT Blood Glucose.: 174 mg/dL (02 Apr 2021 11:42)  POCT Blood Glucose.: 111 mg/dL (02 Apr 2021 08:04)  POCT Blood Glucose.: 127 mg/dL (01 Apr 2021 21:21)        RADIOLOGY & ADDITIONAL TESTS:    < from: Transthoracic Echocardiogram (03.25.21 @ 12:35) >    Patient name: LIDIA PÉREZ  YOB: 1950   Age: 70 (M)   MR#: [id number]  Study Date: 3/25/2021  Location: [address] [place], [place] [address]Sonographer: Johana Akers [facility]  Study quality: Fair  Referring Physician:  CHEKO BOONE MD  Blood Pressure: 139/87 mmHg  Height: 170 cm  Weight: 96 kg  BSA: 2.1 m2  ------------------------------------------------------------------------    PROCEDURE: Transthoracic echocardiogram with 2-D, M-Mode  and complete spectral and color flow Doppler.  INDICATION: Chronic ischemic heart disease, unspecified  (I25.9)  HISTORY:  ------------------------------------------------------------------------  DIMENSIONS:  Dimensions:     Normal Values:  LA:     4.8 cm    2.0 - 4.0 cm  Ao:     3.5 cm    2.0 - 3.8 cm  SEPTUM:0.9 cm    0.6 - 1.2 cm  PWT:    1.0 cm    0.6 - 1.1 cm  LVIDd:  6.0 cm    3.0 - 5.6 cm  LVIDs:  5.6 cm    1.8 - 4.0 cm      Derived Variables:  LVMI: 112 g/m2  RWT: 0.33  Ejection Fraction Visual Estimate: 15-20 %  Peak Velocity (m/sec): AoV=2.0  ------------------------------------------------------------------------  OBSERVATIONS:  Mitral Valve: Tethered mitral valve leaflets. Mild to  moderate mitral regurgitation.  Aortic Root: Normal aortic root.  Aortic Valve: Calcified trileaflet aortic valve with  decreased opening. Peak transaortic valve gradient equals  16.8 mm Hg, mean transaortic valve gradient equals 10 mm  Hg, consistent with mild aortic stenosis. The aortic valve  apears more stenotic than indicated by gradients. In  setting oflow EF, cannot rule out paradoxical low-flow  low-gradient AS. Consider dobutamine stress echocardiogram  for further assessment if clinically indicated.   Trace  aortic regurgitation.  Left Atrium: Normal left atrium.  LA volume index = 28  cc/m2.  Left Ventricle: Severe global left ventricular systolic  dysfunction (EF 15-20% by visual estimation). Not all LV  wall segments were seen. Mild left ventricular enlargement.  Grade II diastolic dysfunction.  Right Heart: Normal right atrium. Off axisimages preclude  accurate assessment of right ventricular size. Reduced RV  systolic function (TAPSE 1.0 cm). Normal tricuspid valve.  Moderate to severe tricuspid regurgitation. Pulmonic valve  not well seen. Trace pulmonic insufficiency is noted.  Pericardium/PleuraNo pericardial effusion. Right pleural  effusion.  Hemodynamic: RA Pressure is 8 mm Hg. RV systolic pressure  is severely increased at  67 mm Hg.  ------------------------------------------------------------------------  CONCLUSIONS:  1. Tethered mitral valve leaflets. Mild to moderate mitral  regurgitation.  2. Calcified trileaflet aortic valve with decreased  opening. Mild aortic stenosis by gradients. The aortic  valve apears more stenotic than indicated by gradients. In  setting of low EF, cannot rule out paradoxical low-flow  low-gradient AS. Consider dobutamine stress echocardiogram  for further assessment if clinically indicated.   Trace  aortic regurgitation.  3. Normal aortic root.  4. Normal left atrium.  5. Mild left ventricular enlargement.  6. Severe global left ventricular systolic dysfunction (EF  15-20% by visual estimation).  7. Grade II diastolic dysfunction.  8. Normal right atrium.  9. Off axis images preclude accurate assessment of right  ventricular size. Reduced RV systolic function (TAPSE 1.0  cm).  10. RV systolic pressure is severely increased at  67 mm  Hg.  11. Normal tricuspid valve. Moderate to severe tricuspid  regurgitation.  12. Pulmonic valve not well seen. Trace pulmonic  insufficiency is noted.  13. No pericardial effusion.  14. Right pleural effusion.    *** No previous Echo exam.  ------------------------------------------------------------------------  Confirmed on  3/25/2021 - 22:24:56 by Curtis Chapa MD  ------------------------------------------------------------------------        < from: US Duplex Carotid Arteries Complete, Bilateral (04.01.21 @ 15:53) >  EXAM:  US DPLX CAROTIDS COMPL BI                            PROCEDURE DATE:  04/01/2021          INTERPRETATION:  CLINICAL INFORMATION: Dizziness and carotid bruit    COMPARISON: None available.    TECHNIQUE: Grayscale, color and spectral Doppler examination of both carotid arteries was performed.    FINDINGS: Small amount of calcified plaque bilaterally right greater than left    No elevated velocities or abnormal waveforms are encountered.    Peak systolic velocities in centimeter per second are as follows:    RIGHT:  PROX CCA = 46  DIST CCA = 48  PROX ICA = 47  DIST ICA = 47  ECA = 71    LEFT:  PROX CCA = 58  DIST CCA = 54  PROX ICA = 50 to  DIST ICA = 56  ECA = 38    Antegrade flow is noted within both vertebral arteries.    IMPRESSION:No significant hemodynamic stenosis of either carotid artery.    Measurement of carotid stenosis is based on velocity parameters that correlate the residual internal carotid diameter with that of the more distal vessel in accordance with a method such as the [place] [employer] Symptomatic Carotid Endarterectomy Trial (NASCET).          CLAUDINE PETERSON MD; Attending Radiologist  This document has been electronically signed. Apr 1 2021  4:03PM

## 2021-04-02 NOTE — PROGRESS NOTE ADULT - SUBJECTIVE AND OBJECTIVE BOX
Patient is seen and examined at the bed side, is afebrile. The creatinine is trending back up.      REVIEW OF SYSTEMS: All other review systems are negative        ALLERGIES: Aldactone (Unknown), Bumex (Blisters; Rash)  metoprolol (Unknown), spironolactone (Unknown)      Vital Signs Last 24 Hrs  T(C): 36.3 (02 Apr 2021 17:27), Max: 36.6 (01 Apr 2021 21:29)  T(F): 97.4 (02 Apr 2021 17:27), Max: 97.9 (01 Apr 2021 21:29)  HR: 58 (02 Apr 2021 17:27) (54 - 64)  BP: 153/98 (02 Apr 2021 17:27) (138/81 - 153/98)  BP(mean): --  RR: 18 (02 Apr 2021 17:27) (18 - 18)  SpO2: 96% (02 Apr 2021 17:27) (94% - 100%)      PHYSICAL EXAM:  GENERAL: Not in distress   CHEST/LUNG: Not using accessory muscles   HEART: s1 and s2 present  ABDOMEN:  Nontender and  Nondistended  EXTREMITIES: LLE swollen, mild erythematous, tender with a fluctuance area at lower leg  CNS: Awake and Alert      LABS:                        12.3   5.46  )-----------( 229      ( 02 Apr 2021 06:15 )             39.9                           12.3   5.53  )-----------( 207      ( 01 Apr 2021 06:50 )             39.9     04-02    139  |  106  |  21<H>  ----------------------------<  95  4.3   |  26  |  1.09    Ca    8.3<L>      02 Apr 2021 06:15        04-01    138  |  102  |  20<H>  ----------------------------<  84  3.4<L>   |  24  |  1.22    Ca    8.1<L>      01 Apr 2021 06:50                 03-31    140  |  105  |  20<H>  ----------------------------<  87  3.9   |  27  |  1.12    Ca    8.0<L>      31 Mar 2021 07:01      TPro  7.4  /  Alb  3.1<L>  /  TBili  0.8  /  DBili  x   /  AST  27  /  ALT  30  /  AlkPhos  163<H>  03-29        CAPILLARY BLOOD GLUCOSE  POCT Blood Glucose.: 124 mg/dL (29 Mar 2021 17:08)  POCT Blood Glucose.: 124 mg/dL (29 Mar 2021 12:24)  POCT Blood Glucose.: 96 mg/dL (29 Mar 2021 07:49)  POCT Blood Glucose.: 112 mg/dL (28 Mar 2021 22:17)      Vancomycin Level, Trough -Pre 4th Dose, order if dosed q6/8/12h (03.31.21 @ 07:01)   Vancomycin Level, Trough: 18.4:      MEDICATIONS  (STANDING):    MEDICATIONS  (STANDING):  apixaban 5 milliGRAM(s) Oral every 12 hours  aspirin  chewable 81 milliGRAM(s) Oral daily  atorvastatin 40 milliGRAM(s) Oral at bedtime  carvedilol 3.125 milliGRAM(s) Oral every 12 hours  cefepime   IVPB 1000 milliGRAM(s) IV Intermittent every 8 hours  cefepime   IVPB      collagenase Ointment 1 Application(s) Topical daily  dextrose 40% Gel 15 Gram(s) Oral once  dextrose 5%. 1000 milliLiter(s) (50 mL/Hr) IV Continuous <Continuous>  dextrose 5%. 1000 milliLiter(s) (100 mL/Hr) IV Continuous <Continuous>  dextrose 50% Injectable 25 Gram(s) IV Push once  dextrose 50% Injectable 12.5 Gram(s) IV Push once  dextrose 50% Injectable 25 Gram(s) IV Push once  furosemide    Tablet 40 milliGRAM(s) Oral two times a day  glucagon  Injectable 1 milliGRAM(s) IntraMuscular once  insulin lispro (ADMELOG) corrective regimen sliding scale   SubCutaneous three times a day before meals  insulin lispro (ADMELOG) corrective regimen sliding scale   SubCutaneous at bedtime  lisinopril 5 milliGRAM(s) Oral daily  sodium chloride 0.9% lock flush 3 milliLiter(s) IV Push every 8 hours  vancomycin  IVPB 1000 milliGRAM(s) IV Intermittent every 12 hours        RADIOLOGY & ADDITIONAL TESTS:    3/30/21: CT Lower Extremity w/ IV Cont, Left (03.30.21 @ 13:06) Diffuse soft tissue swelling and skin thickening. Nonspecific finding that can be seen in the setting of cellulitis. No drainable fluid collection.  No CT evidence for osteomyelitis.      3/25/21 : Xray Chest 1 View-PORTABLE IMMEDIATE (Xray Chest 1 View-PORTABLE IMMEDIATE .) (03.25.21 @ 05:52) >  No acute infiltrate. Cardiomegaly. Sternotomy.    3/25/21 : US Duplex Venous Lower Ext Complete, Bilateral (03.25.21 @ 10:38) No evidence of deep venous thrombosis in either lower extremity.      MICROBIOLOGY DATA:    COVID-19 PCR . (03.25.21 @ 12:30)   COVID-19 PCR: NotDetec:               Assessment and Plan:   · Assessment	  Patient is a 70y old  Male from home with PMH HTN, HLD, DM, PE, CAD s/p CABG (3.5 yrs ago), presents to the ER on 3/25/21  for evaluation of leg swelling, scrotal swelling  and difficulty breathing for months.  He endorses chest tightness, shortness of breath on exertion, requiring him to take frequent breaks when walking or climbing stairs. can walk less than 2 blocks. Today, 3/29/21, He has started on Zosyn and Vancomycin and The ID consult requested to assist with further evaluation of LLE cellulitis and antibiotic management.     # LLE cellulitis with suspected Abscess - CT scan of LLE shows Diffuse soft tissue swelling and skin thickening.  No drainable fluid collection. And No CT evidence for osteomyelitis.      Would recommend:      1. Continue Cefepime and Vancomycin for now  2. May change to oral Augmentin 875 mg q 12hours and Doxycycline 100 mg [id number] in 48 hours to complete 14 days course  3. Monitor kidney function and Adjust Abx doses accordingly   4. Keep LLE elevated  5. Pain management as needed    d/w Covering NP    Attending Attestation:     Spent more than 45 minutes on total encounter, more than 50 % of the visit was spent counseling and/or coordinating care by the Attending physician.   Patient is seen and examined at the bed side, is afebrile. The creatinine is trending back down.       REVIEW OF SYSTEMS: All other review systems are negative        ALLERGIES: Aldactone (Unknown), Bumex (Blisters; Rash)  metoprolol (Unknown), spironolactone (Unknown)      Vital Signs Last 24 Hrs  T(C): 36.3 (02 Apr 2021 17:27), Max: 36.6 (01 Apr 2021 21:29)  T(F): 97.4 (02 Apr 2021 17:27), Max: 97.9 (01 Apr 2021 21:29)  HR: 58 (02 Apr 2021 17:27) (54 - 64)  BP: 153/98 (02 Apr 2021 17:27) (138/81 - 153/98)  BP(mean): --  RR: 18 (02 Apr 2021 17:27) (18 - 18)  SpO2: 96% (02 Apr 2021 17:27) (94% - 100%)      PHYSICAL EXAM:  GENERAL: Not in distress   CHEST/LUNG: Not using accessory muscles   HEART: s1 and s2 present  ABDOMEN:  Nontender and  Nondistended  EXTREMITIES: LLE swollen, mild erythematous, tender with a fluctuance area at lower leg  CNS: Awake and Alert      LABS:                        12.3   5.46  )-----------( 229      ( 02 Apr 2021 06:15 )             39.9                           12.3   5.53  )-----------( 207      ( 01 Apr 2021 06:50 )             39.9       04-02    139  |  106  |  21<H>  ----------------------------<  95  4.3   |  26  |  1.09    Ca    8.3<L>      02 Apr 2021 06:15        04-01    138  |  102  |  20<H>  ----------------------------<  84  3.4<L>   |  24  |  1.22    Ca    8.1<L>      01 Apr 2021 06:50    TPro  7.4  /  Alb  3.1<L>  /  TBili  0.8  /  DBili  x   /  AST  27  /  ALT  30  /  AlkPhos  163<H>  03-29        CAPILLARY BLOOD GLUCOSE  POCT Blood Glucose.: 124 mg/dL (29 Mar 2021 17:08)  POCT Blood Glucose.: 124 mg/dL (29 Mar 2021 12:24)  POCT Blood Glucose.: 96 mg/dL (29 Mar 2021 07:49)  POCT Blood Glucose.: 112 mg/dL (28 Mar 2021 22:17)      Vancomycin Level, Trough -Pre 4th Dose, order if dosed q6/8/12h (03.31.21 @ 07:01)   Vancomycin Level, Trough: 18.4:      MEDICATIONS  (STANDING):    apixaban 5 milliGRAM(s) Oral every 12 hours  aspirin  chewable 81 milliGRAM(s) Oral daily  atorvastatin 40 milliGRAM(s) Oral at bedtime  carvedilol 3.125 milliGRAM(s) Oral every 12 hours  cefepime   IVPB 1000 milliGRAM(s) IV Intermittent every 8 hours  cefepime   IVPB      collagenase Ointment 1 Application(s) Topical daily  furosemide    Tablet 40 milliGRAM(s) Oral two times a day  glucagon  Injectable 1 milliGRAM(s) IntraMuscular once  insulin lispro (ADMELOG) corrective regimen sliding scale   SubCutaneous three times a day before meals  insulin lispro (ADMELOG) corrective regimen sliding scale   SubCutaneous at bedtime  lisinopril 5 milliGRAM(s) Oral daily  sodium chloride 0.9% lock flush 3 milliLiter(s) IV Push every 8 hours  vancomycin  IVPB 1000 milliGRAM(s) IV Intermittent every 12 hours        RADIOLOGY & ADDITIONAL TESTS:    3/30/21: CT Lower Extremity w/ IV Cont, Left (03.30.21 @ 13:06) Diffuse soft tissue swelling and skin thickening. Nonspecific finding that can be seen in the setting of cellulitis. No drainable fluid collection.  No CT evidence for osteomyelitis.      3/25/21 : Xray Chest 1 View-PORTABLE IMMEDIATE (Xray Chest 1 View-PORTABLE IMMEDIATE .) (03.25.21 @ 05:52) >  No acute infiltrate. Cardiomegaly. Sternotomy.    3/25/21 : US Duplex Venous Lower Ext Complete, Bilateral (03.25.21 @ 10:38) No evidence of deep venous thrombosis in either lower extremity.      MICROBIOLOGY DATA:    COVID-19 PCR . (03.25.21 @ 12:30)   COVID-19 PCR: NotDetec:

## 2021-04-02 NOTE — PROGRESS NOTE ADULT - PROBLEM SELECTOR PLAN 9
continue IV ABT for cellulitis as ID recommended - need P.O regimen for d/c   PT - home   COVID vaccine status -declined offer
continue IV ABT for cellulitis as ID recommended - need P.O regimen for d/c   PT - home   COVID vaccine status -declined offer
no family   never   AO x 3   full code   explained about role of HCP

## 2021-04-02 NOTE — PROGRESS NOTE ADULT - PROBLEM SELECTOR PLAN 6
supposed to be on statin   continue lipitor 40mg   reviewed  lipid panel - WNL - Cleared for discharge today 4/2 however patient appealing discharge; notice given  and Case management discussed with patient   -PT - home   -COVID vaccine status -declined offer

## 2021-04-02 NOTE — PROGRESS NOTE ADULT - PROBLEM SELECTOR PLAN 1
presented with BLEs/ scrotum swelling   Due to non compliance with meds, diet and f/u care-stable  ECHO shows severe systolic HF with EF 15-20% and GIIDD  Card Dr. Rhodes  Cont ASA, BB, statin, ACE  Cont Lasix 40mg BID  Strict I&O and daily weights  improving swelling  to b/l lower ext, resolved pedal edema  scrotum swelling improving  pending carotid doppler -ECHO revealed severe systolic HF with EF 15-20%   - Currently stable; transitioned to oral diuretic therapy  - Cont ASA, BB, statin, ACE  -Strict I&O and daily weights

## 2021-04-02 NOTE — PROGRESS NOTE ADULT - PROBLEM SELECTOR PLAN 4
B/L LEs with edema 4+, chronic cellulitis and open wound on LLE oozing and painful  -Surgery consulted, no interventions indicated  -continue  Vanco and Zosyn changed to cefepime per ID Dr. Lauren   -ID Dr. Lauren  is following   -CT LLE shows Diffuse soft tissue swelling and skin thickening. Nonspecific finding that can be seen in the setting of cellulitis. No drainable fluid collection.  No CT evidence for osteomyelitis.    Cont Abx for now-  podiatry consulted  - no need wound culture, continue santyl with compression dressing    f/u Vanco trough 18.4 WNL, f/u vanco trough /Chronic Cellulitis   -continue Vanco and cefepime per ID Dr. Lauren with eventual switch to Po Doxycycline 100mg q12 hours and Augmentin 875mg po q12hrs  - continue santyl with compression dressing

## 2021-04-02 NOTE — PROGRESS NOTE ADULT - PROBLEM SELECTOR PROBLEM 8
Prophylactic measure
Goals of care, counseling/discussion
Prophylactic measure
Goals of care, counseling/discussion
Prophylactic measure

## 2021-04-02 NOTE — PROGRESS NOTE ADULT - SUBJECTIVE AND OBJECTIVE BOX
CHIEF COMPLAINT:Patient is a 70y old  Male who presents with a chief complaint of CHF exacerbation.Pt appears comfortable.    	  REVIEW OF SYSTEMS:  CONSTITUTIONAL: No fever, weight loss, or fatigue  EYES: No eye pain, visual disturbances, or discharge  ENT:  No difficulty hearing, tinnitus, vertigo; No sinus or throat pain  NECK: No pain or stiffness  RESPIRATORY: No cough, wheezing, chills or hemoptysis; No Shortness of Breath  CARDIOVASCULAR: No chest pain, palpitations, passing out, dizziness, or leg swelling  GASTROINTESTINAL: No abdominal or epigastric pain. No nausea, vomiting, or hematemesis; No diarrhea or constipation. No melena or hematochezia.  GENITOURINARY: No dysuria, frequency, hematuria, or incontinence  NEUROLOGICAL: No headaches, memory loss, loss of strength, numbness, or tremors  SKIN: No itching, burning, rashes, or lesions   LYMPH Nodes: No enlarged glands  ENDOCRINE: No heat or cold intolerance; No hair loss  MUSCULOSKELETAL: No joint pain or swelling; No muscle, back, or extremity pain  PSYCHIATRIC: No depression, anxiety, mood swings, or difficulty sleeping  HEME/LYMPH: No easy bruising, or bleeding gums  ALLERGY AND IMMUNOLOGIC: No hives or eczema	        PHYSICAL EXAM:  T(C): 36.6 (04-02-21 @ 05:10), Max: 36.6 (04-01-21 @ 21:29)  HR: 58 (04-02-21 @ 05:10) (54 - 64)  BP: 138/81 (04-02-21 @ 05:10) (104/66 - 139/72)  RR: 18 (04-02-21 @ 05:10) (18 - 18)  SpO2: 100% (04-02-21 @ 05:10) (95% - 100%)        Appearance: Normal	  HEENT:   Normal oral mucosa, PERRL, EOMI	  Lymphatic: No lymphadenopathy  Cardiovascular: Normal S1 S2, No JVD, No murmurs, +2 edema  Respiratory: Lungs clear to auscultation	  Psychiatry: A & O x 3, Mood & affect appropriate  Gastrointestinal:  Soft, Non-tender, + BS	  Skin: No rashes, No ecchymoses, No cyanosis	  Neurologic: Non-focal  Extremities: Normal range of motion, No clubbing, cyanosis +2 edema  Vascular: Peripheral pulses palpable 2+ bilaterally    MEDICATIONS  (STANDING):  apixaban 5 milliGRAM(s) Oral every 12 hours  aspirin  chewable 81 milliGRAM(s) Oral daily  atorvastatin 40 milliGRAM(s) Oral at bedtime  carvedilol 3.125 milliGRAM(s) Oral every 12 hours  cefepime   IVPB 1000 milliGRAM(s) IV Intermittent every 8 hours  cefepime   IVPB      collagenase Ointment 1 Application(s) Topical daily  dextrose 40% Gel 15 Gram(s) Oral once  dextrose 5%. 1000 milliLiter(s) (50 mL/Hr) IV Continuous <Continuous>  dextrose 5%. 1000 milliLiter(s) (100 mL/Hr) IV Continuous <Continuous>  dextrose 50% Injectable 25 Gram(s) IV Push once  dextrose 50% Injectable 12.5 Gram(s) IV Push once  dextrose 50% Injectable 25 Gram(s) IV Push once  furosemide    Tablet 40 milliGRAM(s) Oral two times a day  glucagon  Injectable 1 milliGRAM(s) IntraMuscular once  insulin lispro (ADMELOG) corrective regimen sliding scale   SubCutaneous three times a day before meals  insulin lispro (ADMELOG) corrective regimen sliding scale   SubCutaneous at bedtime  lisinopril 5 milliGRAM(s) Oral daily  sodium chloride 0.9% lock flush 3 milliLiter(s) IV Push every 8 hours  vancomycin  IVPB 1000 milliGRAM(s) IV Intermittent every 12 hours    	  	  LABS:	 	                                12.3   5.46  )-----------( 229      ( 02 Apr 2021 06:15 )             39.9     04-02    139  |  106  |  21<H>  ----------------------------<  95  4.3   |  26  |  1.09    Ca    8.3<L>      02 Apr 2021 06:15      proBNP: Serum Pro-Brain Natriuretic Peptide: 6970 pg/mL (03-25 @ 06:19)    Lipid Profile: Cholesterol 119  LDL --  HDL 24  TG 63    < from: US Duplex Carotid Arteries Complete, Bilateral (04.01.21 @ 15:53) >    EXAM:  US DPLX CAROTIDS COMPL BI                            PROCEDURE DATE:  04/01/2021          INTERPRETATION:  CLINICAL INFORMATION: Dizziness and carotid bruit    COMPARISON: None available.    TECHNIQUE: Grayscale, color and spectral Doppler examination of both carotid arteries was performed.    FINDINGS: Small amount of calcified plaque bilaterally right greater than left    No elevated velocities or abnormal waveforms are encountered.    Peak systolic velocities in centimeter per second are as follows:    RIGHT:  PROX CCA = 46  DIST CCA = 48  PROX ICA = 47  DIST ICA = 47  ECA = 71    LEFT:  PROX CCA = 58  DIST CCA = 54  PROX ICA = 50 to  DIST ICA = 56  ECA = 38    Antegrade flow is noted within both vertebral arteries.    IMPRESSION:No significant hemodynamic stenosis of either carotid artery.    Measurement of carotid stenosis is based on velocity parameters that correlate the residual internal carotid diameter with that of the more distal vessel in accordance with a method such as the [employer] Symptomatic Carotid Endarterectomy Trial (NASCET).

## 2021-04-02 NOTE — PROGRESS NOTE ADULT - ASSESSMENT
Patient is a 70y old  Male from home with PMH HTN, HLD, DM, PE, CAD s/p CABG (3.5 yrs ago), presents to the ER on 3/25/21  for evaluation of leg swelling, scrotal swelling  and difficulty breathing for months.  He endorses chest tightness, shortness of breath on exertion, requiring him to take frequent breaks when walking or climbing stairs. can walk less than 2 blocks. Today, 3/29/21, He has started on Zosyn and Vancomycin and The ID consult requested to assist with further evaluation of LLE cellulitis and antibiotic management.     # LLE cellulitis with suspected Abscess - CT scan of LLE shows Diffuse soft tissue swelling and skin thickening.  No drainable fluid collection. And No CT evidence for osteomyelitis.      Would recommend:    1. Continue Cefepime and Vancomycin for now  2. May change to oral Augmentin 875 mg q 12hours and Doxycycline 100 mg [id number] in 48 hours to continue until 4/12/21  3. Monitor kidney function and Adjust Abx doses accordingly   4. Keep LLE elevated  5. Pain management as needed    d/w Covering NP    Attending Attestation:     Spent more than 45 minutes on total encounter, more than 50 % of the visit was spent counseling and/or coordinating care by the Attending physician.

## 2021-04-02 NOTE — PROGRESS NOTE ADULT - SUBJECTIVE AND OBJECTIVE BOX
Provided an update to patient's wife, Sandra Godinez, over the phone regaring patient's current status and treatments  She talked at length about their relationship, and became very tangential at times  However, I was able to focus her about thinking about potential worst case scenarios  She did admit to me that she has already thought about what would happen if he didn't survive this illness  She has already made mental plans about where he would be buried  I told her I wanted to think over the next few days about what decision she would come to if she was told he needed a tracheostomy  I assured her we are continuing to be very aggressive, but she should prepare herself to make the decision as to whether or not she would want a tracheostomy for him, which would mean a nursing home further away from home, potentially unable to come home for a long time, and potentially unable to enjoy his favorite foods, versus making him comfortable and letting him die naturally  She assured me that she would think this over and talk to her trusted family  All questions answered  Patient is a 70y old  Male who presents with a chief complaint of CHF exacerbation (01 Apr 2021 21:38)    pt seen in tele [ x ], reg med floor [   ], bed [ x ], chair at bedside [   ], a+o x3 [ x ], lethargic [  ],    nad [ x ]        Allergies    Aldactone (Unknown)  Bumex (Blisters; Rash)  metoprolol (Unknown)  spironolactone (Unknown)        Vitals    T(F): 97.8 (04-02-21 @ 05:10), Max: 97.9 (04-01-21 @ 21:29)  HR: 58 (04-02-21 @ 05:10) (54 - 64)  BP: 138/81 (04-02-21 @ 05:10) (104/66 - 139/72)  RR: 18 (04-02-21 @ 05:10) (18 - 18)  SpO2: 100% (04-02-21 @ 05:10) (95% - 100%)  Wt(kg): --  CAPILLARY BLOOD GLUCOSE      POCT Blood Glucose.: 127 mg/dL (01 Apr 2021 21:21)      Labs                          12.3   5.46  )-----------( 229      ( 02 Apr 2021 06:15 )             39.9       04-01    138  |  102  |  20<H>  ----------------------------<  84  3.4<L>   |  24  |  1.22    Ca    8.1<L>      01 Apr 2021 06:50                  Radiology Results      Meds    MEDICATIONS  (STANDING):  apixaban 5 milliGRAM(s) Oral every 12 hours  aspirin  chewable 81 milliGRAM(s) Oral daily  atorvastatin 40 milliGRAM(s) Oral at bedtime  carvedilol 3.125 milliGRAM(s) Oral every 12 hours  cefepime   IVPB 1000 milliGRAM(s) IV Intermittent every 8 hours  cefepime   IVPB      collagenase Ointment 1 Application(s) Topical daily  dextrose 40% Gel 15 Gram(s) Oral once  dextrose 5%. 1000 milliLiter(s) (50 mL/Hr) IV Continuous <Continuous>  dextrose 5%. 1000 milliLiter(s) (100 mL/Hr) IV Continuous <Continuous>  dextrose 50% Injectable 25 Gram(s) IV Push once  dextrose 50% Injectable 12.5 Gram(s) IV Push once  dextrose 50% Injectable 25 Gram(s) IV Push once  furosemide    Tablet 40 milliGRAM(s) Oral two times a day  glucagon  Injectable 1 milliGRAM(s) IntraMuscular once  insulin lispro (ADMELOG) corrective regimen sliding scale   SubCutaneous three times a day before meals  insulin lispro (ADMELOG) corrective regimen sliding scale   SubCutaneous at bedtime  lisinopril 5 milliGRAM(s) Oral daily  sodium chloride 0.9% lock flush 3 milliLiter(s) IV Push every 8 hours  vancomycin  IVPB 1000 milliGRAM(s) IV Intermittent every 12 hours      MEDICATIONS  (PRN):      Physical Exam    Neuro :  no focal deficits  Respiratory: CTA B/L  CV: RRR, S1S2, no murmurs,   Abdominal: Soft, NT, ND +BS,  Extremities: b/l le edema, b/l le with ace wrap        ASSESSMENT    acute on chronic systolic chf,   pulm edema   r/o acs,   left le cellulitis  h/o HTN,   HLD,   DM,   CAD s/p CABGx3,  PE (pulmonary thromboembolism)    PLAN      cont tele,   acs protocol,   cont coreg, lasix,   aspirin, statin,   ce x3 noted above  cardio f/u  echo with EF 15-20%  Grade II diastolic dysfunction. RV systolic pressure is severely noted    cont lisinopril  cont Eliquis.  supplemental O2 as needed,  cont  bronchodilator,  f/u hgba1c   cont vanco   change  Zosyn  to Cefepime due to high salt content  f/u blood cx  id f/u   ct left leg with Diffuse soft tissue swelling and skin thickening. Nonspecific finding that can be seen in the setting of cellulitis. No drainable fluid collection.  No CT evidence for osteomyelitis noted above.  podiatry cons   Patient advised to elevate and use compression.   Explained to patient that wound is from excessive swelling and there is no signs of infection.   Dressed with santyl, 4x4 gauze, allegra and ace to left LE, and plain ACE wrap on RLE.   ordered santyl   wound care/ nursing dressing instructions:   apply santyl to leg leg wound and cover with 4x4 gauze, allegra and ace to left LE.   Apply plain ACE wrap on RLE.   Change dressing MWF.   Patient stable per podiatry standpoint  cont current meds     Patient is a 70y old  Male who presents with a chief complaint of CHF exacerbation (01 Apr 2021 21:38)    pt seen in tele [ x ], reg med floor [   ], bed [ x ], chair at bedside [   ], a+o x3 [ x ], lethargic [  ],    nad [ x ]        Allergies    Aldactone (Unknown)  Bumex (Blisters; Rash)  metoprolol (Unknown)  spironolactone (Unknown)        Vitals    T(F): 97.8 (04-02-21 @ 05:10), Max: 97.9 (04-01-21 @ 21:29)  HR: 58 (04-02-21 @ 05:10) (54 - 64)  BP: 138/81 (04-02-21 @ 05:10) (104/66 - 139/72)  RR: 18 (04-02-21 @ 05:10) (18 - 18)  SpO2: 100% (04-02-21 @ 05:10) (95% - 100%)  Wt(kg): --  CAPILLARY BLOOD GLUCOSE      POCT Blood Glucose.: 127 mg/dL (01 Apr 2021 21:21)      Labs                          12.3   5.46  )-----------( 229      ( 02 Apr 2021 06:15 )             39.9       04-01    138  |  102  |  20<H>  ----------------------------<  84  3.4<L>   |  24  |  1.22    Ca    8.1<L>      01 Apr 2021 06:50                  Radiology Results      Meds    MEDICATIONS  (STANDING):  apixaban 5 milliGRAM(s) Oral every 12 hours  aspirin  chewable 81 milliGRAM(s) Oral daily  atorvastatin 40 milliGRAM(s) Oral at bedtime  carvedilol 3.125 milliGRAM(s) Oral every 12 hours  cefepime   IVPB 1000 milliGRAM(s) IV Intermittent every 8 hours  cefepime   IVPB      collagenase Ointment 1 Application(s) Topical daily  dextrose 40% Gel 15 Gram(s) Oral once  dextrose 5%. 1000 milliLiter(s) (50 mL/Hr) IV Continuous <Continuous>  dextrose 5%. 1000 milliLiter(s) (100 mL/Hr) IV Continuous <Continuous>  dextrose 50% Injectable 25 Gram(s) IV Push once  dextrose 50% Injectable 12.5 Gram(s) IV Push once  dextrose 50% Injectable 25 Gram(s) IV Push once  furosemide    Tablet 40 milliGRAM(s) Oral two times a day  glucagon  Injectable 1 milliGRAM(s) IntraMuscular once  insulin lispro (ADMELOG) corrective regimen sliding scale   SubCutaneous three times a day before meals  insulin lispro (ADMELOG) corrective regimen sliding scale   SubCutaneous at bedtime  lisinopril 5 milliGRAM(s) Oral daily  sodium chloride 0.9% lock flush 3 milliLiter(s) IV Push every 8 hours  vancomycin  IVPB 1000 milliGRAM(s) IV Intermittent every 12 hours      MEDICATIONS  (PRN):      Physical Exam    Neuro :  no focal deficits  Respiratory: CTA B/L  CV: RRR, S1S2, no murmurs,   Abdominal: Soft, NT, ND +BS,  Extremities: b/l le edema, b/l le with ace wrap        ASSESSMENT    acute on chronic systolic chf,   pulm edema   r/o acs,   left le cellulitis  h/o HTN,   HLD,   DM,   CAD s/p CABGx3,  PE (pulmonary thromboembolism)    PLAN      cont tele,   acs protocol,   cont coreg, lasix,   aspirin, statin,   ce x3 noted above  cardio f/u  echo with EF 15-20%  Grade II diastolic dysfunction. RV systolic pressure is severely noted    cont lisinopril  cont Eliquis.  D/W pt regarding stress test-R/O ischemia,will consider it  supplemental O2 as needed,  cont  bronchodilator,  f/u hgba1c   cont vanco   cont Cefepime    May change abx to oral Augmentin 875 mg q 12hours and Doxycycline 100 mg [id number] in 48 hours to complete 14 days course  id f/u   ct left leg with Diffuse soft tissue swelling and skin thickening. Nonspecific finding that can be seen in the setting of cellulitis. No drainable fluid collection.  No CT evidence for osteomyelitis noted above.  podiatry cons   Patient advised to elevate and use compression.   Explained to patient that wound is from excessive swelling and there is no signs of infection.   Dressed with santyl, 4x4 gauze, allegra and ace to left LE, and plain ACE wrap on RLE.   ordered santyl   wound care/ nursing dressing instructions:   apply santyl to leg leg wound and cover with 4x4 gauze, allegra and ace to left LE.   Apply plain ACE wrap on RLE.   Change dressing MWF.   Patient stable per podiatry standpoint  cont current meds

## 2021-04-02 NOTE — CHART NOTE - NSCHARTNOTEFT_GEN_A_CORE
Assessment:     Factors impacting intake: [ ] none [ ] nausea  [ ] vomiting [ ] diarrhea [ ] constipation  [ ]chewing problems [ ] swallowing issues  [ ] other:     Diet Presciption: Diet, DASH/TLC:   Sodium & Cholesterol Restricted (21 @ 01:35)    Intake:     Daily Weight in k.5 (2021 05:10)  Weight in k.3 (31 Mar 2021 05:55)  Weight in k.3 (30 Mar 2021 05:42)  Weight in k.3 (29 Mar 2021 05:02)  Weight in k.3 (28 Mar 2021 04:58)  Weight in k.7 (26 Mar 2021 09:34)    % Weight Change    Pertinent Medications: MEDICATIONS  (STANDING):  apixaban 5 milliGRAM(s) Oral every 12 hours  aspirin  chewable 81 milliGRAM(s) Oral daily  atorvastatin 40 milliGRAM(s) Oral at bedtime  carvedilol 3.125 milliGRAM(s) Oral every 12 hours  cefepime   IVPB 1000 milliGRAM(s) IV Intermittent every 8 hours  cefepime   IVPB      collagenase Ointment 1 Application(s) Topical daily  dextrose 40% Gel 15 Gram(s) Oral once  dextrose 5%. 1000 milliLiter(s) (50 mL/Hr) IV Continuous <Continuous>  dextrose 5%. 1000 milliLiter(s) (100 mL/Hr) IV Continuous <Continuous>  dextrose 50% Injectable 25 Gram(s) IV Push once  dextrose 50% Injectable 12.5 Gram(s) IV Push once  dextrose 50% Injectable 25 Gram(s) IV Push once  furosemide    Tablet 40 milliGRAM(s) Oral two times a day  glucagon  Injectable 1 milliGRAM(s) IntraMuscular once  insulin lispro (ADMELOG) corrective regimen sliding scale   SubCutaneous three times a day before meals  insulin lispro (ADMELOG) corrective regimen sliding scale   SubCutaneous at bedtime  lisinopril 5 milliGRAM(s) Oral daily  sodium chloride 0.9% lock flush 3 milliLiter(s) IV Push every 8 hours  vancomycin  IVPB 1000 milliGRAM(s) IV Intermittent every 12 hours    MEDICATIONS  (PRN):    Pertinent Labs:  Na139 mmol/L Glu 95 mg/dL K+ 4.3 mmol/L Cr  1.09 mg/dL BUN 21 mg/dL<H>  Alb 3.1 g/dL<L>  Chol 119 mg/dL LDL --    HDL 24 mg/dL<L> Trig 63 mg/dL     CAPILLARY BLOOD GLUCOSE      POCT Blood Glucose.: 111 mg/dL (2021 08:04)  POCT Blood Glucose.: 127 mg/dL (2021 21:21)  POCT Blood Glucose.: 115 mg/dL (2021 16:37)  POCT Blood Glucose.: 120 mg/dL (2021 11:48)      Skin:     Estimated Needs:   [ ] no change since previous assessment  [ ] recalculated:     Previous Nutrition Diagnosis:   [ ] Inadequate Energy Intake [ ]Inadequate Oral Intake [ ] Excessive Energy Intake   [ ] Underweight [ ] Increased Nutrient Needs [ ] Overweight/Obesity  [ ] Swallowing Difficult   [ ] Altered GI Function [ ] Unintended Weight Loss [ ] Food & Nutrition Related Knowledge Deficit [ ] Malnutrition   [ ] Not Ready for Diet/Life Style Changes     Nutrition Diagnosis is [ ] ongoing  [ ] Improving   [ ] resolved [ ] not applicable     New Nutrition Diagnosis: [ ] not applicable       Interventions:   Recommend  [ ] Change Diet To:  [ ] Nutrition Supplement  [ ] Nutrition Support  [ ] Other:     Monitoring and Evaluation:   [ ] PO intake [ x ] Tolerance to diet prescription [ x ] weights [ x ] labs[ x ] follow up per protocol  [ ] other: Assessment:       Nutrition reassessment for follow-up. Chart reviewed, pt visited, alert, oriented, well-communicated; s/p Palliative consult, "Moderate protein-calorie malnutrition...2/2 CHF. Pt reports good po intake. Albumin 3.1" per MD, questionable of ? malnutrition;  consult noted, pt extremely guarded, not forthcoming with information reported; Pt now receptive to verbal discussion on Low Na diet, but not interested in more written copy.     Factors impacting intake: [ ] none [ ] nausea  [ ] vomiting [ ] diarrhea [ ] constipation  [ ]chewing problems [ ] swallowing issues  [ X ] other: acute on chronic comorbidities, individual/personal food preferences    Diet Prescription: Diet, DASH/TLC:   Sodium & Cholesterol Restricted (21 @ 01:35)    Intake: appetite good, tolerating meals well, calling Dietary often for food-related issues, assisted by Diet Technician for food preferences/daily menu selection     Daily Weight in k.5 (2021 05:10)  Weight in k.3 (31 Mar 2021 05:55)  Weight in k.3 (30 Mar 2021 05:42)  Weight in k.3 (29 Mar 2021 05:02)  Weight in k.3 (28 Mar 2021 04:58)  Weight in k.7 (26 Mar 2021 09:34)    % Weight Change    Pertinent Medications: MEDICATIONS  (STANDING):  apixaban 5 milliGRAM(s) Oral every 12 hours  aspirin  chewable 81 milliGRAM(s) Oral daily  atorvastatin 40 milliGRAM(s) Oral at bedtime  carvedilol 3.125 milliGRAM(s) Oral every 12 hours  cefepime   IVPB 1000 milliGRAM(s) IV Intermittent every 8 hours  cefepime   IVPB      collagenase Ointment 1 Application(s) Topical daily  dextrose 40% Gel 15 Gram(s) Oral once  dextrose 5%. 1000 milliLiter(s) (50 mL/Hr) IV Continuous <Continuous>  dextrose 5%. 1000 milliLiter(s) (100 mL/Hr) IV Continuous <Continuous>  dextrose 50% Injectable 25 Gram(s) IV Push once  dextrose 50% Injectable 12.5 Gram(s) IV Push once  dextrose 50% Injectable 25 Gram(s) IV Push once  furosemide    Tablet 40 milliGRAM(s) Oral two times a day  glucagon  Injectable 1 milliGRAM(s) IntraMuscular once  insulin lispro (ADMELOG) corrective regimen sliding scale   SubCutaneous three times a day before meals  insulin lispro (ADMELOG) corrective regimen sliding scale   SubCutaneous at bedtime  lisinopril 5 milliGRAM(s) Oral daily  sodium chloride 0.9% lock flush 3 milliLiter(s) IV Push every 8 hours  vancomycin  IVPB 1000 milliGRAM(s) IV Intermittent every 12 hours    MEDICATIONS  (PRN):    Pertinent Labs:  Na139 mmol/L Glu 95 mg/dL K+ 4.3 mmol/L Cr  1.09 mg/dL BUN 21 mg/dL<H>  Alb 3.1 g/dL<L>  Chol 119 mg/dL LDL --    HDL 24 mg/dL<L> Trig 63 mg/dL     CAPILLARY BLOOD GLUCOSE    POCT Blood Glucose.: 111 mg/dL (2021 08:04)  POCT Blood Glucose.: 127 mg/dL (2021 21:21)  POCT Blood Glucose.: 115 mg/dL (2021 16:37)  POCT Blood Glucose.: 120 mg/dL (2021 11:48)    Skin: skin wounds     Estimated Needs:   [ X ] no change since previous assessment  [ ] recalculated:     Previous Nutrition Diagnosis:   [ X ] Excessive Energy Intake     Nutrition Diagnosis is [ X ] ongoing  [ ] Improving   [ ] resolved [ ] not applicable     New Nutrition Diagnosis: [ X ] not applicable       Interventions:   Recommend  [ X ] Continue diet Rx as ordered   [ ] Nutrition Supplement  [ ] Nutrition Support  [ X ] Other: Provide food choices within diet Rx as available/updated                    Pt receptive to verbal diet reinforcement/discussion on low Na diet, not interested in written copy information at present     Monitoring and Evaluation:   [ X ] PO intake [ x ] Tolerance to diet prescription [ x ] weights [ x ] labs[ x ] follow up per protocol  [ ] other: Assessment:       Nutrition reassessment for follow-up. Chart reviewed, pt visited, alert, oriented, well-communicated; s/p Palliative consult, "Moderate protein-calorie malnutrition...2/2 CHF. Pt reports good po intake. Albumin 3.1" per MD, questionable of ? malnutrition;  consult noted, pt extremely guarded, not forthcoming with information reported; Pt now receptive to verbal discussion on Low Na diet, but not interested in more written copy.     Factors impacting intake: [ ] none [ ] nausea  [ ] vomiting [ ] diarrhea [ ] constipation  [ ]chewing problems [ ] swallowing issues  [ X ] other: acute on chronic comorbidities, individual/personal food preferences    Diet Prescription: Diet, DASH/TLC:   Sodium & Cholesterol Restricted (21 @ 01:35)    Intake: appetite good, tolerating meals well, 95% intake today; calling Dietary often for food-related issues, assisted by Diet Technician for food preferences/daily menu selection     Daily Weight in k.5 (2021 05:10)  Weight in k.3 (31 Mar 2021 05:55)  Weight in k.3 (30 Mar 2021 05:42)  Weight in k.3 (29 Mar 2021 05:02)  Weight in k.3 (28 Mar 2021 04:58)  Weight in k.7 (26 Mar 2021 09:34)    % Weight Change    Pertinent Medications: MEDICATIONS  (STANDING):  apixaban 5 milliGRAM(s) Oral every 12 hours  aspirin  chewable 81 milliGRAM(s) Oral daily  atorvastatin 40 milliGRAM(s) Oral at bedtime  carvedilol 3.125 milliGRAM(s) Oral every 12 hours  cefepime   IVPB 1000 milliGRAM(s) IV Intermittent every 8 hours  cefepime   IVPB      collagenase Ointment 1 Application(s) Topical daily  dextrose 40% Gel 15 Gram(s) Oral once  dextrose 5%. 1000 milliLiter(s) (50 mL/Hr) IV Continuous <Continuous>  dextrose 5%. 1000 milliLiter(s) (100 mL/Hr) IV Continuous <Continuous>  dextrose 50% Injectable 25 Gram(s) IV Push once  dextrose 50% Injectable 12.5 Gram(s) IV Push once  dextrose 50% Injectable 25 Gram(s) IV Push once  furosemide    Tablet 40 milliGRAM(s) Oral two times a day  glucagon  Injectable 1 milliGRAM(s) IntraMuscular once  insulin lispro (ADMELOG) corrective regimen sliding scale   SubCutaneous three times a day before meals  insulin lispro (ADMELOG) corrective regimen sliding scale   SubCutaneous at bedtime  lisinopril 5 milliGRAM(s) Oral daily  sodium chloride 0.9% lock flush 3 milliLiter(s) IV Push every 8 hours  vancomycin  IVPB 1000 milliGRAM(s) IV Intermittent every 12 hours    MEDICATIONS  (PRN):    Pertinent Labs:  Na139 mmol/L Glu 95 mg/dL K+ 4.3 mmol/L Cr  1.09 mg/dL BUN 21 mg/dL<H>  Alb 3.1 g/dL<L>  Chol 119 mg/dL LDL --    HDL 24 mg/dL<L> Trig 63 mg/dL     CAPILLARY BLOOD GLUCOSE    POCT Blood Glucose.: 111 mg/dL (2021 08:04)  POCT Blood Glucose.: 127 mg/dL (2021 21:21)  POCT Blood Glucose.: 115 mg/dL (2021 16:37)  POCT Blood Glucose.: 120 mg/dL (2021 11:48)    Skin: skin wounds     Estimated Needs:   [ X ] no change since previous assessment  [ ] recalculated:     Previous Nutrition Diagnosis:   [ X ] Excessive Energy Intake     Nutrition Diagnosis is [ X ] ongoing  [ ] Improving   [ ] resolved [ ] not applicable     New Nutrition Diagnosis: [ X ] not applicable       Interventions:   Recommend  [ X ] Continue diet Rx as ordered   [ ] Nutrition Supplement  [ ] Nutrition Support  [ X ] Other: Provide food choices within diet Rx as available/updated                    Pt receptive to verbal diet reinforcement/discussion on low Na diet, not interested in written copy information at present     Monitoring and Evaluation:   [ X ] PO intake [ x ] Tolerance to diet prescription [ x ] weights [ x ] labs[ x ] follow up per protocol  [ ] other: Assessment:       Nutrition reassessment for follow-up. Chart reviewed, pt visited, alert, oriented, well-communicated; s/p Palliative consult, "Moderate protein-calorie malnutrition...2/2 CHF. Pt reports good po intake. Albumin 3.1" per MD, questionable of ? malnutrition;  consult noted, pt extremely guarded, not forthcoming with information reported; Pt now receptive to verbal discussion on Low Na diet, but not interested in more written copy.     Factors impacting intake: [ ] none [ ] nausea  [ ] vomiting [ ] diarrhea [ ] constipation  [ ]chewing problems [ ] swallowing issues  [ X ] other: acute on chronic comorbidities, individual/personal food preferences    Diet Prescription: Diet, DASH/TLC:   Sodium & Cholesterol Restricted (21 @ 01:35)    Intake: appetite good, tolerating meals well, 95% intake today; calling Dietary often for food-related issues, assisted by Diet Technician for food preferences/daily menu selection     Daily Weight in k.5 (2021 05:10)  Weight in k.3 (31 Mar 2021 05:55)  Weight in k.3 (30 Mar 2021 05:42)  Weight in k.3 (29 Mar 2021 05:02)  Weight in k.3 (28 Mar 2021 04:58)  Weight in k.7 (26 Mar 2021 09:34)    % Weight Change    Pertinent Medications: MEDICATIONS  (STANDING):  apixaban 5 milliGRAM(s) Oral every 12 hours  aspirin  chewable 81 milliGRAM(s) Oral daily  atorvastatin 40 milliGRAM(s) Oral at bedtime  carvedilol 3.125 milliGRAM(s) Oral every 12 hours  cefepime   IVPB 1000 milliGRAM(s) IV Intermittent every 8 hours  cefepime   IVPB      collagenase Ointment 1 Application(s) Topical daily  dextrose 40% Gel 15 Gram(s) Oral once  dextrose 5%. 1000 milliLiter(s) (50 mL/Hr) IV Continuous <Continuous>  dextrose 5%. 1000 milliLiter(s) (100 mL/Hr) IV Continuous <Continuous>  dextrose 50% Injectable 25 Gram(s) IV Push once  dextrose 50% Injectable 12.5 Gram(s) IV Push once  dextrose 50% Injectable 25 Gram(s) IV Push once  furosemide    Tablet 40 milliGRAM(s) Oral two times a day  glucagon  Injectable 1 milliGRAM(s) IntraMuscular once  insulin lispro (ADMELOG) corrective regimen sliding scale   SubCutaneous three times a day before meals  insulin lispro (ADMELOG) corrective regimen sliding scale   SubCutaneous at bedtime  lisinopril 5 milliGRAM(s) Oral daily  sodium chloride 0.9% lock flush 3 milliLiter(s) IV Push every 8 hours  vancomycin  IVPB 1000 milliGRAM(s) IV Intermittent every 12 hours    MEDICATIONS  (PRN):    Pertinent Labs:  Na139 mmol/L Glu 95 mg/dL K+ 4.3 mmol/L Cr  1.09 mg/dL BUN 21 mg/dL<H>  Alb 3.1 g/dL<L>  Chol 119 mg/dL LDL --    HDL 24 mg/dL<L> Trig 63 mg/dL     CAPILLARY BLOOD GLUCOSE    POCT Blood Glucose.: 111 mg/dL (2021 08:04)  POCT Blood Glucose.: 127 mg/dL (2021 21:21)  POCT Blood Glucose.: 115 mg/dL (2021 16:37)  POCT Blood Glucose.: 120 mg/dL (2021 11:48)    Skin: skin wounds; 4+ bilateral LE edema     Estimated Needs:   [ X ] no change since previous assessment  [ ] recalculated:     Previous Nutrition Diagnosis:   [ X ] Excessive Energy Intake     Nutrition Diagnosis is [ X ] ongoing  [ ] Improving   [ ] resolved [ ] not applicable     New Nutrition Diagnosis: [ X ] not applicable       Interventions:   Recommend  [ X ] Continue diet Rx as ordered   [ ] Nutrition Supplement  [ ] Nutrition Support  [ X ] Other: Provide food choices within diet Rx as available/updated                    Pt receptive to verbal diet reinforcement/discussion on low Na diet, not interested in written copy information at present     Monitoring and Evaluation:   [ X ] PO intake [ x ] Tolerance to diet prescription [ x ] weights [ x ] labs[ x ] follow up per protocol  [ ] other:

## 2021-04-02 NOTE — PROGRESS NOTE ADULT - PROBLEM SELECTOR PLAN 7
c/w Eliquis 5mg BID  PPI for GI ppx  COVID - interested in getting COVID vaccine, explained it will be Jhonatan and Jhonatan and only need 1 time dose --> declined offer, he wants to have Pfizer or Moderna
c/w Eliquis 5mg BID
not on any medication for DM    sliding sale   monitor Fs f/u  A1c
c/w Eliquis 5mg BID  PPI for GI ppx  COVID - interested in getting COVID vaccine, explained it will be Jhonatan and Jhonatan and only need 1 time dose --> declined offer, he wants to have Pfizer or Moderna
not on any medication for DM    sliding sale   monitor Fs f/u  A1c
not on any medication for DM    sliding sale   monitor Fs f/u  A1c

## 2021-04-02 NOTE — PROGRESS NOTE ADULT - PROBLEM SELECTOR PLAN 5
home meds as above    Monitor BP DVT prophylaxis :on Eliquis   COVID prevention ; patient intends on getting  Pfizer or Moderna

## 2021-04-02 NOTE — PROGRESS NOTE ADULT - ASSESSMENT
70y M from home with PMH HTN, HLD, DM, PE, CAD s/p CABG (3.5 yrs ago), presented  with leg swelling, scrotal swelling  and difficulty breathing for months.   Admitted for acute on chronic systolic heart failure. TTE with EF 15-20%. Troponin negative. Found with  LLE open wound on anterior mid portion of shin, wound oozing and painful.  Surgery consulted and saw pt., no interventions recommended.  Vanco and Zosyn started, ID Dr. Lauren consulted.  Pt. with persisting LEs edema and cellulitis, ambulates freely. 3/30-CT LLE with contrast showed Diffuse soft tissue swelling and skin thickening. Nonspecific finding that can be seen in the setting of cellulitis. No drainable fluid collection.  No CT evidence for osteomyelitis. Compliance with medications, diet and f/u care reinforced with pt.  Will discharge to home when cleared by ID. 3/31 - seen and examined pt at bedside, AO x 3. less pain reported.  Podiatry called for possible wound cultures from LLE wound, continue IV ABT      69y/o Male w/  PMH HTN, HLD, DM, PE, CAD s/p CABG (3.5 yrs ago), presented  with leg swelling, scrotal swelling  and difficulty breathing for months.   Admitted for acute on chronic systolic heart failure. TTE with EF 15-20%. Troponin negative. Found with  LLE open wound on anterior mid portion of shin, wound oozing and painful.  Surgery consulted and saw pt., no interventions recommended. ID Dr. Lauren following.

## 2021-04-02 NOTE — PROGRESS NOTE ADULT - SUBJECTIVE AND OBJECTIVE BOX
Patient is a 70y old  Male who presents with a chief complaint of CHF exacerbation (02 Apr 2021 06:55)  Awake, alert, comfortable out of bed in NAD    INTERVAL HPI/OVERNIGHT EVENTS:      VITAL SIGNS:  T(F): 97.8 (04-02-21 @ 05:10)  HR: 58 (04-02-21 @ 05:10)  BP: 138/81 (04-02-21 @ 05:10)  RR: 18 (04-02-21 @ 05:10)  SpO2: 100% (04-02-21 @ 05:10)  Wt(kg): --  I&O's Detail          REVIEW OF SYSTEMS:    CONSTITUTIONAL:  No fevers, chills, sweats    HEENT:  Eyes:  No diplopia or blurred vision. ENT:  No earache, sore throat or runny nose.    CARDIOVASCULAR:  No pressure, squeezing, tightness, or heaviness about the chest; no palpitations.    RESPIRATORY:  Per HPI    GASTROINTESTINAL:  No abdominal pain, nausea, vomiting or diarrhea.    GENITOURINARY:  No dysuria, frequency or urgency.    NEUROLOGIC:  No paresthesias, fasciculations, seizures or weakness.    PSYCHIATRIC:  No disorder of thought or mood.      PHYSICAL EXAM:    Constitutional: Well developed and nourished  Eyes:Perrla  ENMT: normal  Neck:supple  Respiratory: good air entry  Cardiovascular: S1 S2 regular  Gastrointestinal: Soft, Non tender  Extremities: No edema  Vascular:normal  Neurological:Awake, alert,Ox3  Musculoskeletal:Normal      MEDICATIONS  (STANDING):  apixaban 5 milliGRAM(s) Oral every 12 hours  aspirin  chewable 81 milliGRAM(s) Oral daily  atorvastatin 40 milliGRAM(s) Oral at bedtime  carvedilol 3.125 milliGRAM(s) Oral every 12 hours  cefepime   IVPB 1000 milliGRAM(s) IV Intermittent every 8 hours  cefepime   IVPB      collagenase Ointment 1 Application(s) Topical daily  dextrose 40% Gel 15 Gram(s) Oral once  dextrose 5%. 1000 milliLiter(s) (50 mL/Hr) IV Continuous <Continuous>  dextrose 5%. 1000 milliLiter(s) (100 mL/Hr) IV Continuous <Continuous>  dextrose 50% Injectable 25 Gram(s) IV Push once  dextrose 50% Injectable 12.5 Gram(s) IV Push once  dextrose 50% Injectable 25 Gram(s) IV Push once  furosemide    Tablet 40 milliGRAM(s) Oral two times a day  glucagon  Injectable 1 milliGRAM(s) IntraMuscular once  insulin lispro (ADMELOG) corrective regimen sliding scale   SubCutaneous three times a day before meals  insulin lispro (ADMELOG) corrective regimen sliding scale   SubCutaneous at bedtime  lisinopril 5 milliGRAM(s) Oral daily  sodium chloride 0.9% lock flush 3 milliLiter(s) IV Push every 8 hours  vancomycin  IVPB 1000 milliGRAM(s) IV Intermittent every 12 hours    MEDICATIONS  (PRN):      Allergies    Aldactone (Unknown)  Bumex (Blisters; Rash)  metoprolol (Unknown)  spironolactone (Unknown)    Intolerances        LABS:                        12.3   5.46  )-----------( 229      ( 02 Apr 2021 06:15 )             39.9     04-02    139  |  106  |  21<H>  ----------------------------<  95  4.3   |  26  |  1.09    Ca    8.3<L>      02 Apr 2021 06:15                CAPILLARY BLOOD GLUCOSE      POCT Blood Glucose.: 111 mg/dL (02 Apr 2021 08:04)  POCT Blood Glucose.: 127 mg/dL (01 Apr 2021 21:21)  POCT Blood Glucose.: 115 mg/dL (01 Apr 2021 16:37)  POCT Blood Glucose.: 120 mg/dL (01 Apr 2021 11:48)        RADIOLOGY & ADDITIONAL TESTS:    CXR:    Ct scan chest:    ekg;    echo:

## 2021-04-02 NOTE — PROGRESS NOTE ADULT - PROBLEM SELECTOR PROBLEM 7
DM (diabetes mellitus)
DM (diabetes mellitus)
Prophylactic measure
Prophylactic measure
DM (diabetes mellitus)
Prophylactic measure

## 2021-04-02 NOTE — PROGRESS NOTE ADULT - ASSESSMENT
70y M from home with PMH HTN, HLD, DM, PE, CAD s/p CABG (3.5 yrs ago), presenting with leg swelling, scrotal swelling  and difficulty breathing for months,acute on chronic systolic HF.  1.Pt needs to be compliant with  cardiac medication.  2.Acute on chronic systolic HF-coreg,ace,lasix po.  3.Pt reports allergy to aldactone.  4.CAD-asa,b blocker,statin.  5.PE-Eliquis.  6.DM-Insulin.  7.HTN-coreg,ace.  8.Lt leg wound,cellulitis-ABX.  9.PPI.  10.Pt declines stress test at this time.

## 2021-04-03 ENCOUNTER — TRANSCRIPTION ENCOUNTER (OUTPATIENT)
Age: 71
End: 2021-04-03

## 2021-04-03 LAB
GLUCOSE BLDC GLUCOMTR-MCNC: 100 MG/DL — HIGH (ref 70–99)
GLUCOSE BLDC GLUCOMTR-MCNC: 123 MG/DL — HIGH (ref 70–99)

## 2021-04-03 RX ORDER — FUROSEMIDE 40 MG
1 TABLET ORAL
Qty: 60 | Refills: 0
Start: 2021-04-03 | End: 2021-05-02

## 2021-04-03 RX ORDER — COLLAGENASE CLOSTRIDIUM HIST. 250 UNIT/G
1 OINTMENT (GRAM) TOPICAL
Qty: 1 | Refills: 0
Start: 2021-04-03 | End: 2021-05-02

## 2021-04-03 RX ORDER — COLLAGENASE CLOSTRIDIUM HIST. 250 UNIT/G
1 OINTMENT (GRAM) TOPICAL
Qty: 1 | Refills: 1
Start: 2021-04-03 | End: 2021-06-01

## 2021-04-03 RX ORDER — FUROSEMIDE 40 MG
1 TABLET ORAL
Qty: 60 | Refills: 1
Start: 2021-04-03 | End: 2021-06-01

## 2021-04-03 RX ADMIN — Medication 100 MILLIGRAM(S): at 18:33

## 2021-04-03 RX ADMIN — APIXABAN 5 MILLIGRAM(S): 2.5 TABLET, FILM COATED ORAL at 17:58

## 2021-04-03 RX ADMIN — Medication 81 MILLIGRAM(S): at 12:51

## 2021-04-03 RX ADMIN — LISINOPRIL 5 MILLIGRAM(S): 2.5 TABLET ORAL at 06:12

## 2021-04-03 RX ADMIN — CARVEDILOL PHOSPHATE 3.12 MILLIGRAM(S): 80 CAPSULE, EXTENDED RELEASE ORAL at 06:11

## 2021-04-03 RX ADMIN — Medication 40 MILLIGRAM(S): at 17:59

## 2021-04-03 RX ADMIN — Medication 40 MILLIGRAM(S): at 06:11

## 2021-04-03 RX ADMIN — APIXABAN 5 MILLIGRAM(S): 2.5 TABLET, FILM COATED ORAL at 06:11

## 2021-04-03 RX ADMIN — CARVEDILOL PHOSPHATE 3.12 MILLIGRAM(S): 80 CAPSULE, EXTENDED RELEASE ORAL at 17:58

## 2021-04-03 NOTE — PROGRESS NOTE ADULT - SUBJECTIVE AND OBJECTIVE BOX
Patient is a 70y old  Male who presents with a chief complaint of CHF exacerbation (03 Apr 2021 09:09)  Awake, alert, comfortable in bed in NAD. S/p bilateral leg wrap with ACE bandage    INTERVAL HPI/OVERNIGHT EVENTS:      VITAL SIGNS:  T(F): 97.1 (04-03-21 @ 05:23)  HR: 62 (04-03-21 @ 05:23)  BP: 148/88 (04-03-21 @ 05:23)  RR: 18 (04-03-21 @ 05:23)  SpO2: 98% (04-03-21 @ 05:23)  Wt(kg): --  I&O's Detail    02 Apr 2021 07:01  -  03 Apr 2021 07:00  --------------------------------------------------------  IN:  Total IN: 0 mL    OUT:    Voided (mL): 600 mL  Total OUT: 600 mL    Total NET: -600 mL              REVIEW OF SYSTEMS:    CONSTITUTIONAL:  No fevers, chills, sweats    HEENT:  Eyes:  No diplopia or blurred vision. ENT:  No earache, sore throat or runny nose.    CARDIOVASCULAR:  No pressure, squeezing, tightness, or heaviness about the chest; no palpitations.    RESPIRATORY:  Per HPI    GASTROINTESTINAL:  No abdominal pain, nausea, vomiting or diarrhea.    GENITOURINARY:  No dysuria, frequency or urgency.    NEUROLOGIC:  No paresthesias, fasciculations, seizures or weakness.    PSYCHIATRIC:  No disorder of thought or mood.      PHYSICAL EXAM:    Constitutional: Well developed and nourished  Eyes:Perrla  ENMT: normal  Neck:supple  Respiratory: good air entry  Cardiovascular: S1 S2 regular  Gastrointestinal: Soft, Non tender  Extremities: + edema  Vascular:normal  Neurological:Awake, alert,Ox3  Musculoskeletal:Normal      MEDICATIONS  (STANDING):  amoxicillin  875 milliGRAM(s)/clavulanate 1 Tablet(s) Oral every 12 hours  apixaban 5 milliGRAM(s) Oral every 12 hours  aspirin  chewable 81 milliGRAM(s) Oral daily  atorvastatin 40 milliGRAM(s) Oral at bedtime  carvedilol 3.125 milliGRAM(s) Oral every 12 hours  collagenase Ointment 1 Application(s) Topical daily  dextrose 40% Gel 15 Gram(s) Oral once  dextrose 5%. 1000 milliLiter(s) (50 mL/Hr) IV Continuous <Continuous>  dextrose 5%. 1000 milliLiter(s) (100 mL/Hr) IV Continuous <Continuous>  dextrose 50% Injectable 25 Gram(s) IV Push once  dextrose 50% Injectable 12.5 Gram(s) IV Push once  dextrose 50% Injectable 25 Gram(s) IV Push once  doxycycline hyclate Capsule 100 milliGRAM(s) Oral every 12 hours  furosemide    Tablet 40 milliGRAM(s) Oral two times a day  glucagon  Injectable 1 milliGRAM(s) IntraMuscular once  insulin lispro (ADMELOG) corrective regimen sliding scale   SubCutaneous three times a day before meals  insulin lispro (ADMELOG) corrective regimen sliding scale   SubCutaneous at bedtime  lisinopril 5 milliGRAM(s) Oral daily  sodium chloride 0.9% lock flush 3 milliLiter(s) IV Push every 8 hours    MEDICATIONS  (PRN):      Allergies    Aldactone (Unknown)  Bumex (Blisters; Rash)  metoprolol (Unknown)  spironolactone (Unknown)    Intolerances        LABS:                        12.3   5.46  )-----------( 229      ( 02 Apr 2021 06:15 )             39.9     04-02    139  |  106  |  21<H>  ----------------------------<  95  4.3   |  26  |  1.09    Ca    8.3<L>      02 Apr 2021 06:15                CAPILLARY BLOOD GLUCOSE      POCT Blood Glucose.: 144 mg/dL (02 Apr 2021 21:50)  POCT Blood Glucose.: 113 mg/dL (02 Apr 2021 17:12)  POCT Blood Glucose.: 174 mg/dL (02 Apr 2021 11:42)        RADIOLOGY & ADDITIONAL TESTS:    CXR:    Ct scan chest:    ekg;    echo:

## 2021-04-03 NOTE — PROGRESS NOTE ADULT - SUBJECTIVE AND OBJECTIVE BOX
CHIEF COMPLAINT:Patient is a 70y old  Male who presents with a chief complaint of CHF exacerbation.Pt appears comfortable.    	  REVIEW OF SYSTEMS:  CONSTITUTIONAL: No fever, weight loss, or fatigue  EYES: No eye pain, visual disturbances, or discharge  ENT:  No difficulty hearing, tinnitus, vertigo; No sinus or throat pain  NECK: No pain or stiffness  RESPIRATORY: No cough, wheezing, chills or hemoptysis; No Shortness of Breath  CARDIOVASCULAR: No chest pain, palpitations, passing out, dizziness, or leg swelling  GASTROINTESTINAL: No abdominal or epigastric pain. No nausea, vomiting, or hematemesis; No diarrhea or constipation. No melena or hematochezia.  GENITOURINARY: No dysuria, frequency, hematuria, or incontinence  NEUROLOGICAL: No headaches, memory loss, loss of strength, numbness, or tremors  SKIN: No itching, burning, rashes, or lesions   LYMPH Nodes: No enlarged glands  ENDOCRINE: No heat or cold intolerance; No hair loss  MUSCULOSKELETAL: No joint pain or swelling; No muscle, back, or extremity pain  PSYCHIATRIC: No depression, anxiety, mood swings, or difficulty sleeping  HEME/LYMPH: No easy bruising, or bleeding gums  ALLERGY AND IMMUNOLOGIC: No hives or eczema	      PHYSICAL EXAM:  T(C): 36.2 (04-03-21 @ 05:23), Max: 36.3 (04-02-21 @ 17:27)  HR: 62 (04-03-21 @ 05:23) (58 - 64)  BP: 148/88 (04-03-21 @ 05:23) (148/80 - 162/87)  RR: 18 (04-03-21 @ 05:23) (18 - 19)  SpO2: 98% (04-03-21 @ 05:23) (94% - 98%)  Wt(kg): --  I&O's Summary    02 Apr 2021 07:01  -  03 Apr 2021 07:00  --------------------------------------------------------  IN: 0 mL / OUT: 600 mL / NET: -600 mL        Appearance: Normal	  HEENT:   Normal oral mucosa, PERRL, EOMI	  Lymphatic: No lymphadenopathy  Cardiovascular: Normal S1 S2, No JVD, No murmurs, No edema  Respiratory: Lungs clear to auscultation	  Psychiatry: A & O x 3, Mood & affect appropriate  Gastrointestinal:  Soft, Non-tender, + BS	  Skin: No rashes, No ecchymoses, No cyanosis	  Neurologic: Non-focal  Extremities: Normal range of motion, No clubbing, cyanosis or edema  Vascular: Peripheral pulses palpable 2+ bilaterally    MEDICATIONS  (STANDING):  amoxicillin  875 milliGRAM(s)/clavulanate 1 Tablet(s) Oral every 12 hours  apixaban 5 milliGRAM(s) Oral every 12 hours  aspirin  chewable 81 milliGRAM(s) Oral daily  atorvastatin 40 milliGRAM(s) Oral at bedtime  carvedilol 3.125 milliGRAM(s) Oral every 12 hours  collagenase Ointment 1 Application(s) Topical daily  dextrose 40% Gel 15 Gram(s) Oral once  dextrose 5%. 1000 milliLiter(s) (50 mL/Hr) IV Continuous <Continuous>  dextrose 5%. 1000 milliLiter(s) (100 mL/Hr) IV Continuous <Continuous>  dextrose 50% Injectable 25 Gram(s) IV Push once  dextrose 50% Injectable 12.5 Gram(s) IV Push once  dextrose 50% Injectable 25 Gram(s) IV Push once  doxycycline hyclate Capsule 100 milliGRAM(s) Oral every 12 hours  furosemide    Tablet 40 milliGRAM(s) Oral two times a day  glucagon  Injectable 1 milliGRAM(s) IntraMuscular once  insulin lispro (ADMELOG) corrective regimen sliding scale   SubCutaneous three times a day before meals  insulin lispro (ADMELOG) corrective regimen sliding scale   SubCutaneous at bedtime  lisinopril 5 milliGRAM(s) Oral daily  sodium chloride 0.9% lock flush 3 milliLiter(s) IV Push every 8 hours      	  LABS:	 	                        12.3   5.46  )-----------( 229      ( 02 Apr 2021 06:15 )             39.9     04-02    139  |  106  |  21<H>  ----------------------------<  95  4.3   |  26  |  1.09    Ca    8.3<L>      02 Apr 2021 06:15      proBNP: Serum Pro-Brain Natriuretic Peptide: 6970 pg/mL (03-25 @ 06:19)    Lipid Profile: Cholesterol 119  LDL --  HDL 24  TG 63    HgA1c:   TSH:

## 2021-04-03 NOTE — PROGRESS NOTE ADULT - SUBJECTIVE AND OBJECTIVE BOX
Patient is seen and examined at the bed side, is afebrile. The creatinine is trending back down.       REVIEW OF SYSTEMS: All other review systems are negative        ALLERGIES: Aldactone (Unknown), Bumex (Blisters; Rash)  metoprolol (Unknown), spironolactone (Unknown)      Vital Signs Last 24 Hrs  T(C): 36.2 (03 Apr 2021 05:23), Max: 36.3 (02 Apr 2021 21:16)  T(F): 97.1 (03 Apr 2021 05:23), Max: 97.4 (02 Apr 2021 21:16)  HR: 62 (03 Apr 2021 05:23) (61 - 62)  BP: 148/88 (03 Apr 2021 05:23) (148/88 - 162/87)  BP(mean): --  RR: 18 (03 Apr 2021 05:23) (18 - 19)  SpO2: 98% (03 Apr 2021 05:23) (97% - 98%)      PHYSICAL EXAM:  GENERAL: Not in distress   CHEST/LUNG: Not using accessory muscles   HEART: s1 and s2 present  ABDOMEN:  Nontender and  Nondistended  EXTREMITIES: LLE swollen, mild erythematous, tender with a fluctuance area at lower leg  CNS: Awake and Alert      LABS: No new Labs                        12.3   5.46  )-----------( 229      ( 02 Apr 2021 06:15 )             39.9                         12.3   5.53  )-----------( 207      ( 01 Apr 2021 06:50 )             39.9       04-02    139  |  106  |  21<H>  ----------------------------<  95  4.3   |  26  |  1.09    Ca    8.3<L>      02 Apr 2021 06:15        04-01    138  |  102  |  20<H>  ----------------------------<  84  3.4<L>   |  24  |  1.22    Ca    8.1<L>      01 Apr 2021 06:50    TPro  7.4  /  Alb  3.1<L>  /  TBili  0.8  /  DBili  x   /  AST  27  /  ALT  30  /  AlkPhos  163<H>  03-29        CAPILLARY BLOOD GLUCOSE  POCT Blood Glucose.: 124 mg/dL (29 Mar 2021 17:08)  POCT Blood Glucose.: 124 mg/dL (29 Mar 2021 12:24)  POCT Blood Glucose.: 96 mg/dL (29 Mar 2021 07:49)  POCT Blood Glucose.: 112 mg/dL (28 Mar 2021 22:17)      Vancomycin Level, Trough -Pre 4th Dose, order if dosed q6/8/12h (03.31.21 @ 07:01)   Vancomycin Level, Trough: 18.4:      MEDICATIONS  (STANDING):    amoxicillin  875 milliGRAM(s)/clavulanate 1 Tablet(s) Oral every 12 hours  apixaban 5 milliGRAM(s) Oral every 12 hours  aspirin  chewable 81 milliGRAM(s) Oral daily  atorvastatin 40 milliGRAM(s) Oral at bedtime  carvedilol 3.125 milliGRAM(s) Oral every 12 hours  collagenase Ointment 1 Application(s) Topical daily  dextrose 40% Gel 15 Gram(s) Oral once  dextrose 5%. 1000 milliLiter(s) (50 mL/Hr) IV Continuous <Continuous>  dextrose 5%. 1000 milliLiter(s) (100 mL/Hr) IV Continuous <Continuous>  dextrose 50% Injectable 25 Gram(s) IV Push once  dextrose 50% Injectable 12.5 Gram(s) IV Push once  dextrose 50% Injectable 25 Gram(s) IV Push once  doxycycline hyclate Capsule 100 milliGRAM(s) Oral every 12 hours  furosemide    Tablet 40 milliGRAM(s) Oral two times a day  glucagon  Injectable 1 milliGRAM(s) IntraMuscular once  insulin lispro (ADMELOG) corrective regimen sliding scale   SubCutaneous three times a day before meals  insulin lispro (ADMELOG) corrective regimen sliding scale   SubCutaneous at bedtime  lisinopril 5 milliGRAM(s) Oral daily  sodium chloride 0.9% lock flush 3 milliLiter(s) IV Push every 8 hours        RADIOLOGY & ADDITIONAL TESTS:    3/30/21: CT Lower Extremity w/ IV Cont, Left (03.30.21 @ 13:06) Diffuse soft tissue swelling and skin thickening. Nonspecific finding that can be seen in the setting of cellulitis. No drainable fluid collection.  No CT evidence for osteomyelitis.      3/25/21 : Xray Chest 1 View-PORTABLE IMMEDIATE (Xray Chest 1 View-PORTABLE IMMEDIATE .) (03.25.21 @ 05:52) >  No acute infiltrate. Cardiomegaly. Sternotomy.    3/25/21 : US Duplex Venous Lower Ext Complete, Bilateral (03.25.21 @ 10:38) No evidence of deep venous thrombosis in either lower extremity.      MICROBIOLOGY DATA:    COVID-19 PCR . (03.25.21 @ 12:30)   COVID-19 PCR: NotDetec:           Assessment and Plan:   · Assessment	  Patient is a 70y old  Male from home with PMH HTN, HLD, DM, PE, CAD s/p CABG (3.5 yrs ago), presents to the ER on 3/25/21  for evaluation of leg swelling, scrotal swelling  and difficulty breathing for months.  He endorses chest tightness, shortness of breath on exertion, requiring him to take frequent breaks when walking or climbing stairs. can walk less than 2 blocks. Today, 3/29/21, He has started on Zosyn and Vancomycin and The ID consult requested to assist with further evaluation of LLE cellulitis and antibiotic management.     # LLE cellulitis with suspected Abscess - CT scan of LLE shows Diffuse soft tissue swelling and skin thickening.  No drainable fluid collection. And No CT evidence for osteomyelitis.      Would recommend:    1. Continue Cefepime and Vancomycin for now  2. May change to oral Augmentin 875 mg q 12hours and Doxycycline 100 mg [id number] in 48 hours to continue until 4/12/21  3. Monitor kidney function and Adjust Abx doses accordingly   4. Keep LLE elevated  5. Pain management as needed    d/w Covering NP    Attending Attestation:     Spent more than 45 minutes on total encounter, more than 50 % of the visit was spent counseling and/or coordinating care by the Attending physician.         Patient is seen and examined at the bed side, is afebrile. He is tolerating ABx well.       REVIEW OF SYSTEMS: All other review systems are negative      ALLERGIES: Aldactone (Unknown), Bumex (Blisters; Rash)  metoprolol (Unknown), spironolactone (Unknown)      Vital Signs Last 24 Hrs  T(C): 36.2 (03 Apr 2021 05:23), Max: 36.3 (02 Apr 2021 21:16)  T(F): 97.1 (03 Apr 2021 05:23), Max: 97.4 (02 Apr 2021 21:16)  HR: 62 (03 Apr 2021 05:23) (61 - 62)  BP: 148/88 (03 Apr 2021 05:23) (148/88 - 162/87)  BP(mean): --  RR: 18 (03 Apr 2021 05:23) (18 - 19)  SpO2: 98% (03 Apr 2021 05:23) (97% - 98%)      PHYSICAL EXAM:  GENERAL: Not in distress   CHEST/LUNG: Not using accessory muscles   HEART: s1 and s2 present  ABDOMEN:  Nontender and  Nondistended  EXTREMITIES: LLE swollen, mild erythematous, tender with a fluctuance area at lower leg  CNS: Awake and Alert      LABS: No new Labs                        12.3   5.46  )-----------( 229      ( 02 Apr 2021 06:15 )             39.9                         12.3   5.53  )-----------( 207      ( 01 Apr 2021 06:50 )             39.9       04-02    139  |  106  |  21<H>  ----------------------------<  95  4.3   |  26  |  1.09    Ca    8.3<L>      02 Apr 2021 06:15      04-01    138  |  102  |  20<H>  ----------------------------<  84  3.4<L>   |  24  |  1.22    Ca    8.1<L>      01 Apr 2021 06:50    TPro  7.4  /  Alb  3.1<L>  /  TBili  0.8  /  DBili  x   /  AST  27  /  ALT  30  /  AlkPhos  163<H>  03-29        CAPILLARY BLOOD GLUCOSE  POCT Blood Glucose.: 124 mg/dL (29 Mar 2021 17:08)  POCT Blood Glucose.: 124 mg/dL (29 Mar 2021 12:24)  POCT Blood Glucose.: 96 mg/dL (29 Mar 2021 07:49)  POCT Blood Glucose.: 112 mg/dL (28 Mar 2021 22:17)      Vancomycin Level, Trough -Pre 4th Dose, order if dosed q6/8/12h (03.31.21 @ 07:01)   Vancomycin Level, Trough: 18.4:      MEDICATIONS  (STANDING):    amoxicillin  875 milliGRAM(s)/clavulanate 1 Tablet(s) Oral every 12 hours  apixaban 5 milliGRAM(s) Oral every 12 hours  aspirin  chewable 81 milliGRAM(s) Oral daily  atorvastatin 40 milliGRAM(s) Oral at bedtime  carvedilol 3.125 milliGRAM(s) Oral every 12 hours  collagenase Ointment 1 Application(s) Topical daily  doxycycline hyclate Capsule 100 milliGRAM(s) Oral every 12 hours  furosemide    Tablet 40 milliGRAM(s) Oral two times a day  glucagon  Injectable 1 milliGRAM(s) IntraMuscular once  insulin lispro (ADMELOG) corrective regimen sliding scale   SubCutaneous three times a day before meals  insulin lispro (ADMELOG) corrective regimen sliding scale   SubCutaneous at bedtime  lisinopril 5 milliGRAM(s) Oral daily  sodium chloride 0.9% lock flush 3 milliLiter(s) IV Push every 8 hours        RADIOLOGY & ADDITIONAL TESTS:    3/30/21: CT Lower Extremity w/ IV Cont, Left (03.30.21 @ 13:06) Diffuse soft tissue swelling and skin thickening. Nonspecific finding that can be seen in the setting of cellulitis. No drainable fluid collection.  No CT evidence for osteomyelitis.      3/25/21 : Xray Chest 1 View-PORTABLE IMMEDIATE (Xray Chest 1 View-PORTABLE IMMEDIATE .) (03.25.21 @ 05:52) >  No acute infiltrate. Cardiomegaly. Sternotomy.    3/25/21 : US Duplex Venous Lower Ext Complete, Bilateral (03.25.21 @ 10:38) No evidence of deep venous thrombosis in either lower extremity.      MICROBIOLOGY DATA:    COVID-19 PCR . (03.25.21 @ 12:30)   COVID-19 PCR: NotDetec:

## 2021-04-03 NOTE — DISCHARGE NOTE NURSING/CASE MANAGEMENT/SOCIAL WORK - PATIENT PORTAL LINK FT
You can access the FollowMyHealth Patient Portal offered by [facility] by registering at the following website: [email or web address]. By joining [facility]’s FollowMyHealth portal, you will also be able to view your health information using other applications (apps) compatible with our system.

## 2021-04-03 NOTE — PHARMACOTHERAPY INTERVENTION NOTE - COMMENTS
Patient identified by STAR KIMI LIST for Heart Failure. Medication list reviewed for appropriateness. No additional interventions at this time.
Patient identified through STAR KIMI LIST and medication list was reviewed. No additional recommendations at this time.
Patient was identified via STAR KIMI LIST. Medications were reviewed for appropriateness. No additional recommendations at this time.

## 2021-04-03 NOTE — PROGRESS NOTE ADULT - ASSESSMENT
1. CHF   Diuretics  Monitor I&O  Echo noted.   Cardio F/U  Elevate L/E   Encourage compliance with medication and treatment plan as OP.   DVT and GI PPX     2. Pleural Effusion  Likely 2nd to CHF  CXR noted   Cont diuretics.   F/U CXR - may need CT chest.   O2 supp PRN      3. Pulmonary HTN   Severe   Noted on Echo   CCB  Bronchodilators PRN   O2 supp PRN   Consider Revatio 20mg PO TID if cardio agrees     4. CAD  Check Carotid duplex  Cont meds  Cardio f/u     5. Leg Wound with cellulitis  Surgical follow up as OP  No intervention at this time  ID follow up  antibiotics  Wound care  ACE bandage  Ct left leg noted (no abscess)

## 2021-04-03 NOTE — PROGRESS NOTE ADULT - SUBJECTIVE AND OBJECTIVE BOX
Patient is a 70y old  Male who presents with a chief complaint of CHF exacerbation (02 Apr 2021 19:50)    pt seen in tele [ x ], reg med floor [   ], bed [ x ], chair at bedside [   ], a+o x3 [ x ], lethargic [  ],    nad [ x ]        Allergies    Aldactone (Unknown)  Bumex (Blisters; Rash)  metoprolol (Unknown)  spironolactone (Unknown)        Vitals    T(F): 97.1 (04-03-21 @ 05:23), Max: 97.4 (04-02-21 @ 17:27)  HR: 62 (04-03-21 @ 05:23) (58 - 64)  BP: 148/88 (04-03-21 @ 05:23) (148/80 - 162/87)  RR: 18 (04-03-21 @ 05:23) (18 - 19)  SpO2: 98% (04-03-21 @ 05:23) (94% - 98%)  Wt(kg): --  CAPILLARY BLOOD GLUCOSE      POCT Blood Glucose.: 144 mg/dL (02 Apr 2021 21:50)      Labs                          12.3   5.46  )-----------( 229      ( 02 Apr 2021 06:15 )             39.9       04-02    139  |  106  |  21<H>  ----------------------------<  95  4.3   |  26  |  1.09    Ca    8.3<L>      02 Apr 2021 06:15                  Radiology Results      Meds    MEDICATIONS  (STANDING):  apixaban 5 milliGRAM(s) Oral every 12 hours  aspirin  chewable 81 milliGRAM(s) Oral daily  atorvastatin 40 milliGRAM(s) Oral at bedtime  carvedilol 3.125 milliGRAM(s) Oral every 12 hours  cefepime   IVPB 1000 milliGRAM(s) IV Intermittent every 8 hours  cefepime   IVPB      collagenase Ointment 1 Application(s) Topical daily  dextrose 40% Gel 15 Gram(s) Oral once  dextrose 5%. 1000 milliLiter(s) (50 mL/Hr) IV Continuous <Continuous>  dextrose 5%. 1000 milliLiter(s) (100 mL/Hr) IV Continuous <Continuous>  dextrose 50% Injectable 25 Gram(s) IV Push once  dextrose 50% Injectable 12.5 Gram(s) IV Push once  dextrose 50% Injectable 25 Gram(s) IV Push once  furosemide    Tablet 40 milliGRAM(s) Oral two times a day  glucagon  Injectable 1 milliGRAM(s) IntraMuscular once  insulin lispro (ADMELOG) corrective regimen sliding scale   SubCutaneous three times a day before meals  insulin lispro (ADMELOG) corrective regimen sliding scale   SubCutaneous at bedtime  lisinopril 5 milliGRAM(s) Oral daily  sodium chloride 0.9% lock flush 3 milliLiter(s) IV Push every 8 hours  vancomycin  IVPB 1000 milliGRAM(s) IV Intermittent every 12 hours      MEDICATIONS  (PRN):      Physical Exam    Neuro :  no focal deficits  Respiratory: CTA B/L  CV: RRR, S1S2, no murmurs,   Abdominal: Soft, NT, ND +BS,  Extremities: b/l le edema, b/l le with ace wrap        ASSESSMENT    acute on chronic systolic chf,   pulm edema   r/o acs,   left le cellulitis  h/o HTN,   HLD,   DM,   CAD s/p CABGx3,  PE (pulmonary thromboembolism)    PLAN      cont tele,   acs protocol,   cont coreg, lasix,   aspirin, statin,   ce x3 noted above  cardio f/u  echo with EF 15-20%  Grade II diastolic dysfunction. RV systolic pressure is severely noted    cont lisinopril  cont Eliquis.  D/W pt regarding stress test-R/O ischemia,will consider it  supplemental O2 as needed,  cont  bronchodilator,  f/u hgba1c   cont vanco   cont Cefepime    May change abx to oral Augmentin 875 mg q 12hours and Doxycycline 100 mg [id number] in 48 hours to complete 14 days course  id f/u   ct left leg with Diffuse soft tissue swelling and skin thickening. Nonspecific finding that can be seen in the setting of cellulitis. No drainable fluid collection.  No CT evidence for osteomyelitis noted above.  podiatry cons   Patient advised to elevate and use compression.   Explained to patient that wound is from excessive swelling and there is no signs of infection.   Dressed with santyl, 4x4 gauze, allegra and ace to left LE, and plain ACE wrap on RLE.   ordered santyl   wound care/ nursing dressing instructions:   apply santyl to leg leg wound and cover with 4x4 gauze, allegra and ace to left LE.   Apply plain ACE wrap on RLE.   Change dressing MWF.   Patient stable per podiatry standpoint  cont current meds           Patient is a 70y old  Male who presents with a chief complaint of CHF exacerbation (02 Apr 2021 19:50)    pt seen in tele [ x ], reg med floor [   ], bed [ x ], chair at bedside [   ], a+o x3 [ x ], lethargic [  ],    nad [ x ]        Allergies    Aldactone (Unknown)  Bumex (Blisters; Rash)  metoprolol (Unknown)  spironolactone (Unknown)        Vitals    T(F): 97.1 (04-03-21 @ 05:23), Max: 97.4 (04-02-21 @ 17:27)  HR: 62 (04-03-21 @ 05:23) (58 - 64)  BP: 148/88 (04-03-21 @ 05:23) (148/80 - 162/87)  RR: 18 (04-03-21 @ 05:23) (18 - 19)  SpO2: 98% (04-03-21 @ 05:23) (94% - 98%)  Wt(kg): --  CAPILLARY BLOOD GLUCOSE      POCT Blood Glucose.: 144 mg/dL (02 Apr 2021 21:50)      Labs                          12.3   5.46  )-----------( 229      ( 02 Apr 2021 06:15 )             39.9       04-02    139  |  106  |  21<H>  ----------------------------<  95  4.3   |  26  |  1.09    Ca    8.3<L>      02 Apr 2021 06:15                  Radiology Results      Meds    MEDICATIONS  (STANDING):  apixaban 5 milliGRAM(s) Oral every 12 hours  aspirin  chewable 81 milliGRAM(s) Oral daily  atorvastatin 40 milliGRAM(s) Oral at bedtime  carvedilol 3.125 milliGRAM(s) Oral every 12 hours  cefepime   IVPB 1000 milliGRAM(s) IV Intermittent every 8 hours  cefepime   IVPB      collagenase Ointment 1 Application(s) Topical daily  dextrose 40% Gel 15 Gram(s) Oral once  dextrose 5%. 1000 milliLiter(s) (50 mL/Hr) IV Continuous <Continuous>  dextrose 5%. 1000 milliLiter(s) (100 mL/Hr) IV Continuous <Continuous>  dextrose 50% Injectable 25 Gram(s) IV Push once  dextrose 50% Injectable 12.5 Gram(s) IV Push once  dextrose 50% Injectable 25 Gram(s) IV Push once  furosemide    Tablet 40 milliGRAM(s) Oral two times a day  glucagon  Injectable 1 milliGRAM(s) IntraMuscular once  insulin lispro (ADMELOG) corrective regimen sliding scale   SubCutaneous three times a day before meals  insulin lispro (ADMELOG) corrective regimen sliding scale   SubCutaneous at bedtime  lisinopril 5 milliGRAM(s) Oral daily  sodium chloride 0.9% lock flush 3 milliLiter(s) IV Push every 8 hours  vancomycin  IVPB 1000 milliGRAM(s) IV Intermittent every 12 hours      MEDICATIONS  (PRN):      Physical Exam    Neuro :  no focal deficits  Respiratory: CTA B/L  CV: RRR, S1S2, no murmurs,   Abdominal: Soft, NT, ND +BS,  Extremities: b/l le edema, b/l le with ace wrap        ASSESSMENT    acute on chronic systolic chf,   pulm edema   r/o acs,   left le cellulitis  h/o HTN,   HLD,   DM,   CAD s/p CABGx3,  PE (pulmonary thromboembolism)    PLAN      cont tele,   acs protocol,   cont coreg, lasix,   aspirin, statin,   ce x3 noted above  cardio f/u  echo with EF 15-20%  Grade II diastolic dysfunction. RV systolic pressure is severely noted    cont lisinopril  cont Eliquis.  pt declined stress test  supplemental O2 as needed,  cont  bronchodilator,  f/u hgba1c   cont vanco   cont Cefepime    change abx to oral Augmentin 875 mg q 12hours and Doxycycline 100 mg [id number] in 48 hours to complete 14 days course  id f/u   ct left leg with Diffuse soft tissue swelling and skin thickening. Nonspecific finding that can be seen in the setting of cellulitis. No drainable fluid collection.  No CT evidence for osteomyelitis noted above.  podiatry cons   Patient advised to elevate and use compression.   Explained to patient that wound is from excessive swelling and there is no signs of infection.   Dressed with santyl, 4x4 gauze, allegra and ace to left LE, and plain ACE wrap on RLE.   ordered santyl   wound care/ nursing dressing instructions:   apply santyl to leg leg wound and cover with 4x4 gauze, allegra and ace to left LE.   Apply plain ACE wrap on RLE.   Change dressing MWF.   Patient stable per podiatry standpoint  cont current meds   pt stable for d/c

## 2021-04-04 LAB
GLUCOSE BLDC GLUCOMTR-MCNC: 136 MG/DL — HIGH (ref 70–99)
GLUCOSE BLDC GLUCOMTR-MCNC: 146 MG/DL — HIGH (ref 70–99)
GLUCOSE BLDC GLUCOMTR-MCNC: 89 MG/DL — SIGNIFICANT CHANGE UP (ref 70–99)

## 2021-04-04 RX ADMIN — Medication 40 MILLIGRAM(S): at 17:36

## 2021-04-04 RX ADMIN — APIXABAN 5 MILLIGRAM(S): 2.5 TABLET, FILM COATED ORAL at 05:57

## 2021-04-04 RX ADMIN — Medication 40 MILLIGRAM(S): at 05:57

## 2021-04-04 RX ADMIN — APIXABAN 5 MILLIGRAM(S): 2.5 TABLET, FILM COATED ORAL at 17:36

## 2021-04-04 RX ADMIN — CARVEDILOL PHOSPHATE 3.12 MILLIGRAM(S): 80 CAPSULE, EXTENDED RELEASE ORAL at 17:36

## 2021-04-04 RX ADMIN — Medication 81 MILLIGRAM(S): at 12:28

## 2021-04-04 NOTE — PROGRESS NOTE ADULT - ASSESSMENT
Patient is a 70y old  Male from home with PMH HTN, HLD, DM, PE, CAD s/p CABG (3.5 yrs ago), presents to the ER on 3/25/21  for evaluation of leg swelling, scrotal swelling  and difficulty breathing for months.  He endorses chest tightness, shortness of breath on exertion, requiring him to take frequent breaks when walking or climbing stairs. can walk less than 2 blocks. Today, 3/29/21, He has started on Zosyn and Vancomycin and The ID consult requested to assist with further evaluation of LLE cellulitis and antibiotic management.     # LLE cellulitis with suspected Abscess - CT scan of LLE shows Diffuse soft tissue swelling and skin thickening.  No drainable fluid collection. And No CT evidence for osteomyelitis.      Would recommend:    1. Continue oral Augmentin 875 mg q 12hours and Doxycycline 100 mg [id number]  to continue until 4/12/21  2. Monitor kidney function and Adjust Abx doses accordingly   3. Keep LLE elevated  4. Pain management as needed      Attending Attestation:     Spent more than 35 minutes on total encounter, more than 50 % of the visit was spent counseling and/or coordinating care by the Attending physician.     Patient is a 70y old  Male from home with PMH HTN, HLD, DM, PE, CAD s/p CABG (3.5 yrs ago), presents to the ER on 3/25/21  for evaluation of leg swelling, scrotal swelling  and difficulty breathing for months.  He endorses chest tightness, shortness of breath on exertion, requiring him to take frequent breaks when walking or climbing stairs. can walk less than 2 blocks. Today, 3/29/21, He has started on Zosyn and Vancomycin and The ID consult requested to assist with further evaluation of LLE cellulitis and antibiotic management.     # LLE cellulitis with suspected Abscess - CT scan of LLE shows Diffuse soft tissue swelling and skin thickening.  No drainable fluid collection. And No CT evidence for osteomyelitis.    Would recommend:    1. Keep both LE elevated   2. Continue oral Augmentin 875 mg q 12hours and Doxycycline 100 mg [id number]  to continue until 4/12/21  3. Monitor kidney function and Adjust Abx doses accordingly   4. Pain management as needed    d/w Dr. Pitts and Patient     Attending Attestation:     Spent more than 35 minutes on total encounter, more than 50 % of the visit was spent counseling and/or coordinating care by the Attending physician.

## 2021-04-04 NOTE — PROGRESS NOTE ADULT - SUBJECTIVE AND OBJECTIVE BOX
Patient is a 70y old  Male who presents with a chief complaint of CHF exacerbation (04 Apr 2021 06:32)  Awake, alert, comfortable in bed in NAD. No cough or sob at rest    INTERVAL HPI/OVERNIGHT EVENTS:      VITAL SIGNS:  T(F): 97.2 (04-04-21 @ 05:18)  HR: 51 (04-04-21 @ 05:18)  BP: 126/79 (04-04-21 @ 05:18)  RR: 18 (04-04-21 @ 05:18)  SpO2: 99% (04-04-21 @ 05:18)  Wt(kg): --  I&O's Detail          REVIEW OF SYSTEMS:    CONSTITUTIONAL:  No fevers, chills, sweats    HEENT:  Eyes:  No diplopia or blurred vision. ENT:  No earache, sore throat or runny nose.    CARDIOVASCULAR:  No pressure, squeezing, tightness, or heaviness about the chest; no palpitations.    RESPIRATORY:  Per HPI    GASTROINTESTINAL:  No abdominal pain, nausea, vomiting or diarrhea.    GENITOURINARY:  No dysuria, frequency or urgency.    NEUROLOGIC:  No paresthesias, fasciculations, seizures or weakness.    PSYCHIATRIC:  No disorder of thought or mood.      PHYSICAL EXAM:    Constitutional: Well developed and nourished  Eyes:Perrla  ENMT: normal  Neck:supple  Respiratory: good air entry  Cardiovascular: S1 S2 regular  Gastrointestinal: Soft, Non tender  Extremities: + edema  Vascular:normal  Neurological:Awake, alert,Ox3  Musculoskeletal:Normal      MEDICATIONS  (STANDING):  amoxicillin  875 milliGRAM(s)/clavulanate 1 Tablet(s) Oral every 12 hours  apixaban 5 milliGRAM(s) Oral every 12 hours  aspirin  chewable 81 milliGRAM(s) Oral daily  atorvastatin 40 milliGRAM(s) Oral at bedtime  carvedilol 3.125 milliGRAM(s) Oral every 12 hours  collagenase Ointment 1 Application(s) Topical daily  dextrose 40% Gel 15 Gram(s) Oral once  dextrose 5%. 1000 milliLiter(s) (50 mL/Hr) IV Continuous <Continuous>  dextrose 5%. 1000 milliLiter(s) (100 mL/Hr) IV Continuous <Continuous>  dextrose 50% Injectable 25 Gram(s) IV Push once  dextrose 50% Injectable 12.5 Gram(s) IV Push once  dextrose 50% Injectable 25 Gram(s) IV Push once  doxycycline hyclate Capsule 100 milliGRAM(s) Oral every 12 hours  furosemide    Tablet 40 milliGRAM(s) Oral two times a day  glucagon  Injectable 1 milliGRAM(s) IntraMuscular once  insulin lispro (ADMELOG) corrective regimen sliding scale   SubCutaneous three times a day before meals  insulin lispro (ADMELOG) corrective regimen sliding scale   SubCutaneous at bedtime  lisinopril 5 milliGRAM(s) Oral daily  sodium chloride 0.9% lock flush 3 milliLiter(s) IV Push every 8 hours    MEDICATIONS  (PRN):      Allergies    Aldactone (Unknown)  Bumex (Blisters; Rash)  metoprolol (Unknown)  spironolactone (Unknown)    Intolerances        LABS:                    CAPILLARY BLOOD GLUCOSE      POCT Blood Glucose.: 89 mg/dL (04 Apr 2021 07:51)  POCT Blood Glucose.: 100 mg/dL (03 Apr 2021 17:10)  POCT Blood Glucose.: 123 mg/dL (03 Apr 2021 11:48)        RADIOLOGY & ADDITIONAL TESTS:    CXR:    Ct scan chest:    ekg;    echo:

## 2021-04-04 NOTE — PROGRESS NOTE ADULT - SUBJECTIVE AND OBJECTIVE BOX
CHIEF COMPLAINT:Patient is a 70y old  Male who presents with a chief complaint of CHF exacerbation.Pt appears comfortable.    	  REVIEW OF SYSTEMS:  CONSTITUTIONAL: No fever, weight loss, or fatigue  EYES: No eye pain, visual disturbances, or discharge  ENT:  No difficulty hearing, tinnitus, vertigo; No sinus or throat pain  NECK: No pain or stiffness  RESPIRATORY: No cough, wheezing, chills or hemoptysis; No Shortness of Breath  CARDIOVASCULAR: No chest pain, palpitations, passing out, dizziness, or leg swelling  GASTROINTESTINAL: No abdominal or epigastric pain. No nausea, vomiting, or hematemesis; No diarrhea or constipation. No melena or hematochezia.  GENITOURINARY: No dysuria, frequency, hematuria, or incontinence  NEUROLOGICAL: No headaches, memory loss, loss of strength, numbness, or tremors  SKIN: No itching, burning, rashes, or lesions   LYMPH Nodes: No enlarged glands  ENDOCRINE: No heat or cold intolerance; No hair loss  MUSCULOSKELETAL: No joint pain or swelling; No muscle, back, or extremity pain  PSYCHIATRIC: No depression, anxiety, mood swings, or difficulty sleeping  HEME/LYMPH: No easy bruising, or bleeding gums  ALLERGY AND IMMUNOLOGIC: No hives or eczema	        PHYSICAL EXAM:  T(C): 36.2 (04-04-21 @ 05:18), Max: 36.2 (04-04-21 @ 05:18)  HR: 51 (04-04-21 @ 05:18) (51 - 63)  BP: 126/79 (04-04-21 @ 05:18) (126/79 - 143/88)  RR: 18 (04-04-21 @ 05:18) (18 - 18)  SpO2: 99% (04-04-21 @ 05:18) (95% - 99%)      Appearance: Normal	  HEENT:   Normal oral mucosa, PERRL, EOMI	  Lymphatic: No lymphadenopathy  Cardiovascular: Normal S1 S2, No JVD, No murmurs, No edema  Respiratory: Lungs clear to auscultation	  Psychiatry: A & O x 3, Mood & affect appropriate  Gastrointestinal:  Soft, Non-tender, + BS	  Skin: No rashes, No ecchymoses, No cyanosis	  Neurologic: Non-focal  Extremities: Normal range of motion, No clubbing, cyanosis or edema  Vascular: Peripheral pulses palpable 2+ bilaterally    MEDICATIONS  (STANDING):  amoxicillin  875 milliGRAM(s)/clavulanate 1 Tablet(s) Oral every 12 hours  apixaban 5 milliGRAM(s) Oral every 12 hours  aspirin  chewable 81 milliGRAM(s) Oral daily  atorvastatin 40 milliGRAM(s) Oral at bedtime  carvedilol 3.125 milliGRAM(s) Oral every 12 hours  collagenase Ointment 1 Application(s) Topical daily  dextrose 40% Gel 15 Gram(s) Oral once  dextrose 5%. 1000 milliLiter(s) (50 mL/Hr) IV Continuous <Continuous>  dextrose 5%. 1000 milliLiter(s) (100 mL/Hr) IV Continuous <Continuous>  dextrose 50% Injectable 25 Gram(s) IV Push once  dextrose 50% Injectable 12.5 Gram(s) IV Push once  dextrose 50% Injectable 25 Gram(s) IV Push once  doxycycline hyclate Capsule 100 milliGRAM(s) Oral every 12 hours  furosemide    Tablet 40 milliGRAM(s) Oral two times a day  glucagon  Injectable 1 milliGRAM(s) IntraMuscular once  insulin lispro (ADMELOG) corrective regimen sliding scale   SubCutaneous three times a day before meals  insulin lispro (ADMELOG) corrective regimen sliding scale   SubCutaneous at bedtime  lisinopril 5 milliGRAM(s) Oral daily  sodium chloride 0.9% lock flush 3 milliLiter(s) IV Push every 8 hours      	  	  LABS:	 	    proBNP: Serum Pro-Brain Natriuretic Peptide: 6970 pg/mL (03-25 @ 06:19)    Lipid Profile: Cholesterol 119  LDL --  HDL 24  TG 63

## 2021-04-04 NOTE — PROGRESS NOTE ADULT - SUBJECTIVE AND OBJECTIVE BOX
Patient is a 70y old  Male who presents with a chief complaint of CHF exacerbation (03 Apr 2021 18:11)    pt seen in tele [ x ], reg med floor [   ], bed [ x ], chair at bedside [   ], a+o x3 [ x ], lethargic [  ],    nad [ x ]        Allergies    Aldactone (Unknown)  Bumex (Blisters; Rash)  metoprolol (Unknown)  spironolactone (Unknown)        Vitals    T(F): 97.2 (04-04-21 @ 05:18), Max: 97.2 (04-04-21 @ 05:18)  HR: 51 (04-04-21 @ 05:18) (51 - 63)  BP: 126/79 (04-04-21 @ 05:18) (126/79 - 143/88)  RR: 18 (04-04-21 @ 05:18) (18 - 18)  SpO2: 99% (04-04-21 @ 05:18) (95% - 99%)  Wt(kg): --  CAPILLARY BLOOD GLUCOSE      POCT Blood Glucose.: 100 mg/dL (03 Apr 2021 17:10)      Labs                          Radiology Results      Meds    MEDICATIONS  (STANDING):  amoxicillin  875 milliGRAM(s)/clavulanate 1 Tablet(s) Oral every 12 hours  apixaban 5 milliGRAM(s) Oral every 12 hours  aspirin  chewable 81 milliGRAM(s) Oral daily  atorvastatin 40 milliGRAM(s) Oral at bedtime  carvedilol 3.125 milliGRAM(s) Oral every 12 hours  collagenase Ointment 1 Application(s) Topical daily  dextrose 40% Gel 15 Gram(s) Oral once  dextrose 5%. 1000 milliLiter(s) (50 mL/Hr) IV Continuous <Continuous>  dextrose 5%. 1000 milliLiter(s) (100 mL/Hr) IV Continuous <Continuous>  dextrose 50% Injectable 25 Gram(s) IV Push once  dextrose 50% Injectable 12.5 Gram(s) IV Push once  dextrose 50% Injectable 25 Gram(s) IV Push once  doxycycline hyclate Capsule 100 milliGRAM(s) Oral every 12 hours  furosemide    Tablet 40 milliGRAM(s) Oral two times a day  glucagon  Injectable 1 milliGRAM(s) IntraMuscular once  insulin lispro (ADMELOG) corrective regimen sliding scale   SubCutaneous three times a day before meals  insulin lispro (ADMELOG) corrective regimen sliding scale   SubCutaneous at bedtime  lisinopril 5 milliGRAM(s) Oral daily  sodium chloride 0.9% lock flush 3 milliLiter(s) IV Push every 8 hours      MEDICATIONS  (PRN):      Physical Exam    Neuro :  no focal deficits  Respiratory: CTA B/L  CV: RRR, S1S2, no murmurs,   Abdominal: Soft, NT, ND +BS,  Extremities: b/l le edema, b/l le with ace wrap        ASSESSMENT    acute on chronic systolic chf,   pulm edema   r/o acs,   left le cellulitis  h/o HTN,   HLD,   DM,   CAD s/p CABGx3,  PE (pulmonary thromboembolism)    PLAN      cont tele,   acs protocol,   cont coreg, lasix,   aspirin, statin,   ce x3 noted above  cardio f/u  echo with EF 15-20%  Grade II diastolic dysfunction. RV systolic pressure is severely noted    cont lisinopril  cont Eliquis.  pt declined stress test  supplemental O2 as needed,  cont  bronchodilator,  f/u hgba1c   cont vanco   cont Cefepime    change abx to oral Augmentin 875 mg q 12hours and Doxycycline 100 mg [id number] in 48 hours to complete 14 days course  id f/u   ct left leg with Diffuse soft tissue swelling and skin thickening. Nonspecific finding that can be seen in the setting of cellulitis. No drainable fluid collection.  No CT evidence for osteomyelitis noted above.  podiatry cons   Patient advised to elevate and use compression.   Explained to patient that wound is from excessive swelling and there is no signs of infection.   Dressed with santyl, 4x4 gauze, allegra and ace to left LE, and plain ACE wrap on RLE.   ordered santyl   wound care/ nursing dressing instructions:   apply santyl to leg leg wound and cover with 4x4 gauze, allegra and ace to left LE.   Apply plain ACE wrap on RLE.   Change dressing MWF.   Patient stable per podiatry standpoint  cont current meds   pt stable for d/c         Patient is a 70y old  Male who presents with a chief complaint of CHF exacerbation (03 Apr 2021 18:11)    pt seen in tele [ x ], reg med floor [   ], bed [ x ], chair at bedside [   ], a+o x3 [ x ], lethargic [  ],    nad [ x ]        Allergies    Aldactone (Unknown)  Bumex (Blisters; Rash)  metoprolol (Unknown)  spironolactone (Unknown)        Vitals    T(F): 97.2 (04-04-21 @ 05:18), Max: 97.2 (04-04-21 @ 05:18)  HR: 51 (04-04-21 @ 05:18) (51 - 63)  BP: 126/79 (04-04-21 @ 05:18) (126/79 - 143/88)  RR: 18 (04-04-21 @ 05:18) (18 - 18)  SpO2: 99% (04-04-21 @ 05:18) (95% - 99%)  Wt(kg): --  CAPILLARY BLOOD GLUCOSE      POCT Blood Glucose.: 100 mg/dL (03 Apr 2021 17:10)      Labs      Radiology Results      Meds    MEDICATIONS  (STANDING):  amoxicillin  875 milliGRAM(s)/clavulanate 1 Tablet(s) Oral every 12 hours  apixaban 5 milliGRAM(s) Oral every 12 hours  aspirin  chewable 81 milliGRAM(s) Oral daily  atorvastatin 40 milliGRAM(s) Oral at bedtime  carvedilol 3.125 milliGRAM(s) Oral every 12 hours  collagenase Ointment 1 Application(s) Topical daily  dextrose 40% Gel 15 Gram(s) Oral once  dextrose 5%. 1000 milliLiter(s) (50 mL/Hr) IV Continuous <Continuous>  dextrose 5%. 1000 milliLiter(s) (100 mL/Hr) IV Continuous <Continuous>  dextrose 50% Injectable 25 Gram(s) IV Push once  dextrose 50% Injectable 12.5 Gram(s) IV Push once  dextrose 50% Injectable 25 Gram(s) IV Push once  doxycycline hyclate Capsule 100 milliGRAM(s) Oral every 12 hours  furosemide    Tablet 40 milliGRAM(s) Oral two times a day  glucagon  Injectable 1 milliGRAM(s) IntraMuscular once  insulin lispro (ADMELOG) corrective regimen sliding scale   SubCutaneous three times a day before meals  insulin lispro (ADMELOG) corrective regimen sliding scale   SubCutaneous at bedtime  lisinopril 5 milliGRAM(s) Oral daily  sodium chloride 0.9% lock flush 3 milliLiter(s) IV Push every 8 hours      MEDICATIONS  (PRN):      Physical Exam    Neuro :  no focal deficits  Respiratory: CTA B/L  CV: RRR, S1S2, no murmurs,   Abdominal: Soft, NT, ND +BS,  Extremities: b/l le edema, b/l le with ace wrap        ASSESSMENT    acute on chronic systolic chf,   pulm edema   r/o acs,   left le cellulitis  h/o HTN,   HLD,   DM,   CAD s/p CABGx3,  PE (pulmonary thromboembolism)    PLAN      cont tele,   acs protocol,   cont coreg, lasix,   aspirin, statin,   ce x3 noted above  cardio f/u  echo with EF 15-20%  Grade II diastolic dysfunction. RV systolic pressure is severely noted    cont lisinopril  cont Eliquis.  pt declined stress test  supplemental O2 as needed,  cont  bronchodilator,  f/u hgba1c   cont vanco   cont Cefepime    change abx to oral Augmentin 875 mg q 12hours and Doxycycline 100 mg [id number] in 48 hours to complete 14 days course  id f/u   ct left leg with Diffuse soft tissue swelling and skin thickening. Nonspecific finding that can be seen in the setting of cellulitis. No drainable fluid collection.  No CT evidence for osteomyelitis noted above.  podiatry cons   Patient advised to elevate and use compression.   Explained to patient that wound is from excessive swelling and there is no signs of infection.   Dressed with santyl, 4x4 gauze, allegra and ace to left LE, and plain ACE wrap on RLE.   ordered santyl   wound care/ nursing dressing instructions:   apply santyl to leg leg wound and cover with 4x4 gauze, allegra and ace to left LE.   Apply plain ACE wrap on RLE.   Change dressing MWF.   Patient stable per podiatry standpoint  cont current meds   pt stable for d/c         Patient is a 70y old  Male who presents with a chief complaint of CHF exacerbation (03 Apr 2021 18:11)    pt seen in tele [ x ], reg med floor [   ], bed [ x ], chair at bedside [   ], a+o x3 [ x ], lethargic [  ],    nad [ x ]        Allergies    Aldactone (Unknown)  Bumex (Blisters; Rash)  metoprolol (Unknown)  spironolactone (Unknown)        Vitals    T(F): 97.2 (04-04-21 @ 05:18), Max: 97.2 (04-04-21 @ 05:18)  HR: 51 (04-04-21 @ 05:18) (51 - 63)  BP: 126/79 (04-04-21 @ 05:18) (126/79 - 143/88)  RR: 18 (04-04-21 @ 05:18) (18 - 18)  SpO2: 99% (04-04-21 @ 05:18) (95% - 99%)  Wt(kg): --  CAPILLARY BLOOD GLUCOSE      POCT Blood Glucose.: 100 mg/dL (03 Apr 2021 17:10)      Labs      Radiology Results      Meds    MEDICATIONS  (STANDING):  amoxicillin  875 milliGRAM(s)/clavulanate 1 Tablet(s) Oral every 12 hours  apixaban 5 milliGRAM(s) Oral every 12 hours  aspirin  chewable 81 milliGRAM(s) Oral daily  atorvastatin 40 milliGRAM(s) Oral at bedtime  carvedilol 3.125 milliGRAM(s) Oral every 12 hours  collagenase Ointment 1 Application(s) Topical daily  dextrose 40% Gel 15 Gram(s) Oral once  dextrose 5%. 1000 milliLiter(s) (50 mL/Hr) IV Continuous <Continuous>  dextrose 5%. 1000 milliLiter(s) (100 mL/Hr) IV Continuous <Continuous>  dextrose 50% Injectable 25 Gram(s) IV Push once  dextrose 50% Injectable 12.5 Gram(s) IV Push once  dextrose 50% Injectable 25 Gram(s) IV Push once  doxycycline hyclate Capsule 100 milliGRAM(s) Oral every 12 hours  furosemide    Tablet 40 milliGRAM(s) Oral two times a day  glucagon  Injectable 1 milliGRAM(s) IntraMuscular once  insulin lispro (ADMELOG) corrective regimen sliding scale   SubCutaneous three times a day before meals  insulin lispro (ADMELOG) corrective regimen sliding scale   SubCutaneous at bedtime  lisinopril 5 milliGRAM(s) Oral daily  sodium chloride 0.9% lock flush 3 milliLiter(s) IV Push every 8 hours      MEDICATIONS  (PRN):      Physical Exam    Neuro :  no focal deficits  Respiratory: CTA B/L  CV: RRR, S1S2, no murmurs,   Abdominal: Soft, NT, ND +BS,  Extremities: b/l le edema, b/l le with ace wrap        ASSESSMENT    acute on chronic systolic chf,   pulm edema   r/o acs,   left le cellulitis  h/o HTN,   HLD,   DM,   CAD s/p CABGx3,  PE (pulmonary thromboembolism)    PLAN      cont tele,   acs protocol,   cont coreg, lasix,   aspirin, statin,   ce x3 noted above  cardio f/u  echo with EF 15-20%  Grade II diastolic dysfunction. RV systolic pressure is severely noted    cont lisinopril  cont Eliquis.  pt declined stress test  supplemental O2 as needed,  cont  bronchodilator,  f/u hgba1c   cont vanco   cont Cefepime    change abx to oral Augmentin 875 mg q 12hours and Doxycycline 100 mg [id number] in 48 hours to complete 14 days course 4/17/21  id f/u   ct left leg with Diffuse soft tissue swelling and skin thickening. Nonspecific finding that can be seen in the setting of cellulitis. No drainable fluid collection.  No CT evidence for osteomyelitis noted above.  podiatry cons   Patient advised to elevate and use compression.   Explained to patient that wound is from excessive swelling and there is no signs of infection.   Dressed with santyl, 4x4 gauze, allegra and ace to left LE, and plain ACE wrap on RLE.   ordered santyl   wound care/ nursing dressing instructions:   apply santyl to leg leg wound and cover with 4x4 gauze, allegra and ace to left LE.   Apply plain ACE wrap on RLE.   Change dressing MWF.   Patient stable per podiatry standpoint  cont current meds   pt stable for d/c

## 2021-04-04 NOTE — PROGRESS NOTE ADULT - SUBJECTIVE AND OBJECTIVE BOX
Patient is seen and examined at the bed side, is afebrile. He is tolerating ABx well.       REVIEW OF SYSTEMS: All other review systems are negative      ALLERGIES: Aldactone (Unknown), Bumex (Blisters; Rash)  metoprolol (Unknown), spironolactone (Unknown)      Vital Signs Last 24 Hrs  T(C): 36.3 (04 Apr 2021 14:14), Max: 36.3 (04 Apr 2021 14:14)  T(F): 97.4 (04 Apr 2021 14:14), Max: 97.4 (04 Apr 2021 14:14)  HR: 62 (04 Apr 2021 14:14) (51 - 63)  BP: 137/78 (04 Apr 2021 14:14) (126/79 - 143/88)  BP(mean): --  RR: 18 (04 Apr 2021 14:14) (18 - 18)  SpO2: 96% (04 Apr 2021 14:14) (95% - 99%)      PHYSICAL EXAM:  GENERAL: Not in distress   CHEST/LUNG: Not using accessory muscles   HEART: s1 and s2 present  ABDOMEN:  Nontender and  Nondistended  EXTREMITIES: LLE swollen, mild erythematous, tender with a fluctuance area at lower leg  CNS: Awake and Alert      LABS: No new Labs                          12.3   5.46  )-----------( 229      ( 02 Apr 2021 06:15 )             39.9                         12.3   5.53  )-----------( 207      ( 01 Apr 2021 06:50 )             39.9       04-02    139  |  106  |  21<H>  ----------------------------<  95  4.3   |  26  |  1.09    Ca    8.3<L>      02 Apr 2021 06:15      04-01    138  |  102  |  20<H>  ----------------------------<  84  3.4<L>   |  24  |  1.22    Ca    8.1<L>      01 Apr 2021 06:50    TPro  7.4  /  Alb  3.1<L>  /  TBili  0.8  /  DBili  x   /  AST  27  /  ALT  30  /  AlkPhos  163<H>  03-29        CAPILLARY BLOOD GLUCOSE  POCT Blood Glucose.: 124 mg/dL (29 Mar 2021 17:08)  POCT Blood Glucose.: 124 mg/dL (29 Mar 2021 12:24)  POCT Blood Glucose.: 96 mg/dL (29 Mar 2021 07:49)  POCT Blood Glucose.: 112 mg/dL (28 Mar 2021 22:17)      Vancomycin Level, Trough -Pre 4th Dose, order if dosed q6/8/12h (03.31.21 @ 07:01)   Vancomycin Level, Trough: 18.4:      MEDICATIONS  (STANDING):    amoxicillin  875 milliGRAM(s)/clavulanate 1 Tablet(s) Oral every 12 hours  apixaban 5 milliGRAM(s) Oral every 12 hours  aspirin  chewable 81 milliGRAM(s) Oral daily  atorvastatin 40 milliGRAM(s) Oral at bedtime  carvedilol 3.125 milliGRAM(s) Oral every 12 hours  collagenase Ointment 1 Application(s) Topical daily  doxycycline hyclate Capsule 100 milliGRAM(s) Oral every 12 hours  furosemide    Tablet 40 milliGRAM(s) Oral two times a day  glucagon  Injectable 1 milliGRAM(s) IntraMuscular once  insulin lispro (ADMELOG) corrective regimen sliding scale   SubCutaneous three times a day before meals  insulin lispro (ADMELOG) corrective regimen sliding scale   SubCutaneous at bedtime  lisinopril 5 milliGRAM(s) Oral daily  sodium chloride 0.9% lock flush 3 milliLiter(s) IV Push every 8 hours        RADIOLOGY & ADDITIONAL TESTS:    3/30/21: CT Lower Extremity w/ IV Cont, Left (03.30.21 @ 13:06) Diffuse soft tissue swelling and skin thickening. Nonspecific finding that can be seen in the setting of cellulitis. No drainable fluid collection.  No CT evidence for osteomyelitis.      3/25/21 : Xray Chest 1 View-PORTABLE IMMEDIATE (Xray Chest 1 View-PORTABLE IMMEDIATE .) (03.25.21 @ 05:52) >  No acute infiltrate. Cardiomegaly. Sternotomy.    3/25/21 : US Duplex Venous Lower Ext Complete, Bilateral (03.25.21 @ 10:38) No evidence of deep venous thrombosis in either lower extremity.      MICROBIOLOGY DATA:    COVID-19 PCR . (03.25.21 @ 12:30)   COVID-19 PCR: NotDetec:            Patient is seen and examined at the bed side, is afebrile. He has no new complaints.       REVIEW OF SYSTEMS: All other review systems are negative      ALLERGIES: Aldactone (Unknown), Bumex (Blisters; Rash)  metoprolol (Unknown), spironolactone (Unknown)      Vital Signs Last 24 Hrs  T(C): 36.3 (04 Apr 2021 14:14), Max: 36.3 (04 Apr 2021 14:14)  T(F): 97.4 (04 Apr 2021 14:14), Max: 97.4 (04 Apr 2021 14:14)  HR: 62 (04 Apr 2021 14:14) (51 - 63)  BP: 137/78 (04 Apr 2021 14:14) (126/79 - 143/88)  BP(mean): --  RR: 18 (04 Apr 2021 14:14) (18 - 18)  SpO2: 96% (04 Apr 2021 14:14) (95% - 99%)      PHYSICAL EXAM:  GENERAL: Not in distress   CHEST/LUNG: Not using accessory muscles   HEART: s1 and s2 present  ABDOMEN:  Nontender and  Nondistended  EXTREMITIES: B/L LE ACE bandage in placed   CNS: Awake and Alert      LABS: No new Labs                          12.3   5.46  )-----------( 229      ( 02 Apr 2021 06:15 )             39.9                         12.3   5.53  )-----------( 207      ( 01 Apr 2021 06:50 )             39.9       04-02    139  |  106  |  21<H>  ----------------------------<  95  4.3   |  26  |  1.09    Ca    8.3<L>      02 Apr 2021 06:15      04-01    138  |  102  |  20<H>  ----------------------------<  84  3.4<L>   |  24  |  1.22    Ca    8.1<L>      01 Apr 2021 06:50    TPro  7.4  /  Alb  3.1<L>  /  TBili  0.8  /  DBili  x   /  AST  27  /  ALT  30  /  AlkPhos  163<H>  03-29        CAPILLARY BLOOD GLUCOSE  POCT Blood Glucose.: 124 mg/dL (29 Mar 2021 17:08)  POCT Blood Glucose.: 124 mg/dL (29 Mar 2021 12:24)  POCT Blood Glucose.: 96 mg/dL (29 Mar 2021 07:49)  POCT Blood Glucose.: 112 mg/dL (28 Mar 2021 22:17)      Vancomycin Level, Trough -Pre 4th Dose, order if dosed q6/8/12h (03.31.21 @ 07:01)   Vancomycin Level, Trough: 18.4:      MEDICATIONS  (STANDING):    amoxicillin  875 milliGRAM(s)/clavulanate 1 Tablet(s) Oral every 12 hours  apixaban 5 milliGRAM(s) Oral every 12 hours  aspirin  chewable 81 milliGRAM(s) Oral daily  atorvastatin 40 milliGRAM(s) Oral at bedtime  carvedilol 3.125 milliGRAM(s) Oral every 12 hours  collagenase Ointment 1 Application(s) Topical daily  doxycycline hyclate Capsule 100 milliGRAM(s) Oral every 12 hours  furosemide    Tablet 40 milliGRAM(s) Oral two times a day  glucagon  Injectable 1 milliGRAM(s) IntraMuscular once  insulin lispro (ADMELOG) corrective regimen sliding scale   SubCutaneous three times a day before meals  insulin lispro (ADMELOG) corrective regimen sliding scale   SubCutaneous at bedtime  lisinopril 5 milliGRAM(s) Oral daily  sodium chloride 0.9% lock flush 3 milliLiter(s) IV Push every 8 hours        RADIOLOGY & ADDITIONAL TESTS:    3/30/21: CT Lower Extremity w/ IV Cont, Left (03.30.21 @ 13:06) Diffuse soft tissue swelling and skin thickening. Nonspecific finding that can be seen in the setting of cellulitis. No drainable fluid collection.  No CT evidence for osteomyelitis.      3/25/21 : Xray Chest 1 View-PORTABLE IMMEDIATE (Xray Chest 1 View-PORTABLE IMMEDIATE .) (03.25.21 @ 05:52) >  No acute infiltrate. Cardiomegaly. Sternotomy.    3/25/21 : US Duplex Venous Lower Ext Complete, Bilateral (03.25.21 @ 10:38) No evidence of deep venous thrombosis in either lower extremity.      MICROBIOLOGY DATA:    COVID-19 PCR . (03.25.21 @ 12:30)   COVID-19 PCR: NotDetec:

## 2021-04-05 VITALS
TEMPERATURE: 99 F | OXYGEN SATURATION: 95 % | RESPIRATION RATE: 19 BRPM | SYSTOLIC BLOOD PRESSURE: 148 MMHG | HEART RATE: 75 BPM | DIASTOLIC BLOOD PRESSURE: 92 MMHG

## 2021-04-05 DIAGNOSIS — L03.90 CELLULITIS, UNSPECIFIED: ICD-10-CM

## 2021-04-05 RX ORDER — JNJ-78436735 50000000000 [PFU]/.5ML
0.5 SUSPENSION INTRAMUSCULAR ONCE
Refills: 0 | Status: COMPLETED | OUTPATIENT
Start: 2021-04-05 | End: 2021-04-05

## 2021-04-05 RX ADMIN — APIXABAN 5 MILLIGRAM(S): 2.5 TABLET, FILM COATED ORAL at 06:12

## 2021-04-05 RX ADMIN — Medication 1 TABLET(S): at 06:12

## 2021-04-05 RX ADMIN — LISINOPRIL 5 MILLIGRAM(S): 2.5 TABLET ORAL at 06:12

## 2021-04-05 RX ADMIN — Medication 81 MILLIGRAM(S): at 14:38

## 2021-04-05 RX ADMIN — Medication 40 MILLIGRAM(S): at 06:12

## 2021-04-05 RX ADMIN — Medication 100 MILLIGRAM(S): at 06:12

## 2021-04-05 RX ADMIN — JNJ-78436735 0.5 MILLILITER(S): 50000000000 SUSPENSION INTRAMUSCULAR at 16:24

## 2021-04-05 RX ADMIN — Medication 1 APPLICATION(S): at 14:39

## 2021-04-05 NOTE — PROGRESS NOTE ADULT - SUBJECTIVE AND OBJECTIVE BOX
Patient is a 70y old  Male who presents with a chief complaint of CHF exacerbation (05 Apr 2021 06:35)  Patient is awake, alert, comfortable in bed in NAD. Feeling well. Stable for DC home    INTERVAL HPI/OVERNIGHT EVENTS:      VITAL SIGNS:  T(F): 98.5 (04-05-21 @ 05:50)  HR: 51 (04-05-21 @ 05:50)  BP: 133/78 (04-05-21 @ 05:50)  RR: 18 (04-05-21 @ 05:50)  SpO2: 100% (04-05-21 @ 05:50)  Wt(kg): --  I&O's Detail          REVIEW OF SYSTEMS:    CONSTITUTIONAL:  No fevers, chills, sweats    HEENT:  Eyes:  No diplopia or blurred vision. ENT:  No earache, sore throat or runny nose.    CARDIOVASCULAR:  No pressure, squeezing, tightness, or heaviness about the chest; no palpitations.    RESPIRATORY:  Per HPI    GASTROINTESTINAL:  No abdominal pain, nausea, vomiting or diarrhea.    GENITOURINARY:  No dysuria, frequency or urgency.    NEUROLOGIC:  No paresthesias, fasciculations, seizures or weakness.    PSYCHIATRIC:  No disorder of thought or mood.      PHYSICAL EXAM:    Constitutional: Well developed and nourished  Eyes:Perrla  ENMT: normal  Neck:supple  Respiratory: good air entry  Cardiovascular: S1 S2 regular  Gastrointestinal: Soft, Non tender  Extremities: No edema  Vascular:normal  Neurological:Awake, alert,Ox3  Musculoskeletal:Normal      MEDICATIONS  (STANDING):  amoxicillin  875 milliGRAM(s)/clavulanate 1 Tablet(s) Oral every 12 hours  apixaban 5 milliGRAM(s) Oral every 12 hours  aspirin  chewable 81 milliGRAM(s) Oral daily  atorvastatin 40 milliGRAM(s) Oral at bedtime  carvedilol 3.125 milliGRAM(s) Oral every 12 hours  collagenase Ointment 1 Application(s) Topical daily  dextrose 40% Gel 15 Gram(s) Oral once  dextrose 5%. 1000 milliLiter(s) (50 mL/Hr) IV Continuous <Continuous>  dextrose 5%. 1000 milliLiter(s) (100 mL/Hr) IV Continuous <Continuous>  dextrose 50% Injectable 25 Gram(s) IV Push once  dextrose 50% Injectable 12.5 Gram(s) IV Push once  dextrose 50% Injectable 25 Gram(s) IV Push once  doxycycline hyclate Capsule 100 milliGRAM(s) Oral every 12 hours  furosemide    Tablet 40 milliGRAM(s) Oral two times a day  glucagon  Injectable 1 milliGRAM(s) IntraMuscular once  insulin lispro (ADMELOG) corrective regimen sliding scale   SubCutaneous three times a day before meals  insulin lispro (ADMELOG) corrective regimen sliding scale   SubCutaneous at bedtime  lisinopril 5 milliGRAM(s) Oral daily  sodium chloride 0.9% lock flush 3 milliLiter(s) IV Push every 8 hours    MEDICATIONS  (PRN):      Allergies    Aldactone (Unknown)  Bumex (Blisters; Rash)  metoprolol (Unknown)  spironolactone (Unknown)    Intolerances        LABS:                    CAPILLARY BLOOD GLUCOSE      POCT Blood Glucose.: 146 mg/dL (04 Apr 2021 16:41)  POCT Blood Glucose.: 136 mg/dL (04 Apr 2021 11:55)        RADIOLOGY & ADDITIONAL TESTS:    CXR:    Ct scan chest:    ekg;    echo:

## 2021-04-05 NOTE — PROGRESS NOTE ADULT - REASON FOR ADMISSION
CHF exacerbation

## 2021-04-05 NOTE — PROGRESS NOTE ADULT - SUBJECTIVE AND OBJECTIVE BOX
Patient is seen and examined at the bed side, is afebrile. He has no new complaints.       REVIEW OF SYSTEMS: All other review systems are negative      ALLERGIES: Aldactone (Unknown), Bumex (Blisters; Rash)  metoprolol (Unknown), spironolactone (Unknown)      Vital Signs Last 24 Hrs  T(C): 37.1 (05 Apr 2021 13:49), Max: 37.1 (05 Apr 2021 13:49)  T(F): 98.8 (05 Apr 2021 13:49), Max: 98.8 (05 Apr 2021 13:49)  HR: 75 (05 Apr 2021 13:49) (51 - 75)  BP: 148/92 (05 Apr 2021 13:49) (133/78 - 148/92)  BP(mean): --  RR: 19 (05 Apr 2021 13:49) (18 - 19)  SpO2: 95% (05 Apr 2021 13:49) (95% - 100%)      PHYSICAL EXAM:  GENERAL: Not in distress   CHEST/LUNG: Not using accessory muscles   HEART: s1 and s2 present  ABDOMEN:  Nontender and  Nondistended  EXTREMITIES: B/L LE ACE bandage in placed   CNS: Awake and Alert      LABS: No new Labs                          12.3   5.46  )-----------( 229      ( 02 Apr 2021 06:15 )             39.9                         12.3   5.53  )-----------( 207      ( 01 Apr 2021 06:50 )             39.9         04-02    139  |  106  |  21<H>  ----------------------------<  95  4.3   |  26  |  1.09    Ca    8.3<L>      02 Apr 2021 06:15      04-01    138  |  102  |  20<H>  ----------------------------<  84  3.4<L>   |  24  |  1.22    Ca    8.1<L>      01 Apr 2021 06:50    TPro  7.4  /  Alb  3.1<L>  /  TBili  0.8  /  DBili  x   /  AST  27  /  ALT  30  /  AlkPhos  163<H>  03-29        CAPILLARY BLOOD GLUCOSE  POCT Blood Glucose.: 124 mg/dL (29 Mar 2021 17:08)  POCT Blood Glucose.: 124 mg/dL (29 Mar 2021 12:24)  POCT Blood Glucose.: 96 mg/dL (29 Mar 2021 07:49)  POCT Blood Glucose.: 112 mg/dL (28 Mar 2021 22:17)      Vancomycin Level, Trough -Pre 4th Dose, order if dosed q6/8/12h (03.31.21 @ 07:01)   Vancomycin Level, Trough: 18.4:      MEDICATIONS  (STANDING):    amoxicillin  875 milliGRAM(s)/clavulanate 1 Tablet(s) Oral every 12 hours  apixaban 5 milliGRAM(s) Oral every 12 hours  aspirin  chewable 81 milliGRAM(s) Oral daily  atorvastatin 40 milliGRAM(s) Oral at bedtime  carvedilol 3.125 milliGRAM(s) Oral every 12 hours  collagenase Ointment 1 Application(s) Topical daily  dextrose 40% Gel 15 Gram(s) Oral once  dextrose 5%. 1000 milliLiter(s) (50 mL/Hr) IV Continuous <Continuous>  dextrose 5%. 1000 milliLiter(s) (100 mL/Hr) IV Continuous <Continuous>  dextrose 50% Injectable 25 Gram(s) IV Push once  dextrose 50% Injectable 12.5 Gram(s) IV Push once  dextrose 50% Injectable 25 Gram(s) IV Push once  doxycycline hyclate Capsule 100 milliGRAM(s) Oral every 12 hours  furosemide    Tablet 40 milliGRAM(s) Oral two times a day  glucagon  Injectable 1 milliGRAM(s) IntraMuscular once  insulin lispro (ADMELOG) corrective regimen sliding scale   SubCutaneous three times a day before meals  insulin lispro (ADMELOG) corrective regimen sliding scale   SubCutaneous at bedtime  lisinopril 5 milliGRAM(s) Oral daily  sodium chloride 0.9% lock flush 3 milliLiter(s) IV Push every 8 hours        RADIOLOGY & ADDITIONAL TESTS:    3/30/21: CT Lower Extremity w/ IV Cont, Left (03.30.21 @ 13:06) Diffuse soft tissue swelling and skin thickening. Nonspecific finding that can be seen in the setting of cellulitis. No drainable fluid collection.  No CT evidence for osteomyelitis.      3/25/21 : Xray Chest 1 View-PORTABLE IMMEDIATE (Xray Chest 1 View-PORTABLE IMMEDIATE .) (03.25.21 @ 05:52) >  No acute infiltrate. Cardiomegaly. Sternotomy.    3/25/21 : US Duplex Venous Lower Ext Complete, Bilateral (03.25.21 @ 10:38) No evidence of deep venous thrombosis in either lower extremity.      MICROBIOLOGY DATA:    COVID-19 PCR . (03.25.21 @ 12:30)   COVID-19 PCR: NotDetec:            Patient  is afebrile and tolerating oral Abx.      REVIEW OF SYSTEMS: All other review systems are negative      ALLERGIES: Aldactone (Unknown), Bumex (Blisters; Rash)  metoprolol (Unknown), spironolactone (Unknown)      Vital Signs Last 24 Hrs  T(C): 37.1 (05 Apr 2021 13:49), Max: 37.1 (05 Apr 2021 13:49)  T(F): 98.8 (05 Apr 2021 13:49), Max: 98.8 (05 Apr 2021 13:49)  HR: 75 (05 Apr 2021 13:49) (51 - 75)  BP: 148/92 (05 Apr 2021 13:49) (133/78 - 148/92)  BP(mean): --  RR: 19 (05 Apr 2021 13:49) (18 - 19)  SpO2: 95% (05 Apr 2021 13:49) (95% - 100%)      PHYSICAL EXAM:  GENERAL: Not in distress   CHEST/LUNG: Not using accessory muscles   HEART: s1 and s2 present  ABDOMEN:  Nontender and  Nondistended  EXTREMITIES: B/L LE ACE bandage in placed   CNS: Awake and Alert      LABS: No new Labs                          12.3   5.46  )-----------( 229      ( 02 Apr 2021 06:15 )             39.9                         12.3   5.53  )-----------( 207      ( 01 Apr 2021 06:50 )             39.9         04-02    139  |  106  |  21<H>  ----------------------------<  95  4.3   |  26  |  1.09    Ca    8.3<L>      02 Apr 2021 06:15      04-01    138  |  102  |  20<H>  ----------------------------<  84  3.4<L>   |  24  |  1.22    Ca    8.1<L>      01 Apr 2021 06:50    TPro  7.4  /  Alb  3.1<L>  /  TBili  0.8  /  DBili  x   /  AST  27  /  ALT  30  /  AlkPhos  163<H>  03-29        CAPILLARY BLOOD GLUCOSE  POCT Blood Glucose.: 124 mg/dL (29 Mar 2021 17:08)  POCT Blood Glucose.: 124 mg/dL (29 Mar 2021 12:24)  POCT Blood Glucose.: 96 mg/dL (29 Mar 2021 07:49)  POCT Blood Glucose.: 112 mg/dL (28 Mar 2021 22:17)      Vancomycin Level, Trough -Pre 4th Dose, order if dosed q6/8/12h (03.31.21 @ 07:01)   Vancomycin Level, Trough: 18.4:      MEDICATIONS  (STANDING):    amoxicillin  875 milliGRAM(s)/clavulanate 1 Tablet(s) Oral every 12 hours  apixaban 5 milliGRAM(s) Oral every 12 hours  aspirin  chewable 81 milliGRAM(s) Oral daily  atorvastatin 40 milliGRAM(s) Oral at bedtime  carvedilol 3.125 milliGRAM(s) Oral every 12 hours  collagenase Ointment 1 Application(s) Topical daily  dextrose 40% Gel 15 Gram(s) Oral once  dextrose 5%. 1000 milliLiter(s) (50 mL/Hr) IV Continuous <Continuous>  dextrose 5%. 1000 milliLiter(s) (100 mL/Hr) IV Continuous <Continuous>  dextrose 50% Injectable 25 Gram(s) IV Push once  dextrose 50% Injectable 12.5 Gram(s) IV Push once  dextrose 50% Injectable 25 Gram(s) IV Push once  doxycycline hyclate Capsule 100 milliGRAM(s) Oral every 12 hours  furosemide    Tablet 40 milliGRAM(s) Oral two times a day  glucagon  Injectable 1 milliGRAM(s) IntraMuscular once  insulin lispro (ADMELOG) corrective regimen sliding scale   SubCutaneous three times a day before meals  insulin lispro (ADMELOG) corrective regimen sliding scale   SubCutaneous at bedtime  lisinopril 5 milliGRAM(s) Oral daily  sodium chloride 0.9% lock flush 3 milliLiter(s) IV Push every 8 hours        RADIOLOGY & ADDITIONAL TESTS:    3/30/21: CT Lower Extremity w/ IV Cont, Left (03.30.21 @ 13:06) Diffuse soft tissue swelling and skin thickening. Nonspecific finding that can be seen in the setting of cellulitis. No drainable fluid collection.  No CT evidence for osteomyelitis.      3/25/21 : Xray Chest 1 View-PORTABLE IMMEDIATE (Xray Chest 1 View-PORTABLE IMMEDIATE .) (03.25.21 @ 05:52) >  No acute infiltrate. Cardiomegaly. Sternotomy.    3/25/21 : US Duplex Venous Lower Ext Complete, Bilateral (03.25.21 @ 10:38) No evidence of deep venous thrombosis in either lower extremity.      MICROBIOLOGY DATA:    COVID-19 PCR . (03.25.21 @ 12:30)   COVID-19 PCR: NotDetec:

## 2021-04-05 NOTE — PROGRESS NOTE ADULT - PROBLEM SELECTOR PLAN 1
Echo showed EF 15-20%  Currently stable  Continue Lasix, ASA, ACE, BB, Statins  Daily weights  Strict intake and outputs

## 2021-04-05 NOTE — PROGRESS NOTE ADULT - SUBJECTIVE AND OBJECTIVE BOX
CHIEF COMPLAINT:Patient is a 70y old  Male who presents with a chief complaint of CHF exacerbation.Pt appears comfortable.    	  REVIEW OF SYSTEMS:  CONSTITUTIONAL: No fever, weight loss, or fatigue  EYES: No eye pain, visual disturbances, or discharge  ENT:  No difficulty hearing, tinnitus, vertigo; No sinus or throat pain  NECK: No pain or stiffness  RESPIRATORY: No cough, wheezing, chills or hemoptysis; No Shortness of Breath  CARDIOVASCULAR: No chest pain, palpitations, passing out, dizziness, or leg swelling  GASTROINTESTINAL: No abdominal or epigastric pain. No nausea, vomiting, or hematemesis; No diarrhea or constipation. No melena or hematochezia.  GENITOURINARY: No dysuria, frequency, hematuria, or incontinence  NEUROLOGICAL: No headaches, memory loss, loss of strength, numbness, or tremors  SKIN: No itching, burning, rashes, or lesions   LYMPH Nodes: No enlarged glands  ENDOCRINE: No heat or cold intolerance; No hair loss  MUSCULOSKELETAL: No joint pain or swelling; No muscle, back, or extremity pain  PSYCHIATRIC: No depression, anxiety, mood swings, or difficulty sleeping  HEME/LYMPH: No easy bruising, or bleeding gums  ALLERGY AND IMMUNOLOGIC: No hives or eczema	        PHYSICAL EXAM:  T(C): 36.9 (04-05-21 @ 05:50), Max: 36.9 (04-05-21 @ 05:50)  HR: 51 (04-05-21 @ 05:50) (51 - 62)  BP: 133/78 (04-05-21 @ 05:50) (133/78 - 137/78)  RR: 18 (04-05-21 @ 05:50) (18 - 18)  SpO2: 100% (04-05-21 @ 05:50) (96% - 100%)  Wt(kg): --  I&O's Summary      Appearance: Normal	  HEENT:   Normal oral mucosa, PERRL, EOMI	  Lymphatic: No lymphadenopathy  Cardiovascular: Normal S1 S2, No JVD, No murmurs, No edema  Respiratory: Lungs clear to auscultation	  Psychiatry: A & O x 3, Mood & affect appropriate  Gastrointestinal:  Soft, Non-tender, + BS	  Skin: No rashes, No ecchymoses, No cyanosis	  Neurologic: Non-focal  Extremities: Normal range of motion, No clubbing, cyanosis or edema      MEDICATIONS  (STANDING):  amoxicillin  875 milliGRAM(s)/clavulanate 1 Tablet(s) Oral every 12 hours  apixaban 5 milliGRAM(s) Oral every 12 hours  aspirin  chewable 81 milliGRAM(s) Oral daily  atorvastatin 40 milliGRAM(s) Oral at bedtime  carvedilol 3.125 milliGRAM(s) Oral every 12 hours  collagenase Ointment 1 Application(s) Topical daily  coronavirus (EUA) Vaccine (Cooliris) 0.5 milliLiter(s) IntraMuscular once  dextrose 40% Gel 15 Gram(s) Oral once  dextrose 5%. 1000 milliLiter(s) (50 mL/Hr) IV Continuous <Continuous>  dextrose 5%. 1000 milliLiter(s) (100 mL/Hr) IV Continuous <Continuous>  dextrose 50% Injectable 25 Gram(s) IV Push once  dextrose 50% Injectable 12.5 Gram(s) IV Push once  dextrose 50% Injectable 25 Gram(s) IV Push once  doxycycline hyclate Capsule 100 milliGRAM(s) Oral every 12 hours  furosemide    Tablet 40 milliGRAM(s) Oral two times a day  glucagon  Injectable 1 milliGRAM(s) IntraMuscular once  insulin lispro (ADMELOG) corrective regimen sliding scale   SubCutaneous three times a day before meals  insulin lispro (ADMELOG) corrective regimen sliding scale   SubCutaneous at bedtime  lisinopril 5 milliGRAM(s) Oral daily  sodium chloride 0.9% lock flush 3 milliLiter(s) IV Push every 8 hours        LABS:	 	      proBNP: Serum Pro-Brain Natriuretic Peptide: 6970 pg/mL (03-25 @ 06:19)    Lipid Profile: Cholesterol 119  LDL --  HDL 24  TG 63

## 2021-04-05 NOTE — PROGRESS NOTE ADULT - ASSESSMENT
Patient is a 70y old  Male from home with PMH HTN, HLD, DM, PE, CAD s/p CABG (3.5 yrs ago), presents to the ER on 3/25/21  for evaluation of leg swelling, scrotal swelling  and difficulty breathing for months.  He endorses chest tightness, shortness of breath on exertion, requiring him to take frequent breaks when walking or climbing stairs. can walk less than 2 blocks. Today, 3/29/21, He has started on Zosyn and Vancomycin and The ID consult requested to assist with further evaluation of LLE cellulitis and antibiotic management.     # LLE cellulitis with suspected Abscess - CT scan of LLE shows Diffuse soft tissue swelling and skin thickening.  No drainable fluid collection. And No CT evidence for osteomyelitis.    Would recommend:    1. Keep both LE elevated   2. Continue oral Augmentin 875 mg q 12hours and Doxycycline 100 mg [id number]  to continue until 4/12/21  3. Monitor kidney function and Adjust Abx doses accordingly   4. Pain management as needed      Attending Attestation:     Spent more than 35 minutes on total encounter, more than 50 % of the visit was spent counseling and/or coordinating care by the Attending physician.     Patient is a 70y old  Male from home with PMH HTN, HLD, DM, PE, CAD s/p CABG (3.5 yrs ago), presents to the ER on 3/25/21  for evaluation of leg swelling, scrotal swelling  and difficulty breathing for months.  He endorses chest tightness, shortness of breath on exertion, requiring him to take frequent breaks when walking or climbing stairs. can walk less than 2 blocks. Today, 3/29/21, He has started on Zosyn and Vancomycin and The ID consult requested to assist with further evaluation of LLE cellulitis and antibiotic management.     # LLE cellulitis with suspected Abscess - CT scan of LLE shows Diffuse soft tissue swelling and skin thickening.  No drainable fluid collection. And No CT evidence for osteomyelitis.    Would recommend:    1. Monitor kidney function and Adjust Abx doses accordingly    2. Continue oral Augmentin 875 mg q 12hours and Doxycycline 100 mg [id number]  to continue until 4/12/21  3. Keep both LE elevated   4. Pain management as needed      Attending Attestation:     Spent more than 35 minutes on total encounter, more than 50 % of the visit was spent counseling and/or coordinating care by the Attending physician.

## 2021-04-05 NOTE — PROGRESS NOTE ADULT - PROBLEM/PLAN-3
DISPLAY PLAN FREE TEXT
No
DISPLAY PLAN FREE TEXT

## 2021-04-05 NOTE — PROGRESS NOTE ADULT - PROBLEM SELECTOR PROBLEM 2
CAD (coronary artery disease)
Cellulitis
CAD (coronary artery disease)
CAD (coronary artery disease)

## 2021-04-05 NOTE — PROGRESS NOTE ADULT - SUBJECTIVE AND OBJECTIVE BOX
Patient is a 70y old  Male who presents with a chief complaint of CHF exacerbation (04 Apr 2021 17:08)    pt seen in tele [ x ], reg med floor [   ], bed [ x ], chair at bedside [   ], a+o x3 [ x ], lethargic [  ],    nad [ x ]      Allergies    Aldactone (Unknown)  Bumex (Blisters; Rash)  metoprolol (Unknown)  spironolactone (Unknown)        Vitals    T(F): 98.5 (04-05-21 @ 05:50), Max: 98.5 (04-05-21 @ 05:50)  HR: 51 (04-05-21 @ 05:50) (51 - 62)  BP: 133/78 (04-05-21 @ 05:50) (133/78 - 137/78)  RR: 18 (04-05-21 @ 05:50) (18 - 18)  SpO2: 100% (04-05-21 @ 05:50) (96% - 100%)  Wt(kg): --  CAPILLARY BLOOD GLUCOSE      POCT Blood Glucose.: 146 mg/dL (04 Apr 2021 16:41)      Labs                          Radiology Results      Meds    MEDICATIONS  (STANDING):  amoxicillin  875 milliGRAM(s)/clavulanate 1 Tablet(s) Oral every 12 hours  apixaban 5 milliGRAM(s) Oral every 12 hours  aspirin  chewable 81 milliGRAM(s) Oral daily  atorvastatin 40 milliGRAM(s) Oral at bedtime  carvedilol 3.125 milliGRAM(s) Oral every 12 hours  collagenase Ointment 1 Application(s) Topical daily  dextrose 40% Gel 15 Gram(s) Oral once  dextrose 5%. 1000 milliLiter(s) (50 mL/Hr) IV Continuous <Continuous>  dextrose 5%. 1000 milliLiter(s) (100 mL/Hr) IV Continuous <Continuous>  dextrose 50% Injectable 25 Gram(s) IV Push once  dextrose 50% Injectable 12.5 Gram(s) IV Push once  dextrose 50% Injectable 25 Gram(s) IV Push once  doxycycline hyclate Capsule 100 milliGRAM(s) Oral every 12 hours  furosemide    Tablet 40 milliGRAM(s) Oral two times a day  glucagon  Injectable 1 milliGRAM(s) IntraMuscular once  insulin lispro (ADMELOG) corrective regimen sliding scale   SubCutaneous three times a day before meals  insulin lispro (ADMELOG) corrective regimen sliding scale   SubCutaneous at bedtime  lisinopril 5 milliGRAM(s) Oral daily  sodium chloride 0.9% lock flush 3 milliLiter(s) IV Push every 8 hours      MEDICATIONS  (PRN):      Physical Exam    Neuro :  no focal deficits  Respiratory: CTA B/L  CV: RRR, S1S2, no murmurs,   Abdominal: Soft, NT, ND +BS,  Extremities: b/l le edema, b/l le with ace wrap        ASSESSMENT    acute on chronic systolic chf,   pulm edema   r/o acs,   left le cellulitis  h/o HTN,   HLD,   DM,   CAD s/p CABGx3,  PE (pulmonary thromboembolism)    PLAN      cont tele,   acs protocol,   cont coreg, lasix,   aspirin, statin,   ce x3 noted above  cardio f/u  echo with EF 15-20%  Grade II diastolic dysfunction. RV systolic pressure is severely noted    cont lisinopril  cont Eliquis.  pt declined stress test  supplemental O2 as needed,  cont  bronchodilator,  f/u hgba1c   cont vanco   cont Cefepime    change abx to oral Augmentin 875 mg q 12hours and Doxycycline 100 mg [id number] in 48 hours to complete 14 days course 4/17/21  id f/u   ct left leg with Diffuse soft tissue swelling and skin thickening. Nonspecific finding that can be seen in the setting of cellulitis. No drainable fluid collection.  No CT evidence for osteomyelitis noted above.  podiatry cons   Patient advised to elevate and use compression.   Explained to patient that wound is from excessive swelling and there is no signs of infection.   Dressed with santyl, 4x4 gauze, allegra and ace to left LE, and plain ACE wrap on RLE.   ordered santyl   wound care/ nursing dressing instructions:   apply santyl to leg leg wound and cover with 4x4 gauze, allegra and ace to left LE.   Apply plain ACE wrap on RLE.   Change dressing MWF.   Patient stable per podiatry standpoint  cont current meds   pt stable for d/c       Patient is a 70y old  Male who presents with a chief complaint of CHF exacerbation (04 Apr 2021 17:08)    pt seen in tele [ x ], reg med floor [   ], bed [ x ], chair at bedside [   ], a+o x3 [ x ], lethargic [  ],    nad [ x ]      Allergies    Aldactone (Unknown)  Bumex (Blisters; Rash)  metoprolol (Unknown)  spironolactone (Unknown)        Vitals    T(F): 98.5 (04-05-21 @ 05:50), Max: 98.5 (04-05-21 @ 05:50)  HR: 51 (04-05-21 @ 05:50) (51 - 62)  BP: 133/78 (04-05-21 @ 05:50) (133/78 - 137/78)  RR: 18 (04-05-21 @ 05:50) (18 - 18)  SpO2: 100% (04-05-21 @ 05:50) (96% - 100%)  Wt(kg): --  CAPILLARY BLOOD GLUCOSE      POCT Blood Glucose.: 146 mg/dL (04 Apr 2021 16:41)      Labs                          Radiology Results      Meds    MEDICATIONS  (STANDING):  amoxicillin  875 milliGRAM(s)/clavulanate 1 Tablet(s) Oral every 12 hours  apixaban 5 milliGRAM(s) Oral every 12 hours  aspirin  chewable 81 milliGRAM(s) Oral daily  atorvastatin 40 milliGRAM(s) Oral at bedtime  carvedilol 3.125 milliGRAM(s) Oral every 12 hours  collagenase Ointment 1 Application(s) Topical daily  dextrose 40% Gel 15 Gram(s) Oral once  dextrose 5%. 1000 milliLiter(s) (50 mL/Hr) IV Continuous <Continuous>  dextrose 5%. 1000 milliLiter(s) (100 mL/Hr) IV Continuous <Continuous>  dextrose 50% Injectable 25 Gram(s) IV Push once  dextrose 50% Injectable 12.5 Gram(s) IV Push once  dextrose 50% Injectable 25 Gram(s) IV Push once  doxycycline hyclate Capsule 100 milliGRAM(s) Oral every 12 hours  furosemide    Tablet 40 milliGRAM(s) Oral two times a day  glucagon  Injectable 1 milliGRAM(s) IntraMuscular once  insulin lispro (ADMELOG) corrective regimen sliding scale   SubCutaneous three times a day before meals  insulin lispro (ADMELOG) corrective regimen sliding scale   SubCutaneous at bedtime  lisinopril 5 milliGRAM(s) Oral daily  sodium chloride 0.9% lock flush 3 milliLiter(s) IV Push every 8 hours      MEDICATIONS  (PRN):      Physical Exam    Neuro :  no focal deficits  Respiratory: CTA B/L  CV: RRR, S1S2, no murmurs,   Abdominal: Soft, NT, ND +BS,  Extremities: b/l le edema, b/l le with ace wrap        ASSESSMENT    acute on chronic systolic chf,   pulm edema   r/o acs,   left le cellulitis  h/o HTN,   HLD,   DM,   CAD s/p CABGx3,  PE (pulmonary thromboembolism)    PLAN      cont tele,   acs protocol,   cont coreg, lasix,   aspirin, statin,   ce x3 noted above  cardio f/u  echo with EF 15-20%  Grade II diastolic dysfunction. RV systolic pressure is severely noted    cont lisinopril  cont Eliquis.  pt declined stress test  supplemental O2 as needed,  cont  bronchodilator,  f/u hgba1c   cont vanco   cont Cefepime    change abx to oral Augmentin 875 mg q 12hours and Doxycycline 100 mg [id number] in 48 hours to complete 14 days course 4/17/21  id f/u   ct left leg with Diffuse soft tissue swelling and skin thickening. Nonspecific finding that can be seen in the setting of cellulitis. No drainable fluid collection.  No CT evidence for osteomyelitis noted   podiatry cons noted  Patient advised to elevate and use compression.   Explained to patient that wound is from excessive swelling and there is no signs of infection.   Dressed with santyl, 4x4 gauze, allegra and ace to left LE, and plain ACE wrap on RLE.   ordered santyl   wound care/ nursing dressing instructions:   apply santyl to leg leg wound and cover with 4x4 gauze, allegra and ace to left LE.   Apply plain ACE wrap on RLE.   Change dressing MWF.   Patient stable per podiatry standpoint  cont current meds   pt stable for d/c

## 2021-04-05 NOTE — PROGRESS NOTE ADULT - NSICDXPILOT_GEN_ALL_CORE
[place]

## 2021-04-05 NOTE — PROGRESS NOTE ADULT - PROBLEM SELECTOR PROBLEM 1
Acute on chronic combined systolic (congestive) and diastolic (congestive) heart failure
Acute on chronic heart failure
Acute on chronic combined systolic (congestive) and diastolic (congestive) heart failure

## 2021-04-05 NOTE — PROGRESS NOTE ADULT - PROVIDER SPECIALTY LIST ADULT
Cardiology
Infectious Disease
Infectious Disease
Internal Medicine
Pulmonology
Pulmonology
Cardiology
Cardiology
Infectious Disease
Internal Medicine
Pulmonology
Pulmonology
Cardiology
Infectious Disease
Internal Medicine
Pulmonology
Cardiology
Cardiology
Infectious Disease
Internal Medicine
Internal Medicine
Pulmonology
Cardiology
Cardiology
Internal Medicine

## 2021-04-05 NOTE — PROGRESS NOTE ADULT - PROBLEM SELECTOR PLAN 2
Patient to be discharged on Augmentin 875 mg every 12 hours and Doxycycline 100 mg every 12 hours - To continue until 4/12/2021  wound care/ nursing dressing instructions:   apply santyl to leg leg wound and cover with 4x4 gauze, allegra and ace to left LE.   Apply plain ACE wrap on RLE.   Change dressing MWF.

## 2021-04-05 NOTE — PROGRESS NOTE ADULT - PROBLEM SELECTOR PROBLEM 3
PE (pulmonary thromboembolism)
HTN (hypertension)
PE (pulmonary thromboembolism)

## 2021-04-06 ENCOUNTER — INPATIENT (INPATIENT)
Facility: HOSPITAL | Age: 71
LOS: 10 days | Discharge: HOME | DRG: 292 | End: 2021-04-17
Attending: INTERNAL MEDICINE | Admitting: INTERNAL MEDICINE
Payer: COMMERCIAL

## 2021-04-06 VITALS
HEART RATE: 83 BPM | OXYGEN SATURATION: 96 % | TEMPERATURE: 98 F | HEIGHT: 67 IN | DIASTOLIC BLOOD PRESSURE: 95 MMHG | SYSTOLIC BLOOD PRESSURE: 161 MMHG | RESPIRATION RATE: 16 BRPM | WEIGHT: 190.04 LBS

## 2021-04-06 DIAGNOSIS — Z95.1 PRESENCE OF AORTOCORONARY BYPASS GRAFT: Chronic | ICD-10-CM

## 2021-04-06 PROCEDURE — 99285 EMERGENCY DEPT VISIT HI MDM: CPT

## 2021-04-06 NOTE — ED CLERICAL - NS ED CLERK NOTE PRE-ARRIVAL INFORMATION; ADDITIONAL PRE-ARRIVAL INFORMATION
This patient is enrolled in the readmission program (CHF) and has active care navigation. This patient can be followed up by the care navigation team within 24 hours. To arrange close follow-up or to obtain additional clinical information about this patient, please call the contact number above.

## 2021-04-07 DIAGNOSIS — I50.42 CHRONIC COMBINED SYSTOLIC (CONGESTIVE) AND DIASTOLIC (CONGESTIVE) HEART FAILURE: ICD-10-CM

## 2021-04-07 DIAGNOSIS — I10 ESSENTIAL (PRIMARY) HYPERTENSION: ICD-10-CM

## 2021-04-07 DIAGNOSIS — E11.9 TYPE 2 DIABETES MELLITUS WITHOUT COMPLICATIONS: ICD-10-CM

## 2021-04-07 DIAGNOSIS — I50.9 HEART FAILURE, UNSPECIFIED: ICD-10-CM

## 2021-04-07 DIAGNOSIS — R14.0 ABDOMINAL DISTENSION (GASEOUS): ICD-10-CM

## 2021-04-07 DIAGNOSIS — I26.99 OTHER PULMONARY EMBOLISM WITHOUT ACUTE COR PULMONALE: ICD-10-CM

## 2021-04-07 DIAGNOSIS — S81.802A UNSPECIFIED OPEN WOUND, LEFT LOWER LEG, INITIAL ENCOUNTER: ICD-10-CM

## 2021-04-07 LAB
ALBUMIN SERPL ELPH-MCNC: 2.8 G/DL — LOW (ref 3.5–5)
ALP SERPL-CCNC: 157 U/L — HIGH (ref 40–120)
ALT FLD-CCNC: 30 U/L DA — SIGNIFICANT CHANGE UP (ref 10–60)
ANION GAP SERPL CALC-SCNC: 4 MMOL/L — LOW (ref 5–17)
ANION GAP SERPL CALC-SCNC: 6 MMOL/L — SIGNIFICANT CHANGE UP (ref 5–17)
ANION GAP SERPL CALC-SCNC: 7 MMOL/L — SIGNIFICANT CHANGE UP (ref 5–17)
APPEARANCE UR: CLEAR — SIGNIFICANT CHANGE UP
AST SERPL-CCNC: 89 U/L — HIGH (ref 10–40)
BASOPHILS # BLD AUTO: 0.07 K/UL — SIGNIFICANT CHANGE UP (ref 0–0.2)
BASOPHILS NFR BLD AUTO: 1.2 % — SIGNIFICANT CHANGE UP (ref 0–2)
BILIRUB SERPL-MCNC: 1 MG/DL — SIGNIFICANT CHANGE UP (ref 0.2–1.2)
BILIRUB UR-MCNC: NEGATIVE — SIGNIFICANT CHANGE UP
BUN SERPL-MCNC: 19 MG/DL — HIGH (ref 7–18)
BUN SERPL-MCNC: 20 MG/DL — HIGH (ref 7–18)
BUN SERPL-MCNC: 20 MG/DL — HIGH (ref 7–18)
CALCIUM SERPL-MCNC: 8 MG/DL — LOW (ref 8.4–10.5)
CALCIUM SERPL-MCNC: 8.1 MG/DL — LOW (ref 8.4–10.5)
CALCIUM SERPL-MCNC: 8.4 MG/DL — SIGNIFICANT CHANGE UP (ref 8.4–10.5)
CHLORIDE SERPL-SCNC: 106 MMOL/L — SIGNIFICANT CHANGE UP (ref 96–108)
CHLORIDE SERPL-SCNC: 108 MMOL/L — SIGNIFICANT CHANGE UP (ref 96–108)
CHLORIDE SERPL-SCNC: 110 MMOL/L — HIGH (ref 96–108)
CO2 SERPL-SCNC: 23 MMOL/L — SIGNIFICANT CHANGE UP (ref 22–31)
CO2 SERPL-SCNC: 24 MMOL/L — SIGNIFICANT CHANGE UP (ref 22–31)
CO2 SERPL-SCNC: 26 MMOL/L — SIGNIFICANT CHANGE UP (ref 22–31)
COLOR SPEC: YELLOW — SIGNIFICANT CHANGE UP
CREAT SERPL-MCNC: 1.09 MG/DL — SIGNIFICANT CHANGE UP (ref 0.5–1.3)
CREAT SERPL-MCNC: 1.13 MG/DL — SIGNIFICANT CHANGE UP (ref 0.5–1.3)
CREAT SERPL-MCNC: 1.13 MG/DL — SIGNIFICANT CHANGE UP (ref 0.5–1.3)
DIFF PNL FLD: NEGATIVE — SIGNIFICANT CHANGE UP
EOSINOPHIL # BLD AUTO: 0.2 K/UL — SIGNIFICANT CHANGE UP (ref 0–0.5)
EOSINOPHIL NFR BLD AUTO: 3.6 % — SIGNIFICANT CHANGE UP (ref 0–6)
GLUCOSE BLDC GLUCOMTR-MCNC: 91 MG/DL — SIGNIFICANT CHANGE UP (ref 70–99)
GLUCOSE SERPL-MCNC: 93 MG/DL — SIGNIFICANT CHANGE UP (ref 70–99)
GLUCOSE SERPL-MCNC: 93 MG/DL — SIGNIFICANT CHANGE UP (ref 70–99)
GLUCOSE SERPL-MCNC: 96 MG/DL — SIGNIFICANT CHANGE UP (ref 70–99)
GLUCOSE UR QL: NEGATIVE — SIGNIFICANT CHANGE UP
HCT VFR BLD CALC: 42.8 % — SIGNIFICANT CHANGE UP (ref 39–50)
HGB BLD-MCNC: 12.9 G/DL — LOW (ref 13–17)
IMM GRANULOCYTES NFR BLD AUTO: 0.4 % — SIGNIFICANT CHANGE UP (ref 0–1.5)
KETONES UR-MCNC: NEGATIVE — SIGNIFICANT CHANGE UP
LEUKOCYTE ESTERASE UR-ACNC: NEGATIVE — SIGNIFICANT CHANGE UP
LIDOCAIN IGE QN: 329 U/L — SIGNIFICANT CHANGE UP (ref 73–393)
LYMPHOCYTES # BLD AUTO: 0.9 K/UL — LOW (ref 1–3.3)
LYMPHOCYTES # BLD AUTO: 16 % — SIGNIFICANT CHANGE UP (ref 13–44)
MAGNESIUM SERPL-MCNC: 2.5 MG/DL — SIGNIFICANT CHANGE UP (ref 1.6–2.6)
MCHC RBC-ENTMCNC: 25.9 PG — LOW (ref 27–34)
MCHC RBC-ENTMCNC: 30.1 GM/DL — LOW (ref 32–36)
MCV RBC AUTO: 85.9 FL — SIGNIFICANT CHANGE UP (ref 80–100)
MONOCYTES # BLD AUTO: 0.7 K/UL — SIGNIFICANT CHANGE UP (ref 0–0.9)
MONOCYTES NFR BLD AUTO: 12.5 % — SIGNIFICANT CHANGE UP (ref 2–14)
NEUTROPHILS # BLD AUTO: 3.73 K/UL — SIGNIFICANT CHANGE UP (ref 1.8–7.4)
NEUTROPHILS NFR BLD AUTO: 66.3 % — SIGNIFICANT CHANGE UP (ref 43–77)
NITRITE UR-MCNC: NEGATIVE — SIGNIFICANT CHANGE UP
NRBC # BLD: 0 /100 WBCS — SIGNIFICANT CHANGE UP (ref 0–0)
NT-PROBNP SERPL-SCNC: 5032 PG/ML — HIGH (ref 0–125)
PH UR: 7 — SIGNIFICANT CHANGE UP (ref 5–8)
PHOSPHATE SERPL-MCNC: 2.6 MG/DL — SIGNIFICANT CHANGE UP (ref 2.5–4.5)
PLATELET # BLD AUTO: 247 K/UL — SIGNIFICANT CHANGE UP (ref 150–400)
POTASSIUM SERPL-MCNC: 3.6 MMOL/L — SIGNIFICANT CHANGE UP (ref 3.5–5.3)
POTASSIUM SERPL-MCNC: 6.9 MMOL/L — CRITICAL HIGH (ref 3.5–5.3)
POTASSIUM SERPL-MCNC: 7.6 MMOL/L — CRITICAL HIGH (ref 3.5–5.3)
POTASSIUM SERPL-SCNC: 3.6 MMOL/L — SIGNIFICANT CHANGE UP (ref 3.5–5.3)
POTASSIUM SERPL-SCNC: 6.9 MMOL/L — CRITICAL HIGH (ref 3.5–5.3)
POTASSIUM SERPL-SCNC: 7.6 MMOL/L — CRITICAL HIGH (ref 3.5–5.3)
PROT SERPL-MCNC: 8 G/DL — SIGNIFICANT CHANGE UP (ref 6–8.3)
PROT UR-MCNC: 15
RBC # BLD: 4.98 M/UL — SIGNIFICANT CHANGE UP (ref 4.2–5.8)
RBC # FLD: 17.6 % — HIGH (ref 10.3–14.5)
SARS-COV-2 RNA SPEC QL NAA+PROBE: SIGNIFICANT CHANGE UP
SODIUM SERPL-SCNC: 136 MMOL/L — SIGNIFICANT CHANGE UP (ref 135–145)
SODIUM SERPL-SCNC: 137 MMOL/L — SIGNIFICANT CHANGE UP (ref 135–145)
SODIUM SERPL-SCNC: 141 MMOL/L — SIGNIFICANT CHANGE UP (ref 135–145)
SP GR SPEC: 1.01 — SIGNIFICANT CHANGE UP (ref 1.01–1.02)
TROPONIN I SERPL-MCNC: <0.015 NG/ML — SIGNIFICANT CHANGE UP (ref 0–0.04)
UROBILINOGEN FLD QL: NEGATIVE — SIGNIFICANT CHANGE UP
WBC # BLD: 5.62 K/UL — SIGNIFICANT CHANGE UP (ref 3.8–10.5)
WBC # FLD AUTO: 5.62 K/UL — SIGNIFICANT CHANGE UP (ref 3.8–10.5)

## 2021-04-07 PROCEDURE — 71045 X-RAY EXAM CHEST 1 VIEW: CPT | Mod: 26

## 2021-04-07 PROCEDURE — 74176 CT ABD & PELVIS W/O CONTRAST: CPT | Mod: 26

## 2021-04-07 RX ORDER — FUROSEMIDE 40 MG
1 TABLET ORAL
Qty: 0 | Refills: 0 | DISCHARGE

## 2021-04-07 RX ORDER — FUROSEMIDE 40 MG
40 TABLET ORAL ONCE
Refills: 0 | Status: COMPLETED | OUTPATIENT
Start: 2021-04-07 | End: 2021-04-07

## 2021-04-07 RX ORDER — LISINOPRIL 2.5 MG/1
5 TABLET ORAL DAILY
Refills: 0 | Status: DISCONTINUED | OUTPATIENT
Start: 2021-04-07 | End: 2021-04-17

## 2021-04-07 RX ORDER — PANTOPRAZOLE SODIUM 20 MG/1
40 TABLET, DELAYED RELEASE ORAL
Refills: 0 | Status: DISCONTINUED | OUTPATIENT
Start: 2021-04-07 | End: 2021-04-17

## 2021-04-07 RX ORDER — CARVEDILOL PHOSPHATE 80 MG/1
3.12 CAPSULE, EXTENDED RELEASE ORAL EVERY 12 HOURS
Refills: 0 | Status: DISCONTINUED | OUTPATIENT
Start: 2021-04-07 | End: 2021-04-17

## 2021-04-07 RX ORDER — ATORVASTATIN CALCIUM 80 MG/1
40 TABLET, FILM COATED ORAL AT BEDTIME
Refills: 0 | Status: DISCONTINUED | OUTPATIENT
Start: 2021-04-07 | End: 2021-04-17

## 2021-04-07 RX ORDER — ASPIRIN/CALCIUM CARB/MAGNESIUM 324 MG
81 TABLET ORAL DAILY
Refills: 0 | Status: DISCONTINUED | OUTPATIENT
Start: 2021-04-07 | End: 2021-04-17

## 2021-04-07 RX ORDER — INSULIN LISPRO 100/ML
VIAL (ML) SUBCUTANEOUS
Refills: 0 | Status: DISCONTINUED | OUTPATIENT
Start: 2021-04-07 | End: 2021-04-17

## 2021-04-07 RX ORDER — APIXABAN 2.5 MG/1
5 TABLET, FILM COATED ORAL EVERY 12 HOURS
Refills: 0 | Status: DISCONTINUED | OUTPATIENT
Start: 2021-04-07 | End: 2021-04-17

## 2021-04-07 RX ORDER — INSULIN LISPRO 100/ML
VIAL (ML) SUBCUTANEOUS AT BEDTIME
Refills: 0 | Status: DISCONTINUED | OUTPATIENT
Start: 2021-04-07 | End: 2021-04-17

## 2021-04-07 RX ORDER — COLLAGENASE CLOSTRIDIUM HIST. 250 UNIT/G
1 OINTMENT (GRAM) TOPICAL DAILY
Refills: 0 | Status: DISCONTINUED | OUTPATIENT
Start: 2021-04-07 | End: 2021-04-17

## 2021-04-07 RX ORDER — PANTOPRAZOLE SODIUM 20 MG/1
0 TABLET, DELAYED RELEASE ORAL
Qty: 0 | Refills: 0 | DISCHARGE

## 2021-04-07 RX ORDER — SODIUM ZIRCONIUM CYCLOSILICATE 10 G/10G
10 POWDER, FOR SUSPENSION ORAL ONCE
Refills: 0 | Status: COMPLETED | OUTPATIENT
Start: 2021-04-07 | End: 2021-04-07

## 2021-04-07 RX ORDER — FUROSEMIDE 40 MG
40 TABLET ORAL
Refills: 0 | Status: DISCONTINUED | OUTPATIENT
Start: 2021-04-07 | End: 2021-04-11

## 2021-04-07 RX ADMIN — Medication 40 MILLIGRAM(S): at 17:09

## 2021-04-07 RX ADMIN — Medication 1 TABLET(S): at 17:08

## 2021-04-07 RX ADMIN — APIXABAN 5 MILLIGRAM(S): 2.5 TABLET, FILM COATED ORAL at 17:08

## 2021-04-07 RX ADMIN — Medication 40 MILLIGRAM(S): at 03:35

## 2021-04-07 RX ADMIN — Medication 100 MILLIGRAM(S): at 17:08

## 2021-04-07 RX ADMIN — PANTOPRAZOLE SODIUM 40 MILLIGRAM(S): 20 TABLET, DELAYED RELEASE ORAL at 08:19

## 2021-04-07 RX ADMIN — CARVEDILOL PHOSPHATE 3.12 MILLIGRAM(S): 80 CAPSULE, EXTENDED RELEASE ORAL at 17:08

## 2021-04-07 RX ADMIN — SODIUM ZIRCONIUM CYCLOSILICATE 10 GRAM(S): 10 POWDER, FOR SUSPENSION ORAL at 06:07

## 2021-04-07 NOTE — H&P ADULT - ATTENDING COMMENTS
70y M from home with PMH HTN, HLD, DM, PE, CAD s/p CABG (3.5 yrs ago), presenting with generalized body swelling + ascites /edema, + shortness of breath, exertional dyspnea and orthopnea.No reported chest pain. Pt admitted from 3/25-4/5/21 for CHF and LLE wound.    As per record, pt did not  refill over last 4 months at [facility], also per pharmacist in [place] , patient has been going from hospital to hospital and been non compliant to medication. patient showed up for medication  few times, however screamed at pharmacist and refused to pick medication up, as he believed meds were not appropriate    Last admission: ECG : first degree heart block, Qtc is prolonged 491. BNP 6970. Pt Admitted for acute on chronic systolic heart failure likely due to non compliance with meds, diet and f/u care. ECHO shows severe systolic HF with EF 15-20% and GIIDD, followed by Cardio Dr. Rhodes     assessment: acute on chronic systolic chf, pulm edema h/o HTN, HLD, DM, CAD s/p CABGx3, pe    plan  --  adm to tele, acs protocol, coreg, lasix, aspirin, statin, supplemental O2 as needed, bronchodilator, cont preadmit home meds, gi and dvt profilaxis    cbc, bmp, mg, phos, lipid, tsh, ce q8 x3    ct abdomen    cardio cons  pulm cons. 70y M from home with PMH HTN, HLD, DM, PE, CAD s/p CABG (3.5 yrs ago), presenting with generalized body swelling + ascites /edema, + shortness of breath, exertional dyspnea and orthopnea. No reported chest pain. Pt admitted from 3/25-4/5/21 for CHF and LLE wound.    As per record, pt did not  refill over last 4 months at [facility], also per pharmacist in [place] , patient has been going from hospital to hospital and been non compliant to medication. patient showed up for medication  few times, however screamed at pharmacist and refused to pick medication up, as he believed meds were not appropriate    Last admission: ECG : first degree heart block, Qtc is prolonged 491. BNP 6970. Pt Admitted for acute on chronic systolic heart failure likely due to non compliance with meds, diet and f/u care. ECHO shows severe systolic HF with EF 15-20% and GIIDD, followed by Cardio Dr. Rhodes     assessment: acute on chronic systolic chf, pulm edema, hyperlalemia, h/o HTN, HLD, DM, CAD s/p CABGx3, pe    plan  --  adm to tele, acs protocol, coreg, lasix, aspirin, statin, supplemental O2 as needed, bronchodilator, lokelma, cont preadmit home meds, gi and dvt profilaxis    cbc, bmp, mg, phos, lipid, tsh, ce q8 x3    ct abdomen    cardio cons  pulm cons.

## 2021-04-07 NOTE — PATIENT PROFILE ADULT - NSPROPTRIGHTCAREGIVER_GEN_A_NUR
2nd attempt to reach Leslie with no answer    Left detailed message with appt information and call back number to confirm declines

## 2021-04-07 NOTE — CONSULT NOTE ADULT - ASSESSMENT
70y M from home with PMH HTN, HLD, DM, PE, CAD s/p CABG (3.5 yrs ago), presenting with swelling abdomen and legs, chronic systolic HF.  1.Pt needs to be compliant with  cardiac medication.  2.Chronic systolic HF-coreg,ace,lasix po.  3.Pt reports allergy to aldactone.  4.CAD-asa,b blocker,statin.  5.PE-Eliquis.  6.DM-Insulin.  7.HTN-coreg,ace.  8.Lt leg wound,cellulitis-ABX.  9.PPI.

## 2021-04-07 NOTE — H&P ADULT - PROBLEM SELECTOR PLAN 4
C/w Augmentin 875 mg every 12 hours and Doxycycline 100 mg every 12 hours - To continue until 4/12/2021  wound care/ nursing dressing instructions:   apply santyl to leg leg wound and cover with 4x4 gauze, allegra and ace to left LE.   Apply plain ACE wrap on RLE.   Change dressing MWF. c/w Jc

## 2021-04-07 NOTE — CONSULT NOTE ADULT - SUBJECTIVE AND OBJECTIVE BOX
CHIEF COMPLAINT:Patient is a 70y old  Male who presents with a chief complaint of Generalized edema.      HPI:  70y M from home, lives alone, with PMH of HTN, HLD, DM, PE, CAD s/p CABG (3.5 yrs ago), presenting with generalized body swelling + ascites /edema, + shortness of breath, exertional dyspnea and orthopnea. No reported chest pain. Pt admitted from 3/25-4/5/21 for CHF and LLE wound. Pt states he was still feeling sick and he thinks he should be staying in the hospital, and want to have evaluation for abdominal distention. Pt was not taking atorvastatin, lisinopril, hydralazine as he thinks he doesn't need it. He states he was taking lasix 80mg PO BID.    As per record on last admission, pt did not  refill over last 4 months at [facility], also per pharmacist in [place] , patient has been going from hospital to hospital and been non compliant to medication. patient showed up for medication  few times, however screamed at pharmacist and refused to pick medication up, as he believed meds were not appropriate    On last admission, BNP 6970, ECHO shows severe systolic HF with EF 15-20% and GIIDD, followed by Cardio Dr. Rhodes       (07 Apr 2021 04:59)      PAST MEDICAL & SURGICAL HISTORY:  CAD (coronary artery disease)    DM (diabetes mellitus)    Dyslipidemia    CHF (congestive heart failure)    Angina pectoris    HTN (hypertension)    PE (pulmonary thromboembolism)    S/P CABG x 3        MEDICATIONS  (STANDING):  amoxicillin  875 milliGRAM(s)/clavulanate 1 Tablet(s) Oral every 12 hours  apixaban 5 milliGRAM(s) Oral every 12 hours  aspirin enteric coated 81 milliGRAM(s) Oral daily  atorvastatin 40 milliGRAM(s) Oral at bedtime  carvedilol 3.125 milliGRAM(s) Oral every 12 hours  collagenase Ointment 1 Application(s) Topical daily  doxycycline hyclate Capsule 100 milliGRAM(s) Oral every 12 hours  furosemide   Injectable 40 milliGRAM(s) IV Push two times a day  insulin lispro (ADMELOG) corrective regimen sliding scale   SubCutaneous three times a day before meals  insulin lispro (ADMELOG) corrective regimen sliding scale   SubCutaneous at bedtime  lisinopril 5 milliGRAM(s) Oral daily  pantoprazole    Tablet 40 milliGRAM(s) Oral before breakfast    MEDICATIONS  (PRN):      FAMILY HISTORY:No hx of CAD      SOCIAL HISTORY:    [ x] Non-smoker    [x ] Alcohol-denies    Allergies    Aldactone (Unknown)  Bumex (Blisters; Rash)  metoprolol (Unknown)  spironolactone (Unknown)    Intolerances    	    REVIEW OF SYSTEMS:  CONSTITUTIONAL: No fever, weight loss, or fatigue  EYES: No eye pain, visual disturbances, or discharge  ENT:  No difficulty hearing, tinnitus, vertigo; No sinus or throat pain  NECK: No pain or stiffness  RESPIRATORY: No cough, wheezing, chills or hemoptysis; No Shortness of Breath  CARDIOVASCULAR: No chest pain, palpitations, passing out, dizziness, + leg swelling  GASTROINTESTINAL: + abdominal or epigastric pain. No nausea, vomiting, or hematemesis; No diarrhea or constipation. No melena or hematochezia.  GENITOURINARY: No dysuria, frequency, hematuria, or incontinence  NEUROLOGICAL: No headaches, memory loss, loss of strength, numbness, or tremors  SKIN: No itching, burning, rashes, or lesions   LYMPH Nodes: No enlarged glands  ENDOCRINE: No heat or cold intolerance; No hair loss  MUSCULOSKELETAL: No joint pain or swelling; No muscle, back, or extremity pain  PSYCHIATRIC: No depression, anxiety, mood swings, or difficulty sleeping  HEME/LYMPH: No easy bruising, or bleeding gums  ALLERGY AND IMMUNOLOGIC: No hives or eczema	        PHYSICAL EXAM:  T(C): 36.1 (04-07-21 @ 07:51), Max: 36.7 (04-06-21 @ 23:38)  HR: 70 (04-07-21 @ 07:51) (70 - 83)  BP: 149/87 (04-07-21 @ 07:51) (149/87 - 161/95)  RR: 17 (04-07-21 @ 07:51) (16 - 17)  SpO2: 96% (04-07-21 @ 07:51) (96% - 96%)        Appearance: Normal	  HEENT:   Normal oral mucosa, PERRL, EOMI	  Lymphatic: No lymphadenopathy  Cardiovascular: Normal S1 S2, No JVD, No murmurs, No edema  Respiratory: Lungs clear to auscultation	  Psychiatry: A & O x 3, Mood & affect appropriate  Gastrointestinal:  Soft, Non-tender, + BS	  Skin: No rashes, No ecchymoses, No cyanosis	  Neurologic: Non-focal  Extremities: Normal range of motion, No clubbing, cyanosis or edema      	    ECG:  	  Sinus rhythm [id number] degree A-V block  ST & T wave abnormality, consider lateral ischemia     	  	  LABS:	 	    CARDIAC MARKERS:  CARDIAC MARKERS ( 07 Apr 2021 02:15 )  <0.015 ng/mL / x     / x     / x     / x                                  12.9   5.62  )-----------( 247      ( 07 Apr 2021 02:15 )             42.8     04-07    141  |  108  |  19<H>  ----------------------------<  93  3.6   |  26  |  1.09    Ca    8.4      07 Apr 2021 08:07  Phos  2.6     04-07  Mg     2.5     04-07    TPro  8.0  /  Alb  2.8<L>  /  TBili  1.0  /  DBili  x   /  AST  89<H>  /  ALT  30  /  AlkPhos  157<H>  04-07    proBNP: Serum Pro-Brain Natriuretic Peptide: 5032 pg/mL (04-07 @ 02:15)    e< from: Transthoracic Echocardiogram (03.25.21 @ 12:35) >  OBSERVATIONS:  Mitral Valve: Tethered mitral valve leaflets. Mild to  moderate mitral regurgitation.  Aortic Root: Normal aortic root.  Aortic Valve: Calcified trileaflet aortic valve with  decreased opening. Peak transaortic valve gradient equals  16.8 mm Hg, mean transaortic valve gradient equals 10 mm  Hg, consistent with mild aortic stenosis. The aortic valve  apears more stenotic than indicated by gradients. In  setting oflow EF, cannot rule out paradoxical low-flow  low-gradient AS. Consider dobutamine stress echocardiogram  for further assessment if clinically indicated.   Trace  aortic regurgitation.  Left Atrium: Normal left atrium.  LA volume index = 28  cc/m2.  Left Ventricle: Severe global left ventricular systolic  dysfunction (EF 15-20% by visual estimation). Not all LV  wall segments were seen. Mild left ventricular enlargement.  Grade II diastolic dysfunction.  Right Heart: Normal right atrium. Off axisimages preclude  accurate assessment of right ventricular size. Reduced RV  systolic function (TAPSE 1.0 cm). Normal tricuspid valve.  Moderate to severe tricuspid regurgitation. Pulmonic valve  not well seen. Trace pulmonic insufficiency is noted.  Pericardium/PleuraNo pericardial effusion. Right pleural  effusion.  Hemodynamic: RA Pressure is 8 mm Hg. RV systolic pressure  is severely increased at  67 mm Hg.            
PULMONARY CONSULT NOTE      LIDIA PÉREZ  MRN-[id number]    Patient is a 70y old  Male who presents with a chief complaint of Generalized edema (2021 04:59)    History of Present Illness:  Reason for Admission: Generalized edema  History of Present Illness:   70y M from home, lives alone, with PMH of HTN, HLD, DM, PE, CAD s/p CABG (3.5 yrs ago), presenting with generalized body swelling + ascites /edema, + shortness of breath, exertional dyspnea and orthopnea. No reported chest pain. Pt admitted from 3/25-21 for CHF and LLE wound. Pt states he was still feeling sick and he thinks he should be staying in the hospital, and want to have evaluation for abdominal distention. Pt was not taking atorvastatin, lisinopril, hydralazine as he thinks he doesn't need it. He states he was taking lasix 80mg PO BID.    As per record on last admission, pt did not  refill over last 4 months at [facility], also per pharmacist in [place] , patient has been going from hospital to hospital and been non compliant to medication. patient showed up for medication  few times, however screamed at pharmacist and refused to pick medication up, as he believed meds were not appropriate    On last admission, BNP 6970, ECHO shows severe systolic HF with EF 15-20% and GIIDD, followed by Cardio Dr. Rhodes      HISTORY OF PRESENT ILLNESS: As above. Awake, alert, comfortable in bed in [facility]    MEDICATIONS  (STANDING):  amoxicillin  875 milliGRAM(s)/clavulanate 1 Tablet(s) Oral every 12 hours  apixaban 5 milliGRAM(s) Oral every 12 hours  aspirin enteric coated 81 milliGRAM(s) Oral daily  atorvastatin 40 milliGRAM(s) Oral at bedtime  carvedilol 3.125 milliGRAM(s) Oral every 12 hours  collagenase Ointment 1 Application(s) Topical daily  doxycycline hyclate Capsule 100 milliGRAM(s) Oral every 12 hours  furosemide   Injectable 40 milliGRAM(s) IV Push two times a day  insulin lispro (ADMELOG) corrective regimen sliding scale   SubCutaneous three times a day before meals  insulin lispro (ADMELOG) corrective regimen sliding scale   SubCutaneous at bedtime  lisinopril 5 milliGRAM(s) Oral daily  pantoprazole    Tablet 40 milliGRAM(s) Oral before breakfast      MEDICATIONS  (PRN):      Allergies    Aldactone (Unknown)  Bumex (Blisters; Rash)  metoprolol (Unknown)  spironolactone (Unknown)    Intolerances        PAST MEDICAL & SURGICAL HISTORY:  CAD (coronary artery disease)    DM (diabetes mellitus)    Dyslipidemia    CHF (congestive heart failure)    Angina pectoris    HTN (hypertension)    PE (pulmonary thromboembolism)    S/P CABG x 3        FAMILY HISTORY:      SOCIAL HISTORY  Smoking History:     REVIEW OF SYSTEMS:    CONSTITUTIONAL:  No fevers, chills, sweats    HEENT:  Eyes:  No diplopia or blurred vision. ENT:  No earache, sore throat or runny nose.    CARDIOVASCULAR:  No pressure, squeezing, tightness, or heaviness about the chest; no palpitations.    RESPIRATORY:  Per HPI    GASTROINTESTINAL:  No abdominal pain, nausea, vomiting or diarrhea.    GENITOURINARY:  No dysuria, frequency or urgency.    NEUROLOGIC:  No paresthesias, fasciculations, seizures or weakness.    PSYCHIATRIC:  No disorder of thought or mood.    Vital Signs Last 24 Hrs  T(C): 36.1 (2021 07:51), Max: 36.7 (2021 23:38)  T(F): 97 (2021 07:51), Max: 98.1 (2021 23:38)  HR: 70 (2021 07:51) (70 - 83)  BP: 149/87 (2021 07:51) (149/87 - 161/95)  BP(mean): --  RR: 17 (2021 07:51) (16 - 17)  SpO2: 96% (2021 07:51) (96% - 96%)  I&O's Detail      PHYSICAL EXAMINATION:    GENERAL: The patient is a well-developed, well-nourished _____in no apparent distress.     HEENT: Head is normocephalic and atraumatic. Extraocular muscles are intact. Mucous membranes are moist.     NECK: Supple.     LUNGS: Clear to auscultation without wheezing, rales, or rhonchi. Respirations unlabored    HEART: Regular rate and rhythm without murmur.    ABDOMEN: Soft, nontender, and nondistended.  No hepatosplenomegaly is noted.    EXTREMITIES: Without any cyanosis, clubbing, rash, lesions + edema.    NEUROLOGIC: Grossly intact.      LABS:                        12.9   5.62  )-----------( 247      ( 2021 02:15 )             42.8     -    141  |  108  |  19<H>  ----------------------------<  93  3.6   |  26  |  1.09    Ca    8.4      2021 08:07  Phos  2.6     -  Mg     2.5         TPro  8.0  /  Alb  2.8<L>  /  TBili  1.0  /  DBili  x   /  AST  89<H>  /  ALT  30  /  AlkPhos  157<H>        Urinalysis Basic - ( 2021 04:55 )    Color: Yellow / Appearance: Clear / S.010 / pH: x  Gluc: x / Ketone: Negative  / Bili: Negative / Urobili: Negative   Blood: x / Protein: 15 / Nitrite: Negative   Leuk Esterase: Negative / RBC: 0-2 /HPF / WBC 0-2 /HPF   Sq Epi: x / Non Sq Epi: Occasional /HPF / Bacteria: Trace /HPF        CARDIAC MARKERS ( 2021 02:15 )  <0.015 ng/mL / x     / x     / x     / x            Serum Pro-Brain Natriuretic Peptide: 5032 pg/mL (21 @ 02:15)          MICROBIOLOGY:    RADIOLOGY & ADDITIONAL STUDIES:    CXR:  < from: Xray Chest 1 View- PORTABLE-Urgent (21 @ 02:59) >  No acute infiltrate. Cardiomegaly.    < end of copied text >    Ct scan chest:    ekg;    echo:

## 2021-04-07 NOTE — H&P ADULT - PROBLEM SELECTOR PROBLEM 2
Acute on chronic combined systolic (congestive) and diastolic (congestive) heart failure Chronic combined systolic (congestive) and diastolic (congestive) heart failure

## 2021-04-07 NOTE — H&P ADULT - ASSESSMENT
70y M from home, lives alone, with PMH of HTN, HLD, DM, PE, CAD s/p CABG (3.5 yrs ago), presenting with generalized body swelling + ascites /edema, + shortness of breath, exertional dyspnea and orthopnea. Pt is admitted to tele for abdominal distention, possibly 2/2 CHF exacerbation.

## 2021-04-07 NOTE — H&P ADULT - PROBLEM SELECTOR PLAN 2
Echo showed EF 15-20%  Currently stable  Continue Lasix, ASA, ACE, BB, Statins  Daily weights  Strict intake and outputs Echo 3/2021 showed EF 15-20%, Grade II diastolic dysfunction, severe TR, RV HTN  Currently stable  Continue Lasix, ASA, ACE, BB, Statins  Daily weights  Strict intake and outputs  Cardio Dr. Rhodes consulted

## 2021-04-07 NOTE — ED PROVIDER NOTE - OBJECTIVE STATEMENT
Chief complaint  of generalized body swelling + ascites /edema,, + shortness of breath, exertional dyspnea and orthopnea. No reported chest pain.  Pt admitted from 3/25-4/5/21 for CHF.

## 2021-04-07 NOTE — ED ADULT NURSE REASSESSMENT NOTE - NS ED NURSE REASSESS COMMENT FT1
Pt refused all po meds, offered before and after dinner, pt is very angry because he does not have an assigned room to a floor   Informed SOFYA South NP about pt's refusal.

## 2021-04-07 NOTE — H&P ADULT - PROBLEM SELECTOR PLAN 1
Pt states he has abdominal distention since last admission  possibly from CHF exacerbation  pt is not agreeable for procedure (paracentesis)   f/u CT abd

## 2021-04-07 NOTE — ED PROVIDER NOTE - CLINICAL SUMMARY MEDICAL DECISION MAKING FREE TEXT BOX
2a- DW Dr. Pitts pt to be readmitted for CHF exacerbation. 2a- D/W Dr. Pitts pt to be readmitted for CHF exacerbation. Pt states worsening LE edema,, abdominal girth and shortness of breath.  MAR endorsed. Pt agrees with admission. I had a detailed discussion with the patient and/or guardian regarding the historical points, exam findings, and any diagnostic results supporting the admit diagnosis.

## 2021-04-07 NOTE — H&P ADULT - HISTORY OF PRESENT ILLNESS
70y M from home with PMH HTN, HLD, DM, PE, CAD s/p CABG (3.5 yrs ago), presenting with generalized body swelling + ascites /edema, + shortness of breath, exertional dyspnea and orthopnea.No reported chest pain. Pt admitted from 3/25-4/5/21 for CHF and LLE wound.    As per record, pt did not  refill over last 4 months at [facility], also per pharmacist in [place] , patient has been going from hospital to hospital and been non compliant to medication. patient showed up for medication  few times, however screamed at pharmacist and refused to pick medication up, as he believed meds were not appropriate    Last admission: ECG : first degree heart block, Qtc is prolonged 491. BNP 6970. Pt Admitted for acute on chronic systolic heart failure likely due to non compliance with meds, diet and f/u care. ECHO shows severe systolic HF with EF 15-20% and GIIDD, followed by Cardio Dr. Rhodes       .   70y M from home, lives alone, with PMH of HTN, HLD, DM, PE, CAD s/p CABG (3.5 yrs ago), presenting with generalized body swelling + ascites /edema, + shortness of breath, exertional dyspnea and orthopnea. No reported chest pain. Pt admitted from 3/25-4/5/21 for CHF and LLE wound. Pt states he was still feeling sick and he thinks he should be staying in the hospital, and want to have evaluation for abdominal distention. Pt was not taking atorvastatin, lisinopril, hydralazine as he thinks he doesn't need it. He states he was taking lasix 80mg PO BID.    As per record on last admission, pt did not  refill over last 4 months at [facility], also per pharmacist in [place] , patient has been going from hospital to hospital and been non compliant to medication. patient showed up for medication  few times, however screamed at pharmacist and refused to pick medication up, as he believed meds were not appropriate    On last admission, BNP 6970, ECHO shows severe systolic HF with EF 15-20% and GIIDD, followed by Cardio Dr. Rhodes

## 2021-04-07 NOTE — CONSULT NOTE ADULT - PROVIDER SPECIALTY LIST ADULT
Spoke w/ pt has a little swelling/irritation at blood draw site wanted to know what to do, advised pt to apply ice to area if not better call us back for further advise/instructions
Cardiology
Pulmonology

## 2021-04-07 NOTE — H&P ADULT - PROBLEM SELECTOR PLAN 3
Patient to be discharged on Augmentin 875 mg every 12 hours and Doxycycline 100 mg every 12 hours - To continue until 4/12/2021  wound care/ nursing dressing instructions:   apply santyl to leg leg wound and cover with 4x4 gauze, allegra and ace to left LE.   Apply plain ACE wrap on RLE.   Change dressing MWF. Echo 3/2021 showed EF 15-20%, Grade II diastolic dysfunction, severe TR, RV HTN  Currently stable  Continue Lasix, ASA, ACE, BB, Statins  Daily weights  Strict intake and outputs C/w Augmentin 875 mg every 12 hours and Doxycycline 100 mg every 12 hours - To continue until 4/12/2021  wound care/ nursing dressing instructions:   apply santyl to leg leg wound and cover with 4x4 gauze, allegra and ace to left LE.   Apply plain ACE wrap on RLE.   Change dressing MWF.

## 2021-04-07 NOTE — H&P ADULT - PROBLEM SELECTOR PLAN 7
Continue DVT Prophylaxis  Patient to receive [place] vaccine today - Consent obtained Monitor Fingersticks  Follow sliding scale Continue Eliquis

## 2021-04-07 NOTE — PATIENT PROFILE ADULT - OVER THE PAST TWO WEEKS HAVE YOU FELT DOWN, DEPRESSED OR HOPELESS?
Diagnosis: Squamous cell carcinoma of L tonsil    Regimen: Cisplatin  Cycle/Day: C7D1    Dr. Edwin Orellana is supervising clinician today. ECO    Review and verified Advanced Directives: Yes, verified and forms not on file. Verified if patient has state DNR bracelet on: No; Full Code    Nursing Assessment:   A focused nursing assessment addressing the toxicity of chemotherapy was performed by Sherine Larkin LPN. RN reviewed and verified ROS. WBC (K/mcL)   Date Value   2020 2.2 (L)   2020 7.7     RBC (mil/mcL)   Date Value   2020 3.79 (L)   2020 5.16     HCT (%)   Date Value   2020 33.1 (L)   2020 45.9     HGB (g/dL)   Date Value   2020 11.5 (L)   2020 15.9     PLT (K/mcL)   Date Value   2020 189   2020 286   10/19/2012 243     Mag 1.5 today. Per Latasha Camacho NP, pt to receive 4 g Mag IV. 2g to be in prehydration bag. Additional 2g to be in final hydration bag. Pt agreeable to plan. Per Latasha Camacho NP, labs and RN check/possible fluids to be added to pts Thursday (3/5) appt    Patient confirms that he has received a copy of Chemotherapy Consent and verbalizes understanding of treatment plan: Yes. Pre-Treatment: - Treatment consent signed  - Patient has valid pre-authorization  - VS completed  - Height and weight verified  BSA independently double checked & verified by two practitioners  - Premed orders, including hydration, are verified prior to administration  - Treatment parameters verified in patient protocol  - Chemotherapy doses independently doubled checked & verified by two practitioners  - Chemotherapy infusion pump settings are double checked & verified by two practitioners  - Patient is identified by first & last name, Date of birth that has been verified with the patient chairside.  - Patient is identified by first & last name, Date of birth that has been verified by two practitioners with the patient chairside.     Treatment: Refer to [facility] and MAR for line assessment and medication administration  Refer to Toxicity Assessment doc flowsheet   Chemotherapy has not ; double checked & verified by two practitioners  Appearance and physical integrity of drugs meets standard of drug monograph; double checked & verified by two practitioners  Rate set on infusion pump is in alignment with ordered rate; double checked & verified by two practitioners  Blood return confirmed before, during and after treatment administered  Infusion pump used for non-vesicant drugs    Post Treatment: Treatment tolerated well; no adverse reaction    Does the Patient have a central line?  yes:   Device: [employer] Site: Right  and Chest  [facility] Site Appearance: clear and WDL  Implanted port accessed using a 20 gauge non-coring needle primed with 0.9% sodium chloride. Good blood return obtained. Blood obtained and sent to lab.   Site Care: Aseptic site care per protocol, Occlusive Transparent Dressing and Injection caps changed/applied  Comments: accessed by phleb  Central line flushed with: 20 ml 0.9 normal saline and 100 un/ml- 500 units heparin  Central line removed: no  Chaitanya Player Needle: Chaitanya Player needle de-accessed      Transfusion: Not needed    Integrative Medicine: No    Oral Chemotherapy: No    Education: No new instructions needed    Next appointment scheduled:   Future Appointments   Date Time Provider [address] [place] [address]   3/4/2020  9:15 AM KATHARINA Elias [facility]   3/5/2020  9:00 AM [facility]   3/9/2020  8:45 AM SL[facility]   3/9/2020  9:00 AM SL[facility]4 TREATMENT CHAIR [facility]   3/12/2020 11:30 AM [facility] ACL LAB [facility]   3/12/2020 12:00 PM [facility] & [address]   2020  3:00 PM Ronaldo Cordova MD [facility]   2020  3:00 PM ST[facility]AM US 02 [id number] [facility]   2020  9:30 AM STLAM US 02 [id number]   2020  8:00 AM Ronaldo Cordova MD [facility]       Patient instructed to call the office with any questions or concerns.     Patient Discharged: patient discharged to home per self, ambulatory no

## 2021-04-08 ENCOUNTER — TRANSCRIPTION ENCOUNTER (OUTPATIENT)
Age: 71
End: 2021-04-08

## 2021-04-08 DIAGNOSIS — Z29.9 ENCOUNTER FOR PROPHYLACTIC MEASURES, UNSPECIFIED: ICD-10-CM

## 2021-04-08 LAB
GLUCOSE BLDC GLUCOMTR-MCNC: 101 MG/DL — HIGH (ref 70–99)
GLUCOSE BLDC GLUCOMTR-MCNC: 112 MG/DL — HIGH (ref 70–99)
GLUCOSE BLDC GLUCOMTR-MCNC: 112 MG/DL — HIGH (ref 70–99)

## 2021-04-08 RX ADMIN — Medication 81 MILLIGRAM(S): at 12:31

## 2021-04-08 RX ADMIN — Medication 40 MILLIGRAM(S): at 17:37

## 2021-04-08 RX ADMIN — CARVEDILOL PHOSPHATE 3.12 MILLIGRAM(S): 80 CAPSULE, EXTENDED RELEASE ORAL at 06:51

## 2021-04-08 RX ADMIN — CARVEDILOL PHOSPHATE 3.12 MILLIGRAM(S): 80 CAPSULE, EXTENDED RELEASE ORAL at 17:37

## 2021-04-08 RX ADMIN — Medication 40 MILLIGRAM(S): at 06:50

## 2021-04-08 RX ADMIN — APIXABAN 5 MILLIGRAM(S): 2.5 TABLET, FILM COATED ORAL at 06:51

## 2021-04-08 RX ADMIN — APIXABAN 5 MILLIGRAM(S): 2.5 TABLET, FILM COATED ORAL at 17:37

## 2021-04-08 NOTE — PROGRESS NOTE ADULT - SUBJECTIVE AND OBJECTIVE BOX
Patient is a 70y old  Male who presents with a chief complaint of Generalized edema (07 Apr 2021 12:25)    pt seen in icu [  ], reg med floor [   ], bed [  ], chair at bedside [   ], a+o x3 [  ], lethargic [  ],  nad [  ]    cruz [  ], ngt [  ], peg [  ], et tube [  ], cent line [  ], picc line [  ]        Allergies    Aldactone (Unknown)  Bumex (Blisters; Rash)  metoprolol (Unknown)  spironolactone (Unknown)        Vitals    T(F): 97 (04-07-21 @ 07:51), Max: 97 (04-07-21 @ 07:51)  HR: 77 (04-07-21 @ 17:09) (70 - 77)  BP: 144/92 (04-07-21 @ 17:09) (144/92 - 149/87)  RR: 18 (04-07-21 @ 17:09) (17 - 18)  SpO2: 96% (04-07-21 @ 17:09) (96% - 96%)  Wt(kg): --  CAPILLARY BLOOD GLUCOSE      POCT Blood Glucose.: 91 mg/dL (07 Apr 2021 17:05)      Labs                          12.9   5.62  )-----------( 247      ( 07 Apr 2021 02:15 )             42.8       04-07    141  |  108  |  19<H>  ----------------------------<  93  3.6   |  26  |  1.09    Ca    8.4      07 Apr 2021 08:07  Phos  2.6     04-07  Mg     2.5     04-07    TPro  8.0  /  Alb  2.8<L>  /  TBili  1.0  /  DBili  x   /  AST  89<H>  /  ALT  30  /  AlkPhos  157<H>  04-07      CARDIAC MARKERS ( 07 Apr 2021 02:15 )  <0.015 ng/mL / x     / x     / x     / x                Radiology Results      Meds    MEDICATIONS  (STANDING):  amoxicillin  875 milliGRAM(s)/clavulanate 1 Tablet(s) Oral every 12 hours  apixaban 5 milliGRAM(s) Oral every 12 hours  aspirin enteric coated 81 milliGRAM(s) Oral daily  atorvastatin 40 milliGRAM(s) Oral at bedtime  carvedilol 3.125 milliGRAM(s) Oral every 12 hours  collagenase Ointment 1 Application(s) Topical daily  doxycycline hyclate Capsule 100 milliGRAM(s) Oral every 12 hours  furosemide   Injectable 40 milliGRAM(s) IV Push two times a day  insulin lispro (ADMELOG) corrective regimen sliding scale   SubCutaneous three times a day before meals  insulin lispro (ADMELOG) corrective regimen sliding scale   SubCutaneous at bedtime  lisinopril 5 milliGRAM(s) Oral daily  pantoprazole    Tablet 40 milliGRAM(s) Oral before breakfast      MEDICATIONS  (PRN):      Physical Exam    Neuro :  no focal deficits  Respiratory: CTA B/L  CV: RRR, S1S2, no murmurs,   Abdominal: Soft, NT, ND +BS,  Extremities: No edema, + peripheral pulses    ASSESSMENT    Heart failure    No pertinent past medical history    CAD (coronary artery disease)    DM (diabetes mellitus)    Dyslipidemia    CHF (congestive heart failure)    Angina pectoris    HTN (hypertension)    PE (pulmonary thromboembolism)    S/P CABG x 3        PLAN     Patient is a 70y old  Male who presents with a chief complaint of Generalized edema (07 Apr 2021 12:25)    pt seen in icu [  ], reg med floor [ x  ], bed [ x ], chair at bedside [   ], a+o x3 [x  ], lethargic [  ],  nad [ x ]      Allergies    Aldactone (Unknown)  Bumex (Blisters; Rash)  metoprolol (Unknown)  spironolactone (Unknown)        Vitals    T(F): 97 (04-07-21 @ 07:51), Max: 97 (04-07-21 @ 07:51)  HR: 77 (04-07-21 @ 17:09) (70 - 77)  BP: 144/92 (04-07-21 @ 17:09) (144/92 - 149/87)  RR: 18 (04-07-21 @ 17:09) (17 - 18)  SpO2: 96% (04-07-21 @ 17:09) (96% - 96%)  Wt(kg): --  CAPILLARY BLOOD GLUCOSE      POCT Blood Glucose.: 91 mg/dL (07 Apr 2021 17:05)      Labs                          12.9   5.62  )-----------( 247      ( 07 Apr 2021 02:15 )             42.8       04-07    141  |  108  |  19<H>  ----------------------------<  93  3.6   |  26  |  1.09    Ca    8.4      07 Apr 2021 08:07  Phos  2.6     04-07  Mg     2.5     04-07    TPro  8.0  /  Alb  2.8<L>  /  TBili  1.0  /  DBili  x   /  AST  89<H>  /  ALT  30  /  AlkPhos  157<H>  04-07      CARDIAC MARKERS ( 07 Apr 2021 02:15 )  <0.015 ng/mL / x     / x     / x     / x        A1C with Estimated Average Glucose in AM (04.02.21 @ 11:33)   A1C with Estimated Average Glucose Result: 6.6`        Radiology Results    < from: CT Abdomen and Pelvis No Cont (04.07.21 @ 10:30) >  FINDINGS:  LOWER CHEST: Mild right pleural effusion, dependent lung atelectasis in the right lung base and calcified granulomata. Coronary artery and aortic valve calcification. Cardiomegaly. Prior sternotomy.    LIVER: Micronodular contour of the liver concerning for cirrhosis. Correlate clinically. Scattered hepatic calcifications may represent a combination of vascular calcification and calcified granulomata.  BILE DUCTS: Normal caliber.  GALLBLADDER: Cholelithiasis.  SPLEEN: Within normal limits.  PANCREAS: Within normal limits.  ADRENALS: Within normal limits.  KIDNEYS/URETERS: Small cyst lower pole of the right kidney.    BLADDER: Minimally distended.  REPRODUCTIVE ORGANS: Prostate is enlarged.    BOWEL: No bowel obstruction. Appendix is partially delineated and appears unremarkable.  PERITONEUM: Mild abdominal and pelvic ascites.  VESSELS: Atherosclerotic changes.  RETROPERITONEUM/LYMPH NODES: No lymphadenopathy. Small nonspecific retroperitoneal and pelvic nodes.  ABDOMINAL WALL: Anasarca. Bilateral fat-containing inguinal hernias. Mild bilateral inguinal lymphadenopathy.  BONES: Within normal limits.    IMPRESSION:  Nodular hepatic contour concerning for cirrhosis. Correlate clinically.    Cholelithiasis.    Mild ascites and a mild right pleural effusion.    < end of copied text >      Meds    MEDICATIONS  (STANDING):  amoxicillin  875 milliGRAM(s)/clavulanate 1 Tablet(s) Oral every 12 hours  apixaban 5 milliGRAM(s) Oral every 12 hours  aspirin enteric coated 81 milliGRAM(s) Oral daily  atorvastatin 40 milliGRAM(s) Oral at bedtime  carvedilol 3.125 milliGRAM(s) Oral every 12 hours  collagenase Ointment 1 Application(s) Topical daily  doxycycline hyclate Capsule 100 milliGRAM(s) Oral every 12 hours  furosemide   Injectable 40 milliGRAM(s) IV Push two times a day  insulin lispro (ADMELOG) corrective regimen sliding scale   SubCutaneous three times a day before meals  insulin lispro (ADMELOG) corrective regimen sliding scale   SubCutaneous at bedtime  lisinopril 5 milliGRAM(s) Oral daily  pantoprazole    Tablet 40 milliGRAM(s) Oral before breakfast      MEDICATIONS  (PRN):      Physical Exam    Neuro :  no focal deficits  Respiratory: CTA B/L  CV: RRR, S1S2, no murmurs,   Abdominal: Soft, NT, ND +BS,  Extremities: b/l le edema, + peripheral pulses, le ace wrap    ASSESSMENT    acute on chronic systolic chf,   pulm edema,   hyperkalemia,   liver cirrhosis,   ascites   h/o HTN,   HLD,   DM,   CAD s/p CABGx3,   chronic systolic heart failure  pe          PLAN    augmentin until 4/17/21  cont coreg, lisinopril lasix,   cont aspirin, statin,   cardio f/u  recent echo with lvef 15 -20%, grade 2 dd, pulm htn.   Pt needs to be compliant with  cardiac medication.  pulm f/u  supplemental O2 as needed,   bronchodilator,   s/p lokelma,   serum potassium wnl   ct abdomen with Nodular hepatic contour concerning for cirrhosis. Correlate clinically. Cholelithiasis. Mild ascites and a mild right pleural effusion noted above.  f/u lft's  hgba1c 6.6  pt not on meds  cont current meds

## 2021-04-08 NOTE — PROGRESS NOTE ADULT - SUBJECTIVE AND OBJECTIVE BOX
CHIEF COMPLAINT:Patient is a 70y old  Male who presents with a chief complaint of Generalized edema.Pt appears comfortable.    	  REVIEW OF SYSTEMS:  CONSTITUTIONAL: No fever, weight loss, or fatigue  EYES: No eye pain, visual disturbances, or discharge  ENT:  No difficulty hearing, tinnitus, vertigo; No sinus or throat pain  NECK: No pain or stiffness  RESPIRATORY: No cough, wheezing, chills or hemoptysis; No Shortness of Breath  CARDIOVASCULAR: No chest pain, palpitations, passing out, dizziness, or leg swelling  GASTROINTESTINAL: No abdominal or epigastric pain. No nausea, vomiting, or hematemesis; No diarrhea or constipation. No melena or hematochezia.  GENITOURINARY: No dysuria, frequency, hematuria, or incontinence  NEUROLOGICAL: No headaches, memory loss, loss of strength, numbness, or tremors  SKIN: No itching, burning, rashes, or lesions   LYMPH Nodes: No enlarged glands  ENDOCRINE: No heat or cold intolerance; No hair loss  MUSCULOSKELETAL: No joint pain or swelling; No muscle, back, or extremity pain  PSYCHIATRIC: No depression, anxiety, mood swings, or difficulty sleeping  HEME/LYMPH: No easy bruising, or bleeding gums  ALLERGY AND IMMUNOLOGIC: No hives or eczema	      PHYSICAL EXAM:  T(C): --  HR: 73 (04-08-21 @ 06:30) (73 - 77)  BP: 146/85 (04-08-21 @ 06:30) (144/92 - 146/85)  RR: 16 (04-08-21 @ 06:30) (16 - 18)  SpO2: 99% (04-08-21 @ 06:30) (96% - 99%)  Wt(kg): --  I&O's Summary      Appearance: Normal	  HEENT:   Normal oral mucosa, PERRL, EOMI	  Lymphatic: No lymphadenopathy  Cardiovascular: Normal S1 S2, No JVD, No murmurs, +2 edema  Respiratory: Lungs clear to auscultation	  Psychiatry: A & O x 3, Mood & affect appropriate  Gastrointestinal:  Soft, Non-tender, + BS	  Skin: No rashes, No ecchymoses, No cyanosis	  Neurologic: Non-focal  Extremities: Normal range of motion, No clubbing, cyanosis +2 edema      MEDICATIONS  (STANDING):  amoxicillin  875 milliGRAM(s)/clavulanate 1 Tablet(s) Oral every 12 hours  apixaban 5 milliGRAM(s) Oral every 12 hours  aspirin enteric coated 81 milliGRAM(s) Oral daily  atorvastatin 40 milliGRAM(s) Oral at bedtime  carvedilol 3.125 milliGRAM(s) Oral every 12 hours  collagenase Ointment 1 Application(s) Topical daily  doxycycline hyclate Capsule 100 milliGRAM(s) Oral every 12 hours  furosemide   Injectable 40 milliGRAM(s) IV Push two times a day  insulin lispro (ADMELOG) corrective regimen sliding scale   SubCutaneous three times a day before meals  insulin lispro (ADMELOG) corrective regimen sliding scale   SubCutaneous at bedtime  lisinopril 5 milliGRAM(s) Oral daily  pantoprazole    Tablet 40 milliGRAM(s) Oral before breakfast    LABS:	 	    CARDIAC MARKERS ( 07 Apr 2021 02:15 )  <0.015 ng/mL / x     / x     / x     / x                          12.9   5.62  )-----------( 247      ( 07 Apr 2021 02:15 )             42.8     04-07    141  |  108  |  19<H>  ----------------------------<  93  3.6   |  26  |  1.09    Ca    8.4      07 Apr 2021 08:07  Phos  2.6     04-07  Mg     2.5     04-07    TPro  8.0  /  Alb  2.8<L>  /  TBili  1.0  /  DBili  x   /  AST  89<H>  /  ALT  30  /  AlkPhos  157<H>  04-07    proBNP: Serum Pro-Brain Natriuretic Peptide: 5032 pg/mL (04-07 @ 02:15)  Serum Pro-Brain Natriuretic Peptide: 6970 pg/mL (03-25 @ 06:19)    Lipid Profile: Cholesterol 119  HDL 24  TG 63

## 2021-04-08 NOTE — DISCHARGE NOTE NURSING/CASE MANAGEMENT/SOCIAL WORK - PATIENT PORTAL LINK FT
You can access the FollowMyHealth Patient Portal offered by [facility] by registering at the following website: [email or web address]. By joining [employer]’s FollowMyHealth portal, you will also be able to view your health information using other applications (apps) compatible with our system.

## 2021-04-08 NOTE — DISCHARGE NOTE NURSING/CASE MANAGEMENT/SOCIAL WORK - NSDCVIVACCINE_GEN_ALL_CORE_FT
Severe acute respiratory syndrome coronavirus 2 (SARS-CoV-2) (Coronavirus disease [COVID-19]) vaccine , 2021/4/5 16:24 , Antoni Sarmiento (RN)  Influenza , 2017/11/22 10:37 , Christina Toribio (RN)

## 2021-04-08 NOTE — PROGRESS NOTE ADULT - SUBJECTIVE AND OBJECTIVE BOX
Patient is a 70y old  Male who presents with a chief complaint of Generalized edema (2021 06:38)  Awake, alert, comfortable in bed in NAD    INTERVAL HPI/OVERNIGHT EVENTS:      VITAL SIGNS:  T(F): --  HR: 73 (21 @ 06:30)  BP: 146/85 (21 @ 06:30)  RR: 16 (21 @ 06:30)  SpO2: 99% (21 @ 06:30)  Wt(kg): --  I&O's Detail          REVIEW OF SYSTEMS:    CONSTITUTIONAL:  No fevers, chills, sweats    HEENT:  Eyes:  No diplopia or blurred vision. ENT:  No earache, sore throat or runny nose.    CARDIOVASCULAR:  No pressure, squeezing, tightness, or heaviness about the chest; no palpitations.    RESPIRATORY:  Per HPI    GASTROINTESTINAL:  No abdominal pain, nausea, vomiting or diarrhea.    GENITOURINARY:  No dysuria, frequency or urgency.    NEUROLOGIC:  No paresthesias, fasciculations, seizures or weakness.    PSYCHIATRIC:  No disorder of thought or mood.      PHYSICAL EXAM:    Constitutional: Well developed and nourished  Eyes:Perrla  ENMT: normal  Neck:supple  Respiratory: good air entry  Cardiovascular: S1 S2 regular  Gastrointestinal: Soft, Non tender  Extremities: + edema  Vascular:normal  Neurological:Awake, alert,Ox3  Musculoskeletal:Normal      MEDICATIONS  (STANDING):  amoxicillin  875 milliGRAM(s)/clavulanate 1 Tablet(s) Oral every 12 hours  apixaban 5 milliGRAM(s) Oral every 12 hours  aspirin enteric coated 81 milliGRAM(s) Oral daily  atorvastatin 40 milliGRAM(s) Oral at bedtime  carvedilol 3.125 milliGRAM(s) Oral every 12 hours  collagenase Ointment 1 Application(s) Topical daily  doxycycline hyclate Capsule 100 milliGRAM(s) Oral every 12 hours  furosemide   Injectable 40 milliGRAM(s) IV Push two times a day  insulin lispro (ADMELOG) corrective regimen sliding scale   SubCutaneous three times a day before meals  insulin lispro (ADMELOG) corrective regimen sliding scale   SubCutaneous at bedtime  lisinopril 5 milliGRAM(s) Oral daily  pantoprazole    Tablet 40 milliGRAM(s) Oral before breakfast    MEDICATIONS  (PRN):      Allergies    Aldactone (Unknown)  Bumex (Blisters; Rash)  metoprolol (Unknown)  spironolactone (Unknown)    Intolerances        LABS:                        12.9   5.62  )-----------( 247      ( 2021 02:15 )             42.8     -    141  |  108  |  19<H>  ----------------------------<  93  3.6   |  26  |  1.09    Ca    8.4      2021 08:07  Phos  2.6     -  Mg     2.5         TPro  8.0  /  Alb  2.8<L>  /  TBili  1.0  /  DBili  x   /  AST  89<H>  /  ALT  30  /  AlkPhos  157<H>        Urinalysis Basic - ( 2021 04:55 )    Color: Yellow / Appearance: Clear / S.010 / pH: x  Gluc: x / Ketone: Negative  / Bili: Negative / Urobili: Negative   Blood: x / Protein: 15 / Nitrite: Negative   Leuk Esterase: Negative / RBC: 0-2 /HPF / WBC 0-2 /HPF   Sq Epi: x / Non Sq Epi: Occasional /HPF / Bacteria: Trace /HPF        CARDIAC MARKERS ( 2021 02:15 )  <0.015 ng/mL / x     / x     / x     / x          CAPILLARY BLOOD GLUCOSE      POCT Blood Glucose.: 101 mg/dL (2021 07:35)  POCT Blood Glucose.: 91 mg/dL (2021 17:05)    pro-bnp 5032  @ 02:15     d-dimer --   @ 02:15      RADIOLOGY & ADDITIONAL TESTS:    CXR:  < from: Xray Chest 1 View- PORTABLE-Urgent (21 @ 02:59) >  IMPRESSION:    No acute infiltrate. Cardiomegaly.    < end of copied text >    Ct scan chest:    ekg;    echo:

## 2021-04-08 NOTE — PROGRESS NOTE ADULT - SUBJECTIVE AND OBJECTIVE BOX
NP Note discussed with Primary Attending.      Patient is a 70y old  Male who presents with a chief complaint of Generalized edema (2021 11:21)      INTERVAL HPI/OVERNIGHT EVENTS: no new complaints    MEDICATIONS  (STANDING):  amoxicillin  875 milliGRAM(s)/clavulanate 1 Tablet(s) Oral every 12 hours  apixaban 5 milliGRAM(s) Oral every 12 hours  aspirin enteric coated 81 milliGRAM(s) Oral daily  atorvastatin 40 milliGRAM(s) Oral at bedtime  carvedilol 3.125 milliGRAM(s) Oral every 12 hours  collagenase Ointment 1 Application(s) Topical daily  doxycycline hyclate Capsule 100 milliGRAM(s) Oral every 12 hours  furosemide   Injectable 40 milliGRAM(s) IV Push two times a day  insulin lispro (ADMELOG) corrective regimen sliding scale   SubCutaneous three times a day before meals  insulin lispro (ADMELOG) corrective regimen sliding scale   SubCutaneous at bedtime  lisinopril 5 milliGRAM(s) Oral daily  pantoprazole    Tablet 40 milliGRAM(s) Oral before breakfast    MEDICATIONS  (PRN):      __________________________________________________  REVIEW OF SYSTEMS:    CONSTITUTIONAL: No fever,   EYES: no acute visual disturbances  NECK: No pain or stiffness  RESPIRATORY: No cough; No shortness of breath  CARDIOVASCULAR: Severe systolic HF, No chest pain, no palpitations  GASTROINTESTINAL: Abdominal distention, No pain. No nausea or vomiting; No diarrhea   NEUROLOGICAL: No headache or numbness, no tremors  MUSCULOSKELETAL: No joint pain, no muscle pain  GENITOURINARY: no dysuria, no frequency, no hesitancy  PSYCHIATRY: no depression , no anxiety  ALL OTHER  ROS negative        Vital Signs Last 24 Hrs  T(C): --  T(F): --  HR: 73 (2021 06:30) (73 - 77)  BP: 146/85 (2021 06:30) (144/92 - 146/85)  BP(mean): --  RR: 16 (2021 06:30) (16 - 18)  SpO2: 99% (2021 06:30) (96% - 99%)    ________________________________________________  PHYSICAL EXAM:  GENERAL: NAD  HEENT: Normocephalic;  conjunctivae and sclerae clear; moist mucous membranes;   NECK : supple  CHEST/LUNG: Clear to auscultation bilaterally with good air entry   HEART: S1 S2  regular; no murmurs, gallops or rubs  ABDOMEN: Soft, Nontender, Nondistended; Bowel sounds present  EXTREMITIES: no cyanosis; no edema; no calf tenderness  SKIN: warm and dry; no rash  NERVOUS SYSTEM:  Awake and alert; Oriented  to place, person and time ; no new deficits    _________________________________________________  LABS:                        12.9   5.62  )-----------( 247      ( 2021 02:15 )             42.8     04-    141  |  108  |  19<H>  ----------------------------<  93  3.6   |  26  |  1.09    Ca    8.4      2021 08:07  Phos  2.6     04-  Mg     2.5         TPro  8.0  /  Alb  2.8<L>  /  TBili  1.0  /  DBili  x   /  AST  89<H>  /  ALT  30  /  AlkPhos  157<H>        Urinalysis Basic - ( 2021 04:55 )    Color: Yellow / Appearance: Clear / S.010 / pH: x  Gluc: x / Ketone: Negative  / Bili: Negative / Urobili: Negative   Blood: x / Protein: 15 / Nitrite: Negative   Leuk Esterase: Negative / RBC: 0-2 /HPF / WBC 0-2 /HPF   Sq Epi: x / Non Sq Epi: Occasional /HPF / Bacteria: Trace /HPF      CAPILLARY BLOOD GLUCOSE      POCT Blood Glucose.: 112 mg/dL (2021 11:32)  POCT Blood Glucose.: 101 mg/dL (2021 07:35)  POCT Blood Glucose.: 91 mg/dL (2021 17:05)        RADIOLOGY & ADDITIONAL TESTS:  EXAM:  CT ABDOMEN AND PELVIS                            PROCEDURE DATE:  2021          INTERPRETATION:  CLINICAL INFORMATION: Abdominal pain, increased abdominal girth, CHF.    COMPARISON: 11/15/2017.    CONTRAST/COMPLICATIONS:  IV Contrast: NONE  Oral Contrast: NONE  Complications: None reported at time of study completion    PROCEDURE:  CT of the Abdomen and Pelvis was performed.  Sagittal and coronal reformats were performed.    FINDINGS:  LOWER CHEST: Mild right pleural effusion, dependent lung atelectasis in the right lung base and calcified granulomata. Coronary artery and aortic valve calcification. Cardiomegaly. Prior sternotomy.    LIVER: Micronodular contour of the liver concerning for cirrhosis. Correlate clinically. Scattered hepatic calcifications may represent a combination of vascular calcification and calcified granulomata.  BILE DUCTS: Normal caliber.  GALLBLADDER: Cholelithiasis.  SPLEEN: Within normal limits.  PANCREAS: Within normal limits.  ADRENALS: Within normal limits.  KIDNEYS/URETERS: Small cyst lower pole of the right kidney.    BLADDER: Minimally distended.  REPRODUCTIVE ORGANS: Prostate is enlarged.    BOWEL: No bowel obstruction. Appendix is partially delineated and appears unremarkable.  PERITONEUM: Mild abdominal and pelvic ascites.  VESSELS: Atherosclerotic changes.  RETROPERITONEUM/LYMPH NODES: No lymphadenopathy. Small nonspecific retroperitoneal and pelvic nodes.  ABDOMINAL WALL: Anasarca. Bilateral fat-containing inguinal hernias. Mild bilateral inguinal lymphadenopathy.  BONES: Within normal limits.    IMPRESSION:  Nodular hepatic contour concerning for cirrhosis. Correlate clinically.    Cholelithiasis.    Mild ascites and a mild right pleural effusion.    Additional findings as above.              ARTURO HARRY MD; Attending Radiologist  This document has been electronically signed. 2021 11:08AM    EXAM:  CT ABDOMEN AND PELVIS                            PROCEDURE DATE:  2021          INTERPRETATION:  CLINICAL INFORMATION: Abdominal pain, increased abdominal girth, CHF.    COMPARISON: 11/15/2017.    CONTRAST/COMPLICATIONS:  IV Contrast: NONE  Oral Contrast: NONE  Complications: None reported at time of study completion    PROCEDURE:  CT of the Abdomen and Pelvis was performed.  Sagittal and coronal reformats were performed.    FINDINGS:  LOWER CHEST: Mild right pleural effusion, dependent lung atelectasis in the right lung base and calcified granulomata. Coronary artery and aortic valve calcification. Cardiomegaly. Prior sternotomy.    LIVER: Micronodular contour of the liver concerning for cirrhosis. Correlate clinically. Scattered hepatic calcifications may represent a combination of vascular calcification and calcified granulomata.  BILE DUCTS: Normal caliber.  GALLBLADDER: Cholelithiasis.  SPLEEN: Within normal limits.  PANCREAS: Within normal limits.  ADRENALS: Within normal limits.  KIDNEYS/URETERS: Small cyst lower pole of the right kidney.    BLADDER: Minimally distended.  REPRODUCTIVE ORGANS: Prostate is enlarged.    BOWEL: No bowel obstruction. Appendix is partially delineated and appears unremarkable.  PERITONEUM: Mild abdominal and pelvic ascites.  VESSELS: Atherosclerotic changes.  RETROPERITONEUM/LYMPH NODES: No lymphadenopathy. Small nonspecific retroperitoneal and pelvic nodes.  ABDOMINAL WALL: Anasarca. Bilateral fat-containing inguinal hernias. Mild bilateral inguinal lymphadenopathy.  BONES: Within normal limits.    IMPRESSION:  Nodular hepatic contour concerning for cirrhosis. Correlate clinically.    Cholelithiasis.    Mild ascites and a mild right pleural effusion.    Additional findings as above.              ARTURO HARRY MD; Attending Radiologist  This document has been electronically signed. 2021 11:08AM    EXAM:  XR CHEST PORTABLE URGENT 1V                            PROCEDURE DATE:  2021          INTERPRETATION:  CLINICAL INDICATION: 70 years  Male with Shortness of Breath.    COMPARISON: 2015    AP view of the chest demonstrates the lungs to be clear. There is blunting of the right lateral costophrenic angle consistent with chronic pleural reaction, unchanged. There are no pleural effusions.. There is no pneumothorax.    The heart is moderately enlarged. The patient status post sternotomy.. There is no mediastinal or hilar mass.    The pulmonary vasculature is normal.    Mild thoracic degenerative changes are present.    IMPRESSION:    No acute infiltrate. Cardiomegaly.      CRISTIANO ALBA MD; Attending Radiologist  This document has been electronically signed. 2021  9:08AM      Imaging  Reviewed:  YES/NO    Consultant(s) Notes Reviewed:   YES/ No      Plan of care was discussed with patient and /or primary care giver; all questions and concerns were addressed

## 2021-04-09 ENCOUNTER — TRANSCRIPTION ENCOUNTER (OUTPATIENT)
Age: 71
End: 2021-04-09

## 2021-04-09 LAB
ALBUMIN SERPL ELPH-MCNC: 2.9 G/DL — LOW (ref 3.5–5)
ALP SERPL-CCNC: 151 U/L — HIGH (ref 40–120)
ALT FLD-CCNC: 22 U/L DA — SIGNIFICANT CHANGE UP (ref 10–60)
ANION GAP SERPL CALC-SCNC: 10 MMOL/L — SIGNIFICANT CHANGE UP (ref 5–17)
AST SERPL-CCNC: 27 U/L — SIGNIFICANT CHANGE UP (ref 10–40)
BILIRUB SERPL-MCNC: 0.9 MG/DL — SIGNIFICANT CHANGE UP (ref 0.2–1.2)
BUN SERPL-MCNC: 20 MG/DL — HIGH (ref 7–18)
CALCIUM SERPL-MCNC: 8.5 MG/DL — SIGNIFICANT CHANGE UP (ref 8.4–10.5)
CHLORIDE SERPL-SCNC: 104 MMOL/L — SIGNIFICANT CHANGE UP (ref 96–108)
CO2 SERPL-SCNC: 24 MMOL/L — SIGNIFICANT CHANGE UP (ref 22–31)
CREAT SERPL-MCNC: 1.16 MG/DL — SIGNIFICANT CHANGE UP (ref 0.5–1.3)
GLUCOSE BLDC GLUCOMTR-MCNC: 107 MG/DL — HIGH (ref 70–99)
GLUCOSE BLDC GLUCOMTR-MCNC: 107 MG/DL — HIGH (ref 70–99)
GLUCOSE BLDC GLUCOMTR-MCNC: 130 MG/DL — HIGH (ref 70–99)
GLUCOSE SERPL-MCNC: 109 MG/DL — HIGH (ref 70–99)
HCT VFR BLD CALC: 41.5 % — SIGNIFICANT CHANGE UP (ref 39–50)
HGB BLD-MCNC: 12.9 G/DL — LOW (ref 13–17)
MCHC RBC-ENTMCNC: 25.7 PG — LOW (ref 27–34)
MCHC RBC-ENTMCNC: 31.1 GM/DL — LOW (ref 32–36)
MCV RBC AUTO: 82.7 FL — SIGNIFICANT CHANGE UP (ref 80–100)
NRBC # BLD: 0 /100 WBCS — SIGNIFICANT CHANGE UP (ref 0–0)
PLATELET # BLD AUTO: 249 K/UL — SIGNIFICANT CHANGE UP (ref 150–400)
POTASSIUM SERPL-MCNC: 3.6 MMOL/L — SIGNIFICANT CHANGE UP (ref 3.5–5.3)
POTASSIUM SERPL-SCNC: 3.6 MMOL/L — SIGNIFICANT CHANGE UP (ref 3.5–5.3)
PROT SERPL-MCNC: 7.4 G/DL — SIGNIFICANT CHANGE UP (ref 6–8.3)
RBC # BLD: 5.02 M/UL — SIGNIFICANT CHANGE UP (ref 4.2–5.8)
RBC # FLD: 17.3 % — HIGH (ref 10.3–14.5)
SODIUM SERPL-SCNC: 138 MMOL/L — SIGNIFICANT CHANGE UP (ref 135–145)
WBC # BLD: 5.2 K/UL — SIGNIFICANT CHANGE UP (ref 3.8–10.5)
WBC # FLD AUTO: 5.2 K/UL — SIGNIFICANT CHANGE UP (ref 3.8–10.5)

## 2021-04-09 RX ORDER — LISINOPRIL 2.5 MG/1
1 TABLET ORAL
Qty: 0 | Refills: 0 | DISCHARGE
Start: 2021-04-09

## 2021-04-09 RX ORDER — ASPIRIN/CALCIUM CARB/MAGNESIUM 324 MG
1 TABLET ORAL
Qty: 0 | Refills: 0 | DISCHARGE
Start: 2021-04-09

## 2021-04-09 RX ORDER — COLLAGENASE CLOSTRIDIUM HIST. 250 UNIT/G
1 OINTMENT (GRAM) TOPICAL
Qty: 0 | Refills: 0 | DISCHARGE
Start: 2021-04-09

## 2021-04-09 RX ORDER — ASPIRIN/CALCIUM CARB/MAGNESIUM 324 MG
1 TABLET ORAL
Qty: 0 | Refills: 0 | DISCHARGE

## 2021-04-09 RX ORDER — ATORVASTATIN CALCIUM 80 MG/1
1 TABLET, FILM COATED ORAL
Qty: 0 | Refills: 0 | DISCHARGE
Start: 2021-04-09

## 2021-04-09 RX ORDER — PANTOPRAZOLE SODIUM 20 MG/1
1 TABLET, DELAYED RELEASE ORAL
Qty: 0 | Refills: 0 | DISCHARGE
Start: 2021-04-09

## 2021-04-09 RX ORDER — APIXABAN 2.5 MG/1
1 TABLET, FILM COATED ORAL
Qty: 0 | Refills: 0 | DISCHARGE
Start: 2021-04-09

## 2021-04-09 RX ORDER — FUROSEMIDE 40 MG
40 TABLET ORAL
Qty: 0 | Refills: 0 | DISCHARGE
Start: 2021-04-09

## 2021-04-09 RX ORDER — APIXABAN 2.5 MG/1
1 TABLET, FILM COATED ORAL
Qty: 0 | Refills: 0 | DISCHARGE

## 2021-04-09 RX ORDER — PANTOPRAZOLE SODIUM 20 MG/1
1 TABLET, DELAYED RELEASE ORAL
Qty: 0 | Refills: 0 | DISCHARGE

## 2021-04-09 RX ORDER — FUROSEMIDE 40 MG
1 TABLET ORAL
Qty: 0 | Refills: 1 | DISCHARGE

## 2021-04-09 RX ADMIN — APIXABAN 5 MILLIGRAM(S): 2.5 TABLET, FILM COATED ORAL at 06:38

## 2021-04-09 RX ADMIN — CARVEDILOL PHOSPHATE 3.12 MILLIGRAM(S): 80 CAPSULE, EXTENDED RELEASE ORAL at 06:38

## 2021-04-09 RX ADMIN — Medication 81 MILLIGRAM(S): at 12:06

## 2021-04-09 RX ADMIN — Medication 40 MILLIGRAM(S): at 06:38

## 2021-04-09 RX ADMIN — APIXABAN 5 MILLIGRAM(S): 2.5 TABLET, FILM COATED ORAL at 17:25

## 2021-04-09 RX ADMIN — Medication 40 MILLIGRAM(S): at 17:25

## 2021-04-09 RX ADMIN — CARVEDILOL PHOSPHATE 3.12 MILLIGRAM(S): 80 CAPSULE, EXTENDED RELEASE ORAL at 17:25

## 2021-04-09 NOTE — DISCHARGE NOTE PROVIDER - PROVIDER TOKENS
PROVIDER:[TOKEN:[[id number]],FOLLOWUP:[1 week]],PROVIDER:[TOKEN:[[id number]],FOLLOWUP:[1 week]]

## 2021-04-09 NOTE — CHART NOTE - NSCHARTNOTEFT_GEN_A_CORE
Patient request doctor only to change his dressing today    Patient refused recommendations of dressing and only wants antimicrobial products on wound. Explained to patient etiology of his superficial wound and that elevation and compression is needed to control his edema in order to prevent breakage in skin.   Dressed patient left leg with medihoney (bedside) per his request, adaptic, abd, kerlix and ace. Dressed right LE with ace for compression   Please continue with WCO/nursing dressing instructions ordered    Patient hostile, agitated not willing to comply with recommendations from podiatry.   Discussed with Nathaniel KUO who is overseeing patient medically

## 2021-04-09 NOTE — DISCHARGE NOTE PROVIDER - NSDCCPCAREPLAN_GEN_ALL_CORE_FT
PRINCIPAL DISCHARGE DIAGNOSIS  Diagnosis: Chronic combined systolic (congestive) and diastolic (congestive) heart failure  Assessment and Plan of Treatment: Weigh yourself daily.  Take all of your medication as directed.  If you become dizzy call your Health Care Provider.  If you gain 3lbs in 3 days, or 5lbs in a week call your Health Care Provider.  Do not eat or drink foods containing more than 2000mg of salt (sodium) in your diet every day.  Call your Health Care Provider if you have any swelling or increased swelling in your feet, ankles, and/or stomach.        SECONDARY DISCHARGE DIAGNOSES  Diagnosis: Leg wound, left  Assessment and Plan of Treatment: Continue with wound care.  Follow-up with your primary care physician,  Call your physician if your wound has not healed, if you develop signs/symptoms of infection (purulent drainage, redness, swelling, tenderness at site).      Diagnosis: HTN (hypertension)  Assessment and Plan of Treatment: You have a history of high blood pressure (hypertension).  Low salt diet  Activity as tolerated.  Take all medication as prescribed.  Follow up with your medical doctor for routine blood pressure monitoring at your next visit.  Notify your doctor if you have any of the following symptoms:   Dizziness, Lightheadedness, Blurry vision, Headache, Chest pain, Shortness of breath       PRINCIPAL DISCHARGE DIAGNOSIS  Diagnosis: Acute on chronic combined systolic and diastolic congestive heart failure  Assessment and Plan of Treatment: you presented to hospital for worsening leg edema which was likely due to CHF exacerbation. you were started on Lasix and Ultrasound of heart was done and it showed that your heart is very weak in its function  It is stongly recommended that you remain compliant with medications, low salt and healthy fat diet and out patient follow up with PCP and Cardiologist to avoid further damage to your heart  Weigh yourself daily.  If you gain 3lbs in 3 days, or 5lbs in a week call your Health Care Provider.  Do not eat or drink foods containing more than 2000mg of salt (sodium) in your diet every day.  Call your Health Care Provider if you have any swelling or increased swelling in your feet, ankles, and/or stomach.  Take all of your medication as directed.  If you become dizzy call your Health Care Provider.        SECONDARY DISCHARGE DIAGNOSES  Diagnosis: Leg wound, left  Assessment and Plan of Treatment: Continue with wound care.  contiune taking antibiotic until 4/17/21  Follow-up with your primary care physician,  Call your physician if your wound has not healed, if you develop signs/symptoms of infection (purulent drainage, redness, swelling, tenderness at site).      Diagnosis: HTN (hypertension)  Assessment and Plan of Treatment: You have a history of high blood pressure (hypertension).  Low salt diet  Activity as tolerated.  Take all medication as prescribed.  Follow up with your medical doctor for routine blood pressure monitoring at your next visit.  Notify your doctor if you have any of the following symptoms:   Dizziness, Lightheadedness, Blurry vision, Headache, Chest pain, Shortness of breath      Diagnosis: Ascites  Assessment and Plan of Treatment: CT abdomen resulted Nodular hepatic contour concerning for cirrhosis.   Cholelithiasis. Mild ascites and a mild right pleural effusion.   Ultrasound shows small ascites. you have asymptomatic of abdominal pain, Nausea or vomiting. No intervention needed at this time.   Follow up with doctor at [address] [place] [address]. report MD for  any abdominal tenderness, increased abdominal distention.       Diagnosis: Pulmonary embolism  Assessment and Plan of Treatment: Take your Eliquis as directed.  Follow up with your health care provider within one week. Call for appointment.  If you develop shortness of breath or if your shortness of breath worsens call your Health Care Provider or go to the Emergency Department.

## 2021-04-09 NOTE — DISCHARGE NOTE PROVIDER - NSDCMRMEDTOKEN_GEN_ALL_CORE_FT
amoxicillin-clavulanate 875 mg-125 mg oral tablet: 1 tab(s) orally every 12 hours  apixaban 5 mg oral tablet: 1 tab(s) orally every 12 hours  aspirin 81 mg oral delayed release tablet: 1 tab(s) orally once a day  atorvastatin 40 mg oral tablet: 1 tab(s) orally once a day (at bedtime)  collagenase 250 units/g topical ointment: 1 application topically once a day  Coreg 3.125 mg oral tablet: 1 tab(s) orally 2 times a day  doxycycline monohydrate 100 mg oral capsule: 1 cap(s) orally every 12 hours  furosemide 100 mg/100 mL-0.9% intravenous solution: 40 milliliter(s) intravenous 2 times a day  hydrALAZINE 10 mg oral tablet: 1 tab(s) orally 3 times a day  lisinopril 5 mg oral tablet: 1 tab(s) orally once a day  pantoprazole 40 mg oral delayed release tablet: 1 tab(s) orally once a day (before a meal)   apixaban 5 mg oral tablet: 1 tab(s) orally every 12 hours  aspirin 81 mg oral delayed release tablet: 1 tab(s) orally once a day  atorvastatin 40 mg oral tablet: 1 tab(s) orally once a day (at bedtime)  collagenase 250 units/g topical ointment: 1 application topically once a day  Coreg 3.125 mg oral tablet: 1 tab(s) orally 2 times a day  doxycycline monohydrate 100 mg oral capsule: 1 cap(s) orally every 12 hours  furosemide 40 mg oral tablet: 1 tab(s) orally once a day  hydrALAZINE 10 mg oral tablet: 1 tab(s) orally 3 times a day  lisinopril 5 mg oral tablet: 1 tab(s) orally once a day  pantoprazole 40 mg oral delayed release tablet: 1 tab(s) orally once a day (before a meal)   apixaban 5 mg oral tablet: 1 tab(s) orally every 12 hours  aspirin 81 mg oral delayed release tablet: 1 tab(s) orally once a day  atorvastatin 40 mg oral tablet: 1 tab(s) orally once a day (at bedtime)  collagenase 250 units/g topical ointment: 1 application topically once a day  Coreg 3.125 mg oral tablet: 1 tab(s) orally 2 times a day  doxycycline monohydrate 100 mg oral capsule: 1 cap(s) orally every 12 hours  furosemide 40 mg oral tablet: 1 tab(s) orally once a day  lisinopril 5 mg oral tablet: 1 tab(s) orally once a day  pantoprazole 40 mg oral delayed release tablet: 1 tab(s) orally once a day (before a meal)   apixaban 5 mg oral tablet: 1 tab(s) orally every 12 hours  aspirin 81 mg oral delayed release tablet: 1 tab(s) orally once a day  atorvastatin 40 mg oral tablet: 1 tab(s) orally once a day (at bedtime)  collagenase 250 units/g topical ointment: 1 application topically once a day  Coreg 3.125 mg oral tablet: 1 tab(s) orally 2 times a day  furosemide 40 mg oral tablet: 1 tab(s) orally once a day  lisinopril 5 mg oral tablet: 1 tab(s) orally once a day  pantoprazole 40 mg oral delayed release tablet: 1 tab(s) orally once a day (before a meal)

## 2021-04-09 NOTE — PROGRESS NOTE ADULT - SUBJECTIVE AND OBJECTIVE BOX
NP Note discussed with Primary Attending.      Patient is a 70y old  Male who presents with a chief complaint of Generalized edema (09 Apr 2021 13:26)      INTERVAL HPI/OVERNIGHT EVENTS: no new complaints    MEDICATIONS  (STANDING):  amoxicillin  875 milliGRAM(s)/clavulanate 1 Tablet(s) Oral every 12 hours  apixaban 5 milliGRAM(s) Oral every 12 hours  aspirin enteric coated 81 milliGRAM(s) Oral daily  atorvastatin 40 milliGRAM(s) Oral at bedtime  carvedilol 3.125 milliGRAM(s) Oral every 12 hours  collagenase Ointment 1 Application(s) Topical daily  doxycycline hyclate Capsule 100 milliGRAM(s) Oral every 12 hours  furosemide   Injectable 40 milliGRAM(s) IV Push two times a day  insulin lispro (ADMELOG) corrective regimen sliding scale   SubCutaneous three times a day before meals  insulin lispro (ADMELOG) corrective regimen sliding scale   SubCutaneous at bedtime  lisinopril 5 milliGRAM(s) Oral daily  pantoprazole    Tablet 40 milliGRAM(s) Oral before breakfast    MEDICATIONS  (PRN):      __________________________________________________  REVIEW OF SYSTEMS:    CONSTITUTIONAL: No fever,   EYES: no acute visual disturbances  NECK: No pain or stiffness  RESPIRATORY: No cough; No shortness of breath  CARDIOVASCULAR: No chest pain, no palpitations  GASTROINTESTINAL: Abdominal distention, No pain. No nausea or vomiting; No diarrhea   NEUROLOGICAL: No headache or numbness, no tremors  MUSCULOSKELETAL: No joint pain, no muscle pain  GENITOURINARY: no dysuria, no frequency, no hesitancy  PSYCHIATRY: no depression , no anxiety  ALL OTHER  ROS negative        Vital Signs Last 24 Hrs  T(C): 36.6 (09 Apr 2021 06:25), Max: 36.7 (08 Apr 2021 20:18)  T(F): 97.8 (09 Apr 2021 06:25), Max: 98 (08 Apr 2021 20:18)  HR: 63 (09 Apr 2021 06:25) (63 - 77)  BP: 140/90 (09 Apr 2021 06:25) (128/65 - 140/90)  BP(mean): 90 (08 Apr 2021 14:13) (90 - 90)  RR: 18 (09 Apr 2021 06:25) (16 - 18)  SpO2: 98% (09 Apr 2021 06:25) (97% - 98%)    ________________________________________________  PHYSICAL EXAM:  GENERAL: NAD  HEENT: Normocephalic;  conjunctivae and sclerae clear; moist mucous membranes;   NECK : supple  CHEST/LUNG: Clear to auscultation bilaterally with good air entry   HEART: S1 S2  regular; no murmurs, gallops or rubs  ABDOMEN: Soft, Nontender, distended; Bowel sounds present  EXTREMITIES: no cyanosis; b/l edema; no calf tenderness, right LE cellulitis  SKIN: warm and dry; no rash  NERVOUS SYSTEM:  Awake and alert; Oriented  to place, person and time ; no new deficits    _________________________________________________  LABS:                        12.9   5.20  )-----------( 249      ( 09 Apr 2021 07:45 )             41.5     04-09    138  |  104  |  20<H>  ----------------------------<  109<H>  3.6   |  24  |  1.16    Ca    8.5      09 Apr 2021 07:45    TPro  7.4  /  Alb  2.9<L>  /  TBili  0.9  /  DBili  x   /  AST  27  /  ALT  22  /  AlkPhos  151<H>  04-09        CAPILLARY BLOOD GLUCOSE      POCT Blood Glucose.: 130 mg/dL (09 Apr 2021 11:10)  POCT Blood Glucose.: 107 mg/dL (09 Apr 2021 07:32)  POCT Blood Glucose.: 112 mg/dL (08 Apr 2021 22:58)        RADIOLOGY & ADDITIONAL TESTS:  EXAM:  CT ABDOMEN AND PELVIS                            PROCEDURE DATE:  04/07/2021          INTERPRETATION:  CLINICAL INFORMATION: Abdominal pain, increased abdominal girth, CHF.    COMPARISON: 11/15/2017.    CONTRAST/COMPLICATIONS:  IV Contrast: NONE  Oral Contrast: NONE  Complications: None reported at time of study completion    PROCEDURE:  CT of the Abdomen and Pelvis was performed.  Sagittal and coronal reformats were performed.    FINDINGS:  LOWER CHEST: Mild right pleural effusion, dependent lung atelectasis in the right lung base and calcified granulomata. Coronary artery and aortic valve calcification. Cardiomegaly. Prior sternotomy.    LIVER: Micronodular contour of the liver concerning for cirrhosis. Correlate clinically. Scattered hepatic calcifications may represent a combination of vascular calcification and calcified granulomata.  BILE DUCTS: Normal caliber.  GALLBLADDER: Cholelithiasis.  SPLEEN: Within normal limits.  PANCREAS: Within normal limits.  ADRENALS: Within normal limits.  KIDNEYS/URETERS: Small cyst lower pole of the right kidney.    BLADDER: Minimally distended.  REPRODUCTIVE ORGANS: Prostate is enlarged.    BOWEL: No bowel obstruction. Appendix is partially delineated and appears unremarkable.  PERITONEUM: Mild abdominal and pelvic ascites.  VESSELS: Atherosclerotic changes.  RETROPERITONEUM/LYMPH NODES: No lymphadenopathy. Small nonspecific retroperitoneal and pelvic nodes.  ABDOMINAL WALL: Anasarca. Bilateral fat-containing inguinal hernias. Mild bilateral inguinal lymphadenopathy.  BONES: Within normal limits.    IMPRESSION:  Nodular hepatic contour concerning for cirrhosis. Correlate clinically.    Cholelithiasis.    Mild ascites and a mild right pleural effusion.    Additional findings as above.              ARTURO HARRY MD; Attending Radiologist  This document has been electronically signed. Apr 7 2021 11:08AM      EXAM:  XR CHEST PORTABLE URGENT 1V                            PROCEDURE DATE:  04/07/2021          INTERPRETATION:  CLINICAL INDICATION: 70 years  Male with Shortness of Breath.    COMPARISON: 12/5/2015    AP view of the chest demonstrates the lungs to be clear. There is blunting of the right lateral costophrenic angle consistent with chronic pleural reaction, unchanged. There are no pleural effusions.. There is no pneumothorax.    The heart is moderately enlarged. The patient status post sternotomy.. There is no mediastinal or hilar mass.    The pulmonary vasculature is normal.    Mild thoracic degenerative changes are present.    IMPRESSION:    No acute infiltrate. Cardiomegaly.        CRISTIANO ALBA MD; Attending Radiologist      EXAM:  US DPLX CAROTIDS COMPL BI                            PROCEDURE DATE:  04/01/2021          INTERPRETATION:  CLINICAL INFORMATION: Dizziness and carotid bruit    COMPARISON: None available.    TECHNIQUE: Grayscale, color and spectral Doppler examination of both carotid arteries was performed.    FINDINGS: Small amount of calcified plaque bilaterally right greater than left    No elevated velocities or abnormal waveforms are encountered.    Peak systolic velocities in centimeter per second are as follows:    RIGHT:  PROX CCA = 46  DIST CCA = 48  PROX ICA = 47  DIST ICA = 47  ECA = 71    LEFT:  PROX CCA = 58  DIST CCA = 54  PROX ICA = 50 to  DIST ICA = 56  ECA = 38    Antegrade flow is noted within both vertebral arteries.    IMPRESSION:No significant hemodynamic stenosis of either carotid artery.    Measurement of carotid stenosis is based on velocity parameters that correlate the residual internal carotid diameter with that of the more distal vessel in accordance with a method such as the [employer] Symptomatic Carotid Endarterectomy Trial (NASCET).        CLAUDINE PETERSON MD; Attending Radiologist  This document has been electronically signed. Apr 1 2021  4:03PM    Imaging  Reviewed:  YES/NO    Consultant(s) Notes Reviewed:   YES/ No      Plan of care was discussed with patient and /or primary care giver; all questions and concerns were addressed

## 2021-04-09 NOTE — PROGRESS NOTE ADULT - SUBJECTIVE AND OBJECTIVE BOX
CHIEF COMPLAINT:Patient is a 70y old  Male who presents with a chief complaint of Generalized edema .Pt appears comfortable.    	  REVIEW OF SYSTEMS:  CONSTITUTIONAL: No fever, weight loss, or fatigue  EYES: No eye pain, visual disturbances, or discharge  ENT:  No difficulty hearing, tinnitus, vertigo; No sinus or throat pain  NECK: No pain or stiffness  RESPIRATORY: No cough, wheezing, chills or hemoptysis; No Shortness of Breath  CARDIOVASCULAR: No chest pain, palpitations, passing out, dizziness, or leg swelling  GASTROINTESTINAL: No abdominal or epigastric pain. No nausea, vomiting, or hematemesis; No diarrhea or constipation. No melena or hematochezia.  GENITOURINARY: No dysuria, frequency, hematuria, or incontinence  NEUROLOGICAL: No headaches, memory loss, loss of strength, numbness, or tremors  SKIN: No itching, burning, rashes, or lesions   LYMPH Nodes: No enlarged glands  ENDOCRINE: No heat or cold intolerance; No hair loss  MUSCULOSKELETAL: No joint pain or swelling; No muscle, back, or extremity pain  PSYCHIATRIC: No depression, anxiety, mood swings, or difficulty sleeping  HEME/LYMPH: No easy bruising, or bleeding gums  ALLERGY AND IMMUNOLOGIC: No hives or eczema	      PHYSICAL EXAM:  T(C): 36.6 (04-09-21 @ 06:25), Max: 36.7 (04-08-21 @ 20:18)  HR: 63 (04-09-21 @ 06:25) (63 - 77)  BP: 140/90 (04-09-21 @ 06:25) (128/65 - 140/90)  RR: 18 (04-09-21 @ 06:25) (16 - 18)  SpO2: 98% (04-09-21 @ 06:25) (97% - 98%)        Appearance: Normal	  HEENT:   Normal oral mucosa, PERRL, EOMI	  Lymphatic: No lymphadenopathy  Cardiovascular: Normal S1 S2, No JVD, No murmurs, +2 edema  Respiratory: Lungs clear to auscultation	  Psychiatry: A & O x 3, Mood & affect appropriate  Gastrointestinal:  Soft, Non-tender, + BS	  Skin: No rashes, No ecchymoses, No cyanosis	  Neurologic: Non-focal  Extremities: Normal range of motion, No clubbing, cyanosis +2 edema  Vascular: Peripheral pulses palpable 2+ bilaterally    MEDICATIONS  (STANDING):  amoxicillin  875 milliGRAM(s)/clavulanate 1 Tablet(s) Oral every 12 hours  apixaban 5 milliGRAM(s) Oral every 12 hours  aspirin enteric coated 81 milliGRAM(s) Oral daily  atorvastatin 40 milliGRAM(s) Oral at bedtime  carvedilol 3.125 milliGRAM(s) Oral every 12 hours  collagenase Ointment 1 Application(s) Topical daily  doxycycline hyclate Capsule 100 milliGRAM(s) Oral every 12 hours  furosemide   Injectable 40 milliGRAM(s) IV Push two times a day  insulin lispro (ADMELOG) corrective regimen sliding scale   SubCutaneous three times a day before meals  insulin lispro (ADMELOG) corrective regimen sliding scale   SubCutaneous at bedtime  lisinopril 5 milliGRAM(s) Oral daily  pantoprazole    Tablet 40 milliGRAM(s) Oral before breakfast      	  	  LABS:	 	                       12.9   5.20  )-----------( 249      ( 09 Apr 2021 07:45 )             41.5     04-09    138  |  104  |  20<H>  ----------------------------<  109<H>  3.6   |  24  |  1.16    Ca    8.5      09 Apr 2021 07:45    TPro  7.4  /  Alb  2.9<L>  /  TBili  0.9  /  DBili  x   /  AST  27  /  ALT  22  /  AlkPhos  151<H>  04-09    proBNP: Serum Pro-Brain Natriuretic Peptide: 5032 pg/mL (04-07 @ 02:15)  Serum Pro-Brain Natriuretic Peptide: 6970 pg/mL (03-25 @ 06:19)    Lipid Profile: Cholesterol 119  LDL --  HDL 24  TG 63

## 2021-04-09 NOTE — DISCHARGE NOTE PROVIDER - HOSPITAL COURSE
Patient is 70y M from home, lives alone, with PMH of HTN, HLD, DM, PE, CAD s/p CABG (3.5 yrs ago), presenting with generalized body swelling + ascites /edema, + shortness of breath, exertional dyspnea and orthopnea. No reported chest pain. Pt admitted from 3/25-4/5/21 for CHF and LLE wound. Patient admitted to medicine for CHF exacerbation with [facility] 5032. Cardiology consulted for evaluation of CHF. Pt is not taking any medications atorvastatin, lisinopril, hydralazine as he thinks he doesn't need it. As per cardiology he needs to comply with his medications. He states he was taking lasix 80mg PO BID.On last admission, BNP 6970, ECHO shows severe systolic HF with EF 15-20% and GIIDD, Podiatry consulted for b/l LE chronic wound for dressing change. Patient was ordered Amoxicillin/Clavunalate 875mg PO q 12h, and Doxycycline 100mg PO q12h.        ----------------------------incomplete----------------------------- Patient is 70y M from home, lives alone, with PMH of HTN, HLD, DM, PE, CAD s/p CABG (3.5 yrs ago), presenting with generalized body swelling + ascites /edema, + shortness of breath, exertional dyspnea and orthopnea. No reported chest pain. Pt admitted from 3/25-4/5/21 for CHF and LLE wound.  BNP 6970, ECHO shows severe systolic HF with EF 15-20% and GIIDD, Podiatry consulted for b/l LE chronic wound for dressing change. Patient treated with Amoxicillin/Clavunalate 875mg PO q 12h, and Doxycycline 100mg PO q12h on last admission.   Patient re-admitted to medicine for CHF exacerbation with [facility] 5032. Cardiology consulted for evaluation of CHF. Pt is not taking any medications atorvastatin, lisinopril, hydralazine as he thinks he doesn't need it.  He states he was taking lasix 80mg PO BID.  Started lasix 40mg as per cardiology.   Pt with abdominal distention, CT abdomen shows Nodular hepatic contour concerning for cirrhosis. Correlate clinically. Cholelithiasis. Mild ascites and a mild right pleural effusion. Denies abdominal pain, N/V. BM controlled.    Hospital course complicated due to abdominal discomfort with distension. Abd. US resulted trace right upper quad ascites. Abdominal Xray shows decreased bowel dilatation. no evidence free air.  Pt is medically optimized for discharge. Pt persistently refusing oral medications, blood works.     Pt is homeless,  refusing shelter. SW following.  pt states considering AL facility vs. NH. adamantly refusing to leave.  24 hr notice given.     Please note this is a brief summary of hospitalization and refer to medical records for completed hospital course.     Patient is 70y M from home, lives alone, with PMH of HTN, HLD, DM, PE, CAD s/p CABG (3.5 yrs ago), presenting with generalized body swelling + ascites /edema, + shortness of breath, exertional dyspnea and orthopnea. No reported chest pain. Pt admitted from 3/25-4/5/21 for CHF and LLE wound.  BNP 6970, ECHO shows severe systolic HF with EF 15-20% and GIIDD, Podiatry consulted for b/l LE chronic wound for dressing change. Patient treated with Amoxicillin/Clavunalate 875mg PO q 12h, and Doxycycline 100mg PO q12h on last admission.   Patient re-admitted to medicine for CHF exacerbation with [facility] 5032. Cardiology consulted for evaluation of CHF. Pt is not taking any medications atorvastatin, lisinopril, hydralazine as he thinks he doesn't need it.  He states he was taking lasix 80mg PO BID.  Started lasix 40mg as per cardiology.   Pt with abdominal distention, CT abdomen shows Nodular hepatic contour concerning for cirrhosis. Correlate clinically. Cholelithiasis. Mild ascites and a mild right pleural effusion. Denies abdominal pain, N/V. BM controlled.    Hospital course complicated due to abdominal discomfort with distension. Abd. US resulted trace right upper quad ascites. Abdominal Xray shows decreased bowel dilatation. no evidence free air.  Pt is medically optimized for discharge. Pt persistently refusing oral medications, blood works.     Pt is homeless,  refusing shelter. SW following.  pt states considering AL facility vs. NH. adamantly refusing to leave.  24 hr notice given.     4/17- Pt evaluated at bedside, alert and oriented x 4.  Pt has lost his appeal per chart review and pt is now requesting d/c.   D/C planning discussed with pt with understanding verbalized.    Please note this is a brief summary of hospitalization and refer to medical records for completed hospital course.

## 2021-04-09 NOTE — DISCHARGE NOTE PROVIDER - NSDCQMAMI_CARD_ALL_CORE
From: Brad Boateng  To: Alexey Donald MD  Sent: 2017 7:53 PM CDT  Subject: Thomas Patino and Bone Density Test    Dr. Stacy Sims,  I know that we discussed and agreed that I need and want to continue medication to combat my overactive bladder and that the generic of Vesicare does not work for me, however, I have now learned that for a 3 month supply, I will pay $427.43. Is there another [facility] drug that I could check the cost of before I approve filling my Vesicare prescription? Also, who do I contact to schedule the ordered Bone Density Test?    Your assistance is appreciated!     [facility] MoralesLos [facility] [place]  : [id number]
No

## 2021-04-09 NOTE — PROGRESS NOTE ADULT - SUBJECTIVE AND OBJECTIVE BOX
Patient is a 70y old  Male who presents with a chief complaint of Generalized edema (09 Apr 2021 06:34)  Awake, alert, comfortable in bed in NAD. No cough or sob at rest. +Kaplan    INTERVAL HPI/OVERNIGHT EVENTS:      VITAL SIGNS:  T(F): 97.8 (04-09-21 @ 06:25)  HR: 63 (04-09-21 @ 06:25)  BP: 140/90 (04-09-21 @ 06:25)  RR: 18 (04-09-21 @ 06:25)  SpO2: 98% (04-09-21 @ 06:25)  Wt(kg): --  I&O's Detail          REVIEW OF SYSTEMS:    CONSTITUTIONAL:  No fevers, chills, sweats    HEENT:  Eyes:  No diplopia or blurred vision. ENT:  No earache, sore throat or runny nose.    CARDIOVASCULAR:  No pressure, squeezing, tightness, or heaviness about the chest; no palpitations.    RESPIRATORY:  Per HPI    GASTROINTESTINAL:  No abdominal pain, nausea, vomiting or diarrhea.    GENITOURINARY:  No dysuria, frequency or urgency.    NEUROLOGIC:  No paresthesias, fasciculations, seizures or weakness.    PSYCHIATRIC:  No disorder of thought or mood.      PHYSICAL EXAM:    Constitutional: Well developed and nourished  Eyes:Perrla  ENMT: normal  Neck:supple  Respiratory: good air entry  Cardiovascular: S1 S2 regular  Gastrointestinal: Soft, Non tender  Extremities: + edema  Vascular:normal  Neurological:Awake, alert,Ox3  Musculoskeletal:Normal      MEDICATIONS  (STANDING):  amoxicillin  875 milliGRAM(s)/clavulanate 1 Tablet(s) Oral every 12 hours  apixaban 5 milliGRAM(s) Oral every 12 hours  aspirin enteric coated 81 milliGRAM(s) Oral daily  atorvastatin 40 milliGRAM(s) Oral at bedtime  carvedilol 3.125 milliGRAM(s) Oral every 12 hours  collagenase Ointment 1 Application(s) Topical daily  doxycycline hyclate Capsule 100 milliGRAM(s) Oral every 12 hours  furosemide   Injectable 40 milliGRAM(s) IV Push two times a day  insulin lispro (ADMELOG) corrective regimen sliding scale   SubCutaneous three times a day before meals  insulin lispro (ADMELOG) corrective regimen sliding scale   SubCutaneous at bedtime  lisinopril 5 milliGRAM(s) Oral daily  pantoprazole    Tablet 40 milliGRAM(s) Oral before breakfast    MEDICATIONS  (PRN):      Allergies    Aldactone (Unknown)  Bumex (Blisters; Rash)  metoprolol (Unknown)  spironolactone (Unknown)    Intolerances        LABS:                        12.9   5.20  )-----------( 249      ( 09 Apr 2021 07:45 )             41.5     04-09    138  |  104  |  20<H>  ----------------------------<  109<H>  3.6   |  24  |  1.16    Ca    8.5      09 Apr 2021 07:45    TPro  7.4  /  Alb  2.9<L>  /  TBili  0.9  /  DBili  x   /  AST  27  /  ALT  22  /  AlkPhos  151<H>  04-09              CAPILLARY BLOOD GLUCOSE      POCT Blood Glucose.: 107 mg/dL (09 Apr 2021 07:32)  POCT Blood Glucose.: 112 mg/dL (08 Apr 2021 22:58)  POCT Blood Glucose.: 112 mg/dL (08 Apr 2021 11:32)    pro-bnp 5032 04-07 @ 02:15     d-dimer --  04-07 @ 02:15      RADIOLOGY & ADDITIONAL TESTS:    CXR:  < from: Xray Chest 1 View- PORTABLE-Urgent (04.07.21 @ 02:59) >  IMPRESSION:    No acute infiltrate. Cardiomegaly.    < end of copied text >    Ct scan chest:    ekg;    echo:

## 2021-04-09 NOTE — DISCHARGE NOTE PROVIDER - CARE PROVIDER_API CALL
Cullen Pitts)  Internal Medicine  [phone] [place] [address]  Phone: [phone]  Fax: [phone]  Follow Up Time: 1 week    Anabell Rhodes  INTERNAL MEDICINE  [address] [place] [address]  Phone: [phone]  Fax: [phone]  Follow Up Time: 1 week

## 2021-04-10 LAB
ALBUMIN SERPL ELPH-MCNC: 2.9 G/DL — LOW (ref 3.5–5)
ALP SERPL-CCNC: 158 U/L — HIGH (ref 40–120)
ALT FLD-CCNC: 26 U/L DA — SIGNIFICANT CHANGE UP (ref 10–60)
ANION GAP SERPL CALC-SCNC: 6 MMOL/L — SIGNIFICANT CHANGE UP (ref 5–17)
AST SERPL-CCNC: 29 U/L — SIGNIFICANT CHANGE UP (ref 10–40)
BILIRUB SERPL-MCNC: 0.9 MG/DL — SIGNIFICANT CHANGE UP (ref 0.2–1.2)
BUN SERPL-MCNC: 22 MG/DL — HIGH (ref 7–18)
CALCIUM SERPL-MCNC: 8 MG/DL — LOW (ref 8.4–10.5)
CHLORIDE SERPL-SCNC: 107 MMOL/L — SIGNIFICANT CHANGE UP (ref 96–108)
CO2 SERPL-SCNC: 28 MMOL/L — SIGNIFICANT CHANGE UP (ref 22–31)
CREAT SERPL-MCNC: 1.22 MG/DL — SIGNIFICANT CHANGE UP (ref 0.5–1.3)
GLUCOSE BLDC GLUCOMTR-MCNC: 144 MG/DL — HIGH (ref 70–99)
GLUCOSE SERPL-MCNC: 120 MG/DL — HIGH (ref 70–99)
HCT VFR BLD CALC: 39.9 % — SIGNIFICANT CHANGE UP (ref 39–50)
HGB BLD-MCNC: 12.3 G/DL — LOW (ref 13–17)
MCHC RBC-ENTMCNC: 25.4 PG — LOW (ref 27–34)
MCHC RBC-ENTMCNC: 30.8 GM/DL — LOW (ref 32–36)
MCV RBC AUTO: 82.4 FL — SIGNIFICANT CHANGE UP (ref 80–100)
NRBC # BLD: 0 /100 WBCS — SIGNIFICANT CHANGE UP (ref 0–0)
PLATELET # BLD AUTO: 268 K/UL — SIGNIFICANT CHANGE UP (ref 150–400)
POTASSIUM SERPL-MCNC: 3.6 MMOL/L — SIGNIFICANT CHANGE UP (ref 3.5–5.3)
POTASSIUM SERPL-SCNC: 3.6 MMOL/L — SIGNIFICANT CHANGE UP (ref 3.5–5.3)
PROT SERPL-MCNC: 7.6 G/DL — SIGNIFICANT CHANGE UP (ref 6–8.3)
RBC # BLD: 4.84 M/UL — SIGNIFICANT CHANGE UP (ref 4.2–5.8)
RBC # FLD: 17.1 % — HIGH (ref 10.3–14.5)
SODIUM SERPL-SCNC: 141 MMOL/L — SIGNIFICANT CHANGE UP (ref 135–145)
WBC # BLD: 6.11 K/UL — SIGNIFICANT CHANGE UP (ref 3.8–10.5)
WBC # FLD AUTO: 6.11 K/UL — SIGNIFICANT CHANGE UP (ref 3.8–10.5)

## 2021-04-10 RX ORDER — MULTIVIT WITH MIN/MFOLATE/K2 340-15/3 G
1 POWDER (GRAM) ORAL ONCE
Refills: 0 | Status: COMPLETED | OUTPATIENT
Start: 2021-04-10 | End: 2021-04-10

## 2021-04-10 RX ADMIN — Medication 40 MILLIGRAM(S): at 17:56

## 2021-04-10 RX ADMIN — Medication 1 BOTTLE: at 20:06

## 2021-04-10 RX ADMIN — Medication 40 MILLIGRAM(S): at 06:10

## 2021-04-10 RX ADMIN — Medication 1 APPLICATION(S): at 12:18

## 2021-04-10 RX ADMIN — CARVEDILOL PHOSPHATE 3.12 MILLIGRAM(S): 80 CAPSULE, EXTENDED RELEASE ORAL at 17:56

## 2021-04-10 RX ADMIN — Medication 81 MILLIGRAM(S): at 12:18

## 2021-04-10 RX ADMIN — APIXABAN 5 MILLIGRAM(S): 2.5 TABLET, FILM COATED ORAL at 17:56

## 2021-04-10 RX ADMIN — APIXABAN 5 MILLIGRAM(S): 2.5 TABLET, FILM COATED ORAL at 06:10

## 2021-04-10 NOTE — PROGRESS NOTE ADULT - SUBJECTIVE AND OBJECTIVE BOX
Patient is a 70y old  Male who presents with a chief complaint of Generalized edema (10 Apr 2021 12:06)  Patient is awake, alert, comfortable out of  bed in NAD    INTERVAL HPI/OVERNIGHT EVENTS:      VITAL SIGNS:  T(F): 97.1 (04-10-21 @ 05:05)  HR: 56 (04-10-21 @ 05:05)  BP: 131/78 (04-10-21 @ 05:05)  RR: 18 (04-10-21 @ 05:05)  SpO2: 96% (04-10-21 @ 05:05)  Wt(kg): --  I&O's Detail          REVIEW OF SYSTEMS:    CONSTITUTIONAL:  No fevers, chills, sweats    HEENT:  Eyes:  No diplopia or blurred vision. ENT:  No earache, sore throat or runny nose.    CARDIOVASCULAR:  No pressure, squeezing, tightness, or heaviness about the chest; no palpitations.    RESPIRATORY:  Per HPI    GASTROINTESTINAL:  No abdominal pain, nausea, vomiting or diarrhea.    GENITOURINARY:  No dysuria, frequency or urgency.    NEUROLOGIC:  No paresthesias, fasciculations, seizures or weakness.    PSYCHIATRIC:  No disorder of thought or mood.      PHYSICAL EXAM:    Constitutional: Well developed and nourished  Eyes:Perrla  ENMT: normal  Neck:supple  Respiratory: good air entry  Cardiovascular: S1 S2 regular  Gastrointestinal: Soft, Non tender  Extremities: + edema  Vascular:normal  Neurological:Awake, alert,Ox3  Musculoskeletal:Normal      MEDICATIONS  (STANDING):  amoxicillin  875 milliGRAM(s)/clavulanate 1 Tablet(s) Oral every 12 hours  apixaban 5 milliGRAM(s) Oral every 12 hours  aspirin enteric coated 81 milliGRAM(s) Oral daily  atorvastatin 40 milliGRAM(s) Oral at bedtime  carvedilol 3.125 milliGRAM(s) Oral every 12 hours  collagenase Ointment 1 Application(s) Topical daily  doxycycline hyclate Capsule 100 milliGRAM(s) Oral every 12 hours  furosemide   Injectable 40 milliGRAM(s) IV Push two times a day  insulin lispro (ADMELOG) corrective regimen sliding scale   SubCutaneous three times a day before meals  insulin lispro (ADMELOG) corrective regimen sliding scale   SubCutaneous at bedtime  lisinopril 5 milliGRAM(s) Oral daily  pantoprazole    Tablet 40 milliGRAM(s) Oral before breakfast    MEDICATIONS  (PRN):      Allergies    Aldactone (Unknown)  Bumex (Blisters; Rash)  metoprolol (Unknown)  spironolactone (Unknown)    Intolerances        LABS:                        12.3   6.11  )-----------( 268      ( 10 Apr 2021 10:40 )             39.9     04-10    141  |  107  |  22<H>  ----------------------------<  120<H>  3.6   |  28  |  1.22    Ca    8.0<L>      10 Apr 2021 10:40    TPro  7.6  /  Alb  2.9<L>  /  TBili  0.9  /  DBili  x   /  AST  29  /  ALT  26  /  AlkPhos  158<H>  04-10              CAPILLARY BLOOD GLUCOSE      POCT Blood Glucose.: 144 mg/dL (10 Apr 2021 11:20)  POCT Blood Glucose.: 107 mg/dL (09 Apr 2021 16:30)    pro-bnp 5032 04-07 @ 02:15     d-dimer --  04-07 @ 02:15      RADIOLOGY & ADDITIONAL TESTS:    CXR:    Ct scan chest:    ekg;    echo:

## 2021-04-10 NOTE — PROGRESS NOTE ADULT - SUBJECTIVE AND OBJECTIVE BOX
Patient is a 70y old  Male who presents with a chief complaint of Generalized edema (09 Apr 2021 16:22)    pt seen in icu [  ], reg med floor [ x  ], bed [ x ], chair at bedside [   ], a+o x3 [x  ], lethargic [  ],    nad [ x ]        Allergies    Aldactone (Unknown)  Bumex (Blisters; Rash)  metoprolol (Unknown)  spironolactone (Unknown)        Vitals    T(F): 97.1 (04-10-21 @ 05:05), Max: 97.5 (04-09-21 @ 20:49)  HR: 56 (04-10-21 @ 05:05) (56 - 69)  BP: 131/78 (04-10-21 @ 05:05) (131/78 - 151/65)  RR: 18 (04-10-21 @ 05:05) (18 - 18)  SpO2: 96% (04-10-21 @ 05:05) (96% - 97%)  Wt(kg): --  CAPILLARY BLOOD GLUCOSE      POCT Blood Glucose.: 107 mg/dL (09 Apr 2021 16:30)      Labs                          12.9   5.20  )-----------( 249      ( 09 Apr 2021 07:45 )             41.5       04-09    138  |  104  |  20<H>  ----------------------------<  109<H>  3.6   |  24  |  1.16    Ca    8.5      09 Apr 2021 07:45    TPro  7.4  /  Alb  2.9<L>  /  TBili  0.9  /  DBili  x   /  AST  27  /  ALT  22  /  AlkPhos  151<H>  04-09                Radiology Results      Meds    MEDICATIONS  (STANDING):  amoxicillin  875 milliGRAM(s)/clavulanate 1 Tablet(s) Oral every 12 hours  apixaban 5 milliGRAM(s) Oral every 12 hours  aspirin enteric coated 81 milliGRAM(s) Oral daily  atorvastatin 40 milliGRAM(s) Oral at bedtime  carvedilol 3.125 milliGRAM(s) Oral every 12 hours  collagenase Ointment 1 Application(s) Topical daily  doxycycline hyclate Capsule 100 milliGRAM(s) Oral every 12 hours  furosemide   Injectable 40 milliGRAM(s) IV Push two times a day  insulin lispro (ADMELOG) corrective regimen sliding scale   SubCutaneous three times a day before meals  insulin lispro (ADMELOG) corrective regimen sliding scale   SubCutaneous at bedtime  lisinopril 5 milliGRAM(s) Oral daily  pantoprazole    Tablet 40 milliGRAM(s) Oral before breakfast      MEDICATIONS  (PRN):      Physical Exam    Neuro :  no focal deficits  Respiratory: CTA B/L  CV: RRR, S1S2, no murmurs,   Abdominal: Soft, NT, ND +BS,  Extremities: b/l le edema, + peripheral pulses, le ace wrap    ASSESSMENT    acute on chronic systolic chf,   pulm edema,   hyperkalemia,   liver cirrhosis,   ascites   h/o HTN,   HLD,   DM,   CAD s/p CABGx3,   chronic systolic heart failure  pe          PLAN    augmentin until 4/17/21   podiatry cons  cont coreg, lisinopril   cont lasix 40mg bid,   cont aspirin, statin,   cardio f/u  recent echo with lvef 15 -20%, grade 2 dd, pulm htn.   Pt needs to be compliant with  cardiac medication.  pulm f/u  supplemental O2 as needed,   bronchodilator,   s/p lokelma,   serum potassium wnl   ct abdomen with Nodular hepatic contour concerning for cirrhosis. Correlate clinically. Cholelithiasis. Mild ascites and a mild right pleural effusion noted above.  lft's improving  hgba1c 6.6  pt not on meds  cont current meds         Patient is a 70y old  Male who presents with a chief complaint of Generalized edema (09 Apr 2021 16:22)    pt seen in icu [  ], reg med floor [ x  ], bed [ x ], chair at bedside [   ], a+o x3 [x  ], lethargic [  ],    nad [ x ]        Allergies    Aldactone (Unknown)  Bumex (Blisters; Rash)  metoprolol (Unknown)  spironolactone (Unknown)        Vitals    T(F): 97.1 (04-10-21 @ 05:05), Max: 97.5 (04-09-21 @ 20:49)  HR: 56 (04-10-21 @ 05:05) (56 - 69)  BP: 131/78 (04-10-21 @ 05:05) (131/78 - 151/65)  RR: 18 (04-10-21 @ 05:05) (18 - 18)  SpO2: 96% (04-10-21 @ 05:05) (96% - 97%)  Wt(kg): --  CAPILLARY BLOOD GLUCOSE      POCT Blood Glucose.: 107 mg/dL (09 Apr 2021 16:30)      Labs                          12.9   5.20  )-----------( 249      ( 09 Apr 2021 07:45 )             41.5       04-09    138  |  104  |  20<H>  ----------------------------<  109<H>  3.6   |  24  |  1.16    Ca    8.5      09 Apr 2021 07:45    TPro  7.4  /  Alb  2.9<L>  /  TBili  0.9  /  DBili  x   /  AST  27  /  ALT  22  /  AlkPhos  151<H>  04-09                Radiology Results      Meds    MEDICATIONS  (STANDING):  amoxicillin  875 milliGRAM(s)/clavulanate 1 Tablet(s) Oral every 12 hours  apixaban 5 milliGRAM(s) Oral every 12 hours  aspirin enteric coated 81 milliGRAM(s) Oral daily  atorvastatin 40 milliGRAM(s) Oral at bedtime  carvedilol 3.125 milliGRAM(s) Oral every 12 hours  collagenase Ointment 1 Application(s) Topical daily  doxycycline hyclate Capsule 100 milliGRAM(s) Oral every 12 hours  furosemide   Injectable 40 milliGRAM(s) IV Push two times a day  insulin lispro (ADMELOG) corrective regimen sliding scale   SubCutaneous three times a day before meals  insulin lispro (ADMELOG) corrective regimen sliding scale   SubCutaneous at bedtime  lisinopril 5 milliGRAM(s) Oral daily  pantoprazole    Tablet 40 milliGRAM(s) Oral before breakfast      MEDICATIONS  (PRN):      Physical Exam    Neuro :  no focal deficits  Respiratory: CTA B/L  CV: RRR, S1S2, no murmurs,   Abdominal: Soft, NT, ND +BS,  Extremities: b/l le edema, + peripheral pulses, le ace wrap    ASSESSMENT    acute on chronic systolic chf,   pulm edema,   hyperkalemia,   liver cirrhosis,   ascites   h/o HTN,   HLD,   DM,   CAD s/p CABGx3,   chronic systolic heart failure  pe          PLAN    augmentin until 4/17/21   podiatry cons  cont coreg, lisinopril   cont lasix 40mg bid,   cont aspirin, statin,   cardio f/u  recent echo with lvef 15 -20%, grade 2 dd, pulm htn.   Pt needs to be compliant with  cardiac medication.  pulm f/u  supplemental O2 as needed,   bronchodilator,   s/p lokelma,   serum potassium wnl   ct abdomen with Nodular hepatic contour concerning for cirrhosis. Correlate clinically. Cholelithiasis. Mild ascites and a mild right pleural effusion noted    lft's improving  hgba1c 6.6  pt not on meds  cont current meds

## 2021-04-10 NOTE — PROGRESS NOTE ADULT - SUBJECTIVE AND OBJECTIVE BOX
CHIEF COMPLAINT:Patient is a 70y old  Male who presents with a chief complaint of Generalized edema.Pt appears comfortable.    	  REVIEW OF SYSTEMS:  CONSTITUTIONAL: No fever, weight loss, or fatigue  EYES: No eye pain, visual disturbances, or discharge  ENT:  No difficulty hearing, tinnitus, vertigo; No sinus or throat pain  NECK: No pain or stiffness  RESPIRATORY: No cough, wheezing, chills or hemoptysis; No Shortness of Breath  CARDIOVASCULAR: No chest pain, palpitations, passing out, dizziness, or leg swelling  GASTROINTESTINAL: No abdominal or epigastric pain. No nausea, vomiting, or hematemesis; No diarrhea or constipation. No melena or hematochezia.  GENITOURINARY: No dysuria, frequency, hematuria, or incontinence  NEUROLOGICAL: No headaches, memory loss, loss of strength, numbness, or tremors  SKIN: No itching, burning, rashes, or lesions   LYMPH Nodes: No enlarged glands  ENDOCRINE: No heat or cold intolerance; No hair loss  MUSCULOSKELETAL: No joint pain or swelling; No muscle, back, or extremity pain  PSYCHIATRIC: No depression, anxiety, mood swings, or difficulty sleeping  HEME/LYMPH: No easy bruising, or bleeding gums  ALLERGY AND IMMUNOLOGIC: No hives or eczema	        PHYSICAL EXAM:  T(C): 36.2 (04-10-21 @ 05:05), Max: 36.4 (04-09-21 @ 20:49)  HR: 56 (04-10-21 @ 05:05) (56 - 69)  BP: 131/78 (04-10-21 @ 05:05) (131/78 - 151/65)  RR: 18 (04-10-21 @ 05:05) (18 - 18)  SpO2: 96% (04-10-21 @ 05:05) (96% - 97%)  Wt(kg): --  I&O's Summary      Appearance: Normal	  HEENT:   Normal oral mucosa, PERRL, EOMI	  Lymphatic: No lymphadenopathy  Cardiovascular: Normal S1 S2, No JVD, No murmurs, No edema  Respiratory: Lungs clear to auscultation	  Psychiatry: A & O x 3, Mood & affect appropriate  Gastrointestinal:  Soft, Non-tender, + BS	  Skin: No rashes, No ecchymoses, No cyanosis	  Neurologic: Non-focal  Extremities: Normal range of motion, No clubbing, cyanosis or edema      MEDICATIONS  (STANDING):  amoxicillin  875 milliGRAM(s)/clavulanate 1 Tablet(s) Oral every 12 hours  apixaban 5 milliGRAM(s) Oral every 12 hours  aspirin enteric coated 81 milliGRAM(s) Oral daily  atorvastatin 40 milliGRAM(s) Oral at bedtime  carvedilol 3.125 milliGRAM(s) Oral every 12 hours  collagenase Ointment 1 Application(s) Topical daily  doxycycline hyclate Capsule 100 milliGRAM(s) Oral every 12 hours  furosemide   Injectable 40 milliGRAM(s) IV Push two times a day  insulin lispro (ADMELOG) corrective regimen sliding scale   SubCutaneous three times a day before meals  insulin lispro (ADMELOG) corrective regimen sliding scale   SubCutaneous at bedtime  lisinopril 5 milliGRAM(s) Oral daily  pantoprazole    Tablet 40 milliGRAM(s) Oral before breakfast        LABS:	 	                        12.3   6.11  )-----------( 268      ( 10 Apr 2021 10:40 )             39.9     04-10    141  |  107  |  22<H>  ----------------------------<  120<H>  3.6   |  28  |  1.22    Ca    8.0<L>      10 Apr 2021 10:40    TPro  7.6  /  Alb  2.9<L>  /  TBili  0.9  /  DBili  x   /  AST  29  /  ALT  26  /  AlkPhos  158<H>  04-10    proBNP: Serum Pro-Brain Natriuretic Peptide: 5032 pg/mL (04-07 @ 02:15)  Serum Pro-Brain Natriuretic Peptide: 6970 pg/mL (03-25 @ 06:19)    Lipid Profile: Cholesterol 119  LDL --  HDL 24  TG 63    HgA1c:   TSH:

## 2021-04-11 LAB
ALBUMIN SERPL ELPH-MCNC: 3.1 G/DL — LOW (ref 3.5–5)
ALP SERPL-CCNC: 161 U/L — HIGH (ref 40–120)
ALT FLD-CCNC: 26 U/L DA — SIGNIFICANT CHANGE UP (ref 10–60)
ANION GAP SERPL CALC-SCNC: 7 MMOL/L — SIGNIFICANT CHANGE UP (ref 5–17)
AST SERPL-CCNC: 31 U/L — SIGNIFICANT CHANGE UP (ref 10–40)
BILIRUB SERPL-MCNC: 0.7 MG/DL — SIGNIFICANT CHANGE UP (ref 0.2–1.2)
BUN SERPL-MCNC: 23 MG/DL — HIGH (ref 7–18)
CALCIUM SERPL-MCNC: 8.3 MG/DL — LOW (ref 8.4–10.5)
CHLORIDE SERPL-SCNC: 107 MMOL/L — SIGNIFICANT CHANGE UP (ref 96–108)
CO2 SERPL-SCNC: 27 MMOL/L — SIGNIFICANT CHANGE UP (ref 22–31)
CREAT SERPL-MCNC: 1.25 MG/DL — SIGNIFICANT CHANGE UP (ref 0.5–1.3)
GLUCOSE BLDC GLUCOMTR-MCNC: 105 MG/DL — HIGH (ref 70–99)
GLUCOSE BLDC GLUCOMTR-MCNC: 152 MG/DL — HIGH (ref 70–99)
GLUCOSE SERPL-MCNC: 112 MG/DL — HIGH (ref 70–99)
HCT VFR BLD CALC: 41.6 % — SIGNIFICANT CHANGE UP (ref 39–50)
HGB BLD-MCNC: 12.6 G/DL — LOW (ref 13–17)
MCHC RBC-ENTMCNC: 25.3 PG — LOW (ref 27–34)
MCHC RBC-ENTMCNC: 30.3 GM/DL — LOW (ref 32–36)
MCV RBC AUTO: 83.4 FL — SIGNIFICANT CHANGE UP (ref 80–100)
NRBC # BLD: 0 /100 WBCS — SIGNIFICANT CHANGE UP (ref 0–0)
PLATELET # BLD AUTO: 244 K/UL — SIGNIFICANT CHANGE UP (ref 150–400)
POTASSIUM SERPL-MCNC: 3.7 MMOL/L — SIGNIFICANT CHANGE UP (ref 3.5–5.3)
POTASSIUM SERPL-SCNC: 3.7 MMOL/L — SIGNIFICANT CHANGE UP (ref 3.5–5.3)
PROT SERPL-MCNC: 7.6 G/DL — SIGNIFICANT CHANGE UP (ref 6–8.3)
RBC # BLD: 4.99 M/UL — SIGNIFICANT CHANGE UP (ref 4.2–5.8)
RBC # FLD: 17.2 % — HIGH (ref 10.3–14.5)
SODIUM SERPL-SCNC: 141 MMOL/L — SIGNIFICANT CHANGE UP (ref 135–145)
WBC # BLD: 5.74 K/UL — SIGNIFICANT CHANGE UP (ref 3.8–10.5)
WBC # FLD AUTO: 5.74 K/UL — SIGNIFICANT CHANGE UP (ref 3.8–10.5)

## 2021-04-11 RX ORDER — FUROSEMIDE 40 MG
40 TABLET ORAL DAILY
Refills: 0 | Status: DISCONTINUED | OUTPATIENT
Start: 2021-04-11 | End: 2021-04-17

## 2021-04-11 RX ADMIN — Medication 40 MILLIGRAM(S): at 17:30

## 2021-04-11 RX ADMIN — Medication 1 APPLICATION(S): at 13:31

## 2021-04-11 RX ADMIN — Medication 81 MILLIGRAM(S): at 11:35

## 2021-04-11 RX ADMIN — CARVEDILOL PHOSPHATE 3.12 MILLIGRAM(S): 80 CAPSULE, EXTENDED RELEASE ORAL at 17:30

## 2021-04-11 RX ADMIN — APIXABAN 5 MILLIGRAM(S): 2.5 TABLET, FILM COATED ORAL at 17:30

## 2021-04-11 NOTE — PROGRESS NOTE ADULT - SUBJECTIVE AND OBJECTIVE BOX
CHIEF COMPLAINT:Patient is a 70y old  Male who presents with a chief complaint of Generalized edema.Pt appears comfortable.    	  REVIEW OF SYSTEMS:  CONSTITUTIONAL: No fever, weight loss, or fatigue  EYES: No eye pain, visual disturbances, or discharge  ENT:  No difficulty hearing, tinnitus, vertigo; No sinus or throat pain  NECK: No pain or stiffness  RESPIRATORY: No cough, wheezing, chills or hemoptysis; No Shortness of Breath  CARDIOVASCULAR: No chest pain, palpitations, passing out, dizziness, or leg swelling  GASTROINTESTINAL: No abdominal or epigastric pain. No nausea, vomiting, or hematemesis; No diarrhea or constipation. No melena or hematochezia.  GENITOURINARY: No dysuria, frequency, hematuria, or incontinence  NEUROLOGICAL: No headaches, memory loss, loss of strength, numbness, or tremors  SKIN: No itching, burning, rashes, or lesions   LYMPH Nodes: No enlarged glands  ENDOCRINE: No heat or cold intolerance; No hair loss  MUSCULOSKELETAL: No joint pain or swelling; No muscle, back, or extremity pain  PSYCHIATRIC: No depression, anxiety, mood swings, or difficulty sleeping  HEME/LYMPH: No easy bruising, or bleeding gums  ALLERGY AND IMMUNOLOGIC: No hives or eczema	      PHYSICAL EXAM:  T(C): 36.7 (04-10-21 @ 20:28), Max: 36.7 (04-10-21 @ 20:28)  HR: 74 (04-10-21 @ 20:28) (72 - 74)  BP: 139/81 (04-10-21 @ 20:28) (139/81 - 143/87)  RR: 18 (04-10-21 @ 20:28) (18 - 18)  SpO2: 97% (04-10-21 @ 20:28) (97% - 97%)    I&O's Summary    10 Apr 2021 07:01  -  11 Apr 2021 07:00  --------------------------------------------------------  IN: 0 mL / OUT: 2 mL / NET: -2 mL        Appearance: Normal	  HEENT:   Normal oral mucosa, PERRL, EOMI	  Lymphatic: No lymphadenopathy  Cardiovascular: Normal S1 S2, No JVD, No murmurs, +2 edema  Respiratory: Lungs clear to auscultation	  Psychiatry: A & O x 3, Mood & affect appropriate  Gastrointestinal:  Soft, Non-tender, + BS	  Skin: No rashes, No ecchymoses, No cyanosis	  Neurologic: Non-focal  Extremities: Normal range of motion, No clubbing, cyanosis +2 edema      MEDICATIONS  (STANDING):  amoxicillin  875 milliGRAM(s)/clavulanate 1 Tablet(s) Oral every 12 hours  apixaban 5 milliGRAM(s) Oral every 12 hours  aspirin enteric coated 81 milliGRAM(s) Oral daily  atorvastatin 40 milliGRAM(s) Oral at bedtime  carvedilol 3.125 milliGRAM(s) Oral every 12 hours  collagenase Ointment 1 Application(s) Topical daily  doxycycline hyclate Capsule 100 milliGRAM(s) Oral every 12 hours  furosemide   Injectable 40 milliGRAM(s) IV Push two times a day  insulin lispro (ADMELOG) corrective regimen sliding scale   SubCutaneous three times a day before meals  insulin lispro (ADMELOG) corrective regimen sliding scale   SubCutaneous at bedtime  lisinopril 5 milliGRAM(s) Oral daily  pantoprazole    Tablet 40 milliGRAM(s) Oral before breakfast        	  LABS:	 	                      12.6   5.74  )-----------( 244      ( 11 Apr 2021 07:00 )             41.6     04-11    141  |  107  |  23<H>  ----------------------------<  112<H>  3.7   |  27  |  1.25    Ca    8.3<L>      11 Apr 2021 07:00    TPro  7.6  /  Alb  3.1<L>  /  TBili  0.7  /  DBili  x   /  AST  31  /  ALT  26  /  AlkPhos  161<H>  04-11    proBNP: Serum Pro-Brain Natriuretic Peptide: 5032 pg/mL (04-07 @ 02:15)  Serum Pro-Brain Natriuretic Peptide: 6970 pg/mL (03-25 @ 06:19)    Lipid Profile: Cholesterol 119    HDL 24  TG 63

## 2021-04-11 NOTE — PROGRESS NOTE ADULT - SUBJECTIVE AND OBJECTIVE BOX
Patient is a 70y old  Male who presents with a chief complaint of Generalized edema (11 Apr 2021 06:36)  Awake, alert, comfortable in bed in NAD. No sob at rest. +Kaplan    INTERVAL HPI/OVERNIGHT EVENTS:      VITAL SIGNS:  T(F): 98 (04-10-21 @ 20:28)  HR: 74 (04-10-21 @ 20:28)  BP: 139/81 (04-10-21 @ 20:28)  RR: 18 (04-10-21 @ 20:28)  SpO2: 97% (04-10-21 @ 20:28)  Wt(kg): --  I&O's Detail    10 Apr 2021 07:01  -  11 Apr 2021 07:00  --------------------------------------------------------  IN:  Total IN: 0 mL    OUT:    Stool (mL): 2 mL  Total OUT: 2 mL    Total NET: -2 mL              REVIEW OF SYSTEMS:    CONSTITUTIONAL:  No fevers, chills, sweats    HEENT:  Eyes:  No diplopia or blurred vision. ENT:  No earache, sore throat or runny nose.    CARDIOVASCULAR:  No pressure, squeezing, tightness, or heaviness about the chest; no palpitations.    RESPIRATORY:  Per HPI    GASTROINTESTINAL:  No abdominal pain, nausea, vomiting or diarrhea.    GENITOURINARY:  No dysuria, frequency or urgency.    NEUROLOGIC:  No paresthesias, fasciculations, seizures or weakness.    PSYCHIATRIC:  No disorder of thought or mood.      PHYSICAL EXAM:    Constitutional: Well developed and nourished  Eyes:Perrla  ENMT: normal  Neck:supple  Respiratory: good air entry  Cardiovascular: S1 S2 regular  Gastrointestinal: Soft, Non tender  Extremities: + edema  Vascular:normal  Neurological:Awake, alert,Ox3  Musculoskeletal:Normal      MEDICATIONS  (STANDING):  amoxicillin  875 milliGRAM(s)/clavulanate 1 Tablet(s) Oral every 12 hours  apixaban 5 milliGRAM(s) Oral every 12 hours  aspirin enteric coated 81 milliGRAM(s) Oral daily  atorvastatin 40 milliGRAM(s) Oral at bedtime  carvedilol 3.125 milliGRAM(s) Oral every 12 hours  collagenase Ointment 1 Application(s) Topical daily  doxycycline hyclate Capsule 100 milliGRAM(s) Oral every 12 hours  furosemide   Injectable 40 milliGRAM(s) IV Push two times a day  insulin lispro (ADMELOG) corrective regimen sliding scale   SubCutaneous three times a day before meals  insulin lispro (ADMELOG) corrective regimen sliding scale   SubCutaneous at bedtime  lisinopril 5 milliGRAM(s) Oral daily  pantoprazole    Tablet 40 milliGRAM(s) Oral before breakfast    MEDICATIONS  (PRN):      Allergies    Aldactone (Unknown)  Bumex (Blisters; Rash)  metoprolol (Unknown)  spironolactone (Unknown)    Intolerances        LABS:                        12.6   5.74  )-----------( 244      ( 11 Apr 2021 07:00 )             41.6     04-11    141  |  107  |  23<H>  ----------------------------<  112<H>  3.7   |  27  |  1.25    Ca    8.3<L>      11 Apr 2021 07:00    TPro  7.6  /  Alb  3.1<L>  /  TBili  0.7  /  DBili  x   /  AST  31  /  ALT  26  /  AlkPhos  161<H>  04-11              CAPILLARY BLOOD GLUCOSE      POCT Blood Glucose.: 105 mg/dL (11 Apr 2021 07:38)  POCT Blood Glucose.: 144 mg/dL (10 Apr 2021 11:20)    pro-bnp 5032 04-07 @ 02:15     d-dimer --  04-07 @ 02:15      RADIOLOGY & ADDITIONAL TESTS:    CXR:    Ct scan chest:    ekg;    echo:

## 2021-04-11 NOTE — PROGRESS NOTE ADULT - SUBJECTIVE AND OBJECTIVE BOX
Patient is a 70y old  Male who presents with a chief complaint of Generalized edema (10 Apr 2021 12:26)    pt seen in icu [  ], reg med floor [ x  ], bed [ x ], chair at bedside [   ], a+o x3 [x  ], lethargic [  ],    nad [ x ]      Allergies    Aldactone (Unknown)  Bumex (Blisters; Rash)  metoprolol (Unknown)  spironolactone (Unknown)        Vitals    T(F): 98 (04-10-21 @ 20:28), Max: 98 (04-10-21 @ 20:28)  HR: 74 (04-10-21 @ 20:28) (72 - 74)  BP: 139/81 (04-10-21 @ 20:28) (139/81 - 143/87)  RR: 18 (04-10-21 @ 20:28) (18 - 18)  SpO2: 97% (04-10-21 @ 20:28) (97% - 97%)  Wt(kg): --  CAPILLARY BLOOD GLUCOSE      POCT Blood Glucose.: 144 mg/dL (10 Apr 2021 11:20)      Labs                          12.3   6.11  )-----------( 268      ( 10 Apr 2021 10:40 )             39.9       04-10    141  |  107  |  22<H>  ----------------------------<  120<H>  3.6   |  28  |  1.22    Ca    8.0<L>      10 Apr 2021 10:40    TPro  7.6  /  Alb  2.9<L>  /  TBili  0.9  /  DBili  x   /  AST  29  /  ALT  26  /  AlkPhos  158<H>  04-10                Radiology Results      Meds    MEDICATIONS  (STANDING):  amoxicillin  875 milliGRAM(s)/clavulanate 1 Tablet(s) Oral every 12 hours  apixaban 5 milliGRAM(s) Oral every 12 hours  aspirin enteric coated 81 milliGRAM(s) Oral daily  atorvastatin 40 milliGRAM(s) Oral at bedtime  carvedilol 3.125 milliGRAM(s) Oral every 12 hours  collagenase Ointment 1 Application(s) Topical daily  doxycycline hyclate Capsule 100 milliGRAM(s) Oral every 12 hours  furosemide   Injectable 40 milliGRAM(s) IV Push two times a day  insulin lispro (ADMELOG) corrective regimen sliding scale   SubCutaneous three times a day before meals  insulin lispro (ADMELOG) corrective regimen sliding scale   SubCutaneous at bedtime  lisinopril 5 milliGRAM(s) Oral daily  pantoprazole    Tablet 40 milliGRAM(s) Oral before breakfast      MEDICATIONS  (PRN):      Physical Exam    Neuro :  no focal deficits  Respiratory: CTA B/L  CV: RRR, S1S2, no murmurs,   Abdominal: Soft, NT, ND +BS,  Extremities: b/l le edema, + peripheral pulses, le ace wrap    ASSESSMENT    acute on chronic systolic chf,   pulm edema,   hyperkalemia,   liver cirrhosis,   ascites   h/o HTN,   HLD,   DM,   CAD s/p CABGx3,   chronic systolic heart failure  pe          PLAN    augmentin until 4/17/21   podiatry cons  cont coreg, lisinopril   cont lasix 40mg bid,   cont aspirin, statin,   cardio f/u  recent echo with lvef 15 -20%, grade 2 dd, pulm htn.   Pt needs to be compliant with  cardiac medication.  pulm f/u  supplemental O2 as needed,   bronchodilator,   s/p lokelma,   serum potassium wnl   ct abdomen with Nodular hepatic contour concerning for cirrhosis. Correlate clinically. Cholelithiasis. Mild ascites and a mild right pleural effusion noted    lft's improving  hgba1c 6.6  pt not on meds  cont current meds

## 2021-04-12 DIAGNOSIS — Z02.9 ENCOUNTER FOR ADMINISTRATIVE EXAMINATIONS, UNSPECIFIED: ICD-10-CM

## 2021-04-12 LAB — GLUCOSE BLDC GLUCOMTR-MCNC: 133 MG/DL — HIGH (ref 70–99)

## 2021-04-12 RX ORDER — POLYETHYLENE GLYCOL 3350 17 G/17G
17 POWDER, FOR SOLUTION ORAL ONCE
Refills: 0 | Status: COMPLETED | OUTPATIENT
Start: 2021-04-12 | End: 2021-04-12

## 2021-04-12 RX ORDER — MULTIVIT WITH MIN/MFOLATE/K2 340-15/3 G
1 POWDER (GRAM) ORAL ONCE
Refills: 0 | Status: COMPLETED | OUTPATIENT
Start: 2021-04-12 | End: 2021-04-12

## 2021-04-12 RX ADMIN — CARVEDILOL PHOSPHATE 3.12 MILLIGRAM(S): 80 CAPSULE, EXTENDED RELEASE ORAL at 17:18

## 2021-04-12 RX ADMIN — APIXABAN 5 MILLIGRAM(S): 2.5 TABLET, FILM COATED ORAL at 06:58

## 2021-04-12 RX ADMIN — Medication 1 APPLICATION(S): at 11:57

## 2021-04-12 RX ADMIN — APIXABAN 5 MILLIGRAM(S): 2.5 TABLET, FILM COATED ORAL at 17:18

## 2021-04-12 RX ADMIN — Medication 40 MILLIGRAM(S): at 06:58

## 2021-04-12 RX ADMIN — CARVEDILOL PHOSPHATE 3.12 MILLIGRAM(S): 80 CAPSULE, EXTENDED RELEASE ORAL at 06:58

## 2021-04-12 RX ADMIN — Medication 1 BOTTLE: at 12:52

## 2021-04-12 RX ADMIN — Medication 81 MILLIGRAM(S): at 11:58

## 2021-04-12 NOTE — PROGRESS NOTE ADULT - SUBJECTIVE AND OBJECTIVE BOX
NP Note discussed with  Primary Attending    Patient is a 70y old  Male who presents with a chief complaint of Generalized edema (12 Apr 2021 12:00)      INTERVAL HPI/OVERNIGHT EVENTS: Patient seen and examined at bedside. Patient refusing to take medication, blood draw, finger stick. Patient does not have symptomatic ascites at rest.    MEDICATIONS  (STANDING):  amoxicillin  875 milliGRAM(s)/clavulanate 1 Tablet(s) Oral every 12 hours  apixaban 5 milliGRAM(s) Oral every 12 hours  aspirin enteric coated 81 milliGRAM(s) Oral daily  atorvastatin 40 milliGRAM(s) Oral at bedtime  carvedilol 3.125 milliGRAM(s) Oral every 12 hours  collagenase Ointment 1 Application(s) Topical daily  doxycycline hyclate Capsule 100 milliGRAM(s) Oral every 12 hours  furosemide    Tablet 40 milliGRAM(s) Oral daily  insulin lispro (ADMELOG) corrective regimen sliding scale   SubCutaneous three times a day before meals  insulin lispro (ADMELOG) corrective regimen sliding scale   SubCutaneous at bedtime  lisinopril 5 milliGRAM(s) Oral daily  pantoprazole    Tablet 40 milliGRAM(s) Oral before breakfast    MEDICATIONS  (PRN):      __________________________________________________  REVIEW OF SYSTEMS:    CONSTITUTIONAL: No fever,   EYES: no acute visual disturbances  NECK: No pain or stiffness  RESPIRATORY: No cough; No shortness of breath  CARDIOVASCULAR: No chest pain, no palpitations  GASTROINTESTINAL: No pain. No nausea or vomiting; No diarrhea   NEUROLOGICAL: No headache or numbness, no tremors  MUSCULOSKELETAL: No joint pain, no muscle pain  GENITOURINARY: no dysuria, no frequency, no hesitancy  PSYCHIATRY: no depression , no anxiety  ALL OTHER  ROS negative        Vital Signs Last 24 Hrs  T(C): 36.7 (11 Apr 2021 20:47), Max: 36.7 (11 Apr 2021 20:47)  T(F): 98 (11 Apr 2021 20:47), Max: 98 (11 Apr 2021 20:47)  HR: 77 (12 Apr 2021 17:13) (63 - 77)  BP: 166/103 (12 Apr 2021 17:13) (132/88 - 166/103)  BP(mean): --  RR: 18 (12 Apr 2021 17:13) (18 - 19)  SpO2: 98% (12 Apr 2021 17:13) (97% - 98%)    ________________________________________________  PHYSICAL EXAM:  GENERAL: NAD  HEENT: Normocephalic;  conjunctivae and sclerae clear; moist mucous membranes;   NECK : supple  CHEST/LUNG: Clear to auscultation bilaterally with good air entry   HEART: S1 S2  regular; no murmurs, gallops or rubs  ABDOMEN: ascites Soft, Nontender, Nondistended; Bowel sounds present  EXTREMITIES: B/L extremities ace bandage, edema no cyanosis; no calf tenderness  SKIN: warm and dry; no rash  NERVOUS SYSTEM:  Awake and alert; Oriented  to place, person and time ; no new deficits    _________________________________________________  LABS:                        12.6   5.74  )-----------( 244      ( 11 Apr 2021 07:00 )             41.6     04-11    141  |  107  |  23<H>  ----------------------------<  112<H>  3.7   |  27  |  1.25    Ca    8.3<L>      11 Apr 2021 07:00    TPro  7.6  /  Alb  3.1<L>  /  TBili  0.7  /  DBili  x   /  AST  31  /  ALT  26  /  AlkPhos  161<H>  04-11        CAPILLARY BLOOD GLUCOSE      POCT Blood Glucose.: 133 mg/dL (12 Apr 2021 08:25)        RADIOLOGY & ADDITIONAL TESTS:  < from: CT Abdomen and Pelvis No Cont (04.07.21 @ 10:30) >  EXAM:  CT ABDOMEN AND PELVIS                            PROCEDURE DATE:  04/07/2021          INTERPRETATION:  CLINICAL INFORMATION: Abdominal pain, increased abdominal girth, CHF.    COMPARISON: 11/15/2017.    CONTRAST/COMPLICATIONS:  IV Contrast: NONE  Oral Contrast: NONE  Complications: None reported at time of study completion    PROCEDURE:  CT of the Abdomen and Pelvis was performed.  Sagittal and coronal reformats were performed.    FINDINGS:  LOWER CHEST: Mild right pleural effusion, dependent lung atelectasis in the right lung base and calcified granulomata. Coronary artery and aortic valve calcification. Cardiomegaly. Prior sternotomy.    LIVER: Micronodular contour of the liver concerning for cirrhosis. Correlate clinically. Scattered hepatic calcifications may represent a combination of vascular calcification and calcified granulomata.  BILE DUCTS: Normal caliber.  GALLBLADDER: Cholelithiasis.  SPLEEN: Within normal limits.  PANCREAS: Within normal limits.  ADRENALS: Within normal limits.  KIDNEYS/URETERS: Small cyst lower pole of the right kidney.    BLADDER: Minimally distended.  REPRODUCTIVE ORGANS: Prostate is enlarged.    BOWEL: No bowel obstruction. Appendix is partially delineated and appears unremarkable.  PERITONEUM: Mild abdominal and pelvic ascites.  VESSELS: Atherosclerotic changes.  RETROPERITONEUM/LYMPH NODES: No lymphadenopathy. Small nonspecific retroperitoneal and pelvic nodes.  ABDOMINAL WALL: Anasarca. Bilateral fat-containing inguinal hernias. Mild bilateral inguinal lymphadenopathy.  BONES: Within normal limits.    IMPRESSION:  Nodular hepatic contour concerning for cirrhosis. Correlate clinically.    Cholelithiasis.    Mild ascites and a mild right pleural effusion.    < end of copied text >  < from: Xray Chest 1 View- PORTABLE-Urgent (04.07.21 @ 02:59) >  EXAM:  XR CHEST PORTABLE URGENT 1V                            PROCEDURE DATE:  04/07/2021          INTERPRETATION:  CLINICAL INDICATION: 70 years  Male with Shortness of Breath.    COMPARISON: 12/5/2015    AP view of the chest demonstrates the lungs to be clear. There is blunting of the right lateral costophrenic angle consistent with chronic pleural reaction, unchanged. There are no pleural effusions.. There is no pneumothorax.    The heart is moderately enlarged. The patient status post sternotomy.. There is no mediastinal or hilar mass.    The pulmonary vasculature is normal.    Mild thoracic degenerative changes are present.    IMPRESSION:    No acute infiltrate. Cardiomegaly.    < end of copied text >  < from: US Duplex Carotid Arteries Complete, Bilateral (04.01.21 @ 15:53) >    EXAM:  US DPLX CAROTIDS COMPL BI                            PROCEDURE DATE:  04/01/2021          INTERPRETATION:  CLINICAL INFORMATION: Dizziness and carotid bruit    COMPARISON: None available.    TECHNIQUE: Grayscale, color and spectral Doppler examination of both carotid arteries was performed.    FINDINGS: Small amount of calcified plaque bilaterally right greater than left    No elevated velocities or abnormal waveforms are encountered.    Peak systolic velocities in centimeter per second are as follows:    RIGHT:  PROX CCA = 46  DIST CCA = 48  PROX ICA = 47  DIST ICA = 47  ECA = 71    LEFT:  PROX CCA = 58  DIST CCA = 54  PROX ICA = 50 to  DIST ICA = 56  ECA = 38    Antegrade flow is noted within both vertebral arteries.    IMPRESSION:No significant hemodynamic stenosis of either carotid artery.    Measurement of carotid stenosis is based on velocity parameters that correlate the residual internal carotid diameter with that of the more distal vessel in accordance with a method such as the [employer] Symptomatic Carotid Endarterectomy Trial (NASCET).    < end of copied text >    Imaging  Reviewed:  YES    Consultant(s) Notes Reviewed:   YES      Plan of care was discussed with patient and /or primary care giver; all questions and concerns were addressed

## 2021-04-12 NOTE — PROGRESS NOTE ADULT - SUBJECTIVE AND OBJECTIVE BOX
CHIEF COMPLAINT:Patient is a 70y old  Male who presents with a chief complaint of Generalized edema .Pt appears comfortabble.    	  REVIEW OF SYSTEMS:  CONSTITUTIONAL: No fever, weight loss, or fatigue  EYES: No eye pain, visual disturbances, or discharge  ENT:  No difficulty hearing, tinnitus, vertigo; No sinus or throat pain  NECK: No pain or stiffness  RESPIRATORY: No cough, wheezing, chills or hemoptysis; No Shortness of Breath  CARDIOVASCULAR: No chest pain, palpitations, passing out, dizziness, or leg swelling  GASTROINTESTINAL: No abdominal or epigastric pain. No nausea, vomiting, or hematemesis; No diarrhea or constipation. No melena or hematochezia.  GENITOURINARY: No dysuria, frequency, hematuria, or incontinence  NEUROLOGICAL: No headaches, memory loss, loss of strength, numbness, or tremors  SKIN: No itching, burning, rashes, or lesions   LYMPH Nodes: No enlarged glands  ENDOCRINE: No heat or cold intolerance; No hair loss  MUSCULOSKELETAL: No joint pain or swelling; No muscle, back, or extremity pain  PSYCHIATRIC: No depression, anxiety, mood swings, or difficulty sleeping  HEME/LYMPH: No easy bruising, or bleeding gums  ALLERGY AND IMMUNOLOGIC: No hives or eczema	        PHYSICAL EXAM:  T(C): 36.7 (04-11-21 @ 20:47), Max: 36.7 (04-11-21 @ 14:21)  HR: 63 (04-12-21 @ 06:53) (63 - 74)  BP: 136/85 (04-12-21 @ 06:53) (132/83 - 142/89)  RR: 19 (04-12-21 @ 06:53) (18 - 19)  SpO2: 97% (04-12-21 @ 06:53) (96% - 97%)        Appearance: Normal	  HEENT:   Normal oral mucosa, PERRL, EOMI	  Lymphatic: No lymphadenopathy  Cardiovascular: Normal S1 S2, No JVD, No murmurs, No edema  Respiratory: Lungs clear to auscultation	  Psychiatry: A & O x 3, Mood & affect appropriate  Gastrointestinal:  Soft, Non-tender, + BS	  Skin: No rashes, No ecchymoses, No cyanosis	  Neurologic: Non-focal  Extremities: Normal range of motion, No clubbing, cyanosis or edema      MEDICATIONS  (STANDING):  amoxicillin  875 milliGRAM(s)/clavulanate 1 Tablet(s) Oral every 12 hours  apixaban 5 milliGRAM(s) Oral every 12 hours  aspirin enteric coated 81 milliGRAM(s) Oral daily  atorvastatin 40 milliGRAM(s) Oral at bedtime  carvedilol 3.125 milliGRAM(s) Oral every 12 hours  collagenase Ointment 1 Application(s) Topical daily  doxycycline hyclate Capsule 100 milliGRAM(s) Oral every 12 hours  furosemide    Tablet 40 milliGRAM(s) Oral daily  insulin lispro (ADMELOG) corrective regimen sliding scale   SubCutaneous three times a day before meals  insulin lispro (ADMELOG) corrective regimen sliding scale   SubCutaneous at bedtime  lisinopril 5 milliGRAM(s) Oral daily  magnesium citrate Oral Solution 1 Bottle Oral once  pantoprazole    Tablet 40 milliGRAM(s) Oral before breakfast     	  	  LABS:	 	                     12.6   5.74  )-----------( 244      ( 11 Apr 2021 07:00 )             41.6     04-11    141  |  107  |  23<H>  ----------------------------<  112<H>  3.7   |  27  |  1.25    Ca    8.3<L>      11 Apr 2021 07:00    TPro  7.6  /  Alb  3.1<L>  /  TBili  0.7  /  DBili  x   /  AST  31  /  ALT  26  /  AlkPhos  161<H>  04-11    proBNP: Serum Pro-Brain Natriuretic Peptide: 5032 pg/mL (04-07 @ 02:15)  Serum Pro-Brain Natriuretic Peptide: 6970 pg/mL (03-25 @ 06:19)    Lipid Profile: Cholesterol 119  LDL --  HDL 24  TG 63    HgA1c:   TSH:

## 2021-04-12 NOTE — PROGRESS NOTE ADULT - SUBJECTIVE AND OBJECTIVE BOX
Patient is a 70y old  Male who presents with a chief complaint of Generalized edema (12 Apr 2021 06:30)  Patient is awake, alert, comfortable in bed in NAD    INTERVAL HPI/OVERNIGHT EVENTS:      VITAL SIGNS:  T(F): 98 (04-11-21 @ 20:47)  HR: 63 (04-12-21 @ 06:53)  BP: 136/85 (04-12-21 @ 06:53)  RR: 19 (04-12-21 @ 06:53)  SpO2: 97% (04-12-21 @ 06:53)  Wt(kg): --  I&O's Detail          REVIEW OF SYSTEMS:    CONSTITUTIONAL:  No fevers, chills, sweats    HEENT:  Eyes:  No diplopia or blurred vision. ENT:  No earache, sore throat or runny nose.    CARDIOVASCULAR:  No pressure, squeezing, tightness, or heaviness about the chest; no palpitations.    RESPIRATORY:  Per HPI    GASTROINTESTINAL:  No abdominal pain, nausea, vomiting or diarrhea.    GENITOURINARY:  No dysuria, frequency or urgency.    NEUROLOGIC:  No paresthesias, fasciculations, seizures or weakness.    PSYCHIATRIC:  No disorder of thought or mood.      PHYSICAL EXAM:    Constitutional: Well developed and nourished  Eyes:Perrla  ENMT: normal  Neck:supple  Respiratory: good air entry  Cardiovascular: S1 S2 regular  Gastrointestinal: Soft, Non tender  Extremities: + edema  Vascular:normal  Neurological:Awake, alert,Ox3  Musculoskeletal:Normal      MEDICATIONS  (STANDING):  amoxicillin  875 milliGRAM(s)/clavulanate 1 Tablet(s) Oral every 12 hours  apixaban 5 milliGRAM(s) Oral every 12 hours  aspirin enteric coated 81 milliGRAM(s) Oral daily  atorvastatin 40 milliGRAM(s) Oral at bedtime  carvedilol 3.125 milliGRAM(s) Oral every 12 hours  collagenase Ointment 1 Application(s) Topical daily  doxycycline hyclate Capsule 100 milliGRAM(s) Oral every 12 hours  furosemide    Tablet 40 milliGRAM(s) Oral daily  insulin lispro (ADMELOG) corrective regimen sliding scale   SubCutaneous three times a day before meals  insulin lispro (ADMELOG) corrective regimen sliding scale   SubCutaneous at bedtime  lisinopril 5 milliGRAM(s) Oral daily  pantoprazole    Tablet 40 milliGRAM(s) Oral before breakfast    MEDICATIONS  (PRN):      Allergies    Aldactone (Unknown)  Bumex (Blisters; Rash)  metoprolol (Unknown)  spironolactone (Unknown)    Intolerances        LABS:                        12.6   5.74  )-----------( 244      ( 11 Apr 2021 07:00 )             41.6     04-11    141  |  107  |  23<H>  ----------------------------<  112<H>  3.7   |  27  |  1.25    Ca    8.3<L>      11 Apr 2021 07:00    TPro  7.6  /  Alb  3.1<L>  /  TBili  0.7  /  DBili  x   /  AST  31  /  ALT  26  /  AlkPhos  161<H>  04-11              CAPILLARY BLOOD GLUCOSE      POCT Blood Glucose.: 133 mg/dL (12 Apr 2021 08:25)  POCT Blood Glucose.: 152 mg/dL (11 Apr 2021 11:22)    pro-bnp 5032 04-07 @ 02:15     d-dimer --  04-07 @ 02:15      RADIOLOGY & ADDITIONAL TESTS:    CXR:    Ct scan chest:    ekg;    echo:

## 2021-04-12 NOTE — PROGRESS NOTE ADULT - SUBJECTIVE AND OBJECTIVE BOX
Patient is a 70y old  Male who presents with a chief complaint of Generalized edema (11 Apr 2021 12:33)    pt seen in icu [  ], reg med floor [ x  ], bed [ x ], chair at bedside [   ], a+o x3 [x  ], lethargic [  ],    nad [ x ]        Allergies    Aldactone (Unknown)  Bumex (Blisters; Rash)  metoprolol (Unknown)  spironolactone (Unknown)        Vitals    T(F): 98 (04-11-21 @ 20:47), Max: 98 (04-11-21 @ 14:21)  HR: 74 (04-11-21 @ 20:47) (65 - 74)  BP: 132/88 (04-11-21 @ 20:47) (132/83 - 142/89)  RR: 18 (04-11-21 @ 20:47) (18 - 18)  SpO2: 97% (04-11-21 @ 20:47) (96% - 97%)  Wt(kg): --  CAPILLARY BLOOD GLUCOSE      POCT Blood Glucose.: 152 mg/dL (11 Apr 2021 11:22)      Labs                          12.6   5.74  )-----------( 244      ( 11 Apr 2021 07:00 )             41.6       04-11    141  |  107  |  23<H>  ----------------------------<  112<H>  3.7   |  27  |  1.25    Ca    8.3<L>      11 Apr 2021 07:00    TPro  7.6  /  Alb  3.1<L>  /  TBili  0.7  /  DBili  x   /  AST  31  /  ALT  26  /  AlkPhos  161<H>  04-11                Radiology Results      Meds    MEDICATIONS  (STANDING):  amoxicillin  875 milliGRAM(s)/clavulanate 1 Tablet(s) Oral every 12 hours  apixaban 5 milliGRAM(s) Oral every 12 hours  aspirin enteric coated 81 milliGRAM(s) Oral daily  atorvastatin 40 milliGRAM(s) Oral at bedtime  carvedilol 3.125 milliGRAM(s) Oral every 12 hours  collagenase Ointment 1 Application(s) Topical daily  doxycycline hyclate Capsule 100 milliGRAM(s) Oral every 12 hours  furosemide    Tablet 40 milliGRAM(s) Oral daily  insulin lispro (ADMELOG) corrective regimen sliding scale   SubCutaneous three times a day before meals  insulin lispro (ADMELOG) corrective regimen sliding scale   SubCutaneous at bedtime  lisinopril 5 milliGRAM(s) Oral daily  pantoprazole    Tablet 40 milliGRAM(s) Oral before breakfast      MEDICATIONS  (PRN):      Physical Exam    Neuro :  no focal deficits  Respiratory: CTA B/L  CV: RRR, S1S2, no murmurs,   Abdominal: Soft, NT, ND +BS,  Extremities: b/l le edema, + peripheral pulses, le ace wrap    ASSESSMENT    acute on chronic systolic chf,   pulm edema,   hyperkalemia,   liver cirrhosis,   ascites   h/o HTN,   HLD,   DM,   CAD s/p CABGx3,   chronic systolic heart failure  pe          PLAN    augmentin until 4/17/21   podiatry cons  cont coreg, lisinopril   cont lasix 40mg bid,   cont aspirin, statin,   cardio f/u  recent echo with lvef 15 -20%, grade 2 dd, pulm htn.   Pt needs to be compliant with  cardiac medication.  pulm f/u  supplemental O2 as needed,   bronchodilator,   s/p lokelma,   serum potassium wnl   ct abdomen with Nodular hepatic contour concerning for cirrhosis. Correlate clinically. Cholelithiasis. Mild ascites and a mild right pleural effusion noted    lft's improving  hgba1c 6.6  pt not on meds  cont current meds

## 2021-04-13 DIAGNOSIS — I27.20 PULMONARY HYPERTENSION, UNSPECIFIED: ICD-10-CM

## 2021-04-13 LAB
GLUCOSE BLDC GLUCOMTR-MCNC: 104 MG/DL — HIGH (ref 70–99)
GLUCOSE BLDC GLUCOMTR-MCNC: 119 MG/DL — HIGH (ref 70–99)
GLUCOSE BLDC GLUCOMTR-MCNC: 124 MG/DL — HIGH (ref 70–99)
GLUCOSE BLDC GLUCOMTR-MCNC: 95 MG/DL — SIGNIFICANT CHANGE UP (ref 70–99)

## 2021-04-13 PROCEDURE — 76705 ECHO EXAM OF ABDOMEN: CPT | Mod: 26

## 2021-04-13 RX ADMIN — APIXABAN 5 MILLIGRAM(S): 2.5 TABLET, FILM COATED ORAL at 17:41

## 2021-04-13 RX ADMIN — Medication 81 MILLIGRAM(S): at 12:15

## 2021-04-13 RX ADMIN — APIXABAN 5 MILLIGRAM(S): 2.5 TABLET, FILM COATED ORAL at 05:11

## 2021-04-13 RX ADMIN — Medication 40 MILLIGRAM(S): at 05:11

## 2021-04-13 RX ADMIN — CARVEDILOL PHOSPHATE 3.12 MILLIGRAM(S): 80 CAPSULE, EXTENDED RELEASE ORAL at 05:11

## 2021-04-13 RX ADMIN — CARVEDILOL PHOSPHATE 3.12 MILLIGRAM(S): 80 CAPSULE, EXTENDED RELEASE ORAL at 17:41

## 2021-04-13 NOTE — PROGRESS NOTE ADULT - SUBJECTIVE AND OBJECTIVE BOX
NP Note discussed with  Primary Attending    Patient is a 70y old  Male who presents with a chief complaint of Generalized edema (13 Apr 2021 14:33)      INTERVAL HPI/OVERNIGHT EVENTS: seen at bedside. pt refused abd. US but now agreeable this afternoon. Pt states feeling uncomfortable to abdomen. denies pain, sob. diarrhea or constipation.     MEDICATIONS  (STANDING):  apixaban 5 milliGRAM(s) Oral every 12 hours  aspirin enteric coated 81 milliGRAM(s) Oral daily  atorvastatin 40 milliGRAM(s) Oral at bedtime  carvedilol 3.125 milliGRAM(s) Oral every 12 hours  collagenase Ointment 1 Application(s) Topical daily  furosemide    Tablet 40 milliGRAM(s) Oral daily  insulin lispro (ADMELOG) corrective regimen sliding scale   SubCutaneous three times a day before meals  insulin lispro (ADMELOG) corrective regimen sliding scale   SubCutaneous at bedtime  lisinopril 5 milliGRAM(s) Oral daily  pantoprazole    Tablet 40 milliGRAM(s) Oral before breakfast    MEDICATIONS  (PRN):      __________________________________________________  REVIEW OF SYSTEMS:    CONSTITUTIONAL: No fever,   EYES: no acute visual disturbances  NECK: No pain or stiffness  RESPIRATORY: No cough; No shortness of breath  CARDIOVASCULAR: No chest pain, no palpitations  GASTROINTESTINAL: No pain. No nausea or vomiting; No diarrhea   NEUROLOGICAL: No headache or numbness, no tremors  MUSCULOSKELETAL: No joint pain, no muscle pain  GENITOURINARY: no dysuria, no frequency, no hesitancy  PSYCHIATRY: no depression , no anxiety  ALL OTHER  ROS negative        Vital Signs Last 24 Hrs  T(C): 36.2 (13 Apr 2021 05:01), Max: 36.2 (13 Apr 2021 05:01)  T(F): 97.1 (13 Apr 2021 05:01), Max: 97.1 (13 Apr 2021 05:01)  HR: 66 (13 Apr 2021 05:01) (66 - 66)  BP: 147/86 (13 Apr 2021 05:01) (147/86 - 147/86)  BP(mean): --  RR: 17 (13 Apr 2021 05:01) (17 - 17)  SpO2: 97% (13 Apr 2021 05:01) (97% - 97%)    ________________________________________________  PHYSICAL EXAM:  GENERAL: NAD  HEENT: Normocephalic;  conjunctivae and sclerae clear; moist mucous membranes;   NECK : supple  CHEST/LUNG: Clear to auscultation bilaterally with good air entry   HEART: S1 S2  regular; no murmurs, gallops or rubs  ABDOMEN: Soft, Nontender, mild distension; Bowel sounds present  EXTREMITIES: no cyanosis; lower leg edema; no calf tenderness  SKIN: warm and dry; no rash, b/l lower leg wound  NERVOUS SYSTEM:  Awake and alert; Oriented  to place, person and time ; no new deficits    _________________________________________________  LABS:              CAPILLARY BLOOD GLUCOSE      POCT Blood Glucose.: 95 mg/dL (13 Apr 2021 16:43)  POCT Blood Glucose.: 124 mg/dL (13 Apr 2021 11:21)  POCT Blood Glucose.: 104 mg/dL (13 Apr 2021 07:24)        RADIOLOGY & ADDITIONAL TESTS:    Imaging  Reviewed:  YES  < from: US Abdomen Limited (04.13.21 @ 14:28) >    EXAM:  US ABDOMEN LIMITED                            PROCEDURE DATE:  04/13/2021          INTERPRETATION:  Indication: Evaluate ascites.    Limited abdominal ultrasound to evaluate ascites.    On the images submitted for review there is a trace ascites in the right upper quadrant. No other ascites or other unusual fluid collection throughout the remainder of the abdominal quadrants.    IMPRESSION: Trace right upper quadrant ascites.            GLADYS SOOD MD; Attending Radiologist  This document has been electronically signed. Apr 13 2021  3:17PM    < end of copied text >    Consultant(s) Notes Reviewed:   YES      Plan of care was discussed with patient and /or primary care giver; all questions and concerns were addressed

## 2021-04-13 NOTE — PROGRESS NOTE ADULT - SUBJECTIVE AND OBJECTIVE BOX
Patient is a 70y old  Male who presents with a chief complaint of Generalized edema (12 Apr 2021 17:36)    pt seen in icu [  ], reg med floor [ x  ], bed [ x ], chair at bedside [   ], a+o x3 [x  ], lethargic [  ],    nad [ x ]      Allergies    Aldactone (Unknown)  Bumex (Blisters; Rash)  metoprolol (Unknown)  spironolactone (Unknown)        Vitals    T(F): 97.1 (04-13-21 @ 05:01), Max: 97.1 (04-13-21 @ 05:01)  HR: 66 (04-13-21 @ 05:01) (63 - 77)  BP: 147/86 (04-13-21 @ 05:01) (136/85 - 166/103)  RR: 17 (04-13-21 @ 05:01) (17 - 19)  SpO2: 97% (04-13-21 @ 05:01) (97% - 98%)  Wt(kg): --  CAPILLARY BLOOD GLUCOSE      POCT Blood Glucose.: 133 mg/dL (12 Apr 2021 08:25)      Labs                          12.6   5.74  )-----------( 244      ( 11 Apr 2021 07:00 )             41.6       04-11    141  |  107  |  23<H>  ----------------------------<  112<H>  3.7   |  27  |  1.25    Ca    8.3<L>      11 Apr 2021 07:00    TPro  7.6  /  Alb  3.1<L>  /  TBili  0.7  /  DBili  x   /  AST  31  /  ALT  26  /  AlkPhos  161<H>  04-11                Radiology Results      Meds    MEDICATIONS  (STANDING):  apixaban 5 milliGRAM(s) Oral every 12 hours  aspirin enteric coated 81 milliGRAM(s) Oral daily  atorvastatin 40 milliGRAM(s) Oral at bedtime  carvedilol 3.125 milliGRAM(s) Oral every 12 hours  collagenase Ointment 1 Application(s) Topical daily  furosemide    Tablet 40 milliGRAM(s) Oral daily  insulin lispro (ADMELOG) corrective regimen sliding scale   SubCutaneous three times a day before meals  insulin lispro (ADMELOG) corrective regimen sliding scale   SubCutaneous at bedtime  lisinopril 5 milliGRAM(s) Oral daily  pantoprazole    Tablet 40 milliGRAM(s) Oral before breakfast      MEDICATIONS  (PRN):      Physical Exam    Neuro :  no focal deficits  Respiratory: CTA B/L  CV: RRR, S1S2, no murmurs,   Abdominal: Soft, NT, ND +BS,  Extremities: b/l le edema, + peripheral pulses, le ace wrap    ASSESSMENT    acute on chronic systolic chf,   pulm edema,   hyperkalemia,   liver cirrhosis,   ascites   h/o HTN,   HLD,   DM,   CAD s/p CABGx3,   chronic systolic heart failure  pe          PLAN    augmentin until 4/17/21   podiatry cons  cont coreg, lisinopril   cont lasix 40mg bid,   cont aspirin, statin,   cardio f/u  recent echo with lvef 15 -20%, grade 2 dd, pulm htn.   Pt needs to be compliant with  cardiac medication.  pulm f/u  supplemental O2 as needed,   bronchodilator,   s/p lokelma,   serum potassium wnl   ct abdomen with Nodular hepatic contour concerning for cirrhosis. Correlate clinically. Cholelithiasis. Mild ascites and a mild right pleural effusion noted    lft's improving  hgba1c 6.6  pt not on meds  cont current meds         Patient is a 70y old  Male who presents with a chief complaint of Generalized edema (12 Apr 2021 17:36)    pt seen in icu [  ], reg med floor [ x  ], bed [ x ], chair at bedside [   ], a+o x3 [x  ], lethargic [  ],    nad [ x ]      Allergies    Aldactone (Unknown)  Bumex (Blisters; Rash)  metoprolol (Unknown)  spironolactone (Unknown)        Vitals    T(F): 97.1 (04-13-21 @ 05:01), Max: 97.1 (04-13-21 @ 05:01)  HR: 66 (04-13-21 @ 05:01) (63 - 77)  BP: 147/86 (04-13-21 @ 05:01) (136/85 - 166/103)  RR: 17 (04-13-21 @ 05:01) (17 - 19)  SpO2: 97% (04-13-21 @ 05:01) (97% - 98%)  Wt(kg): --  CAPILLARY BLOOD GLUCOSE      POCT Blood Glucose.: 133 mg/dL (12 Apr 2021 08:25)      Labs                          12.6   5.74  )-----------( 244      ( 11 Apr 2021 07:00 )             41.6       04-11    141  |  107  |  23<H>  ----------------------------<  112<H>  3.7   |  27  |  1.25    Ca    8.3<L>      11 Apr 2021 07:00    TPro  7.6  /  Alb  3.1<L>  /  TBili  0.7  /  DBili  x   /  AST  31  /  ALT  26  /  AlkPhos  161<H>  04-11                Radiology Results      Meds    MEDICATIONS  (STANDING):  apixaban 5 milliGRAM(s) Oral every 12 hours  aspirin enteric coated 81 milliGRAM(s) Oral daily  atorvastatin 40 milliGRAM(s) Oral at bedtime  carvedilol 3.125 milliGRAM(s) Oral every 12 hours  collagenase Ointment 1 Application(s) Topical daily  furosemide    Tablet 40 milliGRAM(s) Oral daily  insulin lispro (ADMELOG) corrective regimen sliding scale   SubCutaneous three times a day before meals  insulin lispro (ADMELOG) corrective regimen sliding scale   SubCutaneous at bedtime  lisinopril 5 milliGRAM(s) Oral daily  pantoprazole    Tablet 40 milliGRAM(s) Oral before breakfast      MEDICATIONS  (PRN):      Physical Exam    Neuro :  no focal deficits  Respiratory: CTA B/L  CV: RRR, S1S2, no murmurs,   Abdominal: Soft, NT, ND +BS,  Extremities: b/l le edema, + peripheral pulses, le ace wrap    ASSESSMENT    acute on chronic systolic chf,   pulm edema,   hyperkalemia,   liver cirrhosis,   ascites   h/o HTN,   HLD,   DM,   CAD s/p CABGx3,   chronic systolic heart failure  pe          PLAN    augmentin until 4/17/21   podiatry cons  cont coreg, lisinopril   cont lasix 40mg bid,   cont aspirin, statin,   cardio f/u  recent echo with lvef 15 -20%, grade 2 dd, pulm htn.   Pt needs to be compliant with  cardiac medication.  pulm f/u  supplemental O2 as needed,   bronchodilator,   s/p lokelma,   serum potassium wnl   ct abdomen with Nodular hepatic contour concerning for cirrhosis. Correlate clinically. Cholelithiasis. Mild ascites and a mild right pleural effusion noted    lft's improving  hgba1c 6.6  pt not on meds  cont current meds  US abdomen to eval ascites  May need IR eval for therapeutic paracentesis.

## 2021-04-13 NOTE — DIETITIAN INITIAL EVALUATION ADULT. - ADD RECOMMEND
Add MVI/minerals daily as medically feasible Add MVI/minerals daily & MD to restrict fluids as needed as medically feasible

## 2021-04-13 NOTE — DIETITIAN INITIAL EVALUATION ADULT. - PERTINENT MEDS FT
MEDICATIONS  (STANDING):  apixaban 5 milliGRAM(s) Oral every 12 hours  aspirin enteric coated 81 milliGRAM(s) Oral daily  atorvastatin 40 milliGRAM(s) Oral at bedtime  carvedilol 3.125 milliGRAM(s) Oral every 12 hours  collagenase Ointment 1 Application(s) Topical daily  furosemide    Tablet 40 milliGRAM(s) Oral daily  insulin lispro (ADMELOG) corrective regimen sliding scale   SubCutaneous three times a day before meals  insulin lispro (ADMELOG) corrective regimen sliding scale   SubCutaneous at bedtime  lisinopril 5 milliGRAM(s) Oral daily  pantoprazole    Tablet 40 milliGRAM(s) Oral before breakfast    MEDICATIONS  (PRN):

## 2021-04-13 NOTE — DIETITIAN INITIAL EVALUATION ADULT. - FACTORS AFF FOOD INTAKE
heart failure, PE, HTN, noncompliance w/meds/other (specify) general edema, heart failure, PE, HTN, noncompliance w/meds & follow up care, s/p CABG x3/other (specify)

## 2021-04-13 NOTE — DIETITIAN INITIAL EVALUATION ADULT. - PROBLEM/PLAN-3
DISPLAY PLAN FREE TEXT Cryotherapy Text: The wound bed was treated with cryotherapy after the biopsy was performed.

## 2021-04-13 NOTE — PROGRESS NOTE ADULT - SUBJECTIVE AND OBJECTIVE BOX
Patient is a 70y old  Male who presents with a chief complaint of Generalized edema (13 Apr 2021 11:07)  Patient is awake, alert, comfortable in bed in NAD. Having abdominal distention    INTERVAL HPI/OVERNIGHT EVENTS:      VITAL SIGNS:  T(F): 97.1 (04-13-21 @ 05:01)  HR: 66 (04-13-21 @ 05:01)  BP: 147/86 (04-13-21 @ 05:01)  RR: 17 (04-13-21 @ 05:01)  SpO2: 97% (04-13-21 @ 05:01)  Wt(kg): --  I&O's Detail    12 Apr 2021 07:01  -  13 Apr 2021 07:00  --------------------------------------------------------  IN:    Oral Fluid: 720 mL  Total IN: 720 mL    OUT:  Total OUT: 0 mL    Total NET: 720 mL              REVIEW OF SYSTEMS:    CONSTITUTIONAL:  No fevers, chills, sweats    HEENT:  Eyes:  No diplopia or blurred vision. ENT:  No earache, sore throat or runny nose.    CARDIOVASCULAR:  No pressure, squeezing, tightness, or heaviness about the chest; no palpitations.    RESPIRATORY:  Per HPI    GASTROINTESTINAL:  No abdominal pain, nausea, vomiting or diarrhea.    GENITOURINARY:  No dysuria, frequency or urgency.    NEUROLOGIC:  No paresthesias, fasciculations, seizures or weakness.    PSYCHIATRIC:  No disorder of thought or mood.      PHYSICAL EXAM:    Constitutional: Well developed and nourished  Eyes:Perrla  ENMT: normal  Neck:supple  Respiratory: good air entry  Cardiovascular: S1 S2 regular  Gastrointestinal: Soft, Non tender, +distention  Extremities: No edema  Vascular:normal  Neurological:Awake, alert,Ox3  Musculoskeletal:Normal      MEDICATIONS  (STANDING):  apixaban 5 milliGRAM(s) Oral every 12 hours  aspirin enteric coated 81 milliGRAM(s) Oral daily  atorvastatin 40 milliGRAM(s) Oral at bedtime  carvedilol 3.125 milliGRAM(s) Oral every 12 hours  collagenase Ointment 1 Application(s) Topical daily  furosemide    Tablet 40 milliGRAM(s) Oral daily  insulin lispro (ADMELOG) corrective regimen sliding scale   SubCutaneous three times a day before meals  insulin lispro (ADMELOG) corrective regimen sliding scale   SubCutaneous at bedtime  lisinopril 5 milliGRAM(s) Oral daily  pantoprazole    Tablet 40 milliGRAM(s) Oral before breakfast    MEDICATIONS  (PRN):      Allergies    Aldactone (Unknown)  Bumex (Blisters; Rash)  metoprolol (Unknown)  spironolactone (Unknown)    Intolerances        LABS:                    CAPILLARY BLOOD GLUCOSE      POCT Blood Glucose.: 104 mg/dL (13 Apr 2021 07:24)    pro-bnp 5032 04-07 @ 02:15     d-dimer --  04-07 @ 02:15      RADIOLOGY & ADDITIONAL TESTS:  < from: Xray Chest 1 View- PORTABLE-Urgent (04.07.21 @ 02:59) >  IMPRESSION:    No acute infiltrate. Cardiomegaly.      < end of copied text >  < from: CT Abdomen and Pelvis No Cont (04.07.21 @ 10:30) >  FINDINGS:  LOWER CHEST: Mild right pleural effusion, dependent lung atelectasis in the right lung base and calcified granulomata. Coronary artery and aortic valve calcification. Cardiomegaly. Prior sternotomy.      < end of copied text >  < from: CT Abdomen and Pelvis No Cont (04.07.21 @ 10:30) >  IMPRESSION:  Nodular hepatic contour concerning for cirrhosis. Correlate clinically.    Cholelithiasis.    Mild ascites and a mild right pleural effusion.    Additional findings as above.        < end of copied text >    CXR:    Ct scan chest:    ekg;    echo:< from: Transthoracic Echocardiogram (03.25.21 @ 12:35) >  ------------------------------------------------------------------------  CONCLUSIONS:  1. Tethered mitral valve leaflets. Mild to moderate mitral  regurgitation.  2. Calcified trileaflet aortic valve with decreased  opening. Mild aortic stenosis by gradients. The aortic  valve apears more stenotic than indicated by gradients. In  setting of low EF, cannot rule out paradoxical low-flow  low-gradient AS. Consider dobutamine stress echocardiogram  for further assessment if clinically indicated.   Trace  aortic regurgitation.  3. Normal aortic root.  4. Normal left atrium.  5. Mild left ventricular enlargement.  6. Severe global left ventricular systolic dysfunction (EF  15-20% by visual estimation).  7. Grade II diastolic dysfunction.  8. Normal right atrium.  9. Off axis images preclude accurate assessment of right  ventricular size. Reduced RV systolic function (TAPSE 1.0  cm).  10. RV systolic pressure is severely increased at  67 mm  Hg.  11. Normal tricuspid valve. Moderate to severe tricuspid  regurgitation.  12. Pulmonic valve not well seen. Trace pulmonic  insufficiency is noted.  13. No pericardial effusion.  14. Right pleural effusion.      < end of copied text >

## 2021-04-13 NOTE — DIETITIAN INITIAL EVALUATION ADULT. - PERTINENT LABORATORY DATA
04-11 Na141 mmol/L Glu 112 mg/dL<H> K+ 3.7 mmol/L Cr  1.25 mg/dL BUN 23 mg/dL<H>   04-07 Phos 2.6 mg/dL   04-11 Alb 3.1 g/dL<L>     04-01 Chol 119 mg/dL LDL --    HDL 24 mg/dL<L> Trig 63 mg/dL    04-02-21 @ 11:33 HgbA1C 6.6

## 2021-04-13 NOTE — DIETITIAN INITIAL EVALUATION ADULT. - PROBLEM SELECTOR PLAN 3
C/w Augmentin 875 mg every 12 hours and Doxycycline 100 mg every 12 hours - To continue until 4/12/2021  wound care/ nursing dressing instructions:   apply santyl to leg leg wound and cover with 4x4 gauze, allegra and ace to left LE.   Apply plain ACE wrap on RLE.   Change dressing MWF.

## 2021-04-13 NOTE — DIETITIAN INITIAL EVALUATION ADULT. - DIET TYPE
as medically feasible/DASH/TLC (sodium and cholesterol restricted diet)/consistent carbohydrate (evening snack)

## 2021-04-13 NOTE — DIETITIAN INITIAL EVALUATION ADULT. - OTHER INFO
Patient from home. Patient presently homeless & with multiple admissions. Visited pt. alert but "agitated", reports po intake fair to good, proceeded to state "he is not a diabetic & his blood glucose is well controlled", stated he is "aware of what to eat" & fixated on receiving "Regular" diet, refusing therapeutic diet adherence, at times pt. digress to non -nutritional topics, per flow sheet intake >75% of meals, also noted pt. refusing medical tests, Cardio/Pulmonary team consulted, followed by , d/w RN, b/l cellulitis, left/rt. leg edema 4+, skin intact. Patient presently homeless & with multiple admissions. Visited pt. alert but "agitated", reports po intake fair to good, proceeded to state "he is not a diabetic & his blood glucose is well controlled", stated he is "aware of what to eat" & fixated on receiving "Regular" diet, refusing adherence to therapeutic diet, at times pt. digress to non -nutritional topics, per flow sheet intake >75% of meals, also noted pt. refusing medical tests, Cardio/Pulmonary team consulted, followed by , d/w RN, b/l cellulitis, left/rt. leg edema 4+, skin intact.

## 2021-04-13 NOTE — PROGRESS NOTE ADULT - SUBJECTIVE AND OBJECTIVE BOX
CHIEF COMPLAINT:Patient is a 70y old  Male who presents with a chief complaint of Generalized edema.Pt appears comfortable.    	  REVIEW OF SYSTEMS:  CONSTITUTIONAL: No fever, weight loss, or fatigue  EYES: No eye pain, visual disturbances, or discharge  ENT:  No difficulty hearing, tinnitus, vertigo; No sinus or throat pain  NECK: No pain or stiffness  RESPIRATORY: No cough, wheezing, chills or hemoptysis; No Shortness of Breath  CARDIOVASCULAR: No chest pain, palpitations, passing out, dizziness, or leg swelling  GASTROINTESTINAL: No abdominal or epigastric pain. No nausea, vomiting, or hematemesis; No diarrhea or constipation. No melena or hematochezia.  GENITOURINARY: No dysuria, frequency, hematuria, or incontinence  NEUROLOGICAL: No headaches, memory loss, loss of strength, numbness, or tremors  SKIN: No itching, burning, rashes, or lesions   LYMPH Nodes: No enlarged glands  ENDOCRINE: No heat or cold intolerance; No hair loss  MUSCULOSKELETAL: No joint pain or swelling; No muscle, back, or extremity pain  PSYCHIATRIC: No depression, anxiety, mood swings, or difficulty sleeping  HEME/LYMPH: No easy bruising, or bleeding gums  ALLERGY AND IMMUNOLOGIC: No hives or eczema	        PHYSICAL EXAM:  T(C): 36.2 (04-13-21 @ 05:01), Max: 36.2 (04-13-21 @ 05:01)  HR: 66 (04-13-21 @ 05:01) (66 - 77)  BP: 147/86 (04-13-21 @ 05:01) (147/86 - 166/103)  RR: 17 (04-13-21 @ 05:01) (17 - 18)  SpO2: 97% (04-13-21 @ 05:01) (97% - 98%)  Wt(kg): --  I&O's Summary    12 Apr 2021 07:01  -  13 Apr 2021 07:00  --------------------------------------------------------  IN: 720 mL / OUT: 0 mL / NET: 720 mL        Appearance: Normal	  HEENT:   Normal oral mucosa, PERRL, EOMI	  Lymphatic: No lymphadenopathy  Cardiovascular: Normal S1 S2, No JVD, No murmurs, No edema  Respiratory: Lungs clear to auscultation	  Psychiatry: A & O x 3, Mood & affect appropriate  Gastrointestinal:  Soft, Non-tender, + BS	  Skin: No rashes, No ecchymoses, No cyanosis	  Neurologic: Non-focal  Extremities: Normal range of motion, No clubbing, cyanosis or edema      MEDICATIONS  (STANDING):  apixaban 5 milliGRAM(s) Oral every 12 hours  aspirin enteric coated 81 milliGRAM(s) Oral daily  atorvastatin 40 milliGRAM(s) Oral at bedtime  carvedilol 3.125 milliGRAM(s) Oral every 12 hours  collagenase Ointment 1 Application(s) Topical daily  furosemide    Tablet 40 milliGRAM(s) Oral daily  insulin lispro (ADMELOG) corrective regimen sliding scale   SubCutaneous three times a day before meals  insulin lispro (ADMELOG) corrective regimen sliding scale   SubCutaneous at bedtime  lisinopril 5 milliGRAM(s) Oral daily  pantoprazole    Tablet 40 milliGRAM(s) Oral before breakfast        	  LABS:	 	    proBNP: Serum Pro-Brain Natriuretic Peptide: 5032 pg/mL (04-07 @ 02:15)  Serum Pro-Brain Natriuretic Peptide: 6970 pg/mL (03-25 @ 06:19)    Lipid Profile: Cholesterol 119  LDL --  HDL 24  TG 63    HgA1c:   TSH:

## 2021-04-13 NOTE — DIETITIAN INITIAL EVALUATION ADULT. - PROBLEM SELECTOR PLAN 2
Echo 3/2021 showed EF 15-20%, Grade II diastolic dysfunction, severe TR, RV HTN  Currently stable  Continue Lasix, ASA, ACE, BB, Statins  Daily weights  Strict intake and outputs  Cardio Dr. Rhodes consulted

## 2021-04-14 RX ADMIN — APIXABAN 5 MILLIGRAM(S): 2.5 TABLET, FILM COATED ORAL at 06:05

## 2021-04-14 RX ADMIN — CARVEDILOL PHOSPHATE 3.12 MILLIGRAM(S): 80 CAPSULE, EXTENDED RELEASE ORAL at 18:08

## 2021-04-14 RX ADMIN — APIXABAN 5 MILLIGRAM(S): 2.5 TABLET, FILM COATED ORAL at 18:08

## 2021-04-14 RX ADMIN — Medication 81 MILLIGRAM(S): at 13:13

## 2021-04-14 RX ADMIN — CARVEDILOL PHOSPHATE 3.12 MILLIGRAM(S): 80 CAPSULE, EXTENDED RELEASE ORAL at 06:05

## 2021-04-14 RX ADMIN — Medication 40 MILLIGRAM(S): at 06:05

## 2021-04-14 NOTE — PROGRESS NOTE ADULT - SUBJECTIVE AND OBJECTIVE BOX
NP Note discussed with  Primary Attending    Patient is a 70y old  Male who presents with a chief complaint of Generalized edema (14 Apr 2021 11:36)      INTERVAL HPI/OVERNIGHT EVENTS: no new complaints, He refuses to discuss discharge plan. denies any discomfort. feeling comfortable as per patient.     MEDICATIONS  (STANDING):  amoxicillin  875 milliGRAM(s)/clavulanate 1 Tablet(s) Oral every 12 hours  apixaban 5 milliGRAM(s) Oral every 12 hours  aspirin enteric coated 81 milliGRAM(s) Oral daily  atorvastatin 40 milliGRAM(s) Oral at bedtime  carvedilol 3.125 milliGRAM(s) Oral every 12 hours  collagenase Ointment 1 Application(s) Topical daily  furosemide    Tablet 40 milliGRAM(s) Oral daily  insulin lispro (ADMELOG) corrective regimen sliding scale   SubCutaneous three times a day before meals  insulin lispro (ADMELOG) corrective regimen sliding scale   SubCutaneous at bedtime  lisinopril 5 milliGRAM(s) Oral daily  pantoprazole    Tablet 40 milliGRAM(s) Oral before breakfast    MEDICATIONS  (PRN):      __________________________________________________  REVIEW OF SYSTEMS:    CONSTITUTIONAL: No fever, no complaints  EYES: no acute visual disturbances  NECK: No pain or stiffness  RESPIRATORY: No cough; No shortness of breath  CARDIOVASCULAR: No chest pain, no palpitations  GASTROINTESTINAL: No pain. No nausea or vomiting; No diarrhea   NEUROLOGICAL: No headache or numbness, no tremors  MUSCULOSKELETAL: No joint pain, no muscle pain  GENITOURINARY: no dysuria, no frequency, no hesitancy  PSYCHIATRY: no depression , no anxiety  ALL OTHER  ROS negative        Vital Signs Last 24 Hrs  T(C): 36.2 (14 Apr 2021 04:59), Max: 36.4 (13 Apr 2021 20:52)  T(F): 97.2 (14 Apr 2021 04:59), Max: 97.5 (13 Apr 2021 20:52)  HR: 58 (14 Apr 2021 04:59) (58 - 68)  BP: 143/92 (14 Apr 2021 04:59) (143/92 - 150/87)  BP(mean): --  RR: 17 (14 Apr 2021 04:59) (17 - 17)  SpO2: 99% (14 Apr 2021 04:59) (99% - 100%)    ________________________________________________  PHYSICAL EXAM:  GENERAL: NAD, conversant  HEENT: Normocephalic;  conjunctivae and sclerae clear; moist mucous membranes;   NECK : supple  CHEST/LUNG: Clear to auscultation bilaterally with good air entry   HEART: S1 S2  regular; no murmurs, gallops or rubs  ABDOMEN: Soft, Nontender, mildly distended; Bowel sounds present  EXTREMITIES: no cyanosis; no edema; no calf tenderness  SKIN: warm and dry; no rash  NERVOUS SYSTEM:  Awake and alert; Oriented  to place, person and time ; no new deficits    _________________________________________________  LABS:              CAPILLARY BLOOD GLUCOSE      POCT Blood Glucose.: 119 mg/dL (13 Apr 2021 21:35)  POCT Blood Glucose.: 95 mg/dL (13 Apr 2021 16:43)        RADIOLOGY & ADDITIONAL TESTS:    Imaging  Reviewed:  YES      < from: US Abdomen Limited (04.13.21 @ 14:28) >    EXAM:  US ABDOMEN LIMITED                            PROCEDURE DATE:  04/13/2021          INTERPRETATION:  Indication: Evaluate ascites.    Limited abdominal ultrasound to evaluate ascites.    On the images submitted for review there is a trace ascites in the right upper quadrant. No other ascites or other unusual fluid collection throughout the remainder of the abdominal quadrants.    IMPRESSION: Trace right upper quadrant ascites.            GLADYS SOOD MD; Attending Radiologist  This document has been electronically signed. Apr 13 2021  3:17PM    < end of copied text >    < from: CT Abdomen and Pelvis No Cont (04.07.21 @ 10:30) >    EXAM:  CT ABDOMEN AND PELVIS                            PROCEDURE DATE:  04/07/2021          INTERPRETATION:  CLINICAL INFORMATION: Abdominal pain, increased abdominal girth, CHF.    COMPARISON: 11/15/2017.    CONTRAST/COMPLICATIONS:  IV Contrast: NONE  Oral Contrast: NONE  Complications: None reported at time of study completion    PROCEDURE:  CT of the Abdomen and Pelvis was performed.  Sagittal and coronal reformats were performed.    FINDINGS:  LOWER CHEST: Mild right pleural effusion, dependent lung atelectasis in the right lung base and calcified granulomata. Coronary artery and aortic valve calcification. Cardiomegaly. Prior sternotomy.    LIVER: Micronodular contour of the liver concerning for cirrhosis. Correlate clinically. Scattered hepatic calcifications may represent a combination of vascular calcification and calcified granulomata.  BILE DUCTS: Normal caliber.  GALLBLADDER: Cholelithiasis.  SPLEEN: Within normal limits.  PANCREAS: Within normal limits.  ADRENALS: Within normal limits.  KIDNEYS/URETERS: Small cyst lower pole of the right kidney.    BLADDER: Minimally distended.  REPRODUCTIVE ORGANS: Prostate is enlarged.    BOWEL: No bowel obstruction. Appendix is partially delineated and appears unremarkable.  PERITONEUM: Mild abdominal and pelvic ascites.  VESSELS: Atherosclerotic changes.  RETROPERITONEUM/LYMPH NODES: No lymphadenopathy. Small nonspecific retroperitoneal and pelvic nodes.  ABDOMINAL WALL: Anasarca. Bilateral fat-containing inguinal hernias. Mild bilateral inguinal lymphadenopathy.  BONES: Within normal limits.    IMPRESSION:  Nodular hepatic contour concerning for cirrhosis. Correlate clinically.    Cholelithiasis.    Mild ascites and a mild right pleural effusion.    Additional findings as above.              ARTURO HARRY MD; Attending Radiologist  This document has been electronically signed. Apr 7 2021 11:08AM    < end of copied text >    Consultant(s) Notes Reviewed:   YES      Plan of care was discussed with patient and /or primary care giver; all questions and concerns were addressed

## 2021-04-14 NOTE — PROGRESS NOTE ADULT - SUBJECTIVE AND OBJECTIVE BOX
Patient is a 70y old  Male who presents with a chief complaint of Generalized edema (14 Apr 2021 10:51)  Patient is awake, alert, comfortable in bed in NAD    INTERVAL HPI/OVERNIGHT EVENTS:      VITAL SIGNS:  T(F): 97.2 (04-14-21 @ 04:59)  HR: 58 (04-14-21 @ 04:59)  BP: 143/92 (04-14-21 @ 04:59)  RR: 17 (04-14-21 @ 04:59)  SpO2: 99% (04-14-21 @ 04:59)  Wt(kg): --  I&O's Detail          REVIEW OF SYSTEMS:    CONSTITUTIONAL:  No fevers, chills, sweats    HEENT:  Eyes:  No diplopia or blurred vision. ENT:  No earache, sore throat or runny nose.    CARDIOVASCULAR:  No pressure, squeezing, tightness, or heaviness about the chest; no palpitations.    RESPIRATORY:  Per HPI    GASTROINTESTINAL:  No abdominal pain, nausea, vomiting or diarrhea.    GENITOURINARY:  No dysuria, frequency or urgency.    NEUROLOGIC:  No paresthesias, fasciculations, seizures or weakness.    PSYCHIATRIC:  No disorder of thought or mood.      PHYSICAL EXAM:    Constitutional: Well developed and nourished  Eyes:Perrla  ENMT: normal  Neck:supple  Respiratory: good air entry  Cardiovascular: S1 S2 regular  Gastrointestinal: Soft, Non tender  Extremities: No edema  Vascular:normal  Neurological:Awake, alert,Ox3  Musculoskeletal:Normal      MEDICATIONS  (STANDING):  amoxicillin  875 milliGRAM(s)/clavulanate 1 Tablet(s) Oral every 12 hours  apixaban 5 milliGRAM(s) Oral every 12 hours  aspirin enteric coated 81 milliGRAM(s) Oral daily  atorvastatin 40 milliGRAM(s) Oral at bedtime  carvedilol 3.125 milliGRAM(s) Oral every 12 hours  collagenase Ointment 1 Application(s) Topical daily  furosemide    Tablet 40 milliGRAM(s) Oral daily  insulin lispro (ADMELOG) corrective regimen sliding scale   SubCutaneous three times a day before meals  insulin lispro (ADMELOG) corrective regimen sliding scale   SubCutaneous at bedtime  lisinopril 5 milliGRAM(s) Oral daily  pantoprazole    Tablet 40 milliGRAM(s) Oral before breakfast    MEDICATIONS  (PRN):      Allergies    Aldactone (Unknown)  Bumex (Blisters; Rash)  metoprolol (Unknown)  spironolactone (Unknown)    Intolerances        LABS:                    CAPILLARY BLOOD GLUCOSE      POCT Blood Glucose.: 119 mg/dL (13 Apr 2021 21:35)  POCT Blood Glucose.: 95 mg/dL (13 Apr 2021 16:43)        RADIOLOGY & ADDITIONAL TESTS:  u< from: US Abdomen Limited (04.13.21 @ 14:28) >  IMPRESSION: Trace right upper quadrant ascites.    < end of copied text >    CXR:    Ct scan chest:    ekg;    echo:

## 2021-04-14 NOTE — PROGRESS NOTE ADULT - SUBJECTIVE AND OBJECTIVE BOX
CHIEF COMPLAINT:Patient is a 70y old  Male who presents with a chief complaint of Generalized edema.Pt appears comfortable.    	  REVIEW OF SYSTEMS:  CONSTITUTIONAL: No fever, weight loss, or fatigue  EYES: No eye pain, visual disturbances, or discharge  ENT:  No difficulty hearing, tinnitus, vertigo; No sinus or throat pain  NECK: No pain or stiffness  RESPIRATORY: No cough, wheezing, chills or hemoptysis; No Shortness of Breath  CARDIOVASCULAR: No chest pain, palpitations, passing out, dizziness, or leg swelling  GASTROINTESTINAL: No abdominal or epigastric pain. No nausea, vomiting, or hematemesis; No diarrhea or constipation. No melena or hematochezia.  GENITOURINARY: No dysuria, frequency, hematuria, or incontinence  NEUROLOGICAL: No headaches, memory loss, loss of strength, numbness, or tremors  SKIN: No itching, burning, rashes, or lesions   LYMPH Nodes: No enlarged glands  ENDOCRINE: No heat or cold intolerance; No hair loss  MUSCULOSKELETAL: No joint pain or swelling; No muscle, back, or extremity pain  PSYCHIATRIC: No depression, anxiety, mood swings, or difficulty sleeping  HEME/LYMPH: No easy bruising, or bleeding gums  ALLERGY AND IMMUNOLOGIC: No hives or eczema	        PHYSICAL EXAM:  T(C): 36.2 (04-14-21 @ 04:59), Max: 36.4 (04-13-21 @ 20:52)  HR: 58 (04-14-21 @ 04:59) (58 - 68)  BP: 143/92 (04-14-21 @ 04:59) (143/92 - 150/87)  RR: 17 (04-14-21 @ 04:59) (17 - 17)  SpO2: 99% (04-14-21 @ 04:59) (99% - 100%)        Appearance: Normal	  HEENT:   Normal oral mucosa, PERRL, EOMI	  Lymphatic: No lymphadenopathy  Cardiovascular: Normal S1 S2, No JVD, No murmurs, No edema  Respiratory: Lungs clear to auscultation	  Psychiatry: A & O x 3, Mood & affect appropriate  Gastrointestinal:  Soft, Non-tender, + BS	  Skin: No rashes, No ecchymoses, No cyanosis	  Neurologic: Non-focal  Extremities: Normal range of motion, No clubbing, cyanosis or edema  Vascular: Peripheral pulses palpable 2+ bilaterally    MEDICATIONS  (STANDING):  amoxicillin  875 milliGRAM(s)/clavulanate 1 Tablet(s) Oral every 12 hours  apixaban 5 milliGRAM(s) Oral every 12 hours  aspirin enteric coated 81 milliGRAM(s) Oral daily  atorvastatin 40 milliGRAM(s) Oral at bedtime  carvedilol 3.125 milliGRAM(s) Oral every 12 hours  collagenase Ointment 1 Application(s) Topical daily  furosemide    Tablet 40 milliGRAM(s) Oral daily  insulin lispro (ADMELOG) corrective regimen sliding scale   SubCutaneous three times a day before meals  insulin lispro (ADMELOG) corrective regimen sliding scale   SubCutaneous at bedtime  lisinopril 5 milliGRAM(s) Oral daily  pantoprazole    Tablet 40 milliGRAM(s) Oral before breakfast    	  	  LABS:	 	      proBNP: Serum Pro-Brain Natriuretic Peptide: 5032 pg/mL (04-07 @ 02:15)  Serum Pro-Brain Natriuretic Peptide: 6970 pg/mL (03-25 @ 06:19)    Lipid Profile: Cholesterol 119  HDL 24  TG 63

## 2021-04-14 NOTE — PROGRESS NOTE ADULT - SUBJECTIVE AND OBJECTIVE BOX
Patient is a 70y old  Male who presents with a chief complaint of Generalized edema (13 Apr 2021 17:44)    pt seen in icu [  ], reg med floor [ x  ], bed [ x ], chair at bedside [   ], a+o x3 [x  ], lethargic [  ],    nad [ x ]        Allergies    Aldactone (Unknown)  Bumex (Blisters; Rash)  metoprolol (Unknown)  spironolactone (Unknown)        Vitals    T(F): 97.2 (04-14-21 @ 04:59), Max: 97.5 (04-13-21 @ 20:52)  HR: 58 (04-14-21 @ 04:59) (58 - 68)  BP: 143/92 (04-14-21 @ 04:59) (143/92 - 150/87)  RR: 17 (04-14-21 @ 04:59) (17 - 17)  SpO2: 99% (04-14-21 @ 04:59) (99% - 100%)  Wt(kg): --  CAPILLARY BLOOD GLUCOSE      POCT Blood Glucose.: 119 mg/dL (13 Apr 2021 21:35)      Labs                          Radiology Results      Meds    MEDICATIONS  (STANDING):  amoxicillin  875 milliGRAM(s)/clavulanate 1 Tablet(s) Oral every 12 hours  apixaban 5 milliGRAM(s) Oral every 12 hours  aspirin enteric coated 81 milliGRAM(s) Oral daily  atorvastatin 40 milliGRAM(s) Oral at bedtime  carvedilol 3.125 milliGRAM(s) Oral every 12 hours  collagenase Ointment 1 Application(s) Topical daily  furosemide    Tablet 40 milliGRAM(s) Oral daily  insulin lispro (ADMELOG) corrective regimen sliding scale   SubCutaneous three times a day before meals  insulin lispro (ADMELOG) corrective regimen sliding scale   SubCutaneous at bedtime  lisinopril 5 milliGRAM(s) Oral daily  pantoprazole    Tablet 40 milliGRAM(s) Oral before breakfast      MEDICATIONS  (PRN):      Physical Exam    Neuro :  no focal deficits  Respiratory: CTA B/L  CV: RRR, S1S2, no murmurs,   Abdominal: Soft, NT, ND +BS,  Extremities: b/l le edema, + peripheral pulses, le ace wrap    ASSESSMENT    acute on chronic systolic chf,   pulm edema,   hyperkalemia,   liver cirrhosis,   ascites   h/o HTN,   HLD,   DM,   CAD s/p CABGx3,   chronic systolic heart failure  pe          PLAN    augmentin until 4/17/21   podiatry cons  cont coreg, lisinopril   cont lasix 40mg bid,   cont aspirin, statin,   cardio f/u  recent echo with lvef 15 -20%, grade 2 dd, pulm htn.   Pt needs to be compliant with  cardiac medication.  pulm f/u  supplemental O2 as needed,   bronchodilator,   s/p lokelma,   serum potassium wnl   ct abdomen with Nodular hepatic contour concerning for cirrhosis. Correlate clinically. Cholelithiasis. Mild ascites and a mild right pleural effusion noted    lft's improving  hgba1c 6.6  pt not on meds  cont current meds  US abdomen to eval ascites  May need IR eval for therapeutic paracentesis.          Patient is a 70y old  Male who presents with a chief complaint of Generalized edema (13 Apr 2021 17:44)    pt seen in icu [  ], reg med floor [ x  ], bed [ x ], chair at bedside [   ], a+o x3 [x  ], lethargic [  ],    nad [ x ]        Allergies    Aldactone (Unknown)  Bumex (Blisters; Rash)  metoprolol (Unknown)  spironolactone (Unknown)        Vitals    T(F): 97.2 (04-14-21 @ 04:59), Max: 97.5 (04-13-21 @ 20:52)  HR: 58 (04-14-21 @ 04:59) (58 - 68)  BP: 143/92 (04-14-21 @ 04:59) (143/92 - 150/87)  RR: 17 (04-14-21 @ 04:59) (17 - 17)  SpO2: 99% (04-14-21 @ 04:59) (99% - 100%)  Wt(kg): --  CAPILLARY BLOOD GLUCOSE      POCT Blood Glucose.: 119 mg/dL (13 Apr 2021 21:35)      Labs        Radiology Results      < from: US Abdomen Limited (04.13.21 @ 14:28) >  On the images submitted for review there is a trace ascites in the right upper quadrant. No other ascites or other unusual fluid collection throughout the remainder of the abdominal quadrants.    IMPRESSION: Trace right upper quadrant ascites.    < end of copied text >      Meds    MEDICATIONS  (STANDING):  amoxicillin  875 milliGRAM(s)/clavulanate 1 Tablet(s) Oral every 12 hours  apixaban 5 milliGRAM(s) Oral every 12 hours  aspirin enteric coated 81 milliGRAM(s) Oral daily  atorvastatin 40 milliGRAM(s) Oral at bedtime  carvedilol 3.125 milliGRAM(s) Oral every 12 hours  collagenase Ointment 1 Application(s) Topical daily  furosemide    Tablet 40 milliGRAM(s) Oral daily  insulin lispro (ADMELOG) corrective regimen sliding scale   SubCutaneous three times a day before meals  insulin lispro (ADMELOG) corrective regimen sliding scale   SubCutaneous at bedtime  lisinopril 5 milliGRAM(s) Oral daily  pantoprazole    Tablet 40 milliGRAM(s) Oral before breakfast      MEDICATIONS  (PRN):      Physical Exam    Neuro :  no focal deficits  Respiratory: CTA B/L  CV: RRR, S1S2, no murmurs,   Abdominal: Soft, NT, ND +BS,  Extremities: b/l le edema, + peripheral pulses, le ace wrap    ASSESSMENT    acute on chronic systolic chf,   pulm edema,   hyperkalemia,   liver cirrhosis,   ascites   h/o HTN,   HLD,   DM,   CAD s/p CABGx3,   chronic systolic heart failure  pe          PLAN    augmentin until 4/17/21   podiatry cons  cont coreg, lisinopril   cont lasix 40mg bid,   cont aspirin, statin,   cardio f/u  recent echo with lvef 15 -20%, grade 2 dd, pulm htn.   Pt needs to be compliant with  cardiac medication.  pulm f/u  supplemental O2 as needed,   bronchodilator,   s/p lokelma,   serum potassium wnl   ct abdomen with Nodular hepatic contour concerning for cirrhosis. Correlate clinically. Cholelithiasis. Mild ascites and a mild right pleural effusion noted    lft's improving  hgba1c 6.6  pt not on meds for dm   US abdomen to eval ascites with  trace ascites in the right upper quadrant. No other ascites or other unusual fluid collection throughout the remainder of the abdominal quadrants noted above.  no need for paracentesis.   cont current meds  d/c planning

## 2021-04-15 PROCEDURE — 74019 RADEX ABDOMEN 2 VIEWS: CPT | Mod: 26

## 2021-04-15 RX ORDER — FUROSEMIDE 40 MG
1 TABLET ORAL
Qty: 0 | Refills: 0 | DISCHARGE
Start: 2021-04-15

## 2021-04-15 RX ADMIN — APIXABAN 5 MILLIGRAM(S): 2.5 TABLET, FILM COATED ORAL at 05:36

## 2021-04-15 RX ADMIN — CARVEDILOL PHOSPHATE 3.12 MILLIGRAM(S): 80 CAPSULE, EXTENDED RELEASE ORAL at 05:36

## 2021-04-15 RX ADMIN — APIXABAN 5 MILLIGRAM(S): 2.5 TABLET, FILM COATED ORAL at 18:38

## 2021-04-15 RX ADMIN — Medication 81 MILLIGRAM(S): at 12:40

## 2021-04-15 RX ADMIN — CARVEDILOL PHOSPHATE 3.12 MILLIGRAM(S): 80 CAPSULE, EXTENDED RELEASE ORAL at 18:37

## 2021-04-15 RX ADMIN — Medication 40 MILLIGRAM(S): at 05:36

## 2021-04-15 NOTE — PROGRESS NOTE ADULT - SUBJECTIVE AND OBJECTIVE BOX
CHIEF COMPLAINT:Patient is a 70y old  Male who presents with a chief complaint of Generalized edema.Pt appears comfortable.    	  REVIEW OF SYSTEMS:  CONSTITUTIONAL: No fever, weight loss, or fatigue  EYES: No eye pain, visual disturbances, or discharge  ENT:  No difficulty hearing, tinnitus, vertigo; No sinus or throat pain  NECK: No pain or stiffness  RESPIRATORY: No cough, wheezing, chills or hemoptysis; No Shortness of Breath  CARDIOVASCULAR: No chest pain, palpitations, passing out, dizziness, or leg swelling  GASTROINTESTINAL: No abdominal or epigastric pain. No nausea, vomiting, or hematemesis; No diarrhea or constipation. No melena or hematochezia.  GENITOURINARY: No dysuria, frequency, hematuria, or incontinence  NEUROLOGICAL: No headaches, memory loss, loss of strength, numbness, or tremors  SKIN: No itching, burning, rashes, or lesions   LYMPH Nodes: No enlarged glands  ENDOCRINE: No heat or cold intolerance; No hair loss  MUSCULOSKELETAL: No joint pain or swelling; No muscle, back, or extremity pain  PSYCHIATRIC: No depression, anxiety, mood swings, or difficulty sleeping  HEME/LYMPH: No easy bruising, or bleeding gums  ALLERGY AND IMMUNOLOGIC: No hives or eczema	      PHYSICAL EXAM:  T(C): 36.5 (04-15-21 @ 05:10), Max: 36.5 (04-14-21 @ 20:29)  HR: 64 (04-15-21 @ 05:10) (64 - 69)  BP: 138/79 (04-15-21 @ 05:10) (122/71 - 138/79)  RR: 17 (04-15-21 @ 05:10) (17 - 17)  SpO2: 99% (04-15-21 @ 05:10) (96% - 99%)      14 Apr 2021 07:01  -  15 Apr 2021 07:00  --------------------------------------------------------  IN: 0 mL / OUT: 800 mL / NET: -800 mL        Appearance: Normal	  HEENT:   Normal oral mucosa, PERRL, EOMI	  Lymphatic: No lymphadenopathy  Cardiovascular: Normal S1 S2, No JVD, No murmurs, No edema  Respiratory: Lungs clear to auscultation	  Psychiatry: A & O x 3, Mood & affect appropriate  Gastrointestinal:  Soft, Non-tender, + BS	  Skin: No rashes, No ecchymoses, No cyanosis	  Neurologic: Non-focal  Extremities: Normal range of motion, No clubbing, cyanosis or edema  Vascular: Peripheral pulses palpable 2+ bilaterally    MEDICATIONS  (STANDING):  amoxicillin  875 milliGRAM(s)/clavulanate 1 Tablet(s) Oral every 12 hours  apixaban 5 milliGRAM(s) Oral every 12 hours  aspirin enteric coated 81 milliGRAM(s) Oral daily  atorvastatin 40 milliGRAM(s) Oral at bedtime  carvedilol 3.125 milliGRAM(s) Oral every 12 hours  collagenase Ointment 1 Application(s) Topical daily  furosemide    Tablet 40 milliGRAM(s) Oral daily  insulin lispro (ADMELOG) corrective regimen sliding scale   SubCutaneous three times a day before meals  insulin lispro (ADMELOG) corrective regimen sliding scale   SubCutaneous at bedtime  lisinopril 5 milliGRAM(s) Oral daily  pantoprazole    Tablet 40 milliGRAM(s) Oral before breakfast      	  	  LABS:	 	      proBNP: Serum Pro-Brain Natriuretic Peptide: 5032 pg/mL (04-07 @ 02:15)  Serum Pro-Brain Natriuretic Peptide: 6970 pg/mL (03-25 @ 06:19)    Lipid Profile: Cholesterol 119  LDL --  HDL 24  TG 63    HgA1c:   TSH:

## 2021-04-15 NOTE — PROGRESS NOTE ADULT - SUBJECTIVE AND OBJECTIVE BOX
NP Note discussed with  Primary Attending    Patient is a 70y old  Male who presents with a chief complaint of Generalized edema (15 Apr 2021 11:56)      INTERVAL HPI/OVERNIGHT EVENTS: seen at the bedside. refusing to go to shelter. considering AL or NH as discussed with attending at bedside. pt concerns about abdominal distention, uncomfortable. BM controlled per patient. denies pain.     MEDICATIONS  (STANDING):  amoxicillin  875 milliGRAM(s)/clavulanate 1 Tablet(s) Oral every 12 hours  apixaban 5 milliGRAM(s) Oral every 12 hours  aspirin enteric coated 81 milliGRAM(s) Oral daily  atorvastatin 40 milliGRAM(s) Oral at bedtime  carvedilol 3.125 milliGRAM(s) Oral every 12 hours  collagenase Ointment 1 Application(s) Topical daily  furosemide    Tablet 40 milliGRAM(s) Oral daily  insulin lispro (ADMELOG) corrective regimen sliding scale   SubCutaneous three times a day before meals  insulin lispro (ADMELOG) corrective regimen sliding scale   SubCutaneous at bedtime  lisinopril 5 milliGRAM(s) Oral daily  pantoprazole    Tablet 40 milliGRAM(s) Oral before breakfast    MEDICATIONS  (PRN):      __________________________________________________  REVIEW OF SYSTEMS:    CONSTITUTIONAL: No fever,   EYES: no acute visual disturbances  NECK: No pain or stiffness  RESPIRATORY: No cough; No shortness of breath  CARDIOVASCULAR: No chest pain, no palpitations  GASTROINTESTINAL: No pain. No nausea or vomiting; No diarrhea    NEUROLOGICAL: No headache or numbness, no tremors  MUSCULOSKELETAL: No joint pain, no muscle pain  GENITOURINARY: no dysuria, no frequency, no hesitancy  PSYCHIATRY: no depression , no anxiety  ALL OTHER  ROS negative        Vital Signs Last 24 Hrs  T(C): 36.6 (15 Apr 2021 13:30), Max: 36.6 (15 Apr 2021 13:30)  T(F): 97.9 (15 Apr 2021 13:30), Max: 97.9 (15 Apr 2021 13:30)  HR: 65 (15 Apr 2021 13:30) (64 - 69)  BP: 132/89 (15 Apr 2021 13:30) (122/71 - 138/79)  BP(mean): --  RR: 18 (15 Apr 2021 13:30) (17 - 18)  SpO2: 100% (15 Apr 2021 13:30) (96% - 100%)    ________________________________________________  PHYSICAL EXAM:  GENERAL: NAD  HEENT: Normocephalic;  conjunctivae and sclerae clear; moist mucous membranes;   NECK : supple  CHEST/LUNG: Clear to auscultation bilaterally with good air entry   HEART: S1 S2  regular; no murmurs, gallops or rubs  ABDOMEN: Soft, Nontender, mild distended; Bowel sounds present  EXTREMITIES: no cyanosis; BLE trace edema; no calf tenderness  SKIN: warm and dry; b/l LE redness/swelling with wound dressing c/d/i  NERVOUS SYSTEM:  Awake and alert; Oriented  to place, person and time ; no new deficits    _________________________________________________  LABS:              CAPILLARY BLOOD GLUCOSE            RADIOLOGY & ADDITIONAL TESTS:    Imaging  Reviewed:  YES      < from: Xray Abdomen 2 View PORTABLE -Urgent (Xray Abdomen 2 View PORTABLE -Urgent .) (04.15.21 @ 10:35) >    EXAM:  XR ABDOMEN PORTABLE URGENT 2V                            PROCEDURE DATE:  04/15/2021          INTERPRETATION:  Abdomen 2 views    HISTORY: Abdominal distention    COMPARISON: 4/7/2021.    Supine and upright views of the abdomen show less distention of the sigmoid colon. Gastric air bubble is not enlarged. There is no evidence of free air.    IMPRESSION: Decreased bowel dilatation.    Thank you for this referral.            CHINTAN AMAYA MD; Attending Interventional Radiologist  This document has been electronically signed. Apr 15 2021  4:31PM    < end of copied text >  < from: US Abdomen Limited (04.13.21 @ 14:28) >    EXAM:  US ABDOMEN LIMITED                            PROCEDURE DATE:  04/13/2021          INTERPRETATION:  Indication: Evaluate ascites.    Limited abdominal ultrasound to evaluate ascites.    On the images submitted for review there is a trace ascites in the right upper quadrant. No other ascites or other unusual fluid collection throughout the remainder of the abdominal quadrants.    IMPRESSION: Trace right upper quadrant ascites.            GLADYS SOOD MD; Attending Radiologist  This document has been electronically signed. Apr 13 2021  3:17PM    < end of copied text >    Consultant(s) Notes Reviewed:   YES      Plan of care was discussed with patient and /or primary care giver; all questions and concerns were addressed

## 2021-04-15 NOTE — PROGRESS NOTE ADULT - SUBJECTIVE AND OBJECTIVE BOX
Patient is a 70y old  Male who presents with a chief complaint of Generalized edema (14 Apr 2021 16:27)    pt seen in icu [  ], reg med floor [ x  ], bed [ x ], chair at bedside [   ], a+o x3 [x  ], lethargic [  ],    nad [ x ]        Allergies    Aldactone (Unknown)  Bumex (Blisters; Rash)  metoprolol (Unknown)  spironolactone (Unknown)        Vitals    T(F): 97.7 (04-15-21 @ 05:10), Max: 97.7 (04-14-21 @ 20:29)  HR: 64 (04-15-21 @ 05:10) (64 - 69)  BP: 138/79 (04-15-21 @ 05:10) (122/71 - 138/79)  RR: 17 (04-15-21 @ 05:10) (17 - 17)  SpO2: 99% (04-15-21 @ 05:10) (96% - 99%)  Wt(kg): --  CAPILLARY BLOOD GLUCOSE      POCT Blood Glucose.: 119 mg/dL (13 Apr 2021 21:35)      Labs                          Radiology Results      Meds    MEDICATIONS  (STANDING):  amoxicillin  875 milliGRAM(s)/clavulanate 1 Tablet(s) Oral every 12 hours  apixaban 5 milliGRAM(s) Oral every 12 hours  aspirin enteric coated 81 milliGRAM(s) Oral daily  atorvastatin 40 milliGRAM(s) Oral at bedtime  carvedilol 3.125 milliGRAM(s) Oral every 12 hours  collagenase Ointment 1 Application(s) Topical daily  furosemide    Tablet 40 milliGRAM(s) Oral daily  insulin lispro (ADMELOG) corrective regimen sliding scale   SubCutaneous three times a day before meals  insulin lispro (ADMELOG) corrective regimen sliding scale   SubCutaneous at bedtime  lisinopril 5 milliGRAM(s) Oral daily  pantoprazole    Tablet 40 milliGRAM(s) Oral before breakfast      MEDICATIONS  (PRN):      Physical Exam    Neuro :  no focal deficits  Respiratory: CTA B/L  CV: RRR, S1S2, no murmurs,   Abdominal: Soft, NT, ND +BS,  Extremities: b/l le edema, + peripheral pulses, le ace wrap    ASSESSMENT    acute on chronic systolic chf,   pulm edema,   hyperkalemia,   liver cirrhosis,   ascites   h/o HTN,   HLD,   DM,   CAD s/p CABGx3,   chronic systolic heart failure  pe          PLAN    augmentin until 4/17/21   podiatry cons  cont coreg, lisinopril   cont lasix 40mg bid,   cont aspirin, statin,   cardio f/u  recent echo with lvef 15 -20%, grade 2 dd, pulm htn.   Pt needs to be compliant with  cardiac medication.  pulm f/u  supplemental O2 as needed,   bronchodilator,   s/p lokelma,   serum potassium wnl   ct abdomen with Nodular hepatic contour concerning for cirrhosis. Correlate clinically. Cholelithiasis. Mild ascites and a mild right pleural effusion noted    lft's improving  hgba1c 6.6  pt not on meds for dm   US abdomen to eval ascites with  trace ascites in the right upper quadrant. No other ascites or other unusual fluid collection throughout the remainder of the abdominal quadrants noted above.  no need for paracentesis.   cont current meds  d/c planning       Patient is a 70y old  Male who presents with a chief complaint of Generalized edema (14 Apr 2021 16:27)    pt seen in icu [  ], reg med floor [ x  ], bed [ x ], chair at bedside [   ], a+o x3 [x  ], lethargic [  ],    nad [ x ]        Allergies    Aldactone (Unknown)  Bumex (Blisters; Rash)  metoprolol (Unknown)  spironolactone (Unknown)        Vitals    T(F): 97.7 (04-15-21 @ 05:10), Max: 97.7 (04-14-21 @ 20:29)  HR: 64 (04-15-21 @ 05:10) (64 - 69)  BP: 138/79 (04-15-21 @ 05:10) (122/71 - 138/79)  RR: 17 (04-15-21 @ 05:10) (17 - 17)  SpO2: 99% (04-15-21 @ 05:10) (96% - 99%)  Wt(kg): --  CAPILLARY BLOOD GLUCOSE      POCT Blood Glucose.: 119 mg/dL (13 Apr 2021 21:35)      Labs                          Radiology Results      Meds    MEDICATIONS  (STANDING):  amoxicillin  875 milliGRAM(s)/clavulanate 1 Tablet(s) Oral every 12 hours  apixaban 5 milliGRAM(s) Oral every 12 hours  aspirin enteric coated 81 milliGRAM(s) Oral daily  atorvastatin 40 milliGRAM(s) Oral at bedtime  carvedilol 3.125 milliGRAM(s) Oral every 12 hours  collagenase Ointment 1 Application(s) Topical daily  furosemide    Tablet 40 milliGRAM(s) Oral daily  insulin lispro (ADMELOG) corrective regimen sliding scale   SubCutaneous three times a day before meals  insulin lispro (ADMELOG) corrective regimen sliding scale   SubCutaneous at bedtime  lisinopril 5 milliGRAM(s) Oral daily  pantoprazole    Tablet 40 milliGRAM(s) Oral before breakfast      MEDICATIONS  (PRN):      Physical Exam    Neuro :  no focal deficits  Respiratory: CTA B/L  CV: RRR, S1S2, no murmurs,   Abdominal: Soft, NT, ND +BS,  Extremities: b/l le edema, + peripheral pulses, le ace wrap    ASSESSMENT    acute on chronic systolic chf,   pulm edema,   s/p hyperkalemia,   liver cirrhosis,   ascites   h/o HTN,   HLD,   DM,   CAD s/p CABGx3,   chronic systolic heart failure  pe          PLAN    augmentin until 4/17/21   podiatry cons  cont coreg, lisinopril   cont lasix 40mg bid,   cont aspirin, statin,   cardio f/u  recent echo with lvef 15 -20%, grade 2 dd, pulm htn.   Pt needs to be compliant with  cardiac medication.  pulm f/u  supplemental O2 as needed,   bronchodilator,   s/p lokelma,   serum potassium wnl   ct abdomen with Nodular hepatic contour concerning for cirrhosis. Correlate clinically. Cholelithiasis. Mild ascites and a mild right pleural effusion noted    lft's improving  hgba1c 6.6  pt not on meds for dm   US abdomen to eval ascites with  trace ascites in the right upper quadrant. No other ascites or other unusual fluid collection throughout the remainder of the abdominal quadrants noted   no need for paracentesis.   cont current meds  d/c planning   pt appealing d/c

## 2021-04-15 NOTE — PROGRESS NOTE ADULT - SUBJECTIVE AND OBJECTIVE BOX
Patient is a 70y old  Male who presents with a chief complaint of Generalized edema (15 Apr 2021 06:39)  Patient is laying in bed in NAD. Feeling well    INTERVAL HPI/OVERNIGHT EVENTS:      VITAL SIGNS:  T(F): 97.7 (04-15-21 @ 05:10)  HR: 64 (04-15-21 @ 05:10)  BP: 138/79 (04-15-21 @ 05:10)  RR: 17 (04-15-21 @ 05:10)  SpO2: 99% (04-15-21 @ 05:10)  Wt(kg): --  I&O's Detail    14 Apr 2021 07:01  -  15 Apr 2021 07:00  --------------------------------------------------------  IN:  Total IN: 0 mL    OUT:    Voided (mL): 800 mL  Total OUT: 800 mL    Total NET: -800 mL              REVIEW OF SYSTEMS:    CONSTITUTIONAL:  No fevers, chills, sweats    HEENT:  Eyes:  No diplopia or blurred vision. ENT:  No earache, sore throat or runny nose.    CARDIOVASCULAR:  No pressure, squeezing, tightness, or heaviness about the chest; no palpitations.    RESPIRATORY:  Per HPI    GASTROINTESTINAL:  No abdominal pain, nausea, vomiting or diarrhea.    GENITOURINARY:  No dysuria, frequency or urgency.    NEUROLOGIC:  No paresthesias, fasciculations, seizures or weakness.    PSYCHIATRIC:  No disorder of thought or mood.      PHYSICAL EXAM:    Constitutional: Well developed and nourished  Eyes:Perrla  ENMT: normal  Neck:supple  Respiratory: good air entry  Cardiovascular: S1 S2 regular  Gastrointestinal: Soft, Non tender  Extremities: No edema  Vascular:normal  Neurological:Awake, alert,Ox3  Musculoskeletal:Normal      MEDICATIONS  (STANDING):  amoxicillin  875 milliGRAM(s)/clavulanate 1 Tablet(s) Oral every 12 hours  apixaban 5 milliGRAM(s) Oral every 12 hours  aspirin enteric coated 81 milliGRAM(s) Oral daily  atorvastatin 40 milliGRAM(s) Oral at bedtime  carvedilol 3.125 milliGRAM(s) Oral every 12 hours  collagenase Ointment 1 Application(s) Topical daily  furosemide    Tablet 40 milliGRAM(s) Oral daily  insulin lispro (ADMELOG) corrective regimen sliding scale   SubCutaneous three times a day before meals  insulin lispro (ADMELOG) corrective regimen sliding scale   SubCutaneous at bedtime  lisinopril 5 milliGRAM(s) Oral daily  pantoprazole    Tablet 40 milliGRAM(s) Oral before breakfast    MEDICATIONS  (PRN):      Allergies    Aldactone (Unknown)  Bumex (Blisters; Rash)  metoprolol (Unknown)  spironolactone (Unknown)    Intolerances        LABS:                    CAPILLARY BLOOD GLUCOSE            RADIOLOGY & ADDITIONAL TESTS:    CXR:  < from: Xray Chest 1 View- PORTABLE-Urgent (04.07.21 @ 02:59) >  IMPRESSION:    No acute infiltrate. Cardiomegaly.        < end of copied text >    Ct scan chest:    ekg;    echo:

## 2021-04-16 RX ORDER — ACETAMINOPHEN 500 MG
650 TABLET ORAL ONCE
Refills: 0 | Status: DISCONTINUED | OUTPATIENT
Start: 2021-04-16 | End: 2021-04-16

## 2021-04-16 RX ORDER — LACTULOSE 10 G/15ML
10 SOLUTION ORAL ONCE
Refills: 0 | Status: COMPLETED | OUTPATIENT
Start: 2021-04-16 | End: 2021-04-16

## 2021-04-16 RX ADMIN — APIXABAN 5 MILLIGRAM(S): 2.5 TABLET, FILM COATED ORAL at 07:00

## 2021-04-16 RX ADMIN — LACTULOSE 10 GRAM(S): 10 SOLUTION ORAL at 11:05

## 2021-04-16 RX ADMIN — CARVEDILOL PHOSPHATE 3.12 MILLIGRAM(S): 80 CAPSULE, EXTENDED RELEASE ORAL at 17:37

## 2021-04-16 RX ADMIN — Medication 40 MILLIGRAM(S): at 07:00

## 2021-04-16 RX ADMIN — APIXABAN 5 MILLIGRAM(S): 2.5 TABLET, FILM COATED ORAL at 17:37

## 2021-04-16 RX ADMIN — CARVEDILOL PHOSPHATE 3.12 MILLIGRAM(S): 80 CAPSULE, EXTENDED RELEASE ORAL at 07:00

## 2021-04-16 NOTE — PROGRESS NOTE ADULT - SUBJECTIVE AND OBJECTIVE BOX
Patient is a 70y old  Male who presents with a chief complaint of Generalized edema (15 Apr 2021 17:04)    pt seen in icu [  ], reg med floor [ x  ], bed [ x ], chair at bedside [   ], a+o x3 [x  ], lethargic [  ],    nad [ x ]      Allergies    Aldactone (Unknown)  Bumex (Blisters; Rash)  metoprolol (Unknown)  spironolactone (Unknown)        Vitals    T(F): 97.9 (04-15-21 @ 13:30), Max: 97.9 (04-15-21 @ 13:30)  HR: 65 (04-15-21 @ 13:30) (65 - 65)  BP: 132/89 (04-15-21 @ 13:30) (132/89 - 132/89)  RR: 18 (04-15-21 @ 13:30) (18 - 18)  SpO2: 100% (04-15-21 @ 13:30) (100% - 100%)  Wt(kg): --  CAPILLARY BLOOD GLUCOSE          Labs                          Radiology Results      Meds    MEDICATIONS  (STANDING):  amoxicillin  875 milliGRAM(s)/clavulanate 1 Tablet(s) Oral every 12 hours  apixaban 5 milliGRAM(s) Oral every 12 hours  aspirin enteric coated 81 milliGRAM(s) Oral daily  atorvastatin 40 milliGRAM(s) Oral at bedtime  carvedilol 3.125 milliGRAM(s) Oral every 12 hours  collagenase Ointment 1 Application(s) Topical daily  furosemide    Tablet 40 milliGRAM(s) Oral daily  insulin lispro (ADMELOG) corrective regimen sliding scale   SubCutaneous three times a day before meals  insulin lispro (ADMELOG) corrective regimen sliding scale   SubCutaneous at bedtime  lisinopril 5 milliGRAM(s) Oral daily  pantoprazole    Tablet 40 milliGRAM(s) Oral before breakfast      MEDICATIONS  (PRN):      Physical Exam    Neuro :  no focal deficits  Respiratory: CTA B/L  CV: RRR, S1S2, no murmurs,   Abdominal: Soft, NT, ND +BS,  Extremities: b/l le edema, + peripheral pulses, le ace wrap    ASSESSMENT    acute on chronic systolic chf,   pulm edema,   s/p hyperkalemia,   liver cirrhosis,   ascites   h/o HTN,   HLD,   DM,   CAD s/p CABGx3,   chronic systolic heart failure  pe          PLAN    augmentin until 4/17/21   podiatry cons  cont coreg, lisinopril   cont lasix 40mg bid,   cont aspirin, statin,   cardio f/u  recent echo with lvef 15 -20%, grade 2 dd, pulm htn.   Pt needs to be compliant with  cardiac medication.  pulm f/u  supplemental O2 as needed,   bronchodilator,   s/p lokelma,   serum potassium wnl   ct abdomen with Nodular hepatic contour concerning for cirrhosis. Correlate clinically. Cholelithiasis. Mild ascites and a mild right pleural effusion noted    lft's improving  hgba1c 6.6  pt not on meds for dm   US abdomen to eval ascites with  trace ascites in the right upper quadrant. No other ascites or other unusual fluid collection throughout the remainder of the abdominal quadrants noted   no need for paracentesis.   cont current meds  d/c planning   pt appealing d/c

## 2021-04-16 NOTE — PROGRESS NOTE ADULT - PROBLEM SELECTOR PLAN 7
-DVT PPX: eliquis, refusing medication
-DVT PPX: eliquis, refusing medication
Pt on Eliquis  c/w home meds
-DVT PPX: eliquis, refusing medication
Pt on Eliquis  c/w home meds
-DVT PPX: eliquis, refusing medication
-DVT PPX: eliquis, refusing medication

## 2021-04-16 NOTE — PROGRESS NOTE ADULT - PROBLEM SELECTOR PLAN 8
-Patient needs to take medication to not go into CHF exacerbation, however patient refuses all medication. Until patient takes medication unable to be discharged.
-Patient needs to take medication to not go into CHF exacerbation, however patient refuses all medication. Until patient takes medication unable to be discharged.  -US shows trace right upper quad ascites today.   -SW consulted as pt is homeless, needs outside resources
-Patient needs to take medication to not go into CHF exacerbation, however patient refuses all medications.    -SW consulted as pt is homeless, refuses shelter, considering NH vs. AL facility as discussed.  -24h notice given- appealed dc - pending decision
-Patient needs to take medication to not go into CHF exacerbation, however patient refuses all medication. Until patient takes medication unable to be discharged.  -SW consulted as pt is homeless, needs outside resources
-Patient needs to take medication to not go into CHF exacerbation, however patient refuses all medications.    -SW consulted as pt is homeless, refuses shelter, considering NH vs. AL facility as discussed.  -24h notice given

## 2021-04-16 NOTE — PROGRESS NOTE ADULT - SUBJECTIVE AND OBJECTIVE BOX
NP Note discussed with  Primary Attending    Patient is a 70y old  Male who presents with a chief complaint of Generalized edema (16 Apr 2021 11:17)      INTERVAL HPI/OVERNIGHT EVENTS: no new complaints    MEDICATIONS  (STANDING):  amoxicillin  875 milliGRAM(s)/clavulanate 1 Tablet(s) Oral every 12 hours  apixaban 5 milliGRAM(s) Oral every 12 hours  aspirin enteric coated 81 milliGRAM(s) Oral daily  atorvastatin 40 milliGRAM(s) Oral at bedtime  carvedilol 3.125 milliGRAM(s) Oral every 12 hours  collagenase Ointment 1 Application(s) Topical daily  furosemide    Tablet 40 milliGRAM(s) Oral daily  insulin lispro (ADMELOG) corrective regimen sliding scale   SubCutaneous three times a day before meals  insulin lispro (ADMELOG) corrective regimen sliding scale   SubCutaneous at bedtime  lisinopril 5 milliGRAM(s) Oral daily  pantoprazole    Tablet 40 milliGRAM(s) Oral before breakfast    MEDICATIONS  (PRN):      __________________________________________________  REVIEW OF SYSTEMS:    CONSTITUTIONAL: No fever,   EYES: no acute visual disturbances  NECK: No pain or stiffness  RESPIRATORY: No cough; No shortness of breath  CARDIOVASCULAR: No chest pain, no palpitations  GASTROINTESTINAL: No pain. No nausea or vomiting; No diarrhea   NEUROLOGICAL: No headache or numbness, no tremors  MUSCULOSKELETAL: No joint pain, no muscle pain  GENITOURINARY: no dysuria, no frequency, no hesitancy  PSYCHIATRY: no depression , no anxiety  ALL OTHER  ROS negative        Vital Signs Last 24 Hrs  T(C): 36.4 (16 Apr 2021 07:00), Max: 36.6 (15 Apr 2021 13:30)  T(F): 97.6 (16 Apr 2021 07:00), Max: 97.9 (15 Apr 2021 13:30)  HR: 69 (16 Apr 2021 07:00) (65 - 74)  BP: 142/87 (16 Apr 2021 07:00) (132/89 - 142/87)  BP(mean): --  RR: 18 (16 Apr 2021 07:00) (18 - 19)  SpO2: 99% (16 Apr 2021 07:00) (99% - 100%)    ________________________________________________  PHYSICAL EXAM:  GENERAL: NAD  HEENT: Normocephalic;  conjunctivae and sclerae clear; moist mucous membranes;   NECK : supple  CHEST/LUNG: Clear to auscultation bilaterally with good air entry   HEART: S1 S2  regular; no murmurs, gallops or rubs  ABDOMEN: Soft, Nontender, Nondistended; Bowel sounds present  EXTREMITIES: no cyanosis; no edema; no calf tenderness  SKIN: warm and dry; no rash  NERVOUS SYSTEM:  Awake and alert; Oriented  to place, person and time ; no new deficits    _________________________________________________  LABS:              CAPILLARY BLOOD GLUCOSE            RADIOLOGY & ADDITIONAL TESTS:    Imaging Personally Reviewed:  YES/NO    Consultant(s) Notes Reviewed:   YES/ No    Care Discussed with Consultants :     Plan of care was discussed with patient and /or primary care giver; all questions and concerns were addressed and care was aligned with patient's wishes.

## 2021-04-16 NOTE — PROGRESS NOTE ADULT - SUBJECTIVE AND OBJECTIVE BOX
CHIEF COMPLAINT:Patient is a 70y old  Male who presents with a chief complaint of Generalized edema.Pt appears comfortable.    	  REVIEW OF SYSTEMS:  CONSTITUTIONAL: No fever, weight loss, or fatigue  EYES: No eye pain, visual disturbances, or discharge  ENT:  No difficulty hearing, tinnitus, vertigo; No sinus or throat pain  NECK: No pain or stiffness  RESPIRATORY: No cough, wheezing, chills or hemoptysis; No Shortness of Breath  CARDIOVASCULAR: No chest pain, palpitations, passing out, dizziness, or leg swelling  GASTROINTESTINAL: No abdominal or epigastric pain. No nausea, vomiting, or hematemesis; No diarrhea or constipation. No melena or hematochezia.  GENITOURINARY: No dysuria, frequency, hematuria, or incontinence  NEUROLOGICAL: No headaches, memory loss, loss of strength, numbness, or tremors  SKIN: No itching, burning, rashes, or lesions   LYMPH Nodes: No enlarged glands  ENDOCRINE: No heat or cold intolerance; No hair loss  MUSCULOSKELETAL: No joint pain or swelling; No muscle, back, or extremity pain  PSYCHIATRIC: No depression, anxiety, mood swings, or difficulty sleeping  HEME/LYMPH: No easy bruising, or bleeding gums  ALLERGY AND IMMUNOLOGIC: No hives or eczema	      PHYSICAL EXAM:  T(C): 36.4 (04-16-21 @ 07:00), Max: 36.6 (04-15-21 @ 13:30)  HR: 69 (04-16-21 @ 07:00) (65 - 74)  BP: 142/87 (04-16-21 @ 07:00) (132/89 - 142/87)  RR: 18 (04-16-21 @ 07:00) (18 - 19)  SpO2: 99% (04-16-21 @ 07:00) (99% - 100%)  Wt(kg): --  I&O's Summary      Appearance: Normal	  HEENT:   Normal oral mucosa, PERRL, EOMI	  Lymphatic: No lymphadenopathy  Cardiovascular: Normal S1 S2, No JVD, No murmurs, No edema  Respiratory: Lungs clear to auscultation	  Psychiatry: A & O x 3, Mood & affect appropriate  Gastrointestinal:  Soft, Non-tender, + BS	  Skin: No rashes, No ecchymoses, No cyanosis	  Neurologic: Non-focal  Extremities: Normal range of motion, No clubbing, cyanosis or edema      MEDICATIONS  (STANDING):  amoxicillin  875 milliGRAM(s)/clavulanate 1 Tablet(s) Oral every 12 hours  apixaban 5 milliGRAM(s) Oral every 12 hours  aspirin enteric coated 81 milliGRAM(s) Oral daily  atorvastatin 40 milliGRAM(s) Oral at bedtime  carvedilol 3.125 milliGRAM(s) Oral every 12 hours  collagenase Ointment 1 Application(s) Topical daily  furosemide    Tablet 40 milliGRAM(s) Oral daily  insulin lispro (ADMELOG) corrective regimen sliding scale   SubCutaneous three times a day before meals  insulin lispro (ADMELOG) corrective regimen sliding scale   SubCutaneous at bedtime  lisinopril 5 milliGRAM(s) Oral daily  pantoprazole    Tablet 40 milliGRAM(s) Oral before breakfast      	  LABS:	 	    proBNP: Serum Pro-Brain Natriuretic Peptide: 5032 pg/mL (04-07 @ 02:15)  Serum Pro-Brain Natriuretic Peptide: 6970 pg/mL (03-25 @ 06:19)    Lipid Profile: Cholesterol 119  LDL --  HDL 24  TG 63    HgA1c:   TSH:

## 2021-04-16 NOTE — PROGRESS NOTE ADULT - SUBJECTIVE AND OBJECTIVE BOX
Patient is a 70y old  Male who presents with a chief complaint of Generalized edema (16 Apr 2021 06:39)  Patient is awake, alert comfortable out of bed in NAD    INTERVAL HPI/OVERNIGHT EVENTS:      VITAL SIGNS:  T(F): 97.6 (04-16-21 @ 07:00)  HR: 69 (04-16-21 @ 07:00)  BP: 142/87 (04-16-21 @ 07:00)  RR: 18 (04-16-21 @ 07:00)  SpO2: 99% (04-16-21 @ 07:00)  Wt(kg): --  I&O's Detail          REVIEW OF SYSTEMS:    CONSTITUTIONAL:  No fevers, chills, sweats    HEENT:  Eyes:  No diplopia or blurred vision. ENT:  No earache, sore throat or runny nose.    CARDIOVASCULAR:  No pressure, squeezing, tightness, or heaviness about the chest; no palpitations.    RESPIRATORY:  Per HPI    GASTROINTESTINAL:  No abdominal pain, nausea, vomiting or diarrhea.    GENITOURINARY:  No dysuria, frequency or urgency.    NEUROLOGIC:  No paresthesias, fasciculations, seizures or weakness.    PSYCHIATRIC:  No disorder of thought or mood.      PHYSICAL EXAM:    Constitutional: Well developed and nourished  Eyes:Perrla  ENMT: normal  Neck:supple  Respiratory: good air entry  Cardiovascular: S1 S2 regular  Gastrointestinal: Soft, Non tender  Extremities: + edema  Vascular:normal  Neurological:Awake, alert,Ox3  Musculoskeletal:Normal      MEDICATIONS  (STANDING):  amoxicillin  875 milliGRAM(s)/clavulanate 1 Tablet(s) Oral every 12 hours  apixaban 5 milliGRAM(s) Oral every 12 hours  aspirin enteric coated 81 milliGRAM(s) Oral daily  atorvastatin 40 milliGRAM(s) Oral at bedtime  carvedilol 3.125 milliGRAM(s) Oral every 12 hours  collagenase Ointment 1 Application(s) Topical daily  furosemide    Tablet 40 milliGRAM(s) Oral daily  insulin lispro (ADMELOG) corrective regimen sliding scale   SubCutaneous three times a day before meals  insulin lispro (ADMELOG) corrective regimen sliding scale   SubCutaneous at bedtime  lisinopril 5 milliGRAM(s) Oral daily  pantoprazole    Tablet 40 milliGRAM(s) Oral before breakfast    MEDICATIONS  (PRN):      Allergies    Aldactone (Unknown)  Bumex (Blisters; Rash)  metoprolol (Unknown)  spironolactone (Unknown)    Intolerances        LABS:                    CAPILLARY BLOOD GLUCOSE            RADIOLOGY & ADDITIONAL TESTS:    CXR:    Ct scan chest:    ekg;    echo:

## 2021-04-17 VITALS
OXYGEN SATURATION: 97 % | DIASTOLIC BLOOD PRESSURE: 89 MMHG | RESPIRATION RATE: 14 BRPM | HEART RATE: 66 BPM | SYSTOLIC BLOOD PRESSURE: 153 MMHG

## 2021-04-17 PROCEDURE — 76705 ECHO EXAM OF ABDOMEN: CPT

## 2021-04-17 PROCEDURE — 74019 RADEX ABDOMEN 2 VIEWS: CPT

## 2021-04-17 PROCEDURE — 99285 EMERGENCY DEPT VISIT HI MDM: CPT

## 2021-04-17 PROCEDURE — 74176 CT ABD & PELVIS W/O CONTRAST: CPT

## 2021-04-17 PROCEDURE — 83735 ASSAY OF MAGNESIUM: CPT

## 2021-04-17 PROCEDURE — 36415 COLL VENOUS BLD VENIPUNCTURE: CPT

## 2021-04-17 PROCEDURE — 81001 URINALYSIS AUTO W/SCOPE: CPT

## 2021-04-17 PROCEDURE — 84484 ASSAY OF TROPONIN QUANT: CPT

## 2021-04-17 PROCEDURE — 84100 ASSAY OF PHOSPHORUS: CPT

## 2021-04-17 PROCEDURE — 71045 X-RAY EXAM CHEST 1 VIEW: CPT

## 2021-04-17 PROCEDURE — 87635 SARS-COV-2 COVID-19 AMP PRB: CPT

## 2021-04-17 PROCEDURE — 82962 GLUCOSE BLOOD TEST: CPT

## 2021-04-17 PROCEDURE — 83690 ASSAY OF LIPASE: CPT

## 2021-04-17 PROCEDURE — 80053 COMPREHEN METABOLIC PANEL: CPT

## 2021-04-17 PROCEDURE — 93005 ELECTROCARDIOGRAM TRACING: CPT

## 2021-04-17 PROCEDURE — 83880 ASSAY OF NATRIURETIC PEPTIDE: CPT

## 2021-04-17 PROCEDURE — 85025 COMPLETE CBC W/AUTO DIFF WBC: CPT

## 2021-04-17 PROCEDURE — 80048 BASIC METABOLIC PNL TOTAL CA: CPT

## 2021-04-17 PROCEDURE — 85027 COMPLETE CBC AUTOMATED: CPT

## 2021-04-17 RX ADMIN — Medication 40 MILLIGRAM(S): at 05:04

## 2021-04-17 RX ADMIN — APIXABAN 5 MILLIGRAM(S): 2.5 TABLET, FILM COATED ORAL at 05:04

## 2021-04-17 RX ADMIN — CARVEDILOL PHOSPHATE 3.12 MILLIGRAM(S): 80 CAPSULE, EXTENDED RELEASE ORAL at 05:04

## 2021-04-17 NOTE — PROGRESS NOTE ADULT - SUBJECTIVE AND OBJECTIVE BOX
Patient is a 70y old  Male who presents with a chief complaint of Generalized edema (16 Apr 2021 12:45)    pt seen in icu [  ], reg med floor [ x  ], bed [ x ], chair at bedside [   ], a+o x3 [x  ], lethargic [  ],    nad [ x ]      Allergies    Aldactone (Unknown)  Bumex (Blisters; Rash)  metoprolol (Unknown)  spironolactone (Unknown)        Vitals    T(F): 97.4 (04-17-21 @ 05:35), Max: 97.6 (04-16-21 @ 07:00)  HR: 62 (04-17-21 @ 05:35) (62 - 74)  BP: 164/100 (04-17-21 @ 05:35) (142/87 - 164/100)  RR: 18 (04-17-21 @ 05:35) (18 - 19)  SpO2: 98% (04-17-21 @ 05:35) (98% - 99%)  Wt(kg): --  CAPILLARY BLOOD GLUCOSE          Labs                          Radiology Results      Meds    MEDICATIONS  (STANDING):  amoxicillin  875 milliGRAM(s)/clavulanate 1 Tablet(s) Oral every 12 hours  apixaban 5 milliGRAM(s) Oral every 12 hours  aspirin enteric coated 81 milliGRAM(s) Oral daily  atorvastatin 40 milliGRAM(s) Oral at bedtime  carvedilol 3.125 milliGRAM(s) Oral every 12 hours  collagenase Ointment 1 Application(s) Topical daily  furosemide    Tablet 40 milliGRAM(s) Oral daily  insulin lispro (ADMELOG) corrective regimen sliding scale   SubCutaneous three times a day before meals  insulin lispro (ADMELOG) corrective regimen sliding scale   SubCutaneous at bedtime  lisinopril 5 milliGRAM(s) Oral daily  pantoprazole    Tablet 40 milliGRAM(s) Oral before breakfast      MEDICATIONS  (PRN):      Physical Exam    Neuro :  no focal deficits  Respiratory: CTA B/L  CV: RRR, S1S2, no murmurs,   Abdominal: Soft, NT, ND +BS,  Extremities: b/l le edema, + peripheral pulses, le ace wrap    ASSESSMENT    acute on chronic systolic chf,   pulm edema,   s/p hyperkalemia,   liver cirrhosis,   ascites   h/o HTN,   HLD,   DM,   CAD s/p CABGx3,   chronic systolic heart failure  pe          PLAN    augmentin until 4/17/21   podiatry cons  cont coreg, lisinopril   cont lasix 40mg bid,   cont aspirin, statin,   cardio f/u  recent echo with lvef 15 -20%, grade 2 dd, pulm htn.   Pt needs to be compliant with  cardiac medication.  pulm f/u  supplemental O2 as needed,   bronchodilator,   s/p lokelma,   serum potassium wnl   ct abdomen with Nodular hepatic contour concerning for cirrhosis. Correlate clinically. Cholelithiasis. Mild ascites and a mild right pleural effusion noted    lft's improving  hgba1c 6.6  pt not on meds for dm   US abdomen to eval ascites with  trace ascites in the right upper quadrant. No other ascites or other unusual fluid collection throughout the remainder of the abdominal quadrants noted   no need for paracentesis.   cont current meds  d/c planning   pt appealing d/c         Patient is a 70y old  Male who presents with a chief complaint of Generalized edema (16 Apr 2021 12:45)    pt seen in icu [  ], reg med floor [ x  ], bed [ x ], chair at bedside [   ], a+o x3 [x  ], lethargic [  ],    nad [ x ]      Allergies    Aldactone (Unknown)  Bumex (Blisters; Rash)  metoprolol (Unknown)  spironolactone (Unknown)        Vitals    T(F): 97.4 (04-17-21 @ 05:35), Max: 97.6 (04-16-21 @ 07:00)  HR: 62 (04-17-21 @ 05:35) (62 - 74)  BP: 164/100 (04-17-21 @ 05:35) (142/87 - 164/100)  RR: 18 (04-17-21 @ 05:35) (18 - 19)  SpO2: 98% (04-17-21 @ 05:35) (98% - 99%)  Wt(kg): --  CAPILLARY BLOOD GLUCOSE          Labs        Radiology Results      Meds    MEDICATIONS  (STANDING):  amoxicillin  875 milliGRAM(s)/clavulanate 1 Tablet(s) Oral every 12 hours  apixaban 5 milliGRAM(s) Oral every 12 hours  aspirin enteric coated 81 milliGRAM(s) Oral daily  atorvastatin 40 milliGRAM(s) Oral at bedtime  carvedilol 3.125 milliGRAM(s) Oral every 12 hours  collagenase Ointment 1 Application(s) Topical daily  furosemide    Tablet 40 milliGRAM(s) Oral daily  insulin lispro (ADMELOG) corrective regimen sliding scale   SubCutaneous three times a day before meals  insulin lispro (ADMELOG) corrective regimen sliding scale   SubCutaneous at bedtime  lisinopril 5 milliGRAM(s) Oral daily  pantoprazole    Tablet 40 milliGRAM(s) Oral before breakfast      MEDICATIONS  (PRN):      Physical Exam    Neuro :  no focal deficits  Respiratory: CTA B/L  CV: RRR, S1S2, no murmurs,   Abdominal: Soft, NT, ND +BS,  Extremities: b/l le edema, + peripheral pulses, le ace wrap    ASSESSMENT    acute on chronic systolic chf,   pulm edema,   s/p hyperkalemia,   liver cirrhosis,   ascites   h/o HTN,   HLD,   DM,   CAD s/p CABGx3,   chronic systolic heart failure  pe          PLAN    augmentin until 4/17/21   podiatry cons  cont coreg, lisinopril   cont lasix 40mg bid,   cont aspirin, statin,   cardio f/u  recent echo with lvef 15 -20%, grade 2 dd, pulm htn.   Pt needs to be compliant with  cardiac medication.  pulm f/u  supplemental O2 as needed,   bronchodilator,   s/p lokelma,   serum potassium wnl   ct abdomen with Nodular hepatic contour concerning for cirrhosis. Correlate clinically. Cholelithiasis. Mild ascites and a mild right pleural effusion noted    lft's improving  hgba1c 6.6  pt not on meds for dm   US abdomen to eval ascites with  trace ascites in the right upper quadrant. No other ascites or other unusual fluid collection throughout the remainder of the abdominal quadrants noted   no need for paracentesis.   cont current meds  pt lost appeal   pt for d/c

## 2021-04-17 NOTE — PROGRESS NOTE ADULT - PROBLEM SELECTOR PLAN 6
Accuchecks with reg insulin coverage  HBA1C
Continue BB, ACE  Monitor BP.  -patient refuse medication
Accuchecks with reg insulin coverage  HBA1C
Continue BB, ACE  Monitor BP.  -patient refuse medication
Continue BB, ACE  Monitor BP.
Continue BB, ACE  Monitor BP.
Accuchecks with reg insulin coverage  HBA1C
Continue BB, ACE  Monitor BP.  -patient refuse medication
Accuchecks with reg insulin coverage  HBA1C
Accuchecks with reg insulin coverage  HBA1C
Continue BB, ACE  Monitor BP.  -patient refuse medication
Continue BB, ACE  Monitor BP.  -patient refuse medication

## 2021-04-17 NOTE — PROGRESS NOTE ADULT - PROBLEM SELECTOR PLAN 4
monitor BP  cont meds
Pt on Eliquis   c/w home medications
Pt on Eliquis   c/w home medications  -patient refuse medication
wound care  surgical follow up  cont ACE wrap
Pt on Eliquis   c/w home medications
monitor BP  cont meds
wound care  surgical follow up  cont ACE wrap
monitor BP  cont meds
monitor BP  cont meds
wound care  surgical follow up  cont ACE wrap
wound care  surgical follow up  cont ACE wrap
Pt on Eliquis   c/w home medications  -patient refuse medication
wound care  surgical follow up  cont ACE wrap
monitor BP  cont meds
Pt on Eliquis   c/w home medications  -patient refuse medication

## 2021-04-17 NOTE — PROGRESS NOTE ADULT - PROBLEM SELECTOR PROBLEM 6
DM (diabetes mellitus)
HTN (hypertension)
DM (diabetes mellitus)
HTN (hypertension)
DM (diabetes mellitus)
HTN (hypertension)
HTN (hypertension)

## 2021-04-17 NOTE — PROGRESS NOTE ADULT - PROBLEM SELECTOR PROBLEM 3
Leg wound, left
Pulmonary HTN
Leg wound, left
Pulmonary HTN
Leg wound, left
Pulmonary HTN
Leg wound, left
Pulmonary HTN
Leg wound, left
Pulmonary HTN
Leg wound, left

## 2021-04-17 NOTE — PROGRESS NOTE ADULT - PROBLEM SELECTOR PLAN 1
-Echo 3/2021 showed EF 15-20%, Grade II diastolic dysfunction, severe TR, RV HTN  -Currently stable  -Continue Lasix, ASA, ACE, BB, Statins  -Daily weights  -Strict intake and outputs  -pt not compliant with home medications  -patient refusing medication  -Cardio Dr. Rhodes
Cont meds  Cardio follow up  Abdominal sonogram showed minimal ascites not amenable to tap
Cont meds  Cardio follow up
Cont meds  Cardio follow up  Abdominal sonogram showed minimal ascites not amenable to tap
Cont meds  Cardio follow up
Cont meds  Cardio follow up
Echo 3/2021 showed EF 15-20%, Grade II diastolic dysfunction, severe TR, RV HTN  Currently stable  Continue Lasix, ASA, ACE, BB, Statins  Daily weights  Strict intake and outputs  pt not compliant with home medications  Cardio Dr. Rhodes consulted.
-Echo 3/2021 showed EF 15-20%, Grade II diastolic dysfunction, severe TR, RV HTN  -Currently stable  -Continue Lasix, ASA, ACE, BB, Statins  -Daily weights  -Strict intake and outputs  -pt not compliant with home medications  -patient refusing medication  -Cardio Dr. Rhodes
-Echo 3/2021 showed EF 15-20%, Grade II diastolic dysfunction, severe TR, RV HTN  -Currently stable  -Continue Lasix, ASA, ACE, BB, Statins  -Daily weights  -Strict intake and outputs  -pt not compliant with home medications  -patient refusing medication  -Cardio Dr. Rhodes
Cont meds  Cardio follow up  Abdominal sonogram to rule out increased ascites  may need paracentesis if confirmed
Echo 3/2021 showed EF 15-20%, Grade II diastolic dysfunction, severe TR, RV HTN  Currently stable  Continue Lasix, ASA, ACE, BB, Statins  Daily weights  Strict intake and outputs  pt not compliant with home medications  -patient refusing medication  Cardio Dr. Rhodes
-Echo 3/2021 showed EF 15-20%, Grade II diastolic dysfunction, severe TR, RV HTN  -Currently stable  -Continue Lasix, ASA, ACE, BB, Statins  -Daily weights  -Strict intake and outputs  -pt not compliant with home medications  -patient refusing medication  -Cardio Dr. Rhodes
Cont meds  Cardio follow up
Cont meds  Cardio follow up  Abdominal sonogram showed minimal ascites not amenable to tap
Cont meds  Cardio follow up
Cont meds  Cardio follow up  Abdominal sonogram showed minimal ascites not amenable to tap
Echo 3/2021 showed EF 15-20%, Grade II diastolic dysfunction, severe TR, RV HTN  Currently stable  Continue Lasix, ASA, ACE, BB, Statins  Daily weights  Strict intake and outputs  pt not compliant with home medications  Cardio Dr. Rhodes consulted.

## 2021-04-17 NOTE — PROGRESS NOTE ADULT - ASSESSMENT
70y M from home with PMH HTN, HLD, DM, PE, CAD s/p CABG (3.5 yrs ago), presenting with swelling abdomen and legs, chronic systolic HF.  1.Pt needs to be compliant with  cardiac medication.  2.Chronic systolic HF-coreg,ace,lasix po.  3.Pt reports allergy to aldactone.  4.CAD-asa,b blocker,statin.  5.PE-Eliquis.  6.DM-Insulin.  7.HTN-coreg,ace.  8.Lt leg wound,cellulitis-ABX.  9.PPI.    
Patient is 70y M from home, lives alone, with PMH of HTN, HLD, DM, PE, CAD s/p CABG (3.5 yrs ago), presenting with generalized body swelling, ascites /edema, shortness of breath, exertional dyspnea and orthopnea, denies chest pain. Pt admitted from 3/25-4/5/21 for CHF and LLE wound. Patient admitted to medicine for CHF exacerbation with H 5032. Cardiology consulted for evaluation of CHF. Patient is not taking any medications atorvastatin, lisinopril, hydralazine as he thinks he doesn't need it. As per cardiology he needs to comply with his medications. He states he was taking lasix 80mg PO BID.On last admission, BNP 6970, ECHO shows severe systolic HF with EF 15-20% and GIIDD, Pt c/o abdominal discomfort with distension. abd. US resulted trace right upper quad ascites. Abdominal Xray shows decreased bowel dilatation. no evidence free air.  BM controlled. Pt is medically optimized for discharge. Pt still refusing oral medications. also refused to leave. SW following. refusing shelter. pt considering AL facility vs. NH. 24 hr notice given.       
Patient is 70y M from home, lives alone, with PMH of HTN, HLD, DM, PE, CAD s/p CABG (3.5 yrs ago), presenting with generalized body swelling, ascites /edema, shortness of breath, exertional dyspnea and orthopnea, denies chest pain. Pt admitted from 3/25-4/5/21 for CHF and LLE wound. Patient admitted to medicine for CHF exacerbation with [facility] 5032. Cardiology consulted for evaluation of CHF. Patient is not taking any medications atorvastatin, lisinopril, hydralazine as he thinks he doesn't need it. As per cardiology he needs to comply with his medications. He states he was taking lasix 80mg PO BID.On last admission, BNP 6970, ECHO shows severe systolic HF with EF 15-20% and GIIDD, Pt c/o abdominal discomfort with distension. abd. US resulted trace right upper quad ascites.       
70y M from home with PMH HTN, HLD, DM, PE, CAD s/p CABG (3.5 yrs ago), presenting with swelling abdomen and legs, chronic systolic HF.  1.Pt needs to be compliant with  cardiac medication.  2.Chronic systolic HF-coreg,ace,lasix po.  3.Pt reports allergy to aldactone.  4.CAD-asa,b blocker,statin.  5.PE-Eliquis.  6.DM-Insulin.  7.HTN-coreg,ace.  8.Lt leg wound,cellulitis-ABX.  9.PPI.    
Patient is 70y M from home, lives alone, with PMH of HTN, HLD, DM, PE, CAD s/p CABG (3.5 yrs ago), presenting with generalized body swelling, ascites /edema, shortness of breath, exertional dyspnea and orthopnea, denies chest pain. Pt admitted from 3/25-4/5/21 for CHF and LLE wound. Patient admitted to medicine for CHF exacerbation with [facility] 5032. Cardiology consulted for evaluation of CHF. Patient is not taking any medications atorvastatin, lisinopril, hydralazine as he thinks he doesn't need it. As per cardiology he needs to comply with his medications. He states he was taking lasix 80mg PO BID.On last admission, BNP 6970, ECHO shows severe systolic HF with EF 15-20% and GIIDD,       
70y M from home with PMH HTN, HLD, DM, PE, CAD s/p CABG (3.5 yrs ago), presenting with swelling abdomen and legs, chronic systolic HF.  1.Pt needs to be compliant with  cardiac medication.  2.Chronic systolic HF-coreg,ace,lasix po.  3.Pt reports allergy to aldactone.  4.CAD-asa,b blocker,statin.  5.PE-Eliquis.  6.DM-Insulin.  7.HTN-coreg,ace.  8.Lt leg wound,cellulitis-ABX.  9.PPI.    
70y M from home with PMH HTN, HLD, DM, PE, CAD s/p CABG (3.5 yrs ago), presenting with swelling abdomen and legs, chronic systolic HF.  1.Pt needs to be compliant with  cardiac medication.  2.Chronic systolic HF-coreg,ace,lasix po.  3.Pt reports allergy to aldactone.  4.CAD-asa,b blocker,statin.  5.PE-Eliquis.  6.DM-Insulin.  7.HTN-coreg,ace.  8.Lt leg wound,cellulitis-ABX.  9.PPI.    
Patient is 70y M from home, lives alone, with PMH of HTN, HLD, DM, PE, CAD s/p CABG (3.5 yrs ago), presenting with generalized body swelling + ascites /edema, + shortness of breath, exertional dyspnea and orthopnea. No reported chest pain. Pt admitted from 3/25-4/5/21 for CHF and LLE wound. Patient admitted to medicine for CHF exacerbation with [facility] 5032. Cardiology consulted for evaluation of CHF. Pt is not taking any medications atorvastatin, lisinopril, hydralazine as he thinks he doesn't need it. As per cardiology he needs to comply with his medications. He states he was taking lasix 80mg PO BID.On last admission, BNP 6970, ECHO shows severe systolic HF with EF 15-20% and GIIDD,       Patient seen and examined at bedside, denies pain, dressing to right LE changed by podiatry per pt request.
Patient is 70y M from home, lives alone, with PMH of HTN, HLD, DM, PE, CAD s/p CABG (3.5 yrs ago), presenting with generalized body swelling, ascites /edema, shortness of breath, exertional dyspnea and orthopnea, denies chest pain. Pt admitted from 3/25-4/5/21 for CHF and LLE wound. Patient admitted to medicine for CHF exacerbation with H 5032. Cardiology consulted for evaluation of CHF. Patient is not taking any medications atorvastatin, lisinopril, hydralazine as he thinks he doesn't need it. As per cardiology he needs to comply with his medications. He states he was taking lasix 80mg PO BID.On last admission, BNP 6970, ECHO shows severe systolic HF with EF 15-20% and GIIDD, Pt c/o abdominal discomfort with distension. abd. US resulted trace right upper quad ascites. Abdominal Xray shows decreased bowel dilatation. no evidence free air.  BM controlled. Pt is medically optimized for discharge. Pt still refusing oral medications. also refused to leave. SW following. refusing shelter. pt considering AL facility vs. NH. 24 hr notice given.       
Patient is 70y M from home, lives alone, with PMH of HTN, HLD, DM, PE, CAD s/p CABG (3.5 yrs ago), presenting with generalized body swelling, ascites /edema, shortness of breath, exertional dyspnea and orthopnea, denies chest pain. Pt admitted from 3/25-4/5/21 for CHF and LLE wound. Patient admitted to medicine for CHF exacerbation with [facility] 5032. Cardiology consulted for evaluation of CHF. Patient is not taking any medications atorvastatin, lisinopril, hydralazine as he thinks he doesn't need it. As per cardiology he needs to comply with his medications. He states he was taking lasix 80mg PO BID.On last admission, BNP 6970, ECHO shows severe systolic HF with EF 15-20% and GIIDD, Pt c/o abdominal discomfort with distension. abd. US resulted trace right upper quad ascites.   Pt is medically optimized for discharge. Pt still refusing oral medications. also refused to leave. SW following.       
Patient is 70y M from home, lives alone, with PMH of HTN, HLD, DM, PE, CAD s/p CABG (3.5 yrs ago), presenting with generalized body swelling + ascites /edema, + shortness of breath, exertional dyspnea and orthopnea. No reported chest pain. Pt admitted from 3/25-4/5/21 for CHF and LLE wound. Patient admitted to medicine for CHF exacerbation with [facility] 5032. Cardiology consulted for evaluation of CHF. Pt is not taking any medications atorvastatin, lisinopril, hydralazine as he thinks he doesn't need it. As per cardiology he needs to comply with his medications. He states he was taking lasix 80mg PO BID.On last admission, BNP 6970, ECHO shows severe systolic HF with EF 15-20% and GIIDD,       Patient seen and examined at bedside, denies pain,

## 2021-04-17 NOTE — PROGRESS NOTE ADULT - PROBLEM SELECTOR PLAN 5
Accuchecks with reg insulin coverage  HBA1C
Monitor Fingersticks  Follow sliding scale.  -patient refuse glucose monitoring
Accuchecks with reg insulin coverage  HBA1C
monitor BP  cont meds
Accuchecks with reg insulin coverage  HBA1C
Accuchecks with reg insulin coverage  HBA1C
monitor BP  cont meds
Accuchecks with reg insulin coverage  HBA1C
monitor BP  cont meds
monitor BP  cont meds
Monitor Fingersticks  Follow sliding scale.
Monitor Fingersticks  Follow sliding scale.
monitor BP  cont meds
Monitor Fingersticks  Follow sliding scale.  -patient refuse glucose monitoring
Monitor Fingersticks  Follow sliding scale.  -patient refuse glucose monitoring

## 2021-04-17 NOTE — PROGRESS NOTE ADULT - PROBLEM SELECTOR PLAN 3
-C/w Augmentin 875 mg every 12 hours and Doxycycline 100 mg every 12 hours -  until 4/17/2021  --patient only wants podiatry to do dressing change  -patient refuse medication
wound care  surgical follow up  cont ACE wrap
Bronchodilators  Oxygen supp  CC[facility]  Revatio 20mgs po TID  Pfts as OP
wound care  surgical follow up  cont ACE wrap
Bronchodilators  Oxygen supp  CC[facility]  Revatio 20mgs po TID  Pfts as OP
-C/w Augmentin 875 mg every 12 hours and Doxycycline 100 mg every 12 hours -  until 4/17/2021  --patient only wants podiatry to do dressing change  -patient refuse medication
Bronchodilators  Oxygen supp  [facility]  Revatio 20mgs po TID  Pfts as OP
wound care  surgical follow up  cont ACE wrap
wound care  surgical follow up  cont ACE wrap
C/w Augmentin 875 mg every 12 hours and Doxycycline 100 mg every 12 hours - To continue until 4/12/2021  wound care/ nursing dressing instructions:   apply santyl to leg leg wound and cover with 4x4 gauze, allegra and ace to left LE.   Apply plain ACE wrap on RLE.   Change dressing MWF.
Bronchodilators  Oxygen supp  CCB  Revatio 20mgs po TID
-C/w Augmentin 875 mg every 12 hours and Doxycycline 100 mg every 12 hours -  until 4/17/2021  --patient only wants podiatry to do dressing change  -patient refuse medication
Bronchodilators  Oxygen supp  CCB  Revatio 20mgs po TID
wound care  surgical follow up  cont ACE wrap
-C/w Augmentin 875 mg every 12 hours and Doxycycline 100 mg every 12 hours -  until 4/17/2021  --patient only wants podiatry to do dressing change  -patient refuse medication
C/w Augmentin 875 mg every 12 hours and Doxycycline 100 mg every 12 hours - To continue until 4/12/2021  -patient only wants podiatry to do dressing change  -patient refuse medication
C/w Augmentin 875 mg every 12 hours and Doxycycline 100 mg every 12 hours - To continue until 4/12/2021  wound care/ nursing dressing instructions:   apply santyl to leg leg wound and cover with 4x4 gauze, allegra and ace to left LE.   Apply plain ACE wrap on RLE.   Change dressing MWF.

## 2021-04-17 NOTE — PROGRESS NOTE ADULT - PROBLEM SELECTOR PROBLEM 1
CHF (congestive heart failure)

## 2021-04-17 NOTE — PROGRESS NOTE ADULT - PROBLEM SELECTOR PROBLEM 5
DM (diabetes mellitus)
DM (diabetes mellitus)
HTN (hypertension)
DM (diabetes mellitus)
HTN (hypertension)
DM (diabetes mellitus)
HTN (hypertension)
DM (diabetes mellitus)
DM (diabetes mellitus)
HTN (hypertension)
DM (diabetes mellitus)
HTN (hypertension)
DM (diabetes mellitus)

## 2021-04-17 NOTE — PROGRESS NOTE ADULT - NSICDXPILOT_GEN_ALL_CORE
[place]

## 2021-04-17 NOTE — PROGRESS NOTE ADULT - PROBLEM SELECTOR PROBLEM 2
PE (pulmonary thromboembolism)
PE (pulmonary thromboembolism)
Abdominal distension
PE (pulmonary thromboembolism)
PE (pulmonary thromboembolism)
Abdominal distension
PE (pulmonary thromboembolism)
Abdominal distension
PE (pulmonary thromboembolism)
PE (pulmonary thromboembolism)
Abdominal distension
Abdominal distension

## 2021-04-17 NOTE — PROGRESS NOTE ADULT - PROVIDER SPECIALTY LIST ADULT
Cardiology
Internal Medicine
Cardiology
Cardiology
Internal Medicine
Cardiology
Internal Medicine
Pulmonology
Cardiology
Pulmonology
Internal Medicine
Pulmonology
Internal Medicine
Pulmonology
Internal Medicine
Internal Medicine
Pulmonology
Pulmonology
Internal Medicine
Pulmonology
Internal Medicine
Internal Medicine

## 2021-04-17 NOTE — PROGRESS NOTE ADULT - SUBJECTIVE AND OBJECTIVE BOX
Patient is a 70y old  Male who presents with a chief complaint of Generalized edema (17 Apr 2021 06:38)  Awake, alert, comfortable out  of bed in NAD    INTERVAL HPI/OVERNIGHT EVENTS:      VITAL SIGNS:  T(F): 97.4 (04-17-21 @ 05:35)  HR: 66 (04-17-21 @ 08:40)  BP: 153/89 (04-17-21 @ 08:40)  RR: 14 (04-17-21 @ 08:40)  SpO2: 97% (04-17-21 @ 08:40)  Wt(kg): --  I&O's Detail          REVIEW OF SYSTEMS:    CONSTITUTIONAL:  No fevers, chills, sweats    HEENT:  Eyes:  No diplopia or blurred vision. ENT:  No earache, sore throat or runny nose.    CARDIOVASCULAR:  No pressure, squeezing, tightness, or heaviness about the chest; no palpitations.    RESPIRATORY:  Per HPI    GASTROINTESTINAL:  No abdominal pain, nausea, vomiting or diarrhea.    GENITOURINARY:  No dysuria, frequency or urgency.    NEUROLOGIC:  No paresthesias, fasciculations, seizures or weakness.    PSYCHIATRIC:  No disorder of thought or mood.      PHYSICAL EXAM:    Constitutional: Well developed and nourished  Eyes:Perrla  ENMT: normal  Neck:supple  Respiratory: good air entry  Cardiovascular: S1 S2 regular  Gastrointestinal: Soft, Non tender  Extremities: Decreased leg  edema  Vascular:normal  Neurological:Awake, alert,Ox3  Musculoskeletal:Normal      MEDICATIONS  (STANDING):  amoxicillin  875 milliGRAM(s)/clavulanate 1 Tablet(s) Oral every 12 hours  apixaban 5 milliGRAM(s) Oral every 12 hours  aspirin enteric coated 81 milliGRAM(s) Oral daily  atorvastatin 40 milliGRAM(s) Oral at bedtime  carvedilol 3.125 milliGRAM(s) Oral every 12 hours  collagenase Ointment 1 Application(s) Topical daily  furosemide    Tablet 40 milliGRAM(s) Oral daily  insulin lispro (ADMELOG) corrective regimen sliding scale   SubCutaneous three times a day before meals  insulin lispro (ADMELOG) corrective regimen sliding scale   SubCutaneous at bedtime  lisinopril 5 milliGRAM(s) Oral daily  pantoprazole    Tablet 40 milliGRAM(s) Oral before breakfast    MEDICATIONS  (PRN):      Allergies    Aldactone (Unknown)  Bumex (Blisters; Rash)  metoprolol (Unknown)  spironolactone (Unknown)    Intolerances        LABS:                    CAPILLARY BLOOD GLUCOSE            RADIOLOGY & ADDITIONAL TESTS:    CXR:    Ct scan chest:    ekg;    echo:

## 2021-04-17 NOTE — PROGRESS NOTE ADULT - REASON FOR ADMISSION
Generalized edema

## 2021-04-17 NOTE — PROGRESS NOTE ADULT - PROBLEM SELECTOR PLAN 2
Pt states he has abdominal distention since last admission  possibly from CHF exacerbation  pt is not agreeable for procedure (paracentesis)   CT abd findings:  Nodular hepatic contour concerning for cirrhosis. Correlate clinically.  Cholelithiasis, Mild ascites and a mild right pleural effusion.
anticoagulation  monitor for Bleeding
- abdominal distention since last admission  -paracentesis not needed as patient is not symptomatic, breathing comfortable.  -CT abd findings:  Nodular hepatic contour concerning for cirrhosis. Correlate clinically.  -Cholelithiasis, Mild ascites and a mild right pleural effusion.  -Miralax and magnesium citrated ordered, refuse medication  -abd. US 4/13 shows Trace right upper quadrant ascites  -Xray 4/15 shows decreased bowel dilatation. no evidence free air.
anticoagulation  monitor for Bleeding
Pt states he has abdominal distention since last admission  possibly from CHF exacerbation  pt is not agreeable for procedure (paracentesis)   CT abd findings:  Nodular hepatic contour concerning for cirrhosis. Correlate clinically.  Cholelithiasis, Mild ascites and a mild right pleural effusion.
anticoagulation  monitor for Bleeding
- abdominal distention since last admission  -paracentesis not needed as patient is not symptomatic, breathing comfortable.  -CT abd findings:  Nodular hepatic contour concerning for cirrhosis. Correlate clinically.  -Cholelithiasis, Mild ascites and a mild right pleural effusion.  -Miralax and magnesium citrated ordered, refuse medication  -abd. US 4/13 shows Trace right upper quadrant ascites  -Xray 4/15 shows decreased bowel dilatation. no evidence free air.
anticoagulation  monitor for Bleeding
- abdominal distention since last admission  -paracentesis not needed as patient is not symptomatic, breathing comfortable.  -CT abd findings:  Nodular hepatic contour concerning for cirrhosis. Correlate clinically.  -Cholelithiasis, Mild ascites and a mild right pleural effusion.  -Miralax and magnesium citrated ordered, refuse medication
- abdominal distention since last admission  -paracentesis not needed as patient is not symptomatic, breathing comfortable.  -CT abd findings:  Nodular hepatic contour concerning for cirrhosis. Correlate clinically.  -Cholelithiasis, Mild ascites and a mild right pleural effusion.  -Miralax and magnesium citrated ordered, refuse medication  -abd. US shows Trace right upper quadrant ascites
anticoagulation  monitor for Bleeding
- abdominal distention since last admission  -paracentesis not needed as patient is not symptomatic, breathing comfortable.  -CT abd findings:  Nodular hepatic contour concerning for cirrhosis. Correlate clinically.  -Cholelithiasis, Mild ascites and a mild right pleural effusion.  -Miralax and magnesium citrated ordered, refuse medication  -abd. US shows Trace right upper quadrant ascites 4/14

## 2021-04-17 NOTE — PROGRESS NOTE ADULT - SUBJECTIVE AND OBJECTIVE BOX
CHIEF COMPLAINT:Patient is a 70y old  Male who presents with a chief complaint of Generalized edema .Pt appears comfortable.    	  REVIEW OF SYSTEMS:  CONSTITUTIONAL: No fever, weight loss, or fatigue  EYES: No eye pain, visual disturbances, or discharge  ENT:  No difficulty hearing, tinnitus, vertigo; No sinus or throat pain  NECK: No pain or stiffness  RESPIRATORY: No cough, wheezing, chills or hemoptysis; No Shortness of Breath  CARDIOVASCULAR: No chest pain, palpitations, passing out, dizziness, or leg swelling  GASTROINTESTINAL: No abdominal or epigastric pain. No nausea, vomiting, or hematemesis; No diarrhea or constipation. No melena or hematochezia.  GENITOURINARY: No dysuria, frequency, hematuria, or incontinence  NEUROLOGICAL: No headaches, memory loss, loss of strength, numbness, or tremors  SKIN: No itching, burning, rashes, or lesions   LYMPH Nodes: No enlarged glands  ENDOCRINE: No heat or cold intolerance; No hair loss  MUSCULOSKELETAL: No joint pain or swelling; No muscle, back, or extremity pain  PSYCHIATRIC: No depression, anxiety, mood swings, or difficulty sleeping  HEME/LYMPH: No easy bruising, or bleeding gums  ALLERGY AND IMMUNOLOGIC: No hives or eczema	        PHYSICAL EXAM:  T(C): 36.3 (04-17-21 @ 05:35), Max: 36.3 (04-17-21 @ 05:35)  HR: 66 (04-17-21 @ 08:40) (62 - 66)  BP: 153/89 (04-17-21 @ 08:40) (153/89 - 164/100)  RR: 14 (04-17-21 @ 08:40) (14 - 18)  SpO2: 97% (04-17-21 @ 08:40) (97% - 98%)        Appearance: Normal	  HEENT:   Normal oral mucosa, PERRL, EOMI	  Lymphatic: No lymphadenopathy  Cardiovascular: Normal S1 S2, No JVD, No murmurs, No edema  Respiratory: Lungs clear to auscultation	  Psychiatry: A & O x 3, Mood & affect appropriate  Gastrointestinal:  Soft, Non-tender, + BS	  Skin: No rashes, No ecchymoses, No cyanosis	  Neurologic: Non-focal  Extremities: Normal range of motion, No clubbing, cyanosis or edema  Vascular: Peripheral pulses palpable 2+ bilaterally    MEDICATIONS  (STANDING):  amoxicillin  875 milliGRAM(s)/clavulanate 1 Tablet(s) Oral every 12 hours  apixaban 5 milliGRAM(s) Oral every 12 hours  aspirin enteric coated 81 milliGRAM(s) Oral daily  atorvastatin 40 milliGRAM(s) Oral at bedtime  carvedilol 3.125 milliGRAM(s) Oral every 12 hours  collagenase Ointment 1 Application(s) Topical daily  furosemide    Tablet 40 milliGRAM(s) Oral daily  insulin lispro (ADMELOG) corrective regimen sliding scale   SubCutaneous three times a day before meals  insulin lispro (ADMELOG) corrective regimen sliding scale   SubCutaneous at bedtime  lisinopril 5 milliGRAM(s) Oral daily  pantoprazole    Tablet 40 milliGRAM(s) Oral before breakfast      	  	  LABS:	 	  proBNP: Serum Pro-Brain Natriuretic Peptide: 5032 pg/mL (04-07 @ 02:15)  Serum Pro-Brain Natriuretic Peptide: 6970 pg/mL (03-25 @ 06:19)    Lipid Profile: Cholesterol 119  LDL --  HDL 24  TG 63

## 2021-04-17 NOTE — PROGRESS NOTE ADULT - PROBLEM SELECTOR PROBLEM 4
Leg wound, left
PE (pulmonary thromboembolism)
Leg wound, left
HTN (hypertension)
Leg wound, left
HTN (hypertension)
HTN (hypertension)
PE (pulmonary thromboembolism)
PE (pulmonary thromboembolism)
HTN (hypertension)
HTN (hypertension)
Leg wound, left
Leg wound, left
PE (pulmonary thromboembolism)

## 2021-04-19 ENCOUNTER — EMERGENCY (EMERGENCY)
Facility: HOSPITAL | Age: 71
LOS: 1 days | Discharge: HOME | End: 2021-04-19
Attending: EMERGENCY MEDICINE
Payer: COMMERCIAL

## 2021-04-19 VITALS
HEART RATE: 77 BPM | HEIGHT: 67 IN | DIASTOLIC BLOOD PRESSURE: 96 MMHG | SYSTOLIC BLOOD PRESSURE: 149 MMHG | TEMPERATURE: 98 F | RESPIRATION RATE: 18 BRPM | OXYGEN SATURATION: 99 %

## 2021-04-19 VITALS
SYSTOLIC BLOOD PRESSURE: 142 MMHG | DIASTOLIC BLOOD PRESSURE: 85 MMHG | HEART RATE: 86 BPM | TEMPERATURE: 98 F | RESPIRATION RATE: 18 BRPM | OXYGEN SATURATION: 99 %

## 2021-04-19 DIAGNOSIS — Z95.1 PRESENCE OF AORTOCORONARY BYPASS GRAFT: Chronic | ICD-10-CM

## 2021-04-19 LAB
ALBUMIN SERPL ELPH-MCNC: 2.6 G/DL — LOW (ref 3.5–5)
ALP SERPL-CCNC: 142 U/L — HIGH (ref 40–120)
ALT FLD-CCNC: 20 U/L DA — SIGNIFICANT CHANGE UP (ref 10–60)
ANION GAP SERPL CALC-SCNC: 9 MMOL/L — SIGNIFICANT CHANGE UP (ref 5–17)
APPEARANCE UR: CLEAR — SIGNIFICANT CHANGE UP
AST SERPL-CCNC: 23 U/L — SIGNIFICANT CHANGE UP (ref 10–40)
BASOPHILS # BLD AUTO: 0.09 K/UL — SIGNIFICANT CHANGE UP (ref 0–0.2)
BASOPHILS NFR BLD AUTO: 1.3 % — SIGNIFICANT CHANGE UP (ref 0–2)
BILIRUB SERPL-MCNC: 0.7 MG/DL — SIGNIFICANT CHANGE UP (ref 0.2–1.2)
BILIRUB UR-MCNC: NEGATIVE — SIGNIFICANT CHANGE UP
BUN SERPL-MCNC: 32 MG/DL — HIGH (ref 7–18)
CALCIUM SERPL-MCNC: 7.8 MG/DL — LOW (ref 8.4–10.5)
CHLORIDE SERPL-SCNC: 109 MMOL/L — HIGH (ref 96–108)
CO2 SERPL-SCNC: 24 MMOL/L — SIGNIFICANT CHANGE UP (ref 22–31)
COLOR SPEC: YELLOW — SIGNIFICANT CHANGE UP
CREAT SERPL-MCNC: 1.02 MG/DL — SIGNIFICANT CHANGE UP (ref 0.5–1.3)
DIFF PNL FLD: ABNORMAL
EOSINOPHIL # BLD AUTO: 0.27 K/UL — SIGNIFICANT CHANGE UP (ref 0–0.5)
EOSINOPHIL NFR BLD AUTO: 3.9 % — SIGNIFICANT CHANGE UP (ref 0–6)
GLUCOSE SERPL-MCNC: 129 MG/DL — HIGH (ref 70–99)
GLUCOSE UR QL: NEGATIVE — SIGNIFICANT CHANGE UP
HCT VFR BLD CALC: 39.2 % — SIGNIFICANT CHANGE UP (ref 39–50)
HGB BLD-MCNC: 11.9 G/DL — LOW (ref 13–17)
IMM GRANULOCYTES NFR BLD AUTO: 0.3 % — SIGNIFICANT CHANGE UP (ref 0–1.5)
KETONES UR-MCNC: NEGATIVE — SIGNIFICANT CHANGE UP
LEUKOCYTE ESTERASE UR-ACNC: NEGATIVE — SIGNIFICANT CHANGE UP
LIDOCAIN IGE QN: 145 U/L — SIGNIFICANT CHANGE UP (ref 73–393)
LYMPHOCYTES # BLD AUTO: 1.12 K/UL — SIGNIFICANT CHANGE UP (ref 1–3.3)
LYMPHOCYTES # BLD AUTO: 16.3 % — SIGNIFICANT CHANGE UP (ref 13–44)
MCHC RBC-ENTMCNC: 25.1 PG — LOW (ref 27–34)
MCHC RBC-ENTMCNC: 30.4 GM/DL — LOW (ref 32–36)
MCV RBC AUTO: 82.5 FL — SIGNIFICANT CHANGE UP (ref 80–100)
MONOCYTES # BLD AUTO: 0.69 K/UL — SIGNIFICANT CHANGE UP (ref 0–0.9)
MONOCYTES NFR BLD AUTO: 10 % — SIGNIFICANT CHANGE UP (ref 2–14)
NEUTROPHILS # BLD AUTO: 4.68 K/UL — SIGNIFICANT CHANGE UP (ref 1.8–7.4)
NEUTROPHILS NFR BLD AUTO: 68.2 % — SIGNIFICANT CHANGE UP (ref 43–77)
NITRITE UR-MCNC: NEGATIVE — SIGNIFICANT CHANGE UP
NRBC # BLD: 0 /100 WBCS — SIGNIFICANT CHANGE UP (ref 0–0)
PH UR: 5 — SIGNIFICANT CHANGE UP (ref 5–8)
PLATELET # BLD AUTO: 273 K/UL — SIGNIFICANT CHANGE UP (ref 150–400)
POTASSIUM SERPL-MCNC: 3.4 MMOL/L — LOW (ref 3.5–5.3)
POTASSIUM SERPL-SCNC: 3.4 MMOL/L — LOW (ref 3.5–5.3)
PROT SERPL-MCNC: 6.9 G/DL — SIGNIFICANT CHANGE UP (ref 6–8.3)
PROT UR-MCNC: 100
RBC # BLD: 4.75 M/UL — SIGNIFICANT CHANGE UP (ref 4.2–5.8)
RBC # FLD: 17.1 % — HIGH (ref 10.3–14.5)
SODIUM SERPL-SCNC: 142 MMOL/L — SIGNIFICANT CHANGE UP (ref 135–145)
SP GR SPEC: 1.02 — SIGNIFICANT CHANGE UP (ref 1.01–1.02)
TROPONIN I SERPL-MCNC: 0.02 NG/ML — SIGNIFICANT CHANGE UP (ref 0–0.04)
UROBILINOGEN FLD QL: NEGATIVE — SIGNIFICANT CHANGE UP
WBC # BLD: 6.87 K/UL — SIGNIFICANT CHANGE UP (ref 3.8–10.5)
WBC # FLD AUTO: 6.87 K/UL — SIGNIFICANT CHANGE UP (ref 3.8–10.5)

## 2021-04-19 PROCEDURE — 80053 COMPREHEN METABOLIC PANEL: CPT

## 2021-04-19 PROCEDURE — 85025 COMPLETE CBC W/AUTO DIFF WBC: CPT

## 2021-04-19 PROCEDURE — 71045 X-RAY EXAM CHEST 1 VIEW: CPT | Mod: 26

## 2021-04-19 PROCEDURE — 84484 ASSAY OF TROPONIN QUANT: CPT

## 2021-04-19 PROCEDURE — 99285 EMERGENCY DEPT VISIT HI MDM: CPT

## 2021-04-19 PROCEDURE — 83690 ASSAY OF LIPASE: CPT

## 2021-04-19 PROCEDURE — 99284 EMERGENCY DEPT VISIT MOD MDM: CPT | Mod: 25

## 2021-04-19 PROCEDURE — 81001 URINALYSIS AUTO W/SCOPE: CPT

## 2021-04-19 PROCEDURE — 36415 COLL VENOUS BLD VENIPUNCTURE: CPT

## 2021-04-19 PROCEDURE — 71045 X-RAY EXAM CHEST 1 VIEW: CPT

## 2021-04-19 PROCEDURE — 93005 ELECTROCARDIOGRAM TRACING: CPT

## 2021-04-19 RX ORDER — POTASSIUM CHLORIDE 20 MEQ
40 PACKET (EA) ORAL ONCE
Refills: 0 | Status: COMPLETED | OUTPATIENT
Start: 2021-04-19 | End: 2021-04-19

## 2021-04-19 RX ORDER — FUROSEMIDE 40 MG
40 TABLET ORAL ONCE
Refills: 0 | Status: COMPLETED | OUTPATIENT
Start: 2021-04-19 | End: 2021-04-19

## 2021-04-19 RX ORDER — MUPIROCIN 20 MG/G
1 OINTMENT TOPICAL ONCE
Refills: 0 | Status: COMPLETED | OUTPATIENT
Start: 2021-04-19 | End: 2021-04-19

## 2021-04-19 RX ADMIN — Medication 40 MILLIEQUIVALENT(S): at 03:49

## 2021-04-19 RX ADMIN — MUPIROCIN 1 APPLICATION(S): 20 OINTMENT TOPICAL at 05:53

## 2021-04-19 RX ADMIN — Medication 40 MILLIGRAM(S): at 05:54

## 2021-04-19 NOTE — ED ADULT NURSE NOTE - NSIMPLEMENTINTERV_GEN_ALL_ED
Implemented All Universal Safety Interventions:  [place] to call system. Call bell, personal items and telephone within reach. Instruct patient to call for assistance. Room bathroom lighting operational. Non-slip footwear when patient is off stretcher. Physically safe environment: no spills, clutter or unnecessary equipment. Stretcher in lowest position, wheels locked, appropriate side rails in place.

## 2021-04-19 NOTE — ED PROVIDER NOTE - CLINICAL SUMMARY MEDICAL DECISION MAKING FREE TEXT BOX
Pt with recent prolong admission and discharge on 4/17/21. Chart reviewed. Pt has mild ascites not amendable to tap, edema is chronic finding. Pt to continue with Lasix 40mg PO.  Pt has been resting/sleeping comfortably in ED. When I wake pt he is repetitively concerned about his edema. I explained at length that pt is to continue with his Lasix. Pt has superficial skin break down to leg lower anterior tib fib. No discharge noted. I will provide and place Bactroban to superficial skin ulcers. I will provide contact for pt to f/u with medical clinic and wound care/podiatry. pt is neurovascularly intact and .   Pt is not hypoxic and in no respiratory distress.  Pt is well appearing, has no new complaints and able to walk with normal gait. Pt is stable for discharge and follow up with medical doctor. Pt educated on care and need for follow up. Discussed anticipatory guidance and return precautions. Questions answered. I had a detailed discussion with the patient and/or guardian regarding the historical points, exam findings, and any diagnostic results supporting the discharge diagnosis.

## 2021-04-19 NOTE — ED PROVIDER NOTE - PATIENT PORTAL LINK FT
You can access the FollowMyHealth Patient Portal offered by [facility] by registering at the following website: [email or web address]. By joining [email or web address]’s FollowMyHealth portal, you will also be able to view your health information using other applications (apps) compatible with our system.

## 2021-04-19 NOTE — ED PROVIDER NOTE - NS ED MD DISPO DISCHARGE
CHIEF COMPLAINT: Medicare Wellness Visit       HPI:   Jorge Nguyen is a 71 year old male presents for follow up of diabetes, hypertension and hyperlipidemia. Patient has no complaints, is tolerating medications well, medications listed below.  Blood sugars 100-200. Diabetic diet:  No, Exercise not regularly, golfs and walks.    Patient admits that he needs to improve his diet as he has had tumor carbohydrates and sugars over the past several months.  His hemoglobin A1 c has increased from 6.1% up to 6.6% over the past year.  He states that he typically sees a physician in [place] in the spring.  No notes or results available for review.    Also, PSA is in normal range, but has had increased velocity over past 3 measurements dating to 2018.  One episode of hematuria with urology follow up earlier this year.    Hypoglycemia.NO Chest pain: NO  Shortness of breath: NO Dyspnea on exertion: NO  Dizziness: NO Lightheaded: NO  Headaches:  NO Blurry vision:  NO  Nausea or vomiting: NO Diarrhea: NO  Fatigue: NO  Polyuria: NO  Polydipsia: NO Numb/tingling extremities: NO  Weight gain: NO Vision changes: NO    Outpatient Medications Marked as Taking for the 10/7/20 encounter (Office Visit) with Colton Bhakta MD   Medication Sig Dispense Refill   • naproxen (NAPROSYN) 500 MG tablet Take 1 tablet by mouth 2 times daily as needed for Pain. 60 tablet 5   • finasteride (PROPECIA) 1 MG tablet Take 1 mg by mouth daily.     • sildenafil (REVATIO) 20 MG tablet take 1 to 4 tablets by mouth once daily as needed for erectile dysfunction 60 tablet 1   • atorvastatin (LIPITOR) 40 MG tablet Take 1 tablet by mouth daily. 90 tablet 3   • amlodipine-benazepril (LOTREL) 5-20 MG per capsule Take 1 capsule by mouth daily. 90 capsule 0   • metFORMIN (GLUCOPHAGE) 500 MG tablet Take 1 tablet by mouth 2 times daily (with meals). 180 tablet 1   • cilostazol (PLETAL) 100 MG tablet Take 100 mg by mouth daily.     • Lancet Devices  (AUTO-LANCET) Misc Indications: Diabetes Use with test strips to check blood glucose once daily. ICD 10 code E11.9 102 each 3   • blood glucose (ACCU-CHEK INDRA) test strip Indications: Diabetes Check blood sugar daily or as directed by physician. 100 strip 3   • SOFTCLIX LANCETS Misc Indications: Diabetes Use to test blood sugar daily or as directed by physician. 100 each 6   • Blood Glucose Monitoring Suppl (ACCU-CHEK INDRA CONNECT) W/DEVICE Kit 1 Device daily. Indications: Diabetes 1 kit PRN   • Biotin (BIOTIN 5000) 5 MG CAPS Take 1 capsule by mouth 2 times daily.     • Glucosamine HCl--250 MG TABS Take 1 tablet by mouth daily.     • Psyllium (METAMUCIL) 28.3 % POWD Take 1 Package by mouth daily.     • omeprazole (PRILOSEC) 20 MG capsule Take 20 mg by mouth continuous prn.         The 10-year CVD risk score (SHAR'Batsheva, et al., 2008) is: 30.3%    Values used to calculate the score:      Age: 71 years      Sex: Male      Diabetic: Yes      Tobacco smoker: No      Systolic Blood Pressure: 124 mmHg      Is BP treated: Yes      HDL Cholesterol: 59 mg/dL      Total Cholesterol: 165 mg/dL    ALLERGIES:  No Known Allergies  Social History     Tobacco Use   Smoking Status Former Smoker   • Types: Cigarettes   • Quit date: 1992   • Years since quittin.8   Smokeless Tobacco Never Used        ROS:  Negative for HEENT, Cardiac, Pulmonary, Gastrointestinal, Genitourinary, Endocrine, skin, and psychiatric except as in history of present illness.  .  PHYSICAL EXAM:   Visit Vitals  /80   Pulse 86   Temp 98 °F (36.7 °C)   Ht 5' 6\" (1.676 m)   Wt 84.4 kg   SpO2 99%   BMI 30.03 kg/m²     Wt Readings from Last 3 Encounters:   10/07/20 84.4 kg   20 84.8 kg   20 85.7 kg       General: Well-developed, well-nourished 71 year old male in no distress.  Eyes: Pupils equal, round, reactive to light, extraocular movements intact, conjunctivae/sclerae clear.  Neck: Supple, no adenopathy, no jugular venous  distention, no bruits, no thyromegaly.  Lungs: Clear to auscultation, no rales, rhonchi, retraction, or wheezes.  Heart: Regular rate and rhythm, no murmur, no heave or thrill.   Extremities: No cyanosis, no clubbing, warm, no edema.   Neuro: Gait normal.   Psych: Alert and oriented x 3. Cooperative. Memory intact. Mood and affect normal.    Diabetic Foot Exam Documentation     Normal Bilateral Foot Exam:  Skin integrity is normal. Dorsalis pedis and posterior tibial pulses are present.  Pressure sensation using the [place] monofilament is present.          Hemoglobin A1c Hemoglobin A1C (%)   Date Value   10/05/2020 6.6 (H)   10/02/2019 6.1 (H)   09/07/2018 6.1 (H)   09/27/2017 6.1 (H)      Basic Metabolic Panel                      Cholesterol Sodium (mmol/L)   Date Value   10/05/2020 136   08/24/2020 138   10/02/2019 139   09/07/2018 142   08/09/2017 140     Potassium (mmol/L)   Date Value   10/05/2020 4.3   08/24/2020 3.9   10/02/2019 4.0   09/07/2018 4.4   08/09/2017 4.5     Chloride (mmol/L)   Date Value   10/05/2020 101   08/24/2020 105   10/02/2019 104   09/07/2018 105   08/09/2017 104     Glucose (mg/dL)   Date Value   10/05/2020 146 (H)   08/24/2020 106 (H)   10/02/2019 120 (H)   09/07/2018 127 (H)   08/09/2017 131 (H)     CALCIUM (mg/dL)   Date Value   10/02/2019 9.1   09/07/2018 8.6   08/09/2017 8.8     Calcium (mg/dL)   Date Value   10/05/2020 9.2   08/24/2020 9.3     Carbon Dioxide (mmol/L)   Date Value   10/05/2020 26   08/24/2020 25   10/02/2019 29   09/07/2018 28   08/09/2017 28     BUN (mg/dL)   Date Value   10/05/2020 11   08/24/2020 13   10/02/2019 10   09/07/2018 17   08/09/2017 20     Creatinine (mg/dL)   Date Value   10/05/2020 0.73   08/24/2020 0.76   10/02/2019 0.89   09/07/2018 0.81   08/09/2017 0.72     CHOLESTEROL (mg/dL)   Date Value   09/18/2019 156   08/13/2019 163   09/07/2018 145     Cholesterol (mg/dL)   Date Value   10/05/2020 165     CALCULATED LDL (mg/dL)   Date Value    09/18/2019 69   08/13/2019 77   09/07/2018 72     LDL (mg/dL)   Date Value   10/05/2020 76     TRIGLYCERIDE (mg/dL)   Date Value   09/18/2019 96   08/13/2019 84   09/07/2018 93     Triglycerides (mg/dL)   Date Value   10/05/2020 149     HDL (mg/dL)   Date Value   10/05/2020 59   09/18/2019 68   08/13/2019 69   09/07/2018 54      Microalbumin MICROALBUMIN/CREATININE   Date Value   10/02/2019 7.3 mg/g   09/07/2018 9.9 mg/g   09/27/2017 5.7 mcg/mg     Microalbumin/ Creatinine Ratio (mg/g)   Date Value   10/05/2020 15.5            Assessment:  1. Controlled type 2 diabetes mellitus without complication, without long-term current use of insulin (CMS/[facility])    2. Essential hypertension    3. Hyperlipidemia, unspecified hyperlipidemia type    4. Primary osteoarthritis of right knee    5. Increased prostate specific antigen (PSA) velocity                Plan:   · Diabetes Mellitus, type 2, controlled. A1c is 6.6%. Continue current diabetic diet, regular exercise 30 minutes every day, diabetic diet, low fat,   · Essential Hypertension: Controlled. Continue current medication, exercise, SANDY diet.  · Hyperlipidemia: Continue current medication, exercise, weight loss. Recheck lipids in 6 months.  · Increased PSA velocity: recheck PSA in 6 months  · Follow up in 6 month with PCP in [place], sooner if problems develop.    Colton Bhakta MD            .    MEDICARE WELLNESS VISIT NOTE      HISTORY OF PRESENT ILLNESS:   Jorge Nguyen presents for his Subsequent Annual Medicare Wellness Visit.   He has no current complaints or concerns.      Patient Care Team:  Colton Bhakta MD as PCP - General (Family Practice)        Patient Active Problem List   Diagnosis   • Diabetes mellitus type 2, controlled (CMS/[facility])   • GERD (gastroesophageal reflux disease)   • DJD (degenerative joint disease)   • Male hypogonadism   • Coronary atherosclerosis   • Dyslipidemia   • Hypertension   • PVD (peripheral vascular disease)          Past Medical History:   Diagnosis Date   • Arthritis    • Colon polyp 2012   • Diabetes Mellitus    • Hemorrhoids    • History of tobacco abuse    • Pulmonary nodule          Past Surgical History:   Procedure Laterality Date   • Colonoscopy w biopsy  2012    Polyps, diverticulosis, hemorrhoids, 5 year recall   • Colonoscopy w/ polypectomy  10/12/2017    dr dugan   • Eye exam  2014    Negative diabetic eye exam. Douglas Modi MD   • Hemorhoidectomy int ext single column group      Hemorrhoidectomy   • Knee arthroscopy w/ laser  13    left         Social History     Tobacco Use   • Smoking status: Former Smoker     Types: Cigarettes     Quit date: 1992     Years since quittin.8   • Smokeless tobacco: Never Used   Substance Use Topics   • Alcohol use: Yes     Alcohol/week: 8.0 standard drinks     Types: 8 Standard drinks or equivalent per week   • Drug use: No     Drug use:    Drug Use:    No              Family History   Problem Relation Age of Onset   • Cancer Father    • Cancer Sister    • Arthritis Sister    • Cancer Brother    • Diabetes Brother        Current Outpatient Medications   Medication Sig Dispense Refill   • naproxen (NAPROSYN) 500 MG tablet Take 1 tablet by mouth 2 times daily as needed for Pain. 60 tablet 5   • finasteride (PROPECIA) 1 MG tablet Take 1 mg by mouth daily.     • sildenafil (REVATIO) 20 MG tablet take 1 to 4 tablets by mouth once daily as needed for erectile dysfunction 60 tablet 1   • atorvastatin (LIPITOR) 40 MG tablet Take 1 tablet by mouth daily. 90 tablet 3   • amlodipine-benazepril (LOTREL) 5-20 MG per capsule Take 1 capsule by mouth daily. 90 capsule 0   • metFORMIN (GLUCOPHAGE) 500 MG tablet Take 1 tablet by mouth 2 times daily (with meals). 180 tablet 1   • cilostazol (PLETAL) 100 MG tablet Take 100 mg by mouth daily.     • Lancet Devices (AUTO-LANCET) Misc Indications: Diabetes Use with test strips to check blood glucose once daily. ICD 10  code E11.9 102 each 3   • blood glucose (ACCU-CHEK INDRA) test strip Indications: Diabetes Check blood sugar daily or as directed by physician. 100 strip 3   • SOFTCLIX LANCETS Misc Indications: Diabetes Use to test blood sugar daily or as directed by physician. 100 each 6   • Blood Glucose Monitoring Suppl (ACCU-CHEK INDRA CONNECT) W/DEVICE Kit 1 Device daily. Indications: Diabetes 1 kit PRN   • Biotin (BIOTIN 5000) 5 MG CAPS Take 1 capsule by mouth 2 times daily.     • Glucosamine HCl--250 MG TABS Take 1 tablet by mouth daily.     • Psyllium (METAMUCIL) 28.3 % POWD Take 1 Package by mouth daily.     • omeprazole (PRILOSEC) 20 MG capsule Take 20 mg by mouth continuous prn.       No current facility-administered medications for this visit.         The following items on the Medicare Health Risk Assessment were found to be positive  1.) Do you have an Advance directive, living will, or power of  for health care document that contains your wishes for end of life care?: No     2.) Would you like additional information on advance directives?: Yes     4.) During the past 4 weeks, what was the hardest physical activity you could do for at least 2 minutes?: Light     6 a.) How many servings of Fruits and Vegetables do you have each day ( 1 serving = 1 piece of fruit, 1/2 cup fruits or vegetables): 1 per day         Vision and Hearing screens: not performed    Advance Directive:   The patient has the following documents:  No Advance Directives on file. Patient offered documents.    Cognitive Assessment: no evidence of cognitive dysfunction by direct observation    Recent PHQ 2/9 Score    PHQ 2:  Date Adult PHQ 2 Score Adult PHQ 2 Interpretation   10/7/2020 0 No further screening needed       PHQ 9:       DEPRESSION ASSESSMENT/PLAN:  Depression screening is negative no further plan needed.     Body mass index is 30.03 kg/m².    BMI ASSESSMENT/PLAN:  Patient is obese.    Journal food intake daily and take less  sweets, 30  minutes of physical activity a day        Needed Screening/Treatment:   Immunizations reviewed and patient needs: Influenza  Needed follow up:  None    See orders.   See Patient Instructions section.   Return in about 1 year (around 10/7/2021) for Medicare Wellness Visit.   Home

## 2021-04-19 NOTE — ED ADULT NURSE NOTE - ED STAT RN HANDOFF DETAILS 2
Patient discharged home, refused social work consult. IV removed, no bleeding no redness noted. Patient A&OX4, ambulated with steady gait. denied pain/discomfort.

## 2021-04-19 NOTE — ED ADULT TRIAGE NOTE - CHIEF COMPLAINT QUOTE
d/c yesterday from the hospital,with abdominal pain d/c yesterday from the hospital,with abdominal pain and bilateral leg pain

## 2021-04-19 NOTE — ED PROVIDER NOTE - NSFOLLOWUPCLINICS_GEN_ALL_ED_FT
[facility]  Internal Medicine  [address] [place] [address]  Phone: [phone]  Fax: [phone]    [facility]  Podiatry/Wound Care  [address] [place] [address]  Phone: [phone]  Fax: [phone]

## 2021-04-19 NOTE — ED PROVIDER NOTE - PROGRESS NOTE DETAILS
Pt adamantly requesting readmission. Nursing supervisor Dolly informed and will intervene. Pt for  assessment in am. DW Dr. Pitts, informed pt does not require readmission at this time.

## 2021-04-19 NOTE — ED PROVIDER NOTE - OBJECTIVE STATEMENT
Chief complaint  of feeling fluid overloaded and requesting readmission.  No reported fever, no vomiting.  Pt was admitted from beginning of April and discharged on 4/17/21.

## 2021-06-01 PROCEDURE — 80061 LIPID PANEL: CPT

## 2021-06-01 PROCEDURE — 85610 PROTHROMBIN TIME: CPT

## 2021-06-01 PROCEDURE — 80053 COMPREHEN METABOLIC PANEL: CPT

## 2021-06-01 PROCEDURE — 93880 EXTRACRANIAL BILAT STUDY: CPT

## 2021-06-01 PROCEDURE — 71045 X-RAY EXAM CHEST 1 VIEW: CPT

## 2021-06-01 PROCEDURE — 97161 PT EVAL LOW COMPLEX 20 MIN: CPT

## 2021-06-01 PROCEDURE — 85025 COMPLETE CBC W/AUTO DIFF WBC: CPT

## 2021-06-01 PROCEDURE — 80202 ASSAY OF VANCOMYCIN: CPT

## 2021-06-01 PROCEDURE — 36415 COLL VENOUS BLD VENIPUNCTURE: CPT

## 2021-06-01 PROCEDURE — 93005 ELECTROCARDIOGRAM TRACING: CPT

## 2021-06-01 PROCEDURE — 83880 ASSAY OF NATRIURETIC PEPTIDE: CPT

## 2021-06-01 PROCEDURE — 93970 EXTREMITY STUDY: CPT

## 2021-06-01 PROCEDURE — 0031A: CPT

## 2021-06-01 PROCEDURE — 80048 BASIC METABOLIC PNL TOTAL CA: CPT

## 2021-06-01 PROCEDURE — 73701 CT LOWER EXTREMITY W/DYE: CPT

## 2021-06-01 PROCEDURE — 85730 THROMBOPLASTIN TIME PARTIAL: CPT

## 2021-06-01 PROCEDURE — 82962 GLUCOSE BLOOD TEST: CPT

## 2021-06-01 PROCEDURE — 83735 ASSAY OF MAGNESIUM: CPT

## 2021-06-01 PROCEDURE — 97110 THERAPEUTIC EXERCISES: CPT

## 2021-06-01 PROCEDURE — 97116 GAIT TRAINING THERAPY: CPT

## 2021-06-01 PROCEDURE — 87635 SARS-COV-2 COVID-19 AMP PRB: CPT

## 2021-06-01 PROCEDURE — 99285 EMERGENCY DEPT VISIT HI MDM: CPT | Mod: 25

## 2021-06-01 PROCEDURE — 83036 HEMOGLOBIN GLYCOSYLATED A1C: CPT

## 2021-06-01 PROCEDURE — 84484 ASSAY OF TROPONIN QUANT: CPT

## 2021-06-01 PROCEDURE — 85379 FIBRIN DEGRADATION QUANT: CPT

## 2021-06-01 PROCEDURE — 86769 SARS-COV-2 COVID-19 ANTIBODY: CPT

## 2021-06-01 PROCEDURE — 85027 COMPLETE CBC AUTOMATED: CPT

## 2021-06-01 PROCEDURE — 84100 ASSAY OF PHOSPHORUS: CPT

## 2021-06-01 PROCEDURE — 93306 TTE W/DOPPLER COMPLETE: CPT

## 2021-06-01 PROCEDURE — 97530 THERAPEUTIC ACTIVITIES: CPT

## 2021-07-29 ENCOUNTER — INPATIENT (INPATIENT)
Facility: HOSPITAL | Age: 71
LOS: 5 days | Discharge: HOME | DRG: 603 | End: 2021-08-04
Attending: INTERNAL MEDICINE | Admitting: INTERNAL MEDICINE
Payer: COMMERCIAL

## 2021-07-29 VITALS
HEART RATE: 66 BPM | RESPIRATION RATE: 16 BRPM | DIASTOLIC BLOOD PRESSURE: 89 MMHG | SYSTOLIC BLOOD PRESSURE: 134 MMHG | HEIGHT: 67 IN | TEMPERATURE: 97 F | OXYGEN SATURATION: 99 % | WEIGHT: 190.04 LBS

## 2021-07-29 DIAGNOSIS — Z95.1 PRESENCE OF AORTOCORONARY BYPASS GRAFT: Chronic | ICD-10-CM

## 2021-07-29 LAB
HCT VFR BLD CALC: 41.6 % — SIGNIFICANT CHANGE UP (ref 39–50)
HGB BLD-MCNC: 12.5 G/DL — LOW (ref 13–17)
MCHC RBC-ENTMCNC: 25.1 PG — LOW (ref 27–34)
MCHC RBC-ENTMCNC: 30 GM/DL — LOW (ref 32–36)
MCV RBC AUTO: 83.5 FL — SIGNIFICANT CHANGE UP (ref 80–100)
PLATELET # BLD AUTO: 184 K/UL — SIGNIFICANT CHANGE UP (ref 150–400)
RBC # BLD: 4.98 M/UL — SIGNIFICANT CHANGE UP (ref 4.2–5.8)
RBC # FLD: 23 % — HIGH (ref 10.3–14.5)
WBC # BLD: 4.72 K/UL — SIGNIFICANT CHANGE UP (ref 3.8–10.5)
WBC # FLD AUTO: 4.72 K/UL — SIGNIFICANT CHANGE UP (ref 3.8–10.5)

## 2021-07-29 PROCEDURE — 99284 EMERGENCY DEPT VISIT MOD MDM: CPT | Mod: CS

## 2021-07-29 RX ORDER — SODIUM CHLORIDE 9 MG/ML
3 INJECTION INTRAMUSCULAR; INTRAVENOUS; SUBCUTANEOUS EVERY 8 HOURS
Refills: 0 | Status: DISCONTINUED | OUTPATIENT
Start: 2021-07-29 | End: 2021-07-30

## 2021-07-29 RX ORDER — VANCOMYCIN HCL 1 G
1250 VIAL (EA) INTRAVENOUS ONCE
Refills: 0 | Status: COMPLETED | OUTPATIENT
Start: 2021-07-29 | End: 2021-07-29

## 2021-07-29 RX ORDER — OXYCODONE AND ACETAMINOPHEN 5; 325 MG/1; MG/1
1 TABLET ORAL ONCE
Refills: 0 | Status: DISCONTINUED | OUTPATIENT
Start: 2021-07-29 | End: 2021-07-29

## 2021-07-29 NOTE — ED ADULT NURSE NOTE - NSIMPLEMENTINTERV_GEN_ALL_ED
Implemented All Fall Risk Interventions:  [place] to call system. Call bell, personal items and telephone within reach. Instruct patient to call for assistance. Room bathroom lighting operational. Non-slip footwear when patient is off stretcher. Physically safe environment: no spills, clutter or unnecessary equipment. Stretcher in lowest position, wheels locked, appropriate side rails in place. Provide visual cue, wrist band, yellow gown, etc. Monitor gait and stability. Monitor for mental status changes and reorient to person, place, and time. Review medications for side effects contributing to fall risk. Reinforce activity limits and safety measures with patient and family.

## 2021-07-30 DIAGNOSIS — I26.99 OTHER PULMONARY EMBOLISM WITHOUT ACUTE COR PULMONALE: ICD-10-CM

## 2021-07-30 DIAGNOSIS — Z29.9 ENCOUNTER FOR PROPHYLACTIC MEASURES, UNSPECIFIED: ICD-10-CM

## 2021-07-30 DIAGNOSIS — I50.9 HEART FAILURE, UNSPECIFIED: ICD-10-CM

## 2021-07-30 DIAGNOSIS — I10 ESSENTIAL (PRIMARY) HYPERTENSION: ICD-10-CM

## 2021-07-30 DIAGNOSIS — E11.9 TYPE 2 DIABETES MELLITUS WITHOUT COMPLICATIONS: ICD-10-CM

## 2021-07-30 DIAGNOSIS — L03.90 CELLULITIS, UNSPECIFIED: ICD-10-CM

## 2021-07-30 LAB
ALBUMIN SERPL ELPH-MCNC: 3 G/DL — LOW (ref 3.5–5)
ALP SERPL-CCNC: 130 U/L — HIGH (ref 40–120)
ALT FLD-CCNC: 28 U/L DA — SIGNIFICANT CHANGE UP (ref 10–60)
ANION GAP SERPL CALC-SCNC: 6 MMOL/L — SIGNIFICANT CHANGE UP (ref 5–17)
ANISOCYTOSIS BLD QL: SLIGHT — SIGNIFICANT CHANGE UP
APTT BLD: 38.9 SEC — HIGH (ref 27.5–35.5)
AST SERPL-CCNC: 33 U/L — SIGNIFICANT CHANGE UP (ref 10–40)
BASOPHILS # BLD AUTO: 0.06 K/UL — SIGNIFICANT CHANGE UP (ref 0–0.2)
BASOPHILS NFR BLD AUTO: 1.3 % — SIGNIFICANT CHANGE UP (ref 0–2)
BILIRUB SERPL-MCNC: 0.8 MG/DL — SIGNIFICANT CHANGE UP (ref 0.2–1.2)
BUN SERPL-MCNC: 17 MG/DL — SIGNIFICANT CHANGE UP (ref 7–18)
CALCIUM SERPL-MCNC: 8.7 MG/DL — SIGNIFICANT CHANGE UP (ref 8.4–10.5)
CHLORIDE SERPL-SCNC: 112 MMOL/L — HIGH (ref 96–108)
CO2 SERPL-SCNC: 23 MMOL/L — SIGNIFICANT CHANGE UP (ref 22–31)
CREAT SERPL-MCNC: 1.03 MG/DL — SIGNIFICANT CHANGE UP (ref 0.5–1.3)
ELLIPTOCYTES BLD QL SMEAR: SLIGHT — SIGNIFICANT CHANGE UP
EOSINOPHIL # BLD AUTO: 0.26 K/UL — SIGNIFICANT CHANGE UP (ref 0–0.5)
EOSINOPHIL NFR BLD AUTO: 5.5 % — SIGNIFICANT CHANGE UP (ref 0–6)
GLUCOSE BLDC GLUCOMTR-MCNC: 121 MG/DL — HIGH (ref 70–99)
GLUCOSE BLDC GLUCOMTR-MCNC: 95 MG/DL — SIGNIFICANT CHANGE UP (ref 70–99)
GLUCOSE SERPL-MCNC: 87 MG/DL — SIGNIFICANT CHANGE UP (ref 70–99)
IMM GRANULOCYTES NFR BLD AUTO: 0.2 % — SIGNIFICANT CHANGE UP (ref 0–1.5)
INR BLD: 1.34 RATIO — HIGH (ref 0.88–1.16)
LACTATE SERPL-SCNC: 1.5 MMOL/L — SIGNIFICANT CHANGE UP (ref 0.7–2)
LYMPHOCYTES # BLD AUTO: 0.99 K/UL — LOW (ref 1–3.3)
LYMPHOCYTES # BLD AUTO: 21 % — SIGNIFICANT CHANGE UP (ref 13–44)
MACROCYTES BLD QL: SLIGHT — SIGNIFICANT CHANGE UP
MANUAL SMEAR VERIFICATION: SIGNIFICANT CHANGE UP
MONOCYTES # BLD AUTO: 0.42 K/UL — SIGNIFICANT CHANGE UP (ref 0–0.9)
MONOCYTES NFR BLD AUTO: 8.9 % — SIGNIFICANT CHANGE UP (ref 2–14)
NEUTROPHILS # BLD AUTO: 2.98 K/UL — SIGNIFICANT CHANGE UP (ref 1.8–7.4)
NEUTROPHILS NFR BLD AUTO: 63.1 % — SIGNIFICANT CHANGE UP (ref 43–77)
NRBC # BLD: 0 /100 WBCS — SIGNIFICANT CHANGE UP (ref 0–0)
OVALOCYTES BLD QL SMEAR: SLIGHT — SIGNIFICANT CHANGE UP
PLAT MORPH BLD: NORMAL — SIGNIFICANT CHANGE UP
PLATELET COUNT - ESTIMATE: NORMAL — SIGNIFICANT CHANGE UP
POIKILOCYTOSIS BLD QL AUTO: SLIGHT — SIGNIFICANT CHANGE UP
POLYCHROMASIA BLD QL SMEAR: SLIGHT — SIGNIFICANT CHANGE UP
POTASSIUM SERPL-MCNC: 3.9 MMOL/L — SIGNIFICANT CHANGE UP (ref 3.5–5.3)
POTASSIUM SERPL-SCNC: 3.9 MMOL/L — SIGNIFICANT CHANGE UP (ref 3.5–5.3)
PROT SERPL-MCNC: 7.4 G/DL — SIGNIFICANT CHANGE UP (ref 6–8.3)
PROTHROM AB SERPL-ACNC: 15.7 SEC — HIGH (ref 10.6–13.6)
RBC BLD AUTO: ABNORMAL
SARS-COV-2 RNA SPEC QL NAA+PROBE: SIGNIFICANT CHANGE UP
SODIUM SERPL-SCNC: 141 MMOL/L — SIGNIFICANT CHANGE UP (ref 135–145)

## 2021-07-30 PROCEDURE — 71045 X-RAY EXAM CHEST 1 VIEW: CPT | Mod: 26

## 2021-07-30 PROCEDURE — 93970 EXTREMITY STUDY: CPT | Mod: 26

## 2021-07-30 RX ORDER — LISINOPRIL 2.5 MG/1
5 TABLET ORAL DAILY
Refills: 0 | Status: DISCONTINUED | OUTPATIENT
Start: 2021-07-30 | End: 2021-07-30

## 2021-07-30 RX ORDER — COLLAGENASE CLOSTRIDIUM HIST. 250 UNIT/G
1 OINTMENT (GRAM) TOPICAL DAILY
Refills: 0 | Status: DISCONTINUED | OUTPATIENT
Start: 2021-07-30 | End: 2021-08-04

## 2021-07-30 RX ORDER — INSULIN LISPRO 100/ML
VIAL (ML) SUBCUTANEOUS AT BEDTIME
Refills: 0 | Status: DISCONTINUED | OUTPATIENT
Start: 2021-07-30 | End: 2021-08-04

## 2021-07-30 RX ORDER — VANCOMYCIN HCL 1 G
1250 VIAL (EA) INTRAVENOUS EVERY 12 HOURS
Refills: 0 | Status: DISCONTINUED | OUTPATIENT
Start: 2021-07-30 | End: 2021-08-01

## 2021-07-30 RX ORDER — PANTOPRAZOLE SODIUM 20 MG/1
40 TABLET, DELAYED RELEASE ORAL
Refills: 0 | Status: DISCONTINUED | OUTPATIENT
Start: 2021-07-30 | End: 2021-08-04

## 2021-07-30 RX ORDER — APIXABAN 2.5 MG/1
5 TABLET, FILM COATED ORAL EVERY 12 HOURS
Refills: 0 | Status: DISCONTINUED | OUTPATIENT
Start: 2021-07-30 | End: 2021-08-04

## 2021-07-30 RX ORDER — LISINOPRIL 2.5 MG/1
5 TABLET ORAL DAILY
Refills: 0 | Status: DISCONTINUED | OUTPATIENT
Start: 2021-07-30 | End: 2021-08-04

## 2021-07-30 RX ORDER — FUROSEMIDE 40 MG
40 TABLET ORAL DAILY
Refills: 0 | Status: DISCONTINUED | OUTPATIENT
Start: 2021-07-30 | End: 2021-08-04

## 2021-07-30 RX ORDER — INSULIN LISPRO 100/ML
VIAL (ML) SUBCUTANEOUS
Refills: 0 | Status: DISCONTINUED | OUTPATIENT
Start: 2021-07-30 | End: 2021-08-04

## 2021-07-30 RX ORDER — ENOXAPARIN SODIUM 100 MG/ML
40 INJECTION SUBCUTANEOUS DAILY
Refills: 0 | Status: DISCONTINUED | OUTPATIENT
Start: 2021-07-30 | End: 2021-07-30

## 2021-07-30 RX ORDER — ASPIRIN/CALCIUM CARB/MAGNESIUM 324 MG
81 TABLET ORAL DAILY
Refills: 0 | Status: DISCONTINUED | OUTPATIENT
Start: 2021-07-30 | End: 2021-08-04

## 2021-07-30 RX ORDER — CARVEDILOL PHOSPHATE 80 MG/1
3.12 CAPSULE, EXTENDED RELEASE ORAL EVERY 12 HOURS
Refills: 0 | Status: DISCONTINUED | OUTPATIENT
Start: 2021-07-30 | End: 2021-08-04

## 2021-07-30 RX ORDER — ATORVASTATIN CALCIUM 80 MG/1
40 TABLET, FILM COATED ORAL AT BEDTIME
Refills: 0 | Status: DISCONTINUED | OUTPATIENT
Start: 2021-07-30 | End: 2021-08-04

## 2021-07-30 RX ADMIN — Medication 250 MILLIGRAM(S): at 00:07

## 2021-07-30 RX ADMIN — Medication 166.67 MILLIGRAM(S): at 23:55

## 2021-07-30 RX ADMIN — Medication 81 MILLIGRAM(S): at 11:50

## 2021-07-30 RX ADMIN — Medication 40 MILLIGRAM(S): at 05:26

## 2021-07-30 RX ADMIN — CARVEDILOL PHOSPHATE 3.12 MILLIGRAM(S): 80 CAPSULE, EXTENDED RELEASE ORAL at 05:26

## 2021-07-30 RX ADMIN — APIXABAN 5 MILLIGRAM(S): 2.5 TABLET, FILM COATED ORAL at 17:11

## 2021-07-30 RX ADMIN — APIXABAN 5 MILLIGRAM(S): 2.5 TABLET, FILM COATED ORAL at 05:25

## 2021-07-30 RX ADMIN — CARVEDILOL PHOSPHATE 3.12 MILLIGRAM(S): 80 CAPSULE, EXTENDED RELEASE ORAL at 17:11

## 2021-07-30 RX ADMIN — PANTOPRAZOLE SODIUM 40 MILLIGRAM(S): 20 TABLET, DELAYED RELEASE ORAL at 05:26

## 2021-07-30 RX ADMIN — Medication 166.67 MILLIGRAM(S): at 11:49

## 2021-07-30 NOTE — ED PROVIDER NOTE - CLINICAL SUMMARY MEDICAL DECISION MAKING FREE TEXT BOX
Pt p/w s/s of L lower leg cellulitis and possible DVT after failing PO ABx. Giving vanc and admitting for IV ABx. DVT study ordered to be performed in the AM.    Labs unremarkable. Pt stable and endorsed to MAR.

## 2021-07-30 NOTE — H&P ADULT - ATTENDING COMMENTS
69 yo M from home, w/ PMHx of HTN, HLD, DM, PE, CAD s/p CABG 4 years ago, CHF on Lasix, PE on eliquis. Presenting w/ chronic b/l L.E swelling and worsening left l.e cellulitis. He has taken 2 courses of PO Abx. Left leg is swollen, erythematous, has purulent drainage, and ulcerations.   Pt denies any history of diabetes, and is non compliant with HLD medications.      assessment  --  b/l le cellulitis 2nd to pvd, h/o HTN, HLD, DM, PE, CAD s/p CABG 4 years ago, systolic CHF on Lasix, pulm htn, PE on eliquis, liver cirrhosis    plan  --  admit to med, vanco, cont preadmit home meds, gi and dvt profilaxis,  cbc, bmp, mg, phos, lipids, tsh, bld cx, ua, ucx,    cxr  le doppler    pod cons  wound care cons  cardio cons

## 2021-07-30 NOTE — CONSULT NOTE ADULT - SUBJECTIVE AND OBJECTIVE BOX
PULMONARY CONSULT NOTE      LIDIA PÉREZ  MRN-[id number]    Patient is a 70y old  Male who presents with a chief complaint of Left leg cellulitis (30 Jul 2021 01:54)      HISTORY OF PRESENT ILLNESS:  History of Present Illness:  Reason for Admission: Left leg cellulitis  History of Present Illness:   Pt is a 69 yo M from home, w/ PMHx of HTN, HLD, DM, PE, CAD s/p CABG 4 years ago, CHF on Lasix, PE on eliquis. Presenting w/ chronic b/l L.E swelling and worsening left l.e cellulitis. He has taken 2 courses of PO Abx. Left leg is swollen, erythematous, has purulent drainage, and ulcerations.   Pt denies any history of diabetes, and is non compliant with HLD medications.    Pt is awake, alert, lying in bed in NAD.     MEDICATIONS  (STANDING):  apixaban 5 milliGRAM(s) Oral every 12 hours  aspirin enteric coated 81 milliGRAM(s) Oral daily  atorvastatin 40 milliGRAM(s) Oral at bedtime  carvedilol 3.125 milliGRAM(s) Oral every 12 hours  collagenase Ointment 1 Application(s) Topical daily  furosemide    Tablet 40 milliGRAM(s) Oral daily  insulin lispro (ADMELOG) corrective regimen sliding scale   SubCutaneous three times a day before meals  insulin lispro (ADMELOG) corrective regimen sliding scale   SubCutaneous at bedtime  lisinopril 5 milliGRAM(s) Oral daily  pantoprazole    Tablet 40 milliGRAM(s) Oral before breakfast  vancomycin  IVPB 1250 milliGRAM(s) IV Intermittent every 12 hours      MEDICATIONS  (PRN):      Allergies    Aldactone (Unknown)  Bumex (Blisters; Rash)  metoprolol (Unknown)  spironolactone (Unknown)    Intolerances        PAST MEDICAL & SURGICAL HISTORY:  CAD (coronary artery disease)    DM (diabetes mellitus)    Dyslipidemia    CHF (congestive heart failure)    Angina pectoris    HTN (hypertension)    PE (pulmonary thromboembolism)    S/P CABG x 3        FAMILY HISTORY:      SOCIAL HISTORY  Smoking History:     REVIEW OF SYSTEMS:    CONSTITUTIONAL:  No fevers, chills, sweats    HEENT:  Eyes:  No diplopia or blurred vision. ENT:  No earache, sore throat or runny nose.    CARDIOVASCULAR:  No pressure, squeezing, tightness, or heaviness about the chest; no palpitations.    RESPIRATORY:  Per HPI    GASTROINTESTINAL:  No abdominal pain, nausea, vomiting or diarrhea.    GENITOURINARY:  No dysuria, frequency or urgency.    NEUROLOGIC:  No paresthesias, fasciculations, seizures or weakness.    PSYCHIATRIC:  No disorder of thought or mood.    Vital Signs Last 24 Hrs  T(C): 36.2 (30 Jul 2021 03:35), Max: 36.2 (30 Jul 2021 03:35)  T(F): 97.1 (30 Jul 2021 03:35), Max: 97.1 (30 Jul 2021 03:35)  HR: 70 (30 Jul 2021 03:35) (66 - 70)  BP: 142/87 (30 Jul 2021 03:35) (134/89 - 142/87)  BP(mean): --  RR: 18 (30 Jul 2021 03:35) (16 - 18)  SpO2: 99% (30 Jul 2021 03:35) (98% - 99%)  I&O's Detail      PHYSICAL EXAMINATION:    GENERAL: The patient is a well-developed, well-nourished no apparent distress.     HEENT: Head is normocephalic and atraumatic. Extraocular muscles are intact. Mucous membranes are moist.     NECK: Supple.     LUNGS: Clear to auscultation without wheezing, rales, or rhonchi. Respirations unlabored    HEART: Regular rate and rhythm without murmur.    ABDOMEN: Soft, nontender, and nondistended.  No hepatosplenomegaly is noted.    EXTREMITIES: Without any cyanosis, clubbing, rash, lesions or edema.    NEUROLOGIC: Grossly intact.      LABS:                        12.5   4.72  )-----------( 184      ( 29 Jul 2021 23:48 )             41.6     07-29    141  |  112<H>  |  17  ----------------------------<  87  3.9   |  23  |  1.03    Ca    8.7      29 Jul 2021 23:48    TPro  7.4  /  Alb  3.0<L>  /  TBili  0.8  /  DBili  x   /  AST  33  /  ALT  28  /  AlkPhos  130<H>  07-29    PT/INR - ( 29 Jul 2021 23:48 )   PT: 15.7 sec;   INR: 1.34 ratio         PTT - ( 29 Jul 2021 23:48 )  PTT:38.9 sec              Lactate, Blood: 1.5 mmol/L (07-29-21 @ 23:48)        MICROBIOLOGY:    RADIOLOGY & ADDITIONAL STUDIES:    CXR:  COVID-19 PCR . (07.30.21 @ 00:19)   COVID-19 PCR: NotDetec: EUA/IVD   Ct scan chest:    ekg;    echo:
Vascular Surgery Consultation Note    Patient is a 70y old  Male who presents with a chief complaint of Left leg cellulitis (30 Jul 2021 15:53)      HPI:  Pt is a 71 yo M from home, w/ PMHx of HTN, HLD, DM, PE, CAD s/p CABG 4 years ago, CHF on Lasix, PE on eliquis. Presenting w/ chronic b/l L.E swelling and worsening left l.e cellulitis. He has taken 2 courses of PO Abx. Left leg is swollen, erythematous, has purulent drainage, and ulcerations.   Pt denies any history of diabetes, and is non compliant with HLD medications. (30 Jul 2021 01:54)    INTERVAL HPI:  History as above c/o b/l LE swelling for "a long time". Pt states L LE is worse with drainage.  He admits to seeing a podiatrist as outpatient who performs wound care.  Pt not allowing questions by clinician.  Pt states he wants podiarty to clean and dress his legs. Recommended patient to have compression stockings/ACE bandages and patient refuses.     PAST MEDICAL & SURGICAL HISTORY:  CAD (coronary artery disease)    DM (diabetes mellitus)    Dyslipidemia    CHF (congestive heart failure)    Angina pectoris    HTN (hypertension)    PE (pulmonary thromboembolism)    S/P CABG x 3        Allergies    Aldactone (Unknown)  Bumex (Blisters; Rash)  metoprolol (Unknown)  spironolactone (Unknown)    Intolerances        MEDICATIONS  (STANDING):  apixaban 5 milliGRAM(s) Oral every 12 hours  aspirin enteric coated 81 milliGRAM(s) Oral daily  atorvastatin 40 milliGRAM(s) Oral at bedtime  carvedilol 3.125 milliGRAM(s) Oral every 12 hours  collagenase Ointment 1 Application(s) Topical daily  furosemide    Tablet 40 milliGRAM(s) Oral daily  insulin lispro (ADMELOG) corrective regimen sliding scale   SubCutaneous three times a day before meals  insulin lispro (ADMELOG) corrective regimen sliding scale   SubCutaneous at bedtime  lisinopril 5 milliGRAM(s) Oral daily  pantoprazole    Tablet 40 milliGRAM(s) Oral before breakfast  vancomycin  IVPB 1250 milliGRAM(s) IV Intermittent every 12 hours    MEDICATIONS  (PRN):      Vital Signs Last 24 Hrs  T(C): 36.8 (30 Jul 2021 13:24), Max: 36.8 (30 Jul 2021 13:24)  T(F): 98.2 (30 Jul 2021 13:24), Max: 98.2 (30 Jul 2021 13:24)  HR: 87 (30 Jul 2021 13:24) (66 - 87)  BP: 148/79 (30 Jul 2021 13:24) (134/89 - 148/79)  BP(mean): --  RR: 17 (30 Jul 2021 13:24) (16 - 18)  SpO2: 98% (30 Jul 2021 13:24) (98% - 99%)    Physical Exam:  Gen: awake, alert oriented NAD  Abd: obese soft, NT ND  Ext: b/l  edema.  Hyperkeratosis, erythema, warmth b/l L>R.  L LE ulceration with purulence and weeping  Vasc: palpable femoral/popliteal pulses. Unable to palpate DP/PT bilaterally because of edema.  Good capillary refill b/l LE    Labs:                          12.5   4.72  )-----------( 184      ( 29 Jul 2021 23:48 )             41.6     07-29    141  |  112<H>  |  17  ----------------------------<  87  3.9   |  23  |  1.03    Ca    8.7      29 Jul 2021 23:48    TPro  7.4  /  Alb  3.0<L>  /  TBili  0.8  /  DBili  x   /  AST  33  /  ALT  28  /  AlkPhos  130<H>  07-29    PT/INR - ( 29 Jul 2021 23:48 )   PT: 15.7 sec;   INR: 1.34 ratio         PTT - ( 29 Jul 2021 23:48 )  PTT:38.9 sec      
CHIEF COMPLAINT:Patient is a 70y old  Male who presents with a chief complaint of Left leg cellulitis.      HPI:  Pt is a 69 yo M from home, w/ PMHx of HTN, HLD, DM, PE, CAD s/p CABG 4 years ago, CHF on Lasix, PE on eliquis. Presenting w/ chronic b/l L.E swelling and worsening left l.e cellulitis. He has taken 2 courses of PO Abx. Left leg is swollen, erythematous, has purulent drainage, and ulcerations.   Pt denies any history of diabetes, and is non compliant with HLD medications. (30 Jul 2021 01:54)      PAST MEDICAL & SURGICAL HISTORY:  CAD (coronary artery disease)    DM (diabetes mellitus)    Dyslipidemia    CHF (congestive heart failure)    Angina pectoris    HTN (hypertension)    PE (pulmonary thromboembolism)    S/P CABG x 3        MEDICATIONS  (STANDING):  apixaban 5 milliGRAM(s) Oral every 12 hours  aspirin enteric coated 81 milliGRAM(s) Oral daily  atorvastatin 40 milliGRAM(s) Oral at bedtime  carvedilol 3.125 milliGRAM(s) Oral every 12 hours  collagenase Ointment 1 Application(s) Topical daily  furosemide    Tablet 40 milliGRAM(s) Oral daily  insulin lispro (ADMELOG) corrective regimen sliding scale   SubCutaneous three times a day before meals  insulin lispro (ADMELOG) corrective regimen sliding scale   SubCutaneous at bedtime  lisinopril 5 milliGRAM(s) Oral daily  pantoprazole    Tablet 40 milliGRAM(s) Oral before breakfast  vancomycin  IVPB 1250 milliGRAM(s) IV Intermittent every 12 hours    MEDICATIONS  (PRN):      FAMILY HISTORY:No hx of CAD      SOCIAL HISTORY:    [x ] Non-smoker    [ x] Alcohol-denies    Allergies    Aldactone (Unknown)  Bumex (Blisters; Rash)  metoprolol (Unknown)  spironolactone (Unknown)    Intolerances    	    REVIEW OF SYSTEMS:  CONSTITUTIONAL: No fever, weight loss, or fatigue  EYES: No eye pain, visual disturbances, or discharge  ENT:  No difficulty hearing, tinnitus, vertigo; No sinus or throat pain  NECK: No pain or stiffness  RESPIRATORY: No cough, wheezing, chills or hemoptysis; No Shortness of Breath  CARDIOVASCULAR: No chest pain, palpitations, passing out, dizziness, + leg swelling  GASTROINTESTINAL: No abdominal or epigastric pain. No nausea, vomiting, or hematemesis; No diarrhea or constipation. No melena or hematochezia.  GENITOURINARY: No dysuria, frequency, hematuria, or incontinence  NEUROLOGICAL: No headaches, memory loss, loss of strength, numbness, or tremors  SKIN: No itching, burning, rashes, or lesions   LYMPH Nodes: No enlarged glands  ENDOCRINE: No heat or cold intolerance; No hair loss  MUSCULOSKELETAL: No joint pain or swelling; No muscle, back, extremity pain  PSYCHIATRIC: No depression, anxiety, mood swings, or difficulty sleeping  HEME/LYMPH: No easy bruising, or bleeding gums  ALLERGY AND IMMUNOLOGIC: No hives or eczema	    PHYSICAL EXAM:  T(C): 36.8 (07-30-21 @ 13:24), Max: 36.8 (07-30-21 @ 13:24)  HR: 87 (07-30-21 @ 13:24) (66 - 87)  BP: 148/79 (07-30-21 @ 13:24) (134/89 - 148/79)  RR: 17 (07-30-21 @ 13:24) (16 - 18)  SpO2: 98% (07-30-21 @ 13:24) (98% - 99%)        Appearance: Normal	  HEENT:   Normal oral mucosa, PERRL, EOMI	  Lymphatic: No lymphadenopathy  Cardiovascular: Normal S1 S2, No JVD, No murmurs, +2 edema  Respiratory: Lungs clear to auscultation	  Psychiatry: A & O x 3, Mood & affect appropriate  Gastrointestinal:  Soft, Non-tender, + BS	  Skin: No rashes, No ecchymoses, No cyanosis	  Neurologic: Non-focal  Extremities: Normal range of motion, No clubbing, cyanosis +2 edema  Vascular: Peripheral pulses palpable 2+ bilaterally        ECG:   Sinus rhythm with 1st degree A-V block  Rightward axis  Septal infarct , age undetermined    	  LABS:	 	                      12.5   4.72  )-----------( 184      ( 29 Jul 2021 23:48 )             41.6     07-29    141  |  112<H>  |  17  ----------------------------<  87  3.9   |  23  |  1.03    Ca    8.7      29 Jul 2021 23:48    TPro  7.4  /  Alb  3.0<L>  /  TBili  0.8  /  DBili  x   /  AST  33  /  ALT  28  /  AlkPhos  130<H>  07-29    c< from: Transthoracic Echocardiogram (03.25.21 @ 12:35) >  OBSERVATIONS:  Mitral Valve: Tethered mitral valve leaflets. Mild to  moderate mitral regurgitation.  Aortic Root: Normal aortic root.  Aortic Valve: Calcified trileaflet aortic valve with  decreased opening. Peak transaortic valve gradient equals  16.8 mm Hg, mean transaortic valve gradient equals 10 mm  Hg, consistent with mild aortic stenosis. The aortic valve  apears more stenotic than indicated by gradients. In  setting oflow EF, cannot rule out paradoxical low-flow  low-gradient AS. Consider dobutamine stress echocardiogram  for further assessment if clinically indicated.   Trace  aortic regurgitation.  Left Atrium: Normal left atrium.  LA volume index = 28  cc/m2.  Left Ventricle: Severe global left ventricular systolic  dysfunction (EF 15-20% by visual estimation). Not all LV  wall segments were seen. Mild left ventricular enlargement.  Grade II diastolic dysfunction.  Right Heart: Normal right atrium. Off axisimages preclude  accurate assessment of right ventricular size. Reduced RV  systolic function (TAPSE 1.0 cm). Normal tricuspid valve.  Moderate to severe tricuspid regurgitation. Pulmonic valve  not well seen. Trace pulmonic insufficiency is noted.  Pericardium/PleuraNo pericardial effusion. Right pleural  effusion.  Hemodynamic: RA Pressure is 8 mm Hg. RV systolic pressure  is severely increased at  67 mm Hg.    < end of copied text >  < from: US Duplex Venous Lower Ext Complete, Bilateral (07.30.21 @ 11:34) >  EXAM:  US DPLX LWR EXT VEINS COMPL BI                            PROCEDURE DATE:  07/30/2021          INTERPRETATION:  CLINICAL INFORMATION: Bilateral leg edema    COMPARISON: None available.    TECHNIQUE: Duplex sonography of the BILATERAL LOWER extremity veins with color and spectral Doppler, with and without compression.    FINDINGS:    RIGHT:  Normal compressibility of the RIGHT common femoral, femoral and popliteal veins.  Doppler examination shows normal spontaneous and phasic flow.  No RIGHT calf vein thrombosis is detected.    Soft tissue edema in the right calf.    LEFT:  Normal compressibility of the LEFT common femoral, femoral and popliteal veins.  Doppler examination shows normal spontaneous and phasic flow.  No LEFT calf vein thrombosis is detected.    Soft tissue edema in the left calf.    IMPRESSION:  No evidence of deep venous thrombosis in either lower extremity.    Soft tissue edema in the calves bilaterally.    ASTRID CEDENO MD; Attending Radiologist  This document has been electronically signed. Jul 30 2021 12:30PM      EXAM:  XR CHEST PORTABLE IMMED 1V                            PROCEDURE DATE:  04/19/2021          INTERPRETATION:  AP chest on April 19, 2021 at 2:41 AM. Patient has abdominal and leg pain.    Heart enlargement again noted. Sternotomy again seen.    There is no sense of active lung or pleural finding.    IMPRESSION: Chest is similar to April 17 of this year.    IMPRESSION: Heart enlargement and sternotomy again noted.            JORGE RAE M.D., ATTENDING RADIOLOGIST  This document has been electronically signed. Apr 19 2021  8:19AM        
Patient is a 70y old  Male who is  from home, w/ PMHx of HTN, HLD, DM, PE, CAD s/p CABG 4 years ago, CHF on Lasix, PE on eliquis. Presents to the ER for evaluation of chronic b/l L.E swelling and worsening left LE cellulitis. He has taken 2 courses of PO Abx. Left leg is swollen, erythematous, has purulent drainage, and ulcerations. On admission, he has no fever and no Leukocytosis. He has started on IV Vancomycin and The ID consult requested to assist with further evaluation and antibiotic management.      REVIEW OF SYSTEMS: Total of twelve systems have been reviewed with patient and found to be negative unless mentioned in HPI      PAST MEDICAL & SURGICAL HISTORY:  CAD (coronary artery disease)  DM (diabetes mellitus)  Dyslipidemia  CHF (congestive heart failure)  Angina pectoris  HTN (hypertension)  PE (pulmonary thromboembolism)  S/P CABG x 3      SOCIAL HISTORY  Alcohol: Does not drink  Tobacco: Does not smoke  Illicit substance use: None      FAMILY HISTORY: Non contributory to the present illness      ALLERGIES: Aldactone (Unknown), Bumex (Blisters; Rash), metoprolol (Unknown), spironolactone (Unknown)      Vital Signs Last 24 Hrs  T(C): 36.8 (30 Jul 2021 13:24), Max: 36.8 (30 Jul 2021 13:24)  T(F): 98.2 (30 Jul 2021 13:24), Max: 98.2 (30 Jul 2021 13:24)  HR: 87 (30 Jul 2021 13:24) (66 - 87)  BP: 148/79 (30 Jul 2021 13:24) (134/89 - 148/79)  BP(mean): --  RR: 17 (30 Jul 2021 13:24) (16 - 18)  SpO2: 98% (30 Jul 2021 13:24) (98% - 99%)      PHYSICAL EXAM:  GENERAL: Not in distress   CHEST/LUNG:  Not using accessory muscles  HEART: s1 and s2 present  ABDOMEN:  Nontender and  Nondistended  EXTREMITIES: LLE swollen, dry skin with draining wound   CNS: Awake and Alert      LABS:                        12.5   4.72  )-----------( 184      ( 29 Jul 2021 23:48 )             41.6       07-29    141  |  112<H>  |  17  ----------------------------<  87  3.9   |  23  |  1.03    Ca    8.7      29 Jul 2021 23:48    TPro  7.4  /  Alb  3.0<L>  /  TBili  0.8  /  DBili  x   /  AST  33  /  ALT  28  /  AlkPhos  130<H>  07-29    PT/INR - ( 29 Jul 2021 23:48 )   PT: 15.7 sec;   INR: 1.34 ratio         PTT - ( 29 Jul 2021 23:48 )  PTT:38.9 sec    CAPILLARY BLOOD GLUCOSE  POCT Blood Glucose.: 121 mg/dL (30 Jul 2021 17:14)  POCT Blood Glucose.: 95 mg/dL (30 Jul 2021 08:18)        MEDICATIONS  (STANDING):  apixaban 5 milliGRAM(s) Oral every 12 hours  aspirin enteric coated 81 milliGRAM(s) Oral daily  atorvastatin 40 milliGRAM(s) Oral at bedtime  carvedilol 3.125 milliGRAM(s) Oral every 12 hours  collagenase Ointment 1 Application(s) Topical daily  furosemide    Tablet 40 milliGRAM(s) Oral daily  insulin lispro (ADMELOG) corrective regimen sliding scale   SubCutaneous three times a day before meals  insulin lispro (ADMELOG) corrective regimen sliding scale   SubCutaneous at bedtime  lisinopril 5 milliGRAM(s) Oral daily  pantoprazole    Tablet 40 milliGRAM(s) Oral before breakfast  vancomycin  IVPB 1250 milliGRAM(s) IV Intermittent every 12 hours    MEDICATIONS  (PRN):        RADIOLOGY & ADDITIONAL TESTS:    7/30/21: US Duplex Venous Lower Ext Complete, Bilateral (07.30.21 @ 11:34) No evidence of deep venous thrombosis in either lower extremity. Soft tissue edema in the calves bilaterally.      7/30/21: Xray Chest 1 View- PORTABLE-Routine (Xray Chest 1 View- PORTABLE-Routine .) (07.30.21 @ 09:31) Sternotomy wires again noted.    New mild opacity right lung base with trace right pleural effusion. Heart size cannot be accurately assessed in this projection, but appear enlarged.      MICROBIOLOGY DATA:      COVID-19 PCR . (07.30.21 @ 00:19)   COVID-19 PCR: NotDetec:                  
  HPI:  Pt is a 71 yo M from home, w/ PMHx of HTN, HLD, DM, PE, CAD s/p CABG 4 years ago, CHF on Lasix, PE on eliquis. Presenting w/ chronic b/l L.E swelling and worsening left l.e cellulitis. He has taken 2 courses of PO Abx. Left leg is swollen, erythematous, has purulent drainage, and ulcerations.   Pt denies any history of diabetes, and is non compliant with HLD medications. (30 Jul 2021 01:54)      Podiatry HPI: 70 y.o male was resting comfortably in bed. Pt was sleeping. Attempted to wake up pt with attending Dr. Zain cochran. Pt refused to be touched and did not want to be evaluated. Unable to obtain history or evaluate pt. Podiatry was able to take cultures     PMH:No pertinent past medical history    CAD (coronary artery disease)    DM (diabetes mellitus)    Dyslipidemia    CHF (congestive heart failure)    Angina pectoris    HTN (hypertension)    PE (pulmonary thromboembolism)      Allergies: Aldactone (Unknown)  Bumex (Blisters; Rash)  metoprolol (Unknown)  spironolactone (Unknown)    Medications: apixaban 5 milliGRAM(s) Oral every 12 hours  aspirin enteric coated 81 milliGRAM(s) Oral daily  atorvastatin 40 milliGRAM(s) Oral at bedtime  carvedilol 3.125 milliGRAM(s) Oral every 12 hours  collagenase Ointment 1 Application(s) Topical daily  furosemide    Tablet 40 milliGRAM(s) Oral daily  insulin lispro (ADMELOG) corrective regimen sliding scale   SubCutaneous three times a day before meals  insulin lispro (ADMELOG) corrective regimen sliding scale   SubCutaneous at bedtime  lisinopril 5 milliGRAM(s) Oral daily  pantoprazole    Tablet 40 milliGRAM(s) Oral before breakfast  vancomycin  IVPB 1250 milliGRAM(s) IV Intermittent every 12 hours    FH:No pertinent family history in first degree relatives    No pertinent family history in first degree relatives      PSX: S/P CABG x 3      SH: Social History:  Denies smoking. Drinks a beer occasionally. (30 Jul 2021 01:54)      Vital Signs Last 24 Hrs  T(C): 36.2 (30 Jul 2021 03:35), Max: 36.2 (30 Jul 2021 03:35)  T(F): 97.1 (30 Jul 2021 03:35), Max: 97.1 (30 Jul 2021 03:35)  HR: 70 (30 Jul 2021 03:35) (66 - 70)  BP: 142/87 (30 Jul 2021 03:35) (134/89 - 142/87)  BP(mean): --  RR: 18 (30 Jul 2021 03:35) (16 - 18)  SpO2: 99% (30 Jul 2021 03:35) (98% - 99%)    LABS                        12.5   4.72  )-----------( 184      ( 29 Jul 2021 23:48 )             41.6               07-29    141  |  112<H>  |  17  ----------------------------<  87  3.9   |  23  |  1.03    Ca    8.7      29 Jul 2021 23:48    TPro  7.4  /  Alb  3.0<L>  /  TBili  0.8  /  DBili  x   /  AST  33  /  ALT  28  /  AlkPhos  130<H>  07-29     WBC Count: 4.72 K/uL (07-29-21 @ 23:48)      ROS  REVIEW OF SYSTEMS: Negative unless stated otherwise in the HPI      PHYSICAL EXAM  GEN: LIDIA PÉREZ is a pleasant well-nourished, well developed 70y Male in no acute distress, alert awake, and oriented to person, place and time.   LE Focused:  L leg focused   Vasc:  DP/PT non palpable due to edema.   Derm: unable to obtain. Will attempt again tomorrow   Neuro: unable to obtain. Will attempt again tomorrow   MSK:  unable to obtain. Will attempt again tomorrow     Imaging: Pending official read- Left leg and foot     Culture pending of L leg wound     Duplex L leg

## 2021-07-30 NOTE — ED ADULT NURSE REASSESSMENT NOTE - NS ED NURSE REASSESS COMMENT FT1
11 pm . pt started  screaming yelling  that he wants to wrap his both lower leg , but DR. KENT told that it can not be wrapped because the inpatient doctors wants to examine . pt screaming at break covering Rn and regular rn , with all abusive language . the RN and PCA offered assistance pt refused help and told get out and  a dirty word WITh '' F''.  dr. kent made aware . escalated to charge RN and nsg supervisor . Iv inserted lab sent , antibiotics started .pt is admitted to medical floor . pt remains very mad and screaming at everyone

## 2021-07-30 NOTE — H&P ADULT - PROBLEM SELECTOR PLAN 5
-HbA1c of 6.6 on 4/2/21  -Pt denies having DM thus does not take DM medications  -adjust sliding scale as needed

## 2021-07-30 NOTE — ED PROVIDER NOTE - PHYSICAL EXAMINATION
Vital Signs Reviewed  GEN: Comfortable, NAD, AAOx3  HEENT: NCAT, MMM, Neck Supple  RESP: CTAB, No rales/rhonchi/wheezing  CV: RRR, S1S2, No murmurs  ABD: No TTP, Soft, ND, No masses, No CVA Tenderness  Extrem/Skin: L lower leg w/ 2+ pitting edema with chronic skin changes, ulcers, and erythema and induration, R lower leg with 2+ pitting edema w/o induration, L>>R leg swelling, No crepitus/bullae, Equal pulses bilat, No cyanosis/edema/rashes  Neuro: No focal deficits

## 2021-07-30 NOTE — H&P ADULT - PROBLEM SELECTOR PLAN 6
IMPROVE VTE Individual Risk Assessment  RISK                                                                Points  [  ] Previous VTE                                                  3  [  ] Thrombophilia                                               2  [  ] Lower limb paralysis                                      2        (unable to hold up >15 seconds)    [  ] Current Cancer                                              2         (within 6 months)  [  ] Immobilization > 24 hrs                                1  [  ] ICU/CCU stay > 24 hours                              1  [  ] Age > 60                                                      1  IMPROVE VTE Score _____2___    PPI for GI prophylaxis  Lovenox for DVT PPX IMPROVE VTE Individual Risk Assessment  RISK                                                                Points  [  ] Previous VTE                                                  3  [  ] Thrombophilia                                               2  [  ] Lower limb paralysis                                      2        (unable to hold up >15 seconds)    [  ] Current Cancer                                              2         (within 6 months)  [  ] Immobilization > 24 hrs                                1  [  ] ICU/CCU stay > 24 hours                              1  [  ] Age > 60                                                      1  IMPROVE VTE Score _____3___    PPI for GI prophylaxis  Lovenox for DVT PPX

## 2021-07-30 NOTE — H&P ADULT - PROBLEM SELECTOR PLAN 1
Consulted Podiatry  Consulted ID, Dr. Reynolds  start Vancomycin 1250 q12 hrs  f/u Vanc trough, before every 4th dose Consulted Podiatry  Wound care  Consulted ID, Dr. Lauren  start Vancomycin 1250 q12 hrs  f/u Vanc trough, before every 4th dose

## 2021-07-30 NOTE — H&P ADULT - HISTORY OF PRESENT ILLNESS
Pt is a 69 yo M from home, w/ PMHx of HTN, HLD, DM, PE, CAD s/p CABG 4 years ago, CHF on Lasix, PE on eliquis. Presenting w/ chronic b/l L.E swelling and worsening left l.e cellulitis. He has taken 2 courses of PO Abx. Left leg is swollen, erythematous, has purulent drainage, and ulcerations.   Pt denies any history of diabetes, and is non compliant with HLD medications.

## 2021-07-30 NOTE — H&P ADULT - NSHPPHYSICALEXAM_GEN_ALL_CORE
LOS:     VITALS:   T(C): 36.1 (07-30-21 @ 01:17), Max: 36.1 (07-30-21 @ 01:17)  HR: 70 (07-30-21 @ 01:17) (66 - 70)  BP: 136/88 (07-30-21 @ 01:17) (134/89 - 136/88)  RR: 16 (07-30-21 @ 01:17) (16 - 16)  SpO2: 98% (07-30-21 @ 01:17) (98% - 99%)    GENERAL: NAD, lying in bed comfortably  HEAD:  Atraumatic, Normocephalic  EYES: EOMI, PERRLA, conjunctiva and sclera clear  ENT: Moist mucous membranes  CHEST/LUNG: Clear to auscultation bilaterally; No rales, rhonchi, wheezing, or rubs. Unlabored respirations  HEART: Regular rate and rhythm; No murmurs, rubs, or gallops  ABDOMEN: BSx4; Soft, nontender, nondistended  EXTREMITIES:  difficulty palpating Peripheral Pulses. Erythematous, swollen, purulent drainage of left leg  NERVOUS SYSTEM:  A&Ox3, no focal deficits

## 2021-07-30 NOTE — H&P ADULT - PROBLEM SELECTOR PLAN 4
- c/w home meds: lasix 40mg Echo 3/25/21 Severe global left ventricular systolic dysfunction (EF  15-20%)  - c/w home meds: lasix 40mg  - Tele monitoring  -Strict I/O's   -Daily weights  - Dash diet Echo 3/25/21 Severe global left ventricular systolic dysfunction (EF  15-20%)  - c/w home meds: lasix 40mg  - Tele monitoring  - Dash diet Echo 3/25/21 Severe global left ventricular systolic dysfunction (EF  15-20%)  - c/w home meds: lasix 40mg  - Dash diet Echo 3/25/21 Severe global left ventricular systolic dysfunction (EF  15-20%)  - c/w home meds: lasix 40mg  -lisinopril 5mg PO qd, coreg 3.125mg PO BID  - Dash diet

## 2021-07-30 NOTE — H&P ADULT - ASSESSMENT
69 yo M from home, w/ PMHx of HTN, HLD, DM, PE, CAD s/p CABG 4 years ago, CHF on Lasix, PE on eliquis. Presenting w/  worsening left l.e cellulitis.

## 2021-07-30 NOTE — ED PROVIDER NOTE - OBJECTIVE STATEMENT
69 yo M h/o CHF on lasix, PE on eliquis, chronic bilat lower extremity swelling, at least 2 courses of PO ABx in the past one month for L LE cellulitis p/w worsening pain and redness in L lower leg. Pt states that he doesn't recall the ABx name but has been taking the pills for at least 2 weeks and was rx PO ABx prior to that course as well. Pt denies recent fever/ N/V/ diarrhea, dysuria or frequency/hesitancy, chest pain, SOB, focal numbness/weakness, syncope, other recent illness or hospitalizations.

## 2021-07-30 NOTE — CHART NOTE - NSCHARTNOTEFT_GEN_A_CORE
Rodríguez from wound care notified me to consult podiatry for lower extremity ulcerations/cellulitis.  I cancelled the wound care consult and placed a podiatry consult.  Also paged podiatry through [facility].  Podiatry to evaluate patient.

## 2021-07-30 NOTE — H&P ADULT - NSHPREVIEWOFSYSTEMS_GEN_ALL_CORE
REVIEW OF SYSTEMS:    CONSTITUTIONAL: No weakness, fevers or chills  EYES/ENT: No visual changes;  No vertigo or throat pain   NECK: No pain or stiffness  RESPIRATORY: SOB on exertion. No cough, wheezing, hemoptysis  CARDIOVASCULAR: No chest pain or palpitations  GASTROINTESTINAL: No abdominal or epigastric pain. No nausea, vomiting, or hematemesis; No diarrhea or constipation. No melena or hematochezia.  GENITOURINARY: No dysuria, frequency or hematuria  NEUROLOGICAL: No numbness or weakness  SKIN: bilateral lower extremity cellulitis. Erythematous, swollen, purulent drainage.

## 2021-07-31 LAB
ALBUMIN SERPL ELPH-MCNC: 2.7 G/DL — LOW (ref 3.5–5)
ALP SERPL-CCNC: 122 U/L — HIGH (ref 40–120)
ALT FLD-CCNC: 26 U/L DA — SIGNIFICANT CHANGE UP (ref 10–60)
ANION GAP SERPL CALC-SCNC: 6 MMOL/L — SIGNIFICANT CHANGE UP (ref 5–17)
AST SERPL-CCNC: 29 U/L — SIGNIFICANT CHANGE UP (ref 10–40)
BILIRUB SERPL-MCNC: 0.9 MG/DL — SIGNIFICANT CHANGE UP (ref 0.2–1.2)
BUN SERPL-MCNC: 15 MG/DL — SIGNIFICANT CHANGE UP (ref 7–18)
CALCIUM SERPL-MCNC: 8 MG/DL — LOW (ref 8.4–10.5)
CHLORIDE SERPL-SCNC: 112 MMOL/L — HIGH (ref 96–108)
CO2 SERPL-SCNC: 23 MMOL/L — SIGNIFICANT CHANGE UP (ref 22–31)
COVID-19 SPIKE DOMAIN AB INTERP: POSITIVE
COVID-19 SPIKE DOMAIN ANTIBODY RESULT: >250 U/ML — HIGH
CREAT SERPL-MCNC: 1.03 MG/DL — SIGNIFICANT CHANGE UP (ref 0.5–1.3)
GLUCOSE BLDC GLUCOMTR-MCNC: 124 MG/DL — HIGH (ref 70–99)
GLUCOSE BLDC GLUCOMTR-MCNC: 140 MG/DL — HIGH (ref 70–99)
GLUCOSE BLDC GLUCOMTR-MCNC: 97 MG/DL — SIGNIFICANT CHANGE UP (ref 70–99)
GLUCOSE BLDC GLUCOMTR-MCNC: 99 MG/DL — SIGNIFICANT CHANGE UP (ref 70–99)
GLUCOSE SERPL-MCNC: 131 MG/DL — HIGH (ref 70–99)
HCT VFR BLD CALC: 37.6 % — LOW (ref 39–50)
HGB BLD-MCNC: 11.8 G/DL — LOW (ref 13–17)
MCHC RBC-ENTMCNC: 25.8 PG — LOW (ref 27–34)
MCHC RBC-ENTMCNC: 31.4 GM/DL — LOW (ref 32–36)
MCV RBC AUTO: 82.3 FL — SIGNIFICANT CHANGE UP (ref 80–100)
NRBC # BLD: 0 /100 WBCS — SIGNIFICANT CHANGE UP (ref 0–0)
PLATELET # BLD AUTO: 179 K/UL — SIGNIFICANT CHANGE UP (ref 150–400)
POTASSIUM SERPL-MCNC: 3.8 MMOL/L — SIGNIFICANT CHANGE UP (ref 3.5–5.3)
POTASSIUM SERPL-SCNC: 3.8 MMOL/L — SIGNIFICANT CHANGE UP (ref 3.5–5.3)
PROT SERPL-MCNC: 6.8 G/DL — SIGNIFICANT CHANGE UP (ref 6–8.3)
RBC # BLD: 4.57 M/UL — SIGNIFICANT CHANGE UP (ref 4.2–5.8)
RBC # FLD: 23.2 % — HIGH (ref 10.3–14.5)
SARS-COV-2 IGG+IGM SERPL QL IA: >250 U/ML — HIGH
SARS-COV-2 IGG+IGM SERPL QL IA: POSITIVE
SODIUM SERPL-SCNC: 141 MMOL/L — SIGNIFICANT CHANGE UP (ref 135–145)
VANCOMYCIN TROUGH SERPL-MCNC: 17.2 UG/ML — SIGNIFICANT CHANGE UP (ref 10–20)
WBC # BLD: 5.26 K/UL — SIGNIFICANT CHANGE UP (ref 3.8–10.5)
WBC # FLD AUTO: 5.26 K/UL — SIGNIFICANT CHANGE UP (ref 3.8–10.5)

## 2021-07-31 PROCEDURE — 73630 X-RAY EXAM OF FOOT: CPT | Mod: 26,LT

## 2021-07-31 PROCEDURE — 73590 X-RAY EXAM OF LOWER LEG: CPT | Mod: 26,LT

## 2021-07-31 RX ADMIN — Medication 40 MILLIGRAM(S): at 12:01

## 2021-07-31 RX ADMIN — Medication 166.67 MILLIGRAM(S): at 23:10

## 2021-07-31 RX ADMIN — Medication 1 APPLICATION(S): at 13:16

## 2021-07-31 RX ADMIN — APIXABAN 5 MILLIGRAM(S): 2.5 TABLET, FILM COATED ORAL at 06:21

## 2021-07-31 RX ADMIN — CARVEDILOL PHOSPHATE 3.12 MILLIGRAM(S): 80 CAPSULE, EXTENDED RELEASE ORAL at 17:05

## 2021-07-31 RX ADMIN — APIXABAN 5 MILLIGRAM(S): 2.5 TABLET, FILM COATED ORAL at 17:05

## 2021-07-31 RX ADMIN — Medication 166.67 MILLIGRAM(S): at 13:16

## 2021-07-31 RX ADMIN — ATORVASTATIN CALCIUM 40 MILLIGRAM(S): 80 TABLET, FILM COATED ORAL at 21:08

## 2021-07-31 RX ADMIN — PANTOPRAZOLE SODIUM 40 MILLIGRAM(S): 20 TABLET, DELAYED RELEASE ORAL at 06:22

## 2021-07-31 NOTE — PROGRESS NOTE ADULT - SUBJECTIVE AND OBJECTIVE BOX
CHIEF COMPLAINT:Patient is a 70y old  Male who presents with a chief complaint of Left leg cellulitis.Pt appears comfortable.    	  REVIEW OF SYSTEMS:  CONSTITUTIONAL: No fever, weight loss, or fatigue  EYES: No eye pain, visual disturbances, or discharge  ENT:  No difficulty hearing, tinnitus, vertigo; No sinus or throat pain  NECK: No pain or stiffness  RESPIRATORY: No cough, wheezing, chills or hemoptysis; No Shortness of Breath  CARDIOVASCULAR: No chest pain, palpitations, passing out, dizziness, or leg swelling  GASTROINTESTINAL: No abdominal or epigastric pain. No nausea, vomiting, or hematemesis; No diarrhea or constipation. No melena or hematochezia.  GENITOURINARY: No dysuria, frequency, hematuria, or incontinence  NEUROLOGICAL: No headaches, memory loss, loss of strength, numbness, or tremors  SKIN: No itching, burning, rashes, or lesions   LYMPH Nodes: No enlarged glands  ENDOCRINE: No heat or cold intolerance; No hair loss  MUSCULOSKELETAL: No joint pain or swelling; No muscle, back, or extremity pain  PSYCHIATRIC: No depression, anxiety, mood swings, or difficulty sleeping  HEME/LYMPH: No easy bruising, or bleeding gums  ALLERGY AND IMMUNOLOGIC: No hives or eczema	        PHYSICAL EXAM:  T(C): 37 (07-31-21 @ 06:20), Max: 37 (07-31-21 @ 06:20)  HR: 66 (07-31-21 @ 06:20) (66 - 87)  BP: 112/71 (07-31-21 @ 06:20) (112/71 - 148/79)  RR: 18 (07-31-21 @ 06:20) (17 - 18)  SpO2: 97% (07-31-21 @ 06:20) (97% - 98%)  Wt(kg): --  I&O's Summary      Appearance: Normal	  HEENT:   Normal oral mucosa, PERRL, EOMI	  Lymphatic: No lymphadenopathy  Cardiovascular: Normal S1 S2, No JVD, No murmurs, No edema  Respiratory: Lungs clear to auscultation	  Psychiatry: A & O x 3, Mood & affect appropriate  Gastrointestinal:  Soft, Non-tender, + BS	  Skin: No rashes, No ecchymoses, No cyanosis	  Neurologic: Non-focal  Extremities: Normal range of motion, No clubbing, cyanosis or edema  Vascular: Peripheral pulses palpable 2+ bilaterally    MEDICATIONS  (STANDING):  apixaban 5 milliGRAM(s) Oral every 12 hours  aspirin enteric coated 81 milliGRAM(s) Oral daily  atorvastatin 40 milliGRAM(s) Oral at bedtime  carvedilol 3.125 milliGRAM(s) Oral every 12 hours  collagenase Ointment 1 Application(s) Topical daily  furosemide    Tablet 40 milliGRAM(s) Oral daily  insulin lispro (ADMELOG) corrective regimen sliding scale   SubCutaneous three times a day before meals  insulin lispro (ADMELOG) corrective regimen sliding scale   SubCutaneous at bedtime  lisinopril 5 milliGRAM(s) Oral daily  pantoprazole    Tablet 40 milliGRAM(s) Oral before breakfast  vancomycin  IVPB 1250 milliGRAM(s) IV Intermittent every 12 hours      	  LABS:	 	                    12.5   4.72  )-----------( 184      ( 29 Jul 2021 23:48 )             41.6     07-29    141  |  112<H>  |  17  ----------------------------<  87  3.9   |  23  |  1.03    Ca    8.7      29 Jul 2021 23:48    TPro  7.4  /  Alb  3.0<L>  /  TBili  0.8  /  DBili  x   /  AST  33  /  ALT  28  /  AlkPhos  130<H>  07-29        	           CHIEF COMPLAINT:Patient is a 70y old  Male who presents with a chief complaint of Left leg cellulitis.Pt appears comfortable.    	  REVIEW OF SYSTEMS:  CONSTITUTIONAL: No fever, weight loss, or fatigue  EYES: No eye pain, visual disturbances, or discharge  ENT:  No difficulty hearing, tinnitus, vertigo; No sinus or throat pain  NECK: No pain or stiffness  RESPIRATORY: No cough, wheezing, chills or hemoptysis; No Shortness of Breath  CARDIOVASCULAR: No chest pain, palpitations, passing out, dizziness, or leg swelling  GASTROINTESTINAL: No abdominal or epigastric pain. No nausea, vomiting, or hematemesis; No diarrhea or constipation. No melena or hematochezia.  GENITOURINARY: No dysuria, frequency, hematuria, or incontinence  NEUROLOGICAL: No headaches, memory loss, loss of strength, numbness, or tremors  SKIN: No itching, burning, rashes, or lesions   LYMPH Nodes: No enlarged glands  ENDOCRINE: No heat or cold intolerance; No hair loss  MUSCULOSKELETAL: No joint pain or swelling; No muscle, back, or extremity pain  PSYCHIATRIC: No depression, anxiety, mood swings, or difficulty sleeping  HEME/LYMPH: No easy bruising, or bleeding gums  ALLERGY AND IMMUNOLOGIC: No hives or eczema	        PHYSICAL EXAM:  T(C): 37 (07-31-21 @ 06:20), Max: 37 (07-31-21 @ 06:20)  HR: 66 (07-31-21 @ 06:20) (66 - 87)  BP: 112/71 (07-31-21 @ 06:20) (112/71 - 148/79)  RR: 18 (07-31-21 @ 06:20) (17 - 18)  SpO2: 97% (07-31-21 @ 06:20) (97% - 98%)  Wt(kg): --  I&O's Summary      Appearance: Normal	  HEENT:   Normal oral mucosa, PERRL, EOMI	  Lymphatic: No lymphadenopathy  Cardiovascular: Normal S1 S2, No JVD, No murmurs, +2 edema  Respiratory: Lungs clear to auscultation	  Psychiatry: A & O x 3, Mood & affect appropriate  Gastrointestinal:  Soft, Non-tender, + BS	  Skin: No rashes, No ecchymoses, No cyanosis	  Neurologic: Non-focal  Extremities: Normal range of motion, No clubbing, cyanosis +2 edema,Lt leg ulcers.    MEDICATIONS  (STANDING):  apixaban 5 milliGRAM(s) Oral every 12 hours  aspirin enteric coated 81 milliGRAM(s) Oral daily  atorvastatin 40 milliGRAM(s) Oral at bedtime  carvedilol 3.125 milliGRAM(s) Oral every 12 hours  collagenase Ointment 1 Application(s) Topical daily  furosemide    Tablet 40 milliGRAM(s) Oral daily  insulin lispro (ADMELOG) corrective regimen sliding scale   SubCutaneous three times a day before meals  insulin lispro (ADMELOG) corrective regimen sliding scale   SubCutaneous at bedtime  lisinopril 5 milliGRAM(s) Oral daily  pantoprazole    Tablet 40 milliGRAM(s) Oral before breakfast  vancomycin  IVPB 1250 milliGRAM(s) IV Intermittent every 12 hours      	  LABS:	 	                    12.5   4.72  )-----------( 184      ( 29 Jul 2021 23:48 )             41.6     07-29    141  |  112<H>  |  17  ----------------------------<  87  3.9   |  23  |  1.03    Ca    8.7      29 Jul 2021 23:48    TPro  7.4  /  Alb  3.0<L>  /  TBili  0.8  /  DBili  x   /  AST  33  /  ALT  28  /  AlkPhos  130<H>  07-29

## 2021-07-31 NOTE — PROGRESS NOTE ADULT - ASSESSMENT
70y M from home with PMH HTN, HLD, DM, PE, CAD s/p CABG (3.5 yrs ago), presenting with swelling abdomen and legs, chronic systolic HF,Lt leg cellulitis.  1.Left leg cellulitis-ABX as per ID.  2.Chronic systolic HF-coreg,ace,lasix po.  3.Pt reports allergy to aldactone.  4.CAD-asa,b blocker,statin.  5.PE-Eliquis.  6.DM-Insulin.  7.HTN-coreg,ace.  8.Vascular eval noted.f/u as outpatient.  9.PPI.

## 2021-07-31 NOTE — PROGRESS NOTE ADULT - ASSESSMENT
1. PE   On A/C   Monitor for bleeding   DVT and GI PPX     2. Left lower ext. Cellulitis  Abx  Check pending cultures  Podiatry consult  Wound care  ID f/u     3. CHF  Cont meds  Cardio F/U   Monitor I&O     4. DM   Accuchecks  Reg insulin coverage per HSS  A1C level     5. Pulmonary HTN   By hx  Cont meds  Bronchodilators  O2 Supp PRN

## 2021-07-31 NOTE — PROGRESS NOTE ADULT - SUBJECTIVE AND OBJECTIVE BOX
Patient is seen and examined at the bed side, is afebrile. The Blood cultures have no growth to date.       REVIEW OF SYSTEMS: All other review systems are negative      ALLERGIES: Aldactone (Unknown), Bumex (Blisters; Rash), metoprolol (Unknown), spironolactone (Unknown)      Vital Signs Last 24 Hrs  T(C): 36.4 (31 Jul 2021 13:11), Max: 37 (31 Jul 2021 06:20)  T(F): 97.6 (31 Jul 2021 13:11), Max: 98.6 (31 Jul 2021 06:20)  HR: 57 (31 Jul 2021 13:11) (57 - 66)  BP: 127/79 (31 Jul 2021 13:11) (112/71 - 127/79)  BP(mean): --  RR: 17 (31 Jul 2021 13:11) (17 - 18)  SpO2: 97% (31 Jul 2021 13:11) (97% - 97%)      PHYSICAL EXAM:  GENERAL: Not in distress   CHEST/LUNG:  Not using accessory muscles  HEART: s1 and s2 present  ABDOMEN:  Nontender and  Nondistended  EXTREMITIES: LLE swollen, dry skin with draining wound   CNS: Awake and Alert      LABS:                        11.8   5.26  )-----------( 179      ( 31 Jul 2021 12:30 )             37.6                           12.5   4.72  )-----------( 184      ( 29 Jul 2021 23:48 )             41.6         07-31    141  |  112<H>  |  15  ----------------------------<  131<H>  3.8   |  23  |  1.03    Ca    8.0<L>      31 Jul 2021 12:30    TPro  6.8  /  Alb  2.7<L>  /  TBili  0.9  /  DBili  x   /  AST  29  /  ALT  26  /  AlkPhos  122<H>  07-31 07-29    141  |  112<H>  |  17  ----------------------------<  87  3.9   |  23  |  1.03    Ca    8.7      29 Jul 2021 23:48    TPro  7.4  /  Alb  3.0<L>  /  TBili  0.8  /  DBili  x   /  AST  33  /  ALT  28  /  AlkPhos  130<H>  07-29    PT/INR - ( 29 Jul 2021 23:48 )   PT: 15.7 sec;   INR: 1.34 ratio         PTT - ( 29 Jul 2021 23:48 )  PTT:38.9 sec    CAPILLARY BLOOD GLUCOSE  POCT Blood Glucose.: 121 mg/dL (30 Jul 2021 17:14)  POCT Blood Glucose.: 95 mg/dL (30 Jul 2021 08:18)        MEDICATIONS  (STANDING):    apixaban 5 milliGRAM(s) Oral every 12 hours  aspirin enteric coated 81 milliGRAM(s) Oral daily  atorvastatin 40 milliGRAM(s) Oral at bedtime  carvedilol 3.125 milliGRAM(s) Oral every 12 hours  collagenase Ointment 1 Application(s) Topical daily  furosemide    Tablet 40 milliGRAM(s) Oral daily  insulin lispro (ADMELOG) corrective regimen sliding scale   SubCutaneous three times a day before meals  insulin lispro (ADMELOG) corrective regimen sliding scale   SubCutaneous at bedtime  lisinopril 5 milliGRAM(s) Oral daily  pantoprazole    Tablet 40 milliGRAM(s) Oral before breakfast  vancomycin  IVPB 1250 milliGRAM(s) IV Intermittent every 12 hours      RADIOLOGY & ADDITIONAL TESTS:    7/30/21: US Duplex Venous Lower Ext Complete, Bilateral (07.30.21 @ 11:34) No evidence of deep venous thrombosis in either lower extremity. Soft tissue edema in the calves bilaterally.      7/30/21: Xray Chest 1 View- PORTABLE-Routine (Xray Chest 1 View- PORTABLE-Routine .) (07.30.21 @ 09:31) Sternotomy wires again noted.    New mild opacity right lung base with trace right pleural effusion. Heart size cannot be accurately assessed in this projection, but appear enlarged.      MICROBIOLOGY DATA:    Culture - Blood (07.30.21 @ 06:35)   Specimen Source: .Blood Blood-Peripheral   Culture Results: No growth to date.     Culture - Blood (07.30.21 @ 06:35)   Specimen Source: .Blood Blood-Peripheral   Culture Results: No growth to date.     COVID-19 PCR . (07.30.21 @ 00:19)   COVID-19 PCR: NotDetec:                   Patient is seen and examined at the bed side, is afebrile. The Blood cultures have no growth to date. The wound culture is in process.       REVIEW OF SYSTEMS: All other review systems are negative      ALLERGIES: Aldactone (Unknown), Bumex (Blisters; Rash), metoprolol (Unknown), spironolactone (Unknown)      Vital Signs Last 24 Hrs  T(C): 36.4 (31 Jul 2021 13:11), Max: 37 (31 Jul 2021 06:20)  T(F): 97.6 (31 Jul 2021 13:11), Max: 98.6 (31 Jul 2021 06:20)  HR: 57 (31 Jul 2021 13:11) (57 - 66)  BP: 127/79 (31 Jul 2021 13:11) (112/71 - 127/79)  BP(mean): --  RR: 17 (31 Jul 2021 13:11) (17 - 18)  SpO2: 97% (31 Jul 2021 13:11) (97% - 97%)      PHYSICAL EXAM:  GENERAL: Not in distress   CHEST/LUNG:  Not using accessory muscles  HEART: s1 and s2 present  ABDOMEN:  Nontender and  Nondistended  EXTREMITIES: LLE swollen, dry skin with draining wound   CNS: Awake and Alert      LABS:                        11.8   5.26  )-----------( 179      ( 31 Jul 2021 12:30 )             37.6                           12.5   4.72  )-----------( 184      ( 29 Jul 2021 23:48 )             41.6         07-31    141  |  112<H>  |  15  ----------------------------<  131<H>  3.8   |  23  |  1.03    Ca    8.0<L>      31 Jul 2021 12:30    TPro  6.8  /  Alb  2.7<L>  /  TBili  0.9  /  DBili  x   /  AST  29  /  ALT  26  /  AlkPhos  122<H>  07-31 07-29    141  |  112<H>  |  17  ----------------------------<  87  3.9   |  23  |  1.03    Ca    8.7      29 Jul 2021 23:48    TPro  7.4  /  Alb  3.0<L>  /  TBili  0.8  /  DBili  x   /  AST  33  /  ALT  28  /  AlkPhos  130<H>  07-29    PT/INR - ( 29 Jul 2021 23:48 )   PT: 15.7 sec;   INR: 1.34 ratio         PTT - ( 29 Jul 2021 23:48 )  PTT:38.9 sec    CAPILLARY BLOOD GLUCOSE  POCT Blood Glucose.: 121 mg/dL (30 Jul 2021 17:14)  POCT Blood Glucose.: 95 mg/dL (30 Jul 2021 08:18)        MEDICATIONS  (STANDING):    apixaban 5 milliGRAM(s) Oral every 12 hours  aspirin enteric coated 81 milliGRAM(s) Oral daily  atorvastatin 40 milliGRAM(s) Oral at bedtime  carvedilol 3.125 milliGRAM(s) Oral every 12 hours  collagenase Ointment 1 Application(s) Topical daily  furosemide    Tablet 40 milliGRAM(s) Oral daily  insulin lispro (ADMELOG) corrective regimen sliding scale   SubCutaneous three times a day before meals  insulin lispro (ADMELOG) corrective regimen sliding scale   SubCutaneous at bedtime  lisinopril 5 milliGRAM(s) Oral daily  pantoprazole    Tablet 40 milliGRAM(s) Oral before breakfast  vancomycin  IVPB 1250 milliGRAM(s) IV Intermittent every 12 hours      RADIOLOGY & ADDITIONAL TESTS:    7/30/21: US Duplex Venous Lower Ext Complete, Bilateral (07.30.21 @ 11:34) No evidence of deep venous thrombosis in either lower extremity. Soft tissue edema in the calves bilaterally.      7/30/21: Xray Chest 1 View- PORTABLE-Routine (Xray Chest 1 View- PORTABLE-Routine .) (07.30.21 @ 09:31) Sternotomy wires again noted.    New mild opacity right lung base with trace right pleural effusion. Heart size cannot be accurately assessed in this projection, but appear enlarged.      MICROBIOLOGY DATA:    Culture - Blood (07.30.21 @ 06:35)   Specimen Source: .Blood Blood-Peripheral   Culture Results: No growth to date.     Culture - Blood (07.30.21 @ 06:35)   Specimen Source: .Blood Blood-Peripheral   Culture Results: No growth to date.     COVID-19 PCR . (07.30.21 @ 00:19)   COVID-19 PCR: NotDetec:

## 2021-07-31 NOTE — PROGRESS NOTE ADULT - ASSESSMENT
Patient is a 70y old  Male who is  from home, w/ PMHx of HTN, HLD, DM, PE, CAD s/p CABG 4 years ago, CHF on Lasix, PE on eliquis. Presents to the ER for evaluation of chronic b/l L.E swelling and worsening left LE cellulitis. He has taken 2 courses of PO Abx. Left leg is swollen, erythematous, has purulent drainage, and ulcerations. On admission, he has no fever and no Leukocytosis. He has started on IV Vancomycin and The ID consult requested to assist with further evaluation and antibiotic management.    # LLE purulent Cellulitis      would recommend:    1. Follow up wound cultures  2. Continue IV Vancomycin and keep level between 15 to 20  3. Keep LLE elevated  4. Wound care and keep it moist     d/w patient and Nursing staff       Attending Attestation:    Spent more than 45 minutes on total encounter, more than 50 % of the visit was spent counseling and/or coordinating care by the Attending physician. Patient is a 70y old  Male who is  from home, w/ PMHx of HTN, HLD, DM, PE, CAD s/p CABG 4 years ago, CHF on Lasix, PE on eliquis. Presents to the ER for evaluation of chronic b/l L.E swelling and worsening left LE cellulitis. He has taken 2 courses of PO Abx. Left leg is swollen, erythematous, has purulent drainage, and ulcerations. On admission, he has no fever and no Leukocytosis. He has started on IV Vancomycin and The ID consult requested to assist with further evaluation and antibiotic management.    # LLE purulent Cellulitis      would recommend:    1. Follow up wound cultures, is in process   2. Continue IV Vancomycin and keep level between 15 to 20  3. Keep LLE elevated  4. Continue Wound care and keep LE moist     d/w patient and Nursing staff       Attending Attestation:    Spent more than 45 minutes on total encounter, more than 50 % of the visit was spent counseling and/or coordinating care by the Attending physician.

## 2021-07-31 NOTE — PROGRESS NOTE ADULT - SUBJECTIVE AND OBJECTIVE BOX
Patient is a 70y old  Male who presents with a chief complaint of Left leg cellulitis (31 Jul 2021 11:13)  Awake, alert, comfortable in bed in NAD. Still with sob/Kaplan    INTERVAL HPI/OVERNIGHT EVENTS:      VITAL SIGNS:  T(F): 98.6 (07-31-21 @ 06:20)  HR: 66 (07-31-21 @ 06:20)  BP: 112/71 (07-31-21 @ 06:20)  RR: 18 (07-31-21 @ 06:20)  SpO2: 97% (07-31-21 @ 06:20)  Wt(kg): --  I&O's Detail          REVIEW OF SYSTEMS:    CONSTITUTIONAL:  No fevers, chills, sweats    HEENT:  Eyes:  No diplopia or blurred vision. ENT:  No earache, sore throat or runny nose.    CARDIOVASCULAR:  No pressure, squeezing, tightness, or heaviness about the chest; no palpitations.    RESPIRATORY:  Per HPI    GASTROINTESTINAL:  No abdominal pain, nausea, vomiting or diarrhea.    GENITOURINARY:  No dysuria, frequency or urgency.    NEUROLOGIC:  No paresthesias, fasciculations, seizures or weakness.    PSYCHIATRIC:  No disorder of thought or mood.      PHYSICAL EXAM:    Constitutional: Well developed and nourished  Eyes:Perrla  ENMT: normal  Neck:supple  Respiratory: good air entry  Cardiovascular: S1 S2 regular  Gastrointestinal: Soft, Non tender  Extremities: + edema  Vascular:normal  Neurological:Awake, alert,Ox3  Musculoskeletal:Normal      MEDICATIONS  (STANDING):  apixaban 5 milliGRAM(s) Oral every 12 hours  aspirin enteric coated 81 milliGRAM(s) Oral daily  atorvastatin 40 milliGRAM(s) Oral at bedtime  carvedilol 3.125 milliGRAM(s) Oral every 12 hours  collagenase Ointment 1 Application(s) Topical daily  furosemide    Tablet 40 milliGRAM(s) Oral daily  insulin lispro (ADMELOG) corrective regimen sliding scale   SubCutaneous three times a day before meals  insulin lispro (ADMELOG) corrective regimen sliding scale   SubCutaneous at bedtime  lisinopril 5 milliGRAM(s) Oral daily  pantoprazole    Tablet 40 milliGRAM(s) Oral before breakfast  vancomycin  IVPB 1250 milliGRAM(s) IV Intermittent every 12 hours    MEDICATIONS  (PRN):      Allergies    Aldactone (Unknown)  Bumex (Blisters; Rash)  metoprolol (Unknown)  spironolactone (Unknown)    Intolerances        LABS:                        11.8   5.26  )-----------( 179      ( 31 Jul 2021 12:30 )             37.6     07-31    141  |  112<H>  |  15  ----------------------------<  131<H>  3.8   |  23  |  1.03    Ca    8.0<L>      31 Jul 2021 12:30    TPro  6.8  /  Alb  2.7<L>  /  TBili  0.9  /  DBili  x   /  AST  29  /  ALT  26  /  AlkPhos  122<H>  07-31    PT/INR - ( 29 Jul 2021 23:48 )   PT: 15.7 sec;   INR: 1.34 ratio         PTT - ( 29 Jul 2021 23:48 )  PTT:38.9 sec          CAPILLARY BLOOD GLUCOSE      POCT Blood Glucose.: 124 mg/dL (31 Jul 2021 11:59)  POCT Blood Glucose.: 97 mg/dL (31 Jul 2021 07:51)  POCT Blood Glucose.: 121 mg/dL (30 Jul 2021 17:14)        RADIOLOGY & ADDITIONAL TESTS:    CXR:  < from: Xray Chest 1 View- PORTABLE-Routine (Xray Chest 1 View- PORTABLE-Routine .) (07.30.21 @ 09:31) >  FINDINGS/  IMPRESSION:  Sternotomy wires again noted.    New mild opacity right lung base with trace right pleural effusion.    Heart size cannot be accurately assessed in this projection, but appear enlarged.    < end of copied text >  < from: US Duplex Venous Lower Ext Complete, Bilateral (07.30.21 @ 11:34) >    IMPRESSION:  No evidence of deep venous thrombosis in either lower extremity.    Soft tissue edema in the calves bilaterally.        < end of copied text >    Ct scan chest:    ekg;    echo:

## 2021-07-31 NOTE — PROGRESS NOTE ADULT - SUBJECTIVE AND OBJECTIVE BOX
Patient is a 70y old  Male who presents with a chief complaint of Left leg cellulitis (30 Jul 2021 18:59)    pt seen in icu [  ], reg med floor [   ], bed [  ], chair at bedside [   ], a+o x3 [  ], lethargic [  ],  nad [  ]    cruz [  ], ngt [  ], peg [  ], et tube [  ], cent line [  ], picc line [  ]        Allergies    Aldactone (Unknown)  Bumex (Blisters; Rash)  metoprolol (Unknown)  spironolactone (Unknown)        Vitals    T(F): 98.2 (07-30-21 @ 13:24), Max: 98.2 (07-30-21 @ 13:24)  HR: 87 (07-30-21 @ 13:24) (87 - 87)  BP: 148/79 (07-30-21 @ 13:24) (148/79 - 148/79)  RR: 17 (07-30-21 @ 13:24) (17 - 17)  SpO2: 98% (07-30-21 @ 13:24) (98% - 98%)  Wt(kg): --  CAPILLARY BLOOD GLUCOSE      POCT Blood Glucose.: 121 mg/dL (30 Jul 2021 17:14)      Labs                          12.5   4.72  )-----------( 184      ( 29 Jul 2021 23:48 )             41.6       07-29    141  |  112<H>  |  17  ----------------------------<  87  3.9   |  23  |  1.03    Ca    8.7      29 Jul 2021 23:48    TPro  7.4  /  Alb  3.0<L>  /  TBili  0.8  /  DBili  x   /  AST  33  /  ALT  28  /  AlkPhos  130<H>  07-29                Radiology Results      Meds    MEDICATIONS  (STANDING):  apixaban 5 milliGRAM(s) Oral every 12 hours  aspirin enteric coated 81 milliGRAM(s) Oral daily  atorvastatin 40 milliGRAM(s) Oral at bedtime  carvedilol 3.125 milliGRAM(s) Oral every 12 hours  collagenase Ointment 1 Application(s) Topical daily  furosemide    Tablet 40 milliGRAM(s) Oral daily  insulin lispro (ADMELOG) corrective regimen sliding scale   SubCutaneous three times a day before meals  insulin lispro (ADMELOG) corrective regimen sliding scale   SubCutaneous at bedtime  lisinopril 5 milliGRAM(s) Oral daily  pantoprazole    Tablet 40 milliGRAM(s) Oral before breakfast  vancomycin  IVPB 1250 milliGRAM(s) IV Intermittent every 12 hours      MEDICATIONS  (PRN):      Physical Exam    Neuro :  no focal deficits  Respiratory: CTA B/L  CV: RRR, S1S2, no murmurs,   Abdominal: Soft, NT, ND +BS,  Extremities: No edema, + peripheral pulses    ASSESSMENT    Cellulitis    No pertinent past medical history    CAD (coronary artery disease)    DM (diabetes mellitus)    Dyslipidemia    CHF (congestive heart failure)    Angina pectoris    HTN (hypertension)    PE (pulmonary thromboembolism)    S/P CABG x 3        PLAN     Patient is a 70y old  Male who presents with a chief complaint of Left leg cellulitis (30 Jul 2021 18:59)    pt seen in icu [  ], reg med floor [x   ], bed [ x ], chair at bedside [   ], a+o x3 [ x ], lethargic [  ],  nad [ x ]      Allergies    Aldactone (Unknown)  Bumex (Blisters; Rash)  metoprolol (Unknown)  spironolactone (Unknown)        Vitals    T(F): 98.2 (07-30-21 @ 13:24), Max: 98.2 (07-30-21 @ 13:24)  HR: 87 (07-30-21 @ 13:24) (87 - 87)  BP: 148/79 (07-30-21 @ 13:24) (148/79 - 148/79)  RR: 17 (07-30-21 @ 13:24) (17 - 17)  SpO2: 98% (07-30-21 @ 13:24) (98% - 98%)  Wt(kg): --  CAPILLARY BLOOD GLUCOSE      POCT Blood Glucose.: 121 mg/dL (30 Jul 2021 17:14)      Labs                          12.5   4.72  )-----------( 184      ( 29 Jul 2021 23:48 )             41.6       07-29    141  |  112<H>  |  17  ----------------------------<  87  3.9   |  23  |  1.03    Ca    8.7      29 Jul 2021 23:48    TPro  7.4  /  Alb  3.0<L>  /  TBili  0.8  /  DBili  x   /  AST  33  /  ALT  28  /  AlkPhos  130<H>  07-29      Radiology Results    < from: Xray Chest 1 View- PORTABLE-Routine (Xray Chest 1 View- PORTABLE-Routine .) (07.30.21 @ 09:31) >  Sternotomy wires again noted.    New mild opacity right lung base with trace right pleural effusion.    Heart size cannot be accurately assessed in this projection, but appear enlarged.    < end of copied text >      < from: US Duplex Venous Lower Ext Complete, Bilateral (07.30.21 @ 11:34) >  IMPRESSION:  No evidence of deep venous thrombosis in either lower extremity.    Soft tissue edema in the calves bilaterally.    < end of copied text >    Meds    MEDICATIONS  (STANDING):  apixaban 5 milliGRAM(s) Oral every 12 hours  aspirin enteric coated 81 milliGRAM(s) Oral daily  atorvastatin 40 milliGRAM(s) Oral at bedtime  carvedilol 3.125 milliGRAM(s) Oral every 12 hours  collagenase Ointment 1 Application(s) Topical daily  furosemide    Tablet 40 milliGRAM(s) Oral daily  insulin lispro (ADMELOG) corrective regimen sliding scale   SubCutaneous three times a day before meals  insulin lispro (ADMELOG) corrective regimen sliding scale   SubCutaneous at bedtime  lisinopril 5 milliGRAM(s) Oral daily  pantoprazole    Tablet 40 milliGRAM(s) Oral before breakfast  vancomycin  IVPB 1250 milliGRAM(s) IV Intermittent every 12 hours      MEDICATIONS  (PRN):      Physical Exam    Neuro :  no focal deficits  Respiratory: CTA B/L  CV: RRR, S1S2, no murmurs,   Abdominal: Soft, NT, ND +BS,  Extremities: b/l le edema with multiple ulcerations l>r       ASSESSMENT    b/l le cellulitis 2nd to pvd,   h/o HTN,   HLD,   DM,   CAD s/p CABG 4 years ago,   systolic CHF on Lasix,   pulm htn,   PE on eliquis,   liver cirrhosis  Dyslipidemia        PLAN    podiatry f/u   Evaluated L leg and wound.s Dressed with Mupirocin, DSD.   podiatry cons  vascular cons noted  Recommend elevation of b/l LE  Pt can f/u with Dr. Canales as outpatiemt  No acute vascular surgery intervention at this time   venous doppler le with No evidence of deep venous thrombosis in either lower extremity noted above.  f/u wound cx  id f/u  Continue IV Vancomycin and keep level between 15 to 20  f/u blood cx   cardio f/u   cont coreg, lisinopril and lasix po.  .Pt reports allergy to aldactone.  cont asa and statin.  cxr with New mild opacity right lung base with trace right pleural effusion. Heart size cannot be accurately assessed in this projection, but appear enlarged noted above  pulm f/u   Bronchodilators prn  cont current meds

## 2021-07-31 NOTE — PROGRESS NOTE ADULT - ASSESSMENT
A:   Left leg cellulitis  Left leg wound      P:  Patient evaluated, chart reviewed  L foot and leg Xrays pending   Cultures pending   B/l duplex negative for DVT   Evaluated L leg and wound.s Dressed with Mupirocin, DSD.   Continue abx per primary team   Recommend elevation   Discussed with and evaluated bedside with Attending Dr. Pittman

## 2021-07-31 NOTE — PROGRESS NOTE ADULT - SUBJECTIVE AND OBJECTIVE BOX
HPI:  Pt is a 69 yo M from home, w/ PMHx of HTN, HLD, DM, PE, CAD s/p CABG 4 years ago, CHF on Lasix, PE on eliquis. Presenting w/ chronic b/l L.E swelling and worsening left l.e cellulitis. He has taken 2 courses of PO Abx. Left leg is swollen, erythematous, has purulent drainage, and ulcerations.   Pt denies any history of diabetes, and is non compliant with HLD medications. (30 Jul 2021 01:54)      Podiatry HPI: 70 y.o male was resting comfortably in bed. Pt states that he has had the L leg ulcer for a couple of weeks now. Cannot recall an exact time. Denies N/V/F/C/SOB. Denies calf pain. Denies other pedal problems.     Medications apixaban 5 milliGRAM(s) Oral every 12 hours  aspirin enteric coated 81 milliGRAM(s) Oral daily  atorvastatin 40 milliGRAM(s) Oral at bedtime  carvedilol 3.125 milliGRAM(s) Oral every 12 hours  collagenase Ointment 1 Application(s) Topical daily  furosemide    Tablet 40 milliGRAM(s) Oral daily  insulin lispro (ADMELOG) corrective regimen sliding scale   SubCutaneous three times a day before meals  insulin lispro (ADMELOG) corrective regimen sliding scale   SubCutaneous at bedtime  lisinopril 5 milliGRAM(s) Oral daily  pantoprazole    Tablet 40 milliGRAM(s) Oral before breakfast  vancomycin  IVPB 1250 milliGRAM(s) IV Intermittent every 12 hours    FHNo pertinent family history in first degree relatives    No pertinent family history in first degree relatives    ,   PMHNo pertinent past medical history    CAD (coronary artery disease)    DM (diabetes mellitus)    Dyslipidemia    CHF (congestive heart failure)    Angina pectoris    HTN (hypertension)    PE (pulmonary thromboembolism)       PSHS/P CABG x 3        Labs                          12.5   4.72  )-----------( 184      ( 29 Jul 2021 23:48 )             41.6      07-29    141  |  112<H>  |  17  ----------------------------<  87  3.9   |  23  |  1.03    Ca    8.7      29 Jul 2021 23:48    TPro  7.4  /  Alb  3.0<L>  /  TBili  0.8  /  DBili  x   /  AST  33  /  ALT  28  /  AlkPhos  130<H>  07-29     Vital Signs Last 24 Hrs  T(C): 37 (31 Jul 2021 06:20), Max: 37 (31 Jul 2021 06:20)  T(F): 98.6 (31 Jul 2021 06:20), Max: 98.6 (31 Jul 2021 06:20)  HR: 66 (31 Jul 2021 06:20) (66 - 87)  BP: 112/71 (31 Jul 2021 06:20) (112/71 - 148/79)  BP(mean): --  RR: 18 (31 Jul 2021 06:20) (17 - 18)  SpO2: 97% (31 Jul 2021 06:20) (97% - 98%)            ROS  REVIEW OF SYSTEMS: Negative unless stated otherwise in the HPI      PHYSICAL EXAM  GEN: LIDIA PÉREZ is a pleasant well-nourished, well developed 70y Male in no acute distress, alert awake, and oriented to person, place and time.   LE Focused:  L leg focused   Vasc:  DP/PT 1/4. CFT brisk to all digits. +1 pitting edema to dorsal foot and proximally to leg. TG warm to warm   Derm: 2 open lesions noted on the L left anterior leg. One measuring .5cm x .5 cm, fibrous base, mild periwound erythema. Serous drainage noted. Other wound measuring 1.2 cm x 1.5 cm, fibrous base, drainage noted, periwound erythema noted. No tunneling, no undermining, No PTB.   Neuro: Protective sensations intact   MSK:  4/5 muscle strength in all compartments. No POP of calfs.     Imaging: Pending official read- Left leg and foot     Culture pending of L leg wound     Duplex B/L leg (7/30):   IMPRESSION:  No evidence of deep venous thrombosis in either lower extremity.

## 2021-08-01 LAB
-  AMPICILLIN/SULBACTAM: SIGNIFICANT CHANGE UP
-  CEFAZOLIN: SIGNIFICANT CHANGE UP
-  CLINDAMYCIN: SIGNIFICANT CHANGE UP
-  ERYTHROMYCIN: SIGNIFICANT CHANGE UP
-  GENTAMICIN: SIGNIFICANT CHANGE UP
-  OXACILLIN: SIGNIFICANT CHANGE UP
-  PENICILLIN: SIGNIFICANT CHANGE UP
-  RIFAMPIN: SIGNIFICANT CHANGE UP
-  TETRACYCLINE: SIGNIFICANT CHANGE UP
-  TRIMETHOPRIM/SULFAMETHOXAZOLE: SIGNIFICANT CHANGE UP
-  VANCOMYCIN: SIGNIFICANT CHANGE UP
A1C WITH ESTIMATED AVERAGE GLUCOSE RESULT: 6.2 % — HIGH (ref 4–5.6)
ALBUMIN SERPL ELPH-MCNC: 2.9 G/DL — LOW (ref 3.5–5)
ALP SERPL-CCNC: 131 U/L — HIGH (ref 40–120)
ALT FLD-CCNC: 29 U/L DA — SIGNIFICANT CHANGE UP (ref 10–60)
ANION GAP SERPL CALC-SCNC: 9 MMOL/L — SIGNIFICANT CHANGE UP (ref 5–17)
AST SERPL-CCNC: 25 U/L — SIGNIFICANT CHANGE UP (ref 10–40)
BILIRUB SERPL-MCNC: 0.8 MG/DL — SIGNIFICANT CHANGE UP (ref 0.2–1.2)
BUN SERPL-MCNC: 15 MG/DL — SIGNIFICANT CHANGE UP (ref 7–18)
CALCIUM SERPL-MCNC: 8.5 MG/DL — SIGNIFICANT CHANGE UP (ref 8.4–10.5)
CHLORIDE SERPL-SCNC: 110 MMOL/L — HIGH (ref 96–108)
CO2 SERPL-SCNC: 21 MMOL/L — LOW (ref 22–31)
CREAT SERPL-MCNC: 0.91 MG/DL — SIGNIFICANT CHANGE UP (ref 0.5–1.3)
CULTURE RESULTS: SIGNIFICANT CHANGE UP
ESTIMATED AVERAGE GLUCOSE: 131 MG/DL — HIGH (ref 68–114)
GLUCOSE BLDC GLUCOMTR-MCNC: 120 MG/DL — HIGH (ref 70–99)
GLUCOSE BLDC GLUCOMTR-MCNC: 127 MG/DL — HIGH (ref 70–99)
GLUCOSE BLDC GLUCOMTR-MCNC: 137 MG/DL — HIGH (ref 70–99)
GLUCOSE BLDC GLUCOMTR-MCNC: 160 MG/DL — HIGH (ref 70–99)
GLUCOSE SERPL-MCNC: 104 MG/DL — HIGH (ref 70–99)
HCT VFR BLD CALC: 39.1 % — SIGNIFICANT CHANGE UP (ref 39–50)
HCV AB S/CO SERPL IA: 0.09 S/CO — SIGNIFICANT CHANGE UP (ref 0–0.99)
HCV AB SERPL-IMP: SIGNIFICANT CHANGE UP
HGB BLD-MCNC: 12.3 G/DL — LOW (ref 13–17)
MCHC RBC-ENTMCNC: 26.2 PG — LOW (ref 27–34)
MCHC RBC-ENTMCNC: 31.5 GM/DL — LOW (ref 32–36)
MCV RBC AUTO: 83.2 FL — SIGNIFICANT CHANGE UP (ref 80–100)
METHOD TYPE: SIGNIFICANT CHANGE UP
MRSA PCR RESULT.: SIGNIFICANT CHANGE UP
NRBC # BLD: 0 /100 WBCS — SIGNIFICANT CHANGE UP (ref 0–0)
ORGANISM # SPEC MICROSCOPIC CNT: SIGNIFICANT CHANGE UP
ORGANISM # SPEC MICROSCOPIC CNT: SIGNIFICANT CHANGE UP
PLATELET # BLD AUTO: 181 K/UL — SIGNIFICANT CHANGE UP (ref 150–400)
POTASSIUM SERPL-MCNC: 4 MMOL/L — SIGNIFICANT CHANGE UP (ref 3.5–5.3)
POTASSIUM SERPL-SCNC: 4 MMOL/L — SIGNIFICANT CHANGE UP (ref 3.5–5.3)
PROT SERPL-MCNC: 7.4 G/DL — SIGNIFICANT CHANGE UP (ref 6–8.3)
RBC # BLD: 4.7 M/UL — SIGNIFICANT CHANGE UP (ref 4.2–5.8)
RBC # FLD: 23.4 % — HIGH (ref 10.3–14.5)
S AUREUS DNA NOSE QL NAA+PROBE: SIGNIFICANT CHANGE UP
SODIUM SERPL-SCNC: 140 MMOL/L — SIGNIFICANT CHANGE UP (ref 135–145)
SPECIMEN SOURCE: SIGNIFICANT CHANGE UP
WBC # BLD: 5.19 K/UL — SIGNIFICANT CHANGE UP (ref 3.8–10.5)
WBC # FLD AUTO: 5.19 K/UL — SIGNIFICANT CHANGE UP (ref 3.8–10.5)

## 2021-08-01 RX ORDER — CEFAZOLIN SODIUM 1 G
2000 VIAL (EA) INJECTION EVERY 8 HOURS
Refills: 0 | Status: DISCONTINUED | OUTPATIENT
Start: 2021-08-02 | End: 2021-08-04

## 2021-08-01 RX ORDER — CEFAZOLIN SODIUM 1 G
VIAL (EA) INJECTION
Refills: 0 | Status: DISCONTINUED | OUTPATIENT
Start: 2021-08-01 | End: 2021-08-04

## 2021-08-01 RX ORDER — CEFAZOLIN SODIUM 1 G
2000 VIAL (EA) INJECTION ONCE
Refills: 0 | Status: COMPLETED | OUTPATIENT
Start: 2021-08-01 | End: 2021-08-01

## 2021-08-01 RX ORDER — BACITRACIN ZINC 500 UNIT/G
1 OINTMENT IN PACKET (EA) TOPICAL
Refills: 0 | Status: DISCONTINUED | OUTPATIENT
Start: 2021-08-01 | End: 2021-08-04

## 2021-08-01 RX ADMIN — ATORVASTATIN CALCIUM 40 MILLIGRAM(S): 80 TABLET, FILM COATED ORAL at 22:00

## 2021-08-01 RX ADMIN — Medication 1 APPLICATION(S): at 11:34

## 2021-08-01 RX ADMIN — APIXABAN 5 MILLIGRAM(S): 2.5 TABLET, FILM COATED ORAL at 05:37

## 2021-08-01 RX ADMIN — Medication 1 APPLICATION(S): at 17:04

## 2021-08-01 RX ADMIN — Medication 166.67 MILLIGRAM(S): at 11:34

## 2021-08-01 RX ADMIN — CARVEDILOL PHOSPHATE 3.12 MILLIGRAM(S): 80 CAPSULE, EXTENDED RELEASE ORAL at 17:04

## 2021-08-01 RX ADMIN — Medication 1: at 13:05

## 2021-08-01 RX ADMIN — APIXABAN 5 MILLIGRAM(S): 2.5 TABLET, FILM COATED ORAL at 17:04

## 2021-08-01 RX ADMIN — Medication 100 MILLIGRAM(S): at 22:00

## 2021-08-01 RX ADMIN — Medication 81 MILLIGRAM(S): at 11:34

## 2021-08-01 NOTE — PROGRESS NOTE ADULT - ASSESSMENT
Patient is a 70y old  Male who is  from home, w/ PMHx of HTN, HLD, DM, PE, CAD s/p CABG 4 years ago, CHF on Lasix, PE on eliquis. Presents to the ER for evaluation of chronic b/l L.E swelling and worsening left LE cellulitis. He has taken 2 courses of PO Abx. Left leg is swollen, erythematous, has purulent drainage, and ulcerations. On admission, he has no fever and no Leukocytosis. He has started on IV Vancomycin and The ID consult requested to assist with further evaluation and antibiotic management.    # LLE purulent Cellulitis  - wound cx grew MSSA    would recommend:    1. Change Vancomycin to Cefazolin   2. Keep LLE elevated  3. Continue Wound care and keep LE moist     d/w patient and Nursing staff       Attending Attestation:    Spent more than 35 minutes on total encounter, more than 50 % of the visit was spent counseling and/or coordinating care by the Attending physician.

## 2021-08-01 NOTE — PROGRESS NOTE ADULT - ASSESSMENT
A:   Left leg wounds    P:  Patient evaluated, chart reviewed  L LE Xrays pending   Cultures pending   B/l duplex negative for DVT   Evaluated L leg and wounds. Dressed with Mupirocin, DSD.   WCO in place to apply Santyl to Left leg wounds once per day. Dress with 4x4s, allegra, ACE   Continue abx per primary team   Recommend elevation   Discussed and evaluated bedside with Attending Dr. Pittman

## 2021-08-01 NOTE — PROGRESS NOTE ADULT - ASSESSMENT
70y M from home with PMH HTN, HLD, DM, PE, CAD s/p CABG (3.5 yrs ago), presenting with swelling abdomen and legs, chronic systolic HF,Lt leg cellulitis.  1.Left leg cellulitis-ABX as per ID.  2.Chronic systolic HF-coreg,ace,lasix po.  3.Pt reports allergy to aldactone.  4.CAD-asa,b blocker,statin.  5.PE-Eliquis.  6.DM-Insulin.  7.HTN-coreg,ace.  8.Vascular eval noted.f/u as outpatient.B/L ace dressing.  9.PPI.

## 2021-08-01 NOTE — PROGRESS NOTE ADULT - SUBJECTIVE AND OBJECTIVE BOX
Patient is seen and examined at the bed side, is afebrile.  The wound culture grew MSSA.       REVIEW OF SYSTEMS: All other review systems are negative      ALLERGIES: Aldactone (Unknown), Bumex (Blisters; Rash), metoprolol (Unknown), spironolactone (Unknown)      Vital Signs Last 24 Hrs  T(C): 36.1 (01 Aug 2021 04:58), Max: 36.1 (01 Aug 2021 04:58)  T(F): 97 (01 Aug 2021 04:58), Max: 97 (01 Aug 2021 04:58)  HR: 60 (01 Aug 2021 16:40) (60 - 68)  BP: 110/79 (01 Aug 2021 16:40) (99/57 - 110/79)  BP(mean): --  RR: 18 (01 Aug 2021 04:58) (18 - 18)  SpO2: 97% (01 Aug 2021 04:58) (97% - 97%)      PHYSICAL EXAM:  GENERAL: Not in distress   CHEST/LUNG:  Not using accessory muscles  HEART: s1 and s2 present  ABDOMEN:  Nontender and  Nondistended  EXTREMITIES: LLE swollen, dry skin with draining wound   CNS: Awake and Alert      LABS:                        12.3   5.19  )-----------( 181      ( 01 Aug 2021 08:15 )             39.1                           11.8   5.26  )-----------( 179      ( 31 Jul 2021 12:30 )             37.6       08-01    140  |  110<H>  |  15  ----------------------------<  104<H>  4.0   |  21<L>  |  0.91    Ca    8.5      01 Aug 2021 08:15    TPro  7.4  /  Alb  2.9<L>  /  TBili  0.8  /  DBili  x   /  AST  25  /  ALT  29  /  AlkPhos  131<H>  08-01 07-31    141  |  112<H>  |  15  ----------------------------<  131<H>  3.8   |  23  |  1.03    Ca    8.0<L>      31 Jul 2021 12:30    TPro  6.8  /  Alb  2.7<L>  /  TBili  0.9  /  DBili  x   /  AST  29  /  ALT  26  /  AlkPhos  122<H>  07-31      PT/INR - ( 29 Jul 2021 23:48 )   PT: 15.7 sec;   INR: 1.34 ratio         PTT - ( 29 Jul 2021 23:48 )  PTT:38.9 sec    CAPILLARY BLOOD GLUCOSE  POCT Blood Glucose.: 121 mg/dL (30 Jul 2021 17:14)  POCT Blood Glucose.: 95 mg/dL (30 Jul 2021 08:18)      Vancomycin Level, Trough (07.31.21 @ 12:31)   Vancomycin Level, Trough: 17.2:       MEDICATIONS  (STANDING):    apixaban 5 milliGRAM(s) Oral every 12 hours  aspirin enteric coated 81 milliGRAM(s) Oral daily  atorvastatin 40 milliGRAM(s) Oral at bedtime  BACItracin   Ointment 1 Application(s) Topical two times a day  carvedilol 3.125 milliGRAM(s) Oral every 12 hours  collagenase Ointment 1 Application(s) Topical daily  furosemide    Tablet 40 milliGRAM(s) Oral daily  insulin lispro (ADMELOG) corrective regimen sliding scale   SubCutaneous three times a day before meals  insulin lispro (ADMELOG) corrective regimen sliding scale   SubCutaneous at bedtime  lisinopril 5 milliGRAM(s) Oral daily  pantoprazole    Tablet 40 milliGRAM(s) Oral before breakfast  vancomycin  IVPB 1250 milliGRAM(s) IV Intermittent every 12 hours      RADIOLOGY & ADDITIONAL TESTS:    7/30/21: US Duplex Venous Lower Ext Complete, Bilateral (07.30.21 @ 11:34) No evidence of deep venous thrombosis in either lower extremity. Soft tissue edema in the calves bilaterally.      7/30/21: Xray Chest 1 View- PORTABLE-Routine (Xray Chest 1 View- PORTABLE-Routine .) (07.30.21 @ 09:31) Sternotomy wires again noted.    New mild opacity right lung base with trace right pleural effusion. Heart size cannot be accurately assessed in this projection, but appear enlarged.      MICROBIOLOGY DATA:    Culture - Blood (07.30.21 @ 06:35)   Specimen Source: .Blood Blood-Peripheral   Culture Results: No growth to date.     Culture - Blood (07.30.21 @ 06:35)   Specimen Source: .Blood Blood-Peripheral   Culture Results: No growth to date.     COVID-19 PCR . (07.30.21 @ 00:19)   COVID-19 PCR: NotDetec:                     Patient is seen and examined at the bed side, is afebrile.  The wound culture grew MSSA.       REVIEW OF SYSTEMS: All other review systems are negative      ALLERGIES: Aldactone (Unknown), Bumex (Blisters; Rash), metoprolol (Unknown), spironolactone (Unknown)      Vital Signs Last 24 Hrs  T(C): 36.1 (01 Aug 2021 04:58), Max: 36.1 (01 Aug 2021 04:58)  T(F): 97 (01 Aug 2021 04:58), Max: 97 (01 Aug 2021 04:58)  HR: 60 (01 Aug 2021 16:40) (60 - 68)  BP: 110/79 (01 Aug 2021 16:40) (99/57 - 110/79)  BP(mean): --  RR: 18 (01 Aug 2021 04:58) (18 - 18)  SpO2: 97% (01 Aug 2021 04:58) (97% - 97%)      PHYSICAL EXAM:  GENERAL: Not in distress   CHEST/LUNG:  Not using accessory muscles  HEART: s1 and s2 present  ABDOMEN:  Nontender and  Nondistended  EXTREMITIES: LE Bandage in placed   CNS: Awake and Alert      LABS:                        12.3   5.19  )-----------( 181      ( 01 Aug 2021 08:15 )             39.1                           11.8   5.26  )-----------( 179      ( 31 Jul 2021 12:30 )             37.6       08-01    140  |  110<H>  |  15  ----------------------------<  104<H>  4.0   |  21<L>  |  0.91    Ca    8.5      01 Aug 2021 08:15    TPro  7.4  /  Alb  2.9<L>  /  TBili  0.8  /  DBili  x   /  AST  25  /  ALT  29  /  AlkPhos  131<H>  08-01 07-31    141  |  112<H>  |  15  ----------------------------<  131<H>  3.8   |  23  |  1.03    Ca    8.0<L>      31 Jul 2021 12:30    TPro  6.8  /  Alb  2.7<L>  /  TBili  0.9  /  DBili  x   /  AST  29  /  ALT  26  /  AlkPhos  122<H>  07-31      PT/INR - ( 29 Jul 2021 23:48 )   PT: 15.7 sec;   INR: 1.34 ratio         PTT - ( 29 Jul 2021 23:48 )  PTT:38.9 sec    CAPILLARY BLOOD GLUCOSE  POCT Blood Glucose.: 121 mg/dL (30 Jul 2021 17:14)  POCT Blood Glucose.: 95 mg/dL (30 Jul 2021 08:18)      Vancomycin Level, Trough (07.31.21 @ 12:31)   Vancomycin Level, Trough: 17.2:       MEDICATIONS  (STANDING):    apixaban 5 milliGRAM(s) Oral every 12 hours  aspirin enteric coated 81 milliGRAM(s) Oral daily  atorvastatin 40 milliGRAM(s) Oral at bedtime  BACItracin   Ointment 1 Application(s) Topical two times a day  carvedilol 3.125 milliGRAM(s) Oral every 12 hours  collagenase Ointment 1 Application(s) Topical daily  furosemide    Tablet 40 milliGRAM(s) Oral daily  insulin lispro (ADMELOG) corrective regimen sliding scale   SubCutaneous three times a day before meals  insulin lispro (ADMELOG) corrective regimen sliding scale   SubCutaneous at bedtime  lisinopril 5 milliGRAM(s) Oral daily  pantoprazole    Tablet 40 milliGRAM(s) Oral before breakfast  vancomycin  IVPB 1250 milliGRAM(s) IV Intermittent every 12 hours      RADIOLOGY & ADDITIONAL TESTS:    7/30/21: US Duplex Venous Lower Ext Complete, Bilateral (07.30.21 @ 11:34) No evidence of deep venous thrombosis in either lower extremity. Soft tissue edema in the calves bilaterally.      7/30/21: Xray Chest 1 View- PORTABLE-Routine (Xray Chest 1 View- PORTABLE-Routine .) (07.30.21 @ 09:31) Sternotomy wires again noted.    New mild opacity right lung base with trace right pleural effusion. Heart size cannot be accurately assessed in this projection, but appear enlarged.      MICROBIOLOGY DATA:    Culture - Blood (07.30.21 @ 06:35)   Specimen Source: .Blood Blood-Peripheral   Culture Results: No growth to date.     Culture - Blood (07.30.21 @ 06:35)   Specimen Source: .Blood Blood-Peripheral   Culture Results: No growth to date.     COVID-19 PCR . (07.30.21 @ 00:19)   COVID-19 PCR: NotDetec:

## 2021-08-01 NOTE — PROGRESS NOTE ADULT - SUBJECTIVE AND OBJECTIVE BOX
HPI:  Pt is a 71 yo M from home, w/ PMHx of HTN, HLD, DM, PE, CAD s/p CABG 4 years ago, CHF on Lasix, PE on eliquis. Presenting w/ chronic b/l L.E swelling and worsening left l.e cellulitis. He has taken 2 courses of PO Abx. Left leg is swollen, erythematous, has purulent drainage, and ulcerations.   Pt denies any history of diabetes, and is non compliant with HLD medications. (30 Jul 2021 01:54)      Podiatry HPI: 70 y.o male was seen bedside resting comfortably in bed. Pt states that he has had the L leg ulcer for a couple of weeks now. Patient denies any pain to left leg. Denies N/V/F/C/SOB. Denies calf pain. Denies any other pedal problems.         Medications apixaban 5 milliGRAM(s) Oral every 12 hours  aspirin enteric coated 81 milliGRAM(s) Oral daily  atorvastatin 40 milliGRAM(s) Oral at bedtime  BACItracin   Ointment 1 Application(s) Topical two times a day  carvedilol 3.125 milliGRAM(s) Oral every 12 hours  collagenase Ointment 1 Application(s) Topical daily  furosemide    Tablet 40 milliGRAM(s) Oral daily  insulin lispro (ADMELOG) corrective regimen sliding scale   SubCutaneous three times a day before meals  insulin lispro (ADMELOG) corrective regimen sliding scale   SubCutaneous at bedtime  lisinopril 5 milliGRAM(s) Oral daily  pantoprazole    Tablet 40 milliGRAM(s) Oral before breakfast  vancomycin  IVPB 1250 milliGRAM(s) IV Intermittent every 12 hours    FHNo pertinent family history in first degree relatives    No pertinent family history in first degree relatives    ,   PMHNo pertinent past medical history    CAD (coronary artery disease)    DM (diabetes mellitus)    Dyslipidemia    CHF (congestive heart failure)    Angina pectoris    HTN (hypertension)    PE (pulmonary thromboembolism)       PSHS/P CABG x 3        Labs                          12.3   5.19  )-----------( 181      ( 01 Aug 2021 08:15 )             39.1      08-01    140  |  110<H>  |  15  ----------------------------<  104<H>  4.0   |  21<L>  |  0.91    Ca    8.5      01 Aug 2021 08:15    TPro  7.4  /  Alb  2.9<L>  /  TBili  0.8  /  DBili  x   /  AST  25  /  ALT  29  /  AlkPhos  131<H>  08-01     Vital Signs Last 24 Hrs  T(C): 36.1 (01 Aug 2021 04:58), Max: 36.4 (31 Jul 2021 13:11)  T(F): 97 (01 Aug 2021 04:58), Max: 97.6 (31 Jul 2021 13:11)  HR: 68 (01 Aug 2021 04:58) (57 - 68)  BP: 99/57 (01 Aug 2021 04:58) (99/57 - 127/79)  BP(mean): --  RR: 18 (01 Aug 2021 04:58) (17 - 18)  SpO2: 97% (01 Aug 2021 04:58) (97% - 97%)         WBC Count: 5.19 K/uL (08-01-21 @ 08:15)  WBC Count: 5.26 K/uL (07-31-21 @ 12:30)       ROS  REVIEW OF SYSTEMS: Negative unless stated otherwise in the HPI      PHYSICAL EXAM  GEN: LIDIA PÉREZ is a pleasant well-nourished, well developed 70y Male in no acute distress, alert awake, and oriented to person, place and time.   LE Focused:  L leg focused   Vasc:  DP/PT 1/4. CFT brisk to all digits. +1 pitting edema to dorsal foot and proximally to leg. Skin temperature warm to warm from proximal to distal LE.   Derm: 2 open lesions noted on the L left anterior leg.  Wound 1:   Measures 0.5cm x 0.5 cm x 0.1 cm with fibrous base. Periwound is normal. No drainage or malodor present. No clinical signs of infection. Sam grade 1  Wound 2:   Measures 1.2 cm x 1.5 cm x 0.1 cm with fibrotic base. Periwound is normal. No drainage or malodor present. No clinical signs of infection. Sam grade 1   Neuro: Protective sensations intact   MSK:  4/5 muscle strength in all compartments. No POP of calfs.     Imaging: Pending official read- Left leg and foot     Culture pending of L leg wound     Duplex B/L leg (7/30):   IMPRESSION:  No evidence of deep venous thrombosis in either lower extremity.

## 2021-08-01 NOTE — PROGRESS NOTE ADULT - SUBJECTIVE AND OBJECTIVE BOX
CHIEF COMPLAINT:Patient is a 70y old  Male who presents with a chief complaint of Left leg cellulitis.Pt appears comfortable.    	  REVIEW OF SYSTEMS:  CONSTITUTIONAL: No fever, weight loss, or fatigue  EYES: No eye pain, visual disturbances, or discharge  ENT:  No difficulty hearing, tinnitus, vertigo; No sinus or throat pain  NECK: No pain or stiffness  RESPIRATORY: No cough, wheezing, chills or hemoptysis; No Shortness of Breath  CARDIOVASCULAR: No chest pain, palpitations, passing out, dizziness, or leg swelling  GASTROINTESTINAL: No abdominal or epigastric pain. No nausea, vomiting, or hematemesis; No diarrhea or constipation. No melena or hematochezia.  GENITOURINARY: No dysuria, frequency, hematuria, or incontinence  NEUROLOGICAL: No headaches, memory loss, loss of strength, numbness, or tremors  SKIN: No itching, burning, rashes, or lesions   LYMPH Nodes: No enlarged glands  ENDOCRINE: No heat or cold intolerance; No hair loss  MUSCULOSKELETAL: No joint pain or swelling; No muscle, back, or extremity pain  PSYCHIATRIC: No depression, anxiety, mood swings, or difficulty sleeping  HEME/LYMPH: No easy bruising, or bleeding gums  ALLERGY AND IMMUNOLOGIC: No hives or eczema	    PHYSICAL EXAM:  T(C): 36.1 (08-01-21 @ 04:58), Max: 36.4 (07-31-21 @ 13:11)  HR: 68 (08-01-21 @ 04:58) (57 - 68)  BP: 99/57 (08-01-21 @ 04:58) (99/57 - 127/79)  RR: 18 (08-01-21 @ 04:58) (17 - 18)  SpO2: 97% (08-01-21 @ 04:58) (97% - 97%)  Wt(kg): --  I&O's Summary      Appearance: Normal	  HEENT:   Normal oral mucosa, PERRL, EOMI	  Lymphatic: No lymphadenopathy  Cardiovascular: Normal S1 S2, No JVD, No murmurs, No edema  Respiratory: Lungs clear to auscultation	  Psychiatry: A & O x 3, Mood & affect appropriate  Gastrointestinal:  Soft, Non-tender, + BS	  Skin: No rashes, No ecchymoses, No cyanosis	  Neurologic: Non-focal  Extremities: Normal range of motion, No clubbing, B/L dressing      MEDICATIONS  (STANDING):  apixaban 5 milliGRAM(s) Oral every 12 hours  aspirin enteric coated 81 milliGRAM(s) Oral daily  atorvastatin 40 milliGRAM(s) Oral at bedtime  BACItracin   Ointment 1 Application(s) Topical two times a day  carvedilol 3.125 milliGRAM(s) Oral every 12 hours  collagenase Ointment 1 Application(s) Topical daily  furosemide    Tablet 40 milliGRAM(s) Oral daily  insulin lispro (ADMELOG) corrective regimen sliding scale   SubCutaneous three times a day before meals  insulin lispro (ADMELOG) corrective regimen sliding scale   SubCutaneous at bedtime  lisinopril 5 milliGRAM(s) Oral daily  pantoprazole    Tablet 40 milliGRAM(s) Oral before breakfast  vancomycin  IVPB 1250 milliGRAM(s) IV Intermittent every 12 hours        LABS:	 	                        12.3   5.19  )-----------( 181      ( 01 Aug 2021 08:15 )             39.1     08-01    140  |  110<H>  |  15  ----------------------------<  104<H>  4.0   |  21<L>  |  0.91    Ca    8.5      01 Aug 2021 08:15    TPro  7.4  /  Alb  2.9<L>  /  TBili  0.8  /  DBili  x   /  AST  25  /  ALT  29  /  AlkPhos  131<H>  08-01    proBNP:   Lipid Profile:   HgA1c:   TSH:

## 2021-08-01 NOTE — PROGRESS NOTE ADULT - SUBJECTIVE AND OBJECTIVE BOX
Patient is a 70y old  Male who presents with a chief complaint of Left leg cellulitis (31 Jul 2021 18:34)    pt seen in icu [  ], reg med floor [x   ], bed [ x ], chair at bedside [   ], a+o x3 [ x ], lethargic [  ],    nad [ x ]      Allergies    Aldactone (Unknown)  Bumex (Blisters; Rash)  metoprolol (Unknown)  spironolactone (Unknown)        Vitals    T(F): 97 (08-01-21 @ 04:58), Max: 98.6 (07-31-21 @ 06:20)  HR: 68 (08-01-21 @ 04:58) (57 - 68)  BP: 99/57 (08-01-21 @ 04:58) (99/57 - 127/79)  RR: 18 (08-01-21 @ 04:58) (17 - 18)  SpO2: 97% (08-01-21 @ 04:58) (97% - 97%)  Wt(kg): --  CAPILLARY BLOOD GLUCOSE      POCT Blood Glucose.: 99 mg/dL (31 Jul 2021 21:07)      Labs                          11.8   5.26  )-----------( 179      ( 31 Jul 2021 12:30 )             37.6       07-31    141  |  112<H>  |  15  ----------------------------<  131<H>  3.8   |  23  |  1.03    Ca    8.0<L>      31 Jul 2021 12:30    TPro  6.8  /  Alb  2.7<L>  /  TBili  0.9  /  DBili  x   /  AST  29  /  ALT  26  /  AlkPhos  122<H>  07-31            .Surgical Swab Leg left wound  07-30 @ 18:26   Culture yields >4 types of aerobic and/or anaerobic bacteria  Call client services within 7 days if further workup is clinically  indicated.  Culture includes Few Staphylococcus aureus  --  --      .Blood Blood-Peripheral  07-30 @ 06:35   No growth to date.  --  --          Radiology Results      Meds    MEDICATIONS  (STANDING):  apixaban 5 milliGRAM(s) Oral every 12 hours  aspirin enteric coated 81 milliGRAM(s) Oral daily  atorvastatin 40 milliGRAM(s) Oral at bedtime  BACItracin   Ointment 1 Application(s) Topical two times a day  carvedilol 3.125 milliGRAM(s) Oral every 12 hours  collagenase Ointment 1 Application(s) Topical daily  furosemide    Tablet 40 milliGRAM(s) Oral daily  insulin lispro (ADMELOG) corrective regimen sliding scale   SubCutaneous three times a day before meals  insulin lispro (ADMELOG) corrective regimen sliding scale   SubCutaneous at bedtime  lisinopril 5 milliGRAM(s) Oral daily  pantoprazole    Tablet 40 milliGRAM(s) Oral before breakfast  vancomycin  IVPB 1250 milliGRAM(s) IV Intermittent every 12 hours      MEDICATIONS  (PRN):      Physical Exam      Neuro :  no focal deficits  Respiratory: CTA B/L  CV: RRR, S1S2, no murmurs,   Abdominal: Soft, NT, ND +BS,  Extremities: b/l le edema with multiple ulcerations l>r       ASSESSMENT    b/l le cellulitis 2nd to pvd,   h/o HTN,   HLD,   DM,   CAD s/p CABG 4 years ago,   systolic CHF on Lasix,   pulm htn,   PE on eliquis,   liver cirrhosis  Dyslipidemia        PLAN    podiatry f/u   Evaluated L leg and wound.s Dressed with Mupirocin, DSD.   podiatry cons  vascular cons noted  Recommend elevation of b/l LE  Pt can f/u with Dr. Canales as outpatiemt  No acute vascular surgery intervention at this time   venous doppler le with No evidence of deep venous thrombosis in either lower extremity noted above.  f/u wound cx  id f/u  Continue IV Vancomycin and keep level between 15 to 20  f/u blood cx   cardio f/u   cont coreg, lisinopril and lasix po.  .Pt reports allergy to aldactone.  cont asa and statin.  cxr with New mild opacity right lung base with trace right pleural effusion. Heart size cannot be accurately assessed in this projection, but appear enlarged noted above  pulm f/u   Bronchodilators prn  cont current meds       Patient is a 70y old  Male who presents with a chief complaint of Left leg cellulitis (31 Jul 2021 18:34)    pt seen in icu [  ], reg med floor [x   ], bed [ x ], chair at bedside [   ], a+o x3 [ x ], lethargic [  ],    nad [ x ]      Allergies    Aldactone (Unknown)  Bumex (Blisters; Rash)  metoprolol (Unknown)  spironolactone (Unknown)        Vitals    T(F): 97 (08-01-21 @ 04:58), Max: 98.6 (07-31-21 @ 06:20)  HR: 68 (08-01-21 @ 04:58) (57 - 68)  BP: 99/57 (08-01-21 @ 04:58) (99/57 - 127/79)  RR: 18 (08-01-21 @ 04:58) (17 - 18)  SpO2: 97% (08-01-21 @ 04:58) (97% - 97%)  Wt(kg): --  CAPILLARY BLOOD GLUCOSE      POCT Blood Glucose.: 99 mg/dL (31 Jul 2021 21:07)      Labs                          11.8   5.26  )-----------( 179      ( 31 Jul 2021 12:30 )             37.6       07-31    141  |  112<H>  |  15  ----------------------------<  131<H>  3.8   |  23  |  1.03    Ca    8.0<L>      31 Jul 2021 12:30    TPro  6.8  /  Alb  2.7<L>  /  TBili  0.9  /  DBili  x   /  AST  29  /  ALT  26  /  AlkPhos  122<H>  07-31            .Surgical Swab Leg left wound  07-30 @ 18:26   Culture yields >4 types of aerobic and/or anaerobic bacteria  Call client services within 7 days if further workup is clinically  indicated.  Culture includes Few Staphylococcus aureus  --  --      .Blood Blood-Peripheral  07-30 @ 06:35   No growth to date.  --  --          Radiology Results      Meds    MEDICATIONS  (STANDING):  apixaban 5 milliGRAM(s) Oral every 12 hours  aspirin enteric coated 81 milliGRAM(s) Oral daily  atorvastatin 40 milliGRAM(s) Oral at bedtime  BACItracin   Ointment 1 Application(s) Topical two times a day  carvedilol 3.125 milliGRAM(s) Oral every 12 hours  collagenase Ointment 1 Application(s) Topical daily  furosemide    Tablet 40 milliGRAM(s) Oral daily  insulin lispro (ADMELOG) corrective regimen sliding scale   SubCutaneous three times a day before meals  insulin lispro (ADMELOG) corrective regimen sliding scale   SubCutaneous at bedtime  lisinopril 5 milliGRAM(s) Oral daily  pantoprazole    Tablet 40 milliGRAM(s) Oral before breakfast  vancomycin  IVPB 1250 milliGRAM(s) IV Intermittent every 12 hours      MEDICATIONS  (PRN):      Physical Exam      Neuro :  no focal deficits  Respiratory: CTA B/L  CV: RRR, S1S2, no murmurs,   Abdominal: Soft, NT, ND +BS,  Extremities: b/l le edema with multiple ulcerations l>r       ASSESSMENT    b/l le cellulitis 2nd to pvd,   h/o HTN,   HLD,   DM,   CAD s/p CABG 4 years ago,   systolic CHF on Lasix,   pulm htn,   PE on eliquis,   liver cirrhosis  Dyslipidemia        PLAN    podiatry f/u   Evaluated L leg and wound.s Dressed with Mupirocin, DSD.   vascular cons noted  Recommend elevation of b/l LE  Pt can f/u with Dr. Canales as outpatiemt  No acute vascular surgery intervention at this time   venous doppler le with No evidence of deep venous thrombosis in either lower extremity noted above.  wound cx Culture includes Few Staphylococcus aureus noted above   f/u sens  id f/u  Continue IV Vancomycin and keep level between 15 to 20  f/u blood cx   cardio f/u   cont coreg, lisinopril and lasix po.  .Pt reports allergy to aldactone.  cont asa and statin.  cxr with New mild opacity right lung base with trace right pleural effusion. Heart size cannot be accurately assessed in this projection, but appear enlarged noted    pulm f/u   Bronchodilators prn  cont current meds

## 2021-08-01 NOTE — PROGRESS NOTE ADULT - SUBJECTIVE AND OBJECTIVE BOX
Patient is a 70y old  Male who presents with a chief complaint of Left leg cellulitis (01 Aug 2021 09:14)  Awake, alert, comfortable in bed in NAD. Still with Kaplan, no cough    INTERVAL HPI/OVERNIGHT EVENTS:      VITAL SIGNS:  T(F): 97 (08-01-21 @ 04:58)  HR: 68 (08-01-21 @ 04:58)  BP: 99/57 (08-01-21 @ 04:58)  RR: 18 (08-01-21 @ 04:58)  SpO2: 97% (08-01-21 @ 04:58)  Wt(kg): --  I&O's Detail          REVIEW OF SYSTEMS:    CONSTITUTIONAL:  No fevers, chills, sweats    HEENT:  Eyes:  No diplopia or blurred vision. ENT:  No earache, sore throat or runny nose.    CARDIOVASCULAR:  No pressure, squeezing, tightness, or heaviness about the chest; no palpitations.    RESPIRATORY:  Per HPI    GASTROINTESTINAL:  No abdominal pain, nausea, vomiting or diarrhea.    GENITOURINARY:  No dysuria, frequency or urgency.    NEUROLOGIC:  No paresthesias, fasciculations, seizures or weakness.    PSYCHIATRIC:  No disorder of thought or mood.      PHYSICAL EXAM:    Constitutional: Well developed and nourished  Eyes:Perrla  ENMT: normal  Neck:supple  Respiratory: good air entry  Cardiovascular: S1 S2 regular  Gastrointestinal: Soft, Non tender  Extremities: + edema  Vascular:normal  Neurological:Awake, alert,Ox3  Musculoskeletal:Normal      MEDICATIONS  (STANDING):  apixaban 5 milliGRAM(s) Oral every 12 hours  aspirin enteric coated 81 milliGRAM(s) Oral daily  atorvastatin 40 milliGRAM(s) Oral at bedtime  BACItracin   Ointment 1 Application(s) Topical two times a day  carvedilol 3.125 milliGRAM(s) Oral every 12 hours  collagenase Ointment 1 Application(s) Topical daily  furosemide    Tablet 40 milliGRAM(s) Oral daily  insulin lispro (ADMELOG) corrective regimen sliding scale   SubCutaneous three times a day before meals  insulin lispro (ADMELOG) corrective regimen sliding scale   SubCutaneous at bedtime  lisinopril 5 milliGRAM(s) Oral daily  pantoprazole    Tablet 40 milliGRAM(s) Oral before breakfast  vancomycin  IVPB 1250 milliGRAM(s) IV Intermittent every 12 hours    MEDICATIONS  (PRN):      Allergies    Aldactone (Unknown)  Bumex (Blisters; Rash)  metoprolol (Unknown)  spironolactone (Unknown)    Intolerances        LABS:                        12.3   5.19  )-----------( 181      ( 01 Aug 2021 08:15 )             39.1     08-01    140  |  110<H>  |  15  ----------------------------<  104<H>  4.0   |  21<L>  |  0.91    Ca    8.5      01 Aug 2021 08:15    TPro  7.4  /  Alb  2.9<L>  /  TBili  0.8  /  DBili  x   /  AST  25  /  ALT  29  /  AlkPhos  131<H>  08-01              CAPILLARY BLOOD GLUCOSE      POCT Blood Glucose.: 127 mg/dL (01 Aug 2021 08:25)  POCT Blood Glucose.: 99 mg/dL (31 Jul 2021 21:07)  POCT Blood Glucose.: 140 mg/dL (31 Jul 2021 16:39)  POCT Blood Glucose.: 124 mg/dL (31 Jul 2021 11:59)        RADIOLOGY & ADDITIONAL TESTS:    CXR:  < from: Xray Chest 1 View- PORTABLE-Routine (Xray Chest 1 View- PORTABLE-Routine .) (07.30.21 @ 09:31) >  FINDINGS/  IMPRESSION:  Sternotomy wires again noted.    New mild opacity right lung base with trace right pleural effusion.    Heart size cannot be accurately assessed in this projection, but appear enlarged.    < end of copied text >    Ct scan chest:    ekg;    echo:

## 2021-08-02 ENCOUNTER — TRANSCRIPTION ENCOUNTER (OUTPATIENT)
Age: 71
End: 2021-08-02

## 2021-08-02 DIAGNOSIS — Z02.9 ENCOUNTER FOR ADMINISTRATIVE EXAMINATIONS, UNSPECIFIED: ICD-10-CM

## 2021-08-02 LAB
GLUCOSE BLDC GLUCOMTR-MCNC: 107 MG/DL — HIGH (ref 70–99)
GLUCOSE BLDC GLUCOMTR-MCNC: 111 MG/DL — HIGH (ref 70–99)
GLUCOSE BLDC GLUCOMTR-MCNC: 95 MG/DL — SIGNIFICANT CHANGE UP (ref 70–99)

## 2021-08-02 RX ORDER — METRONIDAZOLE 500 MG
500 TABLET ORAL ONCE
Refills: 0 | Status: COMPLETED | OUTPATIENT
Start: 2021-08-02 | End: 2021-08-02

## 2021-08-02 RX ORDER — METRONIDAZOLE 500 MG
500 TABLET ORAL EVERY 8 HOURS
Refills: 0 | Status: DISCONTINUED | OUTPATIENT
Start: 2021-08-03 | End: 2021-08-04

## 2021-08-02 RX ORDER — METRONIDAZOLE 500 MG
TABLET ORAL
Refills: 0 | Status: DISCONTINUED | OUTPATIENT
Start: 2021-08-02 | End: 2021-08-04

## 2021-08-02 RX ADMIN — Medication 1 APPLICATION(S): at 11:51

## 2021-08-02 RX ADMIN — APIXABAN 5 MILLIGRAM(S): 2.5 TABLET, FILM COATED ORAL at 17:26

## 2021-08-02 RX ADMIN — LISINOPRIL 5 MILLIGRAM(S): 2.5 TABLET ORAL at 05:48

## 2021-08-02 RX ADMIN — CARVEDILOL PHOSPHATE 3.12 MILLIGRAM(S): 80 CAPSULE, EXTENDED RELEASE ORAL at 05:48

## 2021-08-02 RX ADMIN — Medication 40 MILLIGRAM(S): at 05:47

## 2021-08-02 RX ADMIN — APIXABAN 5 MILLIGRAM(S): 2.5 TABLET, FILM COATED ORAL at 05:46

## 2021-08-02 RX ADMIN — PANTOPRAZOLE SODIUM 40 MILLIGRAM(S): 20 TABLET, DELAYED RELEASE ORAL at 06:11

## 2021-08-02 RX ADMIN — Medication 1 APPLICATION(S): at 05:48

## 2021-08-02 RX ADMIN — CARVEDILOL PHOSPHATE 3.12 MILLIGRAM(S): 80 CAPSULE, EXTENDED RELEASE ORAL at 17:27

## 2021-08-02 RX ADMIN — Medication 81 MILLIGRAM(S): at 11:51

## 2021-08-02 RX ADMIN — Medication 100 MILLIGRAM(S): at 05:46

## 2021-08-02 RX ADMIN — Medication 1 APPLICATION(S): at 17:26

## 2021-08-02 RX ADMIN — Medication 100 MILLIGRAM(S): at 13:13

## 2021-08-02 NOTE — PROGRESS NOTE ADULT - SUBJECTIVE AND OBJECTIVE BOX
Patient is a 70y old  Male who presents with a chief complaint of Left leg cellulitis (01 Aug 2021 20:53)      pt seen in icu [  ], reg med floor [x   ], bed [ x ], chair at bedside [   ], a+o x3 [ x ], lethargic [  ],    nad [ x ]        Allergies    Aldactone (Unknown)  Bumex (Blisters; Rash)  metoprolol (Unknown)  spironolactone (Unknown)        Vitals    T(F): 98.2 (08-02-21 @ 06:02), Max: 98.2 (08-02-21 @ 06:02)  HR: 77 (08-02-21 @ 06:02) (58 - 77)  BP: 121/70 (08-02-21 @ 06:02) (110/79 - 123/79)  RR: 18 (08-02-21 @ 06:02) (18 - 18)  SpO2: 96% (08-02-21 @ 06:02) (96% - 100%)  Wt(kg): --  CAPILLARY BLOOD GLUCOSE      POCT Blood Glucose.: 120 mg/dL (01 Aug 2021 21:44)      Labs                          12.3   5.19  )-----------( 181      ( 01 Aug 2021 08:15 )             39.1       08-01    140  |  110<H>  |  15  ----------------------------<  104<H>  4.0   |  21<L>  |  0.91    Ca    8.5      01 Aug 2021 08:15    TPro  7.4  /  Alb  2.9<L>  /  TBili  0.8  /  DBili  x   /  AST  25  /  ALT  29  /  AlkPhos  131<H>  08-01            .Surgical Swab Leg left wound  07-30 @ 18:26   Culture yields >4 types of aerobic and/or anaerobic bacteria  Call client services within 7 days if further workup is clinically  indicated.  Culture includes Few Staphylococcus aureus  --  Staphylococcus aureus      .Blood Blood-Peripheral  07-30 @ 06:35   No growth to date.  --  --          Radiology Results      Meds    MEDICATIONS  (STANDING):  apixaban 5 milliGRAM(s) Oral every 12 hours  aspirin enteric coated 81 milliGRAM(s) Oral daily  atorvastatin 40 milliGRAM(s) Oral at bedtime  BACItracin   Ointment 1 Application(s) Topical two times a day  carvedilol 3.125 milliGRAM(s) Oral every 12 hours  ceFAZolin   IVPB      ceFAZolin   IVPB 2000 milliGRAM(s) IV Intermittent every 8 hours  collagenase Ointment 1 Application(s) Topical daily  furosemide    Tablet 40 milliGRAM(s) Oral daily  insulin lispro (ADMELOG) corrective regimen sliding scale   SubCutaneous three times a day before meals  insulin lispro (ADMELOG) corrective regimen sliding scale   SubCutaneous at bedtime  lisinopril 5 milliGRAM(s) Oral daily  pantoprazole    Tablet 40 milliGRAM(s) Oral before breakfast      MEDICATIONS  (PRN):      Physical Exam    Neuro :  no focal deficits  Respiratory: CTA B/L  CV: RRR, S1S2, no murmurs,   Abdominal: Soft, NT, ND +BS,  Extremities: b/l le edema with multiple ulcerations l>r       ASSESSMENT    b/l le cellulitis 2nd to pvd,   h/o HTN,   HLD,   DM,   CAD s/p CABG 4 years ago,   systolic CHF on Lasix,   pulm htn,   PE on eliquis,   liver cirrhosis  Dyslipidemia        PLAN    podiatry f/u   Evaluated L leg and wound.s Dressed with Mupirocin, DSD.   vascular cons noted  Recommend elevation of b/l LE  Pt can f/u with Dr. Canales as outpatiemt  No acute vascular surgery intervention at this time   venous doppler le with No evidence of deep venous thrombosis in either lower extremity noted above.  wound cx Culture includes Few Staphylococcus aureus noted above   f/u sens  id f/u  Continue IV Vancomycin and keep level between 15 to 20  f/u blood cx   cardio f/u   cont coreg, lisinopril and lasix po.  .Pt reports allergy to aldactone.  cont asa and statin.  cxr with New mild opacity right lung base with trace right pleural effusion. Heart size cannot be accurately assessed in this projection, but appear enlarged noted    pulm f/u   Bronchodilators prn  cont current meds         Patient is a 70y old  Male who presents with a chief complaint of Left leg cellulitis (01 Aug 2021 20:53)      pt seen in icu [  ], reg med floor [x   ], bed [ x ], chair at bedside [   ], a+o x3 [ x ], lethargic [  ],    nad [ x ]        Allergies    Aldactone (Unknown)  Bumex (Blisters; Rash)  metoprolol (Unknown)  spironolactone (Unknown)        Vitals    T(F): 98.2 (08-02-21 @ 06:02), Max: 98.2 (08-02-21 @ 06:02)  HR: 77 (08-02-21 @ 06:02) (58 - 77)  BP: 121/70 (08-02-21 @ 06:02) (110/79 - 123/79)  RR: 18 (08-02-21 @ 06:02) (18 - 18)  SpO2: 96% (08-02-21 @ 06:02) (96% - 100%)  Wt(kg): --  CAPILLARY BLOOD GLUCOSE      POCT Blood Glucose.: 120 mg/dL (01 Aug 2021 21:44)      Labs                          12.3   5.19  )-----------( 181      ( 01 Aug 2021 08:15 )             39.1       08-01    140  |  110<H>  |  15  ----------------------------<  104<H>  4.0   |  21<L>  |  0.91    Ca    8.5      01 Aug 2021 08:15    TPro  7.4  /  Alb  2.9<L>  /  TBili  0.8  /  DBili  x   /  AST  25  /  ALT  29  /  AlkPhos  131<H>  08-01            .Surgical Swab Leg left wound  07-30 @ 18:26   Culture yields >4 types of aerobic and/or anaerobic bacteria  Call client services within 7 days if further workup is clinically  indicated.  Culture includes Few Staphylococcus aureus  --  Staphylococcus aureus      .Blood Blood-Peripheral  07-30 @ 06:35   No growth to date.  --  --          Radiology Results      Meds    MEDICATIONS  (STANDING):  apixaban 5 milliGRAM(s) Oral every 12 hours  aspirin enteric coated 81 milliGRAM(s) Oral daily  atorvastatin 40 milliGRAM(s) Oral at bedtime  BACItracin   Ointment 1 Application(s) Topical two times a day  carvedilol 3.125 milliGRAM(s) Oral every 12 hours  ceFAZolin   IVPB      ceFAZolin   IVPB 2000 milliGRAM(s) IV Intermittent every 8 hours  collagenase Ointment 1 Application(s) Topical daily  furosemide    Tablet 40 milliGRAM(s) Oral daily  insulin lispro (ADMELOG) corrective regimen sliding scale   SubCutaneous three times a day before meals  insulin lispro (ADMELOG) corrective regimen sliding scale   SubCutaneous at bedtime  lisinopril 5 milliGRAM(s) Oral daily  pantoprazole    Tablet 40 milliGRAM(s) Oral before breakfast      MEDICATIONS  (PRN):      Physical Exam    Neuro :  no focal deficits  Respiratory: CTA B/L  CV: RRR, S1S2, no murmurs,   Abdominal: Soft, NT, ND +BS,  Extremities: b/l le edema with multiple ulcerations l>r       ASSESSMENT    b/l le cellulitis 2nd to pvd,   h/o HTN,   HLD,   DM,   CAD s/p CABG 4 years ago,   systolic CHF on Lasix,   pulm htn,   PE on eliquis,   liver cirrhosis  Dyslipidemia        PLAN    podiatry f/u   Evaluated L leg and wound.s Dressed with Mupirocin, DSD.   vascular cons noted  Recommend elevation of b/l LE  Pt can f/u with Dr. Canales as outpatiemt  No acute vascular surgery intervention at this time   venous doppler le with No evidence of deep venous thrombosis in either lower extremity noted above.  wound cx Culture includes Few Staphylococcus aureus noted above   f/u sens  id f/u  Continue IV Vancomycin and keep level between 15 to 20  blood cx neg noted above  cardio f/u   cont coreg, lisinopril and lasix po.  .Pt reports allergy to aldactone.  cont asa and statin.  cxr with New mild opacity right lung base with trace right pleural effusion. Heart size cannot be accurately assessed in this projection, but appear enlarged noted    pulm f/u   Bronchodilators prn  cont current meds         Patient is a 70y old  Male who presents with a chief complaint of Left leg cellulitis (01 Aug 2021 20:53)      pt seen in icu [  ], reg med floor [x   ], bed [ x ], chair at bedside [   ], a+o x3 [ x ], lethargic [  ],    nad [ x ]        Allergies    Aldactone (Unknown)  Bumex (Blisters; Rash)  metoprolol (Unknown)  spironolactone (Unknown)        Vitals    T(F): 98.2 (08-02-21 @ 06:02), Max: 98.2 (08-02-21 @ 06:02)  HR: 77 (08-02-21 @ 06:02) (58 - 77)  BP: 121/70 (08-02-21 @ 06:02) (110/79 - 123/79)  RR: 18 (08-02-21 @ 06:02) (18 - 18)  SpO2: 96% (08-02-21 @ 06:02) (96% - 100%)  Wt(kg): --  CAPILLARY BLOOD GLUCOSE      POCT Blood Glucose.: 120 mg/dL (01 Aug 2021 21:44)      Labs                          12.3   5.19  )-----------( 181      ( 01 Aug 2021 08:15 )             39.1       08-01    140  |  110<H>  |  15  ----------------------------<  104<H>  4.0   |  21<L>  |  0.91    Ca    8.5      01 Aug 2021 08:15    TPro  7.4  /  Alb  2.9<L>  /  TBili  0.8  /  DBili  x   /  AST  25  /  ALT  29  /  AlkPhos  131<H>  08-01            .Surgical Swab Leg left wound  07-30 @ 18:26   Culture yields >4 types of aerobic and/or anaerobic bacteria  Call client services within 7 days if further workup is clinically  indicated.  Culture includes Few Staphylococcus aureus  --  Staphylococcus aureus  - Ampicillin/Sulbactam: S <=8/4   - Cefazolin: S <=4   - Clindamycin: R >4   - Erythromycin: R >4   - Gentamicin: S <=1 Should not be used as monotherapy   - Oxacillin: S <=0.25   - Penicillin: R 8   - RIF- Rifampin: S <=1 Should not be used as monotherapy   - Tetra/Doxy: R >8   - Trimethoprim/Sulfamethoxazole: S <=0.5/9.5   - Vancomycin: S 2     .Blood Blood-Peripheral  07-30 @ 06:35   No growth to date.  --  --          Radiology Results      Meds    MEDICATIONS  (STANDING):  apixaban 5 milliGRAM(s) Oral every 12 hours  aspirin enteric coated 81 milliGRAM(s) Oral daily  atorvastatin 40 milliGRAM(s) Oral at bedtime  BACItracin   Ointment 1 Application(s) Topical two times a day  carvedilol 3.125 milliGRAM(s) Oral every 12 hours  ceFAZolin   IVPB      ceFAZolin   IVPB 2000 milliGRAM(s) IV Intermittent every 8 hours  collagenase Ointment 1 Application(s) Topical daily  furosemide    Tablet 40 milliGRAM(s) Oral daily  insulin lispro (ADMELOG) corrective regimen sliding scale   SubCutaneous three times a day before meals  insulin lispro (ADMELOG) corrective regimen sliding scale   SubCutaneous at bedtime  lisinopril 5 milliGRAM(s) Oral daily  pantoprazole    Tablet 40 milliGRAM(s) Oral before breakfast      MEDICATIONS  (PRN):      Physical Exam    Neuro :  no focal deficits  Respiratory: CTA B/L  CV: RRR, S1S2, no murmurs,   Abdominal: Soft, NT, ND +BS,  Extremities: b/l le edema with multiple ulcerations l>r       ASSESSMENT    b/l le cellulitis 2nd to pvd,   h/o HTN,   HLD,   DM,   CAD s/p CABG 4 years ago,   systolic CHF on Lasix,   pulm htn,   PE on eliquis,   liver cirrhosis  Dyslipidemia        PLAN    podiatry f/u   Evaluated L leg and wound.s Dressed with Mupirocin, DSD.   vascular cons noted  Recommend elevation of b/l LE  Pt can f/u with Dr. Canales as outpatiemt  No acute vascular surgery intervention at this time   venous doppler le with No evidence of deep venous thrombosis in either lower extremity noted above.  wound cx Culture includes Few mssa noted above   id f/u  Change Vancomycin to Cefazolin   blood cx neg noted above  cardio f/u   cont coreg, lisinopril and lasix po.  .Pt reports allergy to aldactone.  cont asa and statin.  cxr with New mild opacity right lung base with trace right pleural effusion. Heart size cannot be accurately assessed in this projection, but appear enlarged noted    pulm f/u   Bronchodilators prn  cont current meds

## 2021-08-02 NOTE — PROGRESS NOTE ADULT - SUBJECTIVE AND OBJECTIVE BOX
NP Note discussed with  Primary Attending    Patient is a 70y old  Male who presents with a chief complaint of Left leg cellulitis (02 Aug 2021 10:49)      INTERVAL HPI/OVERNIGHT EVENTS: Patient seen and examined at bedside. Patient comfortable in bed, no new complaints    MEDICATIONS  (STANDING):  apixaban 5 milliGRAM(s) Oral every 12 hours  aspirin enteric coated 81 milliGRAM(s) Oral daily  atorvastatin 40 milliGRAM(s) Oral at bedtime  BACItracin   Ointment 1 Application(s) Topical two times a day  carvedilol 3.125 milliGRAM(s) Oral every 12 hours  ceFAZolin   IVPB      ceFAZolin   IVPB 2000 milliGRAM(s) IV Intermittent every 8 hours  collagenase Ointment 1 Application(s) Topical daily  furosemide    Tablet 40 milliGRAM(s) Oral daily  insulin lispro (ADMELOG) corrective regimen sliding scale   SubCutaneous three times a day before meals  insulin lispro (ADMELOG) corrective regimen sliding scale   SubCutaneous at bedtime  lisinopril 5 milliGRAM(s) Oral daily  pantoprazole    Tablet 40 milliGRAM(s) Oral before breakfast    MEDICATIONS  (PRN):      __________________________________________________  REVIEW OF SYSTEMS:    CONSTITUTIONAL: No fever,   EYES: no acute visual disturbances  NECK: No pain or stiffness  RESPIRATORY: No cough; No shortness of breath  CARDIOVASCULAR: No chest pain, no palpitations  GASTROINTESTINAL: No pain. No nausea or vomiting; No diarrhea   NEUROLOGICAL: No headache or numbness, no tremors  MUSCULOSKELETAL: No joint pain, no muscle pain  GENITOURINARY: no dysuria, no frequency, no hesitancy  PSYCHIATRY: no depression , no anxiety  ALL OTHER  ROS negative        Vital Signs Last 24 Hrs  T(C): 36.8 (02 Aug 2021 06:02), Max: 36.8 (02 Aug 2021 06:02)  T(F): 98.2 (02 Aug 2021 06:02), Max: 98.2 (02 Aug 2021 06:02)  HR: 77 (02 Aug 2021 06:02) (58 - 77)  BP: 121/70 (02 Aug 2021 06:02) (110/79 - 123/79)  BP(mean): --  RR: 18 (02 Aug 2021 06:02) (18 - 18)  SpO2: 96% (02 Aug 2021 06:02) (96% - 100%)    ________________________________________________  PHYSICAL EXAM:  GENERAL: NAD  HEENT: Normocephalic;  conjunctivae and sclerae clear; moist mucous membranes;   NECK : supple  CHEST/LUNG: Clear to auscultation bilaterally with good air entry   HEART: S1 S2  regular; no murmurs, gallops or rubs  ABDOMEN: Soft, Nontender, Nondistended; Bowel sounds present  EXTREMITIES: L>R pitting edema +4, no cyanosis; no edema; no calf tenderness  SKIN: warm and dry; no rash  NERVOUS SYSTEM:  Awake and alert; Oriented  to place, person and time ; no new deficits    _________________________________________________  LABS:                        12.3   5.19  )-----------( 181      ( 01 Aug 2021 08:15 )             39.1     08-01    140  |  110<H>  |  15  ----------------------------<  104<H>  4.0   |  21<L>  |  0.91    Ca    8.5      01 Aug 2021 08:15    TPro  7.4  /  Alb  2.9<L>  /  TBili  0.8  /  DBili  x   /  AST  25  /  ALT  29  /  AlkPhos  131<H>  08-01        CAPILLARY BLOOD GLUCOSE      POCT Blood Glucose.: 111 mg/dL (02 Aug 2021 11:23)  POCT Blood Glucose.: 95 mg/dL (02 Aug 2021 07:44)  POCT Blood Glucose.: 120 mg/dL (01 Aug 2021 21:44)  POCT Blood Glucose.: 137 mg/dL (01 Aug 2021 17:07)  POCT Blood Glucose.: 160 mg/dL (01 Aug 2021 12:26)        RADIOLOGY & ADDITIONAL TESTS:  < from: US Duplex Venous Lower Ext Complete, Bilateral (07.30.21 @ 11:34) >    EXAM:  US DPLX LWR EXT VEINS COMPL BI                            PROCEDURE DATE:  07/30/2021          INTERPRETATION:  CLINICAL INFORMATION: Bilateral leg edema    COMPARISON: None available.    TECHNIQUE: Duplex sonography of the BILATERAL LOWER extremity veins with color and spectral Doppler, with and without compression.    FINDINGS:    RIGHT:  Normal compressibility of the RIGHT common femoral, femoral and popliteal veins.  Doppler examination shows normal spontaneous and phasic flow.  No RIGHT calf vein thrombosis is detected.    Soft tissue edema in the right calf.    LEFT:  Normal compressibility of the LEFT common femoral, femoral and popliteal veins.  Doppler examination shows normal spontaneous and phasic flow.  No LEFT calf vein thrombosis is detected.    Soft tissue edema in the left calf.    IMPRESSION:  No evidence of deep venous thrombosis in either lower extremity.    Soft tissue edema in the calves bilaterally.    < end of copied text >  < from: Xray Chest 1 View- PORTABLE-Routine (Xray Chest 1 View- PORTABLE-Routine .) (07.30.21 @ 09:31) >    EXAM:  XR CHEST PORTABLE ROUTINE 1V                            PROCEDURE DATE:  07/30/2021          INTERPRETATION:  CLINICAL STATEMENT: Chest pain.    TECHNIQUE: AP view of the chest.    COMPARISON: 4/19/2021    FINDINGS/  IMPRESSION:  Sternotomy wires again noted.    New mild opacity right lung base with trace right pleural effusion.    Heart size cannot be accurately assessed in this projection, but appear enlarged.    < end of copied text >    Imaging  Reviewed:  YES    Consultant(s) Notes Reviewed:   YES      Plan of care was discussed with patient and /or primary care giver; all questions and concerns were addressed

## 2021-08-02 NOTE — DISCHARGE NOTE PROVIDER - NSDCFUADDINST_GEN_ALL_CORE_FT
1. Take your medications as prescribed  2. Follow-up with Podiatry/Wound care    Right heel:     -Clean all wounds with normal saline and apply skin prep to the surrounding skin  -Apply a non-adherent dry dressing to the R. Heel wound Daily PRN  -Apply a Foam dressing to the Coccyx area Q 72hrs PRN  -Elevate/float the patients heels using heel protectors and reposition the patient Q 2hrs using wedges or pillows    left leg;     1. Apply Santyl to Left leg wounds once per day. Dress with 4x4s then wrap with  allegra and  ACE wrap

## 2021-08-02 NOTE — DISCHARGE NOTE PROVIDER - PROVIDER TOKENS
PROVIDER:[TOKEN:[[id number]],FOLLOWUP:[1 week]],PROVIDER:[TOKEN:[[id number]],FOLLOWUP:[1 week]]

## 2021-08-02 NOTE — DISCHARGE NOTE PROVIDER - HOSPITAL COURSE
Patient is a 69 yo M from home, w/ PMHx of HTN, HLD, DM, PE, CAD s/p CABG 4 years ago, CHF on Lasix, PE on Eliquis, chronic B/L lymphedema. Patient Presented to ED with worsening, B/L leg cellulitis, failed two courses of outpatient PO antibiotics.  Left leg worse than right leg, swollen, erythematous, has purulent drainage, and ulcerations. Wound culture- MSSA sensitive to cefazolin. Left foot and LE x-ray resulting no acute bony pathology, Podiatry and ID following    incomplete Patient is a 69 yo M from home, w/ PMHx of HTN, HLD, DM, PE, CAD s/p CABG 4 years ago, CHF on Lasix, PE on Eliquis, chronic B/L lymphedema. Patient Presented to ED with worsening, B/L leg cellulitis, failed two courses of outpatient PO antibiotics.  Left leg worse than right leg, swollen, erythematous, has purulent drainage, and ulcerations. Wound culture- MSSA sensitive to cefazolin. Left foot and LE x-ray resulting no acute bony pathology, Podiatry and ID were consulted and assisted with care. Flagyl added for anaerobic coverage 8/2/21.   He discharged home on Augmentin 1 tablet BID x 10 days totals. Will need follow-up with Podiatry Patient is a 69 yo M from home, w/ PMHx of HTN, HLD, DM, PE, CAD s/p CABG 4 years ago, CHF on Lasix, PE on Eliquis, chronic B/L lymphedema. Patient Presented to ED with worsening, B/L leg cellulitis, failed two courses of outpatient PO antibiotics.  Left leg worse than right leg, swollen, erythematous, has purulent drainage, and ulcerations. Wound culture- MSSA sensitive to cefazolin. Left foot and LE x-ray resulting no acute bony pathology, Podiatry and ID were consulted and assisted with care. Flagyl added for anaerobic coverage 8/2/21. Vascular evaluated, no acute interventions required. Seen by wound care for right heel ulcer and dressing recommendations.   He was discharged home on Augmentin 1 tablet BID x 10 days total. Will need follow-up with Podiatry arranged outpatient.

## 2021-08-02 NOTE — PROGRESS NOTE ADULT - ASSESSMENT
Patient is a 69 yo M from home, w/ PMHx of HTN, HLD, DM, PE, CAD s/p CABG 4 years ago, CHF on Lasix, PE on Eliquis, chronic B/L lymphedema. Patient Presented to ED with worsening, B/L leg cellulitis, failed two courses of outpatient PO antibiotics.  Left leg worse than right leg, swollen, erythematous, has purulent drainage, and ulcerations.

## 2021-08-02 NOTE — DISCHARGE NOTE PROVIDER - NSFOLLOWUPCLINICS_GEN_ALL_ED_FT
[facility]  Podiatry/Wound Care  [address] [place] [address]  Phone: [phone]  Fax: [phone]  Follow Up Time: 1 week

## 2021-08-02 NOTE — PROGRESS NOTE ADULT - ASSESSMENT
Patient is a 70y old  Male who is  from home, w/ PMHx of HTN, HLD, DM, PE, CAD s/p CABG 4 years ago, CHF on Lasix, PE on eliquis. Presents to the ER for evaluation of chronic b/l L.E swelling and worsening left LE cellulitis. He has taken 2 courses of PO Abx. Left leg is swollen, erythematous, has purulent drainage, and ulcerations. On admission, he has no fever and no Leukocytosis. He has started on IV Vancomycin and The ID consult requested to assist with further evaluation and antibiotic management.    # LLE purulent Cellulitis  - wound cx grew MSSA    would recommend:    1. Continue Cefazolin and Add Flagyl for anaerobic coverage   2. Keep LLE elevated  3. Continue Wound care and keep LE moist     d/w patient and Nursing staff       Attending Attestation:    Spent more than 35 minutes on total encounter, more than 50 % of the visit was spent counseling and/or coordinating care by the Attending physician.

## 2021-08-02 NOTE — DISCHARGE NOTE PROVIDER - NSDCMRMEDTOKEN_GEN_ALL_CORE_FT
apixaban 5 mg oral tablet: 1 tab(s) orally every 12 hours  aspirin 81 mg oral delayed release tablet: 1 tab(s) orally once a day  atorvastatin 40 mg oral tablet: 1 tab(s) orally once a day (at bedtime)  collagenase 250 units/g topical ointment: 1 application topically once a day  Coreg 3.125 mg oral tablet: 1 tab(s) orally 2 times a day  furosemide 40 mg oral tablet: 1 tab(s) orally once a day  lisinopril 5 mg oral tablet: 1 tab(s) orally once a day  pantoprazole 40 mg oral delayed release tablet: 1 tab(s) orally once a day (before a meal)   amoxicillin-clavulanate 875 mg-125 mg oral tablet: 1 tab(s) orally every 12 hours x 10 days  until 4/17/21   apixaban 5 mg oral tablet: 1 tab(s) orally every 12 hours  aspirin 81 mg oral delayed release tablet: 1 tab(s) orally once a day  atorvastatin 40 mg oral tablet: 1 tab(s) orally once a day (at bedtime)  bacitracin 500 units/g topical ointment: Apply topically to affected area 2 times a day   collagenase 250 units/g topical ointment: 1 application topically once a day  Coreg 3.125 mg oral tablet: 1 tab(s) orally 2 times a day  furosemide 40 mg oral tablet: 1 tab(s) orally once a day  lisinopril 5 mg oral tablet: 1 tab(s) orally once a day  pantoprazole 40 mg oral delayed release tablet: 1 tab(s) orally once a day (before a meal)   amoxicillin-clavulanate 875 mg-125 mg oral tablet: 1 tab(s) orally every 12 hours x 10 days  until 8/14/21  apixaban 5 mg oral tablet: 1 tab(s) orally every 12 hours  aspirin 81 mg oral delayed release tablet: 1 tab(s) orally once a day  atorvastatin 40 mg oral tablet: 1 tab(s) orally once a day (at bedtime)  bacitracin 500 units/g topical ointment: Apply topically to affected area 2 times a day leg ulcers   collagenase 250 units/g topical ointment: 1 application topically once a day  Coreg 3.125 mg oral tablet: 1 tab(s) orally 2 times a day  furosemide 40 mg oral tablet: 1 tab(s) orally once a day  lisinopril 5 mg oral tablet: 1 tab(s) orally once a day  pantoprazole 40 mg oral delayed release tablet: 1 tab(s) orally once a day (before a meal)

## 2021-08-02 NOTE — PROGRESS NOTE ADULT - SUBJECTIVE AND OBJECTIVE BOX
Patient is a 70y old  Male who presents with a chief complaint of Left leg cellulitis (02 Aug 2021 06:23)  Awake, alert, comfortable out of bed in NAD    INTERVAL HPI/OVERNIGHT EVENTS:      VITAL SIGNS:  T(F): 98.2 (08-02-21 @ 06:02)  HR: 77 (08-02-21 @ 06:02)  BP: 121/70 (08-02-21 @ 06:02)  RR: 18 (08-02-21 @ 06:02)  SpO2: 96% (08-02-21 @ 06:02)  Wt(kg): --  I&O's Detail          REVIEW OF SYSTEMS:    CONSTITUTIONAL:  No fevers, chills, sweats    HEENT:  Eyes:  No diplopia or blurred vision. ENT:  No earache, sore throat or runny nose.    CARDIOVASCULAR:  No pressure, squeezing, tightness, or heaviness about the chest; no palpitations.    RESPIRATORY:  Per HPI    GASTROINTESTINAL:  No abdominal pain, nausea, vomiting or diarrhea.    GENITOURINARY:  No dysuria, frequency or urgency.    NEUROLOGIC:  No paresthesias, fasciculations, seizures or weakness.    PSYCHIATRIC:  No disorder of thought or mood.      PHYSICAL EXAM:    Constitutional: Well developed and nourished  Eyes:Perrla  ENMT: normal  Neck:supple  Respiratory: good air entry  Cardiovascular: S1 S2 regular  Gastrointestinal: Soft, Non tender  Extremities: + edema  Vascular:normal  Neurological:Awake, alert,Ox3  Musculoskeletal:Normal      MEDICATIONS  (STANDING):  apixaban 5 milliGRAM(s) Oral every 12 hours  aspirin enteric coated 81 milliGRAM(s) Oral daily  atorvastatin 40 milliGRAM(s) Oral at bedtime  BACItracin   Ointment 1 Application(s) Topical two times a day  carvedilol 3.125 milliGRAM(s) Oral every 12 hours  ceFAZolin   IVPB      ceFAZolin   IVPB 2000 milliGRAM(s) IV Intermittent every 8 hours  collagenase Ointment 1 Application(s) Topical daily  furosemide    Tablet 40 milliGRAM(s) Oral daily  insulin lispro (ADMELOG) corrective regimen sliding scale   SubCutaneous three times a day before meals  insulin lispro (ADMELOG) corrective regimen sliding scale   SubCutaneous at bedtime  lisinopril 5 milliGRAM(s) Oral daily  pantoprazole    Tablet 40 milliGRAM(s) Oral before breakfast    MEDICATIONS  (PRN):      Allergies    Aldactone (Unknown)  Bumex (Blisters; Rash)  metoprolol (Unknown)  spironolactone (Unknown)    Intolerances        LABS:                        12.3   5.19  )-----------( 181      ( 01 Aug 2021 08:15 )             39.1     08-01    140  |  110<H>  |  15  ----------------------------<  104<H>  4.0   |  21<L>  |  0.91    Ca    8.5      01 Aug 2021 08:15    TPro  7.4  /  Alb  2.9<L>  /  TBili  0.8  /  DBili  x   /  AST  25  /  ALT  29  /  AlkPhos  131<H>  08-01              CAPILLARY BLOOD GLUCOSE      POCT Blood Glucose.: 95 mg/dL (02 Aug 2021 07:44)  POCT Blood Glucose.: 120 mg/dL (01 Aug 2021 21:44)  POCT Blood Glucose.: 137 mg/dL (01 Aug 2021 17:07)  POCT Blood Glucose.: 160 mg/dL (01 Aug 2021 12:26)    Culture Results:   Culture yields >4 types of aerobic and/or anaerobic bacteria   Call client services within 7 days if further workup is clinically   indicated.   Culture includes Few Staphylococcus aureus   Organism Identification: Staphylococcus aureus   Organism: Staphylococcus aureus   Method Type: AGA Historical Values  Culture - Blood (07.30.21 @ 06:35)   Specimen Source: .Blood Blood-Peripheral   Culture Results:   No growth to date.     RADIOLOGY & ADDITIONAL TESTS:    CXR:  < from: Xray Chest 1 View- PORTABLE-Routine (Xray Chest 1 View- PORTABLE-Routine .) (07.30.21 @ 09:31) >  FINDINGS/  IMPRESSION:  Sternotomy wires again noted.    New mild opacity right lung base with trace right pleural effusion.    Heart size cannot be accurately assessed in this projection, but appear enlarged.    < end of copied text >    Ct scan chest:    ekg;    echo:

## 2021-08-02 NOTE — PROGRESS NOTE ADULT - SUBJECTIVE AND OBJECTIVE BOX
CHIEF COMPLAINT:Patient is a 70y old  Male who presents with a chief complaint of Left leg cellulitis.Pt with b/l dressing.    	  REVIEW OF SYSTEMS:  CONSTITUTIONAL: No fever, weight loss, or fatigue  EYES: No eye pain, visual disturbances, or discharge  ENT:  No difficulty hearing, tinnitus, vertigo; No sinus or throat pain  NECK: No pain or stiffness  RESPIRATORY: No cough, wheezing, chills or hemoptysis; No Shortness of Breath  CARDIOVASCULAR: No chest pain, palpitations, passing out, dizziness, or leg swelling  GASTROINTESTINAL: No abdominal or epigastric pain. No nausea, vomiting, or hematemesis; No diarrhea or constipation. No melena or hematochezia.  GENITOURINARY: No dysuria, frequency, hematuria, or incontinence  NEUROLOGICAL: No headaches, memory loss, loss of strength, numbness, or tremors  SKIN: No itching, burning, rashes, or lesions   LYMPH Nodes: No enlarged glands  ENDOCRINE: No heat or cold intolerance; No hair loss  MUSCULOSKELETAL: No joint pain or swelling; No muscle, back, or extremity pain  PSYCHIATRIC: No depression, anxiety, mood swings, or difficulty sleeping  HEME/LYMPH: No easy bruising, or bleeding gums  ALLERGY AND IMMUNOLOGIC: No hives or eczema	        PHYSICAL EXAM:  T(C): 36.8 (08-02-21 @ 06:02), Max: 36.8 (08-02-21 @ 06:02)  HR: 77 (08-02-21 @ 06:02) (58 - 77)  BP: 121/70 (08-02-21 @ 06:02) (110/79 - 123/79)  RR: 18 (08-02-21 @ 06:02) (18 - 18)  SpO2: 96% (08-02-21 @ 06:02) (96% - 100%)  Wt(kg): --  I&O's Summary      Appearance: Normal	  HEENT:   Normal oral mucosa, PERRL, EOMI	  Lymphatic: No lymphadenopathy  Cardiovascular: Normal S1 S2, + JVD, No murmurs,   Respiratory: Lungs clear to auscultation	  Psychiatry: A & O x 3, Mood & affect appropriate  Gastrointestinal:  Soft, Non-tender, + BS	  Skin: No rashes, No ecchymoses, No cyanosis	  Neurologic: Non-focal  Extremities: Normal range of motion, No clubbing, cyanosis      MEDICATIONS  (STANDING):  apixaban 5 milliGRAM(s) Oral every 12 hours  aspirin enteric coated 81 milliGRAM(s) Oral daily  atorvastatin 40 milliGRAM(s) Oral at bedtime  BACItracin   Ointment 1 Application(s) Topical two times a day  carvedilol 3.125 milliGRAM(s) Oral every 12 hours  ceFAZolin   IVPB      ceFAZolin   IVPB 2000 milliGRAM(s) IV Intermittent every 8 hours  collagenase Ointment 1 Application(s) Topical daily  furosemide    Tablet 40 milliGRAM(s) Oral daily  insulin lispro (ADMELOG) corrective regimen sliding scale   SubCutaneous three times a day before meals  insulin lispro (ADMELOG) corrective regimen sliding scale   SubCutaneous at bedtime  lisinopril 5 milliGRAM(s) Oral daily  pantoprazole    Tablet 40 milliGRAM(s) Oral before breakfast      	  	  LABS:	 	                    12.3   5.19  )-----------( 181      ( 01 Aug 2021 08:15 )             39.1     08-01    140  |  110<H>  |  15  ----------------------------<  104<H>  4.0   |  21<L>  |  0.91    Ca    8.5      01 Aug 2021 08:15    TPro  7.4  /  Alb  2.9<L>  /  TBili  0.8  /  DBili  x   /  AST  25  /  ALT  29  /  AlkPhos  131<H>  08-01    proBNP:   Lipid Profile:   HgA1c:   TSH:

## 2021-08-02 NOTE — DISCHARGE NOTE PROVIDER - NSDCCPCAREPLAN_GEN_ALL_CORE_FT
PRINCIPAL DISCHARGE DIAGNOSIS  Diagnosis: Cellulitis  Assessment and Plan of Treatment: You wre found to have MSSA in your wound culture, and was treated with IV antibiotics. You do not have DVT to your legs. Your left foot and lower extremity x-ray was negative for acute pathology.  Please seek immediate help if you exhibit from the problem site if:  -fever, chills, shivering  -rapid heart rate, difficulty breathing  -worsening redness, drainage, foul odor   Please continue taking your medication as prescribed. If you have any questions or concerns about your medication please direct them to your prescribing Healthcare Provider.  Podiatry is recommending:  Evaluated L leg and wounds. Dressed with Mupirocin, DSD.   WCO in place to apply Santyl to Left leg wounds once per day. Dress with 4x4s, allegra, ACE   Please follow up with podiatry and Primary Care Physician in 1 week for further management.

## 2021-08-02 NOTE — PROGRESS NOTE ADULT - ASSESSMENT
1. PE   On A/C   Monitor for bleeding   DVT and GI PPX     2. Left lower ext. Cellulit   cultures noted  Podiatry consult  Wound care  ID f/u     3. CHF  Cont meds  Cardio F/U   Monitor I&O     4. DM   Accuchecks  Reg insulin coverage per HSS  A1C level     5. Pulmonary HTN   By hx  Cont meds  Bronchodilators  O2 Supp PRN

## 2021-08-02 NOTE — ADVANCED PRACTICE NURSE CONSULT - ASSESSMENT
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

## 2021-08-02 NOTE — PROGRESS NOTE ADULT - SUBJECTIVE AND OBJECTIVE BOX
Patient is seen and examined at the bed side, is afebrile.  He is tolerating Cefazolin well.    REVIEW OF SYSTEMS: All other review systems are negative      ALLERGIES: Aldactone (Unknown), Bumex (Blisters; Rash), metoprolol (Unknown), spironolactone (Unknown)      Vital Signs Last 24 Hrs  T(C): 36.2 (02 Aug 2021 12:39), Max: 36.8 (02 Aug 2021 06:02)  T(F): 97.1 (02 Aug 2021 12:39), Max: 98.2 (02 Aug 2021 06:02)  HR: 62 (02 Aug 2021 12:39) (58 - 77)  BP: 130/64 (02 Aug 2021 12:39) (121/70 - 130/64)  BP(mean): --  RR: 18 (02 Aug 2021 12:39) (18 - 18)  SpO2: 98% (02 Aug 2021 12:39) (96% - 100%)      PHYSICAL EXAM:  GENERAL: Not in distress   CHEST/LUNG:  Not using accessory muscles  HEART: s1 and s2 present  ABDOMEN:  Nontender and  Nondistended  EXTREMITIES: LE Bandage in placed   CNS: Awake and Alert      LABS: No new Labs                          12.3   5.19  )-----------( 181      ( 01 Aug 2021 08:15 )             39.1                11.8   5.26  )-----------( 179      ( 31 Jul 2021 12:30 )             37.6         08-01    140  |  110<H>  |  15  ----------------------------<  104<H>  4.0   |  21<L>  |  0.91    Ca    8.5      01 Aug 2021 08:15    TPro  7.4  /  Alb  2.9<L>  /  TBili  0.8  /  DBili  x   /  AST  25  /  ALT  29  /  AlkPhos  131<H>  08-01 07-31    141  |  112<H>  |  15  ----------------------------<  131<H>  3.8   |  23  |  1.03    Ca    8.0<L>      31 Jul 2021 12:30    TPro  6.8  /  Alb  2.7<L>  /  TBili  0.9  /  DBili  x   /  AST  29  /  ALT  26  /  AlkPhos  122<H>  07-31      PT/INR - ( 29 Jul 2021 23:48 )   PT: 15.7 sec;   INR: 1.34 ratio         PTT - ( 29 Jul 2021 23:48 )  PTT:38.9 sec    CAPILLARY BLOOD GLUCOSE  POCT Blood Glucose.: 121 mg/dL (30 Jul 2021 17:14)  POCT Blood Glucose.: 95 mg/dL (30 Jul 2021 08:18)      Vancomycin Level, Trough (07.31.21 @ 12:31)   Vancomycin Level, Trough: 17.2:       MEDICATIONS  (STANDING):    apixaban 5 milliGRAM(s) Oral every 12 hours  aspirin enteric coated 81 milliGRAM(s) Oral daily  atorvastatin 40 milliGRAM(s) Oral at bedtime  BACItracin   Ointment 1 Application(s) Topical two times a day  carvedilol 3.125 milliGRAM(s) Oral every 12 hours  ceFAZolin   IVPB      ceFAZolin   IVPB 2000 milliGRAM(s) IV Intermittent every 8 hours  collagenase Ointment 1 Application(s) Topical daily  furosemide    Tablet 40 milliGRAM(s) Oral daily  insulin lispro (ADMELOG) corrective regimen sliding scale   SubCutaneous three times a day before meals  insulin lispro (ADMELOG) corrective regimen sliding scale   SubCutaneous at bedtime  lisinopril 5 milliGRAM(s) Oral daily  pantoprazole    Tablet 40 milliGRAM(s) Oral before breakfast      RADIOLOGY & ADDITIONAL TESTS:    7/30/21: US Duplex Venous Lower Ext Complete, Bilateral (07.30.21 @ 11:34) No evidence of deep venous thrombosis in either lower extremity. Soft tissue edema in the calves bilaterally.      7/30/21: Xray Chest 1 View- PORTABLE-Routine (Xray Chest 1 View- PORTABLE-Routine .) (07.30.21 @ 09:31) Sternotomy wires again noted.    New mild opacity right lung base with trace right pleural effusion. Heart size cannot be accurately assessed in this projection, but appear enlarged.      MICROBIOLOGY DATA:    Culture - Blood (07.30.21 @ 06:35)   Specimen Source: .Blood Blood-Peripheral   Culture Results: No growth to date.     Culture - Blood (07.30.21 @ 06:35)   Specimen Source: .Blood Blood-Peripheral   Culture Results: No growth to date.     COVID-19 PCR . (07.30.21 @ 00:19)   COVID-19 PCR: NotDetec:               Patient is seen and examined at the bed side, is afebrile.  He is tolerating Cefazolin well.      REVIEW OF SYSTEMS: All other review systems are negative      ALLERGIES: Aldactone (Unknown), Bumex (Blisters; Rash), metoprolol (Unknown), spironolactone (Unknown)      Vital Signs Last 24 Hrs  T(C): 36.2 (02 Aug 2021 12:39), Max: 36.8 (02 Aug 2021 06:02)  T(F): 97.1 (02 Aug 2021 12:39), Max: 98.2 (02 Aug 2021 06:02)  HR: 62 (02 Aug 2021 12:39) (58 - 77)  BP: 130/64 (02 Aug 2021 12:39) (121/70 - 130/64)  BP(mean): --  RR: 18 (02 Aug 2021 12:39) (18 - 18)  SpO2: 98% (02 Aug 2021 12:39) (96% - 100%)      PHYSICAL EXAM:  GENERAL: Not in distress   CHEST/LUNG:  Not using accessory muscles  HEART: s1 and s2 present  ABDOMEN:  Nontender and  Nondistended  EXTREMITIES: LE Bandage in placed   CNS: Awake and Alert      LABS: No new Labs                          12.3   5.19  )-----------( 181      ( 01 Aug 2021 08:15 )             39.1                11.8   5.26  )-----------( 179      ( 31 Jul 2021 12:30 )             37.6         08-01    140  |  110<H>  |  15  ----------------------------<  104<H>  4.0   |  21<L>  |  0.91    Ca    8.5      01 Aug 2021 08:15    TPro  7.4  /  Alb  2.9<L>  /  TBili  0.8  /  DBili  x   /  AST  25  /  ALT  29  /  AlkPhos  131<H>  08-01 07-31    141  |  112<H>  |  15  ----------------------------<  131<H>  3.8   |  23  |  1.03    Ca    8.0<L>      31 Jul 2021 12:30    TPro  6.8  /  Alb  2.7<L>  /  TBili  0.9  /  DBili  x   /  AST  29  /  ALT  26  /  AlkPhos  122<H>  07-31      PT/INR - ( 29 Jul 2021 23:48 )   PT: 15.7 sec;   INR: 1.34 ratio         PTT - ( 29 Jul 2021 23:48 )  PTT:38.9 sec    CAPILLARY BLOOD GLUCOSE  POCT Blood Glucose.: 121 mg/dL (30 Jul 2021 17:14)  POCT Blood Glucose.: 95 mg/dL (30 Jul 2021 08:18)      Vancomycin Level, Trough (07.31.21 @ 12:31)   Vancomycin Level, Trough: 17.2:       MEDICATIONS  (STANDING):    apixaban 5 milliGRAM(s) Oral every 12 hours  aspirin enteric coated 81 milliGRAM(s) Oral daily  atorvastatin 40 milliGRAM(s) Oral at bedtime  BACItracin   Ointment 1 Application(s) Topical two times a day  carvedilol 3.125 milliGRAM(s) Oral every 12 hours  ceFAZolin   IVPB      ceFAZolin   IVPB 2000 milliGRAM(s) IV Intermittent every 8 hours  collagenase Ointment 1 Application(s) Topical daily  furosemide    Tablet 40 milliGRAM(s) Oral daily  insulin lispro (ADMELOG) corrective regimen sliding scale   SubCutaneous three times a day before meals  insulin lispro (ADMELOG) corrective regimen sliding scale   SubCutaneous at bedtime  lisinopril 5 milliGRAM(s) Oral daily  pantoprazole    Tablet 40 milliGRAM(s) Oral before breakfast      RADIOLOGY & ADDITIONAL TESTS:    7/30/21: US Duplex Venous Lower Ext Complete, Bilateral (07.30.21 @ 11:34) No evidence of deep venous thrombosis in either lower extremity. Soft tissue edema in the calves bilaterally.      7/30/21: Xray Chest 1 View- PORTABLE-Routine (Xray Chest 1 View- PORTABLE-Routine .) (07.30.21 @ 09:31) Sternotomy wires again noted.    New mild opacity right lung base with trace right pleural effusion. Heart size cannot be accurately assessed in this projection, but appear enlarged.      MICROBIOLOGY DATA:    Culture - Blood (07.30.21 @ 06:35)   Specimen Source: .Blood Blood-Peripheral   Culture Results: No growth to date.     Culture - Blood (07.30.21 @ 06:35)   Specimen Source: .Blood Blood-Peripheral   Culture Results: No growth to date.     COVID-19 PCR . (07.30.21 @ 00:19)   COVID-19 PCR: NotDetec:

## 2021-08-02 NOTE — DISCHARGE NOTE PROVIDER - CARE PROVIDER_API CALL
Cullen Pitts)  Internal Medicine  [address] [place] [address]  Phone: [phone]  Fax: [phone]  Follow Up Time: 1 week    Anabell Rhodes  INTERNAL MEDICINE  [address] [place] [address]  Phone: [phone]  Fax: [phone]  Follow Up Time: 1 week

## 2021-08-03 LAB
ANION GAP SERPL CALC-SCNC: 5 MMOL/L — SIGNIFICANT CHANGE UP (ref 5–17)
BUN SERPL-MCNC: 15 MG/DL — SIGNIFICANT CHANGE UP (ref 7–18)
CALCIUM SERPL-MCNC: 8.3 MG/DL — LOW (ref 8.4–10.5)
CHLORIDE SERPL-SCNC: 110 MMOL/L — HIGH (ref 96–108)
CO2 SERPL-SCNC: 26 MMOL/L — SIGNIFICANT CHANGE UP (ref 22–31)
CREAT SERPL-MCNC: 0.91 MG/DL — SIGNIFICANT CHANGE UP (ref 0.5–1.3)
GLUCOSE BLDC GLUCOMTR-MCNC: 124 MG/DL — HIGH (ref 70–99)
GLUCOSE BLDC GLUCOMTR-MCNC: 144 MG/DL — HIGH (ref 70–99)
GLUCOSE BLDC GLUCOMTR-MCNC: 83 MG/DL — SIGNIFICANT CHANGE UP (ref 70–99)
GLUCOSE SERPL-MCNC: 82 MG/DL — SIGNIFICANT CHANGE UP (ref 70–99)
HCT VFR BLD CALC: 40 % — SIGNIFICANT CHANGE UP (ref 39–50)
HGB BLD-MCNC: 12.4 G/DL — LOW (ref 13–17)
MCHC RBC-ENTMCNC: 25.7 PG — LOW (ref 27–34)
MCHC RBC-ENTMCNC: 31 GM/DL — LOW (ref 32–36)
MCV RBC AUTO: 82.8 FL — SIGNIFICANT CHANGE UP (ref 80–100)
NRBC # BLD: 0 /100 WBCS — SIGNIFICANT CHANGE UP (ref 0–0)
PLATELET # BLD AUTO: 188 K/UL — SIGNIFICANT CHANGE UP (ref 150–400)
POTASSIUM SERPL-MCNC: 3.6 MMOL/L — SIGNIFICANT CHANGE UP (ref 3.5–5.3)
POTASSIUM SERPL-SCNC: 3.6 MMOL/L — SIGNIFICANT CHANGE UP (ref 3.5–5.3)
RBC # BLD: 4.83 M/UL — SIGNIFICANT CHANGE UP (ref 4.2–5.8)
RBC # FLD: 23.3 % — HIGH (ref 10.3–14.5)
SODIUM SERPL-SCNC: 141 MMOL/L — SIGNIFICANT CHANGE UP (ref 135–145)
WBC # BLD: 4.55 K/UL — SIGNIFICANT CHANGE UP (ref 3.8–10.5)
WBC # FLD AUTO: 4.55 K/UL — SIGNIFICANT CHANGE UP (ref 3.8–10.5)

## 2021-08-03 RX ADMIN — Medication 1 APPLICATION(S): at 12:55

## 2021-08-03 RX ADMIN — APIXABAN 5 MILLIGRAM(S): 2.5 TABLET, FILM COATED ORAL at 05:28

## 2021-08-03 RX ADMIN — Medication 100 MILLIGRAM(S): at 21:20

## 2021-08-03 RX ADMIN — Medication 100 MILLIGRAM(S): at 08:12

## 2021-08-03 RX ADMIN — CARVEDILOL PHOSPHATE 3.12 MILLIGRAM(S): 80 CAPSULE, EXTENDED RELEASE ORAL at 05:28

## 2021-08-03 RX ADMIN — Medication 100 MILLIGRAM(S): at 13:33

## 2021-08-03 RX ADMIN — APIXABAN 5 MILLIGRAM(S): 2.5 TABLET, FILM COATED ORAL at 21:24

## 2021-08-03 RX ADMIN — Medication 1 APPLICATION(S): at 21:24

## 2021-08-03 RX ADMIN — Medication 81 MILLIGRAM(S): at 12:54

## 2021-08-03 RX ADMIN — Medication 100 MILLIGRAM(S): at 09:07

## 2021-08-03 RX ADMIN — Medication 40 MILLIGRAM(S): at 05:28

## 2021-08-03 RX ADMIN — CARVEDILOL PHOSPHATE 3.12 MILLIGRAM(S): 80 CAPSULE, EXTENDED RELEASE ORAL at 21:25

## 2021-08-03 NOTE — PROGRESS NOTE ADULT - SUBJECTIVE AND OBJECTIVE BOX
Patient is a 70y old  Male who presents with a chief complaint of Left leg cellulitis (02 Aug 2021 19:59)    pt seen in icu [  ], reg med floor [x   ], bed [ x ], chair at bedside [   ], a+o x3 [ x ], lethargic [  ],    nad [ x ]        Allergies    Aldactone (Unknown)  Bumex (Blisters; Rash)  metoprolol (Unknown)  spironolactone (Unknown)        Vitals    T(F): 97.3 (08-03-21 @ 05:20), Max: 97.3 (08-03-21 @ 05:20)  HR: 62 (08-03-21 @ 05:20) (58 - 62)  BP: 131/76 (08-03-21 @ 05:20) (121/76 - 131/76)  RR: 18 (08-03-21 @ 05:20) (17 - 18)  SpO2: 95% (08-03-21 @ 05:20) (95% - 98%)  Wt(kg): --  CAPILLARY BLOOD GLUCOSE      POCT Blood Glucose.: 107 mg/dL (02 Aug 2021 17:11)      Labs                          12.3   5.19  )-----------( 181      ( 01 Aug 2021 08:15 )             39.1       08-01    140  |  110<H>  |  15  ----------------------------<  104<H>  4.0   |  21<L>  |  0.91    Ca    8.5      01 Aug 2021 08:15    TPro  7.4  /  Alb  2.9<L>  /  TBili  0.8  /  DBili  x   /  AST  25  /  ALT  29  /  AlkPhos  131<H>  08-01            .Surgical Swab Leg left wound  07-30 @ 18:26   Culture yields >4 types of aerobic and/or anaerobic bacteria  Call client services within 7 days if further workup is clinically  indicated.  Culture includes Few Staphylococcus aureus  --  Staphylococcus aureus      .Blood Blood-Peripheral  07-30 @ 06:35   No growth to date.  --  --          Radiology Results      Meds    MEDICATIONS  (STANDING):  apixaban 5 milliGRAM(s) Oral every 12 hours  aspirin enteric coated 81 milliGRAM(s) Oral daily  atorvastatin 40 milliGRAM(s) Oral at bedtime  BACItracin   Ointment 1 Application(s) Topical two times a day  carvedilol 3.125 milliGRAM(s) Oral every 12 hours  ceFAZolin   IVPB      ceFAZolin   IVPB 2000 milliGRAM(s) IV Intermittent every 8 hours  collagenase Ointment 1 Application(s) Topical daily  furosemide    Tablet 40 milliGRAM(s) Oral daily  insulin lispro (ADMELOG) corrective regimen sliding scale   SubCutaneous three times a day before meals  insulin lispro (ADMELOG) corrective regimen sliding scale   SubCutaneous at bedtime  lisinopril 5 milliGRAM(s) Oral daily  metroNIDAZOLE  IVPB      metroNIDAZOLE  IVPB 500 milliGRAM(s) IV Intermittent every 8 hours  pantoprazole    Tablet 40 milliGRAM(s) Oral before breakfast      MEDICATIONS  (PRN):      Physical Exam    Neuro :  no focal deficits  Respiratory: CTA B/L  CV: RRR, S1S2, no murmurs,   Abdominal: Soft, NT, ND +BS,  Extremities: b/l le edema with multiple ulcerations l>r       ASSESSMENT    b/l le cellulitis 2nd to pvd,   h/o HTN,   HLD,   DM,   CAD s/p CABG 4 years ago,   systolic CHF on Lasix,   pulm htn,   PE on eliquis,   liver cirrhosis  Dyslipidemia        PLAN    podiatry f/u   Evaluated L leg and wound.s Dressed with Mupirocin, DSD.   vascular cons noted  Recommend elevation of b/l LE  Pt can f/u with Dr. Canales as outpatiemt  No acute vascular surgery intervention at this time   venous doppler le with No evidence of deep venous thrombosis in either lower extremity noted above.  wound cx Culture includes Few mssa noted above   id f/u  Change Vancomycin to Cefazolin   blood cx neg noted above  cardio f/u   cont coreg, lisinopril and lasix po.  .Pt reports allergy to aldactone.  cont asa and statin.  cxr with New mild opacity right lung base with trace right pleural effusion. Heart size cannot be accurately assessed in this projection, but appear enlarged noted    pulm f/u   Bronchodilators prn  cont current meds         Patient is a 70y old  Male who presents with a chief complaint of Left leg cellulitis (02 Aug 2021 19:59)    pt seen in icu [  ], reg med floor [x   ], bed [ x ], chair at bedside [   ], a+o x3 [ x ], lethargic [  ],    nad [ x ]        Allergies    Aldactone (Unknown)  Bumex (Blisters; Rash)  metoprolol (Unknown)  spironolactone (Unknown)        Vitals    T(F): 97.3 (08-03-21 @ 05:20), Max: 97.3 (08-03-21 @ 05:20)  HR: 62 (08-03-21 @ 05:20) (58 - 62)  BP: 131/76 (08-03-21 @ 05:20) (121/76 - 131/76)  RR: 18 (08-03-21 @ 05:20) (17 - 18)  SpO2: 95% (08-03-21 @ 05:20) (95% - 98%)  Wt(kg): --  CAPILLARY BLOOD GLUCOSE      POCT Blood Glucose.: 107 mg/dL (02 Aug 2021 17:11)      Labs                          12.3   5.19  )-----------( 181      ( 01 Aug 2021 08:15 )             39.1       08-01    140  |  110<H>  |  15  ----------------------------<  104<H>  4.0   |  21<L>  |  0.91    Ca    8.5      01 Aug 2021 08:15    TPro  7.4  /  Alb  2.9<L>  /  TBili  0.8  /  DBili  x   /  AST  25  /  ALT  29  /  AlkPhos  131<H>  08-01            .Surgical Swab Leg left wound  07-30 @ 18:26   Culture yields >4 types of aerobic and/or anaerobic bacteria  Call client services within 7 days if further workup is clinically  indicated.  Culture includes Few Staphylococcus aureus  --  Staphylococcus aureus      .Blood Blood-Peripheral  07-30 @ 06:35   No growth to date.  --  --          Radiology Results      Meds    MEDICATIONS  (STANDING):  apixaban 5 milliGRAM(s) Oral every 12 hours  aspirin enteric coated 81 milliGRAM(s) Oral daily  atorvastatin 40 milliGRAM(s) Oral at bedtime  BACItracin   Ointment 1 Application(s) Topical two times a day  carvedilol 3.125 milliGRAM(s) Oral every 12 hours  ceFAZolin   IVPB      ceFAZolin   IVPB 2000 milliGRAM(s) IV Intermittent every 8 hours  collagenase Ointment 1 Application(s) Topical daily  furosemide    Tablet 40 milliGRAM(s) Oral daily  insulin lispro (ADMELOG) corrective regimen sliding scale   SubCutaneous three times a day before meals  insulin lispro (ADMELOG) corrective regimen sliding scale   SubCutaneous at bedtime  lisinopril 5 milliGRAM(s) Oral daily  metroNIDAZOLE  IVPB      metroNIDAZOLE  IVPB 500 milliGRAM(s) IV Intermittent every 8 hours  pantoprazole    Tablet 40 milliGRAM(s) Oral before breakfast      MEDICATIONS  (PRN):      Physical Exam    Neuro :  no focal deficits  Respiratory: CTA B/L  CV: RRR, S1S2, no murmurs,   Abdominal: Soft, NT, ND +BS,  Extremities: b/l le edema with multiple ulcerations l>r       ASSESSMENT    b/l le cellulitis 2nd to pvd,   h/o HTN,   HLD,   DM,   CAD s/p CABG 4 years ago,   systolic CHF on Lasix,   pulm htn,   PE on eliquis,   liver cirrhosis  Dyslipidemia        PLAN    podiatry f/u   Evaluated L leg and wound.s Dressed with Mupirocin, DSD.   vascular cons noted  Recommend elevation of b/l LE  Pt can f/u with Dr. Canales as outpatiemt  No acute vascular surgery intervention at this time   venous doppler le with No evidence of deep venous thrombosis in either lower extremity noted above.  wound cx Culture includes Few mssa noted above   id f/u  cont Cefazolin   Add Flagyl for anaerobic coverage  blood cx neg noted above  cardio f/u   cont coreg, lisinopril and lasix po.  .Pt reports allergy to aldactone.  cont asa and statin.  cxr with New mild opacity right lung base with trace right pleural effusion. Heart size cannot be accurately assessed in this projection, but appear enlarged noted    pulm f/u   Bronchodilators prn  cont current meds

## 2021-08-03 NOTE — PROGRESS NOTE ADULT - SUBJECTIVE AND OBJECTIVE BOX
CHIEF COMPLAINT:Patient is a 70y old  Male who presents with a chief complaint of Left leg cellulitis.Pt appears comfortable.    	  REVIEW OF SYSTEMS:  CONSTITUTIONAL: No fever, weight loss, or fatigue  EYES: No eye pain, visual disturbances, or discharge  ENT:  No difficulty hearing, tinnitus, vertigo; No sinus or throat pain  NECK: No pain or stiffness  RESPIRATORY: No cough, wheezing, chills or hemoptysis; No Shortness of Breath  CARDIOVASCULAR: No chest pain, palpitations, passing out, dizziness, or leg swelling  GASTROINTESTINAL: No abdominal or epigastric pain. No nausea, vomiting, or hematemesis; No diarrhea or constipation. No melena or hematochezia.  GENITOURINARY: No dysuria, frequency, hematuria, or incontinence  NEUROLOGICAL: No headaches, memory loss, loss of strength, numbness, or tremors  SKIN: No itching, burning, rashes, or lesions   LYMPH Nodes: No enlarged glands  ENDOCRINE: No heat or cold intolerance; No hair loss  MUSCULOSKELETAL: No joint pain or swelling; No muscle, back, or extremity pain  PSYCHIATRIC: No depression, anxiety, mood swings, or difficulty sleeping  HEME/LYMPH: No easy bruising, or bleeding gums  ALLERGY AND IMMUNOLOGIC: No hives or eczema	        PHYSICAL EXAM:  T(C): 36.3 (08-03-21 @ 05:20), Max: 36.3 (08-03-21 @ 05:20)  HR: 62 (08-03-21 @ 05:20) (58 - 62)  BP: 131/76 (08-03-21 @ 05:20) (121/76 - 131/76)  RR: 18 (08-03-21 @ 05:20) (17 - 18)  SpO2: 95% (08-03-21 @ 05:20) (95% - 98%)        Appearance: Normal	  HEENT:   Normal oral mucosa, PERRL, EOMI	  Lymphatic: No lymphadenopathy  Cardiovascular: Normal S1 S2, No JVD, No murmurs,B/L dressing  Respiratory: Lungs clear to auscultation	  Psychiatry: A & O x 3, Mood & affect appropriate  Gastrointestinal:  Soft, Non-tender, + BS	  Skin: No rashes, No ecchymoses, No cyanosis	  Neurologic: Non-focal  Extremities: Normal range of motion, No clubbing, cyanosis,B/L dressing    MEDICATIONS  (STANDING):  apixaban 5 milliGRAM(s) Oral every 12 hours  aspirin enteric coated 81 milliGRAM(s) Oral daily  atorvastatin 40 milliGRAM(s) Oral at bedtime  BACItracin   Ointment 1 Application(s) Topical two times a day  carvedilol 3.125 milliGRAM(s) Oral every 12 hours  ceFAZolin   IVPB      ceFAZolin   IVPB 2000 milliGRAM(s) IV Intermittent every 8 hours  collagenase Ointment 1 Application(s) Topical daily  furosemide    Tablet 40 milliGRAM(s) Oral daily  insulin lispro (ADMELOG) corrective regimen sliding scale   SubCutaneous three times a day before meals  insulin lispro (ADMELOG) corrective regimen sliding scale   SubCutaneous at bedtime  lisinopril 5 milliGRAM(s) Oral daily  metroNIDAZOLE  IVPB      metroNIDAZOLE  IVPB 500 milliGRAM(s) IV Intermittent every 8 hours  pantoprazole    Tablet 40 milliGRAM(s) Oral before breakfast    	  	  LABS:	 	                        12.4   4.55  )-----------( 188      ( 03 Aug 2021 08:25 )             40.0     08-03    141  |  110<H>  |  15  ----------------------------<  82  3.6   |  26  |  0.91    Ca    8.3<L>      03 Aug 2021 08:25

## 2021-08-03 NOTE — PROGRESS NOTE ADULT - ASSESSMENT
1. PE   On A/C   Monitor for bleeding   DVT and GI PPX     2. Left lower ext. Cellulitis   cultures noted  Podiatry follow up  Wound care  ID f/u     3. CHF  Cont meds  Cardio F/U   Monitor I&O     4. DM   Accuchecks  Reg insulin coverage per HSS  A1C level     5. Pulmonary HTN   By hx  Cont meds  Bronchodilators  O2 Supp PRN

## 2021-08-03 NOTE — PROGRESS NOTE ADULT - PROBLEM SELECTOR PLAN 5
-a1c 6.2  -controlled  -Insulin sliding scale  - jaya GAMEZ
-  HgbA1c  6.2  -  Glucose well controlled  -  Continue with finger sticks AC and HS  -  Sliding scale coverage  -  Consistent CHO diet

## 2021-08-03 NOTE — PROGRESS NOTE ADULT - PROBLEM SELECTOR PLAN 1
-afebrile, no leukocytosis  -wound culture- MSSA sensitive to cefazolin  -c/w IV cefazolin -until further recommendation from ID, as patient failed 2 courses of outpatient PO antibiotics.  -f/u x-ray for left foot and LE results  -Podiatry following  -ID consulting
- Afebrile  - no leukocytosis  - wound culture- MSSA sensitive to cefazolin  - Continue with IV cefazolin   - Continue with Flagyl 500mg Q8 for malodorous cellulitis  - Patient previously failed two outpatient trials of PO antibiotics  - Dopplers negative for DVT  - Blood cultures NGTD from 7/26  - Xray of left leg -  < from: Xray Foot AP + Lateral + Oblique, Left (07.31.21 @ 11:07) >  IMPRESSION: No acute bony pathology.  - Podiatry following  - ID consulting

## 2021-08-03 NOTE — PROGRESS NOTE ADULT - PROBLEM SELECTOR PLAN 3
-c/w lisinopril and coreg   -controlled
-  Continue with Lisinopril  -  Blood pressures well controlled  -  DASH diet

## 2021-08-03 NOTE — CHART NOTE - NSCHARTNOTEFT_GEN_A_CORE
EVENT: Alerted by nurse that pt refusing medications and to have IV re-sited unless done by a specialist.    BRIEF HPI: 71 yo M from home, w/ PMHx of HTN, HLD, DM, PE, CAD s/p CABG 4 years ago, CHF on Lasix, PE on Eliquis, chronic B/L lymphedema. Patient Presented to ED with worsening, B/L leg cellulitis, failed two courses of outpatient PO antibiotics.  Left leg worse than right leg, swollen, erythematous, has purulent drainage, and ulcerations. Admitted for cellulitis.     FOCUSSED PE:  NEURO: Alert oriented X 4    Vital Signs Last 24 Hrs  T(C): 36.2 (02 Aug 2021 20:41), Max: 36.8 (02 Aug 2021 06:02)  T(F): 97.2 (02 Aug 2021 20:41), Max: 98.2 (02 Aug 2021 06:02)  HR: 58 (02 Aug 2021 20:41) (58 - 77)  BP: 121/76 (02 Aug 2021 20:41) (121/70 - 130/64)  BP(mean): --  RR: 17 (02 Aug 2021 20:41) (17 - 18)  SpO2: 98% (02 Aug 2021 20:41) (96% - 98%)        MEDICATIONS  (STANDING):  apixaban 5 milliGRAM(s) Oral every 12 hours  aspirin enteric coated 81 milliGRAM(s) Oral daily  atorvastatin 40 milliGRAM(s) Oral at bedtime  BACItracin   Ointment 1 Application(s) Topical two times a day  carvedilol 3.125 milliGRAM(s) Oral every 12 hours  ceFAZolin   IVPB      ceFAZolin   IVPB 2000 milliGRAM(s) IV Intermittent every 8 hours  collagenase Ointment 1 Application(s) Topical daily  furosemide    Tablet 40 milliGRAM(s) Oral daily  insulin lispro (ADMELOG) corrective regimen sliding scale   SubCutaneous three times a day before meals  insulin lispro (ADMELOG) corrective regimen sliding scale   SubCutaneous at bedtime  lisinopril 5 milliGRAM(s) Oral daily  metroNIDAZOLE  IVPB      metroNIDAZOLE  IVPB 500 milliGRAM(s) IV Intermittent every 8 hours  pantoprazole    Tablet 40 milliGRAM(s) Oral before breakfast    MEDICATIONS  (PRN): EVENT: Alerted by nurse that pt refusing medications and to have IV re-sited unless done by a specialist.    BRIEF HPI: 71 yo M from home, w/ PMHx of HTN, HLD, DM, PE, CAD s/p CABG 4 years ago, CHF on Lasix, PE on Eliquis, chronic B/L lymphedema. Patient Presented to ED with worsening, B/L leg cellulitis, failed two courses of outpatient PO antibiotics.  Left leg worse than right leg, swollen, erythematous, has purulent drainage, and ulcerations. Admitted for cellulitis.     FOCUSSED PE:  NEURO: Alert oriented X 4    Vital Signs Last 24 Hrs  T(C): 36.2 (02 Aug 2021 20:41), Max: 36.8 (02 Aug 2021 06:02)  T(F): 97.2 (02 Aug 2021 20:41), Max: 98.2 (02 Aug 2021 06:02)  HR: 58 (02 Aug 2021 20:41) (58 - 77)  BP: 121/76 (02 Aug 2021 20:41) (121/70 - 130/64)  BP(mean): --  RR: 17 (02 Aug 2021 20:41) (17 - 18)  SpO2: 98% (02 Aug 2021 20:41) (96% - 98%)    PLAN:  - Obtain IV access if pt agreeable to continue abx therapy, pt is informed.

## 2021-08-03 NOTE — PROGRESS NOTE ADULT - PROBLEM SELECTOR PLAN 7
-  Plan to return home  -  Follow up with ID  recommendations for PO antibiotics  -  Follow up with Podiatry.
-f/u left foot x-ray  -ID recommendation

## 2021-08-03 NOTE — PROGRESS NOTE ADULT - SUBJECTIVE AND OBJECTIVE BOX
Patient is a 70y old  Male who presents with a chief complaint of Left leg cellulitis (02 Aug 2021 10:49)      INTERVAL HPI/OVERNIGHT EVENTS: Patient seen and examined at bedside.       MEDICATIONS  (STANDING):  apixaban 5 milliGRAM(s) Oral every 12 hours  aspirin enteric coated 81 milliGRAM(s) Oral daily  atorvastatin 40 milliGRAM(s) Oral at bedtime  BACItracin   Ointment 1 Application(s) Topical two times a day  carvedilol 3.125 milliGRAM(s) Oral every 12 hours  ceFAZolin   IVPB      ceFAZolin   IVPB 2000 milliGRAM(s) IV Intermittent every 8 hours  collagenase Ointment 1 Application(s) Topical daily  furosemide    Tablet 40 milliGRAM(s) Oral daily  insulin lispro (ADMELOG) corrective regimen sliding scale   SubCutaneous three times a day before meals  insulin lispro (ADMELOG) corrective regimen sliding scale   SubCutaneous at bedtime  lisinopril 5 milliGRAM(s) Oral daily  metroNIDAZOLE  IVPB      metroNIDAZOLE  IVPB 500 milliGRAM(s) IV Intermittent every 8 hours  pantoprazole    Tablet 40 milliGRAM(s) Oral before breakfast    MEDICATIONS  (PRN):    __________________________________________________  REVIEW OF SYSTEMS:    CONSTITUTIONAL: No fever, chills, weakness.  EYES: no acute visual disturbances  NECK: No pain or stiffness  RESPIRATORY: No cough; No shortness of breath  CARDIOVASCULAR: No chest pain, no palpitations  GASTROINTESTINAL: No pain. No nausea or vomiting; No diarrhea   NEUROLOGICAL: No headache or numbness, no tremors  MUSCULOSKELETAL: No joint pain, no muscle pain  GENITOURINARY: no dysuria, no frequency, no hesitancy  PSYCHIATRY: no depression , no anxiety  ALL OTHER  ROS negative        Vital Signs Last 24 Hrs  T(C): 36.3 (03 Aug 2021 05:20), Max: 36.3 (03 Aug 2021 05:20)  T(F): 97.3 (03 Aug 2021 05:20), Max: 97.3 (03 Aug 2021 05:20)  HR: 62 (03 Aug 2021 05:20) (58 - 62)  BP: 131/76 (03 Aug 2021 05:20) (121/76 - 131/76)  BP(mean): --  RR: 18 (03 Aug 2021 05:20) (17 - 18)  SpO2: 95% (03 Aug 2021 05:20) (95% - 98%)      ________________________________________________  PHYSICAL EXAM:  GENERAL: No acute distress  HEENT: Normocephalic;  conjunctivae and sclerae clear; moist mucous membranes.  NECK : supple, No JVD noted.  CHEST/LUNG: Clear to auscultation bilaterally.  No rales, wheezes or rhonchi  HEART: S1 S2  regular; no murmurs  ABDOMEN: Obese, Soft, Nontender, Nondistended; Bowel sounds present  EXTREMITIES: L>R pitting edema +4, no cyanosis; no edema; no calf tenderness  SKIN: warm and dry;   NERVOUS SYSTEM:  Awake and alert; Oriented  to place, person and time.  No new deficits    _________________________________________________  LABS:                                   12.4   4.55  )-----------( 188      ( 03 Aug 2021 08:25 )             40.0       08-03    141  |  110<H>  |  15  ----------------------------<  82  3.6   |  26  |  0.91    Ca    8.3<L>      03 Aug 2021 08:25      CAPILLARY BLOOD GLUCOSE      POCT Blood Glucose.: 83 mg/dL (03 Aug 2021 08:06)  POCT Blood Glucose.: 107 mg/dL (02 Aug 2021 17:11)  POCT Blood Glucose.: 111 mg/dL (02 Aug 2021 11:23)      RADIOLOGY & ADDITIONAL TESTS:  < from: US Duplex Venous Lower Ext Complete, Bilateral (07.30.21 @ 11:34) >    EXAM:  US DPLX LWR EXT VEINS COMPL BI                            PROCEDURE DATE:  07/30/2021          INTERPRETATION:  CLINICAL INFORMATION: Bilateral leg edema    COMPARISON: None available.    TECHNIQUE: Duplex sonography of the BILATERAL LOWER extremity veins with color and spectral Doppler, with and without compression.    FINDINGS:    RIGHT:  Normal compressibility of the RIGHT common femoral, femoral and popliteal veins.  Doppler examination shows normal spontaneous and phasic flow.  No RIGHT calf vein thrombosis is detected.    Soft tissue edema in the right calf.    LEFT:  Normal compressibility of the LEFT common femoral, femoral and popliteal veins.  Doppler examination shows normal spontaneous and phasic flow.  No LEFT calf vein thrombosis is detected.    Soft tissue edema in the left calf.    IMPRESSION:  No evidence of deep venous thrombosis in either lower extremity.    Soft tissue edema in the calves bilaterally.    < end of copied text >  < from: Xray Chest 1 View- PORTABLE-Routine (Xray Chest 1 View- PORTABLE-Routine .) (07.30.21 @ 09:31) >    EXAM:  XR CHEST PORTABLE ROUTINE 1V                          PROCEDURE DATE:  07/30/2021      INTERPRETATION:  CLINICAL STATEMENT: Chest pain.    TECHNIQUE: AP view of the chest.    COMPARISON: 4/19/2021    FINDINGS/  IMPRESSION:  Sternotomy wires again noted.    New mild opacity right lung base with trace right pleural effusion.    Heart size cannot be accurately assessed in this projection, but appear enlarged.    < end of copied text >    Imaging  Reviewed:  YES    Consultant(s) Notes Reviewed:   YES      Plan of care was discussed with patient and /or primary care giver; all questions and concerns were addressed

## 2021-08-03 NOTE — PROGRESS NOTE ADULT - ASSESSMENT
Patient is a 70y old  Male who is  from home, w/ PMHx of HTN, HLD, DM, PE, CAD s/p CABG 4 years ago, CHF on Lasix, PE on eliquis. Presents to the ER for evaluation of chronic b/l L.E swelling and worsening left LE cellulitis. He has taken 2 courses of PO Abx. Left leg is swollen, erythematous, has purulent drainage, and ulcerations. On admission, he has no fever and no Leukocytosis. He has started on IV Vancomycin and The ID consult requested to assist with further evaluation and antibiotic management.    # LLE purulent Cellulitis  - wound cx grew MSSA    would recommend:    1. Continue Cefazolin and Add Flagyl for anaerobic coverage   2. Keep LLE elevated  3. Continue Wound care and keep LE moist     d/w patient and Nursing staff       Attending Attestation:    Spent more than 35 minutes on total encounter, more than 50 % of the visit was spent counseling and/or coordinating care by the Attending physician. Patient is a 70y old  Male who is  from home, w/ PMHx of HTN, HLD, DM, PE, CAD s/p CABG 4 years ago, CHF on Lasix, PE on eliquis. Presents to the ER for evaluation of chronic b/l L.E swelling and worsening left LE cellulitis. He has taken 2 courses of PO Abx. Left leg is swollen, erythematous, has purulent drainage, and ulcerations. On admission, he has no fever and no Leukocytosis. He has started on IV Vancomycin and The ID consult requested to assist with further evaluation and antibiotic management.    # LLE purulent Cellulitis  - wound cx grew MSSA    would recommend:    1. Continue Cefazolin and Flagyl inpatient   2. May change to oral Abx on discharge   3. Keep LLE elevated  4. Continue Wound care and keep LE moist     d/w patient and Nursing staff       Attending Attestation:    Spent more than 35 minutes on total encounter, more than 50 % of the visit was spent counseling and/or coordinating care by the Attending physician.

## 2021-08-03 NOTE — PROGRESS NOTE ADULT - ASSESSMENT
Patient is a 71 yo M from home, w/ PMHx of HTN, HLD, DM, PE, CAD s/p CABG 4 years ago, CHF on Lasix, PE on Eliquis, chronic B/L lymphedema. Patient Presented to ED with worsening, B/L leg cellulitis, failed two courses of outpatient PO antibiotics.  Left leg worse than right leg, swollen, erythematous, has purulent drainage, and ulcerations.

## 2021-08-03 NOTE — PROGRESS NOTE ADULT - PROBLEM SELECTOR PLAN 4
-Echo 3/25/21 Severe global left ventricular systolic dysfunction (EF  15-20%)  - c/w  lasix   - Dash diet
-  Continue with Coreg  3.125mg twice daily  -  Continue with Lasix  -  DASH diet   -  Echo from  3/25/21 Severe global left ventricular systolic dysfunction (EF  15-20%)

## 2021-08-03 NOTE — PROGRESS NOTE ADULT - SUBJECTIVE AND OBJECTIVE BOX
Patient is seen and examined at the bed side, is afebrile.  The WBC count stay elevated.       REVIEW OF SYSTEMS: All other review systems are negative      ALLERGIES: Aldactone (Unknown), Bumex (Blisters; Rash), metoprolol (Unknown), spironolactone (Unknown)      Vital Signs Last 24 Hrs  T(C): 36.3 (03 Aug 2021 05:20), Max: 36.3 (03 Aug 2021 05:20)  T(F): 97.3 (03 Aug 2021 05:20), Max: 97.3 (03 Aug 2021 05:20)  HR: 62 (03 Aug 2021 05:20) (58 - 62)  BP: 131/76 (03 Aug 2021 05:20) (121/76 - 131/76)  BP(mean): --  RR: 18 (03 Aug 2021 05:20) (17 - 18)  SpO2: 95% (03 Aug 2021 05:20) (95% - 98%)      PHYSICAL EXAM:  GENERAL: Not in distress   CHEST/LUNG:  Not using accessory muscles  HEART: s1 and s2 present  ABDOMEN:  Nontender and  Nondistended  EXTREMITIES: LE Bandage in placed   CNS: Awake and Alert      LABS:                           12.4   4.55  )-----------( 188      ( 03 Aug 2021 08:25 )             40.0                           12.3   5.19  )-----------( 181      ( 01 Aug 2021 08:15 )             39.1       08-03    141  |  110<H>  |  15  ----------------------------<  82  3.6   |  26  |  0.91    Ca    8.3<L>      03 Aug 2021 08:25        08-01    140  |  110<H>  |  15  ----------------------------<  104<H>  4.0   |  21<L>  |  0.91    Ca    8.5      01 Aug 2021 08:15    TPro  7.4  /  Alb  2.9<L>  /  TBili  0.8  /  DBili  x   /  AST  25  /  ALT  29  /  AlkPhos  131<H>  08-01    PT/INR - ( 29 Jul 2021 23:48 )   PT: 15.7 sec;   INR: 1.34 ratio         PTT - ( 29 Jul 2021 23:48 )  PTT:38.9 sec    CAPILLARY BLOOD GLUCOSE  POCT Blood Glucose.: 121 mg/dL (30 Jul 2021 17:14)  POCT Blood Glucose.: 95 mg/dL (30 Jul 2021 08:18)      Vancomycin Level, Trough (07.31.21 @ 12:31)   Vancomycin Level, Trough: 17.2:       MEDICATIONS  (STANDING):    apixaban 5 milliGRAM(s) Oral every 12 hours  aspirin enteric coated 81 milliGRAM(s) Oral daily  atorvastatin 40 milliGRAM(s) Oral at bedtime  BACItracin   Ointment 1 Application(s) Topical two times a day  carvedilol 3.125 milliGRAM(s) Oral every 12 hours  ceFAZolin   IVPB      ceFAZolin   IVPB 2000 milliGRAM(s) IV Intermittent every 8 hours  collagenase Ointment 1 Application(s) Topical daily  furosemide    Tablet 40 milliGRAM(s) Oral daily  insulin lispro (ADMELOG) corrective regimen sliding scale   SubCutaneous three times a day before meals  insulin lispro (ADMELOG) corrective regimen sliding scale   SubCutaneous at bedtime  lisinopril 5 milliGRAM(s) Oral daily  metroNIDAZOLE  IVPB      metroNIDAZOLE  IVPB 500 milliGRAM(s) IV Intermittent every 8 hours  pantoprazole    Tablet 40 milliGRAM(s) Oral before breakfast      RADIOLOGY & ADDITIONAL TESTS:    7/30/21: US Duplex Venous Lower Ext Complete, Bilateral (07.30.21 @ 11:34) No evidence of deep venous thrombosis in either lower extremity. Soft tissue edema in the calves bilaterally.      7/30/21: Xray Chest 1 View- PORTABLE-Routine (Xray Chest 1 View- PORTABLE-Routine .) (07.30.21 @ 09:31) Sternotomy wires again noted.    New mild opacity right lung base with trace right pleural effusion. Heart size cannot be accurately assessed in this projection, but appear enlarged.        MICROBIOLOGY DATA:    Culture - Blood (07.30.21 @ 06:35)   Specimen Source: .Blood Blood-Peripheral   Culture Results: No growth to date.     Culture - Blood (07.30.21 @ 06:35)   Specimen Source: .Blood Blood-Peripheral   Culture Results: No growth to date.     COVID-19 PCR . (07.30.21 @ 00:19)   COVID-19 PCR: NotDetec:                   Patient is seen and examined at the bed side, is afebrile.  The WBC count and kidney function stay normal.       REVIEW OF SYSTEMS: All other review systems are negative      ALLERGIES: Aldactone (Unknown), Bumex (Blisters; Rash), metoprolol (Unknown), spironolactone (Unknown)      Vital Signs Last 24 Hrs  T(C): 36.3 (03 Aug 2021 05:20), Max: 36.3 (03 Aug 2021 05:20)  T(F): 97.3 (03 Aug 2021 05:20), Max: 97.3 (03 Aug 2021 05:20)  HR: 62 (03 Aug 2021 05:20) (58 - 62)  BP: 131/76 (03 Aug 2021 05:20) (121/76 - 131/76)  BP(mean): --  RR: 18 (03 Aug 2021 05:20) (17 - 18)  SpO2: 95% (03 Aug 2021 05:20) (95% - 98%)      PHYSICAL EXAM:  GENERAL: Not in distress   CHEST/LUNG:  Not using accessory muscles  HEART: s1 and s2 present  ABDOMEN:  Nontender and  Nondistended  EXTREMITIES: LE Bandage in placed   CNS: Awake and Alert      LABS:                           12.4   4.55  )-----------( 188      ( 03 Aug 2021 08:25 )             40.0                           12.3   5.19  )-----------( 181      ( 01 Aug 2021 08:15 )             39.1       08-03    141  |  110<H>  |  15  ----------------------------<  82  3.6   |  26  |  0.91    Ca    8.3<L>      03 Aug 2021 08:25        08-01    140  |  110<H>  |  15  ----------------------------<  104<H>  4.0   |  21<L>  |  0.91    Ca    8.5      01 Aug 2021 08:15    TPro  7.4  /  Alb  2.9<L>  /  TBili  0.8  /  DBili  x   /  AST  25  /  ALT  29  /  AlkPhos  131<H>  08-01    PT/INR - ( 29 Jul 2021 23:48 )   PT: 15.7 sec;   INR: 1.34 ratio         PTT - ( 29 Jul 2021 23:48 )  PTT:38.9 sec    CAPILLARY BLOOD GLUCOSE  POCT Blood Glucose.: 121 mg/dL (30 Jul 2021 17:14)  POCT Blood Glucose.: 95 mg/dL (30 Jul 2021 08:18)      Vancomycin Level, Trough (07.31.21 @ 12:31)   Vancomycin Level, Trough: 17.2:       MEDICATIONS  (STANDING):    apixaban 5 milliGRAM(s) Oral every 12 hours  aspirin enteric coated 81 milliGRAM(s) Oral daily  atorvastatin 40 milliGRAM(s) Oral at bedtime  BACItracin   Ointment 1 Application(s) Topical two times a day  carvedilol 3.125 milliGRAM(s) Oral every 12 hours  ceFAZolin   IVPB      ceFAZolin   IVPB 2000 milliGRAM(s) IV Intermittent every 8 hours  collagenase Ointment 1 Application(s) Topical daily  furosemide    Tablet 40 milliGRAM(s) Oral daily  insulin lispro (ADMELOG) corrective regimen sliding scale   SubCutaneous three times a day before meals  insulin lispro (ADMELOG) corrective regimen sliding scale   SubCutaneous at bedtime  lisinopril 5 milliGRAM(s) Oral daily  metroNIDAZOLE  IVPB      metroNIDAZOLE  IVPB 500 milliGRAM(s) IV Intermittent every 8 hours  pantoprazole    Tablet 40 milliGRAM(s) Oral before breakfast      RADIOLOGY & ADDITIONAL TESTS:    7/30/21: US Duplex Venous Lower Ext Complete, Bilateral (07.30.21 @ 11:34) No evidence of deep venous thrombosis in either lower extremity. Soft tissue edema in the calves bilaterally.      7/30/21: Xray Chest 1 View- PORTABLE-Routine (Xray Chest 1 View- PORTABLE-Routine .) (07.30.21 @ 09:31) Sternotomy wires again noted.    New mild opacity right lung base with trace right pleural effusion. Heart size cannot be accurately assessed in this projection, but appear enlarged.        MICROBIOLOGY DATA:    Culture - Blood (07.30.21 @ 06:35)   Specimen Source: .Blood Blood-Peripheral   Culture Results: No growth to date.     Culture - Blood (07.30.21 @ 06:35)   Specimen Source: .Blood Blood-Peripheral   Culture Results: No growth to date.     COVID-19 PCR . (07.30.21 @ 00:19)   COVID-19 PCR: NotDetec:

## 2021-08-03 NOTE — PROGRESS NOTE ADULT - SUBJECTIVE AND OBJECTIVE BOX
Patient is a 70y old  Male who presents with a chief complaint of Left leg cellulitis (03 Aug 2021 11:01)  Awake, alert, comfortable out of bed in NAD    INTERVAL HPI/OVERNIGHT EVENTS:      VITAL SIGNS:  T(F): 97.3 (08-03-21 @ 05:20)  HR: 62 (08-03-21 @ 05:20)  BP: 131/76 (08-03-21 @ 05:20)  RR: 18 (08-03-21 @ 05:20)  SpO2: 95% (08-03-21 @ 05:20)  Wt(kg): --  I&O's Detail          REVIEW OF SYSTEMS:    CONSTITUTIONAL:  No fevers, chills, sweats    HEENT:  Eyes:  No diplopia or blurred vision. ENT:  No earache, sore throat or runny nose.    CARDIOVASCULAR:  No pressure, squeezing, tightness, or heaviness about the chest; no palpitations.    RESPIRATORY:  Per HPI    GASTROINTESTINAL:  No abdominal pain, nausea, vomiting or diarrhea.    GENITOURINARY:  No dysuria, frequency or urgency.    NEUROLOGIC:  No paresthesias, fasciculations, seizures or weakness.    PSYCHIATRIC:  No disorder of thought or mood.      PHYSICAL EXAM:    Constitutional: Well developed and nourished  Eyes:Perrla  ENMT: normal  Neck:supple  Respiratory: good air entry  Cardiovascular: S1 S2 regular  Gastrointestinal: Soft, Non tender  Extremities: + edema  Vascular:normal  Neurological:Awake, alert,Ox3  Musculoskeletal:Normal      MEDICATIONS  (STANDING):  apixaban 5 milliGRAM(s) Oral every 12 hours  aspirin enteric coated 81 milliGRAM(s) Oral daily  atorvastatin 40 milliGRAM(s) Oral at bedtime  BACItracin   Ointment 1 Application(s) Topical two times a day  carvedilol 3.125 milliGRAM(s) Oral every 12 hours  ceFAZolin   IVPB      ceFAZolin   IVPB 2000 milliGRAM(s) IV Intermittent every 8 hours  collagenase Ointment 1 Application(s) Topical daily  furosemide    Tablet 40 milliGRAM(s) Oral daily  insulin lispro (ADMELOG) corrective regimen sliding scale   SubCutaneous three times a day before meals  insulin lispro (ADMELOG) corrective regimen sliding scale   SubCutaneous at bedtime  lisinopril 5 milliGRAM(s) Oral daily  metroNIDAZOLE  IVPB      metroNIDAZOLE  IVPB 500 milliGRAM(s) IV Intermittent every 8 hours  pantoprazole    Tablet 40 milliGRAM(s) Oral before breakfast    MEDICATIONS  (PRN):      Allergies    Aldactone (Unknown)  Bumex (Blisters; Rash)  metoprolol (Unknown)  spironolactone (Unknown)    Intolerances        LABS:                        12.4   4.55  )-----------( 188      ( 03 Aug 2021 08:25 )             40.0     08-03    141  |  110<H>  |  15  ----------------------------<  82  3.6   |  26  |  0.91    Ca    8.3<L>      03 Aug 2021 08:25                CAPILLARY BLOOD GLUCOSE      POCT Blood Glucose.: 83 mg/dL (03 Aug 2021 08:06)  POCT Blood Glucose.: 107 mg/dL (02 Aug 2021 17:11)        RADIOLOGY & ADDITIONAL TESTS:    CXR:  < from: Xray Chest 1 View- PORTABLE-Routine (Xray Chest 1 View- PORTABLE-Routine .) (07.30.21 @ 09:31) >  FINDINGS/  IMPRESSION:  Sternotomy wires again noted.    New mild opacity right lung base with trace right pleural effusion.    Heart size cannot be accurately assessed in this projection, but appear enlarged.    < end of copied text >    Ct scan chest:    ekg;    echo:

## 2021-08-04 ENCOUNTER — TRANSCRIPTION ENCOUNTER (OUTPATIENT)
Age: 71
End: 2021-08-04

## 2021-08-04 VITALS
SYSTOLIC BLOOD PRESSURE: 130 MMHG | DIASTOLIC BLOOD PRESSURE: 86 MMHG | OXYGEN SATURATION: 98 % | HEART RATE: 64 BPM | RESPIRATION RATE: 16 BRPM

## 2021-08-04 PROCEDURE — 73590 X-RAY EXAM OF LOWER LEG: CPT

## 2021-08-04 PROCEDURE — 87070 CULTURE OTHR SPECIMN AEROBIC: CPT

## 2021-08-04 PROCEDURE — 87186 SC STD MICRODIL/AGAR DIL: CPT

## 2021-08-04 PROCEDURE — 82962 GLUCOSE BLOOD TEST: CPT

## 2021-08-04 PROCEDURE — 87635 SARS-COV-2 COVID-19 AMP PRB: CPT

## 2021-08-04 PROCEDURE — 99053 MED SERV 10PM-8AM 24 HR FAC: CPT

## 2021-08-04 PROCEDURE — 83036 HEMOGLOBIN GLYCOSYLATED A1C: CPT

## 2021-08-04 PROCEDURE — 87640 STAPH A DNA AMP PROBE: CPT

## 2021-08-04 PROCEDURE — 99285 EMERGENCY DEPT VISIT HI MDM: CPT

## 2021-08-04 PROCEDURE — 85610 PROTHROMBIN TIME: CPT

## 2021-08-04 PROCEDURE — 85025 COMPLETE CBC W/AUTO DIFF WBC: CPT

## 2021-08-04 PROCEDURE — 71045 X-RAY EXAM CHEST 1 VIEW: CPT

## 2021-08-04 PROCEDURE — 87641 MR-STAPH DNA AMP PROBE: CPT

## 2021-08-04 PROCEDURE — 36415 COLL VENOUS BLD VENIPUNCTURE: CPT

## 2021-08-04 PROCEDURE — 86769 SARS-COV-2 COVID-19 ANTIBODY: CPT

## 2021-08-04 PROCEDURE — 80053 COMPREHEN METABOLIC PANEL: CPT

## 2021-08-04 PROCEDURE — 80048 BASIC METABOLIC PNL TOTAL CA: CPT

## 2021-08-04 PROCEDURE — 93970 EXTREMITY STUDY: CPT

## 2021-08-04 PROCEDURE — 87040 BLOOD CULTURE FOR BACTERIA: CPT

## 2021-08-04 PROCEDURE — 85730 THROMBOPLASTIN TIME PARTIAL: CPT

## 2021-08-04 PROCEDURE — 83605 ASSAY OF LACTIC ACID: CPT

## 2021-08-04 PROCEDURE — 85027 COMPLETE CBC AUTOMATED: CPT

## 2021-08-04 PROCEDURE — 86803 HEPATITIS C AB TEST: CPT

## 2021-08-04 PROCEDURE — 96374 THER/PROPH/DIAG INJ IV PUSH: CPT

## 2021-08-04 PROCEDURE — 80202 ASSAY OF VANCOMYCIN: CPT

## 2021-08-04 PROCEDURE — 73630 X-RAY EXAM OF FOOT: CPT

## 2021-08-04 RX ORDER — BACITRACIN ZINC 500 UNIT/G
1 OINTMENT IN PACKET (EA) TOPICAL
Qty: 1 | Refills: 0
Start: 2021-08-04

## 2021-08-04 RX ORDER — PANTOPRAZOLE SODIUM 20 MG/1
1 TABLET, DELAYED RELEASE ORAL
Qty: 0 | Refills: 0 | DISCHARGE
Start: 2021-08-04

## 2021-08-04 RX ORDER — COLLAGENASE CLOSTRIDIUM HIST. 250 UNIT/G
1 OINTMENT (GRAM) TOPICAL
Qty: 0 | Refills: 0 | DISCHARGE
Start: 2021-08-04

## 2021-08-04 RX ADMIN — Medication 1 APPLICATION(S): at 05:35

## 2021-08-04 RX ADMIN — APIXABAN 5 MILLIGRAM(S): 2.5 TABLET, FILM COATED ORAL at 05:35

## 2021-08-04 RX ADMIN — LISINOPRIL 5 MILLIGRAM(S): 2.5 TABLET ORAL at 05:36

## 2021-08-04 RX ADMIN — CARVEDILOL PHOSPHATE 3.12 MILLIGRAM(S): 80 CAPSULE, EXTENDED RELEASE ORAL at 05:36

## 2021-08-04 RX ADMIN — Medication 40 MILLIGRAM(S): at 05:36

## 2021-08-04 NOTE — PROGRESS NOTE ADULT - SUBJECTIVE AND OBJECTIVE BOX
HPI:  Pt is a 69 yo M from home, w/ PMHx of HTN, HLD, DM, PE, CAD s/p CABG 4 years ago, CHF on Lasix, PE on eliquis. Presenting w/ chronic b/l L.E swelling and worsening left l.e cellulitis. He has taken 2 courses of PO Abx. Left leg is swollen, erythematous, has purulent drainage, and ulcerations.   Pt denies any history of diabetes, and is non compliant with HLD medications. (30 Jul 2021 01:54)      Podiatry HPI: 70 y.o male was seen bedside resting comfortably in bed. Pt states that he has had the L leg ulcer for a couple of weeks now. Patient denies any pain to left leg. Denies N/V/F/C/SOB. Denies calf pain. Denies any other pedal problems.     Podiatry progress: Patient seen laying in bed comfortably. States that overall he feels improved compared to his admission, and states that his leg swelling has improved noticeably. He complains of some residual mild pain to her left plantar heel.     Medications apixaban 5 milliGRAM(s) Oral every 12 hours  aspirin enteric coated 81 milliGRAM(s) Oral daily  atorvastatin 40 milliGRAM(s) Oral at bedtime  BACItracin   Ointment 1 Application(s) Topical two times a day  carvedilol 3.125 milliGRAM(s) Oral every 12 hours  collagenase Ointment 1 Application(s) Topical daily  furosemide    Tablet 40 milliGRAM(s) Oral daily  insulin lispro (ADMELOG) corrective regimen sliding scale   SubCutaneous three times a day before meals  insulin lispro (ADMELOG) corrective regimen sliding scale   SubCutaneous at bedtime  lisinopril 5 milliGRAM(s) Oral daily  pantoprazole    Tablet 40 milliGRAM(s) Oral before breakfast    FHNo pertinent family history in first degree relatives    No pertinent family history in first degree relatives    ,   PMHNo pertinent past medical history    CAD (coronary artery disease)    DM (diabetes mellitus)    Dyslipidemia    CHF (congestive heart failure)    Angina pectoris    HTN (hypertension)    PE (pulmonary thromboembolism)       PSHS/P CABG x 3        Labs                          12.4   4.55  )-----------( 188      ( 03 Aug 2021 08:25 )             40.0      08-03    141  |  110<H>  |  15  ----------------------------<  82  3.6   |  26  |  0.91    Ca    8.3<L>      03 Aug 2021 08:25       Vital Signs Last 24 Hrs  T(C): 36.2 (03 Aug 2021 20:32), Max: 36.2 (03 Aug 2021 20:32)  T(F): 97.2 (03 Aug 2021 20:32), Max: 97.2 (03 Aug 2021 20:32)  HR: 64 (04 Aug 2021 06:01) (64 - 68)  BP: 130/86 (04 Aug 2021 06:01) (123/78 - 130/86)  BP(mean): --  RR: 16 (04 Aug 2021 06:01) (16 - 17)  SpO2: 98% (04 Aug 2021 06:01) (98% - 98%)               ROS  REVIEW OF SYSTEMS: Negative unless stated otherwise in the HPI      PHYSICAL EXAM  GEN: LIDIA PÉREZ is a pleasant well-nourished, well developed 70y Male in no acute distress, alert awake, and oriented to person, place and time.   LE Focused:  L leg focused   Vasc:  DP/PT 1/4. CFT brisk to all digits. +1 pitting edema to dorsal foot and proximally to leg. Skin temperature warm to warm from proximal to distal LE.   Derm: Prior exam noted 2 open lesions on the left anterior leg, now noted to be healed over     Prior wound descriptions:   Wound 1:   Measures 0.5cm x 0.5 cm x 0.1 cm with fibrous base. Periwound is normal. No drainage or malodor present. No clinical signs of infection. Sam grade 1  Wound 2:   Measures 1.2 cm x 1.5 cm x 0.1 cm with fibrotic base. Periwound is normal. No drainage or malodor present. No clinical signs of infection. Sam grade 1   Neuro: Protective sensations intact   MSK:  4/5 muscle strength in all compartments. No POP of calfs.     Imaging: Pending official read- Left leg and foot     Culture pending of L leg wound     Duplex B/L leg (7/30):   IMPRESSION:  No evidence of deep venous thrombosis in either lower extremity.

## 2021-08-04 NOTE — PROGRESS NOTE ADULT - REASON FOR ADMISSION
Left leg cellulitis

## 2021-08-04 NOTE — PROGRESS NOTE ADULT - NSICDXPILOT_GEN_ALL_CORE
[place]

## 2021-08-04 NOTE — PROGRESS NOTE ADULT - PROVIDER SPECIALTY LIST ADULT
Cardiology
Cardiology
Internal Medicine
Cardiology
Infectious Disease
Internal Medicine
Podiatry
Podiatry
Infectious Disease
Infectious Disease
Internal Medicine
Internal Medicine
Podiatry
Pulmonology
Cardiology
Internal Medicine
Pulmonology
Cardiology
Infectious Disease
Internal Medicine
Internal Medicine

## 2021-08-04 NOTE — DISCHARGE NOTE NURSING/CASE MANAGEMENT/SOCIAL WORK - NSDCVIVACCINE_GEN_ALL_CORE_FT
COVID-19 vaccine, vector-nr, [id number], PF, 0.5 mL ([facility]); 05-Apr-2021 16:24; Antoni Sarmiento (RN); [employer]; [id number] (Exp. Date: 05-Apr-2021); IntraMuscular; Deltoid Right.; 0.5 milliLiter(s);   influenza, injectable, quadrivalent, preservative free; 22-Nov-2017 10:37; Christina Toribio (RN); [employer]; [id number]; IntraMuscular; Deltoid Left.; 0.5 milliLiter(s); VIS (VIS Published: 07-Aug-2015, VIS Presented: 22-Nov-2017);

## 2021-08-04 NOTE — PROGRESS NOTE ADULT - SUBJECTIVE AND OBJECTIVE BOX
`Patient is a 70y old  Male who presents with a chief complaint of Left leg cellulitis (03 Aug 2021 18:48)    pt seen in icu [  ], reg med floor [x   ], bed [ x ], chair at bedside [   ], a+o x3 [ x ], lethargic [  ],    nad [ x ]        Allergies    Aldactone (Unknown)  Bumex (Blisters; Rash)  metoprolol (Unknown)  spironolactone (Unknown)        Vitals    T(F): 97.2 (08-03-21 @ 20:32), Max: 97.2 (08-03-21 @ 20:32)  HR: 64 (08-04-21 @ 06:01) (64 - 68)  BP: 130/86 (08-04-21 @ 06:01) (123/78 - 130/86)  RR: 16 (08-04-21 @ 06:01) (16 - 17)  SpO2: 98% (08-04-21 @ 06:01) (98% - 98%)  Wt(kg): --  CAPILLARY BLOOD GLUCOSE      POCT Blood Glucose.: 144 mg/dL (03 Aug 2021 20:59)      Labs                          12.4   4.55  )-----------( 188      ( 03 Aug 2021 08:25 )             40.0       08-03    141  |  110<H>  |  15  ----------------------------<  82  3.6   |  26  |  0.91    Ca    8.3<L>      03 Aug 2021 08:25              .Surgical Swab Leg left wound  07-30 @ 18:26   Culture yields >4 types of aerobic and/or anaerobic bacteria  Call client services within 7 days if further workup is clinically  indicated.  Culture includes Few Staphylococcus aureus  --  Staphylococcus aureus      .Blood Blood-Peripheral  07-30 @ 06:35   No Growth Final  --  --          Radiology Results      Meds    MEDICATIONS  (STANDING):  apixaban 5 milliGRAM(s) Oral every 12 hours  aspirin enteric coated 81 milliGRAM(s) Oral daily  atorvastatin 40 milliGRAM(s) Oral at bedtime  BACItracin   Ointment 1 Application(s) Topical two times a day  carvedilol 3.125 milliGRAM(s) Oral every 12 hours  ceFAZolin   IVPB      ceFAZolin   IVPB 2000 milliGRAM(s) IV Intermittent every 8 hours  collagenase Ointment 1 Application(s) Topical daily  furosemide    Tablet 40 milliGRAM(s) Oral daily  insulin lispro (ADMELOG) corrective regimen sliding scale   SubCutaneous three times a day before meals  insulin lispro (ADMELOG) corrective regimen sliding scale   SubCutaneous at bedtime  lisinopril 5 milliGRAM(s) Oral daily  metroNIDAZOLE  IVPB      metroNIDAZOLE  IVPB 500 milliGRAM(s) IV Intermittent every 8 hours  pantoprazole    Tablet 40 milliGRAM(s) Oral before breakfast      MEDICATIONS  (PRN):      Physical Exam        Neuro :  no focal deficits  Respiratory: CTA B/L  CV: RRR, S1S2, no murmurs,   Abdominal: Soft, NT, ND +BS,  Extremities: b/l le edema with multiple ulcerations l>r       ASSESSMENT    b/l le cellulitis 2nd to pvd,   h/o HTN,   HLD,   DM,   CAD s/p CABG 4 years ago,   systolic CHF on Lasix,   pulm htn,   PE on eliquis,   liver cirrhosis  Dyslipidemia        PLAN    podiatry f/u   Evaluated L leg and wound.s Dressed with Mupirocin, DSD.   vascular cons noted  Recommend elevation of b/l LE  Pt can f/u with Dr. Canales as outpatiemt  No acute vascular surgery intervention at this time   venous doppler le with No evidence of deep venous thrombosis in either lower extremity noted above.  wound cx Culture includes Few mssa noted above   id f/u  Continue Cefazolin and Flagyl inpatient   May change to oral Abx on discharge   blood cx neg noted above  cardio f/u   cont coreg, lisinopril and lasix po.  Pt reports allergy to aldactone.  cont asa and statin.  cxr with New mild opacity right lung base with trace right pleural effusion. Heart size cannot be accurately assessed in this projection, but appear enlarged noted    pulm f/u   Bronchodilators prn  cont current meds

## 2021-08-04 NOTE — DISCHARGE NOTE NURSING/CASE MANAGEMENT/SOCIAL WORK - PATIENT PORTAL LINK FT
You can access the FollowMyHealth Patient Portal offered by [facility] by registering at the following website: [email or web address]. By joining [employer]’s FollowMyHealth portal, you will also be able to view your health information using other applications (apps) compatible with our system.

## 2021-08-04 NOTE — PROGRESS NOTE ADULT - SUBJECTIVE AND OBJECTIVE BOX
CHIEF COMPLAINT:Patient is a 70y old  Male who presents with a chief complaint of Left leg cellulitis.Pt appears comfortable.    	  REVIEW OF SYSTEMS:  CONSTITUTIONAL: No fever, weight loss, or fatigue  EYES: No eye pain, visual disturbances, or discharge  ENT:  No difficulty hearing, tinnitus, vertigo; No sinus or throat pain  NECK: No pain or stiffness  RESPIRATORY: No cough, wheezing, chills or hemoptysis; No Shortness of Breath  CARDIOVASCULAR: No chest pain, palpitations, passing out, dizziness, or leg swelling  GASTROINTESTINAL: No abdominal or epigastric pain. No nausea, vomiting, or hematemesis; No diarrhea or constipation. No melena or hematochezia.  GENITOURINARY: No dysuria, frequency, hematuria, or incontinence  NEUROLOGICAL: No headaches, memory loss, loss of strength, numbness, or tremors  SKIN: No itching, burning, rashes, or lesions   LYMPH Nodes: No enlarged glands  ENDOCRINE: No heat or cold intolerance; No hair loss  MUSCULOSKELETAL: No joint pain or swelling; No muscle, back, or extremity pain  PSYCHIATRIC: No depression, anxiety, mood swings, or difficulty sleeping  HEME/LYMPH: No easy bruising, or bleeding gums  ALLERGY AND IMMUNOLOGIC: No hives or eczema	        PHYSICAL EXAM:  T(C): 36.2 (08-03-21 @ 20:32), Max: 36.2 (08-03-21 @ 20:32)  HR: 64 (08-04-21 @ 06:01) (64 - 68)  BP: 130/86 (08-04-21 @ 06:01) (123/78 - 130/86)  RR: 16 (08-04-21 @ 06:01) (16 - 17)  SpO2: 98% (08-04-21 @ 06:01) (98% - 98%)  Wt(kg): --  I&O's Summary      Appearance: Normal	  HEENT:   Normal oral mucosa, PERRL, EOMI	  Lymphatic: No lymphadenopathy  Cardiovascular: Normal S1 S2, No JVD, No murmurs,B/L dressing  Respiratory: Lungs clear to auscultation	  Psychiatry: A & O x 3, Mood & affect appropriate  Gastrointestinal:  Soft, Non-tender, + BS	  Skin: No rashes, No ecchymoses, No cyanosis	  Neurologic: Non-focal  Extremities: Normal range of motion, No clubbing, cyanosis,B/L dressing      MEDICATIONS  (STANDING):  apixaban 5 milliGRAM(s) Oral every 12 hours  aspirin enteric coated 81 milliGRAM(s) Oral daily  atorvastatin 40 milliGRAM(s) Oral at bedtime  BACItracin   Ointment 1 Application(s) Topical two times a day  carvedilol 3.125 milliGRAM(s) Oral every 12 hours  ceFAZolin   IVPB      ceFAZolin   IVPB 2000 milliGRAM(s) IV Intermittent every 8 hours  collagenase Ointment 1 Application(s) Topical daily  furosemide    Tablet 40 milliGRAM(s) Oral daily  insulin lispro (ADMELOG) corrective regimen sliding scale   SubCutaneous three times a day before meals  insulin lispro (ADMELOG) corrective regimen sliding scale   SubCutaneous at bedtime  lisinopril 5 milliGRAM(s) Oral daily  metroNIDAZOLE  IVPB      metroNIDAZOLE  IVPB 500 milliGRAM(s) IV Intermittent every 8 hours  pantoprazole    Tablet 40 milliGRAM(s) Oral before breakfast        LABS:	 	                         12.4   4.55  )-----------( 188      ( 03 Aug 2021 08:25 )             40.0     08-03    141  |  110<H>  |  15  ----------------------------<  82  3.6   |  26  |  0.91    Ca    8.3<L>      03 Aug 2021 08:25

## 2021-08-04 NOTE — PROGRESS NOTE ADULT - SUBJECTIVE AND OBJECTIVE BOX
Patient is a 70y old  Male who presents with a chief complaint of Left leg cellulitis (04 Aug 2021 10:54)  Awake, alert, laying in bed in NAD    INTERVAL HPI/OVERNIGHT EVENTS:      VITAL SIGNS:  T(F): 97.2 (08-03-21 @ 20:32)  HR: 64 (08-04-21 @ 06:01)  BP: 130/86 (08-04-21 @ 06:01)  RR: 16 (08-04-21 @ 06:01)  SpO2: 98% (08-04-21 @ 06:01)  Wt(kg): --  I&O's Detail          REVIEW OF SYSTEMS:    CONSTITUTIONAL:  No fevers, chills, sweats    HEENT:  Eyes:  No diplopia or blurred vision. ENT:  No earache, sore throat or runny nose.    CARDIOVASCULAR:  No pressure, squeezing, tightness, or heaviness about the chest; no palpitations.    RESPIRATORY:  Per HPI    GASTROINTESTINAL:  No abdominal pain, nausea, vomiting or diarrhea.    GENITOURINARY:  No dysuria, frequency or urgency.    NEUROLOGIC:  No paresthesias, fasciculations, seizures or weakness.    PSYCHIATRIC:  No disorder of thought or mood.      PHYSICAL EXAM:    Constitutional: Well developed and nourished  Eyes:Perrla  ENMT: normal  Neck:supple  Respiratory: good air entry  Cardiovascular: S1 S2 regular  Gastrointestinal: Soft, Non tender  Extremities: + edema  Vascular:normal  Neurological:Awake, alert,Ox3  Musculoskeletal:Normal      MEDICATIONS  (STANDING):  apixaban 5 milliGRAM(s) Oral every 12 hours  aspirin enteric coated 81 milliGRAM(s) Oral daily  atorvastatin 40 milliGRAM(s) Oral at bedtime  BACItracin   Ointment 1 Application(s) Topical two times a day  carvedilol 3.125 milliGRAM(s) Oral every 12 hours  ceFAZolin   IVPB      ceFAZolin   IVPB 2000 milliGRAM(s) IV Intermittent every 8 hours  collagenase Ointment 1 Application(s) Topical daily  furosemide    Tablet 40 milliGRAM(s) Oral daily  insulin lispro (ADMELOG) corrective regimen sliding scale   SubCutaneous three times a day before meals  insulin lispro (ADMELOG) corrective regimen sliding scale   SubCutaneous at bedtime  lisinopril 5 milliGRAM(s) Oral daily  metroNIDAZOLE  IVPB      metroNIDAZOLE  IVPB 500 milliGRAM(s) IV Intermittent every 8 hours  pantoprazole    Tablet 40 milliGRAM(s) Oral before breakfast    MEDICATIONS  (PRN):      Allergies    Aldactone (Unknown)  Bumex (Blisters; Rash)  metoprolol (Unknown)  spironolactone (Unknown)    Intolerances        LABS:                        12.4   4.55  )-----------( 188      ( 03 Aug 2021 08:25 )             40.0     08-03    141  |  110<H>  |  15  ----------------------------<  82  3.6   |  26  |  0.91    Ca    8.3<L>      03 Aug 2021 08:25                CAPILLARY BLOOD GLUCOSE      POCT Blood Glucose.: 144 mg/dL (03 Aug 2021 20:59)  POCT Blood Glucose.: 124 mg/dL (03 Aug 2021 12:01)        RADIOLOGY & ADDITIONAL TESTS:    CXR:  < from: Xray Chest 1 View- PORTABLE-Routine (Xray Chest 1 View- PORTABLE-Routine .) (07.30.21 @ 09:31) >  IMPRESSION:  Sternotomy wires again noted.    New mild opacity right lung base with trace right pleural effusion.    Heart size cannot be accurately assessed in this projection, but appear enlarged.    --- End of Report ---    < end of copied text >    Ct scan chest:    ekg;    echo:

## 2021-08-16 NOTE — PROGRESS NOTE ADULT - PROBLEM SELECTOR PROBLEM 8
Impression: Other chronic allergic conjunctivitis: H10.45. Plan: Discussed diagnosis in detail with patient. Recommend Zaditor or Pataday for itching. Tears and cool compresses.  No Rubbing Discharge planning issues

## 2021-08-23 ENCOUNTER — EMERGENCY (EMERGENCY)
Facility: HOSPITAL | Age: 71
LOS: 1 days | Discharge: HOME | End: 2021-08-23
Attending: EMERGENCY MEDICINE
Payer: COMMERCIAL

## 2021-08-23 VITALS
HEART RATE: 84 BPM | TEMPERATURE: 98 F | DIASTOLIC BLOOD PRESSURE: 90 MMHG | HEIGHT: 67 IN | OXYGEN SATURATION: 98 % | SYSTOLIC BLOOD PRESSURE: 163 MMHG | WEIGHT: 190.04 LBS | RESPIRATION RATE: 18 BRPM

## 2021-08-23 DIAGNOSIS — Z95.1 PRESENCE OF AORTOCORONARY BYPASS GRAFT: Chronic | ICD-10-CM

## 2021-08-23 LAB
ALBUMIN SERPL ELPH-MCNC: 2.8 G/DL — LOW (ref 3.5–5)
ALP SERPL-CCNC: 132 U/L — HIGH (ref 40–120)
ALT FLD-CCNC: 41 U/L DA — SIGNIFICANT CHANGE UP (ref 10–60)
ANION GAP SERPL CALC-SCNC: 5 MMOL/L — SIGNIFICANT CHANGE UP (ref 5–17)
AST SERPL-CCNC: 36 U/L — SIGNIFICANT CHANGE UP (ref 10–40)
BASOPHILS # BLD AUTO: 0.01 K/UL — SIGNIFICANT CHANGE UP (ref 0–0.2)
BASOPHILS NFR BLD AUTO: 0.2 % — SIGNIFICANT CHANGE UP (ref 0–2)
BILIRUB SERPL-MCNC: 1.4 MG/DL — HIGH (ref 0.2–1.2)
BUN SERPL-MCNC: 20 MG/DL — HIGH (ref 7–18)
CALCIUM SERPL-MCNC: 8.3 MG/DL — LOW (ref 8.4–10.5)
CHLORIDE SERPL-SCNC: 108 MMOL/L — SIGNIFICANT CHANGE UP (ref 96–108)
CO2 SERPL-SCNC: 29 MMOL/L — SIGNIFICANT CHANGE UP (ref 22–31)
CREAT SERPL-MCNC: 0.73 MG/DL — SIGNIFICANT CHANGE UP (ref 0.5–1.3)
CRP SERPL-MCNC: 47 MG/L — HIGH
EOSINOPHIL # BLD AUTO: 0.27 K/UL — SIGNIFICANT CHANGE UP (ref 0–0.5)
EOSINOPHIL NFR BLD AUTO: 4.2 % — SIGNIFICANT CHANGE UP (ref 0–6)
ERYTHROCYTE [SEDIMENTATION RATE] IN BLOOD: 9 MM/HR — SIGNIFICANT CHANGE UP (ref 0–20)
GLUCOSE SERPL-MCNC: 110 MG/DL — HIGH (ref 70–99)
HCT VFR BLD CALC: 45.4 % — SIGNIFICANT CHANGE UP (ref 39–50)
HGB BLD-MCNC: 14 G/DL — SIGNIFICANT CHANGE UP (ref 13–17)
IMM GRANULOCYTES NFR BLD AUTO: 0.9 % — SIGNIFICANT CHANGE UP (ref 0–1.5)
LYMPHOCYTES # BLD AUTO: 0.8 K/UL — LOW (ref 1–3.3)
LYMPHOCYTES # BLD AUTO: 12.3 % — LOW (ref 13–44)
MCHC RBC-ENTMCNC: 25.8 PG — LOW (ref 27–34)
MCHC RBC-ENTMCNC: 30.8 GM/DL — LOW (ref 32–36)
MCV RBC AUTO: 83.6 FL — SIGNIFICANT CHANGE UP (ref 80–100)
MONOCYTES # BLD AUTO: 0.56 K/UL — SIGNIFICANT CHANGE UP (ref 0–0.9)
MONOCYTES NFR BLD AUTO: 8.6 % — SIGNIFICANT CHANGE UP (ref 2–14)
NEUTROPHILS # BLD AUTO: 4.8 K/UL — SIGNIFICANT CHANGE UP (ref 1.8–7.4)
NEUTROPHILS NFR BLD AUTO: 73.8 % — SIGNIFICANT CHANGE UP (ref 43–77)
NRBC # BLD: 0 /100 WBCS — SIGNIFICANT CHANGE UP (ref 0–0)
PLATELET # BLD AUTO: 236 K/UL — SIGNIFICANT CHANGE UP (ref 150–400)
POTASSIUM SERPL-MCNC: 3.6 MMOL/L — SIGNIFICANT CHANGE UP (ref 3.5–5.3)
POTASSIUM SERPL-SCNC: 3.6 MMOL/L — SIGNIFICANT CHANGE UP (ref 3.5–5.3)
PROT SERPL-MCNC: 7 G/DL — SIGNIFICANT CHANGE UP (ref 6–8.3)
RBC # BLD: 5.43 M/UL — SIGNIFICANT CHANGE UP (ref 4.2–5.8)
RBC # FLD: 22.9 % — HIGH (ref 10.3–14.5)
SODIUM SERPL-SCNC: 142 MMOL/L — SIGNIFICANT CHANGE UP (ref 135–145)
WBC # BLD: 6.5 K/UL — SIGNIFICANT CHANGE UP (ref 3.8–10.5)
WBC # FLD AUTO: 6.5 K/UL — SIGNIFICANT CHANGE UP (ref 3.8–10.5)

## 2021-08-23 PROCEDURE — 86140 C-REACTIVE PROTEIN: CPT

## 2021-08-23 PROCEDURE — 99284 EMERGENCY DEPT VISIT MOD MDM: CPT | Mod: 25

## 2021-08-23 PROCEDURE — 80053 COMPREHEN METABOLIC PANEL: CPT

## 2021-08-23 PROCEDURE — 85025 COMPLETE CBC W/AUTO DIFF WBC: CPT

## 2021-08-23 PROCEDURE — 36415 COLL VENOUS BLD VENIPUNCTURE: CPT

## 2021-08-23 PROCEDURE — 99284 EMERGENCY DEPT VISIT MOD MDM: CPT

## 2021-08-23 PROCEDURE — 85652 RBC SED RATE AUTOMATED: CPT

## 2021-08-23 NOTE — ED PROVIDER NOTE - NSFOLLOWUPINSTRUCTIONS_ED_ALL_ED_FT
Please change dressing daily. see wound clinic. elevate extremity      Venous Ulcer      A venous ulcer is a shallow sore on your lower leg. Venous ulcer is the most common type of lower leg ulcer. You may have venous ulcers on one leg or on both legs. This condition most often develops around your ankles. This type of ulcer may last for a long time (chronic ulcer) or it may return often (recurrent ulcer).      What are the causes?    This condition is caused by poor blood flow in your legs. The poor flow causes blood to pool in your legs. This can break the skin, causing an ulcer.      What increases the risk?  You are more likely to develop this condition if:  •You are 65 years of age or older.      •You are female.      •You are overweight.      •You are not active.      •You have had a leg ulcer in the past.      •You have varicose veins.      •You have clots in your lower leg veins (deep vein thrombosis).      •You have inflammation of your leg veins (phlebitis).      •You have recently been pregnant.      •You smoke.        What are the signs or symptoms?  The main symptom of this condition is an open sore near your ankle. Other symptoms may include:  •Swelling.      •Thick skin.      •Fluid coming from the ulcer.      •Bleeding.      •Itching.      •Pain and swelling. This gets worse when you stand up and feels better when you raise your leg.      •Blotchy skin.      •Dark skin.        How is this treated?  This condition may be treated by:  •Keeping your leg raised (elevated).      •Wearing a type of bandage or stocking to keep pressure (compression) on the veins of your leg.      •Taking medicines, including antibiotic medicines.      •Cleaning your ulcer and removing any dead tissue from the wound.      •Using bandages and wraps that have medicines in them to cover your ulcer.      •Closing the wound using a piece of skin taken from another area of your body (graft).        Follow these instructions at home:    Medicines     •Take or apply over-the-counter and prescription medicines only as told by your doctor.      •If you were prescribed an antibiotic medicine, take it as told by your doctor. Do not stop using the antibiotic even if you start to feel better.      •Ask your doctor if you should take aspirin before long trips.      Wound care   •Follow instructions from your doctor about how to take care of your wound. Make sure you:  •Wash your hands with soap and water before and after you change your bandage (dressing). If you cannot use soap and water, use hand .      •Change your bandage as told by your doctor.      •If you had a skin graft, leave stitches (sutures) in place. These may need to stay in place for 2 weeks or longer.      •Ask when you should remove your bandage. If your bandage is dry and sticks to your leg when you try to remove it, moisten or wet the bandage with saline solution or water to make it easier to remove.      •Once your bandage is off, check your wound each day for signs of infection. Have a caregiver do this for you if you are not able to do it yourself. Check for:  •More redness, swelling, or pain.      •More fluid or blood.      •Warmth.      •Pus or a bad smell.        Activity     • Do not sit for a long time without moving. Get up to take short walks every 1–2 hours. This is important. Ask for help if you feel weak or unsteady.      •Ask your doctor what level of activity is safe for you.      •Rest with your legs raised during the day. If you can, keep your legs above the level of your heart for 30 minutes, 3–4 times a day, or as told by your doctor.      • Do not sit with your legs crossed.        General instructions      •Wear elastic stockings, compression stockings, or support hose as told by your doctor.      •Raise the foot of your bed as told by your doctor.      • Do not use any products that contain nicotine or tobacco, such as cigarettes, e-cigarettes, and chewing tobacco. If you need help quitting, ask your doctor.      •Keep all follow-up visits as told by your doctor. This is important.        Contact a doctor if:    •Your ulcer is getting larger or is not healing.      •Your pain gets worse.        Get help right away if:    •You have more redness, swelling, or pain around your ulcer.      •You have more fluid or blood coming from your ulcer.      •Your ulcer feels warm to the touch.      •You have pus or a bad smell coming from your ulcer.      •You have a fever.        Summary    •A venous ulcer is a shallow sore on your lower leg.      •Follow instructions from your doctor about how to take care of your wound.      •Check your wound each day for signs of infection.      •Take over-the-counter and prescription medicines only as told by your doctor.      •Keep all follow-up visits as told by your doctor. This is important

## 2021-08-23 NOTE — ED PROVIDER NOTE - PATIENT PORTAL LINK FT
You can access the FollowMyHealth Patient Portal offered by [facility] by registering at the following website: [email or web address]. By joining [employer]’s FollowMyHealth portal, you will also be able to view your health information using other applications (apps) compatible with our system.

## 2021-08-23 NOTE — ED ADULT NURSE NOTE - NSIMPLEMENTINTERV_GEN_ALL_ED
Implemented All Fall Risk Interventions:  [facility] [place] [facility] to call system. Call bell, personal items and telephone within reach. Instruct patient to call for assistance. Room bathroom lighting operational. Non-slip footwear when patient is off stretcher. Physically safe environment: no spills, clutter or unnecessary equipment. Stretcher in lowest position, wheels locked, appropriate side rails in place. Provide visual cue, wrist band, yellow gown, etc. Monitor gait and stability. Monitor for mental status changes and reorient to person, place, and time. Review medications for side effects contributing to fall risk. Reinforce activity limits and safety measures with patient and family.

## 2021-08-23 NOTE — ED PROVIDER NOTE - CARDIAC, MLM
Normal rate, regular rhythm.  Heart sounds S1, S2.  No murmurs, rubs or gallops. b/l venous stasis. LLE- x 2 ulcer with granulation tissue, weaping extremity, no crepitus.
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
## 2021-08-23 NOTE — ED PROVIDER NOTE - CLINICAL SUMMARY MEDICAL DECISION MAKING FREE TEXT BOX
70 yr old male with hx of HTN, HLD, DM, PE, CAD s/p CABG 4 years ago, CHF on Lasix, PE on eliquis c/o LLE swelling and concern for infected leg    chronic LLE/venous stasis- clinically not infected- pt needs better wound care likely benefit from wound clinic and vascular routine follow up. will check basic blood work and xr. recent admission with neg dvt and podiatry wound care note reviewed
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
## 2021-08-23 NOTE — ED ADULT NURSE REASSESSMENT NOTE - NS ED NURSE REASSESS COMMENT FT1
430 am pt refused xrays .  made aware . left leg  dressing done . discharged pt home .discharge instructions given

## 2021-08-23 NOTE — ED ADULT NURSE NOTE - OBJECTIVE STATEMENT
the patient is  a 70 yr  male complaining of cellulites of left  lower leg and itching all over the body ,wants to see a  dermatologist

## 2021-08-23 NOTE — ED ADULT NURSE NOTE - NS ED NURSE DISCH DISPOSITION
Call to mother, left a message on unidentified voice mail to call back.     Normal CBC results.   Discharged

## 2021-08-30 NOTE — PROGRESS NOTE ADULT - SUBJECTIVE AND OBJECTIVE BOX
A Stent Synergy Jem Rx [id number] was deployed in the right coronary artery.   The stent was deployed at 12 solitario for 10 seconds at 8/31/2021 2:42 PM . Stent balloon used for 2nd inflation at 12 solitario for 10 seconds.    NP Note discussed with  primary attending    Patient is a 70y old  Male who presents with a chief complaint of CHF exacerbation (05 Apr 2021 11:09)      INTERVAL HPI/OVERNIGHT EVENTS: Patient seen and examined at the bedside. Offers no new complaints. Reprts that he will go home after he receives the COVID vaccine    MEDICATIONS  (STANDING):  amoxicillin  875 milliGRAM(s)/clavulanate 1 Tablet(s) Oral every 12 hours  apixaban 5 milliGRAM(s) Oral every 12 hours  aspirin  chewable 81 milliGRAM(s) Oral daily  atorvastatin 40 milliGRAM(s) Oral at bedtime  carvedilol 3.125 milliGRAM(s) Oral every 12 hours  collagenase Ointment 1 Application(s) Topical daily  coronavirus (EUA) Vaccine (Contently) 0.5 milliLiter(s) IntraMuscular once  dextrose 40% Gel 15 Gram(s) Oral once  dextrose 5%. 1000 milliLiter(s) (50 mL/Hr) IV Continuous <Continuous>  dextrose 5%. 1000 milliLiter(s) (100 mL/Hr) IV Continuous <Continuous>  dextrose 50% Injectable 25 Gram(s) IV Push once  dextrose 50% Injectable 12.5 Gram(s) IV Push once  dextrose 50% Injectable 25 Gram(s) IV Push once  doxycycline hyclate Capsule 100 milliGRAM(s) Oral every 12 hours  furosemide    Tablet 40 milliGRAM(s) Oral two times a day  glucagon  Injectable 1 milliGRAM(s) IntraMuscular once  insulin lispro (ADMELOG) corrective regimen sliding scale   SubCutaneous three times a day before meals  insulin lispro (ADMELOG) corrective regimen sliding scale   SubCutaneous at bedtime  lisinopril 5 milliGRAM(s) Oral daily  sodium chloride 0.9% lock flush 3 milliLiter(s) IV Push every 8 hours    MEDICATIONS  (PRN):      __________________________________________________  REVIEW OF SYSTEMS:    CONSTITUTIONAL: No fever,   EYES: no acute visual disturbances  NECK: No pain or stiffness  RESPIRATORY: No cough; No shortness of breath  CARDIOVASCULAR: No chest pain, no palpitations  GASTROINTESTINAL: No pain. No nausea or vomiting; No diarrhea   NEUROLOGICAL: No headache or numbness, no tremors  MUSCULOSKELETAL: No joint pain, no muscle pain  GENITOURINARY: no dysuria, no frequency, no hesitancy  PSYCHIATRY: no depression , no anxiety  ALL OTHER  ROS negative        Vital Signs Last 24 Hrs  T(C): 36.9 (05 Apr 2021 05:50), Max: 36.9 (05 Apr 2021 05:50)  T(F): 98.5 (05 Apr 2021 05:50), Max: 98.5 (05 Apr 2021 05:50)  HR: 51 (05 Apr 2021 05:50) (51 - 62)  BP: 133/78 (05 Apr 2021 05:50) (133/78 - 137/78)  BP(mean): --  RR: 18 (05 Apr 2021 05:50) (18 - 18)  SpO2: 100% (05 Apr 2021 05:50) (96% - 100%)    ________________________________________________  PHYSICAL EXAM:  GENERAL: NAD  HEENT: Normocephalic;  conjunctivae and sclerae clear; moist mucous membranes;   NECK : supple  CHEST/LUNG: Clear to auscultation bilaterally with good air entry   HEART: S1 S2  regular; no murmurs, gallops or rubs  ABDOMEN: Soft, Nontender, Nondistended; Bowel sounds present  EXTREMITIES: no cyanosis; no edema; no calf tenderness  SKIN: LLE swollen , covered with ace wraps, mildly erythematous  NERVOUS SYSTEM:  Awake and alert; Oriented  to place, person and time ; no new deficits    _________________________________________________  LABS:              CAPILLARY BLOOD GLUCOSE      POCT Blood Glucose.: 146 mg/dL (04 Apr 2021 16:41)        RADIOLOGY & ADDITIONAL TESTS:  EXAM:  CT LWR EXT IC LT                            PROCEDURE DATE:  03/30/2021          INTERPRETATION:  CT LOWER EXTREMITY WITH IV CONTRAST LEFT dated 3/30/2021 1:06 PM    INDICATION: Pain and swelling    COMPARISON: None available.    TECHNIQUE: CT imaging of the left lower extremity was performed with contrast. The data was reformatted in the axial, coronal, and sagittal planes. Contrast: Omnipaque 350. Administered: 90 cc. Discarded: 10 cc.    FINDINGS:    OSSEOUS STRUCTURES: There is no fracture. No osteonecrosis is noted. Mild medial knee joint space narrowing identified. The tibiotalar, posterior subtalar, middle subtalar, calcaneocuboid joint are preserved. Spurring along the superior aspect of the patella. No cortical erosion or destruction noted. No significant periosteal reaction.  SYNOVIUM/ JOINT FLUID: No knee joint effusion. No popliteal cyst.  TENDONS: The tendons are intact. No full-thickness tendon tear or retraction is identified.  MUSCLES: There is no intramuscular hematoma.  NEUROVASCULAR STRUCTURES: Mild scattered vascular calcifications.  SUBCUTANEOUS SOFT TISSUES: Diffuse severe soft tissue swelling and skin thickening about the calf. No rim-enhancing fluid collection within the soft tissues. No soft tissue gas identified. This edema extends into the dorsum of the hindfoot      IMPRESSION:    Diffuse soft tissue swelling and skin thickening. Nonspecific finding that can be seen in the setting of cellulitis. No drainable fluid collection.  No CT evidence for osteomyelitis.    Patient name: LIDIA PÉREZ  YOB: 1950   Age: 70 (M)   MR#: [id number]  Study Date: 3/25/2021  Location: [address] [place], [place] [address]Sonographer: Johana Akers RDCS  Study quality: Fair  Referring Physician:  CHEKO BOONE MD  Blood Pressure: 139/87 mmHg  Height: 170 cm  Weight: 96 kg  BSA: 2.1 m2  ------------------------------------------------------------------------    PROCEDURE: Transthoracic echocardiogram with 2-D, M-Mode  and complete spectral and color flow Doppler.  INDICATION: Chronic ischemic heart disease, unspecified  (I25.9)  HISTORY:  ------------------------------------------------------------------------  DIMENSIONS:  Dimensions:     Normal Values:  LA:     4.8 cm    2.0 - 4.0 cm  Ao:     3.5 cm    2.0 - 3.8 cm  SEPTUM:0.9 cm    0.6 - 1.2 cm  PWT:    1.0 cm    0.6 - 1.1 cm  LVIDd:  6.0 cm    3.0 - 5.6 cm  LVIDs:  5.6 cm    1.8 - 4.0 cm      Derived Variables:  LVMI: 112 g/m2  RWT: 0.33  Ejection Fraction Visual Estimate: 15-20 %  Peak Velocity (m/sec): AoV=2.0  ------------------------------------------------------------------------  OBSERVATIONS:  Mitral Valve: Tethered mitral valve leaflets. Mild to  moderate mitral regurgitation.  Aortic Root: Normal aortic root.  Aortic Valve: Calcified trileaflet aortic valve with  decreased opening. Peak transaortic valve gradient equals  16.8 mm Hg, mean transaortic valve gradient equals 10 mm  Hg, consistent with mild aortic stenosis. The aortic valve  apears more stenotic than indicated by gradients. In  setting oflow EF, cannot rule out paradoxical low-flow  low-gradient AS. Consider dobutamine stress echocardiogram  for further assessment if clinically indicated.   Trace  aortic regurgitation.  Left Atrium: Normal left atrium.  LA volume index = 28  cc/m2.  Left Ventricle: Severe global left ventricular systolic  dysfunction (EF 15-20% by visual estimation). Not all LV  wall segments were seen. Mild left ventricular enlargement.  Grade II diastolic dysfunction.  Right Heart: Normal right atrium. Off axisimages preclude  accurate assessment of right ventricular size. Reduced RV  systolic function (TAPSE 1.0 cm). Normal tricuspid valve.  Moderate to severe tricuspid regurgitation. Pulmonic valve  not well seen. Trace pulmonic insufficiency is noted.  Pericardium/PleuraNo pericardial effusion. Right pleural  effusion.  Hemodynamic: RA Pressure is 8 mm Hg. RV systolic pressure  is severely increased at  67 mm Hg.  ------------------------------------------------------------------------  CONCLUSIONS:  1. Tethered mitral valve leaflets. Mild to moderate mitral  regurgitation.  2. Calcified trileaflet aortic valve with decreased  opening. Mild aortic stenosis by gradients. The aortic  valve apears more stenotic than indicated by gradients. In  setting of low EF, cannot rule out paradoxical low-flow  low-gradient AS. Consider dobutamine stress echocardiogram  for further assessment if clinically indicated.   Trace  aortic regurgitation.  3. Normal aortic root.  4. Normal left atrium.  5. Mild left ventricular enlargement.  6. Severe global left ventricular systolic dysfunction (EF  15-20% by visual estimation).  7. Grade II diastolic dysfunction.  8. Normal right atrium.  9. Off axis images preclude accurate assessment of right  ventricular size. Reduced RV systolic function (TAPSE 1.0  cm).  10. RV systolic pressure is severely increased at  67 mm  Hg.  11. Normal tricuspid valve. Moderate to severe tricuspid  regurgitation.  12. Pulmonic valve not well seen. Trace pulmonic  insufficiency is noted.  13. No pericardial effusion.  14. Right pleural effusion.    *** No previous Echo exam.  ------------------------------------------------------------------------  Confirmed on  3/25/2021 - 22:24:56 by Curtis Chapa MD  ------------------------------------------------------------------------        Plan of care was discussed with patient ; all questions and concerns were addressed and care was aligned with patient's wishes.    ASSESSMENT  70y M from home with PMH HTN, HLD, DM, PE, CAD s/p CABG (3.5 yrs ago), presented  with leg swelling, scrotal swelling  and difficulty breathing for months. In ED CXR No acute infiltrate and showed Cardiomegaly. ECG : first degree heart block, Qtc is prolonged 491. BNP 6970. Pt Admitted for acute on chronic systolic heart failure likely due to non compliance with meds, diet and f/u care. ECHO shows severe systolic HF with EF 15-20% and GIIDD, followed by Cardio Dr. Rhodes  Pt with open wounds to lower leg with edema 4+, chronic cellulitis. venous doppler negative for DVT. Surgery consulted, no interventions indicated. Started on Vanco and Zosyn per attending. He had CT lower of extremity done on 3/30/2021 revealed Diffuse soft tissue swelling and skin thickening. Nonspecific finding that can be seen in the setting of cellulitis. No drainable fluid collection. No CT evidence for osteomyelitis. He was followed by ID optimized on IV abx then transitioned to oral Augmentin 875 mg q 12hours and Doxycycline 100 mg q59wabla in 48 hours to continue until 4/12/21. He was optimized and stable medically for discharge.

## 2021-09-15 ENCOUNTER — INPATIENT (INPATIENT)
Facility: HOSPITAL | Age: 71
LOS: 0 days | Discharge: LEFT BEFORE DISCHARGE | DRG: 602 | End: 2021-09-16
Attending: INTERNAL MEDICINE | Admitting: INTERNAL MEDICINE
Payer: COMMERCIAL

## 2021-09-15 VITALS
OXYGEN SATURATION: 95 % | HEART RATE: 80 BPM | TEMPERATURE: 98 F | WEIGHT: 220.02 LBS | RESPIRATION RATE: 20 BRPM | DIASTOLIC BLOOD PRESSURE: 65 MMHG | SYSTOLIC BLOOD PRESSURE: 103 MMHG | HEIGHT: 67 IN

## 2021-09-15 DIAGNOSIS — Z95.1 PRESENCE OF AORTOCORONARY BYPASS GRAFT: Chronic | ICD-10-CM

## 2021-09-15 RX ORDER — PIPERACILLIN AND TAZOBACTAM 4; .5 G/20ML; G/20ML
3.38 INJECTION, POWDER, LYOPHILIZED, FOR SOLUTION INTRAVENOUS ONCE
Refills: 0 | Status: COMPLETED | OUTPATIENT
Start: 2021-09-15 | End: 2021-09-15

## 2021-09-15 RX ORDER — MORPHINE SULFATE 50 MG/1
4 CAPSULE, EXTENDED RELEASE ORAL ONCE
Refills: 0 | Status: DISCONTINUED | OUTPATIENT
Start: 2021-09-15 | End: 2021-09-15

## 2021-09-15 RX ORDER — SODIUM CHLORIDE 9 MG/ML
3 INJECTION INTRAMUSCULAR; INTRAVENOUS; SUBCUTANEOUS EVERY 8 HOURS
Refills: 0 | Status: DISCONTINUED | OUTPATIENT
Start: 2021-09-15 | End: 2021-09-16

## 2021-09-16 ENCOUNTER — TRANSCRIPTION ENCOUNTER (OUTPATIENT)
Age: 71
End: 2021-09-16

## 2021-09-16 VITALS — WEIGHT: 224.87 LBS

## 2021-09-16 DIAGNOSIS — I26.99 OTHER PULMONARY EMBOLISM WITHOUT ACUTE COR PULMONALE: ICD-10-CM

## 2021-09-16 DIAGNOSIS — L03.90 CELLULITIS, UNSPECIFIED: ICD-10-CM

## 2021-09-16 DIAGNOSIS — I10 ESSENTIAL (PRIMARY) HYPERTENSION: ICD-10-CM

## 2021-09-16 DIAGNOSIS — I50.23 ACUTE ON CHRONIC SYSTOLIC (CONGESTIVE) HEART FAILURE: ICD-10-CM

## 2021-09-16 DIAGNOSIS — E11.9 TYPE 2 DIABETES MELLITUS WITHOUT COMPLICATIONS: ICD-10-CM

## 2021-09-16 DIAGNOSIS — R09.89 OTHER SPECIFIED SYMPTOMS AND SIGNS INVOLVING THE CIRCULATORY AND RESPIRATORY SYSTEMS: ICD-10-CM

## 2021-09-16 DIAGNOSIS — L03.116 CELLULITIS OF LEFT LOWER LIMB: ICD-10-CM

## 2021-09-16 LAB
ALBUMIN SERPL ELPH-MCNC: 2.1 G/DL — LOW (ref 3.5–5)
ALP SERPL-CCNC: 189 U/L — HIGH (ref 40–120)
ALT FLD-CCNC: 27 U/L DA — SIGNIFICANT CHANGE UP (ref 10–60)
ANION GAP SERPL CALC-SCNC: 4 MMOL/L — LOW (ref 5–17)
APTT BLD: 36.4 SEC — HIGH (ref 27.5–35.5)
AST SERPL-CCNC: 35 U/L — SIGNIFICANT CHANGE UP (ref 10–40)
BASOPHILS # BLD AUTO: 0.07 K/UL — SIGNIFICANT CHANGE UP (ref 0–0.2)
BASOPHILS NFR BLD AUTO: 1.1 % — SIGNIFICANT CHANGE UP (ref 0–2)
BILIRUB SERPL-MCNC: 0.5 MG/DL — SIGNIFICANT CHANGE UP (ref 0.2–1.2)
BUN SERPL-MCNC: 12 MG/DL — SIGNIFICANT CHANGE UP (ref 7–18)
CALCIUM SERPL-MCNC: 7.8 MG/DL — LOW (ref 8.4–10.5)
CHLORIDE SERPL-SCNC: 114 MMOL/L — HIGH (ref 96–108)
CO2 SERPL-SCNC: 25 MMOL/L — SIGNIFICANT CHANGE UP (ref 22–31)
CREAT SERPL-MCNC: 0.75 MG/DL — SIGNIFICANT CHANGE UP (ref 0.5–1.3)
EOSINOPHIL # BLD AUTO: 0.2 K/UL — SIGNIFICANT CHANGE UP (ref 0–0.5)
EOSINOPHIL NFR BLD AUTO: 3.2 % — SIGNIFICANT CHANGE UP (ref 0–6)
GLUCOSE BLDC GLUCOMTR-MCNC: 141 MG/DL — HIGH (ref 70–99)
GLUCOSE BLDC GLUCOMTR-MCNC: 95 MG/DL — SIGNIFICANT CHANGE UP (ref 70–99)
GLUCOSE SERPL-MCNC: 98 MG/DL — SIGNIFICANT CHANGE UP (ref 70–99)
HCT VFR BLD CALC: 39 % — SIGNIFICANT CHANGE UP (ref 39–50)
HGB BLD-MCNC: 11.9 G/DL — LOW (ref 13–17)
IMM GRANULOCYTES NFR BLD AUTO: 0.3 % — SIGNIFICANT CHANGE UP (ref 0–1.5)
INR BLD: 1.22 RATIO — HIGH (ref 0.88–1.16)
LACTATE SERPL-SCNC: 1.5 MMOL/L — SIGNIFICANT CHANGE UP (ref 0.7–2)
LYMPHOCYTES # BLD AUTO: 1 K/UL — SIGNIFICANT CHANGE UP (ref 1–3.3)
LYMPHOCYTES # BLD AUTO: 15.8 % — SIGNIFICANT CHANGE UP (ref 13–44)
MCHC RBC-ENTMCNC: 26.6 PG — LOW (ref 27–34)
MCHC RBC-ENTMCNC: 30.5 GM/DL — LOW (ref 32–36)
MCV RBC AUTO: 87.1 FL — SIGNIFICANT CHANGE UP (ref 80–100)
MONOCYTES # BLD AUTO: 0.63 K/UL — SIGNIFICANT CHANGE UP (ref 0–0.9)
MONOCYTES NFR BLD AUTO: 10 % — SIGNIFICANT CHANGE UP (ref 2–14)
NEUTROPHILS # BLD AUTO: 4.39 K/UL — SIGNIFICANT CHANGE UP (ref 1.8–7.4)
NEUTROPHILS NFR BLD AUTO: 69.6 % — SIGNIFICANT CHANGE UP (ref 43–77)
NRBC # BLD: 0 /100 WBCS — SIGNIFICANT CHANGE UP (ref 0–0)
PLATELET # BLD AUTO: 291 K/UL — SIGNIFICANT CHANGE UP (ref 150–400)
POTASSIUM SERPL-MCNC: 3.6 MMOL/L — SIGNIFICANT CHANGE UP (ref 3.5–5.3)
POTASSIUM SERPL-SCNC: 3.6 MMOL/L — SIGNIFICANT CHANGE UP (ref 3.5–5.3)
PROT SERPL-MCNC: 6.5 G/DL — SIGNIFICANT CHANGE UP (ref 6–8.3)
PROTHROM AB SERPL-ACNC: 14.4 SEC — HIGH (ref 10.6–13.6)
RBC # BLD: 4.48 M/UL — SIGNIFICANT CHANGE UP (ref 4.2–5.8)
RBC # FLD: 19.8 % — HIGH (ref 10.3–14.5)
SARS-COV-2 RNA SPEC QL NAA+PROBE: SIGNIFICANT CHANGE UP
SODIUM SERPL-SCNC: 143 MMOL/L — SIGNIFICANT CHANGE UP (ref 135–145)
WBC # BLD: 6.31 K/UL — SIGNIFICANT CHANGE UP (ref 3.8–10.5)
WBC # FLD AUTO: 6.31 K/UL — SIGNIFICANT CHANGE UP (ref 3.8–10.5)

## 2021-09-16 PROCEDURE — 87635 SARS-COV-2 COVID-19 AMP PRB: CPT

## 2021-09-16 PROCEDURE — 85730 THROMBOPLASTIN TIME PARTIAL: CPT

## 2021-09-16 PROCEDURE — 96374 THER/PROPH/DIAG INJ IV PUSH: CPT

## 2021-09-16 PROCEDURE — 71045 X-RAY EXAM CHEST 1 VIEW: CPT

## 2021-09-16 PROCEDURE — 85025 COMPLETE CBC W/AUTO DIFF WBC: CPT

## 2021-09-16 PROCEDURE — 99285 EMERGENCY DEPT VISIT HI MDM: CPT | Mod: 25

## 2021-09-16 PROCEDURE — 87077 CULTURE AEROBIC IDENTIFY: CPT

## 2021-09-16 PROCEDURE — 87186 SC STD MICRODIL/AGAR DIL: CPT

## 2021-09-16 PROCEDURE — 82962 GLUCOSE BLOOD TEST: CPT

## 2021-09-16 PROCEDURE — 83605 ASSAY OF LACTIC ACID: CPT

## 2021-09-16 PROCEDURE — 99053 MED SERV 10PM-8AM 24 HR FAC: CPT

## 2021-09-16 PROCEDURE — 87070 CULTURE OTHR SPECIMN AEROBIC: CPT

## 2021-09-16 PROCEDURE — 85610 PROTHROMBIN TIME: CPT

## 2021-09-16 PROCEDURE — 80053 COMPREHEN METABOLIC PANEL: CPT

## 2021-09-16 PROCEDURE — 36415 COLL VENOUS BLD VENIPUNCTURE: CPT

## 2021-09-16 PROCEDURE — 71045 X-RAY EXAM CHEST 1 VIEW: CPT | Mod: 26

## 2021-09-16 PROCEDURE — 87040 BLOOD CULTURE FOR BACTERIA: CPT

## 2021-09-16 RX ORDER — LINEZOLID 600 MG/300ML
600 INJECTION, SOLUTION INTRAVENOUS EVERY 12 HOURS
Refills: 0 | Status: DISCONTINUED | OUTPATIENT
Start: 2021-09-17 | End: 2021-09-16

## 2021-09-16 RX ORDER — ACETAMINOPHEN 500 MG
650 TABLET ORAL ONCE
Refills: 0 | Status: COMPLETED | OUTPATIENT
Start: 2021-09-16 | End: 2021-09-16

## 2021-09-16 RX ORDER — COLLAGENASE CLOSTRIDIUM HIST. 250 UNIT/G
1 OINTMENT (GRAM) TOPICAL DAILY
Refills: 0 | Status: DISCONTINUED | OUTPATIENT
Start: 2021-09-16 | End: 2021-09-16

## 2021-09-16 RX ORDER — INSULIN LISPRO 100/ML
VIAL (ML) SUBCUTANEOUS
Refills: 0 | Status: DISCONTINUED | OUTPATIENT
Start: 2021-09-16 | End: 2021-09-16

## 2021-09-16 RX ORDER — ASPIRIN/CALCIUM CARB/MAGNESIUM 324 MG
81 TABLET ORAL DAILY
Refills: 0 | Status: DISCONTINUED | OUTPATIENT
Start: 2021-09-16 | End: 2021-09-16

## 2021-09-16 RX ORDER — FUROSEMIDE 40 MG
40 TABLET ORAL DAILY
Refills: 0 | Status: DISCONTINUED | OUTPATIENT
Start: 2021-09-16 | End: 2021-09-16

## 2021-09-16 RX ORDER — VANCOMYCIN HCL 1 G
1500 VIAL (EA) INTRAVENOUS EVERY 12 HOURS
Refills: 0 | Status: DISCONTINUED | OUTPATIENT
Start: 2021-09-16 | End: 2021-09-16

## 2021-09-16 RX ORDER — LISINOPRIL 2.5 MG/1
5 TABLET ORAL DAILY
Refills: 0 | Status: DISCONTINUED | OUTPATIENT
Start: 2021-09-16 | End: 2021-09-16

## 2021-09-16 RX ORDER — APIXABAN 2.5 MG/1
5 TABLET, FILM COATED ORAL EVERY 12 HOURS
Refills: 0 | Status: DISCONTINUED | OUTPATIENT
Start: 2021-09-16 | End: 2021-09-16

## 2021-09-16 RX ORDER — CARVEDILOL PHOSPHATE 80 MG/1
3.12 CAPSULE, EXTENDED RELEASE ORAL EVERY 12 HOURS
Refills: 0 | Status: DISCONTINUED | OUTPATIENT
Start: 2021-09-16 | End: 2021-09-16

## 2021-09-16 RX ORDER — BACITRACIN ZINC 500 UNIT/G
1 OINTMENT IN PACKET (EA) TOPICAL
Refills: 0 | Status: DISCONTINUED | OUTPATIENT
Start: 2021-09-16 | End: 2021-09-16

## 2021-09-16 RX ORDER — LINEZOLID 600 MG/300ML
600 INJECTION, SOLUTION INTRAVENOUS ONCE
Refills: 0 | Status: COMPLETED | OUTPATIENT
Start: 2021-09-16 | End: 2021-09-16

## 2021-09-16 RX ORDER — PANTOPRAZOLE SODIUM 20 MG/1
40 TABLET, DELAYED RELEASE ORAL
Refills: 0 | Status: DISCONTINUED | OUTPATIENT
Start: 2021-09-16 | End: 2021-09-16

## 2021-09-16 RX ORDER — LINEZOLID 600 MG/300ML
INJECTION, SOLUTION INTRAVENOUS
Refills: 0 | Status: DISCONTINUED | OUTPATIENT
Start: 2021-09-16 | End: 2021-09-16

## 2021-09-16 RX ORDER — PIPERACILLIN AND TAZOBACTAM 4; .5 G/20ML; G/20ML
3.38 INJECTION, POWDER, LYOPHILIZED, FOR SOLUTION INTRAVENOUS EVERY 8 HOURS
Refills: 0 | Status: DISCONTINUED | OUTPATIENT
Start: 2021-09-16 | End: 2021-09-16

## 2021-09-16 RX ORDER — ATORVASTATIN CALCIUM 80 MG/1
40 TABLET, FILM COATED ORAL AT BEDTIME
Refills: 0 | Status: DISCONTINUED | OUTPATIENT
Start: 2021-09-16 | End: 2021-09-16

## 2021-09-16 RX ADMIN — PIPERACILLIN AND TAZOBACTAM 200 GRAM(S): 4; .5 INJECTION, POWDER, LYOPHILIZED, FOR SOLUTION INTRAVENOUS at 03:12

## 2021-09-16 RX ADMIN — SODIUM CHLORIDE 3 MILLILITER(S): 9 INJECTION INTRAMUSCULAR; INTRAVENOUS; SUBCUTANEOUS at 05:27

## 2021-09-16 NOTE — CONSULT NOTE ADULT - SUBJECTIVE AND OBJECTIVE BOX
Patient is a 70y old  Male who presents with a chief complaint of LLE Cellulitis (16 Sep 2021 11:44)    Podiatry HPI: 70 y.o Male with a PMHx of HTN, HLD, DM, PE, CAD s/p CABG 4 years ago, CHF on Lasix, PE on eliquis, chronic venous stasis, multiple LLE wounds with discharge. Patient is a poor historian. Patient states the wounds started about 5 days ago. He denies any pain to the left leg. He states that he's been here before and just wants the leg dressed. He doesn't go anywhere to get his wounds checked and wanted to know where our clinic was. Denies any other pedal complaints. Denies any other constitutional symptoms at this time.     HPI:  71 yo M from home, w/ PMHx of HTN, HLD, DM, PE, CAD s/p CABG 4 years ago, CHF on Lasix, PE on eliquis, chronic venous stasis, multiple LLE cellulitis presenting with LLE discharge. History obtained from ED provider and previous charts as patient did not want to speak with anyone as he is so upset that he was kept in the hallway. Pt initially wanted to leave and go to [facility] but did not know how to get there. Pt refused CT of the lower extremity and refused Physical examination. Pt denies having any medical history.      (16 Sep 2021 06:33)      Podiatry HPI:    PMH:No pertinent past medical history    CAD (coronary artery disease)    DM (diabetes mellitus)    Dyslipidemia    CHF (congestive heart failure)    Angina pectoris    HTN (hypertension)    PE (pulmonary thromboembolism)    HLD (hyperlipidemia)    CHF (congestive heart failure)      Allergies: Aldactone (Unknown)  Bumex (Blisters; Rash)  metoprolol (Unknown)  spironolactone (Unknown)    Medications: apixaban 5 milliGRAM(s) Oral every 12 hours  aspirin enteric coated 81 milliGRAM(s) Oral daily  atorvastatin 40 milliGRAM(s) Oral at bedtime  BACItracin   Ointment 1 Application(s) Topical two times a day  carvedilol 3.125 milliGRAM(s) Oral every 12 hours  collagenase Ointment 1 Application(s) Topical daily  collagenase Ointment 1 Application(s) Topical daily  furosemide    Tablet 40 milliGRAM(s) Oral daily  insulin lispro (ADMELOG) corrective regimen sliding scale   SubCutaneous Before meals and at bedtime  linezolid  IVPB      lisinopril 5 milliGRAM(s) Oral daily  pantoprazole    Tablet 40 milliGRAM(s) Oral before breakfast  piperacillin/tazobactam IVPB.. 3.375 Gram(s) IV Intermittent every 8 hours  sodium chloride 0.9% lock flush 3 milliLiter(s) IV Push every 8 hours    FH:No pertinent family history in first degree relatives    No pertinent family history in first degree relatives      PSX: S/P CABG x 3      SH: Social History:  Lives at home  denies smoking, alcohol intake, illicit drug use (track marks present L AC) (16 Sep 2021 06:33)      Vital Signs Last 24 Hrs  T(C): 36.3 (16 Sep 2021 16:02), Max: 36.9 (15 Sep 2021 22:42)  T(F): 97.4 (16 Sep 2021 16:02), Max: 98.4 (15 Sep 2021 22:42)  HR: 81 (16 Sep 2021 16:02) (74 - 85)  BP: 143/95 (16 Sep 2021 16:02) (103/65 - 143/95)  BP(mean): --  RR: 20 (16 Sep 2021 16:02) (18 - 20)  SpO2: 100% (16 Sep 2021 16:02) (95% - 100%)    LABS                        11.9   6.31  )-----------( 291      ( 16 Sep 2021 03:00 )             39.0               09-16    143  |  114<H>  |  12  ----------------------------<  98  3.6   |  25  |  0.75    Ca    7.8<L>      16 Sep 2021 03:00    TPro  6.5  /  Alb  2.1<L>  /  TBili  0.5  /  DBili  x   /  AST  35  /  ALT  27  /  AlkPhos  189<H>  09-16     WBC Count: 6.31 K/uL (09-16-21 @ 03:00)        PHYSICAL EXAM  GEN: LIDIA PÉREZ is a pleasant well-nourished, well developed 70y Male in no acute distress, alert awake, and oriented to person, place and time.   LEFT LE Focused:    Vasc:  DP/PT 1/4,b/l. Monophasic on Doppler for DP/PT. CFT brisk to all digits, b/l. +1 pitting edema to dorsal foot and proximally to leg. Skin temperature warm to warm from the tibial tuberosity to the distal digits.   Derm: Left anterior wound, 10cm by 20cm, with mild erythema, no redness, no streaking, no malodor noted. Negative PTB. Mild greenish yellow drainage noted. No acute signs of infections noted. No open wound noted to the right.   Neuro: Protective sensation was diminished  MSK: +5/5 muscle strength to all pedal muscle groups    Imaging:   Cultures:     A: Left Leg Wound    P:  Patient evaluated, chart reviewed  Explained all clinical findings to the patient and answered all questions to the patient's satisfaction.  ESR/CRP ordered  Xrays ordered  Duplex ordered  Cultures pending  Cleansed the Left leg with sterile saline.  Applied adaptic, dsd, abd, and a multilayer compression to the left leg  Podiatry will follow in house  Discussed and seen with Dr. Pittman

## 2021-09-16 NOTE — H&P ADULT - PROBLEM SELECTOR PLAN 1
p/w b/l LE swelling L>R, LLE with purulent foul smelling green discharge  f/u blood cultures  Obtain wound cultures  Attempt to obtain CT scan of LLE w/ iv contrast, r/o abscess - pt initially refused in ED  S/p 1 dose zosyn in ed. c/w zosyn for pseudomonal coverage  Consulted Podiatry  Consulted ID Dr. Lauren p/w b/l LE swelling L>R, LLE with purulent foul smelling green discharge  f/u blood cultures  F/u US LE doppler  Obtain wound cultures  Attempt to obtain CT scan of LLE w/ iv contrast, r/o abscess - pt initially refused in ED  S/p 1 dose zosyn in ed.  Start vanc 1500mg q12, obtain vt prior to 4th dose  c/w zosyn for pseudomonal coverage  Consulted Podiatry  Consulted ID Dr. Lauren

## 2021-09-16 NOTE — DISCHARGE NOTE PROVIDER - HOSPITAL COURSE
69 yo M from home, w/ PMHx of HTN, HLD, DM, PE, CAD s/p CABG 4 years ago, CHF on Lasix, PE on eliquis, chronic venous stasis, multiple LLE cellulitis presenting with LLE discharge. History obtained from ED provider and previous charts as patient did not want to speak with anyone as he is so upset that he was kept in the hallway. Pt initially wanted to leave and go to [facility] but did not know how to get there. Pt refused CT of the lower extremity and refused Physical examination. Pt denies having any medical history.   p/w b/l LE swelling L>R, LLE with purulent foul smelling green discharge  f/u blood cultures  f/u blood cultures  F/u US LE doppler  CT scan of LLE w/ iv contrast, r/o abscess - pt initially refused in ED  Start vanc 1500mg q12, obtain vt prior to 4th dose  c/w zosyn for pseudomonal coverage  Consulted Podiatry  Consulted ID Dr. Lauren.  PE :eliquis 5mg PO q12hr        Leaving AMA

## 2021-09-16 NOTE — ED PROVIDER NOTE - PHYSICAL EXAMINATION
Vital Signs Reviewed  GEN: Comfortable, NAD, AAOx3  HEENT: NCAT, MMM, Neck Supple  RESP: CTAB, No rales/rhonchi/wheezing  CV: RRR, S1S2, No murmurs  ABD: No TTP, Soft, ND, No masses, No CVA Tenderness  Extrem/Skin: Equal pulses bilat, Right lower leg w/ 2+ pitting edema and chronic skin changes, Left lower leg w/ copious skin breakdown and gauze soaked in green discharge  Neuro: No focal deficits Vital Signs Reviewed  GEN: Comfortable, NAD, AAOx3  HEENT: NCAT, MMM, Neck Supple  RESP: CTAB, No rales/rhonchi/wheezing  CV: RRR, S1S2, No murmurs  ABD: No TTP, Soft, ND, No masses, No CVA Tenderness  Extrem/Skin: Equal pulses bilat, Right lower leg w/ 2+ pitting edema and chronic skin changes, Left lower leg w/ erythema, warmth, and copious skin breakdown and malodorous green discharge from chronic ulcers  Neuro: No focal deficits

## 2021-09-16 NOTE — H&P ADULT - ATTENDING COMMENTS
69 yo M from home, w/ PMHx of HTN, HLD, DM, PE, CAD s/p CABG 4 years ago, CHF on Lasix, PE on eliquis, chronic venous stasis, multiple LLE cellulitis presenting with LLE discharge. History obtained from ED provider and previous charts as patient did not want to speak with anyone as he is so upset that he was kept in the hallway. Pt initially wanted to leave and go to [facility] but did not know how to get there. Pt refused CT of the lower extremity and refused Physical examination. Pt denies having any medical history.      assessment  --  b/l le cellulitis 2nd to pvd, h/o HTN, HLD, DM, PE, CAD s/p CABG 4 years ago, systolic CHF on Lasix, pulm htn, PE on eliquis, liver cirrhosis    plan  --  admit to med, vanco, zosyn, cont preadmit home meds, gi and dvt profilaxis,  cbc, bmp, mg, phos, lipids, tsh, bld cx, ua, ucx, wound cx       pod cons  wound care cons  cardio cons .

## 2021-09-16 NOTE — H&P ADULT - PROBLEM SELECTOR PLAN 6
IMPROVE VTE Individual Risk Assessment  RISK                                                                Points  [x] Previous VTE                                                  3  [  ] Thrombophilia                                               2  [  ] Lower limb paralysis                                      2        (unable to hold up >15 seconds)    [  ] Current Cancer                                              2         (within 6 months)  [  ] Immobilization > 24 hrs                                1  [  ] ICU/CCU stay > 24 hours                              1  [x] Age > 60                                                      1  IMPROVE VTE Score - 4    PPI for GI prophylaxis  eliquis

## 2021-09-16 NOTE — H&P ADULT - PROBLEM SELECTOR PLAN 4
Echo 3/25/21 Severe global left ventricular systolic dysfunction (EF 15-20%)  c/w home meds: lasix 40mg, lisinopril 5mg PO qd, coreg 3.125mg PO BID  likely non compliant with medication as pt denies he has heart conditions

## 2021-09-16 NOTE — H&P ADULT - HISTORY OF PRESENT ILLNESS
69 yo M from home, w/ PMHx of HTN, HLD, DM, PE, CAD s/p CABG 4 years ago, CHF on Lasix, PE on eliquis, chronic venous stasis, multiple LLE cellulitis presenting with LLE discharge. History obtained from ED provider and previous charts as patient did not want to speak with anyone as he is so upset that he was kept in the hallway. Pt initially wanted to leave and go to [facility] but did not know how to get there. Pt refused CT of the lower extremity and refused Physical examination. Pt denies having any medical history.

## 2021-09-16 NOTE — CONSULT NOTE ADULT - SUBJECTIVE AND OBJECTIVE BOX
PULMONARY CONSULT NOTE      LIDIA PÉREZ  MRN-[id number]    History of Present Illness:  Reason for Admission: LLE Cellulitis  History of Present Illness:   71 yo M from home, w/ PMHx of HTN, HLD, DM, PE, CAD s/p CABG 4 years ago, CHF on Lasix, PE on eliquis, chronic venous stasis, multiple LLE cellulitis presenting with LLE discharge. History obtained from ED provider and previous charts as patient did not want to speak with anyone as he is so upset that he was kept in the hallway. Pt initially wanted to leave and go to [facility] but did not know how to get there. Pt refused CT of the lower extremity and refused Physical examination. Pt denies having any medical history.         HISTORY OF PRESENT ILLNESS: As above. Awake, alert, comfortable in bed in NAD    MEDICATIONS  (STANDING):  apixaban 5 milliGRAM(s) Oral every 12 hours  aspirin enteric coated 81 milliGRAM(s) Oral daily  atorvastatin 40 milliGRAM(s) Oral at bedtime  BACItracin   Ointment 1 Application(s) Topical two times a day  carvedilol 3.125 milliGRAM(s) Oral every 12 hours  collagenase Ointment 1 Application(s) Topical daily  furosemide    Tablet 40 milliGRAM(s) Oral daily  insulin lispro (ADMELOG) corrective regimen sliding scale   SubCutaneous Before meals and at bedtime  lisinopril 5 milliGRAM(s) Oral daily  pantoprazole    Tablet 40 milliGRAM(s) Oral before breakfast  piperacillin/tazobactam IVPB.. 3.375 Gram(s) IV Intermittent every 8 hours  sodium chloride 0.9% lock flush 3 milliLiter(s) IV Push every 8 hours  vancomycin  IVPB 1500 milliGRAM(s) IV Intermittent every 12 hours      MEDICATIONS  (PRN):      Allergies    Aldactone (Unknown)  Bumex (Blisters; Rash)  metoprolol (Unknown)  spironolactone (Unknown)    Intolerances        PAST MEDICAL & SURGICAL HISTORY:  CAD (coronary artery disease)    DM (diabetes mellitus)    Dyslipidemia    CHF (congestive heart failure)    Angina pectoris    HTN (hypertension)    PE (pulmonary thromboembolism)    HLD (hyperlipidemia)    CHF (congestive heart failure)    S/P CABG x 3        FAMILY HISTORY:      SOCIAL HISTORY  Smoking History:     REVIEW OF SYSTEMS:    CONSTITUTIONAL:  No fevers, chills, sweats    HEENT:  Eyes:  No diplopia or blurred vision. ENT:  No earache, sore throat or runny nose.    CARDIOVASCULAR:  No pressure, squeezing, tightness, or heaviness about the chest; no palpitations.    RESPIRATORY:  Per HPI    GASTROINTESTINAL:  No abdominal pain, nausea, vomiting or diarrhea.    GENITOURINARY:  No dysuria, frequency or urgency.    NEUROLOGIC:  No paresthesias, fasciculations, seizures or weakness.    PSYCHIATRIC:  No disorder of thought or mood.    Vital Signs Last 24 Hrs  T(C): 36.9 (15 Sep 2021 22:42), Max: 36.9 (15 Sep 2021 22:42)  T(F): 98.4 (15 Sep 2021 22:42), Max: 98.4 (15 Sep 2021 22:42)  HR: 85 (16 Sep 2021 00:09) (80 - 85)  BP: 121/78 (16 Sep 2021 08:02) (103/65 - 124/77)  BP(mean): --  RR: 18 (16 Sep 2021 08:02) (18 - 20)  SpO2: 98% (16 Sep 2021 08:02) (95% - 98%)  I&O's Detail      PHYSICAL EXAMINATION:    GENERAL: The patient is a well-developed, well-nourished _____in no apparent distress.     HEENT: Head is normocephalic and atraumatic. Extraocular muscles are intact. Mucous membranes are moist.     NECK: Supple.     LUNGS: Clear to auscultation without wheezing, rales, or rhonchi. Respirations unlabored    HEART: Regular rate and rhythm without murmur.    ABDOMEN: Soft, nontender, and nondistended.  No hepatosplenomegaly is noted.    EXTREMITIES: Without any cyanosis, clubbing, rash, lesions + edema.    NEUROLOGIC: Grossly intact.      LABS:                        11.9   6.31  )-----------( 291      ( 16 Sep 2021 03:00 )             39.0     09-16    143  |  114<H>  |  12  ----------------------------<  98  3.6   |  25  |  0.75    Ca    7.8<L>      16 Sep 2021 03:00    TPro  6.5  /  Alb  2.1<L>  /  TBili  0.5  /  DBili  x   /  AST  35  /  ALT  27  /  AlkPhos  189<H>  09-16    PT/INR - ( 16 Sep 2021 02:59 )   PT: 14.4 sec;   INR: 1.22 ratio         PTT - ( 16 Sep 2021 02:59 )  PTT:36.4 sec              Lactate, Blood: 1.5 mmol/L (09-16-21 @ 03:00)        MICROBIOLOGY:    RADIOLOGY & ADDITIONAL STUDIES:    CXR:  < from: Xray Chest 1 View-PORTABLE IMMEDIATE (Xray Chest 1 View-PORTABLE IMMEDIATE .) (09.16.21 @ 01:03) >  IMPRESSION: Cardiomegaly  vascular congestion and small right pleural effusion/pleural scarring similar to prior. No gross focal infiltrate    < end of copied text >    Ct scan chest;    ekg;    echo:

## 2021-09-16 NOTE — DISCHARGE NOTE PROVIDER - CARE PROVIDER_API CALL
Cullen Pitts)  Internal Medicine  [phone] [place], [place] [address]  Phone: [phone]  Fax: [phone]  Follow Up Time:

## 2021-09-16 NOTE — H&P ADULT - PROBLEM SELECTOR PLAN 5
HbA1c of 6.6 on 4/2/21, 6.2 on 7/2021  adjust sliding scale as needed  Maintain poct 140-180   sliding scale  Adjust insulin as indicated  FS ACHS

## 2021-09-16 NOTE — ED PROVIDER NOTE - CLINICAL SUMMARY MEDICAL DECISION MAKING FREE TEXT BOX
Patient presents w/ signs and symptoms of worsening left lower leg infection. Obtaining labs. Will CT leg to rule out surgical pathology. Patient stable. Will reassess. Patient presents w/ signs and symptoms of worsening left lower leg infection. Obtaining labs. Will CT leg to rule out surgical pathology. Patient stable. Will reassess.    Labs unremarkable. Pt refusing CT stating that he is "does not need it", pt will not change mind after discussion. High concern for pseudomonal infection given hx and foul smelling green-blue discharge from ulcers. Admitting for IV ABx to Dr Pitts's service (admitted to [facility] recently). Pt stable and endorsed to MAR.

## 2021-09-16 NOTE — H&P ADULT - NSHPPHYSICALEXAM_GEN_ALL_CORE
General - NAD, sitting up in bed  Eyes - PERRLA, EOM intact  ENT - Nonicteric sclerae, PERRLA, EOMI. Oropharynx clear. Moist mucous membranes. Conjunctivae appear well perfused.   Neck - No noticeable swelling, redness or rash around throat or on face  Lymph Nodes - Refused palpation  Cardiovascular - unable to assess due to patient refusal  Lungs -no use of acessory muscles, unable to auscultate due to patient refusal  Abdomen - RefusedExtremities - (+) b/l LE edema with chronic skin changes, (+) erythema with purulent green discharge on LLE  Musculo Skeletal - 5/5 strength, normal range of motion, no swollen or erythematous joints.  Neurological – Alert and oriented x 3, CN 2-12 grossly intact. able to ambulate

## 2021-09-16 NOTE — ED PROVIDER NOTE - OBJECTIVE STATEMENT
71 y/o M, w/ history of diabetes, HTN, HLD, PE on Eliquis, CABG, CHF, and recent antibiotics for left leg infection, presents w/ worsening pain and fluid drainage from left leg xseveral days. Patient states he finished antibiotics 4 days ago for cellulitis and pain and fluid discharge has worsened. Patient denies fever, urinary symptoms, or cough or respiratory symptoms. Patient is also complaining of left-sided back pain after fall 8 days ago and states he went to ER where imaging  showed a rib fracture and he was discharged. Patient denies chest pain, shortness of breath, focal numbness or weakness, syncope, or any other recent illnesses or hospitalizations. NKDA. 71 y/o M, w/ history of diabetes, HTN, HLD, PE on Eliquis, CABG, CHF, and recent antibiotics for left leg infection, presents w/ worsening pain and fluid drainage from left leg x several days. Patient states he finished antibiotics 4 days ago for cellulitis and pain and fluid discharge has worsened. Patient denies fever, urinary symptoms, or cough or respiratory symptoms. Patient is also complaining of left-sided back pain after fall 8 days ago and states he went to ER where imaging  showed a rib fracture and he was discharged. Patient denies chest pain, shortness of breath, focal numbness or weakness, syncope, or any other recent illnesses or hospitalizations. NKDA.

## 2021-09-16 NOTE — CONSULT NOTE ADULT - SUBJECTIVE AND OBJECTIVE BOX
Patient is a 70y old  Male from home, w/ PMHx of HTN, HLD, DM, PE, CAD s/p CABG 4 years ago, CHF on Lasix, PE on eliquis, chronic venous stasis, multiple LLE cellulitis, now presents for evaluation of LLE discharge. He initially wanted to leave and go to [facility] but did not know how to get there. Pt refused CT of the lower extremity. On admission, he found to have no fever or Leukocytosis. He has started on Zosyn and IV Vancomycin, and the ID consult requested to assist with further evaluation and antibiotic management.       REVIEW OF SYSTEMS: Total of twelve systems have been reviewed with patient and found to be negative unless mentioned in HPI      PAST MEDICAL & SURGICAL HISTORY:  CAD (coronary artery disease)  DM (diabetes mellitus)  Dyslipidemia  CHF (congestive heart failure)  Angina pectoris  HTN (hypertension)  PE (pulmonary thromboembolism)  HLD (hyperlipidemia)  CHF (congestive heart failure)  S/P CABG x 3      SOCIAL HISTORY  Alcohol: Does not drink  Tobacco: Does not smoke  Illicit substance use: None      FAMILY HISTORY: Non contributory to the present illness      Aldactone (Unknown)  Bumex (Blisters; Rash)  metoprolol (Unknown)  spironolactone (Unknown)        Vital Signs Last 24 Hrs  T(C): 36.9 (15 Sep 2021 22:42), Max: 36.9 (15 Sep 2021 22:42)  T(F): 98.4 (15 Sep 2021 22:42), Max: 98.4 (15 Sep 2021 22:42)  HR: 85 (16 Sep 2021 00:09) (80 - 85)  BP: 121/78 (16 Sep 2021 08:02) (103/65 - 124/77)  BP(mean): --  RR: 18 (16 Sep 2021 08:02) (18 - 20)  SpO2: 98% (16 Sep 2021 08:02) (95% - 98%)      PHYSICAL EXAM:  GENERAL: Not in distress   CHEST/LUNG:  Not using accessory muscles  HEART: s1 and s2 present  ABDOMEN:  Nontender and  Nondistended  EXTREMITIES: LE erythematous, swollen with drainage    CNS: Awake and Alert      LABS:                        11.9   6.31  )-----------( 291      ( 16 Sep 2021 03:00 )             39.0       09-16    143  |  114<H>  |  12  ----------------------------<  98  3.6   |  25  |  0.75    Ca    7.8<L>      16 Sep 2021 03:00    TPro  6.5  /  Alb  2.1<L>  /  TBili  0.5  /  DBili  x   /  AST  35  /  ALT  27  /  AlkPhos  189<H>  09-16    PT/INR - ( 16 Sep 2021 02:59 )   PT: 14.4 sec;   INR: 1.22 ratio     PTT - ( 16 Sep 2021 02:59 )  PTT:36.4 sec      CAPILLARY BLOOD GLUCOSE  POCT Blood Glucose.: 141 mg/dL (16 Sep 2021 11:35)        MEDICATIONS  (STANDING):  apixaban 5 milliGRAM(s) Oral every 12 hours  aspirin enteric coated 81 milliGRAM(s) Oral daily  atorvastatin 40 milliGRAM(s) Oral at bedtime  BACItracin   Ointment 1 Application(s) Topical two times a day  carvedilol 3.125 milliGRAM(s) Oral every 12 hours  collagenase Ointment 1 Application(s) Topical daily  furosemide    Tablet 40 milliGRAM(s) Oral daily  insulin lispro (ADMELOG) corrective regimen sliding scale   SubCutaneous Before meals and at bedtime  lisinopril 5 milliGRAM(s) Oral daily  pantoprazole    Tablet 40 milliGRAM(s) Oral before breakfast  piperacillin/tazobactam IVPB.. 3.375 Gram(s) IV Intermittent every 8 hours  sodium chloride 0.9% lock flush 3 milliLiter(s) IV Push every 8 hours  vancomycin  IVPB 1500 milliGRAM(s) IV Intermittent every 12 hours    MEDICATIONS  (PRN):      RADIOLOGY & ADDITIONAL TESTS:  < from: Xray Chest 1 View-PORTABLE IMMEDIATE (Xray Chest 1 View-PORTABLE IMMEDIATE .) (09.16.21 @ 01:03) >    IMPRESSION: Cardiomegaly  vascular congestion and small right pleural effusion/pleural scarring similar to prior. No gross focal infiltrate      MICROBIOLOGY DATA:    COVID-19 PCR . (09.16.21 @ 03:01)   COVID-19 PCR: NotDetec           Patient is a 70y old  Male from home, w/ PMHx of HTN, HLD, DM, PE, CAD s/p CABG 4 years ago, CHF on Lasix, PE on eliquis, chronic venous stasis, multiple LLE cellulitis, now presents for evaluation of LLE discharge. He initially wanted to leave and go to [facility] but did not know how to get there. Pt refused CT of the lower extremity. On admission, he found to have no fever or Leukocytosis. He has started on Zosyn and IV Vancomycin, and the ID consult requested to assist with further evaluation and antibiotic management.       REVIEW OF SYSTEMS: Total of twelve systems have been reviewed with patient and found to be negative unless mentioned in HPI      PAST MEDICAL & SURGICAL HISTORY:  CAD (coronary artery disease)  DM (diabetes mellitus)  Dyslipidemia  CHF (congestive heart failure)  Angina pectoris  HTN (hypertension)  PE (pulmonary thromboembolism)  HLD (hyperlipidemia)  CHF (congestive heart failure)  S/P CABG x 3      SOCIAL HISTORY  Alcohol: Does not drink  Tobacco: Does not smoke  Illicit substance use: None      FAMILY HISTORY: Non contributory to the present illness      Aldactone (Unknown)  Bumex (Blisters; Rash)  metoprolol (Unknown)  spironolactone (Unknown)      Vital Signs Last 24 Hrs  T(C): 36.9 (15 Sep 2021 22:42), Max: 36.9 (15 Sep 2021 22:42)  T(F): 98.4 (15 Sep 2021 22:42), Max: 98.4 (15 Sep 2021 22:42)  HR: 85 (16 Sep 2021 00:09) (80 - 85)  BP: 121/78 (16 Sep 2021 08:02) (103/65 - 124/77)  BP(mean): --  RR: 18 (16 Sep 2021 08:02) (18 - 20)  SpO2: 98% (16 Sep 2021 08:02) (95% - 98%)      PHYSICAL EXAM:  GENERAL: Not in distress   CHEST/LUNG:  Not using accessory muscles  HEART: s1 and s2 present  ABDOMEN:  Nontender and  Nondistended  EXTREMITIES: LE erythematous, swollen with drainage    CNS: Awake and Alert      LABS:                        11.9   6.31  )-----------( 291      ( 16 Sep 2021 03:00 )             39.0       09-16    143  |  114<H>  |  12  ----------------------------<  98  3.6   |  25  |  0.75    Ca    7.8<L>      16 Sep 2021 03:00    TPro  6.5  /  Alb  2.1<L>  /  TBili  0.5  /  DBili  x   /  AST  35  /  ALT  27  /  AlkPhos  189<H>  09-16    PT/INR - ( 16 Sep 2021 02:59 )   PT: 14.4 sec;   INR: 1.22 ratio     PTT - ( 16 Sep 2021 02:59 )  PTT:36.4 sec      CAPILLARY BLOOD GLUCOSE  POCT Blood Glucose.: 141 mg/dL (16 Sep 2021 11:35)        MEDICATIONS  (STANDING):  apixaban 5 milliGRAM(s) Oral every 12 hours  aspirin enteric coated 81 milliGRAM(s) Oral daily  atorvastatin 40 milliGRAM(s) Oral at bedtime  BACItracin   Ointment 1 Application(s) Topical two times a day  carvedilol 3.125 milliGRAM(s) Oral every 12 hours  collagenase Ointment 1 Application(s) Topical daily  furosemide    Tablet 40 milliGRAM(s) Oral daily  insulin lispro (ADMELOG) corrective regimen sliding scale   SubCutaneous Before meals and at bedtime  lisinopril 5 milliGRAM(s) Oral daily  pantoprazole    Tablet 40 milliGRAM(s) Oral before breakfast  piperacillin/tazobactam IVPB.. 3.375 Gram(s) IV Intermittent every 8 hours  sodium chloride 0.9% lock flush 3 milliLiter(s) IV Push every 8 hours  vancomycin  IVPB 1500 milliGRAM(s) IV Intermittent every 12 hours    MEDICATIONS  (PRN):      RADIOLOGY & ADDITIONAL TESTS:  < from: Xray Chest 1 View-PORTABLE IMMEDIATE (Xray Chest 1 View-PORTABLE IMMEDIATE .) (09.16.21 @ 01:03) >    IMPRESSION: Cardiomegaly  vascular congestion and small right pleural effusion/pleural scarring similar to prior. No gross focal infiltrate      MICROBIOLOGY DATA:    COVID-19 PCR . (09.16.21 @ 03:01)   COVID-19 PCR: NotDetec

## 2021-09-16 NOTE — CONSULT NOTE ADULT - ASSESSMENT
Patient is a 70y old  Male from home, w/ PMHx of HTN, HLD, DM, PE, CAD s/p CABG 4 years ago, CHF on Lasix, PE on eliquis, chronic venous stasis, multiple LLE cellulitis, now presents for evaluation of LLE discharge. He initially wanted to leave and go to [facility] but did not know how to get there. Pt refused CT of the lower extremity. On admission, he found to have no fever or Leukocytosis. He has started on Zosyn and IV Vancomycin, and the ID consult requested to assist with further evaluation and antibiotic management.     # LE purulent cellulitis    would recommend:    1. Follow up Blood cultures  2. Keep LE elevated  3. Dressing change and obtain wound culture  4. Continue Zosyn and change Vancomycin to Linezolid to avoid BRITTNY with combination of Zosyn    d/w Admitting resident    will follow the patient with you and make further recommendation based on the clinical course and Lab results  Thank you for the opportunity to participate in Mr. PÉREZ's care      Attending Attestation:    Spent more than 65 minutes on total encounter, more than 50 % of the visit was spent counseling and/or coordinating care by the Attending physician.     Patient is a 70y old  Male from home, w/ PMHx of HTN, HLD, DM, PE, CAD s/p CABG 4 years ago, CHF on Lasix, PE on eliquis, chronic venous stasis, multiple LLE cellulitis, now presents for evaluation of LLE discharge. He initially wanted to leave and go to [facility] but did not know how to get there. Pt refused CT of the lower extremity. On admission, he found to have no fever or Leukocytosis. He has started on Zosyn and IV Vancomycin, and the ID consult requested to assist with further evaluation and antibiotic management.     # LE purulent cellulitis    would recommend:    1. Follow up Blood cultures  2. Keep LE elevated  3. Dressing change and obtain wound culture  4. Continue Zosyn and change Vancomycin to Linezolid to avoid BRITTNY with combination of Zosyn  5. Podiatry evaluation     d/w Admitting resident and patient     will follow the patient with you and make further recommendation based on the clinical course and Lab results  Thank you for the opportunity to participate in Mr. PÉREZ's care    Attending Attestation:    Spent more than 65 minutes on total encounter, more than 50 % of the visit was spent counseling and/or coordinating care by the Attending physician.

## 2021-09-16 NOTE — DISCHARGE NOTE PROVIDER - EXTENDED VTE YES NO FOR MLM ENOXAPARIN
PAST MEDICAL HISTORY:  CAD (Coronary Artery Disease)     Carpal Tunnel Syndrome right and left    CKD (chronic kidney disease) stage 2, GFR 60-89 ml/min     GERD (gastroesophageal reflux disease)     HLD (hyperlipidemia)     HTN (Hypertension)     Prostate cancer     Stented coronary artery ,

## 2021-09-16 NOTE — H&P ADULT - ASSESSMENT
71 yo M from home, w/ PMHx of HTN, HLD, DM, PE, CAD s/p CABG 4 years ago, CHF on Lasix, PE on eliquis, chronic venous stasis, multiple LLE cellulitis presenting with LLE discharge admitted for LLE cellulitis

## 2021-09-19 LAB
-  AMIKACIN: SIGNIFICANT CHANGE UP
-  AMOXICILLIN/CLAVULANIC ACID: SIGNIFICANT CHANGE UP
-  AMPICILLIN/SULBACTAM: SIGNIFICANT CHANGE UP
-  AMPICILLIN: SIGNIFICANT CHANGE UP
-  AZTREONAM: SIGNIFICANT CHANGE UP
-  CEFAZOLIN: SIGNIFICANT CHANGE UP
-  CEFEPIME: SIGNIFICANT CHANGE UP
-  CEFOXITIN: SIGNIFICANT CHANGE UP
-  CEFTRIAXONE: SIGNIFICANT CHANGE UP
-  CIPROFLOXACIN: SIGNIFICANT CHANGE UP
-  ERTAPENEM: SIGNIFICANT CHANGE UP
-  GENTAMICIN: SIGNIFICANT CHANGE UP
-  LEVOFLOXACIN: SIGNIFICANT CHANGE UP
-  MEROPENEM: SIGNIFICANT CHANGE UP
-  PIPERACILLIN/TAZOBACTAM: SIGNIFICANT CHANGE UP
-  TOBRAMYCIN: SIGNIFICANT CHANGE UP
-  TRIMETHOPRIM/SULFAMETHOXAZOLE: SIGNIFICANT CHANGE UP
METHOD TYPE: SIGNIFICANT CHANGE UP

## 2021-09-20 LAB
-  AMIKACIN: SIGNIFICANT CHANGE UP
-  AMOXICILLIN/CLAVULANIC ACID: SIGNIFICANT CHANGE UP
-  AMPICILLIN/SULBACTAM: SIGNIFICANT CHANGE UP
-  AMPICILLIN: SIGNIFICANT CHANGE UP
-  AMPICILLIN: SIGNIFICANT CHANGE UP
-  AZTREONAM: SIGNIFICANT CHANGE UP
-  CEFAZOLIN: SIGNIFICANT CHANGE UP
-  CEFEPIME: SIGNIFICANT CHANGE UP
-  CEFOXITIN: SIGNIFICANT CHANGE UP
-  CEFTAZIDIME/AVIBACTAM: SIGNIFICANT CHANGE UP
-  CEFTOLOZANE/TAZOBACTAM: SIGNIFICANT CHANGE UP
-  CEFTRIAXONE: SIGNIFICANT CHANGE UP
-  CIPROFLOXACIN: SIGNIFICANT CHANGE UP
-  ERTAPENEM: SIGNIFICANT CHANGE UP
-  GENTAMICIN: SIGNIFICANT CHANGE UP
-  IMIPENEM: SIGNIFICANT CHANGE UP
-  LEVOFLOXACIN: SIGNIFICANT CHANGE UP
-  MEROPENEM: SIGNIFICANT CHANGE UP
-  PIPERACILLIN/TAZOBACTAM: SIGNIFICANT CHANGE UP
-  TETRACYCLINE: SIGNIFICANT CHANGE UP
-  TOBRAMYCIN: SIGNIFICANT CHANGE UP
-  TRIMETHOPRIM/SULFAMETHOXAZOLE: SIGNIFICANT CHANGE UP
-  VANCOMYCIN: SIGNIFICANT CHANGE UP
METHOD TYPE: SIGNIFICANT CHANGE UP
METHOD TYPE: SIGNIFICANT CHANGE UP

## 2021-09-20 NOTE — ED ADULT TRIAGE NOTE - AS HEIGHT TYPE
Daily Note     Today's date: 2021  Patient name: Grace Thompson  : 1953  MRN: [id number]  Referring provider: Ayleen Boudreaux MD  Dx:   Encounter Diagnosis     ICD-10-CM    1  Status post total left knee replacement  Z96 652    2  Primary osteoarthritis of left knee  M17 12    3  Chronic pain of left knee  M25 562     G89 29    4  Status post surgical manipulation of knee joint  Z98 890        Start Time: 09  Stop Time: 948  Total time in clinic (min): 48 minutes    Subjective: Patient arrived 15 minutes late to session, but PT had opening after so able to complete full session  She feels that her knee feels like a stiff block and she is not progressing anymore  Objective: See treatment diary below    Assessment: Tolerated treatment well  Patient was educated on 10 visits approved by insurance, needing to continue to make ROM gains between now and then in order to justify more visits, and that the best way to do so is with manual over pressure, otherwise she could work on her HEP independently since that is adequately maintaining her current ROM  Patient exhibited good technique with therapeutic exercises and would benefit from continued PT   1:1 with Francy Millard, PT, DPT for entirety of tx  Plan: Continue per plan of care        Precautions: S/p TKA, WINIFRED    [id number]  Manuals 9/1 9/2 9/3 9/7 9/8 9/9 9/10 9/13 9/15 9/17 9/20   PROM 10 min  10', belly breathing 10' 10' 10' 5' 5' 5' SD 5' IS 5'    Calf stretch         sd 3'                                 Neuro Re-Ed              PNF techniques              SLR    2x10 2x10 2x10 2x10 2x10 2x10 2x10 3x10   Partial step up      2x10, 30s hold 2x10 30s hold 3x10 3x10 3x10 3x10   Step up           x10 2"                                             Ther Ex              Bike Rocking 10' Rocking 10' Rocking 10' Rocking 10' Rocking  10' Rocking 10' Rocking 10' Rocking 10' Rocking 10' Rocking 10' Rocking 10'   SAQ  foam 2x10 w/ tactile cueing 2x10 w/ TC  x10 2x10 2x10 2x10 2x10 3x10 3x10 3x10   Heel slide  2x10 w/ OP 2x10 assist x10 assist 2x10 2x10 2x10 2x10 2x10 5" 3x10 3x10 4x10   Knee flex assist 30x x30 2x10 2x10 f/e ea np x20 f/e x20 f/e x30 f np     Quad set 10x w/ posteior knee tactile cueing 2x10 3x10 x10 2x10 x10 2x10  30x x30 w/ cues x30   LAQ 30x   x10 AAROM at end  2x10 AROM x10 with assist at end x10 2x10 3x10 2x10 1# x30 1#   Knee slide wall   10' HEP          Calf stretch       2x30s       HS/quad stretch   2x30s 2x30s ea 30"x3 ea HEP        Ther Activity              STS    2x10 2x10 2x10 3x10 2x10 foam, 1x0 3x10 foam 3x10 foam  3x12 foam                  Gait Training              Ambulation no AD  5'  AD, VC  VC's [id number]   [id number] x100 ft   VCs x100 ft                 Modalities              Ice    10' 10' 10' 10' 8' 8' 10'                  Goals  In 4 weeks, patient will:  Be independent with home exercise program   MET  AROM/PROM 115 deg PROGRESSING  Good quad contraction PROGRESSING  Step up PROGRESSING    In 8 weeks, patient will:  Be independent in symptom management  5x STS within age norms PROGRESSING  Minimal edema  Ambulate without AD for 1000 ft  Increase FOTO score to 65 PROGRESSING stated

## 2021-09-22 ENCOUNTER — INPATIENT (INPATIENT)
Facility: HOSPITAL | Age: 71
LOS: 0 days | Discharge: HOME | DRG: 603 | End: 2021-09-23
Attending: INTERNAL MEDICINE | Admitting: INTERNAL MEDICINE
Payer: COMMERCIAL

## 2021-09-22 VITALS
WEIGHT: 199.96 LBS | OXYGEN SATURATION: 100 % | SYSTOLIC BLOOD PRESSURE: 127 MMHG | HEART RATE: 87 BPM | HEIGHT: 67 IN | TEMPERATURE: 95 F | DIASTOLIC BLOOD PRESSURE: 86 MMHG | RESPIRATION RATE: 16 BRPM

## 2021-09-22 DIAGNOSIS — L03.119 CELLULITIS OF UNSPECIFIED PART OF LIMB: ICD-10-CM

## 2021-09-22 DIAGNOSIS — Z95.1 PRESENCE OF AORTOCORONARY BYPASS GRAFT: Chronic | ICD-10-CM

## 2021-09-22 PROBLEM — I50.9 HEART FAILURE, UNSPECIFIED: Chronic | Status: ACTIVE | Noted: 2021-09-16

## 2021-09-22 PROBLEM — E78.5 HYPERLIPIDEMIA, UNSPECIFIED: Chronic | Status: ACTIVE | Noted: 2021-09-16

## 2021-09-22 LAB
ALBUMIN SERPL ELPH-MCNC: 2.9 G/DL — LOW (ref 3.3–5)
ALP SERPL-CCNC: 165 U/L — HIGH (ref 40–120)
ALT FLD-CCNC: 16 U/L — SIGNIFICANT CHANGE UP (ref 10–45)
ANION GAP SERPL CALC-SCNC: 15 MMOL/L — SIGNIFICANT CHANGE UP (ref 5–17)
AST SERPL-CCNC: 24 U/L — SIGNIFICANT CHANGE UP (ref 10–40)
BASOPHILS # BLD AUTO: 0.09 K/UL — SIGNIFICANT CHANGE UP (ref 0–0.2)
BASOPHILS NFR BLD AUTO: 1.2 % — SIGNIFICANT CHANGE UP (ref 0–2)
BILIRUB SERPL-MCNC: 0.8 MG/DL — SIGNIFICANT CHANGE UP (ref 0.2–1.2)
BUN SERPL-MCNC: 19 MG/DL — SIGNIFICANT CHANGE UP (ref 7–23)
CALCIUM SERPL-MCNC: 8.1 MG/DL — LOW (ref 8.4–10.5)
CHLORIDE SERPL-SCNC: 105 MMOL/L — SIGNIFICANT CHANGE UP (ref 96–108)
CO2 SERPL-SCNC: 20 MMOL/L — LOW (ref 22–31)
CREAT SERPL-MCNC: 0.86 MG/DL — SIGNIFICANT CHANGE UP (ref 0.5–1.3)
CULTURE RESULTS: SIGNIFICANT CHANGE UP
EOSINOPHIL # BLD AUTO: 0.25 K/UL — SIGNIFICANT CHANGE UP (ref 0–0.5)
EOSINOPHIL NFR BLD AUTO: 3.5 % — SIGNIFICANT CHANGE UP (ref 0–6)
GLUCOSE BLDC GLUCOMTR-MCNC: 127 MG/DL — HIGH (ref 70–99)
GLUCOSE BLDC GLUCOMTR-MCNC: 77 MG/DL — SIGNIFICANT CHANGE UP (ref 70–99)
GLUCOSE SERPL-MCNC: 92 MG/DL — SIGNIFICANT CHANGE UP (ref 70–99)
HCT VFR BLD CALC: 38.2 % — LOW (ref 39–50)
HGB BLD-MCNC: 11.5 G/DL — LOW (ref 13–17)
IMM GRANULOCYTES NFR BLD AUTO: 0.3 % — SIGNIFICANT CHANGE UP (ref 0–1.5)
LYMPHOCYTES # BLD AUTO: 1.11 K/UL — SIGNIFICANT CHANGE UP (ref 1–3.3)
LYMPHOCYTES # BLD AUTO: 15.4 % — SIGNIFICANT CHANGE UP (ref 13–44)
MCHC RBC-ENTMCNC: 26.4 PG — LOW (ref 27–34)
MCHC RBC-ENTMCNC: 30.1 GM/DL — LOW (ref 32–36)
MCV RBC AUTO: 87.8 FL — SIGNIFICANT CHANGE UP (ref 80–100)
MONOCYTES # BLD AUTO: 0.59 K/UL — SIGNIFICANT CHANGE UP (ref 0–0.9)
MONOCYTES NFR BLD AUTO: 8.2 % — SIGNIFICANT CHANGE UP (ref 2–14)
NEUTROPHILS # BLD AUTO: 5.16 K/UL — SIGNIFICANT CHANGE UP (ref 1.8–7.4)
NEUTROPHILS NFR BLD AUTO: 71.4 % — SIGNIFICANT CHANGE UP (ref 43–77)
NRBC # BLD: 0 /100 WBCS — SIGNIFICANT CHANGE UP (ref 0–0)
ORGANISM # SPEC MICROSCOPIC CNT: SIGNIFICANT CHANGE UP
PCP SPEC-MCNC: SIGNIFICANT CHANGE UP
PLATELET # BLD AUTO: 303 K/UL — SIGNIFICANT CHANGE UP (ref 150–400)
POTASSIUM SERPL-MCNC: 3.3 MMOL/L — LOW (ref 3.5–5.3)
POTASSIUM SERPL-SCNC: 3.3 MMOL/L — LOW (ref 3.5–5.3)
PROT SERPL-MCNC: 6.2 G/DL — SIGNIFICANT CHANGE UP (ref 6–8.3)
RBC # BLD: 4.35 M/UL — SIGNIFICANT CHANGE UP (ref 4.2–5.8)
RBC # FLD: 19.3 % — HIGH (ref 10.3–14.5)
SARS-COV-2 RNA SPEC QL NAA+PROBE: SIGNIFICANT CHANGE UP
SODIUM SERPL-SCNC: 140 MMOL/L — SIGNIFICANT CHANGE UP (ref 135–145)
SPECIMEN SOURCE: SIGNIFICANT CHANGE UP
WBC # BLD: 7.22 K/UL — SIGNIFICANT CHANGE UP (ref 3.8–10.5)
WBC # FLD AUTO: 7.22 K/UL — SIGNIFICANT CHANGE UP (ref 3.8–10.5)

## 2021-09-22 PROCEDURE — 73590 X-RAY EXAM OF LOWER LEG: CPT | Mod: 26,LT

## 2021-09-22 PROCEDURE — 73070 X-RAY EXAM OF ELBOW: CPT | Mod: 26,LT

## 2021-09-22 PROCEDURE — 99285 EMERGENCY DEPT VISIT HI MDM: CPT

## 2021-09-22 PROCEDURE — 73630 X-RAY EXAM OF FOOT: CPT | Mod: 26,LT

## 2021-09-22 PROCEDURE — 73560 X-RAY EXAM OF KNEE 1 OR 2: CPT | Mod: 26,LT

## 2021-09-22 RX ORDER — SODIUM CHLORIDE 9 MG/ML
1000 INJECTION, SOLUTION INTRAVENOUS
Refills: 0 | Status: DISCONTINUED | OUTPATIENT
Start: 2021-09-22 | End: 2021-09-23

## 2021-09-22 RX ORDER — VANCOMYCIN HCL 1 G
1000 VIAL (EA) INTRAVENOUS ONCE
Refills: 0 | Status: COMPLETED | OUTPATIENT
Start: 2021-09-22 | End: 2021-09-22

## 2021-09-22 RX ORDER — DEXTROSE 50 % IN WATER 50 %
25 SYRINGE (ML) INTRAVENOUS ONCE
Refills: 0 | Status: DISCONTINUED | OUTPATIENT
Start: 2021-09-22 | End: 2021-09-23

## 2021-09-22 RX ORDER — ATORVASTATIN CALCIUM 80 MG/1
40 TABLET, FILM COATED ORAL AT BEDTIME
Refills: 0 | Status: DISCONTINUED | OUTPATIENT
Start: 2021-09-22 | End: 2021-09-23

## 2021-09-22 RX ORDER — GLUCAGON INJECTION, SOLUTION 0.5 MG/.1ML
1 INJECTION, SOLUTION SUBCUTANEOUS ONCE
Refills: 0 | Status: DISCONTINUED | OUTPATIENT
Start: 2021-09-22 | End: 2021-09-23

## 2021-09-22 RX ORDER — CARVEDILOL PHOSPHATE 80 MG/1
3.12 CAPSULE, EXTENDED RELEASE ORAL EVERY 12 HOURS
Refills: 0 | Status: DISCONTINUED | OUTPATIENT
Start: 2021-09-22 | End: 2021-09-23

## 2021-09-22 RX ORDER — ATORVASTATIN CALCIUM 80 MG/1
1 TABLET, FILM COATED ORAL
Qty: 0 | Refills: 0 | DISCHARGE

## 2021-09-22 RX ORDER — APIXABAN 2.5 MG/1
5 TABLET, FILM COATED ORAL EVERY 12 HOURS
Refills: 0 | Status: DISCONTINUED | OUTPATIENT
Start: 2021-09-22 | End: 2021-09-23

## 2021-09-22 RX ORDER — SACUBITRIL AND VALSARTAN 24; 26 MG/1; MG/1
1 TABLET, FILM COATED ORAL
Refills: 0 | Status: DISCONTINUED | OUTPATIENT
Start: 2021-09-22 | End: 2021-09-23

## 2021-09-22 RX ORDER — DEXTROSE 50 % IN WATER 50 %
12.5 SYRINGE (ML) INTRAVENOUS ONCE
Refills: 0 | Status: DISCONTINUED | OUTPATIENT
Start: 2021-09-22 | End: 2021-09-23

## 2021-09-22 RX ORDER — DEXTROSE 50 % IN WATER 50 %
15 SYRINGE (ML) INTRAVENOUS ONCE
Refills: 0 | Status: DISCONTINUED | OUTPATIENT
Start: 2021-09-22 | End: 2021-09-23

## 2021-09-22 RX ORDER — ACETAMINOPHEN 500 MG
650 TABLET ORAL ONCE
Refills: 0 | Status: COMPLETED | OUTPATIENT
Start: 2021-09-22 | End: 2021-09-22

## 2021-09-22 RX ORDER — ASPIRIN/CALCIUM CARB/MAGNESIUM 324 MG
81 TABLET ORAL DAILY
Refills: 0 | Status: DISCONTINUED | OUTPATIENT
Start: 2021-09-22 | End: 2021-09-23

## 2021-09-22 RX ORDER — SACUBITRIL AND VALSARTAN 24; 26 MG/1; MG/1
1 TABLET, FILM COATED ORAL
Qty: 0 | Refills: 0 | DISCHARGE

## 2021-09-22 RX ORDER — ASPIRIN/CALCIUM CARB/MAGNESIUM 324 MG
1 TABLET ORAL
Qty: 0 | Refills: 0 | DISCHARGE

## 2021-09-22 RX ORDER — FUROSEMIDE 40 MG
80 TABLET ORAL
Refills: 0 | Status: DISCONTINUED | OUTPATIENT
Start: 2021-09-22 | End: 2021-09-23

## 2021-09-22 RX ORDER — FUROSEMIDE 40 MG
1 TABLET ORAL
Qty: 0 | Refills: 0 | DISCHARGE

## 2021-09-22 RX ORDER — CARVEDILOL PHOSPHATE 80 MG/1
1 CAPSULE, EXTENDED RELEASE ORAL
Qty: 0 | Refills: 0 | DISCHARGE

## 2021-09-22 RX ORDER — INSULIN LISPRO 100/ML
VIAL (ML) SUBCUTANEOUS
Refills: 0 | Status: DISCONTINUED | OUTPATIENT
Start: 2021-09-22 | End: 2021-09-23

## 2021-09-22 RX ADMIN — CARVEDILOL PHOSPHATE 3.12 MILLIGRAM(S): 80 CAPSULE, EXTENDED RELEASE ORAL at 16:16

## 2021-09-22 RX ADMIN — APIXABAN 5 MILLIGRAM(S): 2.5 TABLET, FILM COATED ORAL at 16:16

## 2021-09-22 RX ADMIN — SACUBITRIL AND VALSARTAN 1 TABLET(S): 24; 26 TABLET, FILM COATED ORAL at 17:25

## 2021-09-22 RX ADMIN — Medication 81 MILLIGRAM(S): at 16:16

## 2021-09-22 RX ADMIN — Medication 80 MILLIGRAM(S): at 16:16

## 2021-09-22 RX ADMIN — Medication 250 MILLIGRAM(S): at 07:31

## 2021-09-22 NOTE — ED ADULT NURSE NOTE - OBJECTIVE STATEMENT
70 year old male BIBEMS c/o fall. Pt A+Ox3, AMA from [facility] and fell while waiting for the bus. 70 year old male BIBEMS c/o fall. Pt A+Ox3, AMA from [facility] inpatient tonight and fell while waiting for the bus. As per EMS, pt was argumentative with [facility] staff and walked out AMA; however as per pt, [facility] staff was argumentative with him. Pt states he was walking towards home when he "felt weak from standing in the cold waiting for the bus" and "lost his balance on the cobblestone". Pt takes Eliquis and denies hitting head or LOC. Upon assessment, pt arrived in previous hospital gown and presents with bilateral lower extremity weeping cellulitis and edema, with clean/dry dressings from previous hospital. Pt also presents with abrasion to left elbow and left knee from the fall. Pt reports 10/10 pain, however laying comfortably in stretcher and no bleeding/drainage noted. Pt denies dizziness, headache, chest pain, SOB, N/V, abdominal pain, fevers or chills.

## 2021-09-22 NOTE — ED PROVIDER NOTE - NS_EDPROVIDERDISPOUSERTYPE_ED_A_ED
[facility] INPATIENT ENCOUNTER  INFECTIOUS DISEASE - DAILY PROGRESS NOTE      ASSESSMENT:      Pulmonary blastomycosis  Heart transplant  Immunocompromised state  Hx of non compliance with follow up  Seizure like activity          PLAN:    Cont itraconazole- close moniotoring of itraconazole, tacrolimus levels  itra levels are pending  Blasto urinary ag, AB pending    OP follow up    ________________________________________________________________  Ather H MD Herminio    INTERVAL HISTORY: no new complaints    MEDICATIONS:    • tacrolimus  0.5 mg Oral BIDTX   • insulin lispro   Subcutaneous TID AC   • levETIRAcetam  2,000 mg Oral BID   • sodium chloride (PF)  2 mL Injection 2 times per day   • itraconazole  200 mg Oral BID Abreakfast & HS   • pravastatin  40 mg Oral Daily   • mycophenolate  750 mg Oral BIDTX   • clopidogrel  75 mg Oral Daily   • gabapentin  300 mg Oral QAM   • gabapentin  600 mg Oral Nightly   • sertraline  50 mg Oral Daily   • cholecalciferol  1,000 Units Oral Daily   • insulin glargine  26 Units Subcutaneous Nightly         Plan for antibiotics: oral itraconazole      HISTORIES:  Allergies, data, and recent notes reviewed.    REVIEW OF SYSTEMS:      No fever, chills.   No cough or shortness of breath.    No chest pain or palpitations.    No diarrhea, nausea or vomiting.    No dysuria or frequency.    No rash.    OBJECTIVE:    VITAL SIGNS:  Vital Last Value 24 Hour Range   Temperature 98.1 °F (36.7 °C) (02/18/18 2030) Temp  Min: 98.1 °F (36.7 °C)  Max: 98.1 °F (36.7 °C)   Pulse 88 (02/19/18 0755) Pulse  Min: 88  Max: 95   Respiratory 16 (02/19/18 0755) Resp  Min: 16  Max: 20   Non-Invasive  Blood Pressure 118/76 (02/19/18 0755) BP  Min: 118/76  Max: 137/77   Pulse Oximetry 95 % (02/19/18 0755) SpO2  Min: 95 %  Max: 98 %     INTAKE/OUTPUT:  Last Stool Occurrence: 1 (large formed bm per pt report) (02/17/18 0800)      PHYSICAL EXAM:    General: no distress  HEENT:  Anicteric.  Oropharynx is moist. There are  no lip lesions or oral thrush.  Cardiovascular:  Regular rate and rhythm.  Pulmonary:  Clear to auscultation.  Abdomen:  Bowel sounds are normoactive. Soft, non-tender, non-distended.  : no costovertebral angle tenderness  Extremities:  No edema.  Skin:  No rash.  IV Site(s):  No erythema or tenderness.    LABORATORY DATA:  Last 24 hour labs were reviewed in detail.      Recent Labs  Lab 02/16/18  0424   WBC 5.5   HCT 38.7*      AST 11   GPT 14      Recent Labs  Lab 02/16/18  0424 02/15/18  0755 02/13/18  0755   CREATININE 0.81 0.87 0.88          URINALYSIS:   No results found       Microbiology:      Specimen Description (no units)   Date Value   12/22/2017 BRONCHOALVEOLAR LAVAGE   RUL   12/22/2017 BRONCHOALVEOLAR LAVAGE   RUL   12/22/2017 BRONCHOALVEOLAR LAVAGE  LUNG, RIGHT UPPER LOBE   12/16/2017 SPUTUM LUNG   12/16/2017 SPUTUM SPUTUM       CULTURE (no units)   Date Value   12/22/2017 1,000 TO 10,000 CFU/mL MIXED GRAM POSITIVE SOFI   12/22/2017 NO GROWTH 33 DAYS.   12/22/2017 NO GROWTH 42 DAYS.   12/16/2017 NO GROWTH 48 DAYS.   12/16/2017 MANY NORMAL UPPER RESPIRATORY SOFI          Radiology:  Reviewed.    Discussed with:  Nurse and Patient       Attending Attestation (For Attendings USE Only)...

## 2021-09-22 NOTE — ED ADULT NURSE NOTE - CHIEF COMPLAINT QUOTE
Pt JERALD s/p fall.  Pt AMA from [facility] and fell while waiting for bus.  c/o L elbow and knee pain.  Low temp

## 2021-09-22 NOTE — ED ADULT NURSE NOTE - NS ED NURSE RECORD ANOTHER HT AND WT
Abdomen, soft, nontender, nondistended, no guarding or rigidity, no masses palpable, normal bowel sounds, Liver and Spleen, no hepatomegaly present, no hepatosplenomegaly, liver nontender, spleen not palpable
No

## 2021-09-22 NOTE — CHART NOTE - NSCHARTNOTEFT_GEN_A_CORE
Called by RN for patient refusing all treatment, medications, imaging and talking to any medical staff. Patient seen and refused to speak to me at all. Discussed need for medical treatment and patient refused to listen. Patient noted lethargic and stating he's very sleepy and wants to be left alone, in speaking to Dr. Adams stated if patient is refusing all treatment he should leave AMA. Patient approached in regards to refusal of treatment with management and patient now amenable to treatment.

## 2021-09-22 NOTE — ED PROVIDER NOTE - CLINICAL SUMMARY MEDICAL DECISION MAKING FREE TEXT BOX
69yo M h.o multiple presentations for cellulitis presenting after fall w L elbow pain, LLE wounds likely cellulitis based on history and exam. Will likely require admission for wound care and iv abx. No suspected injury from fall based on physical exam. Knee nontender with full ROM, L elbow mildly tender, FROM. NO head trauma, will forgo CT head.

## 2021-09-22 NOTE — ED ADULT NURSE REASSESSMENT NOTE - NS ED NURSE REASSESS COMMENT FT1
Patient refusing tylenol. SHIELA Wiggins made aware. Patient refusing tylenol. Patient A&Ox3, conversing appropriately. SHIELA Wiggins made aware.

## 2021-09-22 NOTE — ED PROVIDER NOTE - NSICDXPASTMEDICALHX_GEN_ALL_CORE_FT
PAST MEDICAL HISTORY:  Angina pectoris     CAD (coronary artery disease)     CHF (congestive heart failure)     CHF (congestive heart failure)     DM (diabetes mellitus)     Dyslipidemia     HLD (hyperlipidemia)     HTN (hypertension)     PE (pulmonary thromboembolism)

## 2021-09-22 NOTE — ED PROVIDER NOTE - OBJECTIVE STATEMENT
69yo M HTN, HLD, DM, PE, CAD s/p CABG 4 years ago, CHF on Lasix, PE on eliquis, chronic venous stasis - Multiple recent admissions for LLE cellulitis and recent AMA from [place] and now from [place] earlier this afternoon now presenting after fall while waiting for bus. Pt reports left hospital last night bc nurses were being argumentative, was waiting for the bus when he felt weak 2/2 cold and loss balance while walking falling onto his L elbow and L knee. No LOC or head trauma. Currently has mild pain of L elbow, otherwise is at baseline. Denies fever/chills, cp/palp, sob. 69yo M HTN, HLD, DM, PE, CAD s/p CABG 4 years ago, CHF on Lasix, PE on Eliquis, chronic venous stasis - Multiple recent admissions for LLE cellulitis and recent AMA from [place] and now from [place] earlier this afternoon now presenting after fall while waiting for bus. Pt reports left hospital last night bc nurses were being argumentative, was waiting for the bus when he felt weak 2/2 cold and loss balance while walking falling onto his L elbow and L knee. No LOC or head trauma. Currently has mild pain of L elbow, otherwise is at baseline. Denies fever/chills, cp/palp, sob.

## 2021-09-22 NOTE — ED ADULT NURSE REASSESSMENT NOTE - NS ED NURSE REASSESS COMMENT FT1
Patient refusing vitals at this time. NP Rosalie made aware. Patient complaining of pain "legs and thighs." As per NP Rosalie will prescribe Tylenol.

## 2021-09-22 NOTE — ED ADULT NURSE REASSESSMENT NOTE - NS ED NURSE REASSESS COMMENT FT1
Patient pulled out PIV. RN requested for another ultrasound line to MD Padgett. MD Padgett at bedside obtaining ultrasound PIV.

## 2021-09-22 NOTE — ED ADULT TRIAGE NOTE - CHIEF COMPLAINT QUOTE
Pt JERALD s/p fall.  Pt AMA from [facility] and fell while waiting for bus.  c/o L elbow and knee pain.  Low temp

## 2021-09-22 NOTE — ED ADULT NURSE REASSESSMENT NOTE - NS ED NURSE REASSESS COMMENT FT1
Report received from Meka BADILLO in [facility]. Patient now has PIV inserted by MD Arreola. Patient has no complaints at this time, at Report received from Meka BADILLO in [facility]. Patient now has PIV inserted by MD Arreola. Patient has no complaints at this time, at this time. Vancomycin hung and currently infusing. Patient made comfortable, blankets provided. Awaiting lab results and admission at this time. Reoriented to call bell. Comfort and safety provided.

## 2021-09-22 NOTE — CONSULT NOTE ADULT - SUBJECTIVE AND OBJECTIVE BOX
CHIEF COMPLAINT:Patient is a 70y old  Male who presents with a chief complaint of fall (22 Sep 2021 10:35)      HISTORY OF PRESENT ILLNESS:    70 male with history as below , cad s/p CABG, Systolic CHF, HTN, DM II, PE on eliquis, chronic venous stasis - Multiple recent admissions for LLE cellulitis and recent AMA from [place] and now from [place] earlier this afternoon now presenting after fall while waiting for bus  Pt denies any chest pain or sob   no dizziness  no syncope     PAST MEDICAL & SURGICAL HISTORY:  CAD (coronary artery disease)    DM (diabetes mellitus)    Dyslipidemia    CHF (congestive heart failure)    Angina pectoris    HTN (hypertension)    PE (pulmonary thromboembolism)    HLD (hyperlipidemia)    CHF (congestive heart failure)    S/P CABG x 3            MEDICATIONS:  apixaban 5 milliGRAM(s) Oral every 12 hours  aspirin enteric coated 81 milliGRAM(s) Oral daily  carvedilol 3.125 milliGRAM(s) Oral every 12 hours  furosemide    Tablet 80 milliGRAM(s) Oral two times a day  sacubitril 49 mG/valsartan 51 mG 1 Tablet(s) Oral two times a day            atorvastatin 40 milliGRAM(s) Oral at bedtime  dextrose 40% Gel 15 Gram(s) Oral once  dextrose 50% Injectable 25 Gram(s) IV Push once  dextrose 50% Injectable 12.5 Gram(s) IV Push once  dextrose 50% Injectable 25 Gram(s) IV Push once  glucagon  Injectable 1 milliGRAM(s) IntraMuscular once  insulin lispro (ADMELOG) corrective regimen sliding scale   SubCutaneous three times a day before meals    dextrose 5%. 1000 milliLiter(s) IV Continuous <Continuous>  dextrose 5%. 1000 milliLiter(s) IV Continuous <Continuous>      FAMILY HISTORY:      Non-contributory    SOCIAL HISTORY:    No tobacco, drugs or etoh    Allergies    Aldactone (Unknown)  Bumex (Blisters; Rash)  metoprolol (Unknown)  spironolactone (Unknown)    Intolerances    	    REVIEW OF SYSTEMS:  as above  The rest of the 14 points ROS reviewed and except above they are unremarkable.        PHYSICAL EXAM:  T(C): 36.5 (09-22-21 @ 07:16), Max: 36.5 (09-22-21 @ 07:16)  HR: 69 (09-22-21 @ 07:16) (69 - 87)  BP: 111/88 (09-22-21 @ 07:16) (111/88 - 137/83)  RR: 15 (09-22-21 @ 07:16) (15 - 18)  SpO2: 100% (09-22-21 @ 07:16) (100% - 100%)  Wt(kg): --  I&O's Summary      JVP: elevated   Neck: supple  Lung: clear   CV: S1 S2 , Murmur:  Abd: soft  Ext: pos edema  neuro: Awake / alert  Psych: flat affect  Skin: dry , hyperpigmented       LABS/DATA:    TELEMETRY: 	    ECG:  	   	  CARDIAC MARKERS:          < from: Transthoracic Echocardiogram (03.25.21 @ 12:35) >  ------------------------------------------------------------------------  CONCLUSIONS:  1. Tethered mitral valve leaflets. Mild to moderate mitral  regurgitation.  2. Calcified trileaflet aortic valve with decreased  opening. Mild aortic stenosis by gradients. The aortic  valve apears more stenotic than indicated by gradients. In  setting of low EF, cannot rule out paradoxical low-flow  low-gradient AS. Consider dobutamine stress echocardiogram  for further assessment if clinically indicated.   Trace  aortic regurgitation.  3. Normal aortic root.  4. Normal left atrium.  5. Mild left ventricular enlargement.  6. Severe global left ventricular systolic dysfunction (EF  15-20% by visual estimation).  7. Grade II diastolic dysfunction.  8. Normal right atrium.  9. Off axis images preclude accurate assessment of right  ventricular size. Reduced RV systolic function (TAPSE 1.0  cm).  10. RV systolic pressure is severely increased at  67 mm  Hg.  11. Normal tricuspid valve. Moderate to severe tricuspid  regurgitation.  12. Pulmonic valve not well seen. Trace pulmonic  insufficiency is noted.  13. No pericardial effusion.  14. Right pleural effusion.    *** No previous Echo exam.  ------------------------------------------------------------------------  Confirmed on  3/25/2021 - 22:24:56 by Curtis Chapa MD  ------------------------------------------------------------------------    < end of copied text >                              11.5   7.22  )-----------( 303      ( 22 Sep 2021 07:29 )             38.2     09-22    140  |  105  |  19  ----------------------------<  92  3.3<L>   |  20<L>  |  0.86    Ca    8.1<L>      22 Sep 2021 07:29    TPro  6.2  /  Alb  2.9<L>  /  TBili  0.8  /  DBili  x   /  AST  24  /  ALT  16  /  AlkPhos  165<H>  09-22    proBNP:   Lipid Profile:   HgA1c:   TSH:           
Patient is a 70y old  Male who presents with a chief complaint of   HPI:  fall     PAST MEDICAL & SURGICAL HISTORY:  CAD (coronary artery disease)    DM (diabetes mellitus)    Dyslipidemia    CHF (congestive heart failure)    Angina pectoris    HTN (hypertension)    PE (pulmonary thromboembolism)    HLD (hyperlipidemia)    CHF (congestive heart failure)    S/P CABG x 3        Social history:    FAMILY HISTORY:            Allergic/Immunologic:	No hives or rash   Allergies    Aldactone (Unknown)  Bumex (Blisters; Rash)  metoprolol (Unknown)  spironolactone (Unknown)    Intolerances        Antimicrobials:          Vital Signs Last 24 Hrs  T(C): 36.5 (22 Sep 2021 07:16), Max: 36.5 (22 Sep 2021 07:16)  T(F): 97.7 (22 Sep 2021 07:16), Max: 97.7 (22 Sep 2021 07:16)  HR: 69 (22 Sep 2021 07:16) (69 - 87)  BP: 111/88 (22 Sep 2021 07:16) (111/88 - 137/83)  BP(mean): --  RR: 15 (22 Sep 2021 07:16) (15 - 18)  SpO2: 100% (22 Sep 2021 07:16) (100% - 100%)           Constitutional:Comfortable.Awake and alert  No cachexia     Eyes:PERRL EOMI.NO discharge or conjunctival injection    ENMT:No sinus tenderness.No thrush.No pharyngeal exudate or erythema.Fair dental hygiene    Neck:Supple,No LN,no JVD      Respiratory:Good air entry bilaterally,CTA    Cardiovascular:S1 S2 wnl, No murmurs,rub or gallops    Gastrointestinal:Soft BS(+) no tenderness no masses ,No rebound or guarding    Genitourinary:No CVA tendereness     Rectal:       Vascular:peripheral pulses felt     severe bilateral lymphedema with large open ulcer on left lower leg  no cellulites                               11.5   7.22  )-----------( 303      ( 22 Sep 2021 07:29 )             38.2         09-22    140  |  105  |  19  ----------------------------<  92  3.3<L>   |  20<L>  |  0.86    Ca    8.1<L>      22 Sep 2021 07:29    TPro  6.2  /  Alb  2.9<L>  /  TBili  0.8  /  DBili  x   /  AST  24  /  ALT  16  /  AlkPhos  165<H>  09-22      RECENT CULTURES:  09-17 @ 03:06  .Surgical Swab Left Leg Wound  Klepne MDRO  Proteus mirabilis  Enterococcus faecalis  Klepne MDRO  AGA    Numerous Klebsiella pneumoniae (Carbapenem Resistant)  Few Proteus mirabilis  Moderate Enterococcus faecalis  --  09-16 @ 06:18  .Blood Blood-Peripheral  --  --  --    No Growth Final  --  09-16 @ 06:17  .Blood Blood-Peripheral  --  --  --    No Growth Final  --      MICROBIOLOGY:  Culture Results:   Numerous Klebsiella pneumoniae (Carbapenem Resistant)  Few Proteus mirabilis  Moderate Enterococcus faecalis (09-17 @ 03:06)  Culture Results:   No Growth Final (09-16 @ 06:18)  Culture Results:   No Growth Final (09-16 @ 06:17)          Radiology:      Assessment:        Recommendations and Plan:    Pager [phone]  After 5 pm/weekends or if no response :[phone]

## 2021-09-22 NOTE — CHART NOTE - NSCHARTNOTEFT_GEN_A_CORE
LMSW attempted to meet with patient for initial social work assessment however have is extremely lethargic and unwilling/unable to participate in social work assessment. Patient reports that he does not live at [address] [place] [address] but is unable to provide his current address. Patient has AMA'd from two hospitals in the last two days and their is concern the patient is possibly homeless. LMSW explored with patient if he was homeless and patient responded that he is not homeless and has never been homeless. LMSW asked permission to speak with patient's emergency contact to which he declined stating his friend is a busy man. Patient reports that he has never been  and has no children. LMSW will reattempt to assess patient when he is less lethargic. Social work will remain available. LMSW attempted to meet with patient for initial social work assessment however patient is extremely lethargic and unwilling/unable to participate in social work assessment. Patient reports that he does not live at [address] [place] [address] but is unable to provide his current address. Patient has AMA'd from two hospitals in the last two days and there is concern the patient is possibly homeless. LMSW explored with patient if he was homeless and patient responded that he is not homeless and has never been homeless. LMSW asked permission to speak with patient's emergency contact (Robbin) to which he declined stating his friend is a "busy man". Patient reports that he has never been  and has no children. LMSW will reattempt to assess patient when he is less lethargic. Social work will remain available.

## 2021-09-22 NOTE — ED PROVIDER NOTE - SKIN, MLM
Venous stasis changes of b/l lower extremity with 2+ edema to knees. LLE with multiple erythematous wheeping wounds, no bleeding, tender to light touch, initially dressed in abd and gauze roll.

## 2021-09-22 NOTE — ED ADULT NURSE NOTE - NSIMPLEMENTINTERV_GEN_ALL_ED
Implemented All Fall with Harm Risk Interventions:  [place] to call system. Call bell, personal items and telephone within reach. Instruct patient to call for assistance. Room bathroom lighting operational. Non-slip footwear when patient is off stretcher. Physically safe environment: no spills, clutter or unnecessary equipment. Stretcher in lowest position, wheels locked, appropriate side rails in place. Provide visual cue, wrist band, yellow gown, etc. Monitor gait and stability. Monitor for mental status changes and reorient to person, place, and time. Review medications for side effects contributing to fall risk. Reinforce activity limits and safety measures with patient and family. Provide visual clues: red socks.

## 2021-09-22 NOTE — CONSULT NOTE ADULT - ASSESSMENT
s/p fall  check orthostatic vitals  PT eval     Chronic systolic CHF  on optimal meds  cont current medication    cad s/p cabg  cont asa, / statin     history of PE  on a/c 
Pt has been seen at [place] and [facility] recently. Had a culture done of the ulcer that is growing MDR gram negative onur and enterococcus.  no fever ro chills  admitted to [place] after falling on knee, after leaving [facility] . Was being treated for cellulitis at [facility].  Pt has severe advanced bilateral lymphedema with cobbles stoning and a large ulcer  The ulcer is colonized but I don't think it is clinically infected  .  He also has redness of  the right upper arm,  but  think it is an infiltrated IV  we can watch off ab unless sings of infection develop

## 2021-09-22 NOTE — H&P ADULT - ASSESSMENT
pt w/ fall  l knee/ elbow contusions  ortho eval  xrays f/u\  lext lymphedema / chronic  id evalnoted\no abs\pt  c/w outpt meds  pt noncompliant as outpt  analgesics  prn  dm\\fsg riss

## 2021-09-22 NOTE — ED PROVIDER NOTE - ATTENDING CONTRIBUTION TO CARE
Afebrile. Awake and Alert. Head atraumatic. No mid-line CS TTP. Lungs CTA. Heart RRR. Abdomen soft NTND. CN II-XII grossly intact. Moves all extremities without lateralization. b/l LE lymphedema with erythema, warm to touch.

## 2021-09-22 NOTE — H&P ADULT - HISTORY OF PRESENT ILLNESS
69yo M HTN, HLD, DM, PE, CAD s/p CABG 4 years ago, CHF on Lasix, PE on eliquis, chronic venous stasis - Multiple recent admissions for LLE cellulitis and recent AMA from [place] and now from [place] earlier this afternoon now presenting after fall while waiting for bus  . Pt reports left hospital last night because  nurses were being argumentative, was waiting for the bus when he felt weak 2/2 cold and loss balance while walking falling onto his L elbow and L knee  . No LOC or head trauma. Currently has mild pain of L elbow and l knee, otherwise is at baseline. Denies fever/chills, cp/palp, sob.

## 2021-09-22 NOTE — PROVIDER CONTACT NOTE (OTHER) - REASON
Patient refused assessment , vitals and medications. Patient refused assessment , vitals and medications and refusing IV insertion.

## 2021-09-22 NOTE — ED ADULT NURSE REASSESSMENT NOTE - NS ED NURSE REASSESS COMMENT FT1
Called MAR, was told to call ACP ([phone]). Spoke with NP Rosalie to notify that patient removed PIV and is refusing another one. SHIELA Wiggins OK not placing another one, as per Rosalie will call MD to discuss plan of care.

## 2021-09-22 NOTE — H&P ADULT - NSHPLABSRESULTS_GEN_ALL_CORE
11.5   7.22  )-----------( 303      ( 22 Sep 2021 07:29 )             38.2       09-22    140  |  105  |  19  ----------------------------<  92  3.3<L>   |  20<L>  |  0.86    Ca    8.1<L>      22 Sep 2021 07:29    TPro  6.2  /  Alb  2.9<L>  /  TBili  0.8  /  DBili  x   /  AST  24  /  ALT  16  /  AlkPhos  165<H>  09-22                      Lactate Trend            CAPILLARY BLOOD GLUCOSE            Culture Results:   Numerous Klebsiella pneumoniae (Carbapenem Resistant)  Few Proteus mirabilis  Moderate Enterococcus faecalis (09-17 @ 03:06)  Culture Results:   No Growth Final (09-16 @ 06:18)  Culture Results:   No Growth Final (09-16 @ 06:17)

## 2021-09-23 ENCOUNTER — TRANSCRIPTION ENCOUNTER (OUTPATIENT)
Age: 71
End: 2021-09-23

## 2021-09-23 VITALS
RESPIRATION RATE: 18 BRPM | TEMPERATURE: 97 F | DIASTOLIC BLOOD PRESSURE: 66 MMHG | OXYGEN SATURATION: 99 % | SYSTOLIC BLOOD PRESSURE: 156 MMHG | HEART RATE: 74 BPM

## 2021-09-23 LAB
GLUCOSE BLDC GLUCOMTR-MCNC: 105 MG/DL — HIGH (ref 70–99)
GLUCOSE BLDC GLUCOMTR-MCNC: 108 MG/DL — HIGH (ref 70–99)

## 2021-09-23 PROCEDURE — 80307 DRUG TEST PRSMV CHEM ANLYZR: CPT

## 2021-09-23 PROCEDURE — 73070 X-RAY EXAM OF ELBOW: CPT

## 2021-09-23 PROCEDURE — 87040 BLOOD CULTURE FOR BACTERIA: CPT

## 2021-09-23 PROCEDURE — 73590 X-RAY EXAM OF LOWER LEG: CPT

## 2021-09-23 PROCEDURE — 73630 X-RAY EXAM OF FOOT: CPT

## 2021-09-23 PROCEDURE — 80053 COMPREHEN METABOLIC PANEL: CPT

## 2021-09-23 PROCEDURE — U0005: CPT

## 2021-09-23 PROCEDURE — 82962 GLUCOSE BLOOD TEST: CPT

## 2021-09-23 PROCEDURE — 96374 THER/PROPH/DIAG INJ IV PUSH: CPT

## 2021-09-23 PROCEDURE — U0003: CPT

## 2021-09-23 PROCEDURE — 73560 X-RAY EXAM OF KNEE 1 OR 2: CPT

## 2021-09-23 PROCEDURE — 85025 COMPLETE CBC W/AUTO DIFF WBC: CPT

## 2021-09-23 PROCEDURE — 99285 EMERGENCY DEPT VISIT HI MDM: CPT | Mod: 25

## 2021-09-23 PROCEDURE — 97162 PT EVAL MOD COMPLEX 30 MIN: CPT

## 2021-09-23 RX ORDER — APIXABAN 2.5 MG/1
1 TABLET, FILM COATED ORAL
Qty: 0 | Refills: 0 | DISCHARGE

## 2021-09-23 RX ADMIN — Medication 80 MILLIGRAM(S): at 06:19

## 2021-09-23 RX ADMIN — Medication 81 MILLIGRAM(S): at 14:01

## 2021-09-23 RX ADMIN — CARVEDILOL PHOSPHATE 3.12 MILLIGRAM(S): 80 CAPSULE, EXTENDED RELEASE ORAL at 06:19

## 2021-09-23 RX ADMIN — APIXABAN 5 MILLIGRAM(S): 2.5 TABLET, FILM COATED ORAL at 06:19

## 2021-09-23 NOTE — DISCHARGE NOTE NURSING/CASE MANAGEMENT/SOCIAL WORK - NSDCVIVACCINE_GEN_ALL_CORE_FT
COVID-19 vaccine, vector-nr, rS-Ad26, PF, 0.5 mL ([facility]); 05-Apr-2021 16:24; Antoni Sarmiento (RN); [facility]; [id number] (Exp. Date: 05-Apr-2021); IntraMuscular; Deltoid Right.; 0.5 milliLiter(s);   influenza, injectable, quadrivalent, preservative free; 22-Nov-2017 10:37; Christina Toribio (RN); [employer]; [id number]; IntraMuscular; Deltoid Left.; 0.5 milliLiter(s); VIS (VIS Published: 07-Aug-2015, VIS Presented: 22-Nov-2017);

## 2021-09-23 NOTE — PHYSICAL THERAPY INITIAL EVALUATION ADULT - LEVEL OF INDEPENDENCE: STAIR NEGOTIATION, REHAB EVAL
*pt in ED, no stairs, however, pt exhibits the strength and balance to negotiate stairs/unable to perform

## 2021-09-23 NOTE — DISCHARGE NOTE ADULT - CARE PROVIDERS DIRECT ADDRESSES
,[email or web address],DirectAddress_Unknown,[email or web address],DirectAddress_Unknown Clear bilaterally, pupils equal, round and reactive to light.

## 2021-09-23 NOTE — DISCHARGE NOTE PROVIDER - HOSPITAL COURSE
71yo M HTN, HLD, DM, PE, CAD s/p CABG 4 years ago, CHF on Lasix, PE on eliquis, chronic venous stasis - Multiple recent admissions for LLE cellulitis and recent AMA from [place] and now from [place] earlier this afternoon now presenting after fall while waiting for bus. Patient admitted with cellulitis and was evaluated by infectious disease. Patient recommended to monitor off antibiotics. Cardiology consulted and no inpatient work up at this time. Patient cleared for discharge per DR Base.

## 2021-09-23 NOTE — PROGRESS NOTE ADULT - SUBJECTIVE AND OBJECTIVE BOX
DATE OF SERVICE: 09-23-21 @ 10:30    Subjective: Patient seen and examined. No new events except as noted.     SUBJECTIVE/ROS:  events noted  pt non compliant       MEDICATIONS:  MEDICATIONS  (STANDING):  apixaban 5 milliGRAM(s) Oral every 12 hours  aspirin enteric coated 81 milliGRAM(s) Oral daily  atorvastatin 40 milliGRAM(s) Oral at bedtime  carvedilol 3.125 milliGRAM(s) Oral every 12 hours  dextrose 40% Gel 15 Gram(s) Oral once  dextrose 5%. 1000 milliLiter(s) (50 mL/Hr) IV Continuous <Continuous>  dextrose 5%. 1000 milliLiter(s) (100 mL/Hr) IV Continuous <Continuous>  dextrose 50% Injectable 25 Gram(s) IV Push once  dextrose 50% Injectable 12.5 Gram(s) IV Push once  dextrose 50% Injectable 25 Gram(s) IV Push once  furosemide    Tablet 80 milliGRAM(s) Oral two times a day  glucagon  Injectable 1 milliGRAM(s) IntraMuscular once  insulin lispro (ADMELOG) corrective regimen sliding scale   SubCutaneous three times a day before meals  sacubitril 49 mG/valsartan 51 mG 1 Tablet(s) Oral two times a day      PHYSICAL EXAM:  T(C): 36.3 (09-23-21 @ 04:54), Max: 36.3 (09-23-21 @ 00:01)  HR: 73 (09-23-21 @ 10:16) (64 - 78)  BP: 114/73 (09-23-21 @ 10:16) (113/76 - 139/89)  RR: 18 (09-23-21 @ 04:54) (18 - 18)  SpO2: 99% (09-23-21 @ 10:16) (96% - 99%)  Wt(kg): --  I&O's Summary    22 Sep 2021 07:01  -  23 Sep 2021 07:00  --------------------------------------------------------  IN: 0 mL / OUT: 300 mL / NET: -300 mL            JVP: Normal  Neck: supple  Lung: clear   CV: S1 S2 , Murmur:  Abd: soft  Ext: pos edema  neuro: Awake / alert  Psych: flat affect    LABS/DATA:    CARDIAC MARKERS:                                11.5   7.22  )-----------( 303      ( 22 Sep 2021 07:29 )             38.2     09-22    140  |  105  |  19  ----------------------------<  92  3.3<L>   |  20<L>  |  0.86    Ca    8.1<L>      22 Sep 2021 07:29    TPro  6.2  /  Alb  2.9<L>  /  TBili  0.8  /  DBili  x   /  AST  24  /  ALT  16  /  AlkPhos  165<H>  09-22    proBNP:   Lipid Profile:   HgA1c:   TSH:     TELE:  EKG:        
DATE OF SERVICE: 09-23-21 @ 10:14  CHIEF COMPLAINT:Patient is a 70y old  Male who presents with a chief complaint of fall (22 Sep 2021 10:35)    	        PAST MEDICAL & SURGICAL HISTORY:  CAD (coronary artery disease)    DM (diabetes mellitus)    Dyslipidemia    CHF (congestive heart failure)    Angina pectoris    HTN (hypertension)    PE (pulmonary thromboembolism)    HLD (hyperlipidemia)    CHF (congestive heart failure)    S/P CABG x 3          no new complaints  no cp or sob  no abd pain   no chills      Medications:  MEDICATIONS  (STANDING):  apixaban 5 milliGRAM(s) Oral every 12 hours  aspirin enteric coated 81 milliGRAM(s) Oral daily  atorvastatin 40 milliGRAM(s) Oral at bedtime  carvedilol 3.125 milliGRAM(s) Oral every 12 hours  dextrose 40% Gel 15 Gram(s) Oral once  dextrose 5%. 1000 milliLiter(s) (50 mL/Hr) IV Continuous <Continuous>  dextrose 5%. 1000 milliLiter(s) (100 mL/Hr) IV Continuous <Continuous>  dextrose 50% Injectable 25 Gram(s) IV Push once  dextrose 50% Injectable 12.5 Gram(s) IV Push once  dextrose 50% Injectable 25 Gram(s) IV Push once  furosemide    Tablet 80 milliGRAM(s) Oral two times a day  glucagon  Injectable 1 milliGRAM(s) IntraMuscular once  insulin lispro (ADMELOG) corrective regimen sliding scale   SubCutaneous three times a day before meals  sacubitril 49 mG/valsartan 51 mG 1 Tablet(s) Oral two times a day    MEDICATIONS  (PRN):    	    PHYSICAL EXAM:  T(C): 36.3 (09-23-21 @ 04:54), Max: 36.3 (09-23-21 @ 00:01)  HR: 64 (09-23-21 @ 04:54) (64 - 78)  BP: 113/76 (09-23-21 @ 04:54) (113/76 - 139/89)  RR: 18 (09-23-21 @ 04:54) (18 - 18)  SpO2: 96% (09-23-21 @ 04:54) (96% - 96%)  Wt(kg): --  I&O's Summary    22 Sep 2021 07:01  -  23 Sep 2021 07:00  --------------------------------------------------------  IN: 0 mL / OUT: 300 mL / NET: -300 mL        Appearance: Normal	    Cardiovascular: Normal S1 S2, No JVD, No murmurs,  Respiratory: Lungs clear to auscultation	  Psychiatry: A & O  Gastrointestinal:  Soft, Non-tender, + BS	    Neurologic: Non-focal  Extremities: chronic venous stasis changes     TELEMETRY: 	    ECG:  	  RADIOLOGY:  OTHER: 	  	  LABS:	 	    CARDIAC MARKERS:                                11.5   7.22  )-----------( 303      ( 22 Sep 2021 07:29 )             38.2     09-22    140  |  105  |  19  ----------------------------<  92  3.3<L>   |  20<L>  |  0.86    Ca    8.1<L>      22 Sep 2021 07:29    TPro  6.2  /  Alb  2.9<L>  /  TBili  0.8  /  DBili  x   /  AST  24  /  ALT  16  /  AlkPhos  165<H>  09-22    proBNP:   Lipid Profile:   HgA1c:   TSH:

## 2021-09-23 NOTE — PROGRESS NOTE ADULT - PROBLEM SELECTOR PLAN 1
- Afebrile  - no leukocytosis  chronic ulcer  no abs as per id   local wound care  pt refusing many tests/ pt eval

## 2021-09-23 NOTE — DISCHARGE NOTE NURSING/CASE MANAGEMENT/SOCIAL WORK - PATIENT PORTAL LINK FT
You can access the FollowMyHealth Patient Portal offered by [facility] by registering at the following website: [email or web address]. By joining [employer]’s FollowMyHealth portal, you will also be able to view your health information using other applications (apps) compatible with our system.

## 2021-09-23 NOTE — PROVIDER CONTACT NOTE (OTHER) - ASSESSMENT
pt a&o4, noncompliant and doesn't cooperate with rules for safety. Rude and threatening to staff. [employer] called
Unable to assess as patient refused.

## 2021-09-23 NOTE — PHYSICAL THERAPY INITIAL EVALUATION ADULT - PERTINENT HX OF CURRENT PROBLEM, REHAB EVAL
71yo M HTN, HLD, DM, PE, CAD s/p CABG 4 years ago, CHF on Lasix, PE on eliquis, chronic venous stasis - Multiple recent admissions for LLE cellulitis and recent AMA from [place] and now from [place] earlier this afternoon now presenting after fall while waiting for bus. Pt reports left hospital last night because nurses were being argumentative, was waiting for the bus when he felt weak 2/2 cold and loss balance while walking falling onto his L elbow and L knee

## 2021-09-23 NOTE — DISCHARGE NOTE PROVIDER - NSDCMRMEDTOKEN_GEN_ALL_CORE_FT
Aspirin Enteric Coated 81 mg oral delayed release tablet: 1 tab(s) orally once a day    NOTE: PATIENT IS SUPPOSED TO BE ON - MEDS NEVER FILLED AS PER LOCAL PHARMACY. REFUSED TO  MED  Coreg 3.125 mg oral tablet: 1 tab(s) orally 2 times a day    NOTE: PATIENT IS SUPPOSED TO BE ON - MEDS NEVER FILLED AS PER LOCAL PHARMACY. REFUSED TO  MED  Eliquis 5 mg oral tablet: 1 tab(s) orally 2 times a day    NOTE: PATIENT IS SUPPOSED TO BE ON - MEDS NEVER FILLED AS PER LOCAL PHARMACY. REFUSED TO  MED  Entresto 49 mg-51 mg oral tablet: 1 tab(s) orally 2 times a day    NOTE: PATIENT IS SUPPOSED TO BE ON - MEDS NEVER FILLED AS PER LOCAL PHARMACY. REFUSED TO  MED  GlipiZIDE XL 2.5 mg oral tablet, extended release: 1 tab(s) orally once a day    NOTE: PATIENT IS SUPPOSED TO BE ON - MEDS NEVER FILLED AS PER LOCAL PHARMACY. REFUSED TO  MED  Lasix 80 mg oral tablet: 1 tab(s) orally 2 times a day    NOTE: PATIENT IS SUPPOSED TO BE ON - MEDS NEVER FILLED AS PER LOCAL PHARMACY. REFUSED TO  MED  Lipitor 40 mg oral tablet: 1 tab(s) orally once a day    NOTE: PATIENT IS SUPPOSED TO BE ON - MEDS NEVER FILLED AS PER LOCAL PHARMACY. REFUSED TO  MED   Aspirin Enteric Coated 81 mg oral delayed release tablet: 1 tab(s) orally once a day    NOTE: PATIENT IS SUPPOSED TO BE ON - MEDS NEVER FILLED AS PER LOCAL PHARMACY. REFUSED TO  MED  Coreg 3.125 mg oral tablet: 1 tab(s) orally 2 times a day    NOTE: PATIENT IS SUPPOSED TO BE ON - MEDS NEVER FILLED AS PER LOCAL PHARMACY. REFUSED TO  MED  Eliquis 5 mg oral tablet: 1 tab(s) orally 2 times a day    Entresto 49 mg-51 mg oral tablet: 1 tab(s) orally 2 times a day    NOTE: PATIENT IS SUPPOSED TO BE ON - MEDS NEVER FILLED AS PER LOCAL PHARMACY. REFUSED TO  MED  GlipiZIDE XL 2.5 mg oral tablet, extended release: 1 tab(s) orally once a day    NOTE: PATIENT IS SUPPOSED TO BE ON - MEDS NEVER FILLED AS PER LOCAL PHARMACY. REFUSED TO  MED  Lasix 80 mg oral tablet: 1 tab(s) orally 2 times a day    NOTE: PATIENT IS SUPPOSED TO BE ON - MEDS NEVER FILLED AS PER LOCAL PHARMACY. REFUSED TO  MED  Lipitor 40 mg oral tablet: 1 tab(s) orally once a day    NOTE: PATIENT IS SUPPOSED TO BE ON - MEDS NEVER FILLED AS PER LOCAL PHARMACY. REFUSED TO  MED

## 2021-09-23 NOTE — DISCHARGE NOTE PROVIDER - NSDCCPCAREPLAN_GEN_ALL_CORE_FT
PRINCIPAL DISCHARGE DIAGNOSIS  Diagnosis: Cellulitis of lower extremity  Assessment and Plan of Treatment: No antibiotis is indicated at this time.  Call your Health Care Provider within two days of arriving home to make a follow up appointment within one week.  If the affected cellulitic area increases in redness, warmth, pain or swelling call your Health Care Provider.  If you develop fever, chills, and/or malaise, call your Health Care Provider.      SECONDARY DISCHARGE DIAGNOSES  Diagnosis: Hypertension  Assessment and Plan of Treatment:     Diagnosis: Diabetes  Assessment and Plan of Treatment:

## 2021-09-23 NOTE — CHART NOTE - NSCHARTNOTEFT_GEN_A_CORE
EMERGENCY ROOM SOCIAL WORK NOTE:   LMSW received handoff from social work colleague and patient with met patient at bedside for supportive interview. LMSW introduced self and explained social works role, patient verbalized understanding and was receptive to continue   Patient stated that he is single has not children and lives in [place]. Patient is not able to provide an address and continues to deny any history of homelessness. Patient states that he previously lived in [place] when he was younger with his parents and younger brother who have all passed away. Patient reports all his family is in [place] except for his emergency contact Robbin Steve ([phone]) who he identified as his uncle. Patient declined Social work to contact Robbin as he sated that "all he cares about is money, not anyone or anything else". LMSW explored patient safety in the community. Patient continues to state that where resides is safe and stable. Patient states he is insured though Medicaid and Medicare. Patient denies have and connection with DSS and states he "doesn't like food stamps" Patient is unable to report how he supports himself and states he plans of starting work again in the near future. LMSW provided emotional support, validation and motivation, and discussed community resources with patient, however he declined any at this time. Patient reports he will take public transportation home, however he does not have money. LMSW will provide [employer] upon discharge, and remain available as needed.

## 2021-09-23 NOTE — PROGRESS NOTE ADULT - ASSESSMENT
s/p fall  check orthostatic vitals  PT eval     Chronic systolic CHF  on optimal meds  cont current medication    cad s/p cabg  cont asa, / statin     history of PE  on a/c     DC planning as per primary team 
Patient is a 69 yo M from home, w/ PMHx of HTN, HLD, DM, PE, CAD s/p CABG 4 years ago, CHF on Lasix, PE on Eliquis, chronic B/L lymphedema. /ciera ext ulcer

## 2021-09-23 NOTE — DISCHARGE NOTE NURSING/CASE MANAGEMENT/SOCIAL WORK - NSDCPEFALRISK_GEN_ALL_CORE
For information on Fall & injury Prevention, visit [email or web address]

## 2021-09-23 NOTE — DISCHARGE NOTE PROVIDER - CARE PROVIDER_API CALL
JOSÉ MIGUEL [facility]  Internal Medicine  [address] [place], [place] [address]  Phone: ([phone]  Fax: ()-  Follow Up Time:

## 2021-09-23 NOTE — PHYSICAL THERAPY INITIAL EVALUATION ADULT - ADDITIONAL COMMENTS
Pt states he lives in [facility] [place] with a few steps to enter and has no stairs to negotiate and has had no fall prior to current fall. Pt states he does not use or own cane or RW

## 2021-09-29 NOTE — DISCHARGE NOTE PROVIDER - NSDCCONDITION_GEN_ALL_CORE
09/29/21 0906   HAWK Message   Medicare Outpatient and Observation Notification regarding financial responsibility Given to patient/caregiver;Explained to patient/caregiver;Signed/date by patient/caregiver   Date HAWK was signed 09/29/21   Time HAWK was signed 0847      Stable

## 2021-10-04 NOTE — ED ADULT NURSE NOTE - NSSISCREENINGQ1_ED_A_ED
Detail Level: Zone Plan: Apply Sunscreen 30 SPF or greater, ideally broad spectrum with zinc or titanium as the active ingredient, daily to sun exposed areas. Recommended ELTA MD UV Clear as a great example of this. Can be purchased in clinic or on [email or web address] No

## 2021-10-08 NOTE — ED PROVIDER NOTE - PATIENT PORTAL LINK FT
Patient has no objection to blood transfusions.
You can access the FollowMyHealth Patient Portal offered by [facility] by registering at the following website: [email or web address]. By joining [employer]’s FollowMyHealth portal, you will also be able to view your health information using other applications (apps) compatible with our system.

## 2021-11-19 NOTE — PROGRESS NOTE ADULT - PROBLEM SELECTOR PLAN 6
Tracey Marroquin RN from electrophysiology.   Ok from rhythm stand point.  Dr. Espinoza wants to reduce to 12.5 mg daily as a trial.   Unsure if general cardiology is okay with that.   Cara is contact General Cardio for their take.   Will await call back.    not on any medication for DM    sliding sale   monitor Fs f/u  A1c

## 2021-12-03 NOTE — DISCHARGE NOTE PROVIDER - NSDCHHATTENDCERT_GEN_ALL_CORE
[Time Spent: ___ minutes] : I have spent [unfilled] minutes of time on the encounter. My signature below certifies that the above stated patient is homebound and upon completion of the Face-To-Face encounter, has the need for intermittent skilled nursing, physical therapy and/or speech or occupational therapy services in their home for their current diagnosis as outlined in their initial plan of care. These services will continue to be monitored by myself or another physician.

## 2022-01-10 NOTE — DISCHARGE NOTE ADULT - LOW SALT DIET. ACTIVITY AS TOLERATED. CALL YOUR DOCTOR FOR FOLLOW UP APPOINTMENT
Quality 111:Pneumonia Vaccination Status For Older Adults: Pneumococcal Vaccination Previously Received
Detail Level: Detailed
Quality 110: Preventive Care And Screening: Influenza Immunization: Influenza Immunization previously received during influenza season
Statement Selected

## 2022-03-17 NOTE — PROVIDER CONTACT NOTE (OTHER) - BACKGROUND
MEDICARE WELLNESS VISIT + NOTE    CHIEF COMPLAINT:  Joey Payne presents for his Welcome to Medicare Medicare Wellness Visit.   His additional complaints or concerns are addressed below.     Patient Care Team:  Cassi Herrera PA-C as PCP - General (Family Practice)        Patient Active Problem List   Diagnosis   • Benign neoplasm of colon   • GERD (gastroesophageal reflux disease)   • Anemia, unspecified   • Renal insufficiency   • Depression   • Alcoholism /alcohol abuse   • Hypertension   • Hyperlipidemia   • Other and unspecified alcohol dependence, unspecified drinking behavior   • Vitamin D deficiency   • Osteoporosis   • Coronary artery disease involving native coronary artery of native heart without angina pectoris         Past Medical History:   Diagnosis Date   • Alcoholism /alcohol abuse 11/13/2012   • Anemia    • Anxiety    • Chronic kidney disease    • Colon Polyp    • Depression    • Esophageal reflux    • Essential hypertension, benign 9/15/2011   • Liver disease    • Osteoporosis 11/27/2015   • Other and unspecified hyperlipidemia 9/15/2011   • Personal history of traumatic fracture     right ankle fracture   • Severe protein-calorie malnutrition (CMS/HCC)    • Type II or unspecified type diabetes mellitus without mention of complication, not stated as uncontrolled 9/15/2011    no longer per wife         Past Surgical History:   Procedure Laterality Date   • Colonoscopy diagnostic  11/26/2012    Affi 3yr recall, polyps tubular adenoma   • Colonoscopy diagnostic  1/14/16    3 yr recall, polyps tubular adenoma   • Colonoscopy diagnostic  09/11/2019    Affi, hx polyps, tubular adenoma, 5 years   • Colonoscopy w/ polypectomy  2008   • Hemocult x1  10/25/2010         Social History     Tobacco Use   • Smoking status: Current Every Day Smoker     Packs/day: 1.00     Years: 42.00     Pack years: 42.00     Types: Cigarettes   • Smokeless tobacco: Never Used   Substance Use Topics   • Alcohol use: 
Yes     Alcohol/week: 42.0 standard drinks     Types: 42 Cans of beer per week   • Drug use: No     Family History   Problem Relation Age of Onset   • Heart disease Mother    • Hypertension Mother    • Diabetes Mother    • Cancer Father         stomache   • Bleeding Disorder Sister         blood clots   • Patient is unaware of any medical problems Sister    • Patient is unaware of any medical problems Brother    • Patient is unaware of any medical problems Brother    • Patient is unaware of any medical problems Brother          Current Outpatient Medications   Medication Sig Dispense Refill   • ferrous gluconate (FERGON) 324 (38 Fe) MG tablet Take 1 tablet by mouth daily. 90 tablet 3   • rosuvastatin (CRESTOR) 40 MG tablet TAKE 1 TABLET BY MOUTH DAILY 90 tablet 1   • potassium chloride (KLOR-CON) 10 MEQ ER tablet Take 1 tablet by mouth 2 times daily. 180 tablet 1   • losartan (COZAAR) 100 MG tablet Take 1 tablet by mouth daily. 90 tablet 1   • folic acid (FOLATE) 1 MG tablet TAKE 1 TABLET BY MOUTH DAILY 90 tablet 1   • metoPROLOL tartrate (LOPRESSOR) 50 MG tablet TAKE 1 TABLET BY MOUTH TWICE DAILY 180 tablet 0   • alendronate (FOSAMAX) 70 MG tablet Take 1 tablet by mouth every 7 days. 12 tablet 3   • ASPIRIN PO      • thiamine (VITAMIN B1) 100 MG tablet Take 1 tablet by mouth daily. 90 tablet 1     No current facility-administered medications for this visit.        The following items on the Medicare Health Risk Assessment were found to be positive  1.) Do you have an Advance directive, living will, or power of  for health care document that contains your wishes for end of life care?: No     2.) Would you like additional information on advance directives?: Yes     4.) During the past 4 weeks, what was the hardest physical activity you could do for at least 2 minutes?: Light     5.) Do you do moderate to strenuous exercise (brisk walk) for about 20 minutes for 3 or more days per week?: No, I usually do not 
exercise this much     6 a.) How many servings of Fruits and Vegetables do you have each day ( 1 serving = 1 piece of fruit, 1/2 cup fruits or vegetables): 1 per day     6 b.) How many servings of High Fiber / Whole Grain Foods to you have each day ( 1 serving = 1 cup cold cereal, 1/2 cup cooked cereal, 1 slice bread): 1 per day         Vision and Hearing screens:    Hearing Screening    125Hz 250Hz 500Hz 1000Hz 2000Hz 3000Hz 4000Hz 6000Hz 8000Hz   Right ear:   Pass Pass Pass  Pass     Left ear:   Pass Pass Pass  Pass        Visual Acuity Screening    Right eye Left eye Both eyes   Without correction: 20/100 20/50 20/50   With correction:        Pt didn't bring glasses to appointment .    Advance Directive:   The patient has the following documents:  No Advance Directives on file. Patient offered documents.    Cognitive/Functional Status: preexisting cognitive issues - stable    Opioid Review: Joey is not taking opioid medications.    Recent PHQ 2/9 Score:    PHQ 2:  Date Adult PHQ 2 Score Adult PHQ 2 Interpretation   3/17/2022 0 No further screening needed       PHQ 9:       DEPRESSION ASSESSMENT/PLAN:  Depression screening is negative no further plan needed.     Body mass index is 26.11 kg/m².    BMI ASSESSMENT/PLAN:  Patient BMI is within normal range.     See Patient Instructions section.   Return in about 1 year (around 3/17/2023) for Medicare Wellness Visit.      OUTPATIENT PROGRESS NOTE    Subjective   Chief Complaint Physical, Medicare Wellness Visit, Office Visit, and Imm/Inj (Flu vaccine)    HPI patient has hypertension and hyperlipidemia.  He has a history of diabetes which is now diet controlled, and renal insufficiency.  He denies chest pain, shortness of breath, headaches, dizziness, or swelling of the lower extremities.  He denies increased thirst, increased urination, blurry vision, or unexplained weight loss.  He has recently been having a better appetite and he has had some weight 
Pt had removed his top sheet and blanket, RN removed bottom sheet with small blood stain from recent ordered 18g IV insertion. Pt had removed his gown and was wearing his shorts and awaiting new gown.
gain.    Patient continues to smoke.  He has a history of an abnormal CT scan of the lungs recently with recommendation for short-term follow-up.  He is overdue for that.  His CT scan lungs also showed severe cardiac calcifications.  He had been referred to the cardiologist who he saw 1 time but is not continuing to follow-up with the cardiologist.    Patient drinks a 24 pack of beer or more per week.  He has stopped drinking hard alcohol.    Patient has had recurrent mild major depression but states that he currently is not feeling depressed.  He does admit that he does not like to do anything other than sit at home most days however.  He denies feeling harmful to himself or others.     Medications  Medications were reviewed and updated today.    Histories  I have personally reviewed and updated the patient's past medical, past surgical, family and social histories during today's visit.    Review of Systems: The patient DENIES symptoms of:  GENERAL fever, chills, night sweats, fatigue and weight loss  SKIN pigmentation or loss of pigmentation, rash and dry skin  EYES visual blurring and double vision  EARS decreased auditory acuity, ringing or tinnitus in the ears and vertigo  NOSE discharge from the nose, obstruction of the nose, sneezing, rhinorrhea and post nasal drainage  MOUTH hoarse voice and pharyngitis  CARDIORESP chest pain, palpitations, fast heart rate, edema, cough, hemoptysis, wheeze, shortness of breath and sputum  GI abdominal pain, nausea, vomiting, constipation, diarrhea, black tarry stools, blood in the stools and dysphagia  VASCULAR claudication with walking   MALE urgency, frequency, dysuria, hematuria and nocturia  NEURO Negative  PSYCH symptoms of depression, feeling sad or blue  MUSCULOSKEL joint stiffness    Objective   Visit Vitals  /70 (BP Location: LUE - Left upper extremity, Patient Position: Sitting, Cuff Size: Regular)   Pulse 84   Temp 97.2 °F (36.2 °C) (Temporal)   Resp 16 
   5' 8\" (1.727 m)   Wt 77.9 kg (171 lb 11.2 oz)   SpO2 98%   BMI 26.11 kg/m²     Physical Exam  General appearance: alert, cooperative and no distress  Head: Normocephalic, without obvious abnormality, atraumatic  Eyes: Conjunctivae/sclerae normal. No erythema, edema or exudate.  Ears: normal tympanic membranes and external ear canals both ears  Nose: Nares normal. Septum midline. Mucosa normal. No drainage or sinus tenderness.  Throat: lips, mucosa, and tongue normal; teeth and gums normal  Neck: no adenopathy, supple, symmetrical, trachea midline and thyroid not enlarged, symmetric, no tenderness/mass/nodules  Back: Back symmetric, no curvature. Range of motion normal. No costovertebral angle tenderness.  Lungs: clear to auscultation bilaterally  Chest wall: no tenderness  Heart: regular rate and rhythm, S1, S2 normal, no murmur, click, rub or gallop  Abdomen: Soft, non-tender; bowel sounds normal; no masses, no hepatosplenomegaly  Extremities: Homans sign is negative, no sign of deep vein thrombosis and no edema, redness or tenderness in the calves or thighs  Skin: Skin color, texture, turgor normal. No rashes or lesions  Genital and rectal exams deferred per patient request.        Assessment & Plan   Diagnoses and associated orders for this visit:  [facility]  -     PREV EXAM INITIAL MEDICARE 1ST 12 MO ENROL  2. Encounter for preventive care  -     PREV NEW AGE >64  3. Screening for lung cancer  -     COUNSELING VISIT TO DISCUSS NEED FOR LUNG CANCER SCREENING USING LOW  -     Chest CT  4. Abnormal CT scan of lung  -     Chest CT  5. Personal history of nicotine dependence   -     COUNSELING VISIT TO DISCUSS NEED FOR LUNG CANCER SCREENING USING LOW  -     Chest CT  6. Screening for ischemic heart disease (IHD)  7. Screening for prostate cancer  -     PSA  8. Glaucoma screening  -     SERVICE TO OPTOMETRY  9. History of diabetes mellitus  -     Basic Metabolic Panel  -     Glycohemoglobin  -     
Hx of PE
pt high fall risk
Lipid Panel With Reflex  -     Microalbumin Urine Random  10. Primary hypertension  -     Basic Metabolic Panel  -     Lipid Panel With Reflex  11. Hyperlipidemia, unspecified hyperlipidemia type  -     Basic Metabolic Panel  -     SGOT  -     SGPT  -     Lipid Panel With Reflex  12. Renal insufficiency  -     Microalbumin Urine Random  13. Need for vaccination  -     PNEUMOCOCCAL CONJUGATE 13 VALENT VACC(PREVNAR-13)  14. Alcohol use disorder, moderate, dependence (CMS/HCC)  15. Major depressive disorder, recurrent episode, mild (CMS/HCC)    Patient was advised to quit smoking.  Also advised to quit drinking.  Labs ordered and pending.  Continue current medications.  Refills were provided.  Encourage patient to become more active.  Immunizations updated.    CT Lung Screening Counseling -  Based on his age of 67 year old and smoking history as documented in the Social History obtaining a CT Lung Screening Testing should be considered.   He is free of symptoms such as chronic cough, hemoptysis, weight loss which would prompt a Diagnostic CT Lung request.     The potential benefits of this testing include:  · Early detection of lung cancers, which can improve treatment results  · Evaluation of lung damage from smoking (emphysema)    The potential risks of this testing include:    · Scanning may miss small / early tumors  · Scanning may detect small lesions of uncertain nature which require repeat testing  · Scanning may detect lesions which prompt further testing - which may turn out to be cancer, but may also turn out to be non-cancerous. More than 95% of abnormal screening chest CTs turn out to be false alarms when further tested; most of these did not require procedures or invasive testing to clarify. 2-3% of high-risk patients will have bronchoscopy, needle biopsy or surgery with a normal end result. The rate of serious complication is about 0.3 %  · It is estimated that there is 10-12% risk of over diagnosing 
cancer that is so small it would never have caused trouble during the patient's lifetime  · Radiation exposures accumulate over time. The low dose CT lung has radiation exposure equivalent to about 6 months of background radiation from the environment. The risk of dying of a cancer related to radiation from a standard CT is less than 0.05%    It is recommended that this screening CT be repeated on a yearly basis up to age 77 years or until 15 years after stopping smoking.    Smoking status was reviewed with tobacco cessation strategies and support information provided.  All his questions were answered and he does  wish to proceed.      Detailed risk / benefit summary from NIH/ [facility] [place]

## 2022-12-14 NOTE — ED ADULT NURSE NOTE - NURSING ED SKIN COLOR
Physical Therapy  Facility/Department: [address] [place] [address]  Physical Therapy Initial Assessment    Name: Yandy Lee  : 1953  MRN: [id number]  Date of Service: 2022    Discharge Recommendations:Cindy Camejo scored a 19/24 on the AM-PAC short mobility form. Current research shows that an AM-PAC score of 18 or greater is typically associated with a discharge to the patient's home setting. Based on the patient's AM-PAC score and their current functional mobility deficits, it is recommended that the patient have 2-3 sessions per week of Physical Therapy at d/c to increase the patient's independence. At this time, this patient demonstrates the endurance and safety to discharge home with (home services) and a follow up treatment frequency of 2-3x/wk. Please see assessment section for further patient specific details. If patient discharges prior to next session this note will serve as a discharge summary. Please see below for the latest assessment towards goals. PT Equipment Recommendations  Equipment Needed:  (rolling walker if home)      Patient Diagnosis(es): The primary encounter diagnosis was Nausea. A diagnosis of Dizziness was also pertinent to this visit. Past Medical History:  has a past medical history of Asthma, Cancer ([address] COPD (chronic obstructive pulmonary disease) ([address] Depression, GERD (gastroesophageal reflux disease), and PE (pulmonary embolism). Past Surgical History:  has a past surgical history that includes Cataract removal; Middle ear surgery; Hysterectomy; bone marrow transplant; and bone marrow biopsy. Assessment   Assessment: 82 yo admitted 24 for 3 week history of nausea/vomiting/unsteady gait/dizziness. Pt is poor to fair historian recalling details of symptom timeline. Pt tearful during session wondering if team will \"figure it out\" what is causing her symptoms.  Pt demo mobility below her reported baseline of independent at home without AD; pt plans to return home at discharge. If home, recommend 24 hour assist initially, home PT, use of RW at all times for safety. Treatment Diagnosis: mobility impairment due to dizziness  Decision Making: Medium Complexity  Requires PT Follow-Up: Yes  Activity Tolerance  Activity Tolerance: Patient limited by fatigue;Patient limited by pain;Treatment limited secondary to medical complications (limited by nausea/vomiting)     Plan   Physcial Therapy Plan  General Plan:  (2-5)  Current Treatment Recommendations: Functional mobility training, Transfer training, Gait training, Endurance training  Safety Devices  Type of Devices: Call light within reach, Chair alarm in place, Left in chair, Nurse notified     Restrictions  Position Activity Restriction  Other position/activity restrictions: up with assist     Subjective   General  Chart Reviewed: Yes  Additional Pertinent Hx: 81 year  with a history of HTN, CML (in remission), 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 Head CT/MRA, brain MRI neg; neuro consult. Family / Caregiver Present: Yes ()  Diagnosis: dizziness  Follows Commands: Within Functional Limits  Subjective  Subjective: Pt found supine in bed and agreeable to PT. Pt c/o B knees \"burning\" but did not rate.          Social/Functional History  Social/Functional History  Lives With: Spouse, Family  Type of Home: House  Home Layout: Two level, Bed/Bath upstairs  Home Access: Stairs to enter with rails  Entrance Stairs - Number of Steps: 7-8  Entrance Stairs - Rails: Both  Bathroom Shower/Tub: Walk-in shower (walk in shower in the basement- tub shower upstairs but it doesn't work)  Home Equipment:  (no equipment)  Has the patient had two or more falls in the past year or any fall with injury in the past year?: Unknown (pt doesn't remember falling but  reporting she may have had a fall recently)  ADL Assistance: Independent  Homemaking Responsibilities:  (shares duties with family)  Ambulation Assistance: Independent  Transfer Assistance: Independent  Active : Yes  Type of Occupation: not currently working    Vision/Hearing  Vision  Vision: Impaired  Vision Exceptions: Wears glasses at all times  Hearing  Hearing: Within functional limits      Cognition   Orientation  Overall Orientation Status: Within Functional Limits (some STM loss noted)  Cognition  Overall Cognitive Status: WFL     Objective                 AROM RLE (degrees)  RLE AROM: WFL  AROM LLE (degrees)  LLE AROM : WFL    Strength RLE  Strength RLE: WFL  Strength LLE  Strength LLE: WFL           Bed mobility  Supine to Sit: Modified independent  Sit to Supine: Modified independent  Scooting: Modified independent    Transfers  Sit to Stand: Contact guard assistance (x5 trials with occ vc for hand placement)  Stand to Sit: Contact guard assistance (x5 trials with occ vc for hand placement)    Ambulation  Device: No Device  Assistance: Contact guard assistance  Quality of Gait: forward posture with decreased step length/height;  pt fatigued quickly and reaching out for UE support inconsistently  Distance: 10 ft x2  Comments: Pt amb 40 ft with RW and CGA; vc for walker safety/sequence. Pt became nauseated/diaphoretic with increased distance and assist back to chair where she was dry heaving into emesis bag. B/P 164/98 initially, 156/94 after longer ambuation distance. RN aware.        AM-PAC Score  AM-PAC Inpatient Mobility Raw Score : 19 (12/14/22 1208)  AM-PAC Inpatient T-Scale Score : 45.44 (12/14/22 1208)  Mobility Inpatient CMS 0-100% Score: 41.77 (12/14/22 1208)  Mobility Inpatient CMS G-Code Modifier : CK (12/14/22 1208)         Goals  Short Term Goals  Time Frame for Short Term Goals: discharge  Short Term Goal 1: sit to/from stand independent  Short Term Goal 2: ambulate 100 ft with or without AD mod I  Patient Goals Patient Goals : return home       Education  Patient Education  Education Given To: Patient  Education Provided: Role of Therapy (need to call for assist to get up)  Education Method: Verbal  Barriers to Learning: Cognition  Education Outcome: Verbalized understanding;Continued education needed      Therapy Time   Individual Concurrent Group Co-treatment   Time In 1010         Time Out 1105         Minutes 55          Timed Code Treatment Minutes: 45      Total Treatment Minutes:   [address] [facility],  normal for race

## 2022-12-21 NOTE — PROGRESS NOTE ADULT - ASSESSMENT
Yes A:   Left leg wounds    P:  Patient evaluated, chart reviewed  L LE Xrays complete, noted without osseous pathology  Cultures noted to be done, with Staph noted.  B/l duplex negative for DVT   Evaluated L leg and wounds. Dressed with DSD, light ACE compression.   WCO in place to apply Santyl to Left leg wounds once per day. Dress with 4x4s, allegra, ACE   Continue abx per primary team   Recommend elevation, Z Float boots bilaterally  Discussed and evaluated bedside with Attending Dr. Pineda

## 2023-01-06 NOTE — DISCHARGE NOTE NURSING/CASE MANAGEMENT/SOCIAL WORK - NSDCPEELIQUISREACT_GEN_ALL_CORE
Apixaban/Eliquis increases your risk for bleeding. Notify your doctor if you experience any of the following side effects: bleeding, coughing or vomiting blood, red or black stool, unexpected pain or swelling, itching or hives, chest pain, chest tightness, trouble breathing, changes in how much or how often you urinate, red or pink urine, numbness or tingling in your feet, or unusual muscle weakness. When Apixaban/Eliquis is taken with other medicines, they can affect how it works. Taking other medications such as aspirin, blood thinners, nonsteroidal anti-inflammatories, and medications that treat depression can increase your risk of bleeding. It is very important to tell your health care provider about all of the other medicines, including over-the-counter medications, herbs, and vitamins you are taking. DO NOT start, stop, or change the dosage of any medicine, including over-the-counter medicines, vitamins, and herbal products without your doctor’s approval. Any products containing aspirin or are nonsteroidal anti-inflammatories lessen the blood’s ability to form clots and add to the effect of Apixaban/Eliquis. Never take aspirin or medicines that contain aspirin without speaking to your doctor.
hand grasp, leg strength strong and equal bilaterally

## 2023-02-20 NOTE — PROVIDER CONTACT NOTE (MEDICATION) - ACTION/TREATMENT ORDERED:
Detail Level: Generalized
aware that pt is off tele. pt is okay to be off tele
Include Location In Plan?: No
as per SLOAN Garcia pt is okay to go off tele

## 2023-06-08 NOTE — PROGRESS NOTE ADULT - PROBLEM/PLAN-7
No rashes, no jaundice present , good turgor , no masses.
DISPLAY PLAN FREE TEXT

## 2024-01-26 NOTE — PATIENT PROFILE ADULT - LIVING ENVIRONMENT
Your blood pressure here was elevated.  I do not usually start antihypertensive meds as seen for the first time with an elevated blood pressure.    Your lab work all looked great but you need to follow-up with your physician preferably within the week    You are being discharged from the Emergency Department after evaluation of your presenting problem.  You were found not to have an emergency that requires hospitalization or surgery, but this does not mean you do not have a health concern.  You should follow up with your primary care physician and any other physicians suggested at time of discharge.  Also, if your condition worsens or changes know that the emergency department is open and available 24 hours a day/ 7 days a week and you should return to us if you have concerns. Thank you for allowing us to participate in your care. no

## 2024-04-20 NOTE — CONSULT NOTE ADULT - ASSESSMENT
A:   Left leg cellulitis  Left leg wound      P:  Patient evaluated, chart reviewed  L foot and leg Xrays pending   Cultures pending   Duplex pending L leg   Pt refused dressing   Will assess again tomorrow   Discussed with and evaluated bedside with Attending Dr. Pittman 
1. PE   On A/C   Monitor for bleeding   DVT and GI PPX     2. Left lower ext. Cellulitis  Abx  Panculture  Podiatry consult  Wound care  ID f/u     3. CHF  Cont meds  Cardio F/U   Monitor I&O     4. DM   Accuchecks  Reg insulin coverage per HSS  A1C level     5. Pulmonary HTN   By hx  Cont meds  Bronchodilators  O2 Supp PRN 
70y M from home with PMH HTN, HLD, DM, PE, CAD s/p CABG (3.5 yrs ago), presenting with swelling abdomen and legs, chronic systolic HF,Lt leg cellulitis.  1.Left leg cellulitis-ABX as per ID.  2.Chronic systolic HF-coreg,ace,lasix po.  3.Pt reports allergy to aldactone.  4.CAD-asa,b blocker,statin.  5.PE-Eliquis.  6.DM-Insulin.  7.HTN-coreg,ace.  8.Vascular eval called.  9.PPI.    
B/l LE lymphoedema and cellulitis  1. Pt refusing compression with ace bandage. Pt refusing to take a shower to clean legs adequately  2. Recommend elevation of b/l LE  3. Pt can f/u with Dr. Canales as outpatiemt  3. No acute vascular surgery intervention at this time
Patient is a 70y old  Male who is  from home, w/ PMHx of HTN, HLD, DM, PE, CAD s/p CABG 4 years ago, CHF on Lasix, PE on eliquis. Presents to the ER for evaluation of chronic b/l L.E swelling and worsening left LE cellulitis. He has taken 2 courses of PO Abx. Left leg is swollen, erythematous, has purulent drainage, and ulcerations. On admission, he has no fever and no Leukocytosis. He has started on IV Vancomycin and The ID consult requested to assist with further evaluation and antibiotic management.    # LLE Cellulitis    would recommend:    1. Follow up wound and Blood cultures  2. Continue IV Vancomycin and keep level between 15 to 20  3. Keep LLE elevated  4. wound care and keep it moist     d/w patient and Nursing staff     will follow the patient with you and make further recommendation based on the clinical course and Lab results  Thank you for the opportunity to participate in Mr. PÉREZ's care      Attending Attestation:    Spent more than 65 minutes on total encounter, more than 50 % of the visit was spent counseling and/or coordinating care by the Attending physician.      
No - the patient is unable to be screened due to medical condition

## 2025-01-07 NOTE — DIETITIAN INITIAL EVALUATION ADULT. - OTHER INFO
Goal Outcome Evaluation:   Patient on 2L tolerating well at this time. Patient C/O the exhalation valve blowing on him as well as being too loud even whenever he tried to muffle it. Patient said he will more than likely not wear cpap tonight. Told patient if something changed to call out and let RT know. No issues or changes @ this time.                                          Reports No weight loss. No complaints of Nausea, vomiting or diarrhea. No problems with chewing or swallowing food . consuming meals. No food allergies. when asked if adhering to DM / heart healthy diet PTA , declined to answer. education On Heart-healthy consistent carbohydrate nutrition therapy deferred.  provide carbohydrate consistent , DASH/TLC , low sodium diet as ordered

## 2025-01-19 NOTE — DISCHARGE NOTE NURSING/CASE MANAGEMENT/SOCIAL WORK - NSDCPEFALRISK_GEN_ALL_CORE
Patient information on fall and injury prevention
Pt encountered semi-german in bed in NAD with family at b/s. Pt is very confused, at times agitated but is able to be redirected to task. Pt performs bed mobility with Any, sit to stand transfer with RW and Any, and ambulation of 20 feet with RW and CGA. Pt is unsteady on his feet and requires CGA for balance. Pt left as found with family at bedside in [facility].

## 2025-02-13 NOTE — ED PROVIDER NOTE - RESPIRATORY, MLM
LVM for pt to call office to schedule annual visit with Debbie.   Patient wrote melanie     Breath sounds clear and equal bilaterally.

## 2025-05-30 NOTE — DISCHARGE NOTE PROVIDER - CARE PROVIDERS DIRECT ADDRESSES
,DirectAddress_Unknown,DirectAddress_Unknown ,DirectAddress_Unknown,DirectAddress_Unknown,DirectAddress_Unknown yes